# Patient Record
Sex: MALE | Race: WHITE | Employment: FULL TIME | URBAN - METROPOLITAN AREA
[De-identification: names, ages, dates, MRNs, and addresses within clinical notes are randomized per-mention and may not be internally consistent; named-entity substitution may affect disease eponyms.]

---

## 2017-01-09 ENCOUNTER — APPOINTMENT (OUTPATIENT)
Dept: GENERAL RADIOLOGY | Age: 42
End: 2017-01-09
Attending: EMERGENCY MEDICINE
Payer: MEDICAID

## 2017-01-09 ENCOUNTER — HOSPITAL ENCOUNTER (EMERGENCY)
Age: 42
Discharge: HOME | End: 2017-01-09
Attending: EMERGENCY MEDICINE
Payer: MEDICAID

## 2017-01-09 VITALS
TEMPERATURE: 98.2 F | WEIGHT: 270 LBS | BODY MASS INDEX: 32.88 KG/M2 | HEART RATE: 105 BPM | OXYGEN SATURATION: 95 % | RESPIRATION RATE: 19 BRPM | SYSTOLIC BLOOD PRESSURE: 126 MMHG | HEIGHT: 76 IN | DIASTOLIC BLOOD PRESSURE: 76 MMHG

## 2017-01-09 DIAGNOSIS — R07.9 CHEST PAIN, UNSPECIFIED TYPE: Primary | ICD-10-CM

## 2017-01-09 LAB
ALBUMIN SERPL BCP-MCNC: 3.5 G/DL (ref 3.5–5)
ALBUMIN/GLOB SERPL: 0.8 {RATIO} (ref 1.1–2.2)
ALP SERPL-CCNC: 87 U/L (ref 45–117)
ALT SERPL-CCNC: 45 U/L (ref 12–78)
ANION GAP BLD CALC-SCNC: 10 MMOL/L (ref 5–15)
AST SERPL W P-5'-P-CCNC: 19 U/L (ref 15–37)
ATRIAL RATE: 102 BPM
BASOPHILS # BLD AUTO: 0 K/UL (ref 0–0.1)
BASOPHILS # BLD: 0 % (ref 0–1)
BILIRUB SERPL-MCNC: 0.3 MG/DL (ref 0.2–1)
BUN SERPL-MCNC: 12 MG/DL (ref 6–20)
BUN/CREAT SERPL: 12 (ref 12–20)
CALCIUM SERPL-MCNC: 8.8 MG/DL (ref 8.5–10.1)
CALCULATED P AXIS, ECG09: 37 DEGREES
CALCULATED R AXIS, ECG10: 24 DEGREES
CALCULATED T AXIS, ECG11: 24 DEGREES
CHLORIDE SERPL-SCNC: 106 MMOL/L (ref 97–108)
CK SERPL-CCNC: 189 U/L (ref 39–308)
CO2 SERPL-SCNC: 25 MMOL/L (ref 21–32)
CREAT SERPL-MCNC: 1.04 MG/DL (ref 0.7–1.3)
DIAGNOSIS, 93000: NORMAL
EOSINOPHIL # BLD: 0.1 K/UL (ref 0–0.4)
EOSINOPHIL NFR BLD: 1 % (ref 0–7)
ERYTHROCYTE [DISTWIDTH] IN BLOOD BY AUTOMATED COUNT: 12.6 % (ref 11.5–14.5)
GLOBULIN SER CALC-MCNC: 4.4 G/DL (ref 2–4)
GLUCOSE SERPL-MCNC: 107 MG/DL (ref 65–100)
HCT VFR BLD AUTO: 39.4 % (ref 36.6–50.3)
HGB BLD-MCNC: 13.2 G/DL (ref 12.1–17)
LYMPHOCYTES # BLD AUTO: 24 % (ref 12–49)
LYMPHOCYTES # BLD: 1.8 K/UL (ref 0.8–3.5)
MCH RBC QN AUTO: 30.5 PG (ref 26–34)
MCHC RBC AUTO-ENTMCNC: 33.5 G/DL (ref 30–36.5)
MCV RBC AUTO: 91 FL (ref 80–99)
MONOCYTES # BLD: 0.4 K/UL (ref 0–1)
MONOCYTES NFR BLD AUTO: 5 % (ref 5–13)
NEUTS SEG # BLD: 5.1 K/UL (ref 1.8–8)
NEUTS SEG NFR BLD AUTO: 70 % (ref 32–75)
P-R INTERVAL, ECG05: 168 MS
PLATELET # BLD AUTO: 262 K/UL (ref 150–400)
POTASSIUM SERPL-SCNC: 3.6 MMOL/L (ref 3.5–5.1)
PROT SERPL-MCNC: 7.9 G/DL (ref 6.4–8.2)
Q-T INTERVAL, ECG07: 368 MS
QRS DURATION, ECG06: 96 MS
QTC CALCULATION (BEZET), ECG08: 479 MS
RBC # BLD AUTO: 4.33 M/UL (ref 4.1–5.7)
SODIUM SERPL-SCNC: 141 MMOL/L (ref 136–145)
TROPONIN I SERPL-MCNC: <0.04 NG/ML
TROPONIN I SERPL-MCNC: <0.04 NG/ML
VENTRICULAR RATE, ECG03: 102 BPM
WBC # BLD AUTO: 7.3 K/UL (ref 4.1–11.1)

## 2017-01-09 PROCEDURE — 85025 COMPLETE CBC W/AUTO DIFF WBC: CPT | Performed by: STUDENT IN AN ORGANIZED HEALTH CARE EDUCATION/TRAINING PROGRAM

## 2017-01-09 PROCEDURE — 36415 COLL VENOUS BLD VENIPUNCTURE: CPT | Performed by: EMERGENCY MEDICINE

## 2017-01-09 PROCEDURE — 74011250637 HC RX REV CODE- 250/637: Performed by: EMERGENCY MEDICINE

## 2017-01-09 PROCEDURE — 71020 XR CHEST PA LAT: CPT

## 2017-01-09 PROCEDURE — 82550 ASSAY OF CK (CPK): CPT | Performed by: STUDENT IN AN ORGANIZED HEALTH CARE EDUCATION/TRAINING PROGRAM

## 2017-01-09 PROCEDURE — 84484 ASSAY OF TROPONIN QUANT: CPT | Performed by: STUDENT IN AN ORGANIZED HEALTH CARE EDUCATION/TRAINING PROGRAM

## 2017-01-09 PROCEDURE — 80053 COMPREHEN METABOLIC PANEL: CPT | Performed by: STUDENT IN AN ORGANIZED HEALTH CARE EDUCATION/TRAINING PROGRAM

## 2017-01-09 PROCEDURE — 93005 ELECTROCARDIOGRAM TRACING: CPT

## 2017-01-09 PROCEDURE — 99285 EMERGENCY DEPT VISIT HI MDM: CPT

## 2017-01-09 RX ADMIN — NITROGLYCERIN 1 INCH: 20 OINTMENT TOPICAL at 15:29

## 2017-01-09 NOTE — ED PROVIDER NOTES
HPI Comments: 42 y.o. male with past medical history significant for acute MI, depression, and HTN who presents from work via EMS with chief complaint of chest pain. Pt reports onset of 10/10 left sided CP 2 hours ago when he was on his way back to work after eating lunch. The pain started as a discomfort that he initially thought was secondary to what he ate for lunch but it did not resolve. The pain radiates to his \"whole left side\" including left arm. No radiation into  He also reports associated tingling and numbness in left arm, diaphoresis, SOB, \"shaking real bad\", and being told he looked \"flush\". He denies having any sx prior to onset of CP except for a cough. Pt states that he was given 325 mg ASA by EMS which reduced his CP, currently rating 5/10. Pt reports that he has a history of a \"very minor\" cardiac event not requiring catheterization, and that he has been compliant with instructions to take 325 mg ASA daily - including early this morning. He takes medication for high cholesterol, HTN, and depression at night. Pt was last seen by his cardiologist 6 months ago. No Nausea or vomiting. There are no other acute medical concerns at this time. Social hx:  Former smoker. Social alcohol use. Note written by Fredi Delacruz, as dictated by Torri Landry MD 3:12 PM          The history is provided by the patient. Past Medical History:   Diagnosis Date    Acute MI ([facility] [address]     Depression     Hypertension        History reviewed. No pertinent past surgical history. History reviewed. No pertinent family history. Social History     Social History    Marital status: N/A     Spouse name: N/A    Number of children: N/A    Years of education: N/A     Occupational History    Not on file.      Social History Main Topics    Smoking status: Former Smoker    Smokeless tobacco: Not on file    Alcohol use Yes      Comment: socially     Drug use: No    Sexual activity: Not on file     Other Topics Concern    Not on file     Social History Narrative    No narrative on file         ALLERGIES: Review of patient's allergies indicates no known allergies. Review of Systems   Constitutional: Positive for diaphoresis. Negative for fever. HENT: Negative for facial swelling. Eyes: Negative for visual disturbance. Respiratory: Positive for cough and shortness of breath. Cardiovascular: Positive for chest pain. Gastrointestinal: Negative for abdominal pain, nausea and vomiting. Genitourinary: Negative for dysuria. Musculoskeletal: Negative for joint swelling. Skin: Negative for rash. Neurological: Positive for tremors and numbness. Negative for headaches. Hematological: Negative for adenopathy. Psychiatric/Behavioral: Negative for suicidal ideas. All other systems reviewed and are negative. Vitals:    01/09/17 1427   BP: 144/90   Pulse: (!) 111   Resp: 14   Temp: 98.4 °F (36.9 °C)   SpO2: 96%   Weight: 122.5 kg (270 lb)   Height: 6' 4\" (1.93 m)            Physical Exam   Constitutional: He is oriented to person, place, and time. He appears well-developed and well-nourished. No distress. HENT:   Head: Normocephalic and atraumatic. Mouth/Throat: Oropharynx is clear and moist.   Eyes: Pupils are equal, round, and reactive to light. Neck: Normal range of motion. Neck supple. Cardiovascular: Normal rate, regular rhythm, normal heart sounds and intact distal pulses. Pulmonary/Chest: Effort normal and breath sounds normal. No respiratory distress. Abdominal: Soft. Bowel sounds are normal. He exhibits no distension. There is no tenderness. Musculoskeletal: Normal range of motion. He exhibits no edema. Neurological: He is alert and oriented to person, place, and time. Skin: Skin is warm and dry. Nursing note and vitals reviewed.      Note written by Fredi Escobar, as dictated by Sophy Funk MD 3:29 PM      MDM  Number of Diagnoses or Management Options  Diagnosis management comments: A:  34yo M who p/w chest pain starting after lunch today. Pain now improved after asa given by EMS. Pt reports having minor MI about one year ago for which he was started on asa 325mg daily. DDx:  ACS, msk pain, GERD/esophagitis, pericarditis, effusion, myocarditis    P:  ecg  cxr  Labs  Already took asa. Will try nitro paste    ED Course       Procedures    ED EKG interpretation:  Rhythm: normal sinus rhythm. Rate (approx.): 102. Axis: normal.  ST segment:  No concerning ST elevations or depressions. This EKG was interpreted by Kristyn Medina MD,ED Provider. Labs and CXR unremarkable. Pt with no pain after applying nitro paste. PROGRESS NOTE:  4:46 PM  Pt is pain free at this time. Declines admission. Will check repeat troponin. Pt states he can f/u with PCP tomorrow. He can also contact his cardiologist from [address] [place] [address] if needed. 5:00 PM  Repeat ECG  ED EKG interpretation:  Rhythm: normal sinus rhythm. Rate (approx.): 97. Axis: normal.  ST segment:  No concerning ST elevations or depressions. This EKG was interpreted by Kristyn Medina MD,ED Provider. 5:52 PM  Repeat trop neg. Pt remains pain free. Danelle Mcclain to get home before dark. Will call PCP in AM.    5:52 PM  Pt has been reevaluated. There are no new complaints, changes, or physical findings at this time. Medications have been reviewed w/ pt and/or family. Pt and/or family's questions have been answered. Pt and/or family expressed good understanding of the dx/tx/rx and is in agreement with plan of care. Pt instructed and agreed to f/u w/ PCP and/or cardiologist and to return to ED upon further deterioration. Pt is ready for discharge.     LABORATORY TESTS:  Recent Results (from the past 12 hour(s))   EKG, 12 LEAD, INITIAL    Collection Time: 01/09/17  2:32 PM   Result Value Ref Range    Ventricular Rate 102 BPM    Atrial Rate 102 BPM    P-R Interval 168 ms    QRS Duration 96 ms    Q-T Interval 368 ms    QTC Calculation (Bezet) 479 ms    Calculated P Axis 37 degrees    Calculated R Axis 24 degrees    Calculated T Axis 24 degrees    Diagnosis       Sinus tachycardia  No previous ECGs available  Confirmed by Renan Tucker MD ([id number]) on 1/9/2017 4:40:07 PM     CBC WITH AUTOMATED DIFF    Collection Time: 01/09/17  2:40 PM   Result Value Ref Range    WBC 7.3 4.1 - 11.1 K/uL    RBC 4.33 4.10 - 5.70 M/uL    HGB 13.2 12.1 - 17.0 g/dL    HCT 39.4 36.6 - 50.3 %    MCV 91.0 80.0 - 99.0 FL    MCH 30.5 26.0 - 34.0 PG    MCHC 33.5 30.0 - 36.5 g/dL    RDW 12.6 11.5 - 14.5 %    PLATELET 391 610 - 857 K/uL    NEUTROPHILS 70 32 - 75 %    LYMPHOCYTES 24 12 - 49 %    MONOCYTES 5 5 - 13 %    EOSINOPHILS 1 0 - 7 %    BASOPHILS 0 0 - 1 %    ABS. NEUTROPHILS 5.1 1.8 - 8.0 K/UL    ABS. LYMPHOCYTES 1.8 0.8 - 3.5 K/UL    ABS. MONOCYTES 0.4 0.0 - 1.0 K/UL    ABS. EOSINOPHILS 0.1 0.0 - 0.4 K/UL    ABS. BASOPHILS 0.0 0.0 - 0.1 K/UL   METABOLIC PANEL, COMPREHENSIVE    Collection Time: 01/09/17  2:40 PM   Result Value Ref Range    Sodium 141 136 - 145 mmol/L    Potassium 3.6 3.5 - 5.1 mmol/L    Chloride 106 97 - 108 mmol/L    CO2 25 21 - 32 mmol/L    Anion gap 10 5 - 15 mmol/L    Glucose 107 (H) 65 - 100 mg/dL    BUN 12 6 - 20 MG/DL    Creatinine 1.04 0.70 - 1.30 MG/DL    BUN/Creatinine ratio 12 12 - 20      GFR est AA >60 >60 ml/min/1.73m2    GFR est non-AA >60 >60 ml/min/1.73m2    Calcium 8.8 8.5 - 10.1 MG/DL    Bilirubin, total 0.3 0.2 - 1.0 MG/DL    ALT 45 12 - 78 U/L    AST 19 15 - 37 U/L    Alk.  phosphatase 87 45 - 117 U/L    Protein, total 7.9 6.4 - 8.2 g/dL    Albumin 3.5 3.5 - 5.0 g/dL    Globulin 4.4 (H) 2.0 - 4.0 g/dL    A-G Ratio 0.8 (L) 1.1 - 2.2     CK W/ REFLX CKMB    Collection Time: 01/09/17  2:40 PM   Result Value Ref Range     39 - 308 U/L   TROPONIN I    Collection Time: 01/09/17  2:40 PM   Result Value Ref Range    Troponin-I, Qt. <0.04 <0.05 ng/mL   EKG, 12 LEAD, SUBSEQUENT    Collection Time: 01/09/17  4:55 PM   Result Value Ref Range    Ventricular Rate 97 BPM    Atrial Rate 97 BPM    P-R Interval 170 ms    QRS Duration 92 ms    Q-T Interval 410 ms    QTC Calculation (Bezet) 520 ms    Calculated P Axis 101 degrees    Calculated R Axis 3 degrees    Calculated T Axis 9 degrees    Diagnosis       Normal sinus rhythm  Inferior infarct , age undetermined  Prolonged QT  When compared with ECG of 09-JAN-2017 14:32,  Inferior infarct is now present     TROPONIN I    Collection Time: 01/09/17  4:57 PM   Result Value Ref Range    Troponin-I, Qt. <0.04 <0.05 ng/mL       IMAGING RESULTS:  XR CHEST PA LAT   Final Result          MEDICATIONS GIVEN:  Medications   nitroglycerin (NITROBID) 2 % ointment 1 Inch (1 Inch Topical Given 1/9/17 8519)       IMPRESSION:  1. Chest pain, unspecified type        PLAN:  1. Current Discharge Medication List      CONTINUE these medications which have NOT CHANGED    Details   LISINOPRIL PO Take  by mouth daily. METOPROLOL SUCCINATE PO Take  by mouth daily. CITALOPRAM HYDROBROMIDE (CITALOPRAM PO) Take  by mouth daily.            2.   Follow-up Information     Follow up With Details Comments Contact Info    Your primary care doctor Call in 1 day      [facility] [place] [facility]  If symptoms worsen [address] [facility]  [phone]          Return to ED if worse

## 2017-01-09 NOTE — DISCHARGE INSTRUCTIONS
Chest Pain: Care Instructions  Your Care Instructions  There are many things that can cause chest pain. Some are not serious and will get better on their own in a few days. But some kinds of chest pain need more testing and treatment. Your doctor may have recommended a follow-up visit in the next 8 to 12 hours. If you are not getting better, you may need more tests or treatment. Even though your doctor has released you, you still need to watch for any problems. The doctor carefully checked you, but sometimes problems can develop later. If you have new symptoms or if your symptoms do not get better, get medical care right away. If you have worse or different chest pain or pressure that lasts more than 5 minutes or you passed out (lost consciousness), call 911 or seek other emergency help right away. A medical visit is only one step in your treatment. Even if you feel better, you still need to do what your doctor recommends, such as going to all suggested follow-up appointments and taking medicines exactly as directed. This will help you recover and help prevent future problems. How can you care for yourself at home? · Rest until you feel better. · Take your medicine exactly as prescribed. Call your doctor if you think you are having a problem with your medicine. · Do not drive after taking a prescription pain medicine. When should you call for help? Call 911 if:  · You passed out (lost consciousness). · You have severe difficulty breathing. · You have symptoms of a heart attack. These may include:  ¨ Chest pain or pressure, or a strange feeling in your chest.  ¨ Sweating. ¨ Shortness of breath. ¨ Nausea or vomiting. ¨ Pain, pressure, or a strange feeling in your back, neck, jaw, or upper belly or in one or both shoulders or arms. ¨ Lightheadedness or sudden weakness. ¨ A fast or irregular heartbeat.   After you call 911, the  may tell you to chew 1 adult-strength or 2 to 4 low-dose aspirin. Wait for an ambulance. Do not try to drive yourself. Call your doctor today if:  · You have any trouble breathing. · Your chest pain gets worse. · You are dizzy or lightheaded, or you feel like you may faint. · You are not getting better as expected. · You are having new or different chest pain. Where can you learn more? Go to http://collin-pasha.info/. Enter A120 in the search box to learn more about \"Chest Pain: Care Instructions. \"  Current as of: May 27, 2016  Content Version: 11.1  © 0732-5893 [employer]. Care instructions adapted under license by [employer] (which disclaims liability or warranty for this information). If you have questions about a medical condition or this instruction, always ask your healthcare professional. [address] any warranty or liability for your use of this information. We hope that we have addressed all of your medical concerns. The examination and treatment you received in the Emergency Department were for an emergent problem and were not intended as complete care. It is important that you follow up with your healthcare provider(s) for ongoing care. If your symptoms worsen or do not improve as expected, and you are unable to reach your usual health care provider(s), you should return to the Emergency Department. Today's healthcare is undergoing tremendous change, and patient satisfaction surveys are one of the many tools to assess the quality of medical care. You may receive a survey from the [employer] organization regarding your experience in the Emergency Department. I hope that your experience has been completely positive, particularly the medical care that I provided. As such, please participate in the survey; anything less than excellent does not meet my expectations or intentions.         [facility] and [facility] participate in nationally recognized quality of care measures. If your blood pressure is greater than 120/80, as reported below, we urge that you seek medical care to address the potential of high blood pressure, commonly known as hypertension. Hypertension can be hereditary or can be caused by certain medical conditions, pain, stress, or \"white coat syndrome. \"       Please make an appointment with your health care provider(s) for follow up of your Emergency Department visit. VITALS:   Patient Vitals for the past 8 hrs:   Temp Pulse Resp BP SpO2   01/09/17 1730 98.2 °F (36.8 °C) (!) 105 19 126/76 95 %   01/09/17 1715 - (!) 104 18 127/78 97 %   01/09/17 1700 - 96 20 124/75 94 %   01/09/17 1645 - (!) 105 21 148/71 98 %   01/09/17 1630 - - - 128/89 -   01/09/17 1615 - (!) 103 18 130/80 97 %   01/09/17 1600 - 100 17 125/87 95 %   01/09/17 1545 - 97 22 (!) 126/91 97 %   01/09/17 1529 - 97 - 126/78 -   01/09/17 1427 98.4 °F (36.9 °C) (!) 111 14 144/90 96 %          Thank you for allowing us to provide you with medical care today. We realize that you have many choices for your emergency care needs. Please choose us in the future for any continued health care needs.       Regards,           Curry White MD    [facility], [facility].   Office: [phone]            Recent Results (from the past 24 hour(s))   EKG, 12 LEAD, INITIAL    Collection Time: 01/09/17  2:32 PM   Result Value Ref Range    Ventricular Rate 102 BPM    Atrial Rate 102 BPM    P-R Interval 168 ms    QRS Duration 96 ms    Q-T Interval 368 ms    QTC Calculation (Bezet) 479 ms    Calculated P Axis 37 degrees    Calculated R Axis 24 degrees    Calculated T Axis 24 degrees    Diagnosis       Sinus tachycardia  No previous ECGs available  Confirmed by Jake Rasheed MD ([id number]) on 1/9/2017 4:40:07 PM     CBC WITH AUTOMATED DIFF    Collection Time: 01/09/17  2:40 PM   Result Value Ref Range    WBC 7.3 4.1 - 11.1 K/uL    RBC 4.33 4.10 - 5.70 M/uL HGB 13.2 12.1 - 17.0 g/dL    HCT 39.4 36.6 - 50.3 %    MCV 91.0 80.0 - 99.0 FL    MCH 30.5 26.0 - 34.0 PG    MCHC 33.5 30.0 - 36.5 g/dL    RDW 12.6 11.5 - 14.5 %    PLATELET 769 410 - 451 K/uL    NEUTROPHILS 70 32 - 75 %    LYMPHOCYTES 24 12 - 49 %    MONOCYTES 5 5 - 13 %    EOSINOPHILS 1 0 - 7 %    BASOPHILS 0 0 - 1 %    ABS. NEUTROPHILS 5.1 1.8 - 8.0 K/UL    ABS. LYMPHOCYTES 1.8 0.8 - 3.5 K/UL    ABS. MONOCYTES 0.4 0.0 - 1.0 K/UL    ABS. EOSINOPHILS 0.1 0.0 - 0.4 K/UL    ABS. BASOPHILS 0.0 0.0 - 0.1 K/UL   METABOLIC PANEL, COMPREHENSIVE    Collection Time: 01/09/17  2:40 PM   Result Value Ref Range    Sodium 141 136 - 145 mmol/L    Potassium 3.6 3.5 - 5.1 mmol/L    Chloride 106 97 - 108 mmol/L    CO2 25 21 - 32 mmol/L    Anion gap 10 5 - 15 mmol/L    Glucose 107 (H) 65 - 100 mg/dL    BUN 12 6 - 20 MG/DL    Creatinine 1.04 0.70 - 1.30 MG/DL    BUN/Creatinine ratio 12 12 - 20      GFR est AA >60 >60 ml/min/1.73m2    GFR est non-AA >60 >60 ml/min/1.73m2    Calcium 8.8 8.5 - 10.1 MG/DL    Bilirubin, total 0.3 0.2 - 1.0 MG/DL    ALT 45 12 - 78 U/L    AST 19 15 - 37 U/L    Alk.  phosphatase 87 45 - 117 U/L    Protein, total 7.9 6.4 - 8.2 g/dL    Albumin 3.5 3.5 - 5.0 g/dL    Globulin 4.4 (H) 2.0 - 4.0 g/dL    A-G Ratio 0.8 (L) 1.1 - 2.2     CK W/ REFLX CKMB    Collection Time: 01/09/17  2:40 PM   Result Value Ref Range     39 - 308 U/L   TROPONIN I    Collection Time: 01/09/17  2:40 PM   Result Value Ref Range    Troponin-I, Qt. <0.04 <0.05 ng/mL   EKG, 12 LEAD, SUBSEQUENT    Collection Time: 01/09/17  4:55 PM   Result Value Ref Range    Ventricular Rate 97 BPM    Atrial Rate 97 BPM    P-R Interval 170 ms    QRS Duration 92 ms    Q-T Interval 410 ms    QTC Calculation (Bezet) 520 ms    Calculated P Axis 101 degrees    Calculated R Axis 3 degrees    Calculated T Axis 9 degrees    Diagnosis       Normal sinus rhythm  Inferior infarct , age undetermined  Prolonged QT  When compared with ECG of 09-JAN-2017 14:32,  Inferior infarct is now present     TROPONIN I    Collection Time: 01/09/17  4:57 PM   Result Value Ref Range    Troponin-I, Qt. <0.04 <0.05 ng/mL       Xr Chest Pa Lat    Result Date: 1/9/2017  Exam:  2 view chest Indication: Chest pain, 10 out of 10 left-sided chest pain 2 hours ago after eating lunch. COMPARISON: None PA and lateral views demonstrate normal heart size. The patient is on a cardiac monitor. The lungs are clear. No adenopathy or pleural effusions. There is mild/moderate dextroconvex curvature of the thoracic spine. IMPRESSION: 1.  No acute process

## 2017-01-09 NOTE — ED TRIAGE NOTES
Triage Note:  Pt arrived complaining of CP that began while at work today. Pt was given 325 mg ASA with EMS. Pt states pain is 8/10. Pt states he had an MI one year ago.

## 2017-01-09 NOTE — ED NOTES
Pt. Given discharge instructions bedside. Verbalized understanding.  Ambulated out of ED with steady gait

## 2017-01-09 NOTE — LETTER
[address] 
[address] [facility] [address] 
[phone] Work/School Note Date: 1/9/2017 To Whom It May concern: 
 
Placido Castillo was seen and treated today in the emergency room by the following provider(s): 
Attending Provider: Jose M Quiñones MD. Placido Castillo may return to work on 1/11/2017.  
 
Sincerely, 
 
 
 
 
Jose M Quiñones MD

## 2017-01-10 LAB
ATRIAL RATE: 97 BPM
CALCULATED P AXIS, ECG09: 101 DEGREES
CALCULATED R AXIS, ECG10: 3 DEGREES
CALCULATED T AXIS, ECG11: 9 DEGREES
DIAGNOSIS, 93000: NORMAL
P-R INTERVAL, ECG05: 170 MS
Q-T INTERVAL, ECG07: 410 MS
QRS DURATION, ECG06: 92 MS
QTC CALCULATION (BEZET), ECG08: 520 MS
VENTRICULAR RATE, ECG03: 97 BPM

## 2017-03-27 ENCOUNTER — ED HISTORICAL/CONVERTED ENCOUNTER (OUTPATIENT)
Dept: OTHER | Age: 42
End: 2017-03-27

## 2017-04-27 ENCOUNTER — ED HISTORICAL/CONVERTED ENCOUNTER (OUTPATIENT)
Dept: OTHER | Age: 42
End: 2017-04-27

## 2017-06-15 ENCOUNTER — ED HISTORICAL/CONVERTED ENCOUNTER (OUTPATIENT)
Dept: OTHER | Age: 42
End: 2017-06-15

## 2017-08-18 ENCOUNTER — OP HISTORICAL/CONVERTED ENCOUNTER (OUTPATIENT)
Dept: OTHER | Age: 42
End: 2017-08-18

## 2017-09-20 ENCOUNTER — OP HISTORICAL/CONVERTED ENCOUNTER (OUTPATIENT)
Dept: OTHER | Age: 42
End: 2017-09-20

## 2017-09-26 ENCOUNTER — ED HISTORICAL/CONVERTED ENCOUNTER (OUTPATIENT)
Dept: OTHER | Age: 42
End: 2017-09-26

## 2017-10-20 ENCOUNTER — IP HISTORICAL/CONVERTED ENCOUNTER (OUTPATIENT)
Dept: OTHER | Age: 42
End: 2017-10-20

## 2017-11-07 ENCOUNTER — ED HISTORICAL/CONVERTED ENCOUNTER (OUTPATIENT)
Dept: OTHER | Age: 42
End: 2017-11-07

## 2017-11-19 ENCOUNTER — ED HISTORICAL/CONVERTED ENCOUNTER (OUTPATIENT)
Dept: OTHER | Age: 42
End: 2017-11-19

## 2018-01-12 ENCOUNTER — IP HISTORICAL/CONVERTED ENCOUNTER (OUTPATIENT)
Dept: OTHER | Age: 43
End: 2018-01-12

## 2018-04-16 ENCOUNTER — ED HISTORICAL/CONVERTED ENCOUNTER (OUTPATIENT)
Dept: OTHER | Age: 43
End: 2018-04-16

## 2018-08-23 ENCOUNTER — IP HISTORICAL/CONVERTED ENCOUNTER (OUTPATIENT)
Dept: OTHER | Age: 43
End: 2018-08-23

## 2018-09-15 ENCOUNTER — ED HISTORICAL/CONVERTED ENCOUNTER (OUTPATIENT)
Dept: OTHER | Age: 43
End: 2018-09-15

## 2018-10-09 ENCOUNTER — ED HISTORICAL/CONVERTED ENCOUNTER (OUTPATIENT)
Dept: OTHER | Age: 43
End: 2018-10-09

## 2018-10-23 ENCOUNTER — ED HISTORICAL/CONVERTED ENCOUNTER (OUTPATIENT)
Dept: OTHER | Age: 43
End: 2018-10-23

## 2018-10-31 ENCOUNTER — OP HISTORICAL/CONVERTED ENCOUNTER (OUTPATIENT)
Dept: OTHER | Age: 43
End: 2018-10-31

## 2018-11-05 ENCOUNTER — ED HISTORICAL/CONVERTED ENCOUNTER (OUTPATIENT)
Dept: OTHER | Age: 43
End: 2018-11-05

## 2018-11-08 ENCOUNTER — ED HISTORICAL/CONVERTED ENCOUNTER (OUTPATIENT)
Dept: OTHER | Age: 43
End: 2018-11-08

## 2018-11-25 ENCOUNTER — ED HISTORICAL/CONVERTED ENCOUNTER (OUTPATIENT)
Dept: OTHER | Age: 43
End: 2018-11-25

## 2019-01-09 ENCOUNTER — ED HISTORICAL/CONVERTED ENCOUNTER (OUTPATIENT)
Dept: OTHER | Age: 44
End: 2019-01-09

## 2019-03-14 ENCOUNTER — ED HISTORICAL/CONVERTED ENCOUNTER (OUTPATIENT)
Dept: OTHER | Age: 44
End: 2019-03-14

## 2019-03-30 ENCOUNTER — ED HISTORICAL/CONVERTED ENCOUNTER (OUTPATIENT)
Dept: OTHER | Age: 44
End: 2019-03-30

## 2019-04-08 ENCOUNTER — ED HISTORICAL/CONVERTED ENCOUNTER (OUTPATIENT)
Dept: OTHER | Age: 44
End: 2019-04-08

## 2019-04-26 ENCOUNTER — IP HISTORICAL/CONVERTED ENCOUNTER (OUTPATIENT)
Dept: OTHER | Age: 44
End: 2019-04-26

## 2019-05-12 ENCOUNTER — IP HISTORICAL/CONVERTED ENCOUNTER (OUTPATIENT)
Dept: OTHER | Age: 44
End: 2019-05-12

## 2019-06-08 ENCOUNTER — ED HISTORICAL/CONVERTED ENCOUNTER (OUTPATIENT)
Dept: OTHER | Age: 44
End: 2019-06-08

## 2019-06-14 ENCOUNTER — OP HISTORICAL/CONVERTED ENCOUNTER (OUTPATIENT)
Dept: OTHER | Age: 44
End: 2019-06-14

## 2019-06-17 ENCOUNTER — ED HISTORICAL/CONVERTED ENCOUNTER (OUTPATIENT)
Dept: OTHER | Age: 44
End: 2019-06-17

## 2019-07-09 ENCOUNTER — ED HISTORICAL/CONVERTED ENCOUNTER (OUTPATIENT)
Dept: OTHER | Age: 44
End: 2019-07-09

## 2019-09-29 ENCOUNTER — OP HISTORICAL/CONVERTED ENCOUNTER (OUTPATIENT)
Dept: OTHER | Age: 44
End: 2019-09-29

## 2019-10-25 ENCOUNTER — ED HISTORICAL/CONVERTED ENCOUNTER (OUTPATIENT)
Dept: OTHER | Age: 44
End: 2019-10-25

## 2020-07-29 ENCOUNTER — HOSPITAL ENCOUNTER (OUTPATIENT)
Dept: CT IMAGING | Age: 45
Discharge: HOME | End: 2020-07-29
Payer: MEDICAID

## 2020-07-29 DIAGNOSIS — R07.9 CHEST PAIN: ICD-10-CM

## 2020-07-29 DIAGNOSIS — I10 ESSENTIAL (PRIMARY) HYPERTENSION: ICD-10-CM

## 2020-07-29 PROCEDURE — 75574 CT ANGIO HRT W/3D IMAGE: CPT

## 2020-07-29 PROCEDURE — 75571 CT HRT W/O DYE W/CA TEST: CPT

## 2020-07-29 RX ORDER — IODIXANOL 320 MG/ML
150 INJECTION, SOLUTION INTRAVASCULAR
Status: DISPENSED | OUTPATIENT
Start: 2020-07-29 | End: 2020-07-29

## 2020-09-03 ENCOUNTER — OFFICE VISIT (OUTPATIENT)
Dept: SURGERY | Age: 45
End: 2020-09-03
Payer: MEDICAID

## 2020-09-03 VITALS
HEART RATE: 105 BPM | WEIGHT: 315 LBS | TEMPERATURE: 98 F | HEIGHT: 76 IN | DIASTOLIC BLOOD PRESSURE: 88 MMHG | OXYGEN SATURATION: 97 % | BODY MASS INDEX: 38.36 KG/M2 | SYSTOLIC BLOOD PRESSURE: 126 MMHG

## 2020-09-03 DIAGNOSIS — E66.01 OBESITY, MORBID (HCC): ICD-10-CM

## 2020-09-03 PROCEDURE — 99201 PR OFFICE OUTPATIENT NEW 10 MINUTES: CPT | Performed by: SURGERY

## 2020-09-03 RX ORDER — METFORMIN HYDROCHLORIDE 500 MG/1
600 TABLET ORAL
Status: ON HOLD | COMMUNITY
Start: 2020-08-13 | End: 2021-08-19

## 2020-09-03 RX ORDER — TRAZODONE HYDROCHLORIDE 100 MG/1
TABLET ORAL
Status: ON HOLD | COMMUNITY
Start: 2020-07-17 | End: 2021-08-19

## 2020-09-03 RX ORDER — FUROSEMIDE 40 MG/1
TABLET ORAL
COMMUNITY
Start: 2020-08-13 | End: 2020-12-17 | Stop reason: ALTCHOICE

## 2020-09-03 RX ORDER — NICOTINE 11MG/24HR
PATCH, TRANSDERMAL 24 HOURS TRANSDERMAL
Status: ON HOLD | COMMUNITY
Start: 2020-08-13 | End: 2021-06-27 | Stop reason: ALTCHOICE

## 2020-09-03 RX ORDER — GABAPENTIN 100 MG/1
CAPSULE ORAL
COMMUNITY
Start: 2020-08-21 | End: 2020-12-17 | Stop reason: ALTCHOICE

## 2020-09-03 RX ORDER — ALBUTEROL SULFATE 90 UG/1
AEROSOL, METERED RESPIRATORY (INHALATION)
COMMUNITY
Start: 2020-08-21 | End: 2021-07-13 | Stop reason: ALTCHOICE

## 2020-09-03 RX ORDER — ATORVASTATIN CALCIUM 20 MG/1
20 TABLET, FILM COATED ORAL DAILY
COMMUNITY
Start: 2020-08-03

## 2020-09-07 PROBLEM — I65.29 CAROTID ARTERY STENOSIS: Status: ACTIVE | Noted: 2020-09-07

## 2020-09-07 PROBLEM — E66.01 OBESITY, MORBID (HCC): Status: ACTIVE | Noted: 2020-09-07

## 2020-09-07 NOTE — PROGRESS NOTES
VASCULAR FOLLOW UP      Subjective:   CHIEF COMPLAINTS:    PRESENTATION OF ILLNESS:  This patient is not examined today. Past Medical History:   Diagnosis Date    Acute MI ([address]     Depression     Hypertension     Ill-defined condition       Past Surgical History:   Procedure Laterality Date    HX HERNIA REPAIR      HX ORTHOPAEDIC      rotator cuff surgery     Family History   Problem Relation Age of Onset    Diabetes Maternal Uncle     Hypertension Maternal Uncle     Hypertension Paternal Uncle     Diabetes Paternal Uncle     Cancer Other       Social History     Tobacco Use    Smoking status: Former Smoker    Smokeless tobacco: Never Used   Substance Use Topics    Alcohol use: Yes     Comment: socially        Prior to Admission medications    Medication Sig Start Date End Date Taking? Authorizing Provider   atorvastatin (LIPITOR) 20 mg tablet TAKE ONE TABLET BY MOUTH AT BEDTIME 8/3/20   Provider, Historical   ProAir HFA 90 mcg/actuation inhaler INHALE 2 PUFFS EVERY 6 HOURS as needed for SHORTNESS OF BREATH 8/21/20   Provider, Historical   Vitamin D3 50 mcg (2,000 unit) cap capsule TAKE ONE CAPSULE BY MOUTH EVERY DAY 8/13/20   Provider, Historical   furosemide (LASIX) 40 mg tablet TAKE ONE TABLET BY MOUTH EVERY DAY as needed for EDEMA 8/13/20   Provider, Historical   gabapentin (NEURONTIN) 100 mg capsule TAKE ONE CAPSULE BY MOUTH TWICE DAILY 8/21/20   Provider, Historical   metFORMIN (GLUCOPHAGE) 500 mg tablet TAKE ONE TABLET BY MOUTH TWICE DAILY WITH MEALS 8/13/20   Provider, Historical   traZODone (DESYREL) 100 mg tablet TAKE ONE TABLET BY MOUTH AT BEDTIME 7/17/20   Provider, Historical   METOPROLOL SUCCINATE PO Take  by mouth daily. Elizabeth De MD   CITALOPRAM HYDROBROMIDE (CITALOPRAM PO) Take  by mouth daily. Elizabeth De MD   aspirin 81 mg chewable tablet Take 81 mg by mouth daily.     Elizabeth De MD     Allergies   Allergen Reactions    Vancomycin Other (comments)     Promise Aguila syndrome        Review of Systems:  I reviewed the rest of organ systems personally and they were negative signed by Dr. Estrella Hoffman    Objective:     Visit Vitals  /88 (BP 1 Location: Right arm, BP Patient Position: Sitting)   Pulse (!) 105   Temp 98 °F (36.7 °C) (Temporal)   Ht 6' 4\" (1.93 m)   Wt 346 lb (156.9 kg)   SpO2 97% Comment: uses oxygen at night-4l>with Cpap   BMI 42.12 kg/m²     VITAL SIGNS REVIEWED. Physical Exam:  Patient is well-nourished pleasant in conversation is appropriate. Head and neck examination atraumatic, normocephalic. Gaze appropriate. Conversation appropriate. Neck examination shows supple. No mass. No obvious carotid bruit. Chest examination shows lungs are clear bilaterally well-expanded, no crackles or wheezes. Cardiovascular system regular rate, no obvious murmur. Skin warm to touch  and moist, no skin lesions. Abdomen is soft ,not tender or distended bowel sounds present. No palpable mass. Neurological examinations, no focal neuro deficits moving all 4 extremities. Cranial nerves intact. Sensation is intact as well. Hematologic: No obvious bruise or swelling or obvious lymphadenopathy. Psychosocial: Appropriate. Has good effect. Musculoskeletal system: No muscle wasting, appropriate movements upper and lower extremity. Vascular examination        Data Review:   No visits with results within 1 Month(s) from this visit.    Latest known visit with results is:   Admission on 01/09/2017, Discharged on 01/09/2017   Component Date Value Ref Range Status    Ventricular Rate 01/09/2017 102  BPM Final    Atrial Rate 01/09/2017 102  BPM Final    P-R Interval 01/09/2017 168  ms Final    QRS Duration 01/09/2017 96  ms Final    Q-T Interval 01/09/2017 368  ms Final    QTC Calculation (Bezet) 01/09/2017 479  ms Final    Calculated P Axis 01/09/2017 37  degrees Final    Calculated R Axis 01/09/2017 24  degrees Final    Calculated T Axis 01/09/2017 24  degrees Final    Diagnosis 01/09/2017    Final                    Value:Sinus tachycardia  No previous ECGs available  Confirmed by Juanita Lerner MD ([id number]) on 1/9/2017 4:40:07 PM      WBC 01/09/2017 7.3  4.1 - 11.1 K/uL Final    RBC 01/09/2017 4.33  4.10 - 5.70 M/uL Final    HGB 01/09/2017 13.2  12.1 - 17.0 g/dL Final    HCT 01/09/2017 39.4  36.6 - 50.3 % Final    MCV 01/09/2017 91.0  80.0 - 99.0 FL Final    MCH 01/09/2017 30.5  26.0 - 34.0 PG Final    MCHC 01/09/2017 33.5  30.0 - 36.5 g/dL Final    RDW 01/09/2017 12.6  11.5 - 14.5 % Final    PLATELET 82/92/1919 671  150 - 400 K/uL Final    NEUTROPHILS 01/09/2017 70  32 - 75 % Final    LYMPHOCYTES 01/09/2017 24  12 - 49 % Final    MONOCYTES 01/09/2017 5  5 - 13 % Final    EOSINOPHILS 01/09/2017 1  0 - 7 % Final    BASOPHILS 01/09/2017 0  0 - 1 % Final    ABS. NEUTROPHILS 01/09/2017 5.1  1.8 - 8.0 K/UL Final    ABS. LYMPHOCYTES 01/09/2017 1.8  0.8 - 3.5 K/UL Final    ABS. MONOCYTES 01/09/2017 0.4  0.0 - 1.0 K/UL Final    ABS. EOSINOPHILS 01/09/2017 0.1  0.0 - 0.4 K/UL Final    ABS. BASOPHILS 01/09/2017 0.0  0.0 - 0.1 K/UL Final    Sodium 01/09/2017 141  136 - 145 mmol/L Final    Potassium 01/09/2017 3.6  3.5 - 5.1 mmol/L Final    Chloride 01/09/2017 106  97 - 108 mmol/L Final    CO2 01/09/2017 25  21 - 32 mmol/L Final    Anion gap 01/09/2017 10  5 - 15 mmol/L Final    Glucose 01/09/2017 107* 65 - 100 mg/dL Final    BUN 01/09/2017 12  6 - 20 MG/DL Final    Creatinine 01/09/2017 1.04  0.70 - 1.30 MG/DL Final    BUN/Creatinine ratio 01/09/2017 12  12 - 20   Final    GFR est AA 01/09/2017 >60  >60 ml/min/1.73m2 Final    GFR est non-AA 01/09/2017 >60  >60 ml/min/1.73m2 Final    Calcium 01/09/2017 8.8  8.5 - 10.1 MG/DL Final    Bilirubin, total 01/09/2017 0.3  0.2 - 1.0 MG/DL Final    ALT (SGPT) 01/09/2017 45  12 - 78 U/L Final    AST (SGOT) 01/09/2017 19  15 - 37 U/L Final    Alk.  phosphatase 01/09/2017 87  45 - 117 U/L Final    Protein, total 01/09/2017 7.9  6.4 - 8.2 g/dL Final    Albumin 01/09/2017 3.5  3.5 - 5.0 g/dL Final    Globulin 01/09/2017 4.4* 2.0 - 4.0 g/dL Final    A-G Ratio 01/09/2017 0.8* 1.1 - 2.2   Final    CK 01/09/2017 189  39 - 308 U/L Final    Troponin-I, Qt. 01/09/2017 <0.04  <0.05 ng/mL Final    Troponin-I, Qt. 01/09/2017 <0.04  <0.05 ng/mL Final    Ventricular Rate 01/09/2017 97  BPM Final    Atrial Rate 01/09/2017 97  BPM Final    P-R Interval 01/09/2017 170  ms Final    QRS Duration 01/09/2017 92  ms Final    Q-T Interval 01/09/2017 410  ms Final    QTC Calculation (Bezet) 01/09/2017 520  ms Final    Calculated P Axis 01/09/2017 101  degrees Final    Calculated R Axis 01/09/2017 3  degrees Final    Calculated T Axis 01/09/2017 9  degrees Final    Diagnosis 01/09/2017    Final                    Value:Normal sinus rhythm    Prolonged QT  When compared with ECG of 09-JAN-2017 14:32,  QT has lengthened    Confirmed by Radha Riggs M.D., Brodie Schilder ([id number]) on 1/10/2017 4:09:49 PM          Assessment:     Problem List Items Addressed This Visit        Other    Obesity, morbid (HCC)    Relevant Medications    furosemide (LASIX) 40 mg tablet    metFORMIN (GLUCOPHAGE) 500 mg tablet              Plan:   Pt will return in 2 weeks with appropriate documentation and imagings.           Arsenio Jeff MD

## 2020-12-17 ENCOUNTER — TELEPHONE (OUTPATIENT)
Dept: ENDOCRINOLOGY | Age: 45
End: 2020-12-17

## 2020-12-17 ENCOUNTER — OFFICE VISIT (OUTPATIENT)
Dept: ENDOCRINOLOGY | Age: 45
End: 2020-12-17
Payer: MEDICAID

## 2020-12-17 VITALS
OXYGEN SATURATION: 95 % | HEART RATE: 101 BPM | WEIGHT: 315 LBS | SYSTOLIC BLOOD PRESSURE: 132 MMHG | HEIGHT: 76 IN | DIASTOLIC BLOOD PRESSURE: 78 MMHG | BODY MASS INDEX: 38.36 KG/M2 | TEMPERATURE: 98.6 F

## 2020-12-17 DIAGNOSIS — E04.2 MULTINODULAR GOITER: Primary | ICD-10-CM

## 2020-12-17 DIAGNOSIS — E04.9 GOITER: ICD-10-CM

## 2020-12-17 PROBLEM — J44.9 COPD (CHRONIC OBSTRUCTIVE PULMONARY DISEASE) (HCC): Status: ACTIVE | Noted: 2020-12-17

## 2020-12-17 PROBLEM — I10 ESSENTIAL HYPERTENSION: Status: ACTIVE | Noted: 2020-12-17

## 2020-12-17 PROBLEM — R91.8 PULMONARY NODULES: Status: ACTIVE | Noted: 2020-12-17

## 2020-12-17 PROBLEM — G47.33 OBSTRUCTIVE SLEEP APNEA: Status: ACTIVE | Noted: 2020-12-17

## 2020-12-17 PROBLEM — I25.111 CORONARY ARTERY DISEASE INVOLVING NATIVE CORONARY ARTERY OF NATIVE HEART WITH ANGINA PECTORIS WITH DOCUMENTED SPASM (HCC): Status: ACTIVE | Noted: 2020-12-17

## 2020-12-17 PROBLEM — E11.65 TYPE 2 DIABETES MELLITUS WITH HYPERGLYCEMIA, WITHOUT LONG-TERM CURRENT USE OF INSULIN (HCC): Status: ACTIVE | Noted: 2020-12-17

## 2020-12-17 PROBLEM — I42.9 CARDIOMYOPATHY (HCC): Status: ACTIVE | Noted: 2020-12-17

## 2020-12-17 PROCEDURE — 99204 OFFICE O/P NEW MOD 45 MIN: CPT | Performed by: INTERNAL MEDICINE

## 2020-12-17 RX ORDER — DIGOXIN 125 MCG
TABLET ORAL
COMMUNITY
Start: 2020-11-04

## 2020-12-17 RX ORDER — SILDENAFIL 100 MG/1
TABLET, FILM COATED ORAL
Status: ON HOLD | COMMUNITY
Start: 2020-11-17 | End: 2021-06-27 | Stop reason: ALTCHOICE

## 2020-12-17 RX ORDER — GABAPENTIN 300 MG/1
CAPSULE ORAL
COMMUNITY
Start: 2020-12-15 | End: 2021-04-05 | Stop reason: ALTCHOICE

## 2020-12-17 RX ORDER — DOXYCYCLINE 100 MG/1
CAPSULE ORAL
COMMUNITY
Start: 2020-12-11 | End: 2020-12-30 | Stop reason: ALTCHOICE

## 2020-12-17 RX ORDER — CITALOPRAM 40 MG/1
TABLET, FILM COATED ORAL
COMMUNITY
Start: 2020-12-08 | End: 2022-07-18

## 2020-12-17 RX ORDER — AMLODIPINE BESYLATE 5 MG/1
TABLET ORAL
Status: ON HOLD | COMMUNITY
Start: 2020-12-15 | End: 2021-06-27 | Stop reason: ALTCHOICE

## 2020-12-17 RX ORDER — METOPROLOL SUCCINATE 50 MG/1
TABLET, EXTENDED RELEASE ORAL
Status: ON HOLD | COMMUNITY
Start: 2020-11-04 | End: 2021-08-19

## 2020-12-17 RX ORDER — BUMETANIDE 2 MG/1
TABLET ORAL
Status: ON HOLD | COMMUNITY
Start: 2020-12-15 | End: 2021-06-27 | Stop reason: ALTCHOICE

## 2020-12-17 RX ORDER — ZOLPIDEM TARTRATE 10 MG/1
TABLET ORAL
COMMUNITY
Start: 2020-12-04 | End: 2021-04-05 | Stop reason: ALTCHOICE

## 2020-12-17 NOTE — LETTER
12/17/2020 Patient: Saul Jorgensen YOB: 1975 Date of Visit: 12/17/2020 Pete Boss MD 
[address] [address] [address] Fax: [phone] Dear Pete Boss MD, Thank you for referring Mr. Saul Jorgensen to [address] [place] [address] for evaluation. My notes for this consultation are attached. If you have questions, please do not hesitate to call me. I look forward to following your patient along with you. Sincerely, Trina Machuca MD

## 2020-12-17 NOTE — PROGRESS NOTES
History and Physical    Patient: Marion Leyden MRN: [id number]  SSN: [id number]    YOB: 1975  Age: 54 y.o. Sex: male      Subjective:      Marion Leyden is a 54 y.o. male with past medical history of hypertension, hyperlipidemia, type 2 diabetes mellitus, obstructive sleep apnea, depression, COPD, pulmonary nodules, coronary artery disease, cardiomyopathy is sent to me by primary care physician Dr. Franchesca Mcgee for multinodular goiter. Patient thinks he was diagnosed with multinodular goiter a few years back. He does not remember what was done about it at that time. He had chest CT scan a few months back because of frequent COPD exacerbation. This showed pulmonary nodules but it also incidentally showed multinodular goiter. Following this he had thyroid ultrasound and he is here for further evaluation and management. Patient thinks that in past month and a half or so his goiter has enlarged and he has tenderness. He also has difficulty in swallowing which has increased in the past few months. He denies any recent upper respiratory infections. Difficulty in swallowing (food/pills getting stuck): yes getting progressively worse  Difficulty in breathing: yes when he looks down  Visible swelling in the neck: yes  Hoarseness of voice: no  Family history of thyroid cancer: no  Personal history of radiation exposure to head/neck region: no  Family/personal history of other cancers: lung cancer, throat cancer  Symptoms of hypo/hyperthyroidism: increased sweating at night, heat intolerance. BM regular.  Palpitations + CHF and cardiomyopathy     Past Medical History:   Diagnosis Date    Acute MI ([address]     Depression     Hypertension     Ill-defined condition      Past Surgical History:   Procedure Laterality Date    HX HERNIA REPAIR      HX ORTHOPAEDIC      rotator cuff surgery      Family History   Problem Relation Age of Onset    Diabetes Maternal Uncle     Hypertension Maternal Uncle     Hypertension Paternal Uncle     Diabetes Paternal Uncle     Cancer Other      Social History     Tobacco Use    Smoking status: Former Smoker    Smokeless tobacco: Never Used   Substance Use Topics    Alcohol use: Yes     Comment: socially       Prior to Admission medications    Medication Sig Start Date End Date Taking?  Authorizing Provider   amLODIPine (NORVASC) 5 mg tablet TAKE ONE TABLET BY MOUTH EVERY DAY 12/15/20  Yes Provider, Historical   bumetanide (BUMEX) 2 mg tablet TAKE ONE TABLET BY MOUTH EVERY 24 HOURS 12/15/20  Yes Provider, Historical   doxycycline (MONODOX) 100 mg capsule TAKE 1 CAPSULE BY MOUTH TWICE DAILY 12/11/20  Yes Provider, Historical   digoxin (LANOXIN) 0.125 mg tablet TAKE ONE TABLET BY MOUTH EVERY DAY 11/4/20  Yes Provider, Historical   sildenafil citrate (VIAGRA) 100 mg tablet TAKE ONE TABLET BY MOUTH EVERY DAY AS NEEDED 11/17/20  Yes Provider, Historical   zolpidem (AMBIEN) 10 mg tablet TAKE 1 TABLET BY MOUTH ONCE DAILY AT BEDTIME AS NEEDED 12/4/20  Yes Provider, Historical   citalopram (CELEXA) 40 mg tablet TAKE ONE TABLET BY MOUTH EVERY DAY 12/8/20  Yes Provider, Historical   gabapentin (NEURONTIN) 300 mg capsule TAKE ONE CAPSULE BY MOUTH THREE TIMES DAILY 12/15/20  Yes Provider, Historical   metoprolol succinate (TOPROL-XL) 50 mg XL tablet TAKE ONE TABLET BY MOUTH DAILY 11/4/20  Yes Provider, Historical   atorvastatin (LIPITOR) 20 mg tablet TAKE ONE TABLET BY MOUTH AT BEDTIME 8/3/20  Yes Provider, Historical   ProAir HFA 90 mcg/actuation inhaler INHALE 2 PUFFS EVERY 6 HOURS as needed for SHORTNESS OF BREATH 8/21/20  Yes Provider, Historical   Vitamin D3 50 mcg (2,000 unit) cap capsule TAKE ONE CAPSULE BY MOUTH EVERY DAY 8/13/20  Yes Provider, Historical   metFORMIN (GLUCOPHAGE) 500 mg tablet TAKE ONE TABLET BY MOUTH TWICE DAILY WITH MEALS 8/13/20  Yes Provider, Historical   traZODone (DESYREL) 100 mg tablet TAKE ONE TABLET BY MOUTH AT BEDTIME 7/17/20  Yes Provider, Historical   aspirin 81 mg chewable tablet Take 81 mg by mouth daily. Yes Other, MD Elizabeth        Allergies   Allergen Reactions    Vancomycin Hives     Lyndsey syndrome       Review of Systems:  ROS    A comprehensive review of systems was preformed and it is negative except mentioned in HPI    Objective:     Vitals:    12/17/20 1435   BP: 132/78   Pulse: (!) 101   Temp: 98.6 °F (37 °C)   TempSrc: Temporal   SpO2: 95%   Weight: 337 lb 14.4 oz (153.3 kg)   Height: 6' 4\" (1.93 m)        Physical Exam:    Physical Exam  Vitals signs and nursing note reviewed. Constitutional:       Appearance: He is obese. HENT:      Head: Normocephalic and atraumatic. Eyes:      Extraocular Movements: Extraocular movements intact. Pupils: Pupils are equal, round, and reactive to light. Neck:      Musculoskeletal: Neck supple. Comments: Thyromegaly palpated, but I could not examine him further because of tenderness in thyroid region  Cardiovascular:      Rate and Rhythm: Normal rate and regular rhythm. Pulmonary:      Effort: Pulmonary effort is normal.      Breath sounds: Normal breath sounds. Abdominal:      General: Bowel sounds are normal.      Palpations: Abdomen is soft. Musculoskeletal: Normal range of motion. General: No swelling. Skin:     General: Skin is warm and dry. Neurological:      General: No focal deficit present. Mental Status: He is alert and oriented to person, place, and time. Psychiatric:         Mood and Affect: Mood normal.         Behavior: Behavior normal.          Labs and Imaging:    Thyroid ultrasound 8-:  Comparison to thyroid ultrasound from 2014  Multiple cystic and solid nodules evident. Largest nodule is 3.6 x 2.3 x 1.6 cm in size. No unusual vascularity. No significant change in size when compared to previous ultrasound.     Last 3 Recorded Weights in this Encounter    12/17/20 1435   Weight: 337 lb 14.4 oz (153.3 kg) No results found for: HBA1C, HGBE8, SBQ9XQYP, IST3VNNA, TDF5VPQV     Assessment:     Patient Active Problem List   Diagnosis Code    Obesity, morbid ([facility] E66.01    Carotid artery stenosis I65.29    Coronary artery disease involving native coronary artery of native heart with angina pectoris with documented spasm ([facility]) I25.111    Cardiomyopathy ([facility] [address] I42.9    COPD (chronic obstructive pulmonary disease) ([facility] [address] J44.9    Pulmonary nodules R91.8    Obstructive sleep apnea G47.33    Type 2 diabetes mellitus with hyperglycemia, without long-term current use of insulin ([facility]) E11.65    Essential hypertension I10           Plan:     Multinodular goiter:  I reviewed labs, notes from the referring provider's office. Thyroid ultrasound from August 2020, the report that I have is not very clear to read but it looks like it says there is multiple nodules, solid and cystic, largest nodule is 3.6 cm in size. However, patient reports an increase in size and tenderness in his goiter in past couple months. Plan:  Check TSH today. Repeat thyroid ultrasound, advised patient to bring images on a CD for my review. I will see him back in 3 weeks. Essential hypertension:  Blood pressure well controlled on current medications. Type 2 diabetes mellitus:  Patient says it is well controlled. He is only taking Metformin.   Orders Placed This Encounter    US THYROID/PARATHYROID/SOFT TISS     [facility]     Standing Status:   Future     Standing Expiration Date:   1/17/2022     Order Specific Question:   Reason for Exam     Answer:   goiter with recent enllargement and tenderness    TSH RFX ON ABNORMAL TO FREE T4    METABOLIC PANEL, BASIC        Signed By: Olga Ortiz MD     December 17, 2020      Return to clinic 3 weeks

## 2020-12-24 LAB
BUN SERPL-MCNC: 12 MG/DL (ref 6–24)
BUN/CREAT SERPL: 12 (ref 9–20)
CALCIUM SERPL-MCNC: 8.9 MG/DL (ref 8.7–10.2)
CHLORIDE SERPL-SCNC: 100 MMOL/L (ref 96–106)
CO2 SERPL-SCNC: 25 MMOL/L (ref 20–29)
CREAT SERPL-MCNC: 1.02 MG/DL (ref 0.76–1.27)
GLUCOSE SERPL-MCNC: 139 MG/DL (ref 65–99)
POTASSIUM SERPL-SCNC: 4.1 MMOL/L (ref 3.5–5.2)
SODIUM SERPL-SCNC: 141 MMOL/L (ref 134–144)
TSH SERPL DL<=0.005 MIU/L-ACNC: 1.72 UIU/ML (ref 0.45–4.5)

## 2020-12-30 ENCOUNTER — OFFICE VISIT (OUTPATIENT)
Dept: ENDOCRINOLOGY | Age: 45
End: 2020-12-30
Payer: MEDICAID

## 2020-12-30 ENCOUNTER — TELEPHONE (OUTPATIENT)
Dept: ENDOCRINOLOGY | Age: 45
End: 2020-12-30

## 2020-12-30 VITALS
DIASTOLIC BLOOD PRESSURE: 82 MMHG | WEIGHT: 315 LBS | BODY MASS INDEX: 38.36 KG/M2 | TEMPERATURE: 97.8 F | SYSTOLIC BLOOD PRESSURE: 132 MMHG | OXYGEN SATURATION: 94 % | HEIGHT: 76 IN | HEART RATE: 97 BPM

## 2020-12-30 DIAGNOSIS — E04.2 MULTINODULAR GOITER: Primary | ICD-10-CM

## 2020-12-30 PROCEDURE — 99214 OFFICE O/P EST MOD 30 MIN: CPT | Performed by: INTERNAL MEDICINE

## 2020-12-30 NOTE — PROGRESS NOTES
History and Physical    Patient: Debbie Salas MRN: [id number]  SSN: [id number]    YOB: 1975  Age: 54 y.o. Sex: male      Subjective:      Debbie Salas is a 54 y.o. male with past medical history of hypertension, hyperlipidemia, type 2 diabetes mellitus, obstructive sleep apnea, depression, COPD, pulmonary nodules, coronary artery disease, cardiomyopathy is sent to me by primary care physician Dr. Amanda Waller for multinodular goiter. After the last visit patient had repeat ultrasound of thyroid as well as TSH checked and he is here to follow-up on the results. He continues to have difficulty in swallowing and he continues to have soreness and tenderness in his thyroid region. Initial history:  Patient thinks he was diagnosed with multinodular goiter a few years back. He does not remember what was done about it at that time. He had chest CT scan a few months back because of frequent COPD exacerbation. This showed pulmonary nodules but it also incidentally showed multinodular goiter. Following this he had thyroid ultrasound and he is here for further evaluation and management. Patient thinks that in past month and a half or so his goiter has enlarged and he has tenderness. He also has difficulty in swallowing which has increased in the past few months. He denies any recent upper respiratory infections. Difficulty in swallowing (food/pills getting stuck): yes getting progressively worse  Difficulty in breathing: yes when he looks down  Visible swelling in the neck: yes  Hoarseness of voice: no  Family history of thyroid cancer: no  Personal history of radiation exposure to head/neck region: no  Family/personal history of other cancers: lung cancer, throat cancer  Symptoms of hypo/hyperthyroidism: increased sweating at night, heat intolerance. BM regular.  Palpitations + CHF and cardiomyopathy     Past Medical History:   Diagnosis Date    Acute MI ([address]     Depression     Hypertension     Ill-defined condition      Past Surgical History:   Procedure Laterality Date    HX HERNIA REPAIR      HX ORTHOPAEDIC      rotator cuff surgery      Family History   Problem Relation Age of Onset    Diabetes Maternal Uncle     Hypertension Maternal Uncle     Hypertension Paternal Uncle     Diabetes Paternal Uncle     Cancer Other    [facility] Mother     No Known Problems Father      Social History     Tobacco Use    Smoking status: Former Smoker    Smokeless tobacco: Never Used   Substance Use Topics    Alcohol use: Yes     Comment: socially       Prior to Admission medications    Medication Sig Start Date End Date Taking?  Authorizing Provider   amLODIPine (NORVASC) 5 mg tablet TAKE ONE TABLET BY MOUTH EVERY DAY 12/15/20  Yes Provider, Historical   bumetanide (BUMEX) 2 mg tablet TAKE ONE TABLET BY MOUTH EVERY 24 HOURS 12/15/20  Yes Provider, Historical   digoxin (LANOXIN) 0.125 mg tablet TAKE ONE TABLET BY MOUTH EVERY DAY 11/4/20  Yes Provider, Historical   sildenafil citrate (VIAGRA) 100 mg tablet TAKE ONE TABLET BY MOUTH EVERY DAY AS NEEDED 11/17/20  Yes Provider, Historical   zolpidem (AMBIEN) 10 mg tablet TAKE 1 TABLET BY MOUTH ONCE DAILY AT BEDTIME AS NEEDED 12/4/20  Yes Provider, Historical   citalopram (CELEXA) 40 mg tablet TAKE ONE TABLET BY MOUTH EVERY DAY 12/8/20  Yes Provider, Historical   gabapentin (NEURONTIN) 300 mg capsule TAKE ONE CAPSULE BY MOUTH THREE TIMES DAILY 12/15/20  Yes Provider, Historical   metoprolol succinate (TOPROL-XL) 50 mg XL tablet TAKE ONE TABLET BY MOUTH DAILY 11/4/20  Yes Provider, Historical   atorvastatin (LIPITOR) 20 mg tablet TAKE ONE TABLET BY MOUTH AT BEDTIME 8/3/20  Yes Provider, Historical   ProAir HFA 90 mcg/actuation inhaler INHALE 2 PUFFS EVERY 6 HOURS as needed for SHORTNESS OF BREATH 8/21/20  Yes Provider, Historical   Vitamin D3 50 mcg (2,000 unit) cap capsule TAKE ONE CAPSULE BY MOUTH EVERY DAY 8/13/20  Yes Provider, Historical   metFORMIN (GLUCOPHAGE) 500 mg tablet TAKE ONE TABLET BY MOUTH TWICE DAILY WITH MEALS 8/13/20  Yes Provider, Historical   traZODone (DESYREL) 100 mg tablet TAKE ONE TABLET BY MOUTH AT BEDTIME 7/17/20  Yes Provider, Historical   aspirin 81 mg chewable tablet Take 81 mg by mouth daily. Yes Other, MD Elizabeth        Allergies   Allergen Reactions    Vancomycin Hives     Lyndsey syndrome       Review of Systems:  ROS    A comprehensive review of systems was preformed and it is negative except mentioned in HPI    Objective:     Vitals:    12/30/20 1512   BP: 132/82   Pulse: 97   Temp: 97.8 °F (36.6 °C)   TempSrc: Temporal   SpO2: 94%   Weight: 341 lb 12.8 oz (155 kg)   Height: 6' 4\" (1.93 m)        Physical Exam:    Physical Exam  Vitals signs and nursing note reviewed. Constitutional:       Appearance: He is obese. HENT:      Head: Normocephalic and atraumatic. Neck:      Musculoskeletal: Neck supple. Comments: Thyromegaly palpated, but I could not examine him further because of tenderness in thyroid region  Cardiovascular:      Rate and Rhythm: Normal rate and regular rhythm. Pulmonary:      Effort: Pulmonary effort is normal.      Breath sounds: Normal breath sounds. Neurological:      Mental Status: He is alert. Labs and Imaging:    Thyroid ultrasound 8-:  Comparison to thyroid ultrasound from 2014  Multiple cystic and solid nodules evident. Largest nodule is 3.6 x 2.3 x 1.6 cm in size. No unusual vascularity. No significant change in size when compared to previous ultrasound. Results for Kim Hart (MRN [id number]) as of 12/30/2020 15:33   Ref.  Range 12/23/2020 11:47   Sodium Latest Ref Range: 134 - 144 mmol/L 141   Potassium Latest Ref Range: 3.5 - 5.2 mmol/L 4.1   Chloride Latest Ref Range: 96 - 106 mmol/L 100   CO2 Latest Ref Range: 20 - 29 mmol/L 25   Glucose Latest Ref Range: 65 - 99 mg/dL 139 (H)   BUN Latest Ref Range: 6 - 24 mg/dL 12   Creatinine Latest Ref Range: 0.76 - 1.27 mg/dL 1.02   BUN/Creatinine ratio Latest Ref Range: 9 - 20  12   Calcium Latest Ref Range: 8.7 - 10.2 mg/dL 8.9   GFR est non-AA Latest Ref Range: >59 mL/min/1.73 88   GFR est AA Latest Ref Range: >59 mL/min/1.73 101   TSH Latest Ref Range: 0.450 - 4.500 uIU/mL 1.720     Last 3 Recorded Weights in this Encounter    12/30/20 1512   Weight: 341 lb 12.8 oz (155 kg)        No results found for: HBA1C, HGBE8, FXJ0AZET, ZRG3YRKH, TFB3FMNH     Assessment:     Patient Active Problem List   Diagnosis Code    Obesity, morbid ([facility]) E66.01    Carotid artery stenosis I65.29    Coronary artery disease involving native coronary artery of native heart with angina pectoris with documented spasm ([facility]) I25.111    Cardiomyopathy ([address] I42.9    COPD (chronic obstructive pulmonary disease) ([facility] [address] J44.9    Pulmonary nodules R91.8    Obstructive sleep apnea G47.33    Type 2 diabetes mellitus with hyperglycemia, without long-term current use of insulin ([facility]) E11.65    Essential hypertension I10    Multinodular goiter E04.2           Plan:     Multinodular goiter:  Thyroid ultrasound from August 2020, the report that I have is not very clear to read but it looks like it says there is multiple nodules, solid and cystic, largest nodule is 3.6 cm in size. However, patient reports an increase in size and tenderness in his goiter in past couple months. 12-:  TSH normal at 1.26    Thyroid ultrasound 12-: I reviewed the images myself  Both lobes of thyroid appear to be replaced by big nodules.   Right lobe has 4.23 x 2.63 cm nodule which is isoechoic with cystic spaces, no microcalcification, margins are smooth    Left lobe has 4.23 x 2.24 x 3.24 cm nodule which is solid, isoechoic with some hypoechoic areas and echogenic foci which appear like colloid  Plan:  Given the size and appearance of these nodules I am going to order a biopsy of left lobe 4.23 cm nodule, patient wants this to be done at [facility]. I will see him back in 3 weeks for further management. Essential hypertension:  Blood pressure well controlled on current medications. Type 2 diabetes mellitus:  Patient says it is well controlled. He is only taking Metformin.   Orders Placed This Encounter   [facility] THYROID CYST     [facility]  Left lobe 4.23 cm nodule     Standing Status:   Future     Standing Expiration Date:   1/30/2022     Order Specific Question:   Reason for Exam     Answer:   multinodulaarr goiter        Signed By: Augie Weller MD     December 30, 2020      Return to clinic 3 weeks

## 2020-12-30 NOTE — LETTER
12/30/2020 Patient: Marion Leyden YOB: 1975 Date of Visit: 12/30/2020 Jairon Doyle MD 
[address] [address] Via Fax: [phone] Dear Jairon Doyle MD, Thank you for referring Mr. Marion Leyden to [address] [place] [address] for evaluation. My notes for this consultation are attached. If you have questions, please do not hesitate to call me. I look forward to following your patient along with you. Sincerely, Cassie Begum MD

## 2021-01-28 ENCOUNTER — TELEPHONE (OUTPATIENT)
Dept: ENDOCRINOLOGY | Age: 46
End: 2021-01-28

## 2021-01-28 DIAGNOSIS — E04.2 MULTINODULAR GOITER: Primary | ICD-10-CM

## 2021-01-28 NOTE — TELEPHONE ENCOUNTER
To interventional radiology  At [facility]. According to this physician, left lobe nodule is not exactly a nodule but just heterogenous texture. However there is a nodule in the right inferior lobe which may warrant biopsy. Agreed with biopsying the right inferior nodule. Caitie: I made a new thyroid biopsy order. Please fax this to [phone].

## 2021-02-04 ENCOUNTER — OFFICE VISIT (OUTPATIENT)
Dept: ENDOCRINOLOGY | Age: 46
End: 2021-02-04
Payer: MEDICAID

## 2021-02-04 VITALS
HEART RATE: 97 BPM | BODY MASS INDEX: 38.36 KG/M2 | SYSTOLIC BLOOD PRESSURE: 116 MMHG | HEIGHT: 76 IN | TEMPERATURE: 98.7 F | DIASTOLIC BLOOD PRESSURE: 75 MMHG | WEIGHT: 315 LBS | OXYGEN SATURATION: 93 %

## 2021-02-04 DIAGNOSIS — E04.2 MULTINODULAR GOITER: Primary | ICD-10-CM

## 2021-02-04 PROCEDURE — 99214 OFFICE O/P EST MOD 30 MIN: CPT | Performed by: INTERNAL MEDICINE

## 2021-02-04 RX ORDER — ONDANSETRON 4 MG/1
TABLET, ORALLY DISINTEGRATING ORAL
Status: ON HOLD | COMMUNITY
Start: 2021-01-11 | End: 2021-06-27 | Stop reason: ALTCHOICE

## 2021-02-04 RX ORDER — FAMOTIDINE 40 MG/1
TABLET, FILM COATED ORAL
Status: ON HOLD | COMMUNITY
Start: 2021-01-13 | End: 2021-06-27 | Stop reason: ALTCHOICE

## 2021-02-04 NOTE — LETTER
2/4/2021 Patient: Des Ryan YOB: 1975 Date of Visit: 2/4/2021 Yfn Moon MD 
[address] [address] Via Fax: [phone] Dear Yfn Moon MD, Thank you for referring Mr. Des Ryan to [address] [place] [address] for evaluation. My notes for this consultation are attached. If you have questions, please do not hesitate to call me. I look forward to following your patient along with you. Sincerely, Skye Eden MD

## 2021-02-04 NOTE — PROGRESS NOTES
History and Physical    Patient: Mere Mccann MRN: [id number]  SSN: [id number]    YOB: 1975  Age: 54 y.o. Sex: male      Subjective:      Mere Mccann is a 54 y.o. male with past medical history of hypertension, hyperlipidemia, type 2 diabetes mellitus, obstructive sleep apnea, depression, COPD, pulmonary nodules, coronary artery disease, cardiomyopathy is sent to me by primary care physician Dr. Sudheer Montenegro for multinodular goiter. After the last visit patient had repeat ultrasound of thyroid as well as TSH checked. TSH was normal and thyroid ultrasound showed heterogenous echotexture with a couple nodules which meet criteria for biopsy. Following this patient had thyroid biopsy at [facility] and he is here to follow-up on the results. He continues to have pressure symptoms from the goiter, difficulty in swallowing, difficulty in breathing, he feels like his airway gets cut off if he flexes his neck. Initial history:  Patient thinks he was diagnosed with multinodular goiter a few years back. He does not remember what was done about it at that time. He had chest CT scan a few months back because of frequent COPD exacerbation. This showed pulmonary nodules but it also incidentally showed multinodular goiter. Following this he had thyroid ultrasound and he is here for further evaluation and management. Patient thinks that in past month and a half or so his goiter has enlarged and he has tenderness. He also has difficulty in swallowing which has increased in the past few months. He denies any recent upper respiratory infections.   Difficulty in swallowing (food/pills getting stuck): yes getting progressively worse  Difficulty in breathing: yes when he looks down  Visible swelling in the neck: yes  Hoarseness of voice: no  Family history of thyroid cancer: no  Personal history of radiation exposure to head/neck region: no  Family/personal history of other cancers: lung cancer, throat cancer  Symptoms of hypo/hyperthyroidism: increased sweating at night, heat intolerance. BM regular. Palpitations + CHF and cardiomyopathy     Past Medical History:   Diagnosis Date    Acute MI ([facility] [address]     Depression     Hypertension     Ill-defined condition      Past Surgical History:   Procedure Laterality Date    HX HERNIA REPAIR      HX ORTHOPAEDIC      rotator cuff surgery    IR FINE NEEDLE ASPIRATION W IMAGE        Family History   Problem Relation Age of Onset    Diabetes Maternal Uncle     Hypertension Maternal Uncle     Hypertension Paternal Uncle     Diabetes Paternal Uncle     Cancer Other    [facility] Mother     No Known Problems Father      Social History     Tobacco Use    Smoking status: Former Smoker    Smokeless tobacco: Never Used   Substance Use Topics    Alcohol use: Yes     Comment: socially       Prior to Admission medications    Medication Sig Start Date End Date Taking?  Authorizing Provider   famotidine (PEPCID) 40 mg tablet TAKE ONE TABLET BY MOUTH DAILY 1/13/21  Yes Provider, Historical   ondansetron (ZOFRAN ODT) 4 mg disintegrating tablet DISSOLVE ONE TABLET ON THE TONGUE EVERY 8 HOURS AS NEEDED FOR NAUSEA 1/11/21  Yes Provider, Historical   amLODIPine (NORVASC) 5 mg tablet TAKE ONE TABLET BY MOUTH EVERY DAY 12/15/20  Yes Provider, Historical   bumetanide (BUMEX) 2 mg tablet TAKE ONE TABLET BY MOUTH EVERY 24 HOURS 12/15/20  Yes Provider, Historical   digoxin (LANOXIN) 0.125 mg tablet TAKE ONE TABLET BY MOUTH EVERY DAY 11/4/20  Yes Provider, Historical   sildenafil citrate (VIAGRA) 100 mg tablet TAKE ONE TABLET BY MOUTH EVERY DAY AS NEEDED 11/17/20  Yes Provider, Historical   zolpidem (AMBIEN) 10 mg tablet TAKE 1 TABLET BY MOUTH ONCE DAILY AT BEDTIME AS NEEDED 12/4/20  Yes Provider, Historical   citalopram (CELEXA) 40 mg tablet TAKE ONE TABLET BY MOUTH EVERY DAY 12/8/20  Yes Provider, Historical   gabapentin (NEURONTIN) 300 mg capsule TAKE ONE CAPSULE BY MOUTH THREE TIMES DAILY 12/15/20  Yes Provider, Historical   metoprolol succinate (TOPROL-XL) 50 mg XL tablet TAKE ONE TABLET BY MOUTH DAILY 11/4/20  Yes Provider, Historical   atorvastatin (LIPITOR) 20 mg tablet TAKE ONE TABLET BY MOUTH AT BEDTIME 8/3/20  Yes Provider, Historical   ProAir HFA 90 mcg/actuation inhaler INHALE 2 PUFFS EVERY 6 HOURS as needed for SHORTNESS OF BREATH 8/21/20  Yes Provider, Historical   Vitamin D3 50 mcg (2,000 unit) cap capsule TAKE ONE CAPSULE BY MOUTH EVERY DAY 8/13/20  Yes Provider, Historical   metFORMIN (GLUCOPHAGE) 500 mg tablet TAKE ONE TABLET BY MOUTH TWICE DAILY WITH MEALS 8/13/20  Yes Provider, Historical   traZODone (DESYREL) 100 mg tablet TAKE ONE TABLET BY MOUTH AT BEDTIME 7/17/20  Yes Provider, Historical   aspirin 81 mg chewable tablet Take 81 mg by mouth daily. Yes Other, MD Elizabeth        Allergies   Allergen Reactions    Vancomycin Hives     Lyndsey syndrome       Review of Systems:  ROS    A comprehensive review of systems was preformed and it is negative except mentioned in HPI    Objective:     Vitals:    02/04/21 1604   BP: 116/75   Pulse: 97   Temp: 98.7 °F (37.1 °C)   TempSrc: Temporal   SpO2: 93%   Weight: 342 lb 11.2 oz (155.4 kg)   Height: 6' 4\" (1.93 m)        Physical Exam:    Physical Exam  Vitals signs and nursing note reviewed. Constitutional:       Appearance: He is obese. HENT:      Head: Normocephalic and atraumatic. Neck:      Musculoskeletal: Neck supple. Comments: [place] sign positive  Cardiovascular:      Rate and Rhythm: Normal rate and regular rhythm. Pulmonary:      Effort: Pulmonary effort is normal.      Breath sounds: Normal breath sounds. Neurological:      Mental Status: He is alert. Labs and Imaging:    Thyroid ultrasound 8-:  Comparison to thyroid ultrasound from 2014  Multiple cystic and solid nodules evident. Largest nodule is 3.6 x 2.3 x 1.6 cm in size. No unusual vascularity.   No significant change in size when compared to previous ultrasound. Results for Hany Montenegro (MRN [id number]) as of 12/30/2020 15:33   Ref. Range 12/23/2020 11:47   Sodium Latest Ref Range: 134 - 144 mmol/L 141   Potassium Latest Ref Range: 3.5 - 5.2 mmol/L 4.1   Chloride Latest Ref Range: 96 - 106 mmol/L 100   CO2 Latest Ref Range: 20 - 29 mmol/L 25   Glucose Latest Ref Range: 65 - 99 mg/dL 139 (H)   BUN Latest Ref Range: 6 - 24 mg/dL 12   Creatinine Latest Ref Range: 0.76 - 1.27 mg/dL 1.02   BUN/Creatinine ratio Latest Ref Range: 9 - 20  12   Calcium Latest Ref Range: 8.7 - 10.2 mg/dL 8.9   GFR est non-AA Latest Ref Range: >59 mL/min/1.73 88   GFR est AA Latest Ref Range: >59 mL/min/1.73 101   TSH Latest Ref Range: 0.450 - 4.500 uIU/mL 1.720     Last 3 Recorded Weights in this Encounter    02/04/21 1604   Weight: 342 lb 11.2 oz (155.4 kg)        No results found for: HBA1C, HGBE8, JBD2XTER, VGR7IVWY, XTD8LQLL     Assessment:     Patient Active Problem List   Diagnosis Code    Obesity, morbid ([facility]) E66.01    Carotid artery stenosis I65.29    Coronary artery disease involving native coronary artery of native heart with angina pectoris with documented spasm ([facility]) I25.111    Cardiomyopathy ([facility]) I42.9    COPD (chronic obstructive pulmonary disease) ([facility] [address] J44.9    Pulmonary nodules R91.8    Obstructive sleep apnea G47.33    Type 2 diabetes mellitus with hyperglycemia, without long-term current use of insulin ([facility]) E11.65    Essential hypertension I10    Multinodular goiter E04.2           Plan:     Multinodular goiter:  Thyroid ultrasound from August 2020, the report that I have is not very clear to read but it looks like it says there is multiple nodules, solid and cystic, largest nodule is 3.6 cm in size. However, patient reports an increase in size and tenderness in his goiter in past couple months. 12-:  TSH normal at 1.26    Thyroid ultrasound 12-:    Both lobes of thyroid appear to be replaced by big nodules. Right lobe has 4.23 x 2.63 cm nodule which is isoechoic with cystic spaces, no microcalcification, margins are smooth. Left lobe has 4.23 x 2.24 x 3.24 cm nodule which is solid, isoechoic with some hypoechoic areas and echogenic foci which appear like colloid    1-:  Biopsy of right thyroid nodule:  Negative for malignant cells. Plan:  As patient is having compressive symptoms, I will refer patient to ENT for thyroidectomy. Patient is aware that he will be dependent on thyroid hormone replacement for the rest of his life. I will see him back a few weeks after surgery. Essential hypertension:  Blood pressure well controlled on current medications. Type 2 diabetes mellitus:  Patient says it is well controlled. He is only taking Metformin.   Orders Placed This Encounter    REFERRAL TO ENT-OTOLARYNGOLOGY     Referral Priority:   Routine     Referral Type:   Consultation     Referral Reason:   Specialty Services Required     Referred to Provider:   Lola Sumner MD     Requested Specialty:   Otolaryngology     Number of Visits Requested:   1        Signed By: Agus Nevarez MD     February 4, 2021      Return to clinic 2 months

## 2021-02-22 ENCOUNTER — OFFICE VISIT (OUTPATIENT)
Dept: ENT CLINIC | Age: 46
End: 2021-02-22
Payer: MEDICAID

## 2021-02-22 VITALS
HEIGHT: 76 IN | RESPIRATION RATE: 18 BRPM | BODY MASS INDEX: 38.36 KG/M2 | DIASTOLIC BLOOD PRESSURE: 80 MMHG | SYSTOLIC BLOOD PRESSURE: 126 MMHG | HEART RATE: 111 BPM | OXYGEN SATURATION: 94 % | TEMPERATURE: 97.4 F | WEIGHT: 315 LBS

## 2021-02-22 DIAGNOSIS — E04.2 MULTINODULAR GOITER: Primary | ICD-10-CM

## 2021-02-22 DIAGNOSIS — I42.9 CARDIOMYOPATHY, UNSPECIFIED TYPE (HCC): ICD-10-CM

## 2021-02-22 DIAGNOSIS — J44.9 CHRONIC OBSTRUCTIVE PULMONARY DISEASE, UNSPECIFIED COPD TYPE (HCC): ICD-10-CM

## 2021-02-22 DIAGNOSIS — R13.14 PHARYNGOESOPHAGEAL DYSPHAGIA: ICD-10-CM

## 2021-02-22 PROCEDURE — 99204 OFFICE O/P NEW MOD 45 MIN: CPT | Performed by: OTOLARYNGOLOGY

## 2021-02-22 NOTE — LETTER
2/22/2021 Patient: Lucinda Narayan YOB: 1975 Date of Visit: 2/22/2021 Raul Alicea MD 
[address] [place] [address] Via Fax: [phone] Obed Chaney MD 
[address] [facility] [phone] [address] In [employer] Dear MD Obed Abraham MD, Thank you for referring Mr. Lucinda Narayan to [facility] for evaluation. My notes for this consultation are attached. If you have questions, please do not hesitate to call me. I look forward to following your patient along with you. Sincerely, Valentino Stone MD 
 
 cough

## 2021-02-22 NOTE — PROGRESS NOTES
Subjective:    Svetlana Torre   54 y.o.   1975     New Patient Visit    Location - thyroid    Quality - goiter    Severity -  Moderate/severe    Duration - years    Timing - chronic    Context - pt with known MNG for years - he has been having more compressive symptoms of late, adolph when bending neck forward and raising arms; c/o dysphagia at times and feeling choked off    Modifying Features - as above    Associated symptoms/signs - hx CHF/CAD       Review of Systems  Review of Systems   Constitutional: Negative for chills and fever. HENT: Negative for ear pain, hearing loss, nosebleeds and tinnitus. Eyes: Negative for blurred vision and double vision. Respiratory: Negative for cough, sputum production and shortness of breath. Cardiovascular: Negative for chest pain and palpitations. Gastrointestinal: Negative for heartburn, nausea and vomiting. Musculoskeletal: Negative for joint pain and neck pain. Skin: Negative. Neurological: Negative for dizziness, speech change, weakness and headaches. Endo/Heme/Allergies: Negative for environmental allergies. Does not bruise/bleed easily. Psychiatric/Behavioral: Negative for memory loss. The patient does not have insomnia.           Past Medical History:   Diagnosis Date    Acute MI ([address]     Depression     Hypertension     Ill-defined condition      Past Surgical History:   Procedure Laterality Date    HX HERNIA REPAIR      HX ORTHOPAEDIC      rotator cuff surgery    IR FINE NEEDLE ASPIRATION W IMAGE        Family History   Problem Relation Age of Onset    Diabetes Maternal Uncle     Hypertension Maternal Uncle     Hypertension Paternal Uncle     Diabetes Paternal Uncle     Cancer Other    [place] [facility] Mother     No Known Problems Father      Social History     Tobacco Use    Smoking status: Former Smoker    Smokeless tobacco: Never Used   Substance Use Topics    Alcohol use: Yes     Comment: socially       Prior to Admission medications    Medication Sig Start Date End Date Taking? Authorizing Provider   famotidine (PEPCID) 40 mg tablet TAKE ONE TABLET BY MOUTH DAILY 1/13/21   Provider, Historical   ondansetron (ZOFRAN ODT) 4 mg disintegrating tablet DISSOLVE ONE TABLET ON THE TONGUE EVERY 8 HOURS AS NEEDED FOR NAUSEA 1/11/21   Provider, Historical   amLODIPine (NORVASC) 5 mg tablet TAKE ONE TABLET BY MOUTH EVERY DAY 12/15/20   Provider, Historical   bumetanide (BUMEX) 2 mg tablet TAKE ONE TABLET BY MOUTH EVERY 24 HOURS 12/15/20   Provider, Historical   digoxin (LANOXIN) 0.125 mg tablet TAKE ONE TABLET BY MOUTH EVERY DAY 11/4/20   Provider, Historical   sildenafil citrate (VIAGRA) 100 mg tablet TAKE ONE TABLET BY MOUTH EVERY DAY AS NEEDED 11/17/20   Provider, Historical   zolpidem (AMBIEN) 10 mg tablet TAKE 1 TABLET BY MOUTH ONCE DAILY AT BEDTIME AS NEEDED 12/4/20   Provider, Historical   citalopram (CELEXA) 40 mg tablet TAKE ONE TABLET BY MOUTH EVERY DAY 12/8/20   Provider, Historical   gabapentin (NEURONTIN) 300 mg capsule TAKE ONE CAPSULE BY MOUTH THREE TIMES DAILY 12/15/20   Provider, Historical   metoprolol succinate (TOPROL-XL) 50 mg XL tablet TAKE ONE TABLET BY MOUTH DAILY 11/4/20   Provider, Historical   atorvastatin (LIPITOR) 20 mg tablet TAKE ONE TABLET BY MOUTH AT BEDTIME 8/3/20   Provider, Historical   ProAir HFA 90 mcg/actuation inhaler INHALE 2 PUFFS EVERY 6 HOURS as needed for SHORTNESS OF BREATH 8/21/20   Provider, Historical   Vitamin D3 50 mcg (2,000 unit) cap capsule TAKE ONE CAPSULE BY MOUTH EVERY DAY 8/13/20   Provider, Historical   metFORMIN (GLUCOPHAGE) 500 mg tablet TAKE ONE TABLET BY MOUTH TWICE DAILY WITH MEALS 8/13/20   Provider, Historical   traZODone (DESYREL) 100 mg tablet TAKE ONE TABLET BY MOUTH AT BEDTIME 7/17/20   Provider, Historical   aspirin 81 mg chewable tablet Take 81 mg by mouth daily.     Other, MD Elizabeth        Allergies   Allergen Reactions    Vancomycin Hives     Lyndsey syndrome Objective:     Visit Vitals  /80 (BP 1 Location: Left lower arm, BP Patient Position: Sitting, BP Cuff Size: Adult)   Pulse (!) 111   Temp 97.4 °F (36.3 °C) (Oral)   Resp 18   Ht 6' 4\" (1.93 m)   Wt 342 lb (155.1 kg)   SpO2 94%   BMI 41.63 kg/m²        Physical Exam  Vitals signs reviewed. Constitutional:       General: He is awake. Appearance: Normal appearance. He is morbidly obese. HENT:      Head: Normocephalic and atraumatic. Jaw: There is normal jaw occlusion. No trismus, tenderness or malocclusion. Salivary Glands: Right salivary gland is not diffusely enlarged or tender. Left salivary gland is not diffusely enlarged or tender. Right Ear: Hearing, tympanic membrane, ear canal and external ear normal.      Left Ear: Hearing, tympanic membrane, ear canal and external ear normal.      Ears:      [place] exam findings: does not lateralize. Right Rinne: AC > BC. Left Rinne: AC > BC. Nose: No septal deviation, mucosal edema or rhinorrhea. Right Turbinates: Not enlarged, swollen or pale. Left Turbinates: Not enlarged, swollen or pale. Right Sinus: No maxillary sinus tenderness or frontal sinus tenderness. Left Sinus: No maxillary sinus tenderness or frontal sinus tenderness. Mouth/Throat:      Lips: Pink. Mouth: Mucous membranes are moist. No oral lesions. Dentition: Normal dentition. No gum lesions. Tongue: No lesions. Palate: No mass and lesions. Pharynx: Oropharynx is clear. Uvula midline. Tonsils: No tonsillar exudate. 2+ on the right. 2+ on the left. Eyes:      General: Vision grossly intact. Extraocular Movements: Extraocular movements intact. Right eye: No nystagmus. Left eye: No nystagmus. Pupils: Pupils are equal, round, and reactive to light. Neck:      Musculoskeletal: Normal range of motion. No edema or erythema.       Thyroid: Thyroid mass, thyromegaly and thyroid tenderness present. Trachea: Trachea and phonation normal. No tracheal tenderness. Cardiovascular:      Rate and Rhythm: Normal rate and regular rhythm. Pulmonary:      Effort: Pulmonary effort is normal.      Breath sounds: Normal breath sounds. No stridor. No wheezing. Musculoskeletal: Normal range of motion. Lymphadenopathy:      Cervical: No cervical adenopathy. Skin:     General: Skin is warm and dry. Neurological:      General: No focal deficit present. Mental Status: He is alert and oriented to person, place, and time. Mental status is at baseline. Cranial Nerves: Cranial nerves are intact. Coordination: Romberg sign negative. Psychiatric:         Mood and Affect: Mood normal.         Behavior: Behavior normal. Behavior is cooperative. Assessment/Plan:     Encounter Diagnoses   Name Primary?  Multinodular goiter Yes    Chronic obstructive pulmonary disease, unspecified COPD type ([address]     Cardiomyopathy, unspecified type ([address]     Pharyngoesophageal dysphagia      Reviewed all pt imaging results and endocrinology notes. He has a multinodular goiter with compression. Largest 3.6 cm and per his report he had a benign FNA at [facility]. Because of his obesity has thyroids hard to palpate I want him to have a CT scan of the neck done prior to surgery. He is a high risk surgical candidate because of his obesity, CAD, CHF, COPD. He will need clearance from his cardiologist and pulmonologist as well. We will tentatively aim to have surgery in early April. Orders Placed This Encounter    CT NECK SOFT TISSUE W CONT    REFERRAL TO SURGERY           Thank you for referring this patient to:    [facility] Ha.  Chance Bullock MD, [address] [facility]  [address] [place] [address] [address]  [facility] 14. [phone]

## 2021-02-23 ENCOUNTER — TELEPHONE (OUTPATIENT)
Dept: ENT CLINIC | Age: 46
End: 2021-02-23

## 2021-02-25 NOTE — TELEPHONE ENCOUNTER
Pt. Scheduled 3/4/21 at [facility] 4:00PM pt to arrive at 3:30PM, no food 4hours prior to CT, make sure well hydrated, bring ins card and picture ID, advised pt of date and time and instructions.

## 2021-03-02 ENCOUNTER — TELEPHONE (OUTPATIENT)
Dept: ENT CLINIC | Age: 46
End: 2021-03-02

## 2021-03-10 DIAGNOSIS — E04.2 MULTINODULAR GOITER: ICD-10-CM

## 2021-03-10 DIAGNOSIS — R13.14 PHARYNGOESOPHAGEAL DYSPHAGIA: ICD-10-CM

## 2021-04-05 ENCOUNTER — OFFICE VISIT (OUTPATIENT)
Dept: ENDOCRINOLOGY | Age: 46
End: 2021-04-05
Payer: MEDICAID

## 2021-04-05 VITALS
TEMPERATURE: 97.9 F | HEIGHT: 76 IN | DIASTOLIC BLOOD PRESSURE: 76 MMHG | HEART RATE: 87 BPM | BODY MASS INDEX: 38.36 KG/M2 | SYSTOLIC BLOOD PRESSURE: 124 MMHG | WEIGHT: 315 LBS | OXYGEN SATURATION: 98 %

## 2021-04-05 DIAGNOSIS — E03.9 ACQUIRED HYPOTHYROIDISM: Primary | ICD-10-CM

## 2021-04-05 PROCEDURE — 99213 OFFICE O/P EST LOW 20 MIN: CPT | Performed by: INTERNAL MEDICINE

## 2021-04-05 RX ORDER — PANTOPRAZOLE SODIUM 40 MG/1
TABLET, DELAYED RELEASE ORAL
COMMUNITY
Start: 2021-03-23

## 2021-04-05 RX ORDER — ZOLPIDEM TARTRATE 5 MG/1
TABLET ORAL
COMMUNITY
End: 2021-05-06 | Stop reason: ALTCHOICE

## 2021-04-05 RX ORDER — GABAPENTIN 600 MG/1
600 TABLET ORAL AS NEEDED
Status: ON HOLD | COMMUNITY
Start: 2021-03-10 | End: 2021-08-19

## 2021-04-05 RX ORDER — FLUTICASONE FUROATE AND VILANTEROL 200; 25 UG/1; UG/1
POWDER RESPIRATORY (INHALATION)
COMMUNITY
End: 2021-09-13 | Stop reason: ALTCHOICE

## 2021-04-05 RX ORDER — LEVOTHYROXINE SODIUM 150 UG/1
150 TABLET ORAL
Qty: 30 TAB | Refills: 3 | Status: SHIPPED | OUTPATIENT
Start: 2021-04-05 | End: 2021-05-05

## 2021-04-05 NOTE — PROGRESS NOTES
History and Physical    Patient: Rosita Rollins MRN: [id number]  SSN: [id number]    YOB: 1975  Age: 54 y.o. Sex: male      Subjective:      Rosita Rollins is a 54 y.o. male with past medical history of hypertension, hyperlipidemia, type 2 diabetes mellitus, obstructive sleep apnea, depression, COPD, pulmonary nodules, coronary artery disease, cardiomyopathy is sent to me by primary care physician Dr. Amna Quiles for multinodular goiter. After the last visit patient had repeat ultrasound of thyroid as well as TSH checked. TSH was normal and thyroid ultrasound showed heterogenous echotexture with a couple nodules which meet criteria for biopsy. Following this patient had thyroid biopsy at [facility] and he is here to follow-up on the results. He continues to have pressure symptoms from the goiter, difficulty in swallowing, difficulty in breathing, he feels like his airway gets cut off if he flexes his neck. Initial history:  Patient thinks he was diagnosed with multinodular goiter a few years back. He does not remember what was done about it at that time. He had chest CT scan a few months back because of frequent COPD exacerbation. This showed pulmonary nodules but it also incidentally showed multinodular goiter. Following this he had thyroid ultrasound and he is here for further evaluation and management. Patient thinks that in past month and a half or so his goiter has enlarged and he has tenderness. He also has difficulty in swallowing which has increased in the past few months. He denies any recent upper respiratory infections. Karmen sign was positive. At the last visit patient was referred to ENT surgery. He has been cleared by cardiology and pulmonology. He is tentatively scheduled for surgery on April 13.   Difficulty in swallowing (food/pills getting stuck): yes getting progressively worse  Difficulty in breathing: yes when he looks down  Visible swelling in the neck: yes  Hoarseness of voice: no  Family history of thyroid cancer: no  Personal history of radiation exposure to head/neck region: no  Family/personal history of other cancers: lung cancer, throat cancer  Symptoms of hypo/hyperthyroidism: increased sweating at night, heat intolerance. BM regular. Palpitations + CHF and cardiomyopathy     Past Medical History:   Diagnosis Date    Acute MI ([address]     Depression     Hypertension     Ill-defined condition      Past Surgical History:   Procedure Laterality Date    HX HERNIA REPAIR      HX ORTHOPAEDIC      rotator cuff surgery    IR FINE NEEDLE ASPIRATION W IMAGE        Family History   Problem Relation Age of Onset    Diabetes Maternal Uncle     Hypertension Maternal Uncle     Hypertension Paternal Uncle     Diabetes Paternal Uncle     Cancer Other    [de-identified] Cancer Mother     No Known Problems Father      Social History     Tobacco Use    Smoking status: Former Smoker    Smokeless tobacco: Never Used   Substance Use Topics    Alcohol use: Yes     Comment: socially       Prior to Admission medications    Medication Sig Start Date End Date Taking? Authorizing Provider   zolpidem (AMBIEN) 5 mg tablet zolpidem 5 mg tablet   Yes Provider, Historical   gabapentin (NEURONTIN) 600 mg tablet TAKE ONE TABLET BY MOUTH THREE TIMES DAILY 3/10/21  Yes Provider, Historical   pantoprazole (PROTONIX) 40 mg tablet TAKE ONE TABLET BY MOUTH TWICE DAILY 3/23/21  Yes Provider, Historical   fluticasone furoate-vilanteroL (BREO ELLIPTA) 200-25 mcg/dose inhaler Breo Ellipta 200 mcg-25 mcg/dose powder for inhalation   inhale ONE PUFF by MOUTH ONCE daily   Yes Provider, Historical   levothyroxine (SYNTHROID) 150 mcg tablet Take 1 Tab by mouth Daily (before breakfast) for 30 days.  4/5/21 5/5/21 Yes Warren Polanco MD   famotidine (PEPCID) 40 mg tablet TAKE ONE TABLET BY MOUTH DAILY 1/13/21  Yes Provider, Historical   ondansetron (ZOFRAN ODT) 4 mg disintegrating tablet DISSOLVE ONE TABLET ON THE TONGUE EVERY 8 HOURS AS NEEDED FOR NAUSEA 1/11/21  Yes Provider, Historical   amLODIPine (NORVASC) 5 mg tablet TAKE ONE TABLET BY MOUTH EVERY DAY 12/15/20  Yes Provider, Historical   bumetanide (BUMEX) 2 mg tablet TAKE ONE TABLET BY MOUTH EVERY 24 HOURS 12/15/20  Yes Provider, Historical   digoxin (LANOXIN) 0.125 mg tablet TAKE ONE TABLET BY MOUTH EVERY DAY 11/4/20  Yes Provider, Historical   sildenafil citrate (VIAGRA) 100 mg tablet TAKE ONE TABLET BY MOUTH EVERY DAY AS NEEDED 11/17/20  Yes Provider, Historical   citalopram (CELEXA) 40 mg tablet TAKE ONE TABLET BY MOUTH EVERY DAY 12/8/20  Yes Provider, Historical   metoprolol succinate (TOPROL-XL) 50 mg XL tablet TAKE ONE TABLET BY MOUTH DAILY 11/4/20  Yes Provider, Historical   atorvastatin (LIPITOR) 20 mg tablet TAKE ONE TABLET BY MOUTH AT BEDTIME 8/3/20  Yes Provider, Historical   ProAir HFA 90 mcg/actuation inhaler INHALE 2 PUFFS EVERY 6 HOURS as needed for SHORTNESS OF BREATH 8/21/20  Yes Provider, Historical   Vitamin D3 50 mcg (2,000 unit) cap capsule TAKE ONE CAPSULE BY MOUTH EVERY DAY 8/13/20  Yes Provider, Historical   metFORMIN (GLUCOPHAGE) 500 mg tablet TAKE ONE TABLET BY MOUTH TWICE DAILY WITH MEALS 8/13/20  Yes Provider, Historical   traZODone (DESYREL) 100 mg tablet TAKE ONE TABLET BY MOUTH AT BEDTIME 7/17/20  Yes Provider, Historical   aspirin 81 mg chewable tablet Take 81 mg by mouth daily. Yes Other, MD Elizabeth        Allergies   Allergen Reactions    Vancomycin Hives     Lyndsey syndrome       Review of Systems:  ROS    A comprehensive review of systems was preformed and it is negative except mentioned in HPI    Objective:     Vitals:    04/05/21 1341   BP: 124/76   Pulse: 87   Temp: 97.9 °F (36.6 °C)   TempSrc: Temporal   SpO2: 98%   Weight: 340 lb 12.8 oz (154.6 kg)   Height: 6' 4\" (1.93 m)        Physical Exam:    Physical Exam  Vitals signs and nursing note reviewed.    Constitutional: Appearance: He is obese. HENT:      Head: Normocephalic and atraumatic. Neck:      Musculoskeletal: Neck supple. Comments: [place] sign positive  Cardiovascular:      Rate and Rhythm: Normal rate and regular rhythm. Pulmonary:      Effort: Pulmonary effort is normal.      Breath sounds: Normal breath sounds. Neurological:      Mental Status: He is alert. Labs and Imaging:    Thyroid ultrasound 8-:  Comparison to thyroid ultrasound from 2014  Multiple cystic and solid nodules evident. Largest nodule is 3.6 x 2.3 x 1.6 cm in size. No unusual vascularity. No significant change in size when compared to previous ultrasound. Results for Suleiman Vargas (MRN [id number]) as of 12/30/2020 15:33   Ref.  Range 12/23/2020 11:47   Sodium Latest Ref Range: 134 - 144 mmol/L 141   Potassium Latest Ref Range: 3.5 - 5.2 mmol/L 4.1   Chloride Latest Ref Range: 96 - 106 mmol/L 100   CO2 Latest Ref Range: 20 - 29 mmol/L 25   Glucose Latest Ref Range: 65 - 99 mg/dL 139 (H)   BUN Latest Ref Range: 6 - 24 mg/dL 12   Creatinine Latest Ref Range: 0.76 - 1.27 mg/dL 1.02   BUN/Creatinine ratio Latest Ref Range: 9 - 20  12   Calcium Latest Ref Range: 8.7 - 10.2 mg/dL 8.9   GFR est non-AA Latest Ref Range: >59 mL/min/1.73 88   GFR est AA Latest Ref Range: >59 mL/min/1.73 101   TSH Latest Ref Range: 0.450 - 4.500 uIU/mL 1.720     Last 3 Recorded Weights in this Encounter    04/05/21 1341   Weight: 340 lb 12.8 oz (154.6 kg)        No results found for: HBA1C, HGBE8, JUO3EDAJ, JVU9UJPP, OGX8HQMT     Assessment:     Patient Active Problem List   Diagnosis Code    Obesity, morbid ([facility]) E66.01    Carotid artery stenosis I65.29    Coronary artery disease involving native coronary artery of native heart with angina pectoris with documented spasm ([facility]) I25.111    Cardiomyopathy ([facility]) I42.9    COPD (chronic obstructive pulmonary disease) ([facility]) J44.9    Pulmonary nodules R91.8    Obstructive sleep apnea G47.33    Type 2 diabetes mellitus with hyperglycemia, without long-term current use of insulin (HCC) E11.65    Essential hypertension I10    Multinodular goiter E04.2    Pharyngoesophageal dysphagia R13.14           Plan:     Multinodular goiter:  Thyroid ultrasound from August 2020, the report that I have is not very clear to read but it looks like it says there is multiple nodules, solid and cystic, largest nodule is 3.6 cm in size. However, patient reports an increase in size and tenderness in his goiter in past couple months. 12-:  TSH normal at 1.26    Thyroid ultrasound 12-: Both lobes of thyroid appear to be replaced by big nodules. Right lobe has 4.23 x 2.63 cm nodule which is isoechoic with cystic spaces, no microcalcification, margins are smooth. Left lobe has 4.23 x 2.24 x 3.24 cm nodule which is solid, isoechoic with some hypoechoic areas and echogenic foci which appear like colloid    1-:  Biopsy of right thyroid nodule:  Negative for malignant cells. Worsening compressive symptoms. Tentatively scheduled for thyroidectomy on 4-. Plan:  I will start him on levothyroxine 150 mcg daily, to be taken after thyroidectomy for postoperative hypothyroidism. Discussed with patient the correct way of taking levothyroxine. I will see him back in the office 4 weeks after surgery with labs prior to the visit. Essential hypertension:  Blood pressure well controlled on current medications. Type 2 diabetes mellitus:  Patient says it is well controlled. He is only taking Metformin. Orders Placed This Encounter    TSH AND FREE T4     Standing Status:   Future     Standing Expiration Date:   90/1/8870    METABOLIC PANEL, COMPREHENSIVE     Standing Status:   Future     Standing Expiration Date:   10/5/2021    levothyroxine (SYNTHROID) 150 mcg tablet     Sig: Take 1 Tab by mouth Daily (before breakfast) for 30 days.      Dispense:  30 Tab     Refill:  3 Signed By: Niharika Jarvis MD     April 5, 2021      Return to clinic one month

## 2021-04-05 NOTE — LETTER
4/5/2021 Patient: Audelia Polk YOB: 1975 Date of Visit: 4/5/2021 Jeni Tran MD 
[address] [place] [id number] Via Fax: [phone] Dear Jeni Tran MD, Thank you for referring Mr. Audelia Polk to [address] [place] [address] for evaluation. My notes for this consultation are attached. If you have questions, please do not hesitate to call me. I look forward to following your patient along with you. Sincerely, Monserrat Gaytan MD

## 2021-04-09 ENCOUNTER — OFFICE VISIT (OUTPATIENT)
Dept: ENT CLINIC | Age: 46
End: 2021-04-09
Payer: MEDICAID

## 2021-04-09 ENCOUNTER — TELEPHONE (OUTPATIENT)
Dept: ENT CLINIC | Age: 46
End: 2021-04-09

## 2021-04-09 VITALS
RESPIRATION RATE: 18 BRPM | DIASTOLIC BLOOD PRESSURE: 80 MMHG | OXYGEN SATURATION: 88 % | WEIGHT: 315 LBS | BODY MASS INDEX: 38.36 KG/M2 | TEMPERATURE: 97.1 F | HEART RATE: 110 BPM | SYSTOLIC BLOOD PRESSURE: 138 MMHG | HEIGHT: 76 IN

## 2021-04-09 DIAGNOSIS — E04.2 MULTINODULAR GOITER: ICD-10-CM

## 2021-04-09 DIAGNOSIS — E11.65 TYPE 2 DIABETES MELLITUS WITH HYPERGLYCEMIA, WITHOUT LONG-TERM CURRENT USE OF INSULIN (HCC): ICD-10-CM

## 2021-04-09 DIAGNOSIS — R13.14 PHARYNGOESOPHAGEAL DYSPHAGIA: Primary | ICD-10-CM

## 2021-04-09 PROCEDURE — 99214 OFFICE O/P EST MOD 30 MIN: CPT | Performed by: OTOLARYNGOLOGY

## 2021-04-09 NOTE — LETTER
NOTIFICATION RETURN TO WORK / SCHOOL 
 
4/9/2021 1:05 PM 
 
Mr. Macy Degroot 
[address] [address] To Whom It May Concern: 
 
Macy Degroot is currently under the care of [facility]. Mr. Macy Degroot will be out of work from April 13, 2021- April 28, 2021 He will return to work on: April 29, 2021 If there are questions or concerns please have the patient contact our office. Sincerely, Lawence Aschoff, MD

## 2021-04-09 NOTE — LETTER
4/9/2021 Patient: Tasha Wolff YOB: 1975 Date of Visit: 4/9/2021 Hudson Mitchell MD 
[address] [place] [id number] Via Fax: [phone] Dear Hudson Mitchell MD, Thank you for referring Mr. Tasha Wolff to [facility], NOSE, THROAT AND ALLERGY CARE for evaluation. My notes for this consultation are attached. If you have questions, please do not hesitate to call me. I look forward to following your patient along with you. Sincerely, Kacey Le MD

## 2021-04-09 NOTE — PROGRESS NOTES
Subjective:    Domenice Levels   54 y.o.   1975     Followup Visit    Location - thyroid    Quality - goiter    Severity -  Moderate/severe    Duration - years    Timing - chronic    Context - pt with known MNG for years - he has been having more compressive symptoms of late, adolph when bending neck forward and raising arms; c/o dysphagia at times and feeling choked off    Modifying Features - as above    Associated symptoms/signs - hx CHF/CAD     4/9/21 - f/u thyroid - pt is preop today for surgery 4/13 @ [facility]; he had cards clearance with Dr Sánchez Jensen, note reviewed, will request the echo results; he states his pulm Dr Merlinda Gutta had reported everything was stable; he did go to ER 2 weeks ago with some chest congestion, had a negative CT chest for PE; was placed on abx he feels better now; has seen Dr Lexis Gregory, was given rx for levothyroxine to start    Review of Systems  Review of Systems   Constitutional: Negative for chills and fever. HENT: Negative for ear pain, hearing loss, nosebleeds and tinnitus. Eyes: Negative for blurred vision and double vision. Respiratory: Negative for cough, sputum production and shortness of breath. Cardiovascular: Negative for chest pain and palpitations. Gastrointestinal: Negative for heartburn, nausea and vomiting. Musculoskeletal: Negative for joint pain and neck pain. Skin: Negative. Neurological: Negative for dizziness, speech change, weakness and headaches. Endo/Heme/Allergies: Negative for environmental allergies. Does not bruise/bleed easily. Psychiatric/Behavioral: Negative for memory loss. The patient does not have insomnia.           Past Medical History:   Diagnosis Date    Acute MI ([facility] [address]     Depression     Hypertension     Ill-defined condition      Past Surgical History:   Procedure Laterality Date    HX HERNIA REPAIR      HX ORTHOPAEDIC      rotator cuff surgery    IR FINE NEEDLE ASPIRATION W IMAGE        Family History   Problem Relation Age of Onset    Diabetes Maternal Uncle     Hypertension Maternal Uncle     Hypertension Paternal Uncle     Diabetes Paternal Uncle     Cancer Other    Samreen Horn Cancer Mother     No Known Problems Father      Social History     Tobacco Use    Smoking status: Former Smoker    Smokeless tobacco: Never Used   Substance Use Topics    Alcohol use: Yes     Comment: socially       Prior to Admission medications    Medication Sig Start Date End Date Taking? Authorizing Provider   zolpidem (AMBIEN) 5 mg tablet zolpidem 5 mg tablet   Yes Provider, Historical   gabapentin (NEURONTIN) 600 mg tablet TAKE ONE TABLET BY MOUTH THREE TIMES DAILY 3/10/21  Yes Provider, Historical   pantoprazole (PROTONIX) 40 mg tablet TAKE ONE TABLET BY MOUTH TWICE DAILY 3/23/21  Yes Provider, Historical   fluticasone furoate-vilanteroL (BREO ELLIPTA) 200-25 mcg/dose inhaler Breo Ellipta 200 mcg-25 mcg/dose powder for inhalation   inhale ONE PUFF by MOUTH ONCE daily   Yes Provider, Historical   levothyroxine (SYNTHROID) 150 mcg tablet Take 1 Tab by mouth Daily (before breakfast) for 30 days.  4/5/21 5/5/21 Yes Marnie Becerra MD   famotidine (PEPCID) 40 mg tablet TAKE ONE TABLET BY MOUTH DAILY 1/13/21  Yes Provider, Historical   ondansetron (ZOFRAN ODT) 4 mg disintegrating tablet DISSOLVE ONE TABLET ON THE TONGUE EVERY 8 HOURS AS NEEDED FOR NAUSEA 1/11/21  Yes Provider, Historical   amLODIPine (NORVASC) 5 mg tablet TAKE ONE TABLET BY MOUTH EVERY DAY 12/15/20  Yes Provider, Historical   bumetanide (BUMEX) 2 mg tablet TAKE ONE TABLET BY MOUTH EVERY 24 HOURS 12/15/20  Yes Provider, Historical   digoxin (LANOXIN) 0.125 mg tablet TAKE ONE TABLET BY MOUTH EVERY DAY 11/4/20  Yes Provider, Historical   sildenafil citrate (VIAGRA) 100 mg tablet TAKE ONE TABLET BY MOUTH EVERY DAY AS NEEDED 11/17/20  Yes Provider, Historical   citalopram (CELEXA) 40 mg tablet TAKE ONE TABLET BY MOUTH EVERY DAY 12/8/20  Yes Provider, Historical   metoprolol succinate (TOPROL-XL) 50 mg XL tablet TAKE ONE TABLET BY MOUTH DAILY 11/4/20  Yes Provider, Historical   atorvastatin (LIPITOR) 20 mg tablet TAKE ONE TABLET BY MOUTH AT BEDTIME 8/3/20  Yes Provider, Historical   ProAir HFA 90 mcg/actuation inhaler INHALE 2 PUFFS EVERY 6 HOURS as needed for SHORTNESS OF BREATH 8/21/20  Yes Provider, Historical   Vitamin D3 50 mcg (2,000 unit) cap capsule TAKE ONE CAPSULE BY MOUTH EVERY DAY 8/13/20  Yes Provider, Historical   metFORMIN (GLUCOPHAGE) 500 mg tablet TAKE ONE TABLET BY MOUTH TWICE DAILY WITH MEALS 8/13/20  Yes Provider, Historical   traZODone (DESYREL) 100 mg tablet TAKE ONE TABLET BY MOUTH AT BEDTIME 7/17/20  Yes Provider, Historical   aspirin 81 mg chewable tablet Take 81 mg by mouth daily. Yes Other, MD Elizabeth        Allergies   Allergen Reactions    Vancomycin Hives     Lyndsey syndrome         Objective:     Visit Vitals  /80 (BP 1 Location: Right upper arm, BP Patient Position: Sitting, BP Cuff Size: Adult)   Pulse (!) 110   Temp 97.1 °F (36.2 °C) (Temporal)   Resp 18   Ht 6' 4\" (1.93 m)   Wt 340 lb (154.2 kg)   SpO2 (!) 88%   BMI 41.39 kg/m²        Physical Exam  Vitals signs reviewed. Constitutional:       General: He is awake. Appearance: Normal appearance. He is morbidly obese. HENT:      Head: Normocephalic and atraumatic. Jaw: There is normal jaw occlusion. No trismus, tenderness or malocclusion. Salivary Glands: Right salivary gland is not diffusely enlarged or tender. Left salivary gland is not diffusely enlarged or tender. Right Ear: Hearing, tympanic membrane, ear canal and external ear normal.      Left Ear: Hearing, tympanic membrane, ear canal and external ear normal.      Ears:      [place] exam findings: does not lateralize. Right Rinne: AC > BC. Left Rinne: AC > BC. Nose: No septal deviation, mucosal edema or rhinorrhea. Right Turbinates: Not enlarged, swollen or pale.       Left Turbinates: Not enlarged, swollen or pale.      Right Sinus: No maxillary sinus tenderness or frontal sinus tenderness. Left Sinus: No maxillary sinus tenderness or frontal sinus tenderness. Mouth/Throat:      Lips: Pink. Mouth: Mucous membranes are moist. No oral lesions. Dentition: Normal dentition. No gum lesions. Tongue: No lesions. Palate: No mass and lesions. Pharynx: Oropharynx is clear. Uvula midline. Tonsils: No tonsillar exudate. 2+ on the right. 2+ on the left. Eyes:      General: Vision grossly intact. Extraocular Movements: Extraocular movements intact. Right eye: No nystagmus. Left eye: No nystagmus. Pupils: Pupils are equal, round, and reactive to light. Neck:      Musculoskeletal: Normal range of motion. No edema or erythema. Thyroid: Thyroid mass, thyromegaly and thyroid tenderness present. Trachea: Trachea and phonation normal. No tracheal tenderness. Cardiovascular:      Rate and Rhythm: Normal rate and regular rhythm. Pulmonary:      Effort: Pulmonary effort is normal.      Breath sounds: Normal breath sounds. No stridor. No wheezing. Musculoskeletal: Normal range of motion. Lymphadenopathy:      Cervical: No cervical adenopathy. Skin:     General: Skin is warm and dry. Neurological:      General: No focal deficit present. Mental Status: He is alert and oriented to person, place, and time. Mental status is at baseline. Cranial Nerves: Cranial nerves are intact. Coordination: Romberg sign negative. Psychiatric:         Mood and Affect: Mood normal.         Behavior: Behavior normal. Behavior is cooperative. Assessment/Plan:     Encounter Diagnoses   Name Primary?  Pharyngoesophageal dysphagia Yes    Type 2 diabetes mellitus with hyperglycemia, without long-term current use of insulin (HCC)     Multinodular goiter      Reviewed all pt imaging results and endocrinology notes.   He has a multinodular goiter with compression. Largest 3.6 cm and per his report he had a benign FNA at [facility]. I reviewed Dr Aquiles Squires recent note, cardiology note. Also his recent imaging including CT chest and his prior CT neck. He has R>L goiter with compression, bilateral nodules and would need total thyroid for complete treatment of his compression. He is a high risk surgical candidate because of his obesity, CAD, CHF, COPD. Will also get recent notes from pulmonary. Instructed to hold ASA. Risks/benefits thyroidectomy discussed with patient including bleeding, infection, recurrence, hypocalcemia, RLN injury. Questions answered. No orders of the defined types were placed in this encounter. Thank you for referring this patient to:    Nguyen Bergeron.  Ilene Kapadia MD, [address] [facility]  [address] [place] [address] [address]  [facility] [phone]

## 2021-04-09 NOTE — PROGRESS NOTES
Visit Vitals  /80 (BP 1 Location: Right upper arm, BP Patient Position: Sitting, BP Cuff Size: Adult)   Pulse (!) 110   Temp 97.1 °F (36.2 °C) (Temporal)   Resp 18   Ht 6' 4\" (1.93 m)   Wt 340 lb (154.2 kg)   SpO2 (!) 88%   BMI 41.39 kg/m²     Chief Complaint   Patient presents with    Pre-op Exam

## 2021-04-14 DIAGNOSIS — E04.2 MULTINODULAR GOITER: Primary | ICD-10-CM

## 2021-04-14 RX ORDER — OXYCODONE AND ACETAMINOPHEN 5; 325 MG/1; MG/1
1 TABLET ORAL
Qty: 20 TAB | Refills: 0 | Status: SHIPPED | OUTPATIENT
Start: 2021-04-14 | End: 2021-04-17

## 2021-04-14 RX ORDER — CAL/D3/MAG11/ZINC/COP/MANG/BOR 600 MG-800
1 TABLET ORAL 4 TIMES DAILY
Qty: 100 TAB | Refills: 1 | Status: ON HOLD | OUTPATIENT
Start: 2021-04-14 | End: 2021-06-27 | Stop reason: ALTCHOICE

## 2021-04-21 ENCOUNTER — OFFICE VISIT (OUTPATIENT)
Dept: ENT CLINIC | Age: 46
End: 2021-04-21
Payer: MEDICAID

## 2021-04-21 VITALS
TEMPERATURE: 97.1 F | BODY MASS INDEX: 38.36 KG/M2 | OXYGEN SATURATION: 98 % | RESPIRATION RATE: 18 BRPM | HEIGHT: 76 IN | HEART RATE: 90 BPM | SYSTOLIC BLOOD PRESSURE: 136 MMHG | DIASTOLIC BLOOD PRESSURE: 84 MMHG | WEIGHT: 315 LBS

## 2021-04-21 DIAGNOSIS — R04.2 HEMOPTYSIS: ICD-10-CM

## 2021-04-21 DIAGNOSIS — E04.2 MULTINODULAR GOITER: Primary | ICD-10-CM

## 2021-04-21 PROCEDURE — 99024 POSTOP FOLLOW-UP VISIT: CPT | Performed by: OTOLARYNGOLOGY

## 2021-04-21 PROCEDURE — 31575 DIAGNOSTIC LARYNGOSCOPY: CPT | Performed by: OTOLARYNGOLOGY

## 2021-04-21 RX ORDER — OXYCODONE AND ACETAMINOPHEN 5; 325 MG/1; MG/1
1 TABLET ORAL
Qty: 20 TAB | Refills: 0 | Status: SHIPPED | OUTPATIENT
Start: 2021-04-21 | End: 2021-04-24

## 2021-04-21 NOTE — LETTER
4/21/2021 Patient: Destini Cote YOB: 1975 Date of Visit: 4/21/2021 Abdelrahman Durand MD 
[address] [place] [address] [address] Fax: [phone] Dear Abdelrahman Durand MD, Thank you for referring Mr. Destini Cote to [facility] EAR, NOSE, THROAT AND ALLERGY CARE for evaluation. My notes for this consultation are attached. If you have questions, please do not hesitate to call me. I look forward to following your patient along with you. Sincerely, Orlando Fishman MD

## 2021-04-21 NOTE — PROGRESS NOTES
Subjective:    Shirlene Arevalo   54 y.o.   1975     Followup Visit    Location - thyroid    Quality - goiter    Severity -  Moderate/severe    Duration - years    Timing - chronic    Context - pt with known MNG for years - he has been having more compressive symptoms of late, adolph when bending neck forward and raising arms; c/o dysphagia at times and feeling choked off    Modifying Features - as above    Associated symptoms/signs - hx CHF/CAD     4/9/21 - f/u thyroid - pt is preop today for surgery 4/13 @ [facility]; he had cards clearance with Dr Fany Severino, note reviewed, will request the echo results; he states his pulm Dr Artis Roles had reported everything was stable; he did go to ER 2 weeks ago with some chest congestion, had a negative CT chest for PE; was placed on abx he feels better now; has seen Dr Karolina Segal, was given rx for levothyroxine to start    4/21/21 - 8 days postop total thyroid - pt states having more neck/throat pain since yesterday; coughed up some blood; doing ok with breathing, some pain with swallowing; he is out of pain meds    Review of Systems  Not done      Past Medical History:   Diagnosis Date    Acute MI ([address]     Depression     Hypertension     Ill-defined condition      Past Surgical History:   Procedure Laterality Date    HX HERNIA REPAIR      HX ORTHOPAEDIC      rotator cuff surgery    IR FINE NEEDLE ASPIRATION W IMAGE        Family History   Problem Relation Age of Onset    Diabetes Maternal Uncle     Hypertension Maternal Uncle     Hypertension Paternal Uncle     Diabetes Paternal Uncle     Cancer Other    Bee Montesinos Cancer Mother     No Known Problems Father      Social History     Tobacco Use    Smoking status: Former Smoker    Smokeless tobacco: Never Used   Substance Use Topics    Alcohol use: Yes     Comment: socially       Prior to Admission medications    Medication Sig Start Date End Date Taking?  Authorizing Provider   oxyCODONE-acetaminophen (PERCOCET) 5-325 mg per tablet Take 1 Tab by mouth every four (4) hours as needed for Pain for up to 3 days. Max Daily Amount: 6 Tabs. 4/21/21 4/24/21 Yes Carlos Julian MD   [id number]-zinc--jose-bor (Caltrate 600-D Plus Minerals) 600 mg calcium- 800 unit-50 mg tab Take 1 Tab by mouth four (4) times daily. 4/14/21  Yes Alannah Hughes MD   zolpidem (AMBIEN) 5 mg tablet zolpidem 5 mg tablet   Yes Provider, Historical   gabapentin (NEURONTIN) 600 mg tablet TAKE ONE TABLET BY MOUTH THREE TIMES DAILY 3/10/21  Yes Provider, Historical   pantoprazole (PROTONIX) 40 mg tablet TAKE ONE TABLET BY MOUTH TWICE DAILY 3/23/21  Yes Provider, Historical   fluticasone furoate-vilanteroL (BREO ELLIPTA) 200-25 mcg/dose inhaler Breo Ellipta 200 mcg-25 mcg/dose powder for inhalation   inhale ONE PUFF by MOUTH ONCE daily   Yes Provider, Historical   levothyroxine (SYNTHROID) 150 mcg tablet Take 1 Tab by mouth Daily (before breakfast) for 30 days.  4/5/21 5/5/21 Yes Mariama Juares MD   famotidine (PEPCID) 40 mg tablet TAKE ONE TABLET BY MOUTH DAILY 1/13/21  Yes Provider, Historical   ondansetron (ZOFRAN ODT) 4 mg disintegrating tablet DISSOLVE ONE TABLET ON THE TONGUE EVERY 8 HOURS AS NEEDED FOR NAUSEA 1/11/21  Yes Provider, Historical   amLODIPine (NORVASC) 5 mg tablet TAKE ONE TABLET BY MOUTH EVERY DAY 12/15/20  Yes Provider, Historical   bumetanide (BUMEX) 2 mg tablet TAKE ONE TABLET BY MOUTH EVERY 24 HOURS 12/15/20  Yes Provider, Historical   digoxin (LANOXIN) 0.125 mg tablet TAKE ONE TABLET BY MOUTH EVERY DAY 11/4/20  Yes Provider, Historical   sildenafil citrate (VIAGRA) 100 mg tablet TAKE ONE TABLET BY MOUTH EVERY DAY AS NEEDED 11/17/20  Yes Provider, Historical   citalopram (CELEXA) 40 mg tablet TAKE ONE TABLET BY MOUTH EVERY DAY 12/8/20  Yes Provider, Historical   metoprolol succinate (TOPROL-XL) 50 mg XL tablet TAKE ONE TABLET BY MOUTH DAILY 11/4/20  Yes Provider, Historical   atorvastatin (LIPITOR) 20 mg tablet TAKE ONE TABLET BY MOUTH AT BEDTIME 8/3/20  Yes Provider, Historical   ProAir HFA 90 mcg/actuation inhaler INHALE 2 PUFFS EVERY 6 HOURS as needed for SHORTNESS OF BREATH 8/21/20  Yes Provider, Historical   Vitamin D3 50 mcg (2,000 unit) cap capsule TAKE ONE CAPSULE BY MOUTH EVERY DAY 8/13/20  Yes Provider, Historical   metFORMIN (GLUCOPHAGE) 500 mg tablet TAKE ONE TABLET BY MOUTH TWICE DAILY WITH MEALS 8/13/20  Yes Provider, Historical   traZODone (DESYREL) 100 mg tablet TAKE ONE TABLET BY MOUTH AT BEDTIME 7/17/20  Yes Provider, Historical   aspirin 81 mg chewable tablet Take 81 mg by mouth daily. Yes Other, MD Elizabeth        Allergies   Allergen Reactions    Vancomycin Hives     Lyndsey syndrome         Objective:     Visit Vitals  /84 (BP 1 Location: Left upper arm, BP Patient Position: Sitting, BP Cuff Size: Adult)   Pulse 90   Temp 97.1 °F (36.2 °C) (Temporal)   Resp 18   Ht 6' 4\" (1.93 m)   Wt 340 lb (154.2 kg)   SpO2 98%   BMI 41.39 kg/m²      No acute distress  Voice still hoarse  No stridor  Incision intact, Dermabond is peeling off, mild incisional tenderness there is no erythema no ecchymosis; midline incisional edema  Palpation of his neck he is just guarding and tender  Oropharynx is clear no thrush no erythema    Procedure Note - Fiberoptic Laryngoscopy    The nostril is packed with lidocaine/oxymetazoline cottonball for several minutes for topical anesthesia. After several minutes the packing is removed and fiberoptic scope is advanced. Findings are as summarized. Nose - normal turbinates, septum  Nasopharynx - normal eustachian tubes, no mucosal lesions  Oropharynx - normal tonsils, tongue base and posterior wall  Hypopharynx - normal pyriform sinus and post-cricoid region  Larynx - normal arytenoids, false cords, and true cords  Subglottis - visualized upper trachea is normal        Assessment/Plan:     Encounter Diagnoses   Name Primary?  Multinodular goiter Yes    Hemoptysis      He has neck pain today.   Do not see anything out of the ordinary as far as his healing. He does not have any evidence of hematoma. Scope exam is overall clear there is no bruising or ecchymosis internally there is no evidence of bleeding site. We discussed wound care, he can use heat for his neck pain, continue tapering down the calcium tablet he has no obvious symptoms or signs of hypocalcemia at this point    His pathology report is reviewed he has bilateral benign goiter no malignancy. I refill his pain medications. ,  Follow-up 2 weeks      Orders Placed This Encounter    LARYNGOSCOPY,FLEX FIBER,DIAGNOSTIC    oxyCODONE-acetaminophen (PERCOCET) 5-325 mg per tablet     Follow-up and Dispositions    · Return in about 2 weeks (around 5/5/2021). Jameel Murphy MD, [address] [facility]  [address] [place] [address] [address]  [facility] [phone]. [phone]

## 2021-04-22 ENCOUNTER — TELEPHONE (OUTPATIENT)
Dept: ENT CLINIC | Age: 46
End: 2021-04-22

## 2021-05-05 DIAGNOSIS — E03.9 ACQUIRED HYPOTHYROIDISM: ICD-10-CM

## 2021-05-06 ENCOUNTER — OFFICE VISIT (OUTPATIENT)
Dept: ENDOCRINOLOGY | Age: 46
End: 2021-05-06
Payer: MEDICAID

## 2021-05-06 VITALS
BODY MASS INDEX: 38.36 KG/M2 | HEIGHT: 76 IN | HEART RATE: 94 BPM | TEMPERATURE: 97.3 F | DIASTOLIC BLOOD PRESSURE: 81 MMHG | SYSTOLIC BLOOD PRESSURE: 116 MMHG | WEIGHT: 315 LBS | OXYGEN SATURATION: 98 %

## 2021-05-06 DIAGNOSIS — E04.2 MULTINODULAR GOITER: ICD-10-CM

## 2021-05-06 DIAGNOSIS — E89.0 POSTOPERATIVE HYPOTHYROIDISM: Primary | ICD-10-CM

## 2021-05-06 PROBLEM — I63.9 CVA (CEREBRAL VASCULAR ACCIDENT) (HCC): Status: ACTIVE | Noted: 2021-05-06

## 2021-05-06 PROCEDURE — 99214 OFFICE O/P EST MOD 30 MIN: CPT | Performed by: INTERNAL MEDICINE

## 2021-05-06 RX ORDER — ZOLPIDEM TARTRATE 10 MG/1
TABLET ORAL
COMMUNITY
Start: 2021-04-07 | End: 2021-08-03 | Stop reason: ALTCHOICE

## 2021-05-06 RX ORDER — MULTIVITAMIN
TABLET ORAL 3 TIMES DAILY
COMMUNITY
Start: 2021-04-14 | End: 2021-08-26 | Stop reason: ALTCHOICE

## 2021-05-06 NOTE — PROGRESS NOTES
History and Physical    Patient: Suzan Gentile MRN: [id number]  SSN: [id number]    YOB: 1975  Age: 54 y.o. Sex: male      Subjective:      Suzan Gentile is a 54 y.o. male with past medical history of hypertension, hyperlipidemia, type 2 diabetes mellitus, obstructive sleep apnea, depression, COPD, pulmonary nodules, coronary artery disease, cardiomyopathy is sent to me by primary care physician Dr. Lebron Rivero for multinodular goiter. After the last visit patient had repeat ultrasound of thyroid as well as TSH checked. TSH was normal and thyroid ultrasound showed heterogenous echotexture with a couple nodules which meet criteria for biopsy. Following this patient had thyroid biopsy at [facility] and pathology was benign. He continues to have pressure symptoms from the goiter, difficulty in swallowing, difficulty in breathing, he feels like his airway gets cut off if he flexes his neck. At the last visit patient was referred to ENT surgery. He had total thyroidectomy on 4-. Pathology showed benign goiter. After the surgery patient was started on levothyroxine 150 mcg daily. He is taking it regularly and appropriately. He is on calcium tablets which are not being tapered, per ENT directions. He takes calcium along with all other medications and levothyroxine in the morning. He denies any tingling in fingertips, perioral paresthesias, muscle spasms. Surgical incision is healing well. Last week patient had a stroke, he went to [facility] where he was given TPA and transferred to [facility]. He was found to have what sounds like atrial septal defect and he has appointment today with cardiologist to discuss about fixing it.   Since the surgery he feels better in terms of obstructive symptoms, he is able to swallow much better, he still has difficulty in breathing at night but he wears CPAP and overall he feels better in terms of breathing. Initial history:  Patient thinks he was diagnosed with multinodular goiter a few years back. He does not remember what was done about it at that time. He had chest CT scan a few months back because of frequent COPD exacerbation. This showed pulmonary nodules but it also incidentally showed multinodular goiter. Following this he had thyroid ultrasound and he is here for further evaluation and management. Patient thinks that in past month and a half or so his goiter has enlarged and he has tenderness. He also has difficulty in swallowing which has increased in the past few months. He denies any recent upper respiratory infections. [place] sign was positive. At the last visit patient was referred to ENT surgery. He has been cleared by cardiology and pulmonology. He is tentatively scheduled for surgery on April 13. Difficulty in swallowing (food/pills getting stuck): yes getting progressively worse  Difficulty in breathing: yes when he looks down  Visible swelling in the neck: yes  Hoarseness of voice: no  Family history of thyroid cancer: no  Personal history of radiation exposure to head/neck region: no  Family/personal history of other cancers: lung cancer, throat cancer  Symptoms of hypo/hyperthyroidism: increased sweating at night, heat intolerance. BM regular.  Palpitations + CHF and cardiomyopathy     Past Medical History:   Diagnosis Date    [facility]     Depression     Hypertension     Ill-defined condition     Stroke [facility])      Past Surgical History:   Procedure Laterality Date    HX HERNIA REPAIR      HX ORTHOPAEDIC      rotator cuff surgery    HX THYROIDECTOMY      IR FINE NEEDLE ASPIRATION W IMAGE        Family History   Problem Relation Age of Onset    Diabetes Maternal Uncle     Hypertension Maternal Uncle     Hypertension Paternal Uncle     Diabetes Paternal Uncle     Cancer Other    Nik Sit Cancer Mother     No Known Problems Father      Social History     Tobacco Use    Smoking status: Former Smoker    Smokeless tobacco: Never Used   Substance Use Topics    Alcohol use: Yes     Comment: socially       Prior to Admission medications    Medication Sig Start Date End Date Taking? Authorizing Provider   calcium-cholecalciferol, D3, (CALTRATE 600+D) tablet TAKE 1 TABLET BY MOUTH 4 TIMES DAILY 4/14/21  Yes Provider, Historical   zolpidem (AMBIEN) 10 mg tablet TAKE 1 TABLET BY MOUTH ONCE DAILY AT BEDTIME AS NEEDED 4/7/21  Yes Provider, Historical   [id number]-[id number]-[id number]-zinc--jose-bor (Caltrate 600-D Plus Minerals) 600 mg calcium- 800 unit-50 mg tab Take 1 Tab by mouth four (4) times daily.  4/14/21  Yes Varinder Hughes MD   gabapentin (NEURONTIN) 600 mg tablet TAKE ONE TABLET BY MOUTH THREE TIMES DAILY 3/10/21  Yes Provider, Historical   pantoprazole (PROTONIX) 40 mg tablet TAKE ONE TABLET BY MOUTH TWICE DAILY 3/23/21  Yes Provider, Historical   fluticasone furoate-vilanteroL (BREO ELLIPTA) 200-25 mcg/dose inhaler Breo Ellipta 200 mcg-25 mcg/dose powder for inhalation   inhale ONE PUFF by MOUTH ONCE daily   Yes Provider, Historical   famotidine (PEPCID) 40 mg tablet TAKE ONE TABLET BY MOUTH DAILY 1/13/21  Yes Provider, Historical   ondansetron (ZOFRAN ODT) 4 mg disintegrating tablet DISSOLVE ONE TABLET ON THE TONGUE EVERY 8 HOURS AS NEEDED FOR NAUSEA 1/11/21  Yes Provider, Historical   amLODIPine (NORVASC) 5 mg tablet TAKE ONE TABLET BY MOUTH EVERY DAY 12/15/20  Yes Provider, Historical   bumetanide (BUMEX) 2 mg tablet TAKE ONE TABLET BY MOUTH EVERY 24 HOURS 12/15/20  Yes Provider, Historical   digoxin (LANOXIN) 0.125 mg tablet TAKE ONE TABLET BY MOUTH EVERY DAY 11/4/20  Yes Provider, Historical   sildenafil citrate (VIAGRA) 100 mg tablet TAKE ONE TABLET BY MOUTH EVERY DAY AS NEEDED 11/17/20  Yes Provider, Historical   citalopram (CELEXA) 40 mg tablet TAKE ONE TABLET BY MOUTH EVERY DAY 12/8/20  Yes Provider, Historical   metoprolol succinate (TOPROL-XL) 50 mg XL tablet TAKE ONE TABLET BY MOUTH DAILY 11/4/20  Yes Provider, Historical   atorvastatin (LIPITOR) 20 mg tablet TAKE ONE TABLET BY MOUTH AT BEDTIME 8/3/20  Yes Provider, Historical   ProAir HFA 90 mcg/actuation inhaler INHALE 2 PUFFS EVERY 6 HOURS as needed for SHORTNESS OF BREATH 8/21/20  Yes Provider, Historical   Vitamin D3 50 mcg (2,000 unit) cap capsule TAKE ONE CAPSULE BY MOUTH EVERY DAY 8/13/20  Yes Provider, Historical   metFORMIN (GLUCOPHAGE) 500 mg tablet TAKE ONE TABLET BY MOUTH TWICE DAILY WITH MEALS 8/13/20  Yes Provider, Historical   traZODone (DESYREL) 100 mg tablet TAKE ONE TABLET BY MOUTH AT BEDTIME 7/17/20  Yes Provider, Historical   aspirin 81 mg chewable tablet Take 81 mg by mouth daily. Yes Other, MD Elizabeth   levothyroxine (SYNTHROID) 150 mcg tablet Take 1 Tab by mouth Daily (before breakfast) for 30 days. 4/5/21 5/5/21  Oliver Beltran MD        Allergies   Allergen Reactions    Vancomycin Hives     Lyndsey syndrome       Review of Systems:  ROS    A comprehensive review of systems was preformed and it is negative except mentioned in HPI    Objective:     Vitals:    05/06/21 0852   BP: 116/81   Pulse: 94   Temp: 97.3 °F (36.3 °C)   TempSrc: Temporal   SpO2: 98%   Weight: (!) 353 lb (160.1 kg)   Height: 6' 4\" (1.93 m)        Physical Exam:    Physical Exam  Vitals signs and nursing note reviewed. Constitutional:       Appearance: He is obese. HENT:      Head: Normocephalic and atraumatic. Neck:      Musculoskeletal: Neck supple. Cardiovascular:      Rate and Rhythm: Normal rate and regular rhythm. Pulmonary:      Effort: Pulmonary effort is normal.      Breath sounds: Normal breath sounds. Neurological:      Mental Status: He is alert. Labs and Imaging:    Thyroid ultrasound 8-:  Comparison to thyroid ultrasound from 2014  Multiple cystic and solid nodules evident. Largest nodule is 3.6 x 2.3 x 1.6 cm in size. No unusual vascularity.   No significant change in size when compared to previous ultrasound. Results for Estephania Valentine (MRN [id number]) as of 12/30/2020 15:33   Ref. Range 12/23/2020 11:47   Sodium Latest Ref Range: 134 - 144 mmol/L 141   Potassium Latest Ref Range: 3.5 - 5.2 mmol/L 4.1   Chloride Latest Ref Range: 96 - 106 mmol/L 100   CO2 Latest Ref Range: 20 - 29 mmol/L 25   Glucose Latest Ref Range: 65 - 99 mg/dL 139 (H)   BUN Latest Ref Range: 6 - 24 mg/dL 12   Creatinine Latest Ref Range: 0.76 - 1.27 mg/dL 1.02   BUN/Creatinine ratio Latest Ref Range: 9 - 20  12   Calcium Latest Ref Range: 8.7 - 10.2 mg/dL 8.9   GFR est non-AA Latest Ref Range: >59 mL/min/1.73 88   GFR est AA Latest Ref Range: >59 mL/min/1.73 101   TSH Latest Ref Range: 0.450 - 4.500 uIU/mL 1.720     Last 3 Recorded Weights in this Encounter    05/06/21 0852   Weight: (!) 353 lb (160.1 kg)        No results found for: HBA1C, HGBE8, NEB4PIJP, YKB5HHSU, FMF4LRQT     Assessment:     Patient Active Problem List   Diagnosis Code    Obesity, morbid ([facility]) E66.01    Carotid artery stenosis I65.29    Coronary artery disease involving native coronary artery of native heart with angina pectoris with documented spasm ([facility]) I25.111    Cardiomyopathy ([facility]) I42.9    COPD (chronic obstructive pulmonary disease) ([facility]) J44.9    Pulmonary nodules R91.8    Obstructive sleep apnea G47.33    Type 2 diabetes mellitus with hyperglycemia, without long-term current use of insulin ([facility]) E11.65    Essential hypertension I10    Multinodular goiter E04.2    Pharyngoesophageal dysphagia R13.14    CVA (cerebral vascular accident) ([facility] I63.9    Postoperative hypothyroidism E89.0           Plan:     Postoperative hypothyroidism:  S/p total thyroidectomy on 4-. On levothyroxine 150 mcg daily.   Plan:  Discussed with patient the correct way of taking levothyroxine, take it by itself first thing in the morning, wait at least 30 to 45 minutes before taking other medications, take calcium with dinner, not with breakfast to prevent binding with levothyroxine. Check TSH, free T4, CMP and PTH today. I will see him back in 2 months with labs prior to the visit. Multinodular goiter:  Thyroid ultrasound from August 2020, the report that I have is not very clear to read but it looks like it says there is multiple nodules, solid and cystic, largest nodule is 3.6 cm in size. However, patient reports an increase in size and tenderness in his goiter in past couple months. 12-:  TSH normal at 1.26    Thyroid ultrasound 12-: Both lobes of thyroid appear to be replaced by big nodules. Right lobe has 4.23 x 2.63 cm nodule which is isoechoic with cystic spaces, no microcalcification, margins are smooth. Left lobe has 4.23 x 2.24 x 3.24 cm nodule which is solid, isoechoic with some hypoechoic areas and echogenic foci which appear like colloid    1-:  Biopsy of right thyroid nodule:  Negative for malignant cells. Worsening compressive symptoms. Total thyroidectomy on 4-. Pathology benign    Essential hypertension:  Blood pressure well controlled on current medications. Type 2 diabetes mellitus:  Patient says it is well controlled. He is only taking Metformin. Obstructive sleep apnea:  Compliant with CPAP. Follows with pulmonology. CVA:  Last week of April 2021. S/p TPA. Found to have atrial septal defect. Has appointment with cardiology.     Orders Placed This Encounter    TSH AND FREE T4    METABOLIC PANEL, COMPREHENSIVE    PTH INTACT    TSH AND FREE T4     Standing Status:   Future     Standing Expiration Date:   11/6/2021        Signed By: Peter Rutherford MD     May 6, 2021      Return to clinic 2 months

## 2021-05-06 NOTE — LETTER
5/6/2021 Patient: Edmundo Mckay YOB: 1975 Date of Visit: 5/6/2021 Terra Navarrete MD 
[address] [address] [address] Fax: [phone] Dear Terra Navarrete MD, Thank you for referring Mr. Edmundo Mckay to [address] [place] [address] for evaluation. My notes for this consultation are attached. If you have questions, please do not hesitate to call me. I look forward to following your patient along with you. Sincerely, Gilma Baeza MD

## 2021-05-12 ENCOUNTER — OFFICE VISIT (OUTPATIENT)
Dept: ENT CLINIC | Age: 46
End: 2021-05-12
Payer: MEDICAID

## 2021-05-12 VITALS
WEIGHT: 315 LBS | BODY MASS INDEX: 38.36 KG/M2 | HEIGHT: 76 IN | RESPIRATION RATE: 18 BRPM | SYSTOLIC BLOOD PRESSURE: 140 MMHG | OXYGEN SATURATION: 98 % | TEMPERATURE: 98.2 F | DIASTOLIC BLOOD PRESSURE: 80 MMHG | HEART RATE: 79 BPM

## 2021-05-12 DIAGNOSIS — E04.2 MULTINODULAR GOITER: Primary | ICD-10-CM

## 2021-05-12 PROCEDURE — 99024 POSTOP FOLLOW-UP VISIT: CPT | Performed by: OTOLARYNGOLOGY

## 2021-05-12 NOTE — LETTER
5/12/2021 Patient: Bryanna Hughes YOB: 1975 Date of Visit: 5/12/2021 Kayla Tamez MD 
[address] [place] [address] [address] Fax: [phone] Chloé Abrams MD 
[id number] [facility] [phone] [address] In [employer] Dear MD Chloé Finch MD, Thank you for referring Mr. Bryanna Hughes to [facility] [address], [facility] AND [facility]ARE for evaluation. My notes for this consultation are attached. If you have questions, please do not hesitate to call me. I look forward to following your patient along with you. Sincerely, J Carlos Landry MD

## 2021-05-12 NOTE — PROGRESS NOTES
Subjective:    Michael Pulse   54 y.o.   1975     Followup Visit    Original HPI  Location - thyroid    Quality - goiter    Severity -  Moderate/severe    Duration - years    Timing - chronic    Context - pt with known MNG for years - he has been having more compressive symptoms of late, adolph when bending neck forward and raising arms; c/o dysphagia at times and feeling choked off    Modifying Features - as above    Associated symptoms/signs - hx CHF/CAD     Followup HPI  4/9/21 - f/u thyroid - pt is preop today for surgery 4/13 @ [facility]; he had cards clearance with Dr Cornell Figueroa, note reviewed, will request the echo results; he states his pulm Dr Wilmar Gordillo had reported everything was stable; he did go to ER 2 weeks ago with some chest congestion, had a negative CT chest for PE; was placed on abx he feels better now; has seen Dr Quintero , was given rx for levothyroxine to start    4/21/21 - 8 days postop total thyroid - pt states having more neck/throat pain since yesterday; coughed up some blood; doing ok with breathing, some pain with swallowing; he is out of pain meds    5/12/21 -3-week follow-up. Patient has had eventful past few weeks, he presented to [facility] a few days after his last visit with me with acute stroke symptoms and was given TPA. He was transferred to [facility] and overall did well. However was found to have cardiac source of potential embolism and is having percutaneous repair next week. He reports no further pain in his neck, swallowing fine as long as he takes small bites. No limitation in range of motion.   No hypocalcemic signs    Review of Systems  Not done      Past Medical History:   Diagnosis Date    Acute [facility] [address]     Depression     Hypertension     Ill-defined condition     Stroke [facility])      Past Surgical History:   Procedure Laterality Date    HX HERNIA REPAIR      HX ORTHOPAEDIC      rotator cuff surgery    HX THYROIDECTOMY      IR FINE NEEDLE ASPIRATION W IMAGE        Family History   Problem Relation Age of Onset    Diabetes Maternal Uncle     Hypertension Maternal Uncle     Hypertension Paternal Uncle     Diabetes Paternal Uncle     Cancer Other    Jesse Quintero Cancer Mother     No Known Problems Father      Social History     Tobacco Use    Smoking status: Former Smoker    Smokeless tobacco: Never Used   Substance Use Topics    Alcohol use: Yes     Comment: socially       Prior to Admission medications    Medication Sig Start Date End Date Taking? Authorizing Provider   calcium-cholecalciferol, D3, (CALTRATE 600+D) tablet TAKE 1 TABLET BY MOUTH 4 TIMES DAILY 4/14/21   Provider, Historical   zolpidem (AMBIEN) 10 mg tablet TAKE 1 TABLET BY MOUTH ONCE DAILY AT BEDTIME AS NEEDED 4/7/21   Provider, Historical   esx-[id number]-[id number]-zinc--jose-bor (Caltrate 600-D Plus Minerals) 600 mg calcium- 800 unit-50 mg tab Take 1 Tab by mouth four (4) times daily.  4/14/21   Gonzalo Le MD   gabapentin (NEURONTIN) 600 mg tablet TAKE ONE TABLET BY MOUTH THREE TIMES DAILY 3/10/21   Provider, Historical   pantoprazole (PROTONIX) 40 mg tablet TAKE ONE TABLET BY MOUTH TWICE DAILY 3/23/21   Provider, Historical   fluticasone furoate-vilanteroL (BREO ELLIPTA) 200-25 mcg/dose inhaler Breo Ellipta 200 mcg-25 mcg/dose powder for inhalation   inhale ONE PUFF by MOUTH ONCE daily    Provider, Historical   famotidine (PEPCID) 40 mg tablet TAKE ONE TABLET BY MOUTH DAILY 1/13/21   Provider, Historical   ondansetron (ZOFRAN ODT) 4 mg disintegrating tablet DISSOLVE ONE TABLET ON THE TONGUE EVERY 8 HOURS AS NEEDED FOR NAUSEA 1/11/21   Provider, Historical   amLODIPine (NORVASC) 5 mg tablet TAKE ONE TABLET BY MOUTH EVERY DAY 12/15/20   Provider, Historical   bumetanide (BUMEX) 2 mg tablet TAKE ONE TABLET BY MOUTH EVERY 24 HOURS 12/15/20   Provider, Historical   digoxin (LANOXIN) 0.125 mg tablet TAKE ONE TABLET BY MOUTH EVERY DAY 11/4/20   Provider, Historical   sildenafil citrate (VIAGRA) 100 mg tablet TAKE ONE TABLET BY MOUTH EVERY DAY AS NEEDED 11/17/20   Provider, Historical   citalopram (CELEXA) 40 mg tablet TAKE ONE TABLET BY MOUTH EVERY DAY 12/8/20   Provider, Historical   metoprolol succinate (TOPROL-XL) 50 mg XL tablet TAKE ONE TABLET BY MOUTH DAILY 11/4/20   Provider, Historical   atorvastatin (LIPITOR) 20 mg tablet TAKE ONE TABLET BY MOUTH AT BEDTIME 8/3/20   Provider, Historical   ProAir HFA 90 mcg/actuation inhaler INHALE 2 PUFFS EVERY 6 HOURS as needed for SHORTNESS OF BREATH 8/21/20   Provider, Historical   Vitamin D3 50 mcg (2,000 unit) cap capsule TAKE ONE CAPSULE BY MOUTH EVERY DAY 8/13/20   Provider, Historical   metFORMIN (GLUCOPHAGE) 500 mg tablet TAKE ONE TABLET BY MOUTH TWICE DAILY WITH MEALS 8/13/20   Provider, Historical   traZODone (DESYREL) 100 mg tablet TAKE ONE TABLET BY MOUTH AT BEDTIME 7/17/20   Provider, Historical   aspirin 81 mg chewable tablet Take 81 mg by mouth daily. Other, MD Elizabeth        Allergies   Allergen Reactions    Vancomycin Hives     Lyndsey syndrome         Objective:     Visit Vitals  BP (!) 140/80 (BP 1 Location: Left upper arm, BP Patient Position: Sitting, BP Cuff Size: Adult)   Pulse 79   Temp 98.2 °F (36.8 °C) (Temporal)   Resp 18   Ht 6' 4\" (1.93 m)   Wt (!) 353 lb (160.1 kg)   SpO2 98%   BMI 42.97 kg/m²      No acute distress  Voice ear  No stridor  Incision intact, mild incisional hypertrophy no erythema        Assessment/Plan:     Encounter Diagnoses   Name Primary?  Multinodular goiter Yes     1 month postop total thyroidectomy for compressive goiter. He is now doing well postop. Further wound care is discussed. He will continue to follow-up with endocrinology for thyroid medication adjustments. I did review some of his labs from his hospitalization and his calcium appeared to be in good range. Continue with 1 tab daily for now. His pathology report is reviewed he has bilateral benign goiter no malignancy.     Can follow-up as needed      No orders of the defined types were placed in this encounter. Follow-up and Dispositions    · Return if symptoms worsen or fail to improve. Jameel Stinson MD, [address]  [address] [place] [address] [address]  [facility] 14. [phone]

## 2021-05-13 DIAGNOSIS — E89.0 POSTOPERATIVE HYPOTHYROIDISM: Primary | ICD-10-CM

## 2021-05-13 LAB
ALBUMIN SERPL-MCNC: 4.6 G/DL (ref 4–5)
ALBUMIN/GLOB SERPL: 1.2 {RATIO} (ref 1.2–2.2)
ALP SERPL-CCNC: 98 IU/L (ref 39–117)
ALT SERPL-CCNC: 68 IU/L (ref 0–44)
AST SERPL-CCNC: 45 IU/L (ref 0–40)
BILIRUB SERPL-MCNC: 0.2 MG/DL (ref 0–1.2)
BUN SERPL-MCNC: 16 MG/DL (ref 6–24)
BUN/CREAT SERPL: 15 (ref 9–20)
CALCIUM SERPL-MCNC: 9.4 MG/DL (ref 8.7–10.2)
CHLORIDE SERPL-SCNC: 96 MMOL/L (ref 96–106)
CO2 SERPL-SCNC: 23 MMOL/L (ref 20–29)
CREAT SERPL-MCNC: 1.05 MG/DL (ref 0.76–1.27)
GLOBULIN SER CALC-MCNC: 3.8 G/DL (ref 1.5–4.5)
GLUCOSE SERPL-MCNC: 140 MG/DL (ref 65–99)
POTASSIUM SERPL-SCNC: 3.7 MMOL/L (ref 3.5–5.2)
PROT SERPL-MCNC: 8.4 G/DL (ref 6–8.5)
PTH-INTACT SERPL-MCNC: 29 PG/ML (ref 15–65)
SODIUM SERPL-SCNC: 139 MMOL/L (ref 134–144)
T4 FREE SERPL-MCNC: 1.05 NG/DL (ref 0.82–1.77)
TSH SERPL DL<=0.005 MIU/L-ACNC: 29.2 UIU/ML (ref 0.45–4.5)

## 2021-05-13 RX ORDER — LEVOTHYROXINE SODIUM 175 UG/1
175 TABLET ORAL
Qty: 30 TAB | Refills: 3 | Status: SHIPPED | OUTPATIENT
Start: 2021-05-13 | End: 2021-06-12

## 2021-05-13 NOTE — PROGRESS NOTES
Please inform patient about the following:  Calcium level is normal.  Continue calcium tablets according to ENT directions. Thyroid labs are indicating that we need to increase levothyroxine dose. I am going to increase from 150 mcg to 175 mcg daily. Sending prescription to pharmacy.

## 2021-05-14 ENCOUNTER — TELEPHONE (OUTPATIENT)
Dept: ENDOCRINOLOGY | Age: 46
End: 2021-05-14

## 2021-05-14 NOTE — TELEPHONE ENCOUNTER
Patient notified    ----- Message from Javier Johnson MD sent at 5/13/2021  9:20 AM EDT -----  Please inform patient about the following:  Calcium level is normal.  Continue calcium tablets according to ENT directions. Thyroid labs are indicating that we need to increase levothyroxine dose. I am going to increase from 150 mcg to 175 mcg daily. Sending prescription to pharmacy.

## 2021-06-26 ENCOUNTER — HOSPITAL ENCOUNTER (OUTPATIENT)
Age: 46
Setting detail: OBSERVATION
Discharge: HOME | End: 2021-06-28
Attending: STUDENT IN AN ORGANIZED HEALTH CARE EDUCATION/TRAINING PROGRAM | Admitting: INTERNAL MEDICINE
Payer: MEDICAID

## 2021-06-26 ENCOUNTER — APPOINTMENT (OUTPATIENT)
Dept: CT IMAGING | Age: 46
End: 2021-06-26
Attending: STUDENT IN AN ORGANIZED HEALTH CARE EDUCATION/TRAINING PROGRAM
Payer: MEDICAID

## 2021-06-26 ENCOUNTER — APPOINTMENT (OUTPATIENT)
Dept: GENERAL RADIOLOGY | Age: 46
End: 2021-06-26
Attending: STUDENT IN AN ORGANIZED HEALTH CARE EDUCATION/TRAINING PROGRAM
Payer: MEDICAID

## 2021-06-26 DIAGNOSIS — R07.9 CHEST PAIN, UNSPECIFIED TYPE: Primary | ICD-10-CM

## 2021-06-26 LAB
ALBUMIN SERPL-MCNC: 3.2 G/DL (ref 3.5–5)
ALBUMIN/GLOB SERPL: 0.7 {RATIO} (ref 1.1–2.2)
ALP SERPL-CCNC: 79 U/L (ref 45–117)
ALT SERPL-CCNC: 90 U/L (ref 12–78)
ANION GAP SERPL CALC-SCNC: 6 MMOL/L (ref 5–15)
AST SERPL W P-5'-P-CCNC: 60 U/L (ref 15–37)
BASOPHILS # BLD: 0 K/UL (ref 0–0.1)
BASOPHILS NFR BLD: 0 % (ref 0–1)
BILIRUB SERPL-MCNC: 0.2 MG/DL (ref 0.2–1)
BNP SERPL-MCNC: 50 PG/ML
BUN SERPL-MCNC: 15 MG/DL (ref 6–20)
BUN/CREAT SERPL: 13 (ref 12–20)
CA-I BLD-MCNC: 8.9 MG/DL (ref 8.5–10.1)
CHLORIDE SERPL-SCNC: 106 MMOL/L (ref 97–108)
CK SERPL-CCNC: 188.8 NG/ML (ref 39–308)
CO2 SERPL-SCNC: 28 MMOL/L (ref 21–32)
CREAT SERPL-MCNC: 1.18 MG/DL (ref 0.7–1.3)
DIFFERENTIAL METHOD BLD: ABNORMAL
EOSINOPHIL # BLD: 0.1 K/UL (ref 0–0.4)
EOSINOPHIL NFR BLD: 2 % (ref 0–7)
ERYTHROCYTE [DISTWIDTH] IN BLOOD BY AUTOMATED COUNT: 13.9 % (ref 11.5–14.5)
GLOBULIN SER CALC-MCNC: 4.6 G/DL (ref 2–4)
GLUCOSE SERPL-MCNC: 139 MG/DL (ref 65–100)
HCT VFR BLD AUTO: 37.4 % (ref 36.6–50.3)
HGB BLD-MCNC: 12.2 G/DL (ref 12.1–17)
IMM GRANULOCYTES # BLD AUTO: 0 K/UL (ref 0–0.04)
IMM GRANULOCYTES NFR BLD AUTO: 0 % (ref 0–0.5)
LYMPHOCYTES # BLD: 2 K/UL (ref 0.8–3.5)
LYMPHOCYTES NFR BLD: 30 % (ref 12–49)
MCH RBC QN AUTO: 31.9 PG (ref 26–34)
MCHC RBC AUTO-ENTMCNC: 32.6 G/DL (ref 30–36.5)
MCV RBC AUTO: 97.7 FL (ref 80–99)
MONOCYTES # BLD: 0.6 K/UL (ref 0–1)
MONOCYTES NFR BLD: 8 % (ref 5–13)
NEUTS SEG # BLD: 4 K/UL (ref 1.8–8)
NEUTS SEG NFR BLD: 60 % (ref 32–75)
NRBC # BLD: 0 K/UL (ref 0–0.01)
NRBC BLD-RTO: 0 PER 100 WBC
PLATELET # BLD AUTO: 260 K/UL (ref 150–400)
PMV BLD AUTO: 10.2 FL (ref 8.9–12.9)
POTASSIUM SERPL-SCNC: 4 MMOL/L (ref 3.5–5.1)
PROT SERPL-MCNC: 7.8 G/DL (ref 6.4–8.2)
RBC # BLD AUTO: 3.83 M/UL (ref 4.1–5.7)
SODIUM SERPL-SCNC: 140 MMOL/L (ref 136–145)
TROPONIN I SERPL-MCNC: <0.05 NG/ML
WBC # BLD AUTO: 6.8 K/UL (ref 4.1–11.1)

## 2021-06-26 PROCEDURE — 96374 THER/PROPH/DIAG INJ IV PUSH: CPT

## 2021-06-26 PROCEDURE — 93005 ELECTROCARDIOGRAM TRACING: CPT

## 2021-06-26 PROCEDURE — 84484 ASSAY OF TROPONIN QUANT: CPT

## 2021-06-26 PROCEDURE — 99284 EMERGENCY DEPT VISIT MOD MDM: CPT

## 2021-06-26 PROCEDURE — 36415 COLL VENOUS BLD VENIPUNCTURE: CPT

## 2021-06-26 PROCEDURE — 83880 ASSAY OF NATRIURETIC PEPTIDE: CPT

## 2021-06-26 PROCEDURE — 85025 COMPLETE CBC W/AUTO DIFF WBC: CPT

## 2021-06-26 PROCEDURE — 96376 TX/PRO/DX INJ SAME DRUG ADON: CPT

## 2021-06-26 PROCEDURE — 82550 ASSAY OF CK (CPK): CPT

## 2021-06-26 PROCEDURE — 80053 COMPREHEN METABOLIC PANEL: CPT

## 2021-06-26 PROCEDURE — 65270000029 HC RM PRIVATE

## 2021-06-26 PROCEDURE — 71275 CT ANGIOGRAPHY CHEST: CPT

## 2021-06-26 PROCEDURE — 71045 X-RAY EXAM CHEST 1 VIEW: CPT

## 2021-06-26 PROCEDURE — 99218 HC RM OBSERVATION: CPT

## 2021-06-26 PROCEDURE — 74011250636 HC RX REV CODE- 250/636: Performed by: STUDENT IN AN ORGANIZED HEALTH CARE EDUCATION/TRAINING PROGRAM

## 2021-06-26 RX ORDER — MORPHINE SULFATE 4 MG/ML
4 INJECTION INTRAVENOUS
Status: COMPLETED | OUTPATIENT
Start: 2021-06-26 | End: 2021-06-27

## 2021-06-26 RX ORDER — MORPHINE SULFATE 2 MG/ML
2 INJECTION, SOLUTION INTRAMUSCULAR; INTRAVENOUS
Status: COMPLETED | OUTPATIENT
Start: 2021-06-26 | End: 2021-06-26

## 2021-06-26 RX ADMIN — MORPHINE SULFATE 2 MG: 2 INJECTION, SOLUTION INTRAMUSCULAR; INTRAVENOUS at 22:16

## 2021-06-26 RX ADMIN — MORPHINE SULFATE 2 MG: 2 INJECTION, SOLUTION INTRAMUSCULAR; INTRAVENOUS at 23:57

## 2021-06-27 LAB
ATRIAL RATE: 106 BPM
ATRIAL RATE: 83 BPM
CALCULATED P AXIS, ECG09: 14 DEGREES
CALCULATED P AXIS, ECG09: 29 DEGREES
CALCULATED R AXIS, ECG10: 6 DEGREES
CALCULATED R AXIS, ECG10: 8 DEGREES
CALCULATED T AXIS, ECG11: 18 DEGREES
CALCULATED T AXIS, ECG11: 18 DEGREES
DIAGNOSIS, 93000: NORMAL
DIAGNOSIS, 93000: NORMAL
P-R INTERVAL, ECG05: 182 MS
P-R INTERVAL, ECG05: 194 MS
Q-T INTERVAL, ECG07: 362 MS
Q-T INTERVAL, ECG07: 418 MS
QRS DURATION, ECG06: 106 MS
QRS DURATION, ECG06: 96 MS
QTC CALCULATION (BEZET), ECG08: 480 MS
QTC CALCULATION (BEZET), ECG08: 491 MS
TROPONIN I SERPL-MCNC: <0.05 NG/ML
VENTRICULAR RATE, ECG03: 106 BPM
VENTRICULAR RATE, ECG03: 83 BPM

## 2021-06-27 PROCEDURE — 74011250636 HC RX REV CODE- 250/636: Performed by: STUDENT IN AN ORGANIZED HEALTH CARE EDUCATION/TRAINING PROGRAM

## 2021-06-27 PROCEDURE — 74011250636 HC RX REV CODE- 250/636: Performed by: INTERNAL MEDICINE

## 2021-06-27 PROCEDURE — 96372 THER/PROPH/DIAG INJ SC/IM: CPT

## 2021-06-27 PROCEDURE — 36415 COLL VENOUS BLD VENIPUNCTURE: CPT

## 2021-06-27 PROCEDURE — 96376 TX/PRO/DX INJ SAME DRUG ADON: CPT

## 2021-06-27 PROCEDURE — 65270000029 HC RM PRIVATE

## 2021-06-27 PROCEDURE — 99218 HC RM OBSERVATION: CPT

## 2021-06-27 PROCEDURE — 84484 ASSAY OF TROPONIN QUANT: CPT

## 2021-06-27 PROCEDURE — 74011000636 HC RX REV CODE- 636: Performed by: INTERNAL MEDICINE

## 2021-06-27 PROCEDURE — 65270000032 HC RM SEMIPRIVATE

## 2021-06-27 PROCEDURE — 74011250637 HC RX REV CODE- 250/637: Performed by: INTERNAL MEDICINE

## 2021-06-27 PROCEDURE — 93005 ELECTROCARDIOGRAM TRACING: CPT

## 2021-06-27 RX ORDER — LEVOTHYROXINE SODIUM 150 UG/1
150 TABLET ORAL
COMMUNITY
End: 2021-08-26 | Stop reason: ALTCHOICE

## 2021-06-27 RX ORDER — ATORVASTATIN CALCIUM 20 MG/1
20 TABLET, FILM COATED ORAL DAILY
Status: DISCONTINUED | OUTPATIENT
Start: 2021-06-27 | End: 2021-06-28 | Stop reason: HOSPADM

## 2021-06-27 RX ORDER — BUDESONIDE AND FORMOTEROL FUMARATE DIHYDRATE 160; 4.5 UG/1; UG/1
2 AEROSOL RESPIRATORY (INHALATION)
Status: DISCONTINUED | OUTPATIENT
Start: 2021-06-28 | End: 2021-06-28 | Stop reason: HOSPADM

## 2021-06-27 RX ORDER — PANTOPRAZOLE SODIUM 40 MG/1
40 TABLET, DELAYED RELEASE ORAL
Status: DISCONTINUED | OUTPATIENT
Start: 2021-06-27 | End: 2021-06-28 | Stop reason: HOSPADM

## 2021-06-27 RX ORDER — METFORMIN HYDROCHLORIDE 500 MG/1
500 TABLET ORAL
Status: DISCONTINUED | OUTPATIENT
Start: 2021-06-27 | End: 2021-06-28 | Stop reason: HOSPADM

## 2021-06-27 RX ORDER — SODIUM CHLORIDE 0.9 % (FLUSH) 0.9 %
5-40 SYRINGE (ML) INJECTION AS NEEDED
Status: DISCONTINUED | OUTPATIENT
Start: 2021-06-27 | End: 2021-06-28 | Stop reason: HOSPADM

## 2021-06-27 RX ORDER — CITALOPRAM 20 MG/1
40 TABLET, FILM COATED ORAL DAILY
Status: DISCONTINUED | OUTPATIENT
Start: 2021-06-27 | End: 2021-06-28 | Stop reason: HOSPADM

## 2021-06-27 RX ORDER — SODIUM CHLORIDE 0.9 % (FLUSH) 0.9 %
5-40 SYRINGE (ML) INJECTION EVERY 8 HOURS
Status: DISCONTINUED | OUTPATIENT
Start: 2021-06-27 | End: 2021-06-28 | Stop reason: HOSPADM

## 2021-06-27 RX ORDER — OXYCODONE AND ACETAMINOPHEN 5; 325 MG/1; MG/1
1 TABLET ORAL
Status: DISCONTINUED | OUTPATIENT
Start: 2021-06-27 | End: 2021-06-28 | Stop reason: HOSPADM

## 2021-06-27 RX ORDER — CALCIUM CARBONATE/VITAMIN D3 600MG-5MCG
1 TABLET ORAL 3 TIMES DAILY
Status: DISCONTINUED | OUTPATIENT
Start: 2021-06-27 | End: 2021-06-28 | Stop reason: HOSPADM

## 2021-06-27 RX ORDER — GUAIFENESIN 100 MG/5ML
81 LIQUID (ML) ORAL DAILY
Status: DISCONTINUED | OUTPATIENT
Start: 2021-06-27 | End: 2021-06-28 | Stop reason: HOSPADM

## 2021-06-27 RX ORDER — ENOXAPARIN SODIUM 100 MG/ML
40 INJECTION SUBCUTANEOUS EVERY 24 HOURS
Status: DISCONTINUED | OUTPATIENT
Start: 2021-06-27 | End: 2021-06-28 | Stop reason: HOSPADM

## 2021-06-27 RX ORDER — LEVOTHYROXINE SODIUM 100 UG/1
100 TABLET ORAL
Status: DISCONTINUED | OUTPATIENT
Start: 2021-06-27 | End: 2021-06-28 | Stop reason: HOSPADM

## 2021-06-27 RX ORDER — ZOLPIDEM TARTRATE 5 MG/1
5 TABLET ORAL
Status: DISCONTINUED | OUTPATIENT
Start: 2021-06-27 | End: 2021-06-28 | Stop reason: HOSPADM

## 2021-06-27 RX ORDER — ALBUTEROL SULFATE 90 UG/1
2 AEROSOL, METERED RESPIRATORY (INHALATION)
Status: DISCONTINUED | OUTPATIENT
Start: 2021-06-27 | End: 2021-06-28 | Stop reason: HOSPADM

## 2021-06-27 RX ORDER — DIGOXIN 125 MCG
0.12 TABLET ORAL DAILY
Status: DISCONTINUED | OUTPATIENT
Start: 2021-06-27 | End: 2021-06-28 | Stop reason: HOSPADM

## 2021-06-27 RX ORDER — METOPROLOL SUCCINATE 25 MG/1
50 TABLET, EXTENDED RELEASE ORAL DAILY
Status: DISCONTINUED | OUTPATIENT
Start: 2021-06-27 | End: 2021-06-28 | Stop reason: HOSPADM

## 2021-06-27 RX ORDER — TRAZODONE HYDROCHLORIDE 50 MG/1
100 TABLET ORAL
Status: DISCONTINUED | OUTPATIENT
Start: 2021-06-27 | End: 2021-06-28 | Stop reason: HOSPADM

## 2021-06-27 RX ORDER — GABAPENTIN 300 MG/1
600 CAPSULE ORAL 3 TIMES DAILY
Status: DISCONTINUED | OUTPATIENT
Start: 2021-06-28 | End: 2021-06-28 | Stop reason: HOSPADM

## 2021-06-27 RX ADMIN — CITALOPRAM HYDROBROMIDE 40 MG: 20 TABLET ORAL at 10:42

## 2021-06-27 RX ADMIN — METOPROLOL SUCCINATE 50 MG: 25 TABLET, EXTENDED RELEASE ORAL at 10:41

## 2021-06-27 RX ADMIN — DIGOXIN 0.12 MG: 125 TABLET ORAL at 10:42

## 2021-06-27 RX ADMIN — ASPIRIN 81 MG: 81 TABLET, CHEWABLE ORAL at 10:42

## 2021-06-27 RX ADMIN — MORPHINE SULFATE 4 MG: 4 INJECTION, SOLUTION INTRAMUSCULAR; INTRAVENOUS at 02:20

## 2021-06-27 RX ADMIN — OXYCODONE AND ACETAMINOPHEN 1 TABLET: 5; 325 TABLET ORAL at 17:52

## 2021-06-27 RX ADMIN — ENOXAPARIN SODIUM 40 MG: 40 INJECTION SUBCUTANEOUS at 10:42

## 2021-06-27 RX ADMIN — OXYCODONE AND ACETAMINOPHEN 1 TABLET: 5; 325 TABLET ORAL at 14:03

## 2021-06-27 RX ADMIN — ZOLPIDEM TARTRATE 5 MG: 5 TABLET ORAL at 21:50

## 2021-06-27 RX ADMIN — PANTOPRAZOLE SODIUM 40 MG: 40 TABLET, DELAYED RELEASE ORAL at 10:47

## 2021-06-27 RX ADMIN — Medication 10 ML: at 14:06

## 2021-06-27 RX ADMIN — LEVOTHYROXINE SODIUM 100 MCG: 0.1 TABLET ORAL at 10:41

## 2021-06-27 RX ADMIN — ATORVASTATIN CALCIUM 20 MG: 20 TABLET, FILM COATED ORAL at 10:41

## 2021-06-27 RX ADMIN — Medication 1 TABLET: at 17:10

## 2021-06-27 RX ADMIN — Medication 1 TABLET: at 21:50

## 2021-06-27 RX ADMIN — OXYCODONE AND ACETAMINOPHEN 1 TABLET: 5; 325 TABLET ORAL at 21:50

## 2021-06-27 RX ADMIN — IOPAMIDOL 100 ML: 755 INJECTION, SOLUTION INTRAVENOUS at 00:27

## 2021-06-27 RX ADMIN — TRAZODONE HYDROCHLORIDE 100 MG: 50 TABLET ORAL at 21:50

## 2021-06-27 RX ADMIN — Medication 10 ML: at 10:47

## 2021-06-27 RX ADMIN — MORPHINE SULFATE 4 MG: 4 INJECTION, SOLUTION INTRAMUSCULAR; INTRAVENOUS at 06:25

## 2021-06-27 NOTE — CONSULTS
Consult    NAME: Leno Gupta   :  1975   MRN:  [id number]     Date/Time:  2021 11:53 AM    Patient PCP: Makenzie Trivedi MD  ________________________________________________________________________     Assessment:     1. Recurrent chest pain  2. Status post PFO closure 2 weeks ago  3. Recent CVA  4. History of normal coronary arteriogram  5. Morbid obesity with sleep apnea  6. Essential hypertension        Plan:     1. Patient stated that she had a cardiac catheterization after PFO and her coronary arteries were within normal limits. Areas to be substantiated because patient stated that he had 2 heart attacks many years ago and did not have any stents. So far cardiac enzymes are negative. Will repeat echocardiogram to assess the PFO device. CT of the chest did not reveal any aortic dissection or pulmonary embolism. Study was unremarkable. Will empirically treat like a GERD. We will try to get a copy of the records from [facility]. 2. Once MI is ruled out and echocardiogram is reevaluated he may go back to his cardiologist for further evaluation. Empirically place him on proton pump inhibitor. []        High complexity decision making was performed        Subjective:   CHIEF COMPLAINT:     HISTORY OF PRESENT ILLNESS:     Aretha Lagos is a white 54 y.o.  male who is known to have hypertension, obesity, sleep apnea, CVA with the left hemiparesis a month ago and PFO closure 2 weeks ago came to the emergency room because of recurrent chest pain. Which he described as pricking or pulling-like chest pain over the precordium, mainly at rest no relation with exercise, no precipitating or relieving factor, lasts only few seconds, no radiation, has mild associated dyspnea. Patient stated that he had a cardiac catheterization recently and did not have any significant coronary artery disease. He had very good result with PFO closure.   Patient denies any nausea vomiting or abdominal pain. There is no history of gallbladder disorder. There is no relation of the chest pain with deep inspiration. Denies any fever chills or cough or sputum production. We were asked to consult for work up and evaluation of the above problems. Past Medical History:   Diagnosis Date    Acute MI ([facility] [address]     Depression     Hypertension     Ill-defined condition     [facility] ([facility]       Past Surgical History:   Procedure Laterality Date    HX HERNIA REPAIR      HX ORTHOPAEDIC      rotator cuff surgery    HX OTHER SURGICAL      Patent Foramen Ovale Repair    HX THYROIDECTOMY      IR FINE NEEDLE ASPIRATION W IMAGE       Allergies   Allergen Reactions    Vancomycin Hives     Lyndsey syndrome      Meds:  See below  Social History     Tobacco Use    Smoking status: Former Smoker    Smokeless tobacco: Never Used   Substance Use Topics    Alcohol use: Yes     Comment: socially       Family History   Problem Relation Age of Onset    Diabetes Maternal Uncle     Hypertension Maternal Uncle     Hypertension Paternal Uncle     Diabetes Paternal Uncle     Cancer Other    [facility] Cancer Mother     No Known Problems Father        REVIEW OF SYSTEMS:     []         Unable to obtain  ROS due to ---   [x]         Total of 12 systems reviewed as follows:    Constitutional: negative fever, negative chills, negative weight loss  Eyes:   negative visual changes  ENT:   negative sore throat, tongue or lip swelling  Respiratory:  negative cough, negative dyspnea  Cards:  negative for , palpitations, lower extremity edema.   Has chest pain  GI:   negative for nausea, vomiting, diarrhea, and abdominal pain  Genitourinary: negative for frequency, dysuria  Integument:  negative for rash   Hematologic:  negative for easy bruising and gum/nose bleeding  Musculoskel: negative for myalgias,  back pain  Neurological:  negative for headaches, dizziness, vertigo, weakness  Behavl/Psych: negative for feelings of anxiety, depression     Pertinent Positives include :    Objective:      Physical Exam:    Last 24hrs VS reviewed since prior progress note. Most recent are:    Visit Vitals  /72 (BP Patient Position: At rest;Semi fowlers)   Pulse 80   Temp 97.4 °F (36.3 °C)   Resp 20   Ht 6' 4\" (1.93 m)   Wt 157.4 kg (347 lb)   SpO2 95%   BMI 42.24 kg/m²     No intake or output data in the 24 hours ending 06/27/21 1153     General Appearance: Well developed, well nourished, alert & oriented x 3,    no acute distress. Ears/Nose/Mouth/Throat: Pupils equal and round, Hearing grossly normal.  Neck: Supple. JVP within normal limits. Carotids good upstrokes, with no bruit. Chest: Lungs clear to auscultation bilaterally. Cardiovascular: Regular rate and rhythm, S1S2 normal, no murmur, rubs, gallops. Abdomen: Soft, non-tender, bowel sounds are active. No organomegaly. Extremities: No edema bilaterally. Femoral pulses +2, Distal Pulses +1. Skin: Warm and dry. Neuro: CN II-XII grossly intact, Strength and sensation grossly intact. []         Post-cath site without hematoma, bruit, tenderness, or thrill. Distal pulses intact.     Data:      I have reviewed vital signs,labs and ekg independently    Telemetry:    EKG:     EKG RESULTS     Procedure Component Value Units Date/Time    EKG, 12 LEAD, INITIAL [[id number]]     Order Status: Canceled     EKG, 12 LEAD, INITIAL [[id number]]     Order Status: Sent     EKG, 12 LEAD, INITIAL [[id number]] Collected: 06/26/21 2139    Order Status: Completed Updated: 06/27/21 1029     Ventricular Rate 106 BPM      Atrial Rate 106 BPM      P-R Interval 182 ms      QRS Duration 96 ms      Q-T Interval 362 ms      QTC Calculation (Bezet) 480 ms      Calculated P Axis 29 degrees      Calculated R Axis 8 degrees      Calculated T Axis 18 degrees      Diagnosis --     Sinus tachycardia  Otherwise normal ECG  When compared with ECG of 25-OCT-2019 20:33,  No significant change was found  Confirmed by TAYA MYERS, [place] Maurice[place]ley ([id number]) on 6/27/2021 10:29:31 AM             CTA CHEST W OR W WO CONT   Final Result   No acute abnormality identified. XR CHEST PORT   Final Result   1. Unchanged cardiomegaly and/or pericardial effusion. No evidence of cardiac   decompensation. 2. Left lower lobe infiltrate. Echo Results  (Last 48 hours)    None        Cardiolite (Tc-99m Sestamibi) stress test    XR Results (most recent):     Results from Hospital Encounter encounter on 06/26/21    XR CHEST PORT    Narrative  Chest pain. Comparison chest x-ray 10/25/2019. FINDINGS: Single frontal view of the chest. Enlarged cardiac silhouette. Electronic device overlies the left thorax. No vascular congestion or pulmonary  edema. Similar deviation of the trachea, likely from enlarged thyroid. The lungs  are similarly inflated. Left retrocardiac airspace disease. No pleural effusion  or pneumothorax. Surgical staples overlie the superior mediastinum. No free air  under the diaphragm. Bony structures grossly intact. Impression  1. Unchanged cardiomegaly and/or pericardial effusion. No evidence of cardiac  decompensation. 2. Left lower lobe infiltrate. Prior to Admission medications    Medication Sig Start Date End Date Taking? Authorizing Provider   levothyroxine (SYNTHROID) 150 mcg tablet Take 100 mcg by mouth Daily (before breakfast). Yes Provider, Historical   calcium-cholecalciferol, D3, (CALTRATE 600+D) tablet three (3) times daily. 4/14/21  Yes Provider, Historical   zolpidem (AMBIEN) 10 mg tablet TAKE 1 TABLET BY MOUTH ONCE DAILY AT BEDTIME AS NEEDED 4/7/21  Yes Provider, Historical   citalopram (CELEXA) 40 mg tablet TAKE ONE TABLET BY MOUTH EVERY DAY 12/8/20  Yes Provider, Historical   metoprolol succinate (TOPROL-XL) 50 mg XL tablet TAKE ONE TABLET BY MOUTH DAILY 11/4/20  Yes Provider, Historical   atorvastatin (LIPITOR) 20 mg tablet 20 mg daily.  8/3/20  Yes Provider, Historical   metFORMIN (GLUCOPHAGE) 500 mg tablet TAKE ONE TABLET BY MOUTH TWICE DAILY WITH MEALS 8/13/20  Yes Provider, Historical   traZODone (DESYREL) 100 mg tablet TAKE ONE TABLET BY MOUTH AT BEDTIME 7/17/20  Yes Provider, Historical   gabapentin (NEURONTIN) 600 mg tablet as needed. 3/10/21   Provider, Historical   pantoprazole (PROTONIX) 40 mg tablet TAKE ONE TABLET BY MOUTH TWICE DAILY 3/23/21   Provider, Historical   fluticasone furoate-vilanteroL (BREO ELLIPTA) 200-25 mcg/dose inhaler Breo Ellipta 200 mcg-25 mcg/dose powder for inhalation   inhale ONE PUFF by MOUTH ONCE daily    Provider, Historical   digoxin (LANOXIN) 0.125 mg tablet TAKE ONE TABLET BY MOUTH EVERY DAY 11/4/20   Provider, Historical   ProAir HFA 90 mcg/actuation inhaler INHALE 2 PUFFS EVERY 6 HOURS as needed for SHORTNESS OF BREATH 8/21/20   Provider, Historical   aspirin 81 mg chewable tablet Take 81 mg by mouth daily.     Other, MD Elizabeth       Recent Results (from the past 24 hour(s))   EKG, 12 LEAD, INITIAL    Collection Time: 06/26/21  9:39 PM   Result Value Ref Range    Ventricular Rate 106 BPM    Atrial Rate 106 BPM    P-R Interval 182 ms    QRS Duration 96 ms    Q-T Interval 362 ms    QTC Calculation (Bezet) 480 ms    Calculated P Axis 29 degrees    Calculated R Axis 8 degrees    Calculated T Axis 18 degrees    Diagnosis       Sinus tachycardia  Otherwise normal ECG  When compared with ECG of 25-OCT-2019 20:33,  No significant change was found  Confirmed by CHANTELLE BAIN MD ([id number]) on 6/27/2021 10:29:31 AM     CBC WITH AUTOMATED DIFF    Collection Time: 06/26/21  9:51 PM   Result Value Ref Range    WBC 6.8 4.1 - 11.1 K/uL    RBC 3.83 (L) 4.10 - 5.70 M/uL    HGB 12.2 12.1 - 17.0 g/dL    HCT 37.4 36.6 - 50.3 %    MCV 97.7 80.0 - 99.0 FL    MCH 31.9 26.0 - 34.0 PG    MCHC 32.6 30.0 - 36.5 g/dL    RDW 13.9 11.5 - 14.5 %    PLATELET 601 837 - 338 K/uL    MPV 10.2 8.9 - 12.9 FL    NRBC 0.0 0.0  WBC    ABSOLUTE NRBC 0.00 0.00 - 0.01 K/uL    NEUTROPHILS 60 32 - 75 %    LYMPHOCYTES 30 12 - 49 %    MONOCYTES 8 5 - 13 %    EOSINOPHILS 2 0 - 7 %    BASOPHILS 0 0 - 1 %    IMMATURE GRANULOCYTES 0 0 - 0.5 %    ABS. NEUTROPHILS 4.0 1.8 - 8.0 K/UL    ABS. LYMPHOCYTES 2.0 0.8 - 3.5 K/UL    ABS. MONOCYTES 0.6 0.0 - 1.0 K/UL    ABS. EOSINOPHILS 0.1 0.0 - 0.4 K/UL    ABS. BASOPHILS 0.0 0.0 - 0.1 K/UL    ABS. IMM. GRANS. 0.0 0.00 - 0.04 K/UL    DF AUTOMATED     METABOLIC PANEL, COMPREHENSIVE    Collection Time: 06/26/21  9:51 PM   Result Value Ref Range    Sodium 140 136 - 145 mmol/L    Potassium 4.0 3.5 - 5.1 mmol/L    Chloride 106 97 - 108 mmol/L    CO2 28 21 - 32 mmol/L    Anion gap 6 5 - 15 mmol/L    Glucose 139 (H) 65 - 100 mg/dL    BUN 15 6 - 20 mg/dL    Creatinine 1.18 0.70 - 1.30 mg/dL    BUN/Creatinine ratio 13 12 - 20      GFR est AA >60 >60 ml/min/1.73m2    GFR est non-AA >60 >60 ml/min/1.73m2    Calcium 8.9 8.5 - 10.1 mg/dL    Bilirubin, total 0.2 0.2 - 1.0 mg/dL    AST (SGOT) 60 (H) 15 - 37 U/L    ALT (SGPT) 90 (H) 12 - 78 U/L    Alk. phosphatase 79 45 - 117 U/L    Protein, total 7.8 6.4 - 8.2 g/dL    Albumin 3.2 (L) 3.5 - 5.0 g/dL    Globulin 4.6 (H) 2.0 - 4.0 g/dL    A-G Ratio 0.7 (L) 1.1 - 2.2     CK W/ REFLX CKMB    Collection Time: 06/26/21  9:51 PM   Result Value Ref Range    .8 39 - 308 ng/mL   TROPONIN I    Collection Time: 06/26/21  9:51 PM   Result Value Ref Range    Troponin-I, Qt. <0.05 <0.05 ng/mL   BNP    Collection Time: 06/26/21  9:51 PM   Result Value Ref Range    NT pro-BNP 50 <125 pg/mL   TROPONIN I    Collection Time: 06/27/21 12:01 AM   Result Value Ref Range    Troponin-I, Qt. <0.05 <0.05 ng/mL   TROPONIN I    Collection Time: 06/27/21  9:01 AM   Result Value Ref Range    Troponin-I, Qt. <0.05 <0.05 ng/mL   Discussed patient status with patient and nursing staff.     Indiana Holland MD    Patient PCP: MD Kristy Reyes MD

## 2021-06-27 NOTE — ED NOTES
Pt c/o 8/10 chest pain. States \"it feels like everything in my chest is being squeezed. \" Pt is sitting on stretcher with call bell within reach. Denies shortness of breath at this time. Informed physician of pt pain. Will continue to monitor via cardiac monitor, pulse ox, and BP cuff.

## 2021-06-27 NOTE — ROUTINE PROCESS
Primary Nurse Gunjan Escalera RN and Fatuma Johnston RN performed a dual skin assessment on this patient. Patient has bruise to right side of abdomen/ribcage which patient states is from Levoquin shot. Patient also has redness underneath abdominal fold and multiple skin tags present in bilateral underarms. Patient skin is otherwise intact. Patient has heart monitor present to left side of chest, which was placed outside of this hospital. Jaxon score is 20.

## 2021-06-27 NOTE — ED PROVIDER NOTES
EMERGENCY DEPARTMENT HISTORY AND PHYSICAL EXAM      Date: 6/26/2021  Patient Name: Manan Guerrero    History of Presenting Illness     Chief Complaint   Patient presents with    Chest Pain       History Provided By: Patient    HPI: Manan Guerrero, 54 y.o. male with a past medical history significant  hypertension, myocardial infarction, stroke and Recent PFO repair presents to the ED with cc of chest pain, weakness, near syncope. Patient states he awoke this morning, felt generalized weakness, started experiencing chest pain in the morning, describes as sharp, midsternal, rating to left arm and left shoulder, lasting several seconds at a time, denies any shortness of breath, denies any nausea vomiting. Patient states he felt very weak, at one point family member noted that he may have lost consciousness. Patient states he took 4 nitroglycerin with the course of the day, with minimal relief. Patient was recently hospitalized for PFO repair at [facility].   Is followed by [place] [facility], Dr. Candice Watson. There are no other complaints, changes, or physical findings at this time. PCP: Argelia Allen MD    Current Facility-Administered Medications   Medication Dose Route Frequency Provider Last Rate Last Admin    morphine injection 4 mg  4 mg IntraVENous Q4H PRN Katlyn Saenz MD         Current Outpatient Medications   Medication Sig Dispense Refill    calcium-cholecalciferol, D3, (CALTRATE 600+D) tablet TAKE 1 TABLET BY MOUTH 4 TIMES DAILY      zolpidem (AMBIEN) 10 mg tablet TAKE 1 TABLET BY MOUTH ONCE DAILY AT BEDTIME AS NEEDED      jtr-[id number]-zinc--jose-bor (Caltrate 600-D Plus Minerals) 600 mg calcium- 800 unit-50 mg tab Take 1 Tab by mouth four (4) times daily.  100 Tab 1    gabapentin (NEURONTIN) 600 mg tablet TAKE ONE TABLET BY MOUTH THREE TIMES DAILY      pantoprazole (PROTONIX) 40 mg tablet TAKE ONE TABLET BY MOUTH TWICE DAILY      fluticasone furoate-vilanteroL (BREO ELLIPTA) 200-25 mcg/dose inhaler Breo Ellipta 200 mcg-25 mcg/dose powder for inhalation   inhale ONE PUFF by MOUTH ONCE daily      famotidine (PEPCID) 40 mg tablet TAKE ONE TABLET BY MOUTH DAILY      ondansetron (ZOFRAN ODT) 4 mg disintegrating tablet DISSOLVE ONE TABLET ON THE TONGUE EVERY 8 HOURS AS NEEDED FOR NAUSEA      amLODIPine (NORVASC) 5 mg tablet TAKE ONE TABLET BY MOUTH EVERY DAY      bumetanide (BUMEX) 2 mg tablet TAKE ONE TABLET BY MOUTH EVERY 24 HOURS      digoxin (LANOXIN) 0.125 mg tablet TAKE ONE TABLET BY MOUTH EVERY DAY      sildenafil citrate (VIAGRA) 100 mg tablet TAKE ONE TABLET BY MOUTH EVERY DAY AS NEEDED      citalopram (CELEXA) 40 mg tablet TAKE ONE TABLET BY MOUTH EVERY DAY      metoprolol succinate (TOPROL-XL) 50 mg XL tablet TAKE ONE TABLET BY MOUTH DAILY      atorvastatin (LIPITOR) 20 mg tablet TAKE ONE TABLET BY MOUTH AT BEDTIME      ProAir HFA 90 mcg/actuation inhaler INHALE 2 PUFFS EVERY 6 HOURS as needed for SHORTNESS OF BREATH      Vitamin D3 50 mcg (2,000 unit) cap capsule TAKE ONE CAPSULE BY MOUTH EVERY DAY      metFORMIN (GLUCOPHAGE) 500 mg tablet TAKE ONE TABLET BY MOUTH TWICE DAILY WITH MEALS      traZODone (DESYREL) 100 mg tablet TAKE ONE TABLET BY MOUTH AT BEDTIME      aspirin 81 mg chewable tablet Take 81 mg by mouth daily.          Past History     Past Medical History:  Past Medical History:   Diagnosis Date    Acute MI ([address]     Depression     Hypertension     Ill-defined condition     Stroke [facility])        Past Surgical History:  Past Surgical History:   Procedure Laterality Date    HX HERNIA REPAIR      HX ORTHOPAEDIC      rotator cuff surgery    HX OTHER SURGICAL      Patent Foramen Ovale Repair    HX THYROIDECTOMY      IR FINE NEEDLE ASPIRATION W IMAGE         Family History:  Family History   Problem Relation Age of Onset    Diabetes Maternal Uncle     Hypertension Maternal Uncle     Hypertension Paternal Marvelyn Laurentt Diabetes Paternal Uncle     Cancer Other     Cancer Mother     No Known Problems Father        Social History:  Social History     Tobacco Use    Smoking status: Former Smoker    Smokeless tobacco: Never Used   Vaping Use    Vaping Use: Never used   Substance Use Topics    Alcohol use: Yes     Comment: socially     Drug use: No       Allergies: Allergies   Allergen Reactions    Vancomycin Hives     Lyndsey syndrome         Review of Systems     Review of Systems   Constitutional: Negative for activity change, chills and fever. HENT: Negative for congestion and sore throat. Eyes: Negative for photophobia and visual disturbance. Respiratory: Positive for chest tightness. Negative for apnea and shortness of breath. Cardiovascular: Positive for chest pain and leg swelling. Negative for palpitations. Gastrointestinal: Negative for abdominal pain, blood in stool, nausea and vomiting. Genitourinary: Negative for dysuria. Musculoskeletal: Negative for arthralgias and back pain. Skin: Negative for color change. Neurological: Positive for light-headedness. Negative for dizziness, syncope, weakness, numbness and headaches. Physical Exam     Physical Exam  Vitals and nursing note reviewed. Constitutional:       Appearance: Normal appearance. He is normal weight. HENT:      Head: Normocephalic and atraumatic. Nose: Nose normal.      Mouth/Throat:      Mouth: Mucous membranes are moist.   Eyes:      Extraocular Movements: Extraocular movements intact. Pupils: Pupils are equal, round, and reactive to light. Cardiovascular:      Rate and Rhythm: Normal rate and regular rhythm. Pulses: Normal pulses. Radial pulses are 2+ on the right side and 2+ on the left side. Heart sounds: Normal heart sounds. Pulmonary:      Effort: Pulmonary effort is normal.      Breath sounds: Normal breath sounds. Chest:      Chest wall: Tenderness present.    Abdominal:      General: Abdomen is flat. Bowel sounds are normal.      Palpations: Abdomen is soft. Musculoskeletal:         General: No swelling or tenderness. Normal range of motion. Cervical back: Normal range of motion and neck supple. Skin:     General: Skin is warm and dry. Capillary Refill: Capillary refill takes less than 2 seconds. Neurological:      General: No focal deficit present. Mental Status: He is alert and oriented to person, place, and time. Cranial Nerves: No cranial nerve deficit. Sensory: No sensory deficit. Motor: No weakness. Psychiatric:         Mood and Affect: Mood normal.         Behavior: Behavior normal.         Diagnostic Study Results     Labs -     Recent Results (from the past 12 hour(s))   CBC WITH AUTOMATED DIFF    Collection Time: 06/26/21  9:51 PM   Result Value Ref Range    WBC 6.8 4.1 - 11.1 K/uL    RBC 3.83 (L) 4.10 - 5.70 M/uL    HGB 12.2 12.1 - 17.0 g/dL    HCT 37.4 36.6 - 50.3 %    MCV 97.7 80.0 - 99.0 FL    MCH 31.9 26.0 - 34.0 PG    MCHC 32.6 30.0 - 36.5 g/dL    RDW 13.9 11.5 - 14.5 %    PLATELET 171 024 - 867 K/uL    MPV 10.2 8.9 - 12.9 FL    NRBC 0.0 0.0  WBC    ABSOLUTE NRBC 0.00 0.00 - 0.01 K/uL    NEUTROPHILS 60 32 - 75 %    LYMPHOCYTES 30 12 - 49 %    MONOCYTES 8 5 - 13 %    EOSINOPHILS 2 0 - 7 %    BASOPHILS 0 0 - 1 %    IMMATURE GRANULOCYTES 0 0 - 0.5 %    ABS. NEUTROPHILS 4.0 1.8 - 8.0 K/UL    ABS. LYMPHOCYTES 2.0 0.8 - 3.5 K/UL    ABS. MONOCYTES 0.6 0.0 - 1.0 K/UL    ABS. EOSINOPHILS 0.1 0.0 - 0.4 K/UL    ABS. BASOPHILS 0.0 0.0 - 0.1 K/UL    ABS. IMM.  GRANS. 0.0 0.00 - 0.04 K/UL    DF AUTOMATED     METABOLIC PANEL, COMPREHENSIVE    Collection Time: 06/26/21  9:51 PM   Result Value Ref Range    Sodium 140 136 - 145 mmol/L    Potassium 4.0 3.5 - 5.1 mmol/L    Chloride 106 97 - 108 mmol/L    CO2 28 21 - 32 mmol/L    Anion gap 6 5 - 15 mmol/L    Glucose 139 (H) 65 - 100 mg/dL    BUN 15 6 - 20 mg/dL    Creatinine 1.18 0.70 - 1.30 mg/dL BUN/Creatinine ratio 13 12 - 20      GFR est AA >60 >60 ml/min/1.73m2    GFR est non-AA >60 >60 ml/min/1.73m2    Calcium 8.9 8.5 - 10.1 mg/dL    Bilirubin, total 0.2 0.2 - 1.0 mg/dL    AST (SGOT) 60 (H) 15 - 37 U/L    ALT (SGPT) 90 (H) 12 - 78 U/L    Alk. phosphatase 79 45 - 117 U/L    Protein, total 7.8 6.4 - 8.2 g/dL    Albumin 3.2 (L) 3.5 - 5.0 g/dL    Globulin 4.6 (H) 2.0 - 4.0 g/dL    A-G Ratio 0.7 (L) 1.1 - 2.2     CK W/ REFLX CKMB    Collection Time: 06/26/21  9:51 PM   Result Value Ref Range    .8 39 - 308 ng/mL   TROPONIN I    Collection Time: 06/26/21  9:51 PM   Result Value Ref Range    Troponin-I, Qt. <0.05 <0.05 ng/mL   BNP    Collection Time: 06/26/21  9:51 PM   Result Value Ref Range    NT pro-BNP 50 <125 pg/mL   TROPONIN I    Collection Time: 06/27/21 12:01 AM   Result Value Ref Range    Troponin-I, Qt. <0.05 <0.05 ng/mL       Radiologic Studies -   [unfilled]  CT Results  (Last 48 hours)    None        CXR Results  (Last 48 hours)               06/26/21 2208  XR CHEST PORT Final result    Impression:  1. Unchanged cardiomegaly and/or pericardial effusion. No evidence of cardiac   decompensation. 2. Left lower lobe infiltrate. Narrative:  Chest pain. Comparison chest x-ray 10/25/2019. FINDINGS: Single frontal view of the chest. Enlarged cardiac silhouette. Electronic device overlies the left thorax. No vascular congestion or pulmonary   edema. Similar deviation of the trachea, likely from enlarged thyroid. The lungs   are similarly inflated. Left retrocardiac airspace disease. No pleural effusion   or pneumothorax. Surgical staples overlie the superior mediastinum. No free air   under the diaphragm. Bony structures grossly intact. Medical Decision Making and ED Course   I am the first provider for this patient. I reviewed the vital signs, available nursing notes, past medical history, past surgical history, family history and social history.     Vital Signs-Reviewed the patient's vital signs. Patient Vitals for the past 12 hrs:   Temp Pulse Resp BP SpO2   06/26/21 2344 -- 99 20 125/84 92 %   06/26/21 2211 -- (!) 108 22 121/68 92 %   06/26/21 2138 98.5 °F (36.9 °C) (!) 107 18 110/67 95 %       EKG interpretation: (PreliminaryNormal sinus  Sinus tach 106, no ST elevations, Q waves in lead III. Records Reviewed: Nursing Notes and Old Medical Records    The patient presents with chest pain with a differential diagnosis of  ACS, arrhythmia, acute MI, pulmonary edema/CHF and angina      Provider Notes (Medical Decision Making):     MDM   58-year-old male, history of hypertension, ACS, PFO repair, CVA, presents to emergency department for chest pain weakness dizziness since this morning. Physical exam shows well-appearing male, no distress, mild tachycardia, no acute ischemic changes on EKG, normal cardiovascular exam otherwise. We will draw basic lab works including cardiac enzymes, BNP, patient took aspirin at home, nitroglycerin, will administer morphine 2 mg IV. ED Course:   Initial assessment performed. The patients presenting problems have been discussed, and they are in agreement with the care plan formulated and outlined with them. I have encouraged them to ask questions as they arise throughout their visit. ED Course as of Jun 27 0121   Sat Jun 26, 2021   2252 Pts lab work resulting, troponin negative x1, metabolic panel grossly normal, CBC shows stable H&H, patient's chest pain mildly improved after morphine, still patient's heart score is 5, will admit for chest pain. [PZ]   5143 Spoke with Dr. Tanya Enamorado, covering Dr. Paul Wilson, will accept admission. [PZ]      ED Course User Index  [PZ] Colette Mullen MD         Procedures       Carla Villavicencio MD  Procedures               Disposition       Admitted      Diagnosis     Clinical Impression:   1.  Chest pain, unspecified type        Attestations:    Carla Villavicencio MD    Please note that this dictation was completed with [employer], the computer voice recognition software. Quite often unanticipated grammatical, syntax, homophones, and other interpretive errors are inadvertently transcribed by the computer software. Please disregard these errors. Please excuse any errors that have escaped final proofreading. Thank you.

## 2021-06-27 NOTE — H&P
History and Physical    Patient: Edmundo Mckay MRN: [id number]  SSN: [id number]    YOB: 1975  Age: 54 y.o. Sex: male      Subjective:      Edmundo Mckay is a 54 y.o. male who has a history of obstructive sleep apnea, CAD, MI, depression, hypertension, CVA, history of recent PFO closure presented with a complaint of chest pain radiating to the left arm. Patient also has some shortness of breath denies any fever chills denies any cough. Past Medical History:   Diagnosis Date    Acute MI ([address]     Depression     Hypertension     Ill-defined condition     Stroke [facility])      Past Surgical History:   Procedure Laterality Date    HX HERNIA REPAIR      HX ORTHOPAEDIC      rotator cuff surgery    HX OTHER SURGICAL      Patent Foramen Ovale Repair    HX THYROIDECTOMY      IR FINE NEEDLE ASPIRATION W IMAGE        Family History   Problem Relation Age of Onset    Diabetes Maternal Uncle     Hypertension Maternal Uncle     Hypertension Paternal Uncle     Diabetes Paternal Uncle     Cancer Other    [facility] Cancer Mother     No Known Problems Father      Social History     Tobacco Use    Smoking status: Former Smoker    Smokeless tobacco: Never Used   Substance Use Topics    Alcohol use: Yes     Comment: socially       Prior to Admission medications    Medication Sig Start Date End Date Taking? Authorizing Provider   levothyroxine (SYNTHROID) 150 mcg tablet Take 100 mcg by mouth Daily (before breakfast). Yes Provider, Historical   calcium-cholecalciferol, D3, (CALTRATE 600+D) tablet three (3) times daily.  4/14/21  Yes Provider, Historical   zolpidem (AMBIEN) 10 mg tablet TAKE 1 TABLET BY MOUTH ONCE DAILY AT BEDTIME AS NEEDED 4/7/21  Yes Provider, Historical   citalopram (CELEXA) 40 mg tablet TAKE ONE TABLET BY MOUTH EVERY DAY 12/8/20  Yes Provider, Historical   metoprolol succinate (TOPROL-XL) 50 mg XL tablet TAKE ONE TABLET BY MOUTH DAILY 11/4/20  Yes Provider, Historical   atorvastatin (LIPITOR) 20 mg tablet 20 mg daily. 8/3/20  Yes Provider, Historical   metFORMIN (GLUCOPHAGE) 500 mg tablet TAKE ONE TABLET BY MOUTH TWICE DAILY WITH MEALS 8/13/20  Yes Provider, Historical   traZODone (DESYREL) 100 mg tablet TAKE ONE TABLET BY MOUTH AT BEDTIME 7/17/20  Yes Provider, Historical   gabapentin (NEURONTIN) 600 mg tablet as needed. 3/10/21   Provider, Historical   pantoprazole (PROTONIX) 40 mg tablet TAKE ONE TABLET BY MOUTH TWICE DAILY 3/23/21   Provider, Historical   fluticasone furoate-vilanteroL (BREO ELLIPTA) 200-25 mcg/dose inhaler Breo Ellipta 200 mcg-25 mcg/dose powder for inhalation   inhale ONE PUFF by MOUTH ONCE daily    Provider, Historical   digoxin (LANOXIN) 0.125 mg tablet TAKE ONE TABLET BY MOUTH EVERY DAY 11/4/20   Provider, Historical   ProAir HFA 90 mcg/actuation inhaler INHALE 2 PUFFS EVERY 6 HOURS as needed for SHORTNESS OF BREATH 8/21/20   Provider, Historical   aspirin 81 mg chewable tablet Take 81 mg by mouth daily. Other, MD Elizabeth        Allergies   Allergen Reactions    Vancomycin Hives     Lyndsey syndrome       Review of Systems:  A comprehensive review of systems was negative except for that written in the History of Present Illness. Objective:     Vitals:    06/27/21 0136 06/27/21 0356 06/27/21 0735 06/27/21 0800   BP: 116/84  126/72    Pulse: 95 96 80 80   Resp: 19  20    Temp: 97.7 °F (36.5 °C)  97.4 °F (36.3 °C)    SpO2: 93%  95%    Weight:       Height:            Physical Exam:  General:  Alert, cooperative, no distress, appears stated age. Eyes:  Conjunctivae/corneas clear. PERRL, EOMs intact. Fundi benign   Ears:  Normal TMs and external ear canals both ears. Nose: Nares normal. Septum midline. Mucosa normal. No drainage or sinus tenderness.    Mouth/Throat: Lips, mucosa, and tongue normal. Teeth and gums normal.   Neck: Supple, symmetrical, trachea midline, no adenopathy, thyroid: no enlargment/tenderness/nodules, no carotid bruit and no JVD. Back:   Symmetric, no curvature. ROM normal. No CVA tenderness. Lungs:   Clear to auscultation bilaterally. Heart:  Regular rate and rhythm, S1, S2 normal, no murmur, click, rub or gallop. Abdomen:   Soft, non-tender. Bowel sounds normal. No masses,  No organomegaly. Extremities: Extremities normal, atraumatic, no cyanosis or edema. Pulses: 2+ and symmetric all extremities. Skin: Skin color, texture, turgor normal. No rashes or lesions   Lymph nodes: Cervical, supraclavicular, and axillary nodes normal.   Neurologic: CNII-XII intact. Normal strength, sensation and reflexes throughout. Recent Results (from the past 24 hour(s))   EKG, 12 LEAD, INITIAL    Collection Time: 06/26/21  9:39 PM   Result Value Ref Range    Ventricular Rate 106 BPM    Atrial Rate 106 BPM    P-R Interval 182 ms    QRS Duration 96 ms    Q-T Interval 362 ms    QTC Calculation (Bezet) 480 ms    Calculated P Axis 29 degrees    Calculated R Axis 8 degrees    Calculated T Axis 18 degrees    Diagnosis       Sinus tachycardia  Otherwise normal ECG  When compared with ECG of 25-OCT-2019 20:33,  No significant change was found  Confirmed by CHANTELLE BAIN MD ([id number]) on 6/27/2021 10:29:31 AM     CBC WITH AUTOMATED DIFF    Collection Time: 06/26/21  9:51 PM   Result Value Ref Range    WBC 6.8 4.1 - 11.1 K/uL    RBC 3.83 (L) 4.10 - 5.70 M/uL    HGB 12.2 12.1 - 17.0 g/dL    HCT 37.4 36.6 - 50.3 %    MCV 97.7 80.0 - 99.0 FL    MCH 31.9 26.0 - 34.0 PG    MCHC 32.6 30.0 - 36.5 g/dL    RDW 13.9 11.5 - 14.5 %    PLATELET 656 383 - 925 K/uL    MPV 10.2 8.9 - 12.9 FL    NRBC 0.0 0.0  WBC    ABSOLUTE NRBC 0.00 0.00 - 0.01 K/uL    NEUTROPHILS 60 32 - 75 %    LYMPHOCYTES 30 12 - 49 %    MONOCYTES 8 5 - 13 %    EOSINOPHILS 2 0 - 7 %    BASOPHILS 0 0 - 1 %    IMMATURE GRANULOCYTES 0 0 - 0.5 %    ABS. NEUTROPHILS 4.0 1.8 - 8.0 K/UL    ABS. LYMPHOCYTES 2.0 0.8 - 3.5 K/UL    ABS. MONOCYTES 0.6 0.0 - 1.0 K/UL    ABS.  EOSINOPHILS 0.1 0.0 - 0.4 K/UL    ABS. BASOPHILS 0.0 0.0 - 0.1 K/UL    ABS. IMM. GRANS. 0.0 0.00 - 0.04 K/UL    DF AUTOMATED     METABOLIC PANEL, COMPREHENSIVE    Collection Time: 06/26/21  9:51 PM   Result Value Ref Range    Sodium 140 136 - 145 mmol/L    Potassium 4.0 3.5 - 5.1 mmol/L    Chloride 106 97 - 108 mmol/L    CO2 28 21 - 32 mmol/L    Anion gap 6 5 - 15 mmol/L    Glucose 139 (H) 65 - 100 mg/dL    BUN 15 6 - 20 mg/dL    Creatinine 1.18 0.70 - 1.30 mg/dL    BUN/Creatinine ratio 13 12 - 20      GFR est AA >60 >60 ml/min/1.73m2    GFR est non-AA >60 >60 ml/min/1.73m2    Calcium 8.9 8.5 - 10.1 mg/dL    Bilirubin, total 0.2 0.2 - 1.0 mg/dL    AST (SGOT) 60 (H) 15 - 37 U/L    ALT (SGPT) 90 (H) 12 - 78 U/L    Alk.  phosphatase 79 45 - 117 U/L    Protein, total 7.8 6.4 - 8.2 g/dL    Albumin 3.2 (L) 3.5 - 5.0 g/dL    Globulin 4.6 (H) 2.0 - 4.0 g/dL    A-G Ratio 0.7 (L) 1.1 - 2.2     CK W/ REFLX CKMB    Collection Time: 06/26/21  9:51 PM   Result Value Ref Range    .8 39 - 308 ng/mL   TROPONIN I    Collection Time: 06/26/21  9:51 PM   Result Value Ref Range    Troponin-I, Qt. <0.05 <0.05 ng/mL   BNP    Collection Time: 06/26/21  9:51 PM   Result Value Ref Range    NT pro-BNP 50 <125 pg/mL   TROPONIN I    Collection Time: 06/27/21 12:01 AM   Result Value Ref Range    Troponin-I, Qt. <0.05 <0.05 ng/mL       Assessment:     Hospital Problems  Date Reviewed: 5/12/2021        Codes Class Noted POA    Chest pain ICD-10-CM: R07.9  ICD-9-CM: 786.50  6/26/2021 Unknown          History of CAD,  History of MI,  Obstructive sleep apnea,  Morbid obesity  Chronic hypoxic respiratory failure on home oxygen  History of recent PFO closure  Anxiety neurosis  History of CHF  Patient appears  Volume depleted we will hold Bumex  Plan:      cardiology consult  Resume home CPAP machine    Signed By: Ann Marie Garcia MD     June 27, 2021

## 2021-06-27 NOTE — ED NOTES
TRANSFER - OUT REPORT:    Verbal report given to Zayra(name) on Robert Enamorado  being transferred to [facility], Room 478(unit) for routine progression of care       Report consisted of patients Situation, Background, Assessment and   Recommendations(SBAR). Information from the following report(s) SBAR, ED Summary and MAR was reviewed with the receiving nurse. Lines:   Peripheral IV 06/26/21 Left Hand (Active)   Site Assessment Clean, dry, & intact 06/26/21 2222   Phlebitis Assessment 0 06/26/21 2222   Infiltration Assessment 0 06/26/21 2222   Dressing Status Clean, dry, & intact 06/26/21 2222   Dressing Type Tape;Transparent;Elastic bandage 06/26/21 2222   Hub Color/Line Status Pink;Flushed;Patent 06/26/21 2222   Action Taken Blood drawn 06/26/21 2154        Opportunity for questions and clarification was provided.       Patient transported with:   Monitor  Tech

## 2021-06-27 NOTE — ED TRIAGE NOTES
Patient states that he began having chest pain and SOB then he got dizzy.  Three weeks ago he had a PFO closure at [facility] and prior to that he had a stroke

## 2021-06-27 NOTE — PROGRESS NOTES
Patient arrived to unit. Patient a&o x4 and able to verbalize needs. Patient ambulatory. Patient has one bookbag of personal belongings. Vital signs charted.

## 2021-06-28 ENCOUNTER — APPOINTMENT (OUTPATIENT)
Dept: NON INVASIVE DIAGNOSTICS | Age: 46
End: 2021-06-28
Attending: INTERNAL MEDICINE
Payer: MEDICAID

## 2021-06-28 VITALS
HEIGHT: 76 IN | SYSTOLIC BLOOD PRESSURE: 117 MMHG | DIASTOLIC BLOOD PRESSURE: 66 MMHG | RESPIRATION RATE: 20 BRPM | HEART RATE: 80 BPM | WEIGHT: 315 LBS | BODY MASS INDEX: 38.36 KG/M2 | TEMPERATURE: 97.6 F | OXYGEN SATURATION: 98 %

## 2021-06-28 LAB
ANION GAP SERPL CALC-SCNC: 7 MMOL/L (ref 5–15)
BUN SERPL-MCNC: 12 MG/DL (ref 6–20)
BUN/CREAT SERPL: 13 (ref 12–20)
CA-I BLD-MCNC: 8.7 MG/DL (ref 8.5–10.1)
CHLORIDE SERPL-SCNC: 104 MMOL/L (ref 97–108)
CO2 SERPL-SCNC: 29 MMOL/L (ref 21–32)
CREAT SERPL-MCNC: 0.91 MG/DL (ref 0.7–1.3)
ECHO AO ASC DIAM: 3.8 CM
ECHO AO ROOT DIAM: 3.9 CM
ECHO AV AREA PEAK VELOCITY: 4.35 CM2
ECHO AV AREA VTI: 4.34 CM2
ECHO AV AREA/BSA PEAK VELOCITY: 1.6 CM2/M2
ECHO AV AREA/BSA VTI: 1.6 CM2/M2
ECHO AV MEAN GRADIENT: 2 MMHG
ECHO AV MEAN VELOCITY: 72.9 CM/S
ECHO AV PEAK GRADIENT: 5 MMHG
ECHO AV PEAK VELOCITY: 108 CM/S
ECHO AV VTI: 19.1 CM
ECHO LA MAJOR AXIS: 4.7 CM
ECHO LA MAJOR AXIS: 4.7 CM
ECHO LV E' SEPTAL VELOCITY: 7.9 CM/S
ECHO LV EDV A2C: 69.4 CM3
ECHO LV ESV A2C: 15.8 CM3
ECHO LV INTERNAL DIMENSION DIASTOLIC: 4.11 CM (ref 4.2–5.9)
ECHO LV INTERNAL DIMENSION SYSTOLIC: 2.51 CM
ECHO LV IVSD: 1.14 CM (ref 0.6–1)
ECHO LV MASS 2D: 160.9 G (ref 88–224)
ECHO LV MASS INDEX 2D: 57.5 G/M2 (ref 49–115)
ECHO LV POSTERIOR WALL DIASTOLIC: 1.15 CM (ref 0.6–1)
ECHO LVOT DIAM: 2.6 CM
ECHO LVOT PEAK GRADIENT: 3 MMHG
ECHO LVOT PEAK VELOCITY: 88.4 CM/S
ECHO LVOT SV: 83 CM3
ECHO LVOT VTI: 15.6 CM
ECHO LVOT VTI: 15.6 CM
ECHO MV A VELOCITY: 55 CM/S
ECHO MV AREA PHT: 6.67 CM2
ECHO MV E VELOCITY: 79.7 CM/S
ECHO MV E/A RATIO: 1.45
ECHO MV E/E' SEPTAL: 10.09
ECHO MV MAX VELOCITY: 85.8 CM/S
ECHO MV MEAN GRADIENT: 2 MMHG
ECHO MV PEAK GRADIENT: 3 MMHG
ECHO MV PRESSURE HALF TIME (PHT): 33 MS
ECHO MV VTI: 22.7 CM
ECHO PV MAX VELOCITY: 112 CM/S
ECHO PV MEAN GRADIENT: 3 MMHG
ECHO PV PEAK INSTANTANEOUS GRADIENT SYSTOLIC: 5 MMHG
ECHO PV VTI: 21.4 CM
ECHO TV MEAN GRADIENT: 3 MMHG
ECHO TV REGURGITANT MAX VELOCITY: 84.6 CM/S
GLUCOSE BLD STRIP.AUTO-MCNC: 169 MG/DL (ref 65–117)
GLUCOSE BLD STRIP.AUTO-MCNC: 174 MG/DL (ref 65–117)
GLUCOSE BLD STRIP.AUTO-MCNC: 221 MG/DL (ref 65–117)
GLUCOSE SERPL-MCNC: 98 MG/DL (ref 65–100)
LVOT MG: 2 MMHG
MV DEC SLOPE: 7020 MM/S2
MV DEC SLOPE: 7020 MM/S2
PERFORMED BY, TECHID: ABNORMAL
POTASSIUM SERPL-SCNC: 4 MMOL/L (ref 3.5–5.1)
SODIUM SERPL-SCNC: 140 MMOL/L (ref 136–145)

## 2021-06-28 PROCEDURE — 74011250636 HC RX REV CODE- 250/636: Performed by: INTERNAL MEDICINE

## 2021-06-28 PROCEDURE — 82962 GLUCOSE BLOOD TEST: CPT

## 2021-06-28 PROCEDURE — 77010033678 HC OXYGEN DAILY

## 2021-06-28 PROCEDURE — 74011250637 HC RX REV CODE- 250/637: Performed by: INTERNAL MEDICINE

## 2021-06-28 PROCEDURE — 99218 HC RM OBSERVATION: CPT

## 2021-06-28 PROCEDURE — 94760 N-INVAS EAR/PLS OXIMETRY 1: CPT

## 2021-06-28 PROCEDURE — 96372 THER/PROPH/DIAG INJ SC/IM: CPT

## 2021-06-28 PROCEDURE — 93306 TTE W/DOPPLER COMPLETE: CPT

## 2021-06-28 PROCEDURE — 94640 AIRWAY INHALATION TREATMENT: CPT

## 2021-06-28 PROCEDURE — 36415 COLL VENOUS BLD VENIPUNCTURE: CPT

## 2021-06-28 PROCEDURE — 80048 BASIC METABOLIC PNL TOTAL CA: CPT

## 2021-06-28 RX ORDER — BUMETANIDE 1 MG/1
1 TABLET ORAL DAILY
Qty: 30 TABLET | Refills: 0 | Status: ON HOLD | OUTPATIENT
Start: 2021-06-28 | End: 2021-08-19 | Stop reason: ALTCHOICE

## 2021-06-28 RX ORDER — PANTOPRAZOLE SODIUM 40 MG/1
40 TABLET, DELAYED RELEASE ORAL DAILY
Qty: 30 TABLET | Refills: 0 | Status: SHIPPED | OUTPATIENT
Start: 2021-06-28 | End: 2021-08-03

## 2021-06-28 RX ADMIN — PANTOPRAZOLE SODIUM 40 MG: 40 TABLET, DELAYED RELEASE ORAL at 06:37

## 2021-06-28 RX ADMIN — ENOXAPARIN SODIUM 40 MG: 40 INJECTION SUBCUTANEOUS at 09:58

## 2021-06-28 RX ADMIN — OXYCODONE AND ACETAMINOPHEN 1 TABLET: 5; 325 TABLET ORAL at 03:42

## 2021-06-28 RX ADMIN — Medication 1 TABLET: at 09:56

## 2021-06-28 RX ADMIN — Medication 10 ML: at 00:24

## 2021-06-28 RX ADMIN — METOPROLOL SUCCINATE 50 MG: 25 TABLET, EXTENDED RELEASE ORAL at 09:56

## 2021-06-28 RX ADMIN — GABAPENTIN 600 MG: 300 CAPSULE ORAL at 09:55

## 2021-06-28 RX ADMIN — Medication 1 TABLET: at 16:22

## 2021-06-28 RX ADMIN — Medication 10 ML: at 06:41

## 2021-06-28 RX ADMIN — DIGOXIN 0.12 MG: 125 TABLET ORAL at 09:56

## 2021-06-28 RX ADMIN — GABAPENTIN 600 MG: 300 CAPSULE ORAL at 16:22

## 2021-06-28 RX ADMIN — BUDESONIDE AND FORMOTEROL FUMARATE DIHYDRATE 2 PUFF: 160; 4.5 AEROSOL RESPIRATORY (INHALATION) at 09:07

## 2021-06-28 RX ADMIN — ASPIRIN 81 MG: 81 TABLET, CHEWABLE ORAL at 09:55

## 2021-06-28 RX ADMIN — ATORVASTATIN CALCIUM 20 MG: 20 TABLET, FILM COATED ORAL at 09:55

## 2021-06-28 RX ADMIN — CITALOPRAM HYDROBROMIDE 40 MG: 20 TABLET ORAL at 09:56

## 2021-06-28 RX ADMIN — LEVOTHYROXINE SODIUM 100 MCG: 0.1 TABLET ORAL at 06:37

## 2021-06-28 RX ADMIN — Medication 10 ML: at 13:45

## 2021-06-28 NOTE — PROGRESS NOTES
Dr. Jacobo Rodriguez said for pt to take Protonix 40 mg once daily, will ju through duplicate protonix. Telemetry box removed and returned to the telemetry room. Discharge information discussed including discharge teaching, new medications, continued medications, follow-up appointments, when to call the provider, and when to call 911. Pt verbalized understanding. Pt safe for discharge at this time per Dr. Derrick Almodovar.

## 2021-06-28 NOTE — PROGRESS NOTES
Progress Note    Patient: Suzan Gentile MRN: [id number]  SSN: [id number]    YOB: 1975  Age: 54 y.o. Sex: male      Admit Date: 6/26/2021    LOS: 2 days     Subjective:     54years old morbidly obese with obstructive sleep apnea history of carotid artery stenosis possible CAD COPD chronic hypoxic respiratory failure on home oxygen essential hypertension postoperative hypothyroidism feels better today admitted for chest pain. Patient has been seen by cardiologist echocardiogram was recommended and pending. diagnosis possible reflux esophagitis    Objective:     Vitals:    06/27/21 1711 06/27/21 2104 06/28/21 0730 06/28/21 0800   BP: 112/69 119/72 108/71    Pulse: 81 68 89 88   Resp: 22 20 20    Temp: 97.5 °F (36.4 °C) 97.6 °F (36.4 °C) 98.5 °F (36.9 °C)    SpO2: 94% 95% 92%    Weight:       Height:            Intake and Output:  Current Shift: No intake/output data recorded. Last three shifts: 06/26 1901 - 06/28 0700  In: 1000 [P.O.:1000]  Out: 1200 [Urine:1200]    Physical Exam:   General:  Alert, cooperative, no distress, appears stated age. Eyes:  Conjunctivae/corneas clear. PERRL, EOMs intact. Fundi benign   Ears:  Normal TMs and external ear canals both ears. Nose: Nares normal. Septum midline. Mucosa normal. No drainage or sinus tenderness. Mouth/Throat: Lips, mucosa, and tongue normal. Teeth and gums normal.   Neck: Supple, symmetrical, trachea midline, no adenopathy, thyroid: no enlargment/tenderness/nodules, no carotid bruit and no JVD. Back:   Symmetric, no curvature. ROM normal. No CVA tenderness. Lungs:   Clear to auscultation bilaterally. Heart:  Regular rate and rhythm, S1, S2 normal, no murmur, click, rub or gallop. Abdomen:   Soft, non-tender. Bowel sounds normal. No masses,  No organomegaly. Extremities: Extremities normal, atraumatic, no cyanosis or edema. Pulses: 2+ and symmetric all extremities.    Skin: Skin color, texture, turgor normal. No rashes or lesions   Lymph nodes: Cervical, supraclavicular, and axillary nodes normal.   Neurologic: CNII-XII intact. Normal strength, sensation and reflexes throughout. Lab/Data Review: All lab results for the last 24 hours reviewed.      Recent Results (from the past 24 hour(s))   EKG, 12 LEAD, INITIAL    Collection Time: 06/27/21 10:55 AM   Result Value Ref Range    Ventricular Rate 83 BPM    Atrial Rate 83 BPM    P-R Interval 194 ms    QRS Duration 106 ms    Q-T Interval 418 ms    QTC Calculation (Bezet) 491 ms    Calculated P Axis 14 degrees    Calculated R Axis 6 degrees    Calculated T Axis 18 degrees    Diagnosis       Normal sinus rhythm  Inferior infarct , age undetermined  Abnormal ECG  When compared with ECG of 26-JUN-2021 21:39,  No significant change was found  Confirmed by Adriel Rothman ([id number]) on 6/27/2021 1:28:23 PM     TROPONIN I    Collection Time: 06/27/21  3:30 PM   Result Value Ref Range    Troponin-I, Qt. <0.05 <0.05 ng/mL   TROPONIN I    Collection Time: 06/27/21  9:05 PM   Result Value Ref Range    Troponin-I, Qt. <0.05 <4.56 ng/mL   METABOLIC PANEL, BASIC    Collection Time: 06/28/21  3:10 AM   Result Value Ref Range    Sodium 140 136 - 145 mmol/L    Potassium 4.0 3.5 - 5.1 mmol/L    Chloride 104 97 - 108 mmol/L    CO2 29 21 - 32 mmol/L    Anion gap 7 5 - 15 mmol/L    Glucose 98 65 - 100 mg/dL    BUN 12 6 - 20 mg/dL    Creatinine 0.91 0.70 - 1.30 mg/dL    BUN/Creatinine ratio 13 12 - 20      GFR est AA >60 >60 ml/min/1.73m2    GFR est non-AA >60 >60 ml/min/1.73m2    Calcium 8.7 8.5 - 10.1 mg/dL   GLUCOSE, POC    Collection Time: 06/28/21  7:27 AM   Result Value Ref Range    Glucose (POC) 174 (H) 65 - 117 mg/dL    Performed by Jennifer Lisset          Assessment:     Active Problems:    Chest pain (6/26/2021)    GERD  Obstructive sleep apnea  Chronic hypoxic respiratory failure  Morbid obesity    Plan:   Echocardiogram pending  Place on patient on proton inhibitor      Signed By: Laurel Farley Elmo Gale MD     June 28, 2021

## 2021-06-28 NOTE — PROGRESS NOTES
Progress Note      6/28/2021 4:44 PM  NAME: Tasha Wolff   MRN:  [id number]   Admit Diagnosis: Chest pain [R07.9]      Problem List:     1. Atypical chest pain. 2. Status post PFO closure. 3. Status post a CVA. 4. Essential hypertension. Assessment/Plan:     1. Patient chest pain has resolved now. There is no evidence of myocardial infarction. Patient stated that he had a normal coronary arteriogram in the past.  Echocardiogram shows prosthetic device in the PFO position is functioning well with a negative bubble study. No significant abnormal motion of the PFO closure device is noted. However due to marked obesity study is limited. CT of the chest did not reveal any pulmonary embolism or aortic dissection. No further cardiac work-up is planned. Advised contact his primary cardiologist in [facility] for further care. []       High complexity decision making was performed in this patient at high risk for decompensation with multiple organ involvement. Subjective:     Tasha Wolff denies chest pain, dyspnea. Discussed with RN events overnight. Review of Systems:    Symptom Y/N Comments  Symptom Y/N Comments   Fever/Chills N   Chest Pain N    Poor Appetite N   Edema N    Cough N   Abdominal Pain N    Sputum N   Joint Pain N    SOB/WILKS N   Pruritis/Rash N    Nausea/vomit N   Tolerating PT/OT Y    Diarrhea N   Tolerating Diet Y    Constipation N   Other       Could NOT obtain due to:      Objective:      Physical Exam:    Last 24hrs VS reviewed since prior progress note.  Most recent are:    Visit Vitals  /74   Pulse 86   Temp 98.2 °F (36.8 °C)   Resp 20   Ht 6' 4\" (1.93 m)   Wt 157.4 kg (347 lb)   SpO2 94%   BMI 42.24 kg/m²       Intake/Output Summary (Last 24 hours) at 6/28/2021 1644  Last data filed at 6/27/2021 1754  Gross per 24 hour   Intake 1000 ml   Output 1200 ml   Net -200 ml        General Appearance: Well developed, well nourished, alert & oriented x 3,    no acute distress. Ears/Nose/Mouth/Throat: Hearing grossly normal.  Neck: Supple. Chest: Lungs clear to auscultation bilaterally. Cardiovascular: Regular rate and rhythm, S1S2 normal, no murmur. Abdomen: Soft, non-tender, bowel sounds are active. Extremities: No edema bilaterally. Skin: Warm and dry. []            PMH/ reviewed - no change compared to H&P    Data Review    Telemetry: normal sinus rhythm     EKG:   []  No new EKG for review    Lab Data Personally Reviewed:    Recent Labs     06/26/21  2151   WBC 6.8   HGB 12.2   HCT 37.4        No results for input(s): INR, PTP, APTT, INREXT in the last 72 hours. Recent Labs     06/28/21  0310 06/26/21  2151    140   K 4.0 4.0    106   CO2 29 28   BUN 12 15   CREA 0.91 1.18   GLU 98 139*   CA 8.7 8.9     Recent Labs     06/27/21  2105 06/27/21  1530 06/27/21  0901   TROIQ <0.05 <0.05 <0.05     No results found for: CHOL, CHOLX, CHLST, CHOLV, HDL, HDLP, LDL, LDLC, DLDLP, TGLX, TRIGL, TRIGP, CHHD, CHHDX    Recent Labs     06/26/21  2151   AP 79   TP 7.8   ALB 3.2*   GLOB 4.6*     No results for input(s): PH, PCO2, PO2 in the last 72 hours.     Medications Personally Reviewed:    Current Facility-Administered Medications   Medication Dose Route Frequency    perflutren lipid microspheres (DEFINITY) 1.1 mg/mL contrast injection        aspirin chewable tablet 81 mg  81 mg Oral DAILY    atorvastatin (LIPITOR) tablet 20 mg  20 mg Oral DAILY    calcium-vitamin D 600 mg(1,500mg) -200 unit per tablet 1 Tablet  1 Tablet Oral TID    citalopram (CELEXA) tablet 40 mg  40 mg Oral DAILY    digoxin (LANOXIN) tablet 0.125 mg  0.125 mg Oral DAILY    budesonide-formoteroL (SYMBICORT) 160-4.5 mcg/actuation HFA inhaler 2 Puff  2 Puff Inhalation BID RT    gabapentin (NEURONTIN) capsule 600 mg  600 mg Oral TID    levothyroxine (SYNTHROID) tablet 100 mcg  100 mcg Oral ACB    metFORMIN (GLUCOPHAGE) tablet 500 mg  500 mg Oral DAILY WITH BREAKFAST  metoprolol succinate (TOPROL-XL) XL tablet 50 mg  50 mg Oral DAILY    albuterol (PROVENTIL HFA, VENTOLIN HFA, PROAIR HFA) inhaler 2 Puff  2 Puff Inhalation Q6H PRN    traZODone (DESYREL) tablet 100 mg  100 mg Oral QHS    pantoprazole (PROTONIX) tablet 40 mg  40 mg Oral ACB    zolpidem (AMBIEN) tablet 5 mg  5 mg Oral QHS    sodium chloride (NS) flush 5-40 mL  5-40 mL IntraVENous Q8H    sodium chloride (NS) flush 5-40 mL  5-40 mL IntraVENous PRN    enoxaparin (LOVENOX) injection 40 mg  40 mg SubCUTAneous Q24H    oxyCODONE-acetaminophen (PERCOCET) 5-325 mg per tablet 1 Tablet  1 Tablet Oral Q4H PRN         Maritza Dean MD

## 2021-06-28 NOTE — DISCHARGE SUMMARY
Discharge Summary     Patient: Jose Bower MRN: [id number]  SSN: [id number]    YOB: 1975  Age: 54 y.o.   Sex: male       Admit Date: 6/26/2021    Discharge Date: 6/28/2021      Admission Diagnoses: Chest pain [R07.9]    Discharge Diagnoses:   Problem List as of 6/28/2021 Date Reviewed: 5/12/2021        Codes Class Noted - Resolved    Chest pain ICD-10-CM: R07.9  ICD-9-CM: 786.50  6/26/2021 - Present        CVA (cerebral vascular accident) [facility]) ICD-10-CM: I63.9  ICD-9-CM: 434.91  5/6/2021 - Present        Postoperative hypothyroidism ICD-10-CM: E89.0  ICD-9-CM: 244.0  5/6/2021 - Present        Pharyngoesophageal dysphagia ICD-10-CM: R13.14  ICD-9-CM: 787.24  2/22/2021 - Present        Multinodular goiter ICD-10-CM: E04.2  ICD-9-CM: 241.1  12/30/2020 - Present        Coronary artery disease involving native coronary artery of native heart with angina pectoris with documented spasm ([facility] ICD-10-CM: I25.111  ICD-9-CM: 414.01, 413.9  12/17/2020 - Present        Cardiomyopathy ([facility] ICD-10-CM: I42.9  ICD-9-CM: 425.4  12/17/2020 - Present        COPD (chronic obstructive pulmonary disease) ([facility] ICD-10-CM: J44.9  ICD-9-CM: 378  12/17/2020 - Present        Pulmonary nodules ICD-10-CM: R91.8  ICD-9-CM: 793.19  12/17/2020 - Present        Obstructive sleep apnea ICD-10-CM: G47.33  ICD-9-CM: 327.23  12/17/2020 - Present        Type 2 diabetes mellitus with hyperglycemia, without long-term current use of insulin (HCC) ICD-10-CM: E11.65  ICD-9-CM: 250.00, 790.29  12/17/2020 - Present        Essential hypertension ICD-10-CM: I10  ICD-9-CM: 401.9  12/17/2020 - Present        Obesity, morbid ([address] ICD-10-CM: E66.01  ICD-9-CM: 278.01  9/7/2020 - Present        Carotid artery stenosis ICD-10-CM: I65.29  ICD-9-CM: 433.10  9/7/2020 - Present               Discharge Condition: Good    Hospital Course: Patient was 9years old came in with a complaint of chest pain has a history of PFO closure recently status post severe essential hypertension with obstructive sleep apnea morbid obesity patient was seen by cardiologist serial cardiac enzymes were negative and had an echocardiogram which showed a negative bubble study. CT of the chest did not reveal any embolism or aortic dissection. He is to follow-up with his cardiologist.  Possible reflux esophagitis and patient was started on Protonix    Consults: Cardiology    Significant Diagnostic Studies: labs:     CTA CHEST W OR W WO CONT   Final Result   No acute abnormality identified. XR CHEST PORT   Final Result   1. Unchanged cardiomegaly and/or pericardial effusion. No evidence of cardiac   decompensation. 2. Left lower lobe infiltrate. Disposition:     Discharge Medications:   Current Discharge Medication List      START taking these medications    Details   ! ! pantoprazole (Protonix) 40 mg tablet Take 1 Tablet by mouth daily. Qty: 30 Tablet, Refills: 0       !! - Potential duplicate medications found. Please discuss with provider. CONTINUE these medications which have CHANGED    Details   bumetanide (BUMEX) 1 mg tablet Take 1 Tablet by mouth daily. Qty: 30 Tablet, Refills: 0         CONTINUE these medications which have NOT CHANGED    Details   levothyroxine (SYNTHROID) 150 mcg tablet Take 100 mcg by mouth Daily (before breakfast). calcium-cholecalciferol, D3, (CALTRATE 600+D) tablet three (3) times daily. zolpidem (AMBIEN) 10 mg tablet TAKE 1 TABLET BY MOUTH ONCE DAILY AT BEDTIME AS NEEDED      citalopram (CELEXA) 40 mg tablet TAKE ONE TABLET BY MOUTH EVERY DAY      metoprolol succinate (TOPROL-XL) 50 mg XL tablet TAKE ONE TABLET BY MOUTH DAILY      atorvastatin (LIPITOR) 20 mg tablet 20 mg daily. metFORMIN (GLUCOPHAGE) 500 mg tablet TAKE ONE TABLET BY MOUTH TWICE DAILY WITH MEALS      traZODone (DESYREL) 100 mg tablet TAKE ONE TABLET BY MOUTH AT BEDTIME      gabapentin (NEURONTIN) 600 mg tablet as needed.       !! pantoprazole (PROTONIX) 40 mg tablet TAKE ONE TABLET BY MOUTH TWICE DAILY      fluticasone furoate-vilanteroL (BREO ELLIPTA) 200-25 mcg/dose inhaler Breo Ellipta 200 mcg-25 mcg/dose powder for inhalation   inhale ONE PUFF by MOUTH ONCE daily      digoxin (LANOXIN) 0.125 mg tablet TAKE ONE TABLET BY MOUTH EVERY DAY      ProAir HFA 90 mcg/actuation inhaler INHALE 2 PUFFS EVERY 6 HOURS as needed for SHORTNESS OF BREATH      aspirin 81 mg chewable tablet Take 81 mg by mouth daily. !! - Potential duplicate medications found. Please discuss with provider.       STOP taking these medications       ame-N5-mkm54-zinc--jose-bor (Caltrate 600-D Plus Minerals) 600 mg calcium- 800 unit-50 mg tab Comments:   Reason for Stopping:         famotidine (PEPCID) 40 mg tablet Comments:   Reason for Stopping:         ondansetron (ZOFRAN ODT) 4 mg disintegrating tablet Comments:   Reason for Stopping:         amLODIPine (NORVASC) 5 mg tablet Comments:   Reason for Stopping:         sildenafil citrate (VIAGRA) 100 mg tablet Comments:   Reason for Stopping:         Vitamin D3 50 mcg (2,000 unit) cap capsule Comments:   Reason for Stopping:               Activity: Activity as tolerated  Diet: Cardiac Diet  Wound Care: None needed    Follow-up Appointments   Procedures    FOLLOW UP VISIT Appointment in: 3 - 5 Days     Standing Status:   Standing     Number of Occurrences:   1     Order Specific Question:   Appointment in     Answer:   3 - 5 Days     45 minutes discharge time  Signed By: Lazaro Garcia MD     June 28, 2021

## 2021-06-28 NOTE — PROGRESS NOTES
Reason for Admission:  Chest pain                     RUR Score:      11%               Plan for utilizing home health:   None       PCP: First and Last name:  Priscila Watlers MD     Name of Practice:    Are you a current patient: Yes/No: Yes   Approximate date of last visit: Last Week   Can you participate in a virtual visit with your PCP: Yes                    Current Advanced Directive/Advance Care Plan: Full Code      Healthcare Decision Maker:   Click here to complete [address] including selection of the [address] Relationship (ie \"Primary\")    Patient requested Bulmaro Flaherty as primary Decision Maker [phone]           Patient advised he does have an advance directive on file at [address] [place] [address]. Recommended bring copy for record. Transition of Care Plan:         * Disposition- Home, Independent  * PCP, specialist F/U    Ilene Velazquez presented to hospital with complaint of chest pain. PMH significant for  Sleep apnea, CAD, MI, depression, HTN and  CVA. Demographics, NOK and PCP verified. Patient reports full independence and voiced no concerns regarding discharge. Patient is on oxygen through [facility] and has a CPAP. Care Management Interventions  PCP Verified by CM:  Yes  Discharge Durable Medical Equipment: No  Physical Therapy Consult: No  Occupational Therapy Consult: No  Speech Therapy Consult: No  Current Support Network: Own Home  The Plan for Transition of Care is Related to the Following Treatment Goals : Home  The Patient and/or Patient Representative was Provided with a Choice of Provider and Agrees with the Discharge Plan?: Yes  Name of the Patient Representative Who was Provided with a Choice of Provider and Agrees with the Discharge Plan: Patient  Discharge Location  Discharge Placement: Home     Yefri Ling ACM-SW  Complex Care Coordinator/[place]  C: [phone]

## 2021-06-28 NOTE — PROGRESS NOTES
Discharge order received. IV removed, catheter tip intact. Waiting on page from [address] to clarify medication. Night shift nurse, Francisco Hernandez given report. Discharge plan of care/case management plan validated with provider discharge order.

## 2021-06-29 NOTE — PROGRESS NOTES
Message send to Dr. Nolberto Villalobos to clarify Med discharge order of protonix via perfect . Awaiting call back.

## 2021-07-06 DIAGNOSIS — E89.0 POSTOPERATIVE HYPOTHYROIDISM: ICD-10-CM

## 2021-07-13 ENCOUNTER — HOSPITAL ENCOUNTER (OUTPATIENT)
Age: 46
Setting detail: OBSERVATION
Discharge: HOME HEALTH | End: 2021-07-16
Attending: EMERGENCY MEDICINE | Admitting: INTERNAL MEDICINE
Payer: MEDICAID

## 2021-07-13 ENCOUNTER — APPOINTMENT (OUTPATIENT)
Dept: GENERAL RADIOLOGY | Age: 46
End: 2021-07-13
Attending: EMERGENCY MEDICINE
Payer: MEDICAID

## 2021-07-13 DIAGNOSIS — I24.9 ACS (ACUTE CORONARY SYNDROME) (HCC): Primary | ICD-10-CM

## 2021-07-13 PROBLEM — R04.2 HEMOPTYSIS: Status: ACTIVE | Noted: 2021-07-13

## 2021-07-13 LAB
ALBUMIN SERPL-MCNC: 3.7 G/DL (ref 3.5–5)
ALBUMIN/GLOB SERPL: 0.8 {RATIO} (ref 1.1–2.2)
ALP SERPL-CCNC: 85 U/L (ref 45–117)
ALT SERPL-CCNC: 99 U/L (ref 12–78)
ANION GAP SERPL CALC-SCNC: 7 MMOL/L (ref 5–15)
AST SERPL W P-5'-P-CCNC: 56 U/L (ref 15–37)
BASOPHILS # BLD: 0 K/UL (ref 0–0.1)
BASOPHILS NFR BLD: 0 % (ref 0–1)
BILIRUB SERPL-MCNC: 0.2 MG/DL (ref 0.2–1)
BNP SERPL-MCNC: 29 PG/ML
BUN SERPL-MCNC: 18 MG/DL (ref 6–20)
BUN/CREAT SERPL: 16 (ref 12–20)
CA-I BLD-MCNC: 8.9 MG/DL (ref 8.5–10.1)
CHLORIDE SERPL-SCNC: 103 MMOL/L (ref 97–108)
CO2 SERPL-SCNC: 28 MMOL/L (ref 21–32)
CREAT SERPL-MCNC: 1.14 MG/DL (ref 0.7–1.3)
DIFFERENTIAL METHOD BLD: ABNORMAL
EOSINOPHIL # BLD: 0.2 K/UL (ref 0–0.4)
EOSINOPHIL NFR BLD: 2 % (ref 0–7)
ERYTHROCYTE [DISTWIDTH] IN BLOOD BY AUTOMATED COUNT: 14.7 % (ref 11.5–14.5)
GLOBULIN SER CALC-MCNC: 4.5 G/DL (ref 2–4)
GLUCOSE SERPL-MCNC: 116 MG/DL (ref 65–100)
HCT VFR BLD AUTO: 39.1 % (ref 36.6–50.3)
HGB BLD-MCNC: 12.7 G/DL (ref 12.1–17)
IMM GRANULOCYTES # BLD AUTO: 0 K/UL (ref 0–0.04)
IMM GRANULOCYTES NFR BLD AUTO: 0 % (ref 0–0.5)
INR PPP: 0.8 (ref 0.9–1.1)
LYMPHOCYTES # BLD: 1.4 K/UL (ref 0.8–3.5)
LYMPHOCYTES NFR BLD: 21 % (ref 12–49)
MAGNESIUM SERPL-MCNC: 1.8 MG/DL (ref 1.6–2.4)
MCH RBC QN AUTO: 32.2 PG (ref 26–34)
MCHC RBC AUTO-ENTMCNC: 32.5 G/DL (ref 30–36.5)
MCV RBC AUTO: 99 FL (ref 80–99)
MONOCYTES # BLD: 0.5 K/UL (ref 0–1)
MONOCYTES NFR BLD: 7 % (ref 5–13)
NEUTS SEG # BLD: 4.8 K/UL (ref 1.8–8)
NEUTS SEG NFR BLD: 70 % (ref 32–75)
NRBC # BLD: 0 K/UL (ref 0–0.01)
NRBC BLD-RTO: 0 PER 100 WBC
PLATELET # BLD AUTO: 225 K/UL (ref 150–400)
PMV BLD AUTO: 9.8 FL (ref 8.9–12.9)
POTASSIUM SERPL-SCNC: 4.5 MMOL/L (ref 3.5–5.1)
PROT SERPL-MCNC: 8.2 G/DL (ref 6.4–8.2)
PROTHROMBIN TIME: 11.2 SEC (ref 11.9–14.7)
RBC # BLD AUTO: 3.95 M/UL (ref 4.1–5.7)
SODIUM SERPL-SCNC: 138 MMOL/L (ref 136–145)
TROPONIN I SERPL-MCNC: <0.05 NG/ML
WBC # BLD AUTO: 6.8 K/UL (ref 4.1–11.1)

## 2021-07-13 PROCEDURE — 74011636637 HC RX REV CODE- 636/637: Performed by: INTERNAL MEDICINE

## 2021-07-13 PROCEDURE — 74011250637 HC RX REV CODE- 250/637: Performed by: EMERGENCY MEDICINE

## 2021-07-13 PROCEDURE — 74011250637 HC RX REV CODE- 250/637: Performed by: INTERNAL MEDICINE

## 2021-07-13 PROCEDURE — 71045 X-RAY EXAM CHEST 1 VIEW: CPT

## 2021-07-13 PROCEDURE — 36415 COLL VENOUS BLD VENIPUNCTURE: CPT

## 2021-07-13 PROCEDURE — 84484 ASSAY OF TROPONIN QUANT: CPT

## 2021-07-13 PROCEDURE — 99285 EMERGENCY DEPT VISIT HI MDM: CPT

## 2021-07-13 PROCEDURE — 96374 THER/PROPH/DIAG INJ IV PUSH: CPT

## 2021-07-13 PROCEDURE — 96376 TX/PRO/DX INJ SAME DRUG ADON: CPT

## 2021-07-13 PROCEDURE — 65270000029 HC RM PRIVATE

## 2021-07-13 PROCEDURE — 83036 HEMOGLOBIN GLYCOSYLATED A1C: CPT

## 2021-07-13 PROCEDURE — 94640 AIRWAY INHALATION TREATMENT: CPT

## 2021-07-13 PROCEDURE — G0378 HOSPITAL OBSERVATION PER HR: HCPCS

## 2021-07-13 PROCEDURE — 74011000250 HC RX REV CODE- 250: Performed by: INTERNAL MEDICINE

## 2021-07-13 PROCEDURE — 85025 COMPLETE CBC W/AUTO DIFF WBC: CPT

## 2021-07-13 PROCEDURE — 83880 ASSAY OF NATRIURETIC PEPTIDE: CPT

## 2021-07-13 PROCEDURE — 83735 ASSAY OF MAGNESIUM: CPT

## 2021-07-13 PROCEDURE — 80053 COMPREHEN METABOLIC PANEL: CPT

## 2021-07-13 PROCEDURE — 85610 PROTHROMBIN TIME: CPT

## 2021-07-13 PROCEDURE — 93005 ELECTROCARDIOGRAM TRACING: CPT

## 2021-07-13 PROCEDURE — 74011250636 HC RX REV CODE- 250/636: Performed by: EMERGENCY MEDICINE

## 2021-07-13 RX ORDER — CALCIUM CARBONATE/VITAMIN D3 600MG-5MCG
1 TABLET ORAL 3 TIMES DAILY
Status: DISCONTINUED | OUTPATIENT
Start: 2021-07-13 | End: 2021-07-16 | Stop reason: HOSPADM

## 2021-07-13 RX ORDER — ACETAMINOPHEN 650 MG/1
650 SUPPOSITORY RECTAL
Status: DISCONTINUED | OUTPATIENT
Start: 2021-07-13 | End: 2021-07-16 | Stop reason: HOSPADM

## 2021-07-13 RX ORDER — ONDANSETRON 2 MG/ML
4 INJECTION INTRAMUSCULAR; INTRAVENOUS
Status: DISCONTINUED | OUTPATIENT
Start: 2021-07-13 | End: 2021-07-16 | Stop reason: HOSPADM

## 2021-07-13 RX ORDER — HYDROCODONE BITARTRATE AND ACETAMINOPHEN 5; 325 MG/1; MG/1
1 TABLET ORAL
Status: DISCONTINUED | OUTPATIENT
Start: 2021-07-13 | End: 2021-07-16 | Stop reason: HOSPADM

## 2021-07-13 RX ORDER — ONDANSETRON 4 MG/1
4 TABLET, ORALLY DISINTEGRATING ORAL
Status: DISCONTINUED | OUTPATIENT
Start: 2021-07-13 | End: 2021-07-16 | Stop reason: HOSPADM

## 2021-07-13 RX ORDER — IPRATROPIUM BROMIDE AND ALBUTEROL SULFATE 2.5; .5 MG/3ML; MG/3ML
3 SOLUTION RESPIRATORY (INHALATION)
Status: DISCONTINUED | OUTPATIENT
Start: 2021-07-13 | End: 2021-07-16 | Stop reason: HOSPADM

## 2021-07-13 RX ORDER — GABAPENTIN 300 MG/1
600 CAPSULE ORAL 4 TIMES DAILY
Status: DISCONTINUED | OUTPATIENT
Start: 2021-07-13 | End: 2021-07-16 | Stop reason: HOSPADM

## 2021-07-13 RX ORDER — PANTOPRAZOLE SODIUM 40 MG/1
40 TABLET, DELAYED RELEASE ORAL ONCE
Status: COMPLETED | OUTPATIENT
Start: 2021-07-13 | End: 2021-07-13

## 2021-07-13 RX ORDER — PANTOPRAZOLE SODIUM 40 MG/1
40 TABLET, DELAYED RELEASE ORAL
Status: DISCONTINUED | OUTPATIENT
Start: 2021-07-14 | End: 2021-07-16 | Stop reason: HOSPADM

## 2021-07-13 RX ORDER — TRAZODONE HYDROCHLORIDE 50 MG/1
50 TABLET ORAL
Status: DISCONTINUED | OUTPATIENT
Start: 2021-07-13 | End: 2021-07-16 | Stop reason: HOSPADM

## 2021-07-13 RX ORDER — ZOLPIDEM TARTRATE 5 MG/1
5 TABLET ORAL
Status: DISCONTINUED | OUTPATIENT
Start: 2021-07-13 | End: 2021-07-16 | Stop reason: HOSPADM

## 2021-07-13 RX ORDER — POLYETHYLENE GLYCOL 3350 17 G/17G
17 POWDER, FOR SOLUTION ORAL DAILY PRN
Status: DISCONTINUED | OUTPATIENT
Start: 2021-07-13 | End: 2021-07-16 | Stop reason: HOSPADM

## 2021-07-13 RX ORDER — SODIUM CHLORIDE 0.9 % (FLUSH) 0.9 %
5-40 SYRINGE (ML) INJECTION EVERY 8 HOURS
Status: DISCONTINUED | OUTPATIENT
Start: 2021-07-13 | End: 2021-07-16 | Stop reason: HOSPADM

## 2021-07-13 RX ORDER — MORPHINE SULFATE 2 MG/ML
2 INJECTION, SOLUTION INTRAMUSCULAR; INTRAVENOUS ONCE
Status: COMPLETED | OUTPATIENT
Start: 2021-07-13 | End: 2021-07-13

## 2021-07-13 RX ORDER — MORPHINE SULFATE 2 MG/ML
2 INJECTION, SOLUTION INTRAMUSCULAR; INTRAVENOUS
Status: COMPLETED | OUTPATIENT
Start: 2021-07-13 | End: 2021-07-13

## 2021-07-13 RX ORDER — DOXYCYCLINE 100 MG/1
100 CAPSULE ORAL EVERY 12 HOURS
Status: DISCONTINUED | OUTPATIENT
Start: 2021-07-13 | End: 2021-07-16 | Stop reason: HOSPADM

## 2021-07-13 RX ORDER — GUAIFENESIN 100 MG/5ML
81 LIQUID (ML) ORAL DAILY
Status: DISCONTINUED | OUTPATIENT
Start: 2021-07-14 | End: 2021-07-16 | Stop reason: HOSPADM

## 2021-07-13 RX ORDER — CITALOPRAM 20 MG/1
40 TABLET, FILM COATED ORAL DAILY
Status: DISCONTINUED | OUTPATIENT
Start: 2021-07-14 | End: 2021-07-16 | Stop reason: HOSPADM

## 2021-07-13 RX ORDER — ATORVASTATIN CALCIUM 20 MG/1
20 TABLET, FILM COATED ORAL DAILY
Status: DISCONTINUED | OUTPATIENT
Start: 2021-07-14 | End: 2021-07-16 | Stop reason: HOSPADM

## 2021-07-13 RX ORDER — BUMETANIDE 1 MG/1
1 TABLET ORAL DAILY
Status: DISCONTINUED | OUTPATIENT
Start: 2021-07-14 | End: 2021-07-16 | Stop reason: HOSPADM

## 2021-07-13 RX ORDER — SODIUM CHLORIDE 0.9 % (FLUSH) 0.9 %
5-40 SYRINGE (ML) INJECTION AS NEEDED
Status: DISCONTINUED | OUTPATIENT
Start: 2021-07-13 | End: 2021-07-15

## 2021-07-13 RX ORDER — ACETAMINOPHEN 325 MG/1
650 TABLET ORAL
Status: DISCONTINUED | OUTPATIENT
Start: 2021-07-13 | End: 2021-07-16 | Stop reason: HOSPADM

## 2021-07-13 RX ORDER — LEVOTHYROXINE SODIUM 100 UG/1
100 TABLET ORAL
Status: DISCONTINUED | OUTPATIENT
Start: 2021-07-14 | End: 2021-07-16 | Stop reason: HOSPADM

## 2021-07-13 RX ORDER — DEXTROSE 50 % IN WATER (D50W) INTRAVENOUS SYRINGE
25-50 AS NEEDED
Status: DISCONTINUED | OUTPATIENT
Start: 2021-07-13 | End: 2021-07-16 | Stop reason: HOSPADM

## 2021-07-13 RX ORDER — MAGNESIUM SULFATE 100 %
4 CRYSTALS MISCELLANEOUS AS NEEDED
Status: DISCONTINUED | OUTPATIENT
Start: 2021-07-13 | End: 2021-07-16 | Stop reason: HOSPADM

## 2021-07-13 RX ORDER — METFORMIN HYDROCHLORIDE 500 MG/1
500 TABLET ORAL
Status: DISCONTINUED | OUTPATIENT
Start: 2021-07-14 | End: 2021-07-16 | Stop reason: HOSPADM

## 2021-07-13 RX ORDER — BUDESONIDE AND FORMOTEROL FUMARATE DIHYDRATE 160; 4.5 UG/1; UG/1
1 AEROSOL RESPIRATORY (INHALATION)
Status: DISCONTINUED | OUTPATIENT
Start: 2021-07-13 | End: 2021-07-16 | Stop reason: HOSPADM

## 2021-07-13 RX ORDER — IPRATROPIUM BROMIDE AND ALBUTEROL SULFATE 2.5; .5 MG/3ML; MG/3ML
3 SOLUTION RESPIRATORY (INHALATION)
Status: DISCONTINUED | OUTPATIENT
Start: 2021-07-13 | End: 2021-07-13 | Stop reason: SDUPTHER

## 2021-07-13 RX ORDER — DIGOXIN 125 MCG
0.12 TABLET ORAL DAILY
Status: DISCONTINUED | OUTPATIENT
Start: 2021-07-14 | End: 2021-07-16 | Stop reason: HOSPADM

## 2021-07-13 RX ORDER — METOPROLOL SUCCINATE 50 MG/1
50 TABLET, EXTENDED RELEASE ORAL DAILY
Status: DISCONTINUED | OUTPATIENT
Start: 2021-07-14 | End: 2021-07-16 | Stop reason: HOSPADM

## 2021-07-13 RX ORDER — PANTOPRAZOLE SODIUM 40 MG/1
40 TABLET, DELAYED RELEASE ORAL DAILY
Status: DISCONTINUED | OUTPATIENT
Start: 2021-07-14 | End: 2021-07-13 | Stop reason: SDUPTHER

## 2021-07-13 RX ADMIN — ZOLPIDEM TARTRATE 5 MG: 5 TABLET ORAL at 21:08

## 2021-07-13 RX ADMIN — Medication 1 TABLET: at 21:08

## 2021-07-13 RX ADMIN — GABAPENTIN 600 MG: 300 CAPSULE ORAL at 21:08

## 2021-07-13 RX ADMIN — MORPHINE SULFATE 2 MG: 2 INJECTION, SOLUTION INTRAMUSCULAR; INTRAVENOUS at 08:31

## 2021-07-13 RX ADMIN — TRAZODONE HYDROCHLORIDE 50 MG: 50 TABLET ORAL at 21:08

## 2021-07-13 RX ADMIN — BUDESONIDE AND FORMOTEROL FUMARATE DIHYDRATE 1 PUFF: 160; 4.5 AEROSOL RESPIRATORY (INHALATION) at 20:56

## 2021-07-13 RX ADMIN — PREDNISONE 30 MG: 20 TABLET ORAL at 15:22

## 2021-07-13 RX ADMIN — PANTOPRAZOLE SODIUM 40 MG: 40 TABLET, DELAYED RELEASE ORAL at 12:47

## 2021-07-13 RX ADMIN — IPRATROPIUM BROMIDE AND ALBUTEROL SULFATE 3 ML: .5; 3 SOLUTION RESPIRATORY (INHALATION) at 15:24

## 2021-07-13 RX ADMIN — DOXYCYCLINE HYCLATE 100 MG: 100 CAPSULE ORAL at 21:08

## 2021-07-13 RX ADMIN — ACETAMINOPHEN 650 MG: 325 TABLET ORAL at 19:17

## 2021-07-13 RX ADMIN — Medication 10 ML: at 21:10

## 2021-07-13 RX ADMIN — HYDROCODONE BITARTRATE AND ACETAMINOPHEN 1 TABLET: 5; 325 TABLET ORAL at 23:53

## 2021-07-13 RX ADMIN — MORPHINE SULFATE 2 MG: 2 INJECTION, SOLUTION INTRAMUSCULAR; INTRAVENOUS at 13:44

## 2021-07-13 RX ADMIN — IPRATROPIUM BROMIDE AND ALBUTEROL SULFATE 3 ML: .5; 2.5 SOLUTION RESPIRATORY (INHALATION) at 20:56

## 2021-07-13 NOTE — CONSULTS
[address] [facility]    Name:  Girish Gordillo  MR#:  [id number]  :  1975  ACCOUNT #:  [de-identified]  DATE OF SERVICE:  2021    ATTENDING PHYSICIAN:  Jennifer Richmond MD    REQUESTING PHYSICIAN:  Angelic Kennedy. Gabriel Lewis MD    PRIMARY CARE PHYSICIAN:  Steff Garvin MD    REASON FOR CONSULTATION:  Hemoptysis. HISTORY OF PRESENTING ILLNESS:  The patient is a 50-year-old obese white male. He is well known to myself. He has a history of severe obstructive sleep apnea. He is supposed to be on BiPAP 19/15 with nasal pillow and a chinstrap. He claims to be compliant. The patient also has multiple other medical problems including underlying asthma, hypertension, dyslipidemia, depression. He has a history of TIAs. The patient stated that he had another TIA, and more recently about a month ago, he underwent a PFO closure by Dr. Nathaniel Damon [facility].  He has been on Plavix and aspirin. A few days ago, he started getting some shortness of breath and chest pains. Yesterday, he coughed up some bright red blood. Today, he coughed up blood again. Paramedics were called and he was brought over to the hospital.  Further investigations ensued. Because of chest pains, Cardiology was also consulted. They have actually okayed him for discharge. It is felt that he has atypical chest pains. Apparently normal coronary arteries were documented about a month ago prior to his PFO closure. Currently, he is on room air. He feels comfortable. He has not coughed up any more blood. No further information can be obtained. PAST MEDICAL HISTORY:  Significant for severe obstructive sleep apnea and he claims to be compliant with his BiPAP machine. Moderate persistent asthma, and he is supposed to be on Breo 200/25 and Spiriva Respimat 2.5 mcg, also bronchodilator treatments and albuterol inhaler.   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 PAST SURGICAL HISTORY:  Significant for rotator cuff surgery and hernia repair. MEDICATIONS:  Currently, he is not on any medications. Plavix was recommended to be discontinued by the cardiologist.  At home, he had been on Breo 200/25, Spiriva Respimat 2.5 mcg, albuterol inhaler p.r.n., nebulized albuterol p.r.n., trazodone 50 mg daily, Lasix, and citalopram.    ALLERGIES:  VANCOMYCIN. SOCIAL HISTORY:  He is . He lives with his daughter. History of tobacco abuse and he quit in 2003. He started smoking at the age of 5 years and he most smoked was one pack a day. Occasional alcohol consumption. OCCUPATIONAL HISTORY:  He used to work in construction and as an . He is now unemployed. FAMILY HISTORY:  Mother passed away from lung cancer in the early 46s. Father unknown. REVIEW OF SYSTEMS:  Complete review of systems was undertaken. Pertinent findings are discussed above. All other systems were reviewed and are negative. PHYSICAL EXAMINATION:  GENERAL:  The patient is a middle aged appearing morbidly obese white male who does not appear to be in any distress. VITAL SIGNS:  Temperature is 99, pulse is 89 per minute, respiratory rate is 17 per minute, and blood pressure is 137/85. Saturation on room air is 95%. HEENT:  Shows pupils are equal and reactive to light. No pallor or icterus. Nasal passages are patent. Oropharynx is very crowded in general.  No thrush or exudation. NECK:  Neck is quite thick. Trachea is central.  No JVD or lymphadenopathy. CHEST:  He is not using any accessory muscles of respiration. Chest is symmetrical with equal and diminished-to-fair air entry bilaterally. He has prolonged expiratory phase with an occasional expiratory wheeze. No rhonchi or crackles. No chest wall tenderness. HEART:  Rhythm is regular without any loud murmur or gallop. ABDOMEN:  Obese and benign. Bowel sounds are audible. No masses or organomegaly. EXTREMITIES:  Do not show any cyanosis or clubbing. No pitting edema. Pulses are palpable. NEUROLOGICAL:  Examination is grossly intact. SKIN:  Warm and dry. DIAGNOSTIC AND LABORATORY DATA:  Portable chest x-ray shows no acute process, but limited by large body habitus. Echocardiogram done on 06/28 showed normal left ventricle with normal ejection fraction. Wall motion was normal.  No atrial septal defect. PFO occluded device noted. Normal right atrium and right ventricle. Electrolytes are mostly within normal limits. Potassium is 4.5.  BUN and creatinine are normal.  Troponin was negative. BNP is 29. D-dimer 0.49. INR 0.8. CBC is normal.    ASSESSMENT AND PLAN:  1. The patient is presenting with chest pains and hemoptysis. Hemoptysis is most likely due to acute bronchitis. I will start him on azithromycin and nebulized bronchodilator treatments. He does have underlying moderate persistent asthma. I will also give him p.o. prednisone. Chest x-ray shows no focal airspace disease. Hemoptysis is due to acute bronchitis in the setting of the patient using Plavix and aspirin. Apparently, Plavix has been recommended to be stopped by the cardiologist.  2.  Moderate persistent asthma. At home, he has been on Breo 200/25 and Spiriva Respimat along with bronchodilator treatments. Treatment as above. 3.  Severe obstructive sleep apnea. He claims to be compliant with his BiPAP 19/15 with nasal pillow and a chinstrap. However, his compliance has been problematic in the past.  4.  History of transient ischemic attacks. He had a recent patent foramen ovale closure done about a month ago by Dr. Nash Biggs at [facility].  Recent echocardiogram was essentially normal as discussed above.   5.  For deep vein thrombosis prophylaxis, I will start the patient on sequential compression devices to the lower extremities. Thank you for allowing me to participate in the care of this patient. From Pulmonary standpoint, may plan for discharge in the morning. I doubt that the patient has venous thromboembolism. I will follow the patient closely with you.       Patricia Shukla MD      [id number]  D:  07/13/2021 14:51  T:  07/13/2021 19:02  JOB #:  [id number]  CC:  Alissa Montgomery MD

## 2021-07-13 NOTE — CONSULTS
Full consultation dictated. [id number]. Hemoptysis due to acute bronchitis in the setting of Plavix and aspirin. Discussed with cardiology. They recommended discontinuation of Plavix. We will start the patient on oral prednisone and azithromycin. He has moderate persistent asthma at baseline. Also history of severe obstructive sleep apnea claims to be compliant with his BiPAP machine 19/15 with nasal pillows and chinstrap. Thank you.

## 2021-07-13 NOTE — CONSULTS
Cardiology Consult    NAME: Kar Kothari   :  1975   MRN:  [id number]     Date/Time:  2021 12:42 PM    Patient PCP: Chadwick De Guzman MD      Subjective:   CHIEF COMPLAINT: Chest pain    Primary cardiologist: Dr. Jackie Downingws: Chest pain and hemoptysis      HISTORY OF PRESENT ILLNESS:     Kar Kothari is a 54 y.o. WHITE/NON- male who   has hypertension, obesity, COPD, ANIBAL, CVA  months ago s/p PFO closure 4 weeks ago at [facility] by Dr. Nathaniel Damon. Cardiac catheterization showed normal coronary arteries and follow-up echocardiogram showed good seating of the PFO device without compromise. He came to the emergency room because of chest pain, shortness of breath, and hemoptysis. Chest pain is described as tightness that comes and goes at rest.  He has associated wheezing. He reports intermittent hemoptysis, says he is coughed up bright red blood size of a dime. He denies any sputum production, fever, or chills. Says the chest pain is not related to breathing. No cough.         Past Medical History:   Diagnosis Date    Acute MI ([facility] [address]     Depression     Hypertension     Ill-defined condition     Stroke [facility])       Past Surgical History:   Procedure Laterality Date    HX HERNIA REPAIR      HX ORTHOPAEDIC      rotator cuff surgery    HX OTHER SURGICAL      Patent Foramen Ovale Repair    HX THYROIDECTOMY      IR FINE NEEDLE ASPIRATION W IMAGE       Allergies   Allergen Reactions    Vancomycin Hives     Lyndsey syndrome      Meds:  See below  Social History     Tobacco Use    Smoking status: Former Smoker    Smokeless tobacco: Never Used   Substance Use Topics    Alcohol use: Yes     Comment: socially       Family History   Problem Relation Age of Onset    Diabetes Maternal Uncle     Hypertension Maternal Uncle     Hypertension Paternal Uncle     Diabetes Paternal Uncle     Cancer Other    Floydene Ada Cancer Mother     No Known Problems Father REVIEW OF SYSTEMS:     []         Unable to obtain  ROS due to ---   [x]         Total of 12 systems reviewed as follows:    Constitutional: negative fever, negative chills,  Eyes:   negative visual changes  ENT:   negative sore throat, tongue or lip swelling  Respiratory:  +  shortness of breath and hemoptysis  Cards:  Positive for chest pain  GI:   negative for nausea, vomiting  Genitourinary: negative for frequency, dysuria  Integument:  negative for rash   Hematologic:  negative for easy bruising and gum/nose bleeding  Musculoskel: negative for myalgias, back pain  Neurological:  negative for headaches, dizziness, weakness      Objective:      Physical Exam:    Last 24hrs VS reviewed since prior progress note. Most recent are:    Visit Vitals  BP (!) 140/91 (BP 1 Location: Left arm)   Pulse 97   Temp 99 °F (37.2 °C)   Resp 18   Ht 6' 4\" (1.93 m)   Wt 158.8 kg (350 lb)   SpO2 91%   BMI 42.60 kg/m²     No intake or output data in the 24 hours ending 07/13/21 1242     General Appearance: Well developed, alert, no acute distress. Ears/Nose/Mouth/Throat: Pupils equal and round, Hearing grossly normal.  Neck: Supple. No JVP. Good carotids upstrokes, no bruit. Chest: Bilateral wheezes  Cardiovascular: Regular rate and rhythm, S1S2 normal, soft systolic murmur  Abdomen: Soft, non-tender, bowel sounds are active. Extremities: No edema bilaterally. Femoral pulses +2, Distal Pulses +1. Skin: Warm and dry. Neuro: Alert and oriented x 3, normal speech; follows simple commands  Psychiatric: Cooperative, normal affect and judgment      Data:      Telemetry: Sinus rhythm with heart rate in the 80s    EKG: Sinus rhythm with LVH    []  No new EKG for review. Prior to Admission medications    Medication Sig Start Date End Date Taking? Authorizing Provider   pantoprazole (Protonix) 40 mg tablet Take 1 Tablet by mouth daily.  6/28/21   Manuela Grimes MD   bumetanide (BUMEX) 1 mg tablet Take 1 Tablet by mouth daily. 6/28/21   Darrell Ngo MD   levothyroxine (SYNTHROID) 150 mcg tablet Take 100 mcg by mouth Daily (before breakfast). Provider, Historical   calcium-cholecalciferol, D3, (CALTRATE 600+D) tablet three (3) times daily. 4/14/21   Provider, Historical   zolpidem (AMBIEN) 10 mg tablet TAKE 1 TABLET BY MOUTH ONCE DAILY AT BEDTIME AS NEEDED 4/7/21   Provider, Historical   gabapentin (NEURONTIN) 600 mg tablet as needed. 3/10/21   Provider, Historical   pantoprazole (PROTONIX) 40 mg tablet TAKE ONE TABLET BY MOUTH TWICE DAILY 3/23/21   Provider, Historical   fluticasone furoate-vilanteroL (BREO ELLIPTA) 200-25 mcg/dose inhaler Breo Ellipta 200 mcg-25 mcg/dose powder for inhalation   inhale ONE PUFF by MOUTH ONCE daily    Provider, Historical   digoxin (LANOXIN) 0.125 mg tablet TAKE ONE TABLET BY MOUTH EVERY DAY 11/4/20   Provider, Historical   citalopram (CELEXA) 40 mg tablet TAKE ONE TABLET BY MOUTH EVERY DAY 12/8/20   Provider, Historical   metoprolol succinate (TOPROL-XL) 50 mg XL tablet TAKE ONE TABLET BY MOUTH DAILY 11/4/20   Provider, Historical   atorvastatin (LIPITOR) 20 mg tablet 20 mg daily. 8/3/20   Provider, Historical   ProAir HFA 90 mcg/actuation inhaler INHALE 2 PUFFS EVERY 6 HOURS as needed for SHORTNESS OF BREATH 8/21/20   Provider, Historical   metFORMIN (GLUCOPHAGE) 500 mg tablet TAKE ONE TABLET BY MOUTH TWICE DAILY WITH MEALS 8/13/20   Provider, Historical   traZODone (DESYREL) 100 mg tablet TAKE ONE TABLET BY MOUTH AT BEDTIME 7/17/20   Provider, Historical   aspirin 81 mg chewable tablet Take 81 mg by mouth daily.     Other, MD Elizabeth       Recent Results (from the past 24 hour(s))   CBC WITH AUTOMATED DIFF    Collection Time: 07/13/21  8:25 AM   Result Value Ref Range    WBC 6.8 4.1 - 11.1 K/uL    RBC 3.95 (L) 4.10 - 5.70 M/uL    HGB 12.7 12.1 - 17.0 g/dL    HCT 39.1 36.6 - 50.3 %    MCV 99.0 80.0 - 99.0 FL    MCH 32.2 26.0 - 34.0 PG    MCHC 32.5 30.0 - 36.5 g/dL    RDW 14.7 (H) 11.5 - 14.5 %    PLATELET 137 772 - 359 K/uL    MPV 9.8 8.9 - 12.9 FL    NRBC 0.0 0.0  WBC    ABSOLUTE NRBC 0.00 0.00 - 0.01 K/uL    NEUTROPHILS 70 32 - 75 %    LYMPHOCYTES 21 12 - 49 %    MONOCYTES 7 5 - 13 %    EOSINOPHILS 2 0 - 7 %    BASOPHILS 0 0 - 1 %    IMMATURE GRANULOCYTES 0 0 - 0.5 %    ABS. NEUTROPHILS 4.8 1.8 - 8.0 K/UL    ABS. LYMPHOCYTES 1.4 0.8 - 3.5 K/UL    ABS. MONOCYTES 0.5 0.0 - 1.0 K/UL    ABS. EOSINOPHILS 0.2 0.0 - 0.4 K/UL    ABS. BASOPHILS 0.0 0.0 - 0.1 K/UL    ABS. IMM. GRANS. 0.0 0.00 - 0.04 K/UL    DF AUTOMATED     METABOLIC PANEL, COMPREHENSIVE    Collection Time: 07/13/21  8:25 AM   Result Value Ref Range    Sodium 138 136 - 145 mmol/L    Potassium 4.5 3.5 - 5.1 mmol/L    Chloride 103 97 - 108 mmol/L    CO2 28 21 - 32 mmol/L    Anion gap 7 5 - 15 mmol/L    Glucose 116 (H) 65 - 100 mg/dL    BUN 18 6 - 20 mg/dL    Creatinine 1.14 0.70 - 1.30 mg/dL    BUN/Creatinine ratio 16 12 - 20      GFR est AA >60 >60 ml/min/1.73m2    GFR est non-AA >60 >60 ml/min/1.73m2    Calcium 8.9 8.5 - 10.1 mg/dL    Bilirubin, total 0.2 0.2 - 1.0 mg/dL    AST (SGOT) 56 (H) 15 - 37 U/L    ALT (SGPT) 99 (H) 12 - 78 U/L    Alk.  phosphatase 85 45 - 117 U/L    Protein, total 8.2 6.4 - 8.2 g/dL    Albumin 3.7 3.5 - 5.0 g/dL    Globulin 4.5 (H) 2.0 - 4.0 g/dL    A-G Ratio 0.8 (L) 1.1 - 2.2     MAGNESIUM    Collection Time: 07/13/21  8:25 AM   Result Value Ref Range    Magnesium 1.8 1.6 - 2.4 mg/dL   PROTHROMBIN TIME + INR    Collection Time: 07/13/21  8:25 AM   Result Value Ref Range    Prothrombin time 11.2 (L) 11.9 - 14.7 sec    INR 0.8 (L) 0.9 - 1.1     TROPONIN I    Collection Time: 07/13/21  8:25 AM   Result Value Ref Range    Troponin-I, Qt. <0.05 <0.05 ng/mL   TROPONIN I    Collection Time: 07/13/21 10:49 AM   Result Value Ref Range    Troponin-I, Qt. <0.05 <0.05 ng/mL        _______________________________________________________________________     Assessment:   Atypical chest pain with normal coronary arteries documented approximately 1 month ago  Recent PFO closure with follow-up echocardiogram x2 showing good seating of the device  Hemoptysis  Chronic COPD  Sleep apnea  Morbid obesity      Plan:   I spoke with Dr. Calin Browning by phone and reviewed his medical history, it turns out that the patient has normal ejection fraction and widely patent coronary arteries did appear essentially normal.  Therefore, there is no evidence of previous MI    The patient had PFO closure without complications and follow-up echocardiogram at [facility] and from here 2 weeks ago both indicate that this device is well-seated without evidence of compromise    Because the patient is having hemoptysis, I would suggest that we stop the Plavix and just go with aspirin alone; consult pulmonary for further evaluation of his wheezing    From a cardiac standpoint, I would not do additional studies at this time if his enzymes are negative, check BNP        []        High complexity decision making was performed    Shon Cody MD

## 2021-07-13 NOTE — ED PROVIDER NOTES
EMERGENCY DEPARTMENT HISTORY AND PHYSICAL EXAM      Date: 7/13/2021  Patient Name: Yamilka Ortiz      History of Presenting Illness     Chief Complaint   Patient presents with    Chest Pain       History Provided By: Patient    HPI: Yamilka Ortiz, 54 y.o. male with a past medical history significant Coronary artery disease presents to the ED with cc of left-sided chest pain that began this morning acutely. Patient states there were no exacerbating or relieving factors but it felt like his last heart attack. He says it is constant does not radiate and is mild to moderate in severity. Patient specifically denies fever, chills, nausea, vomiting, shortness of breath, rash, diarrhea, headache, night sweats. Patient arrives via EMS and did receive aspirin and nitroglycerin in route. Patient still has pain at this point time and rates it as a 5 out of 10. There are no other complaints, changes, or physical findings at this time.     PCP: Jada Ashley MD    Current Facility-Administered Medications   Medication Dose Route Frequency Provider Last Rate Last Admin    predniSONE (DELTASONE) tablet 30 mg  30 mg Oral DAILY WITH Sedonia Hamman, Zahid, MD   30 mg at 07/14/21 0739    doxycycline (VIBRAMYCIN) capsule 100 mg  100 mg Oral Q12H Cristy Lawson MD   100 mg at 07/14/21 0908    aspirin chewable tablet 81 mg  81 mg Oral DAILY Anil BEGUM MD   81 mg at 07/14/21 0908    atorvastatin (LIPITOR) tablet 20 mg  20 mg Oral DAILY Anil BEGUM MD   20 mg at 07/14/21 0908    bumetanide (BUMEX) tablet 1 mg  1 mg Oral DAILY Anil BEGUM MD   1 mg at 07/14/21 0909    calcium-vitamin D 600 mg(1,500mg) -200 unit per tablet 1 Tablet  1 Tablet Oral TID Jose Araiza MD   1 Tablet at 07/13/21 2108    citalopram (CELEXA) tablet 40 mg  40 mg Oral DAILY Anil BEGUM MD   40 mg at 07/14/21 0909    budesonide-formoteroL (SYMBICORT) 160-4.5 mcg/actuation HFA inhaler 1 Puff  1 Puff Inhalation BID RT Patric Mares MD   1 Puff at 07/14/21 0751    gabapentin (NEURONTIN) capsule 600 mg  600 mg Oral QID Karlos BEGUM MD   600 mg at 07/14/21 4626    levothyroxine (SYNTHROID) tablet 100 mcg  100 mcg Oral ACB Karlos BEGUM MD   100 mcg at 07/14/21 0739    metFORMIN (GLUCOPHAGE) tablet 500 mg  500 mg Oral DAILY WITH Magui BEGUM MD   500 mg at 07/14/21 0739    metoprolol succinate (TOPROL-XL) XL tablet 50 mg  50 mg Oral DAILY Karlos BEGUM MD   50 mg at 07/14/21 0908    pantoprazole (PROTONIX) tablet 40 mg  40 mg Oral ACB Patric Mares MD   40 mg at 07/14/21 0739    digoxin (LANOXIN) tablet 0.125 mg  0.125 mg Oral DAILY Patric Mares MD        traZODone (DESYREL) tablet 50 mg  50 mg Oral QHS Karlos BEGUM MD   50 mg at 07/13/21 2108    zolpidem (AMBIEN) tablet 5 mg  5 mg Oral QHS Patric Mares MD   5 mg at 07/13/21 2108    sodium chloride (NS) flush 5-40 mL  5-40 mL IntraVENous Q8H Patric Mares MD   10 mL at 07/14/21 0541    sodium chloride (NS) flush 5-40 mL  5-40 mL IntraVENous PRN Karlos BEGUM MD        acetaminophen (TYLENOL) tablet 650 mg  650 mg Oral Q6H PRN Karlos BEGUM MD   650 mg at 07/13/21 1917    Or    acetaminophen (TYLENOL) suppository 650 mg  650 mg Rectal Q6H PRN Patric Mares MD        polyethylene glycol (MIRALAX) packet 17 g  17 g Oral DAILY PRN Karlos BEGUM MD        ondansetron (ZOFRAN ODT) tablet 4 mg  4 mg Oral Q8H PRN Karlos BEGUM MD        Or    ondansetron (ZOFRAN) injection 4 mg  4 mg IntraVENous Q6H PRN Karlos BEGUM MD        albuterol-ipratropium (DUO-NEB) 2.5 MG-0.5 MG/3 ML  3 mL Nebulization Q6H RT Patric Mares MD   3 mL at 07/14/21 0751    glucose chewable tablet 16 g  4 Tablet Oral PRN Karlos BEGUM MD        dextrose (D50W) injection syrg 12.5-25 g  25-50 mL IntraVENous PRN Karlos BEGUM MD        glucagon (GLUCAGEN) injection 1 mg  1 mg IntraMUSCular PRN Mary Rollins MD        HYDROcodone-acetaminophen [facility]) 5-325 mg per tablet 1 Tablet  1 Tablet Oral Q6H PRN Mary Rollins MD   1 Tablet at 07/14/21 0739       Past History     Past Medical History:  Past Medical History:   Diagnosis Date    Acute MI ([address]     Depression     Hypertension     Ill-defined condition     Stroke [facility])        Past Surgical History:  Past Surgical History:   Procedure Laterality Date    HX HERNIA REPAIR      HX ORTHOPAEDIC      rotator cuff surgery    HX OTHER SURGICAL      Patent Foramen Ovale Repair    HX THYROIDECTOMY      IR FINE NEEDLE ASPIRATION W IMAGE         Family History:  Family History   Problem Relation Age of Onset    Diabetes Maternal Uncle     Hypertension Maternal Uncle     Hypertension Paternal Uncle     Diabetes Paternal Uncle     Cancer Other    [place] Ping Cancer Mother     No Known Problems Father        Social History:  Social History     Tobacco Use    Smoking status: Former Smoker    Smokeless tobacco: Never Used   Vaping Use    Vaping Use: Never used   Substance Use Topics    Alcohol use: Yes     Comment: socially     Drug use: No       Allergies: Allergies   Allergen Reactions    Vancomycin Hives     Lyndsey syndrome         Review of Systems     Review of Systems   Constitutional: Negative. Negative for appetite change, chills, fatigue and fever. HENT: Negative. Negative for congestion and sinus pain. Eyes: Negative. Negative for pain and visual disturbance. Respiratory: Negative. Negative for chest tightness and shortness of breath. Cardiovascular: Positive for chest pain. Gastrointestinal: Negative. Negative for abdominal pain, diarrhea, nausea and vomiting. Genitourinary: Negative. Negative for difficulty urinating. No discharge   Musculoskeletal: Negative. Negative for arthralgias. Skin: Negative. Negative for rash. Neurological: Negative. Negative for weakness and headaches. Hematological: Negative. Psychiatric/Behavioral: Negative. Negative for agitation. The patient is not nervous/anxious. All other systems reviewed and are negative. Physical Exam     Physical Exam  Vitals and nursing note reviewed. Constitutional:       General: He is not in acute distress. Appearance: He is well-developed. HENT:      Head: Normocephalic and atraumatic. Nose: Nose normal.      Mouth/Throat:      Mouth: Mucous membranes are moist.      Pharynx: Oropharynx is clear. No oropharyngeal exudate. Eyes:      General:         Right eye: No discharge. Left eye: No discharge. Conjunctiva/sclera: Conjunctivae normal.      Pupils: Pupils are equal, round, and reactive to light. Cardiovascular:      Rate and Rhythm: Normal rate and regular rhythm. Chest Wall: PMI is not displaced. No thrill. Heart sounds: Normal heart sounds. No murmur heard. No friction rub. No gallop. Pulmonary:      Effort: Pulmonary effort is normal. No respiratory distress. Breath sounds: Normal breath sounds. No wheezing or rales. Chest:      Chest wall: No tenderness. Abdominal:      General: Bowel sounds are normal. There is no distension. Palpations: Abdomen is soft. There is no mass. Tenderness: There is no abdominal tenderness. There is no guarding or rebound. Musculoskeletal:         General: Normal range of motion. Cervical back: Normal range of motion and neck supple. Lymphadenopathy:      Cervical: No cervical adenopathy. Skin:     General: Skin is warm and dry. Capillary Refill: Capillary refill takes less than 2 seconds. Findings: No erythema or rash. Neurological:      Mental Status: He is alert and oriented to person, place, and time. Cranial Nerves: No cranial nerve deficit.       Coordination: Coordination normal.   Psychiatric:         Mood and Affect: Mood normal.         Behavior: Behavior normal.         Lab and Diagnostic Study Results     Labs -     Recent Results (from the past 12 hour(s))   GLUCOSE, POC    Collection Time: 07/14/21  8:54 AM   Result Value Ref Range    Glucose (POC) 282 (H) 65 - 117 mg/dL    Performed by Damari Knight        Radiologic Studies -   [unfilled]  CT Results  (Last 48 hours)    None        CXR Results  (Last 48 hours)               07/13/21 0821  XR CHEST PORT Final result    Impression:  Possible hydrostatic edema. Narrative:  Chest, frontal view, 7/13/2021       History: Chest pain. Comparison: Including chest 6/26/2021. Findings: The cardiac silhouette is enlarged. The lungs are underexpanded. There is prominence of the pulmonary vasculature and mild hydrostatic edema is   possible. No focal consolidation, pleural effusions or pneumothorax is   identified. Dextroconvex scoliosis of the thoracic spine is noted. Medical Decision Making and ED Course   - I am the first and primary provider for this patient AND AM THE PRIMARY PROVIDER OF RECORD. - I reviewed the vital signs, available nursing notes, past medical history, past surgical history, family history and social history. - Initial assessment performed. The patients presenting problems have been discussed, and the staff are in agreement with the care plan formulated and outlined with them. I have encouraged them to ask questions as they arise throughout their visit. Vital Signs-Reviewed the patient's vital signs. Patient Vitals for the past 12 hrs:   Temp Pulse Resp BP SpO2   07/14/21 0849 97.9 °F (36.6 °C) 99 20 133/68 96 %   07/14/21 0800 -- 98 -- -- --   07/14/21 0400 -- 87 -- -- --   07/13/21 2358 -- 89 -- -- --   07/13/21 2211 97.7 °F (36.5 °C) 94 18 136/78 94 %       EKG interpretation: (Preliminary): Performed at 7:48 AM, and read at 7:50 AM  Ventricular rate 98 bpm,  ms, QRS duration 100 ms,  ms. Interpretation: Normal sinus rhythm with prolonged QT. Abnormal EKG.       Records Reviewed: Nursing Notes      ED Course:   HEART SCORE:   History: Highly Suspicious  ECG: Nonspecific repolarization disturbance  Age: Greater than 45 years - Less than 65 years  Risk Factors: Greater than or equal to 3 risk factors, or history of atherosclerotic disease  Troponin: Less than or equal to normal limit   HEART Score Total : 6     Management   Scores 0-3: 0.9-1.7% risk of adverse cardiac event. In the HEART Score study, these patients were discharged (0.99% in the retrospective study, 1.7% in the prospective study)   Scores 4-6: 12-16.6% risk of adverse cardiac event. In the HEART Score study, these patients were admitted to the hospital. (11.6% retrospective, 16.6% prospective)   Scores ? 7: 50-65% risk of adverse cardiac event. In the HEART Score study, these patients were candidates for early invasive measures. (65.2% retrospective, 50.1% prospective)   A MACE (Major Adverse Cardiac Event) was defined as all-cause mortality, myocardial infarction, or coronary revascularization. Original/Primary Reference  · Olvin Coelho, Huber MALDONADO. Chest pain in the emergency room: value of the HEART score. Neth Heart J. 2008;16(6):191-6. Validation  · Manuel Telles, Huber MALDONADO, et al. A prospective validation of the HEART score for chest pain patients at the emergency department. Int J Cardiol. 2013;168(3):2153-8. · Manuel Telles, Zelda Webster et al. Chest pain in the emergency room: a multicenter validation of the HEART Score. Crit Pathw Cardiol. 2010;9(3):164-9. · Alexis BRITTANY, Demario RF, Jaskaran JENSEN, et al. The HEART Pathway Randomized Controlled Trial One Year Outcomes. Acad Emerg Med. 2018;       ED Course as of Jul 14 0932   Tue Jul 13, 2021   1211 Discussed the case with Dr. Zabrina Guadalupe who is recommending serial enzymes in the department. She conveys if those are negative the patient can be discharged.     [CS]   1838 Dr. Zabrina Guadalupe is evaluated the patient conveys that he does have some mild wheezing at this point time. Patient also conveys to Dr. WELLSTAR [facility] that he is had hemoptysis. She again recommends patient be admitted to the hospital for further work-up. Pulmonary consult with patient's pulmonologist is pending.    [CS]      ED Course User Index  [CS] Blair Seymour MD       Provider Notes (Medical Decision Making): 9year-old male with known history of coronary disease presents with left-sided chest pain that he says mimics his heart attack in the past.  Troponin is negative here no appreciable acute changes on EKG. Patient's heart score is 6 will admit for further provocative testing and risk factor evaluation and management. MDM           Consultations:       Consultations: -  Hospitalist Consultant: Dr. Jia Wood: We have asked for emergent assistance with regard to this patient. We have discussed the patients HPI, ROS, PE and results this far. They will come and evaluate the patient for admission. Procedures and Critical Care       Performed by: Moiz Gonzalez MD  PROCEDURES:  Procedures       Disposition     Disposition: Admitted to Floor Medical Floor the case was discussed with the admitting physician Dr. Jia Wood    Admitted    Diagnosis     Clinical Impression:   1. ACS (acute coronary syndrome) ([facility] [address]        Attestations:    Moiz Gonzalez MD    Please note that this dictation was completed with [employer]bon, the computer voice recognition software. Quite often unanticipated grammatical, syntax, homophones, and other interpretive errors are inadvertently transcribed by the computer software. Please disregard these errors. Please excuse any errors that have escaped final proofreading. Thank you.

## 2021-07-13 NOTE — Clinical Note
Status[de-identified] INPATIENT [101]   Type of Bed: Medical [8]   Inpatient Hospitalization Certified Necessary for the Following Reasons: 1.  Patient Failed outpatient treatment (further clarification in H&P documentation)   Admitting Diagnosis: ACS (acute coronary syndrome) [facility]) [[id number]]   Admitting Physician: Melissa Lorenzo   Attending Physician: Melissa Lorenzo   Estimated Length of Stay: 2 Midnights   Discharge Plan[de-identified] Home with Office Follow-up

## 2021-07-13 NOTE — H&P
History and Physical    Patient: Daphney Toledo MRN: [id number]  SSN: [id number]    YOB: 1975  Age: 54 y.o. Sex: male      Subjective:      Daphney Toledo is a 54 y.o. male wh 54years old with history of PFO placement recently evaluated in the hospital and also at [facility] where an echocardiogram showed intact patch. He presented with a complaint of hemoptysis with the cough and chest pain. Patient is noncompliant with medication has a history of depression CVA. Past Medical History:   Diagnosis Date    Acute MI ([facility] [address]     Depression     Hypertension     Ill-defined condition     Stroke [facility])      Past Surgical History:   Procedure Laterality Date    HX HERNIA REPAIR      HX ORTHOPAEDIC      rotator cuff surgery    HX OTHER SURGICAL      Patent Foramen Ovale Repair    HX THYROIDECTOMY      IR FINE NEEDLE ASPIRATION W IMAGE        Family History   Problem Relation Age of Onset    Diabetes Maternal Uncle     Hypertension Maternal Uncle     Hypertension Paternal Uncle     Diabetes Paternal Uncle     Cancer Other    [facility] Cancer Mother     No Known Problems Father      Social History     Tobacco Use    Smoking status: Former Smoker    Smokeless tobacco: Never Used   Substance Use Topics    Alcohol use: Yes     Comment: socially       Prior to Admission medications    Medication Sig Start Date End Date Taking? Authorizing Provider   pantoprazole (Protonix) 40 mg tablet Take 1 Tablet by mouth daily. 6/28/21   Jena Paulson MD   bumetanide (BUMEX) 1 mg tablet Take 1 Tablet by mouth daily. 6/28/21   Jena Paulson MD   levothyroxine (SYNTHROID) 150 mcg tablet Take 100 mcg by mouth Daily (before breakfast). Provider, Historical   calcium-cholecalciferol, D3, (CALTRATE 600+D) tablet three (3) times daily.  4/14/21   Provider, Historical   zolpidem (AMBIEN) 10 mg tablet TAKE 1 TABLET BY MOUTH ONCE DAILY AT BEDTIME AS NEEDED 4/7/21   Provider, Historical   gabapentin (NEURONTIN) 600 mg tablet as needed. 3/10/21   Provider, Historical   pantoprazole (PROTONIX) 40 mg tablet TAKE ONE TABLET BY MOUTH TWICE DAILY 3/23/21   Provider, Historical   fluticasone furoate-vilanteroL (BREO ELLIPTA) 200-25 mcg/dose inhaler Breo Ellipta 200 mcg-25 mcg/dose powder for inhalation   inhale ONE PUFF by MOUTH ONCE daily    Provider, Historical   digoxin (LANOXIN) 0.125 mg tablet TAKE ONE TABLET BY MOUTH EVERY DAY 11/4/20   Provider, Historical   citalopram (CELEXA) 40 mg tablet TAKE ONE TABLET BY MOUTH EVERY DAY 12/8/20   Provider, Historical   metoprolol succinate (TOPROL-XL) 50 mg XL tablet TAKE ONE TABLET BY MOUTH DAILY 11/4/20   Provider, Historical   atorvastatin (LIPITOR) 20 mg tablet 20 mg daily. 8/3/20   Provider, Historical   metFORMIN (GLUCOPHAGE) 500 mg tablet TAKE ONE TABLET BY MOUTH TWICE DAILY WITH MEALS 8/13/20   Provider, Historical   traZODone (DESYREL) 100 mg tablet TAKE ONE TABLET BY MOUTH AT BEDTIME 7/17/20   Provider, Historical   aspirin 81 mg chewable tablet Take 81 mg by mouth daily. Other, MD Elizabeth        Allergies   Allergen Reactions    Vancomycin Hives     Lyndsey syndrome       Review of Systems:  A comprehensive review of systems was negative except for that written in the History of Present Illness. Objective:     Vitals:    07/13/21 1343 07/13/21 1521 07/13/21 1525 07/13/21 1640   BP: 137/85 (!) 155/80  129/77   Pulse: 89 86  80   Resp: 17 21  14   Temp:       SpO2: 95% 99% 94% 92%   Weight:       Height:            Physical Exam:  General:  Alert, cooperative, no distress, appears stated age. Eyes:  Conjunctivae/corneas clear. PERRL, EOMs intact. Fundi benign   Ears:  Normal TMs and external ear canals both ears. Nose: Nares normal. Septum midline. Mucosa normal. No drainage or sinus tenderness.    Mouth/Throat: Lips, mucosa, and tongue normal. Teeth and gums normal.   Neck: Supple, symmetrical, trachea midline, no adenopathy, thyroid: no enlargment/tenderness/nodules, no carotid bruit and no JVD. Back:   Symmetric, no curvature. ROM normal. No CVA tenderness. Lungs:    Rhonchi to auscultation bilaterally. Heart:  Regular rate and rhythm, S1, S2 normal, no murmur, click, rub or gallop. Abdomen:   Soft, non-tender. Bowel sounds normal. No masses,  No organomegaly. Extremities: Extremities normal, atraumatic, no cyanosis or edema. Pulses: 2+ and symmetric all extremities. Skin: Skin color, texture, turgor normal. No rashes or lesions   Lymph nodes: Cervical, supraclavicular, and axillary nodes normal.   Neurologic: CNII-XII intact. Normal strength, sensation and reflexes throughout.        Assessment:     Hospital Problems  Date Reviewed: 5/12/2021        Codes Class Noted POA    ACS (acute coronary syndrome) [facility]) ICD-10-CM: I24.9  ICD-9-CM: 411.1  7/13/2021 Unknown          Acute bronchitis  Obstructive sleep apnea  Morbid obesity  Hemoptysis  Atypical chest pain  Noncompliance with diet  Plan:     Patient has been seen by cardiology and pulmonology placed on steroids and antibiotics resume home medications    Signed By: João Lamas MD     July 13, 2021

## 2021-07-14 LAB
AMPHET UR QL SCN: NEGATIVE
BARBITURATES UR QL SCN: NEGATIVE
BENZODIAZ UR QL: NEGATIVE
CANNABINOIDS UR QL SCN: NEGATIVE
COCAINE UR QL SCN: NEGATIVE
DRUG SCRN COMMENT,DRGCM: ABNORMAL
EST. AVERAGE GLUCOSE BLD GHB EST-MCNC: 146 MG/DL
GLUCOSE BLD STRIP.AUTO-MCNC: 140 MG/DL (ref 65–117)
GLUCOSE BLD STRIP.AUTO-MCNC: 147 MG/DL (ref 65–117)
GLUCOSE BLD STRIP.AUTO-MCNC: 249 MG/DL (ref 65–117)
GLUCOSE BLD STRIP.AUTO-MCNC: 282 MG/DL (ref 65–117)
HBA1C MFR BLD: 6.7 % (ref 4–5.6)
METHADONE UR QL: NEGATIVE
OPIATES UR QL: POSITIVE
PCP UR QL: NEGATIVE
PERFORMED BY, TECHID: ABNORMAL

## 2021-07-14 PROCEDURE — 74011250637 HC RX REV CODE- 250/637: Performed by: INTERNAL MEDICINE

## 2021-07-14 PROCEDURE — G0378 HOSPITAL OBSERVATION PER HR: HCPCS

## 2021-07-14 PROCEDURE — 82962 GLUCOSE BLOOD TEST: CPT

## 2021-07-14 PROCEDURE — 94640 AIRWAY INHALATION TREATMENT: CPT

## 2021-07-14 PROCEDURE — 74011000250 HC RX REV CODE- 250: Performed by: INTERNAL MEDICINE

## 2021-07-14 PROCEDURE — 80307 DRUG TEST PRSMV CHEM ANLYZR: CPT

## 2021-07-14 PROCEDURE — 74011636637 HC RX REV CODE- 636/637: Performed by: INTERNAL MEDICINE

## 2021-07-14 PROCEDURE — 65270000029 HC RM PRIVATE

## 2021-07-14 RX ORDER — DEXTROSE 50 % IN WATER (D50W) INTRAVENOUS SYRINGE
25-50 AS NEEDED
Status: DISCONTINUED | OUTPATIENT
Start: 2021-07-14 | End: 2021-07-16 | Stop reason: HOSPADM

## 2021-07-14 RX ORDER — INSULIN GLARGINE 100 [IU]/ML
15 INJECTION, SOLUTION SUBCUTANEOUS DAILY
Status: DISCONTINUED | OUTPATIENT
Start: 2021-07-15 | End: 2021-07-16 | Stop reason: HOSPADM

## 2021-07-14 RX ADMIN — PANTOPRAZOLE SODIUM 40 MG: 40 TABLET, DELAYED RELEASE ORAL at 07:39

## 2021-07-14 RX ADMIN — GABAPENTIN 600 MG: 300 CAPSULE ORAL at 14:33

## 2021-07-14 RX ADMIN — Medication 10 ML: at 05:41

## 2021-07-14 RX ADMIN — GABAPENTIN 600 MG: 300 CAPSULE ORAL at 09:09

## 2021-07-14 RX ADMIN — GABAPENTIN 600 MG: 300 CAPSULE ORAL at 21:38

## 2021-07-14 RX ADMIN — PREDNISONE 30 MG: 20 TABLET ORAL at 07:39

## 2021-07-14 RX ADMIN — LEVOTHYROXINE SODIUM 100 MCG: 0.1 TABLET ORAL at 07:39

## 2021-07-14 RX ADMIN — METOPROLOL SUCCINATE 50 MG: 50 TABLET, EXTENDED RELEASE ORAL at 09:08

## 2021-07-14 RX ADMIN — HYDROCODONE BITARTRATE AND ACETAMINOPHEN 1 TABLET: 5; 325 TABLET ORAL at 07:39

## 2021-07-14 RX ADMIN — IPRATROPIUM BROMIDE AND ALBUTEROL SULFATE 3 ML: .5; 2.5 SOLUTION RESPIRATORY (INHALATION) at 13:49

## 2021-07-14 RX ADMIN — CITALOPRAM HYDROBROMIDE 40 MG: 20 TABLET ORAL at 09:09

## 2021-07-14 RX ADMIN — IPRATROPIUM BROMIDE AND ALBUTEROL SULFATE 3 ML: .5; 2.5 SOLUTION RESPIRATORY (INHALATION) at 07:51

## 2021-07-14 RX ADMIN — HYDROCODONE BITARTRATE AND ACETAMINOPHEN 1 TABLET: 5; 325 TABLET ORAL at 14:33

## 2021-07-14 RX ADMIN — ATORVASTATIN CALCIUM 20 MG: 20 TABLET, FILM COATED ORAL at 09:08

## 2021-07-14 RX ADMIN — ASPIRIN 81 MG: 81 TABLET, CHEWABLE ORAL at 09:08

## 2021-07-14 RX ADMIN — HYDROCODONE BITARTRATE AND ACETAMINOPHEN 1 TABLET: 5; 325 TABLET ORAL at 18:11

## 2021-07-14 RX ADMIN — Medication 1 TABLET: at 21:38

## 2021-07-14 RX ADMIN — GABAPENTIN 600 MG: 300 CAPSULE ORAL at 18:11

## 2021-07-14 RX ADMIN — ZOLPIDEM TARTRATE 5 MG: 5 TABLET ORAL at 21:38

## 2021-07-14 RX ADMIN — BUDESONIDE AND FORMOTEROL FUMARATE DIHYDRATE 1 PUFF: 160; 4.5 AEROSOL RESPIRATORY (INHALATION) at 07:51

## 2021-07-14 RX ADMIN — BUMETANIDE 1 MG: 1 TABLET ORAL at 09:09

## 2021-07-14 RX ADMIN — DOXYCYCLINE HYCLATE 100 MG: 100 CAPSULE ORAL at 21:38

## 2021-07-14 RX ADMIN — METFORMIN HYDROCHLORIDE 500 MG: 500 TABLET ORAL at 07:39

## 2021-07-14 RX ADMIN — TRAZODONE HYDROCHLORIDE 50 MG: 50 TABLET ORAL at 21:38

## 2021-07-14 RX ADMIN — DOXYCYCLINE HYCLATE 100 MG: 100 CAPSULE ORAL at 09:08

## 2021-07-14 RX ADMIN — Medication 1 TABLET: at 16:20

## 2021-07-14 RX ADMIN — DIPHENHYDRAMINE HYDROCHLORIDE, ZINC ACETATE: 2; .1 CREAM TOPICAL at 18:12

## 2021-07-14 NOTE — ROUTINE PROCESS
TRANSFER - OUT REPORT:    Verbal report given to jonny(name) on Ronni Ramos  being transferred to (unit) for routine progression of care       Report consisted of patients Situation, Background, Assessment and   Recommendations(SBAR). Information from the following report(s) SBAR was reviewed with the receiving nurse. Lines:   Peripheral IV 07/13/21 Anterior;Right Forearm (Active)        Opportunity for questions and clarification was provided.       Patient transported with:   [employer]

## 2021-07-14 NOTE — PROGRESS NOTES
Reason for Admission:  Kelsey Anton is a 54 y.o. male wh 54years old with history of PFO placement recently evaluated in the hospital and also at [facility] where an echocardiogram showed intact patch. He presented with a complaint of hemoptysis with the cough and chest pain. Patient is noncompliant with medication has a history of depression CVA. RUR Score:  13%                   Plan for utilizing home health:   no       PCP: First and Last name:  Mariya Warner MD     Name of Practice:    Are you a current patient: Yes/No: yes   Approximate date of last visit: couple of weeks ago   Can you participate in a virtual visit with your PCP:                     Current Advanced Directive/Advance Care Plan: Full Code Patient understands advance directive discussion and wants CPR and ventilation if needed. He states his girlfriend, Nadya Bentley ([phone]) as his emergency contact. His next of kin is his cousin, Danya Ramsey ([phone]). Healthcare Decision Maker:   Click here to complete [employer] [employer] including selection of the Healthcare Decision Maker Relationship (ie \"Primary\")                           Transition of Care Plan:     CM assessed patient at bedside. Verified demographics. [facility]. They have given him that hotel room for a week, but pt states he will have to leave that soon because he has no money to continue to pay for it. He does not have a current address. The best number to reach him at is [phone]. He currently has no income. He is waiting on a response from his disability. Patient has [facility] Medicaid and is INPATIENT status, no notifications at this time. Patients pcp is Luc Almendarez who he saw a couple weeks ago. He uses the [facility] for his pharmacy needs.  He sees a cardiologist, pulmonologist, and an endocrinologist. He does take medications at home. Upon discharge he does have a ride. Patient mentioned that [address] [place] [address] is assisting him with getting into an [facility]. They mentioned 2 different facilities. He mentioned he went to see the first facility and he could not stay there because he has difficulty going up and down stairs due to severe neuropathy in his legs. He states he still has to go to [facility] and check out the place. He states he is independent with his ADLS but he cannot walk due to neuropathy. He states he has fallen previously and also forgets his medications. He also mentioned he has a medicaid worker. Her name is Debra Fernandez, phone number [phone], [phone]. He states she is assisting him along with the  in getting into the L[facility]. CM: assist with pcp follow up and other services as recommended. Care Management Interventions  PCP Verified by CM:  Yes  Mode of Transport at Discharge: Self  Transition of Care Consult (CM Consult): Discharge Planning  Health Maintenance Reviewed: Yes  Current Support Network: Assisted Living  Confirm Follow Up Transport: Self  The Plan for Transition of Care is Related to the Following Treatment Goals : pcp followup  The Patient and/or Patient Representative was Provided with a Choice of Provider and Agrees with the Discharge Plan?: Yes  Freedom of Choice List was Provided with Basic Dialogue that Supports the Patient's Individualized Plan of Care/Goals, Treatment Preferences and Shares the Quality Data Associated with the Providers?: Yes  Discharge Location  Discharge Placement: Assisted Living    AVERY Velasco, [address] [place] [address]

## 2021-07-14 NOTE — PROGRESS NOTES
Progress Note      7/14/2021 12:11 PM  NAME: Corinna Rothman   MRN:  [id number]   Admit Diagnosis: ACS (acute coronary syndrome) ([facility] [address] [I24.9]  Hemoptysis [R04.2]      Problem List:   Atypical chest pain with normal coronary arteries documented approximately 1 month ago  Recent PFO closure with follow-up echocardiogram x2 showing good seating of the device  Hemoptysis  Chronic COPD  Sleep apnea  Morbid obesity     Assessment/Plan:   No evidence of a cardiac etiology for his complaints  Cardiac catheterization 1 month ago showed normal coronary arteries, no evidence of previous MI or stents  No further cardiac testing is planned at this time, patient is cleared for discharge from a cardiac standpoint         []       High complexity decision making was performed in this patient at high risk for decompensation with multiple organ involvement. Subjective:     Corinna Rothman denies chest pain, dyspnea. Discussed with RN events overnight. Review of Systems:   Negative except for as noted above. Objective:      Physical Exam:    Last 24hrs VS reviewed since prior progress note. Most recent are:    Visit Vitals  /68 (BP 1 Location: Right upper arm, BP Patient Position: Sitting)   Pulse 99   Temp 97.9 °F (36.6 °C)   Resp 20   Ht 6' 4\" (1.93 m)   Wt 158.8 kg (350 lb)   SpO2 96%   BMI 42.60 kg/m²     No intake or output data in the 24 hours ending 07/14/21 1211     General Appearance: Obese; no acute distress. Ears/Nose/Mouth/Throat:  moist mucous membranes  Neck: Supple. Chest: Lungs with bilateral scattered wheezes. Cardiovascular: Regular rate and rhythm, S1S2 normal, no murmur. Abdomen: Soft, non-tender, bowel sounds are active. Extremities: No edema bilaterally. Skin: Warm and dry. []         Post-cath site without hematoma, bruit, tenderness, or thrill. Distal pulses intact.     PMH/SH reviewed - no change compared to H&P        Telemetry: Sinus rhythm with heart rate in the 80s and 90s    EKG:     []  No new EKG for review    Lab Data Personally Reviewed:    Recent Labs     07/13/21  0825   WBC 6.8   HGB 12.7   HCT 39.1        Recent Labs     07/13/21  0825   INR 0.8*   PTP 11.2*      Recent Labs     07/13/21  0825      K 4.5      CO2 28   BUN 18   CREA 1.14   *   CA 8.9   MG 1.8     Recent Labs     07/13/21  1255 07/13/21  1049 07/13/21  0825   TROIQ <0.05 <0.05 <0.05     No results found for: CHOL, CHOLX, CHLST, CHOLV, HDL, HDLP, LDL, LDLC, DLDLP, TGLX, TRIGL, TRIGP, CHHD, CHHDX    Recent Labs     07/13/21  0825   AP 85   TP 8.2   ALB 3.7   GLOB 4.5*     No results for input(s): PH, PCO2, PO2 in the last 72 hours.     Medications Personally Reviewed:    Current Facility-Administered Medications   Medication Dose Route Frequency    predniSONE (DELTASONE) tablet 30 mg  30 mg Oral DAILY WITH BREAKFAST    doxycycline (VIBRAMYCIN) capsule 100 mg  100 mg Oral Q12H    aspirin chewable tablet 81 mg  81 mg Oral DAILY    atorvastatin (LIPITOR) tablet 20 mg  20 mg Oral DAILY    bumetanide (BUMEX) tablet 1 mg  1 mg Oral DAILY    calcium-vitamin D 600 mg(1,500mg) -200 unit per tablet 1 Tablet  1 Tablet Oral TID    citalopram (CELEXA) tablet 40 mg  40 mg Oral DAILY    budesonide-formoteroL (SYMBICORT) 160-4.5 mcg/actuation HFA inhaler 1 Puff  1 Puff Inhalation BID RT    gabapentin (NEURONTIN) capsule 600 mg  600 mg Oral QID    levothyroxine (SYNTHROID) tablet 100 mcg  100 mcg Oral ACB    metFORMIN (GLUCOPHAGE) tablet 500 mg  500 mg Oral DAILY WITH BREAKFAST    metoprolol succinate (TOPROL-XL) XL tablet 50 mg  50 mg Oral DAILY    pantoprazole (PROTONIX) tablet 40 mg  40 mg Oral ACB    digoxin (LANOXIN) tablet 0.125 mg  0.125 mg Oral DAILY    traZODone (DESYREL) tablet 50 mg  50 mg Oral QHS    zolpidem (AMBIEN) tablet 5 mg  5 mg Oral QHS    sodium chloride (NS) flush 5-40 mL  5-40 mL IntraVENous Q8H    sodium chloride (NS) flush 5-40 mL  5-40 mL IntraVENous PRN    acetaminophen (TYLENOL) tablet 650 mg  650 mg Oral Q6H PRN    Or    acetaminophen (TYLENOL) suppository 650 mg  650 mg Rectal Q6H PRN    polyethylene glycol (MIRALAX) packet 17 g  17 g Oral DAILY PRN    ondansetron (ZOFRAN ODT) tablet 4 mg  4 mg Oral Q8H PRN    Or    ondansetron (ZOFRAN) injection 4 mg  4 mg IntraVENous Q6H PRN    albuterol-ipratropium (DUO-NEB) 2.5 MG-0.5 MG/3 ML  3 mL Nebulization Q6H RT    glucose chewable tablet 16 g  4 Tablet Oral PRN    dextrose (D50W) injection syrg 12.5-25 g  25-50 mL IntraVENous PRN    glucagon (GLUCAGEN) injection 1 mg  1 mg IntraMUSCular PRN    HYDROcodone-acetaminophen (NORCO) 5-325 mg per tablet 1 Tablet  1 Tablet Oral Q6H PRN         Ian Caba MD

## 2021-07-14 NOTE — PROGRESS NOTES
Bedside shift change report given to Ria Gaviria (oncoming nurse) by Ewa Zarate (offgoing nurse). Report included the following information SBAR, Kardex, Procedure Summary, Intake/Output, MAR, Recent Results and Cardiac Rhythm NSR.

## 2021-07-14 NOTE — PROGRESS NOTES
Pulmonary Progress Note      NAME: Bin Rashid   :  1975  MRM:  [id number]    Date/Time: 2021  2:36 PM         Subjective:     Patient seen and examined. He is sitting comfortably in bed. He feels some chest tightness. He coughed up small amount of blood early this morning. He has been on room air. Slept fairly well but without CPAP/BiPAP. Stated that at home he has been on Dennis. Apparently Breo was switched to Dennis. Claims to be compliant with his BiPAP machine at home. Past Medical History reviewed and unchanged from Admission History and Physical       Objective:     Physical Exam     Vitals:      Last 24hrs VS reviewed since prior progress note. Most recent are:    Visit Vitals  /68 (BP 1 Location: Right upper arm, BP Patient Position: Sitting)   Pulse 89   Temp 97.9 °F (36.6 °C)   Resp 20   Ht 6' 4\" (1.93 m)   Wt 158.8 kg (350 lb)   SpO2 96%   BMI 42.60 kg/m²     SpO2 Readings from Last 6 Encounters:   21 96%   21 98%   21 98%   21 98%   21 98%   21 (!) 88%        No intake or output data in the 24 hours ending 21 1436     GENERAL:  The patient is a middle aged appearing morbidly obese white male who does not appear to be in any distress. Saturation on room air is 95%. HEENT:  Shows pupils are equal and reactive to light. No pallor or icterus. Nasal passages are patent. Oropharynx is very crowded in general.  No thrush or exudation. NECK:  Neck is quite thick. Trachea is central.  No JVD or lymphadenopathy. CHEST:  He is not using any accessory muscles of respiration. Chest is symmetrical with equal and diminished-to-fair air entry bilaterally. He has prolonged expiratory phase with an occasional expiratory wheeze. No rhonchi or crackles. No chest wall tenderness. HEART:  Rhythm is regular without any loud murmur or gallop. ABDOMEN:  Obese and benign. Bowel sounds are audible.   No masses or organomegaly. EXTREMITIES:  Do not show any cyanosis or clubbing. No pitting edema. Pulses are palpable. NEUROLOGICAL:  Examination is grossly intact. SKIN:  Warm and dry. XR CHEST PORT   Final Result   Possible hydrostatic edema.              Lab Data Reviewed: (see below)      Medications:  Current Facility-Administered Medications   Medication Dose Route Frequency    predniSONE (DELTASONE) tablet 30 mg  30 mg Oral DAILY WITH BREAKFAST    doxycycline (VIBRAMYCIN) capsule 100 mg  100 mg Oral Q12H    aspirin chewable tablet 81 mg  81 mg Oral DAILY    atorvastatin (LIPITOR) tablet 20 mg  20 mg Oral DAILY    bumetanide (BUMEX) tablet 1 mg  1 mg Oral DAILY    calcium-vitamin D 600 mg(1,500mg) -200 unit per tablet 1 Tablet  1 Tablet Oral TID    citalopram (CELEXA) tablet 40 mg  40 mg Oral DAILY    budesonide-formoteroL (SYMBICORT) 160-4.5 mcg/actuation HFA inhaler 1 Puff  1 Puff Inhalation BID RT    gabapentin (NEURONTIN) capsule 600 mg  600 mg Oral QID    levothyroxine (SYNTHROID) tablet 100 mcg  100 mcg Oral ACB    metFORMIN (GLUCOPHAGE) tablet 500 mg  500 mg Oral DAILY WITH BREAKFAST    metoprolol succinate (TOPROL-XL) XL tablet 50 mg  50 mg Oral DAILY    pantoprazole (PROTONIX) tablet 40 mg  40 mg Oral ACB    digoxin (LANOXIN) tablet 0.125 mg  0.125 mg Oral DAILY    traZODone (DESYREL) tablet 50 mg  50 mg Oral QHS    zolpidem (AMBIEN) tablet 5 mg  5 mg Oral QHS    sodium chloride (NS) flush 5-40 mL  5-40 mL IntraVENous Q8H    sodium chloride (NS) flush 5-40 mL  5-40 mL IntraVENous PRN    acetaminophen (TYLENOL) tablet 650 mg  650 mg Oral Q6H PRN    Or    acetaminophen (TYLENOL) suppository 650 mg  650 mg Rectal Q6H PRN    polyethylene glycol (MIRALAX) packet 17 g  17 g Oral DAILY PRN    ondansetron (ZOFRAN ODT) tablet 4 mg  4 mg Oral Q8H PRN    Or    ondansetron (ZOFRAN) injection 4 mg  4 mg IntraVENous Q6H PRN    albuterol-ipratropium (DUO-NEB) 2.5 MG-0.5 MG/3 ML  3 mL Nebulization Q6H RT    glucose chewable tablet 16 g  4 Tablet Oral PRN    dextrose (D50W) injection syrg 12.5-25 g  25-50 mL IntraVENous PRN    glucagon (GLUCAGEN) injection 1 mg  1 mg IntraMUSCular PRN    HYDROcodone-acetaminophen (NORCO) 5-325 mg per tablet 1 Tablet  1 Tablet Oral Q6H PRN       ______________________________________________________________________      Lab Review:     Recent Labs     07/13/21  0825   WBC 6.8   HGB 12.7   HCT 39.1        Recent Labs     07/13/21  0825      K 4.5      CO2 28   *   BUN 18   CREA 1.14   CA 8.9   MG 1.8   ALB 3.7   ALT 99*   INR 0.8*     No components found for: GLPOC  No results for input(s): PH, PCO2, PO2, HCO3, FIO2 in the last 72 hours. Recent Labs     07/13/21  0825   INR 0.8*       Other pertinent lab:   Portable chest x-ray shows no acute process, but limited by large body habitus. Echocardiogram done on 06/28 showed normal left ventricle with normal ejection fraction. Wall motion was normal.  No atrial septal defect. PFO occluded device noted. Normal right atrium and right ventricle. Troponin was negative. BNP is 29. D-dimer 0.49. INR 0.8. CTA of the chest was done on 6/27/2021. Personally reviewed. No pulmonary embolism. Mostly normal lung parenchyma. A pericardial cyst as described. Stable mediastinal adenopathy is also described. Assessment & Plan:      1. The patient is presenting with chest pains and hemoptysis. Hemoptysis is most likely due to acute bronchitis in the setting of a Plavix and aspirin. Overall hemoptysis has improved. He had one small episode early this morning. He was started on doxycycline and nebulized bronchodilator treatments. He does have underlying moderate persistent asthma. Also started on p.o. prednisone. Chest x-ray shows no focal airspace disease. Apparently, Plavix has been discontinued by the cardiologist.  Patient had a recent CTA of the chest done.   I do not think that he has pulmonary embolism. No need for another CT scan of the chest.  He is diabetic also. 2.  Moderate persistent asthma. At home, he has been on Breo 200/25 and Spiriva Respimat along with bronchodilator treatments. Treatment as above. I will start him on Symbicort in the hospital.    3.  Severe obstructive sleep apnea. He claims to be compliant with his BiPAP 19/15 with nasal pillow and a chinstrap. However, his compliance has been problematic in the past.  The patient stated that his family will bring his BiPAP machine with nasal pillows from home today. 4.  History of transient ischemic attacks. He had a recent patent foramen ovale closure done about a month ago by Dr. Delgado Garcia at [facility].  Recent echocardiogram was essentially normal as discussed above. 5.  For deep vein thrombosis prophylaxis, on sequential compression devices to the lower extremities. 6.  Status post thyroidectomy. He is on Synthroid.     Thank you for allowing me to participate in the care of this patient. From Pulmonary standpoint, may plan for discharge in the morning. I doubt that the patient has venous thromboembolism. I will follow the patient closely with you.   More than 30 minutes spent with patient care.  Eze Santillan MD

## 2021-07-14 NOTE — ACP (ADVANCE CARE PLANNING)
Full Code Patient understands advance directive discussion and wants CPR and ventilation if needed. He states his girlfriend, Merlyn Kemp ([phone]) as his emergency contact. His next of kin is his cousin, Reena Saba ([phone]).      Marylene Spice, ASIMN, [place], [place]  [phone]

## 2021-07-14 NOTE — PROGRESS NOTES
Admission skin assessment performed by Janusz Glover and Queenie Cordova RN. Skin is dry and intact.

## 2021-07-14 NOTE — PROGRESS NOTES
Progress Note    Patient: Arnulfo Dean MRN: [id number]  SSN: [id number]    YOB: 1975  Age: 54 y.o. Sex: male      Admit Date: 7/13/2021    LOS: 1 day     Subjective:     Patient complains of chest tightness and cough. No hemoptysis    Objective:     Vitals:    07/14/21 0800 07/14/21 0849 07/14/21 1200 07/14/21 1457   BP:  133/68  115/79   Pulse: 98 99 89 98   Resp:  20  20   Temp:  97.9 °F (36.6 °C)  98.1 °F (36.7 °C)   SpO2:  96%  93%   Weight:       Height:            Intake and Output:  Current Shift: No intake/output data recorded. Last three shifts: No intake/output data recorded. Physical Exam:   General:  Alert, cooperative, no distress, appears stated age. Eyes:  Conjunctivae/corneas clear. PERRL, EOMs intact. Fundi benign   Ears:  Normal TMs and external ear canals both ears. Nose: Nares normal. Septum midline. Mucosa normal. No drainage or sinus tenderness. Mouth/Throat: Lips, mucosa, and tongue normal. Teeth and gums normal.   Neck: Supple, symmetrical, trachea midline, no adenopathy, thyroid: no enlargment/tenderness/nodules, no carotid bruit and no JVD. Back:   Symmetric, no curvature. ROM normal. No CVA tenderness. Lungs:   Diminished air entry with rhonchi to auscultation bilaterally. Heart:  Regular rate and rhythm, S1, S2 normal, no murmur, click, rub or gallop. Abdomen:   Soft, non-tender. Bowel sounds normal. No masses,  No organomegaly. Extremities: Extremities normal, atraumatic, no cyanosis or edema. Pulses: 2+ and symmetric all extremities. Skin: Skin color, texture, turgor normal. No rashes or lesions   Lymph nodes: Cervical, supraclavicular, and axillary nodes normal.   Neurologic: CNII-XII intact. Normal strength, sensation and reflexes throughout. Lab/Data Review: All lab results for the last 24 hours reviewed.      Recent Results (from the past 24 hour(s))   HEMOGLOBIN A1C WITH EAG    Collection Time: 07/13/21  7:00 PM Result Value Ref Range    Hemoglobin A1c 6.7 (H) 4.0 - 5.6 %    Est. average glucose 146 mg/dL   GLUCOSE, POC    Collection Time: 07/14/21  8:54 AM   Result Value Ref Range    Glucose (POC) 282 (H) 65 - 117 mg/dL    Performed by Kristy Harp    GLUCOSE, POC    Collection Time: 07/14/21 11:54 AM   Result Value Ref Range    Glucose (POC) 140 (H) 65 - 117 mg/dL    Performed by Stevo Ball, URINE    Collection Time: 07/14/21 12:30 PM   Result Value Ref Range    AMPHETAMINES Negative Negative      BARBITURATES Negative Negative      BENZODIAZEPINES Negative Negative      COCAINE Negative Negative      METHADONE Negative Negative      OPIATES Positive (A) Negative      PCP(PHENCYCLIDINE) Negative Negative      THC (TH-CANNABINOL) Negative Negative      Drug screen comment        This test is a screen for drugs of abuse in a medical setting only (i.e., they are unconfirmed results and as such must not be used for non-medical purposes, e.g.,employment testing, legal testing). Due to its inherent nature, false positive (FP) and false negative (FN) results may be obtained. Therefore, if necessary for medical care, recommend confirmation of positive findings by GC/MS.    GLUCOSE, POC    Collection Time: 07/14/21  4:07 PM   Result Value Ref Range    Glucose (POC) 249 (H) 65 - 117 mg/dL    Performed by Kristy Harp          Assessment:     Active Problems:    ACS (acute coronary syndrome) ([facility] [address] (7/13/2021)      Hemoptysis (7/13/2021)    Severe COPD extubation  Obstructive sleep apnea  Possible reflux esophagitis  Very morbid obesity  Atypical chest pain  Dermatitis of the lower extremities  Plan:     Give a dose of steroids continue with duo nebs patient is on antibiotics  Placed on antiproton inhibitor      Signed By: Huber Louis MD     July 14, 2021

## 2021-07-15 ENCOUNTER — APPOINTMENT (OUTPATIENT)
Dept: CT IMAGING | Age: 46
End: 2021-07-15
Attending: INTERNAL MEDICINE
Payer: MEDICAID

## 2021-07-15 LAB
ATRIAL RATE: 98 BPM
CALCULATED P AXIS, ECG09: 17 DEGREES
CALCULATED R AXIS, ECG10: 7 DEGREES
CALCULATED T AXIS, ECG11: 27 DEGREES
D DIMER PPP FEU-MCNC: 0.44 UG/ML(FEU)
DIAGNOSIS, 93000: NORMAL
GLUCOSE BLD STRIP.AUTO-MCNC: 126 MG/DL (ref 65–117)
GLUCOSE BLD STRIP.AUTO-MCNC: 186 MG/DL (ref 65–117)
GLUCOSE BLD STRIP.AUTO-MCNC: 298 MG/DL (ref 65–117)
P-R INTERVAL, ECG05: 184 MS
PERFORMED BY, TECHID: ABNORMAL
Q-T INTERVAL, ECG07: 390 MS
QRS DURATION, ECG06: 100 MS
QTC CALCULATION (BEZET), ECG08: 497 MS
VENTRICULAR RATE, ECG03: 98 BPM

## 2021-07-15 PROCEDURE — 82962 GLUCOSE BLOOD TEST: CPT

## 2021-07-15 PROCEDURE — 74011250637 HC RX REV CODE- 250/637: Performed by: INTERNAL MEDICINE

## 2021-07-15 PROCEDURE — 36415 COLL VENOUS BLD VENIPUNCTURE: CPT

## 2021-07-15 PROCEDURE — 74011636637 HC RX REV CODE- 636/637: Performed by: INTERNAL MEDICINE

## 2021-07-15 PROCEDURE — 71250 CT THORAX DX C-: CPT

## 2021-07-15 PROCEDURE — G0378 HOSPITAL OBSERVATION PER HR: HCPCS

## 2021-07-15 PROCEDURE — 74011000250 HC RX REV CODE- 250: Performed by: INTERNAL MEDICINE

## 2021-07-15 PROCEDURE — 85379 FIBRIN DEGRADATION QUANT: CPT

## 2021-07-15 PROCEDURE — 70450 CT HEAD/BRAIN W/O DYE: CPT

## 2021-07-15 PROCEDURE — 94640 AIRWAY INHALATION TREATMENT: CPT

## 2021-07-15 PROCEDURE — 65270000029 HC RM PRIVATE

## 2021-07-15 RX ORDER — PREDNISONE 20 MG/1
20 TABLET ORAL
Status: DISCONTINUED | OUTPATIENT
Start: 2021-07-16 | End: 2021-07-16 | Stop reason: HOSPADM

## 2021-07-15 RX ADMIN — HYDROCODONE BITARTRATE AND ACETAMINOPHEN 1 TABLET: 5; 325 TABLET ORAL at 17:59

## 2021-07-15 RX ADMIN — Medication 1 TABLET: at 21:07

## 2021-07-15 RX ADMIN — METOPROLOL SUCCINATE 50 MG: 50 TABLET, EXTENDED RELEASE ORAL at 09:42

## 2021-07-15 RX ADMIN — Medication 10 ML: at 21:35

## 2021-07-15 RX ADMIN — INSULIN GLARGINE 15 UNITS: 100 INJECTION, SOLUTION SUBCUTANEOUS at 09:39

## 2021-07-15 RX ADMIN — ATORVASTATIN CALCIUM 20 MG: 20 TABLET, FILM COATED ORAL at 09:41

## 2021-07-15 RX ADMIN — PANTOPRAZOLE SODIUM 40 MG: 40 TABLET, DELAYED RELEASE ORAL at 09:41

## 2021-07-15 RX ADMIN — Medication 1 TABLET: at 09:39

## 2021-07-15 RX ADMIN — DOXYCYCLINE HYCLATE 100 MG: 100 CAPSULE ORAL at 09:41

## 2021-07-15 RX ADMIN — IPRATROPIUM BROMIDE AND ALBUTEROL SULFATE 3 ML: .5; 2.5 SOLUTION RESPIRATORY (INHALATION) at 02:28

## 2021-07-15 RX ADMIN — TRAZODONE HYDROCHLORIDE 50 MG: 50 TABLET ORAL at 21:08

## 2021-07-15 RX ADMIN — PREDNISONE 30 MG: 20 TABLET ORAL at 09:39

## 2021-07-15 RX ADMIN — DIGOXIN 0.12 MG: 125 TABLET ORAL at 09:41

## 2021-07-15 RX ADMIN — GABAPENTIN 600 MG: 300 CAPSULE ORAL at 09:41

## 2021-07-15 RX ADMIN — IPRATROPIUM BROMIDE AND ALBUTEROL SULFATE 3 ML: .5; 2.5 SOLUTION RESPIRATORY (INHALATION) at 08:39

## 2021-07-15 RX ADMIN — GABAPENTIN 600 MG: 300 CAPSULE ORAL at 21:07

## 2021-07-15 RX ADMIN — HYDROCODONE BITARTRATE AND ACETAMINOPHEN 1 TABLET: 5; 325 TABLET ORAL at 01:35

## 2021-07-15 RX ADMIN — DOXYCYCLINE HYCLATE 100 MG: 100 CAPSULE ORAL at 21:07

## 2021-07-15 RX ADMIN — ASPIRIN 81 MG: 81 TABLET, CHEWABLE ORAL at 09:39

## 2021-07-15 RX ADMIN — GABAPENTIN 600 MG: 300 CAPSULE ORAL at 13:29

## 2021-07-15 RX ADMIN — BUMETANIDE 1 MG: 1 TABLET ORAL at 09:40

## 2021-07-15 RX ADMIN — GABAPENTIN 600 MG: 300 CAPSULE ORAL at 17:59

## 2021-07-15 RX ADMIN — BUDESONIDE AND FORMOTEROL FUMARATE DIHYDRATE 1 PUFF: 160; 4.5 AEROSOL RESPIRATORY (INHALATION) at 20:12

## 2021-07-15 RX ADMIN — LEVOTHYROXINE SODIUM 100 MCG: 0.1 TABLET ORAL at 09:41

## 2021-07-15 RX ADMIN — IPRATROPIUM BROMIDE AND ALBUTEROL SULFATE 3 ML: .5; 2.5 SOLUTION RESPIRATORY (INHALATION) at 15:22

## 2021-07-15 RX ADMIN — ZOLPIDEM TARTRATE 5 MG: 5 TABLET ORAL at 21:08

## 2021-07-15 RX ADMIN — CITALOPRAM HYDROBROMIDE 40 MG: 20 TABLET ORAL at 09:41

## 2021-07-15 RX ADMIN — BUDESONIDE AND FORMOTEROL FUMARATE DIHYDRATE 1 PUFF: 160; 4.5 AEROSOL RESPIRATORY (INHALATION) at 08:39

## 2021-07-15 RX ADMIN — METFORMIN HYDROCHLORIDE 500 MG: 500 TABLET ORAL at 09:40

## 2021-07-15 RX ADMIN — IPRATROPIUM BROMIDE AND ALBUTEROL SULFATE 3 ML: .5; 2.5 SOLUTION RESPIRATORY (INHALATION) at 20:12

## 2021-07-15 RX ADMIN — HYDROCODONE BITARTRATE AND ACETAMINOPHEN 1 TABLET: 5; 325 TABLET ORAL at 09:40

## 2021-07-15 RX ADMIN — Medication 1 TABLET: at 17:59

## 2021-07-15 NOTE — PROGRESS NOTES
CM was notified patient was requesting to speak with CM at the bedside. CM met with the patient and he inquired about the status of his UAI. Chart reviewed. CM unaware of UAI needed or being completed. Patient then reported he is essentially homeless. He has been staying at the [facility] for the past week where [facility] has funded a week stay. He reported he wears a CPAP at night and oxygen PRN and has to have a place to stay. He reported he needs a place to stay and has no income and was informed a UAI could potentially assist him with getting into an care home with an auxiliary stacey. CM will start working on a UAI for care home but informed the patient we will likely not be able to secure care home prior to discharge and he will likely have to return to the hotel on discharge. Soon after speaking with the patient CM received a phone call [phone]. She reported that this patient has an open APS case but could not provide details. She stated they have been working to get him into an care home but have been unsuccessful due to no openings at this time. She stated the patient is at high risk and essentially homeless and needs to be placed in an LISA. CM explained we would likely not be able to secure that during his hospitalization as he would likely be discharged in 24 hours. Ms. Irvin Swenson reiterated patient's risk of homelessness and that they have exhausted all options. CM explained we do not have any additional options or resources and this process would need to continue from the community. Ms. Irvin Swenson stated she would continue looking and call CM back.      She did state if we were able to complete a UAI and find LISA, they would pay for a month at the LISA until his auxiliary stacey kicked in. CM inquired why they would not be able to assist with a hotel for one more week while they continue this process and she stated they are only allowed to pay one week for a hotel but can pay one month for LISA. CM working on UAI and will send to [facility] once processed. CM will also reach out to some LISA's, however, it appears they have already reached out to all in the community.

## 2021-07-15 NOTE — PROGRESS NOTES
Pulmonary Progress Note      NAME: Cee Sparks   :  1975  MRM:  [id number]    Date/Time: 7/15/2021  2:36 PM         Subjective:     Patient seen and examined. He is sitting comfortably in bed. Apparently last evening he was sitting on the edge of the bed. When he reached over to get a drink he momentarily passed out. His head went back on the bed hitting the bed rail. He woke up right away. His vision was a little blurry. He apparently had a CT of the chest done without IV contrast.  Cardiology assessed him and no arrhythmia was found. It was not thought to be cardiogenic. Breathing wise is doing fine. His significant other did not bring his BiPAP machine from home. He has had no further hemoptysis. Slept fairly well but without CPAP/BiPAP. Stated that at home he has been on [place]. Apparently Breo was switched to [place]. Claims to be compliant with his BiPAP machine at home. Past Medical History reviewed and unchanged from Admission History and Physical       Objective:     Physical Exam     Vitals:      Last 24hrs VS reviewed since prior progress note. Most recent are:    Visit Vitals  /82 (BP 1 Location: Right upper arm, BP Patient Position: At rest;Supine)   Pulse 96   Temp 98.3 °F (36.8 °C)   Resp 20   Ht 6' 4\" (1.93 m)   Wt 158.8 kg (350 lb)   SpO2 92%   BMI 42.60 kg/m²     SpO2 Readings from Last 6 Encounters:   07/15/21 92%   21 98%   21 98%   21 98%   21 98%   21 (!) 88%            Intake/Output Summary (Last 24 hours) at 7/15/2021 1504  Last data filed at 7/15/2021 0201  Gross per 24 hour   Intake 3000 ml   Output --   Net 3000 ml        GENERAL:  The patient is a middle aged appearing morbidly obese white male who does not appear to be in any distress. Saturation on room air is 95%. HEENT:  Shows pupils are equal and reactive to light. No pallor or icterus. Nasal passages are patent.   Oropharynx is very crowded in general.  No thrush or exudation. NECK:  Neck is quite thick. Trachea is central.  No JVD or lymphadenopathy. CHEST:  He is not using any accessory muscles of respiration. Chest is symmetrical with equal and diminished-to-fair air entry bilaterally. He has prolonged expiratory phase with an occasional expiratory wheeze. No rhonchi or crackles. No chest wall tenderness. HEART:  Rhythm is regular without any loud murmur or gallop. ABDOMEN:  Obese and benign. Bowel sounds are audible. No masses or organomegaly. EXTREMITIES:  Do not show any cyanosis or clubbing. No pitting edema. Pulses are palpable. NEUROLOGICAL:  Examination is grossly intact. SKIN:  Warm and dry. CT CHEST WO CONT   Final Result   No acute cardiopulmonary finding. Chronic findings as above. XR CHEST PORT   Final Result   Possible hydrostatic edema.              Lab Data Reviewed: (see below)      Medications:  Current Facility-Administered Medications   Medication Dose Route Frequency    dextrose (D50W) injection syrg 12.5-25 g  25-50 mL IntraVENous PRN    insulin glargine (LANTUS) injection 15 Units  15 Units SubCUTAneous DAILY    diphenhydrAMINE-zinc acetate 2%-0.1% (BENADRYL) cream   Topical TID PRN    predniSONE (DELTASONE) tablet 30 mg  30 mg Oral DAILY WITH BREAKFAST    doxycycline (VIBRAMYCIN) capsule 100 mg  100 mg Oral Q12H    aspirin chewable tablet 81 mg  81 mg Oral DAILY    atorvastatin (LIPITOR) tablet 20 mg  20 mg Oral DAILY    bumetanide (BUMEX) tablet 1 mg  1 mg Oral DAILY    calcium-vitamin D 600 mg(1,500mg) -200 unit per tablet 1 Tablet  1 Tablet Oral TID    citalopram (CELEXA) tablet 40 mg  40 mg Oral DAILY    budesonide-formoteroL (SYMBICORT) 160-4.5 mcg/actuation HFA inhaler 1 Puff  1 Puff Inhalation BID RT    gabapentin (NEURONTIN) capsule 600 mg  600 mg Oral QID    levothyroxine (SYNTHROID) tablet 100 mcg  100 mcg Oral ACB    metFORMIN (GLUCOPHAGE) tablet 500 mg  500 mg Oral DAILY WITH BREAKFAST    metoprolol succinate (TOPROL-XL) XL tablet 50 mg  50 mg Oral DAILY    pantoprazole (PROTONIX) tablet 40 mg  40 mg Oral ACB    digoxin (LANOXIN) tablet 0.125 mg  0.125 mg Oral DAILY    traZODone (DESYREL) tablet 50 mg  50 mg Oral QHS    zolpidem (AMBIEN) tablet 5 mg  5 mg Oral QHS    sodium chloride (NS) flush 5-40 mL  5-40 mL IntraVENous Q8H    acetaminophen (TYLENOL) tablet 650 mg  650 mg Oral Q6H PRN    Or    acetaminophen (TYLENOL) suppository 650 mg  650 mg Rectal Q6H PRN    polyethylene glycol (MIRALAX) packet 17 g  17 g Oral DAILY PRN    ondansetron (ZOFRAN ODT) tablet 4 mg  4 mg Oral Q8H PRN    Or    ondansetron (ZOFRAN) injection 4 mg  4 mg IntraVENous Q6H PRN    albuterol-ipratropium (DUO-NEB) 2.5 MG-0.5 MG/3 ML  3 mL Nebulization Q6H RT    glucose chewable tablet 16 g  4 Tablet Oral PRN    dextrose (D50W) injection syrg 12.5-25 g  25-50 mL IntraVENous PRN    glucagon (GLUCAGEN) injection 1 mg  1 mg IntraMUSCular PRN    HYDROcodone-acetaminophen (NORCO) 5-325 mg per tablet 1 Tablet  1 Tablet Oral Q6H PRN       ______________________________________________________________________      Lab Review:     Recent Labs     07/13/21  0825   WBC 6.8   HGB 12.7   HCT 39.1        Recent Labs     07/13/21  0825      K 4.5      CO2 28   *   BUN 18   CREA 1.14   CA 8.9   MG 1.8   ALB 3.7   ALT 99*   INR 0.8*     No components found for: GLPOC  No results for input(s): PH, PCO2, PO2, HCO3, FIO2 in the last 72 hours. Recent Labs     07/13/21  0825   INR 0.8*       Other pertinent lab:   Portable chest x-ray shows no acute process, but limited by large body habitus. Echocardiogram done on 06/28 showed normal left ventricle with normal ejection fraction. Wall motion was normal.  No atrial septal defect. PFO occluded device noted. Normal right atrium and right ventricle. Troponin was negative. BNP is 29. D-dimer 0.49. INR 0.8.    CTA of the chest was done on 6/27/2021. Personally reviewed. No pulmonary embolism. Mostly normal lung parenchyma. A pericardial cyst as described. Stable mediastinal adenopathy is also described. Assessment & Plan:      1. The patient is presenting with chest pains and hemoptysis. Hemoptysis is most likely due to acute bronchitis in the setting of a Plavix and aspirin. Overall hemoptysis has improved. He had one small episode early 7/14 morning. He was started on doxycycline and nebulized bronchodilator treatments. He does have underlying moderate persistent asthma. Also started on p.o. prednisone. Chest x-ray shows no focal airspace disease. Apparently, Plavix has been discontinued by the cardiologist.  Patient had a recent CTA of the chest done which was negative for PE. I do not think that he has pulmonary embolism. Last evening after the brief syncopal episode he had a CT of the chest done without IV contrast.  Reviewed. No acute process. Discussed with primary attending. Will obtain a CT of the head without contrast.    As discussed above cardiology has seen the patient and they do not think it was cardiogenic. 2.  Moderate persistent asthma. At home, he has been on Breo 200/25 and Spiriva Respimat along with bronchodilator treatments. Treatment as above. On Symbicort in the hospital.    3.  Severe obstructive sleep apnea. He claims to be compliant with his BiPAP 19/15 with nasal pillow and a chinstrap. However, his compliance has been problematic in the past.  The patient stated that his family will bring his BiPAP machine with nasal pillows from home today. 4.  History of transient ischemic attacks. He had a recent patent foramen ovale closure done about a month ago by Dr. Ifeanyi Eldridge at [facility].  Recent echocardiogram was essentially normal as discussed above. 5.  For deep vein thrombosis prophylaxis, on sequential compression devices to the lower extremities.     6.  Status post thyroidectomy. He is on Synthroid.     Thank you for allowing me to participate in the care of this patient. From Pulmonary standpoint, may plan for discharge in the morning. I doubt that the patient has venous thromboembolism. I will follow the patient closely with you.   More than 30 minutes spent with patient care.  Mike Guerrero MD

## 2021-07-15 NOTE — DISCHARGE SUMMARY
Discharge Summary     Patient: Cee Sparks MRN: [id number]  SSN: [id number]    YOB: 1975  Age: 54 y.o.   Sex: male       Admit Date: 7/13/2021    Discharge Date: 7/15/2021      Admission Diagnoses: ACS (acute coronary syndrome) ([facility] [I24.9]  Hemoptysis [R04.2]    Discharge Diagnoses:   Problem List as of 7/15/2021 Date Reviewed: 5/12/2021        Codes Class Noted - Resolved    ACS (acute coronary syndrome) ([facility] ICD-10-CM: I24.9  ICD-9-CM: 411.1  7/13/2021 - Present        Hemoptysis ICD-10-CM: R04.2  ICD-9-CM: 786.30  7/13/2021 - Present        Chest pain ICD-10-CM: R07.9  ICD-9-CM: 786.50  6/26/2021 - Present        CVA (cerebral vascular accident) [facility]) ICD-10-CM: I63.9  ICD-9-CM: 434.91  5/6/2021 - Present        Postoperative hypothyroidism ICD-10-CM: E89.0  ICD-9-CM: 244.0  5/6/2021 - Present        Pharyngoesophageal dysphagia ICD-10-CM: R13.14  ICD-9-CM: 787.24  2/22/2021 - Present        Multinodular goiter ICD-10-CM: E04.2  ICD-9-CM: 241.1  12/30/2020 - Present        Coronary artery disease involving native coronary artery of native heart with angina pectoris with documented spasm ([facility] ICD-10-CM: I25.111  ICD-9-CM: 414.01, 413.9  12/17/2020 - Present        Cardiomyopathy ([facility] ICD-10-CM: I42.9  ICD-9-CM: 425.4  12/17/2020 - Present        COPD (chronic obstructive pulmonary disease) ([facility] ICD-10-CM: J44.9  ICD-9-CM: 460  12/17/2020 - Present        Pulmonary nodules ICD-10-CM: R91.8  ICD-9-CM: 793.19  12/17/2020 - Present        Obstructive sleep apnea ICD-10-CM: G47.33  ICD-9-CM: 327.23  12/17/2020 - Present        Type 2 diabetes mellitus with hyperglycemia, without long-term current use of insulin (HCC) ICD-10-CM: E11.65  ICD-9-CM: 250.00, 790.29  12/17/2020 - Present        Essential hypertension ICD-10-CM: I10  ICD-9-CM: 401.9  12/17/2020 - Present        Obesity, morbid ([address] ICD-10-CM: E66.01  ICD-9-CM: 278.01  9/7/2020 - Present        Carotid artery stenosis ICD-10-CM: I65.29  ICD-9-CM: 433.10  9/7/2020 - Present               Discharge Condition: Good    Hospital Course: Patient came with a complaint of cough and hemoptysis there with diagnosis of acute bronchitis morbid obesity patient with sleep apnea patient is noncompliant with diet and CPAP machine use.  notes evaluate patient social condition and attempt to get patient to residential    Consults: Cardiology and Pulmonary/Critical Care    Significant Diagnostic Studies: labs:   Recent Results (from the past 24 hour(s))   GLUCOSE, POC    Collection Time: 07/14/21  8:20 PM   Result Value Ref Range    Glucose (POC) 147 (H) 65 - 117 mg/dL    Performed by Jorge AVILA DIMER    Collection Time: 07/15/21  3:26 AM   Result Value Ref Range    D DIMER 0.44 <0.50 ug/ml(FEU)   GLUCOSE, POC    Collection Time: 07/15/21  9:07 AM   Result Value Ref Range    Glucose (POC) 126 (H) 65 - 117 mg/dL    Performed by Kailyn Rees    GLUCOSE, POC    Collection Time: 07/15/21 11:42 AM   Result Value Ref Range    Glucose (POC) 186 (H) 65 - 117 mg/dL    Performed by JUNITO MAGANA          CT CHEST WO CONT   Final Result   No acute cardiopulmonary finding. Chronic findings as above. XR CHEST PORT   Final Result   Possible hydrostatic edema. CT HEAD WO CONT    (Results Pending)       Disposition: home    Discharge Medications:   Current Discharge Medication List      CONTINUE these medications which have NOT CHANGED    Details   bumetanide (BUMEX) 1 mg tablet Take 1 Tablet by mouth daily. Qty: 30 Tablet, Refills: 0      levothyroxine (SYNTHROID) 150 mcg tablet Take 150 mcg by mouth Daily (before breakfast). calcium-cholecalciferol, D3, (CALTRATE 600+D) tablet three (3) times daily. zolpidem (AMBIEN) 10 mg tablet TAKE 1 TABLET BY MOUTH ONCE DAILY AT BEDTIME AS NEEDED      gabapentin (NEURONTIN) 600 mg tablet as needed.       citalopram (CELEXA) 40 mg tablet TAKE ONE TABLET BY MOUTH EVERY DAY      metoprolol succinate (TOPROL-XL) 50 mg XL tablet TAKE ONE TABLET BY MOUTH DAILY      atorvastatin (LIPITOR) 20 mg tablet 20 mg daily. metFORMIN (GLUCOPHAGE) 500 mg tablet TAKE ONE TABLET BY MOUTH TWICE DAILY WITH MEALS      traZODone (DESYREL) 100 mg tablet TAKE ONE TABLET BY MOUTH AT BEDTIME      aspirin 81 mg chewable tablet Take 81 mg by mouth daily. !! pantoprazole (Protonix) 40 mg tablet Take 1 Tablet by mouth daily. Qty: 30 Tablet, Refills: 0      !! pantoprazole (PROTONIX) 40 mg tablet TAKE ONE TABLET BY MOUTH TWICE DAILY      fluticasone furoate-vilanteroL (BREO ELLIPTA) 200-25 mcg/dose inhaler Breo Ellipta 200 mcg-25 mcg/dose powder for inhalation   inhale ONE PUFF by MOUTH ONCE daily      digoxin (LANOXIN) 0.125 mg tablet TAKE ONE TABLET BY MOUTH EVERY DAY       ! ! - Potential duplicate medications found. Please discuss with provider. STOP taking these medications       ProAir HFA 90 mcg/actuation inhaler Comments:   Reason for Stopping:               Activity: Activity as tolerated  Diet: Diabetic Diet  Wound Care: As directed    No orders of the defined types were placed in this encounter.   50 minutes discharge time    Signed By: Monique Sahni MD     July 15, 2021

## 2021-07-15 NOTE — PROGRESS NOTES
Pt reports \"blacking out\" overnight and tipping to the side of the bed bumping the bed rail with his head. Pt reports no pain to the head and feels he only blacked out for \" a few seconds\". Dr Arlen Hart notified and ordered a D dimer and chest CT. Pt orthostatic VS completed. Pt asking for a \"lunch box meal\" immidately after CT and orthostatic VS. Pt placed on bed alarm.

## 2021-07-15 NOTE — PROGRESS NOTES
Progress Note      7/15/2021 12:11 PM  NAME: Robert Coe   MRN:  [id number]   Admit Diagnosis: ACS (acute coronary syndrome) ([address] [I24.9]  Hemoptysis [R04.2]      Problem List:   Atypical chest pain with normal coronary arteries  Unwitnessed syncopal episodes early this AM   Recent PFO closure with follow-up echocardiogram x2 showing good seating of the device  Hemoptysis  Chronic COPD  Sleep apnea  Morbid obesity     Assessment/Plan:   I have personally reviewed his rhythm strips over the past since admission, there is no evidence of cardiac arrhythmia. Most specifically, during the time period where the patient says he passed out, there was no evidence of any arrhythmias     CT of the chest from last night also reviewed and shows no significant pathology, the PFO closure device is well-seated and there is no evidence of pulmonary embolism         []       High complexity decision making was performed in this patient at high risk for decompensation with multiple organ involvement. Subjective:     Robert Coe denies chest pain, dyspnea. Discussed with RN events overnight. Review of Systems:   Negative except for as noted above. Objective:      Physical Exam:    Last 24hrs VS reviewed since prior progress note. Most recent are:    Visit Vitals  /82 (BP 1 Location: Right upper arm, BP Patient Position: At rest;Supine)   Pulse 96   Temp 98.3 °F (36.8 °C)   Resp 20   Ht 6' 4\" (1.93 m)   Wt 158.8 kg (350 lb)   SpO2 92%   BMI 42.60 kg/m²       Intake/Output Summary (Last 24 hours) at 7/15/2021 1145  Last data filed at 7/15/2021 0201  Gross per 24 hour   Intake 3000 ml   Output --   Net 3000 ml        General Appearance: Obese; no acute distress. Ears/Nose/Mouth/Throat:  moist mucous membranes  Neck: Supple. Chest: Lungs with bilateral scattered wheezes. Cardiovascular: Regular rate and rhythm, S1S2 normal, no murmur.   Abdomen: Soft, non-tender, bowel sounds are active. Extremities: No edema bilaterally. Skin: Warm and dry. []         Post-cath site without hematoma, bruit, tenderness, or thrill. Distal pulses intact. PMH/SH reviewed - no change compared to H&P        Telemetry: Sinus rhythm with heart rate in the 80s and 90s    EKG:     []  No new EKG for review    Lab Data Personally Reviewed:    Recent Labs     07/13/21  0825   WBC 6.8   HGB 12.7   HCT 39.1        Recent Labs     07/13/21  0825   INR 0.8*   PTP 11.2*      Recent Labs     07/13/21  0825      K 4.5      CO2 28   BUN 18   CREA 1.14   *   CA 8.9   MG 1.8     Recent Labs     07/13/21  1255 07/13/21  1049 07/13/21  0825   TROIQ <0.05 <0.05 <0.05     No results found for: CHOL, CHOLX, CHLST, CHOLV, HDL, HDLP, LDL, LDLC, DLDLP, TGLX, TRIGL, TRIGP, CHHD, CHHDX    Recent Labs     07/13/21  0825   AP 85   TP 8.2   ALB 3.7   GLOB 4.5*     No results for input(s): PH, PCO2, PO2 in the last 72 hours.     Medications Personally Reviewed:    Current Facility-Administered Medications   Medication Dose Route Frequency    dextrose (D50W) injection syrg 12.5-25 g  25-50 mL IntraVENous PRN    insulin glargine (LANTUS) injection 15 Units  15 Units SubCUTAneous DAILY    diphenhydrAMINE-zinc acetate 2%-0.1% (BENADRYL) cream   Topical TID PRN    predniSONE (DELTASONE) tablet 30 mg  30 mg Oral DAILY WITH BREAKFAST    doxycycline (VIBRAMYCIN) capsule 100 mg  100 mg Oral Q12H    aspirin chewable tablet 81 mg  81 mg Oral DAILY    atorvastatin (LIPITOR) tablet 20 mg  20 mg Oral DAILY    bumetanide (BUMEX) tablet 1 mg  1 mg Oral DAILY    calcium-vitamin D 600 mg(1,500mg) -200 unit per tablet 1 Tablet  1 Tablet Oral TID    citalopram (CELEXA) tablet 40 mg  40 mg Oral DAILY    budesonide-formoteroL (SYMBICORT) 160-4.5 mcg/actuation HFA inhaler 1 Puff  1 Puff Inhalation BID RT    gabapentin (NEURONTIN) capsule 600 mg  600 mg Oral QID    levothyroxine (SYNTHROID) tablet 100 mcg  100 mcg Oral ACB    metFORMIN (GLUCOPHAGE) tablet 500 mg  500 mg Oral DAILY WITH BREAKFAST    metoprolol succinate (TOPROL-XL) XL tablet 50 mg  50 mg Oral DAILY    pantoprazole (PROTONIX) tablet 40 mg  40 mg Oral ACB    digoxin (LANOXIN) tablet 0.125 mg  0.125 mg Oral DAILY    traZODone (DESYREL) tablet 50 mg  50 mg Oral QHS    zolpidem (AMBIEN) tablet 5 mg  5 mg Oral QHS    sodium chloride (NS) flush 5-40 mL  5-40 mL IntraVENous Q8H    sodium chloride (NS) flush 5-40 mL  5-40 mL IntraVENous PRN    acetaminophen (TYLENOL) tablet 650 mg  650 mg Oral Q6H PRN    Or    acetaminophen (TYLENOL) suppository 650 mg  650 mg Rectal Q6H PRN    polyethylene glycol (MIRALAX) packet 17 g  17 g Oral DAILY PRN    ondansetron (ZOFRAN ODT) tablet 4 mg  4 mg Oral Q8H PRN    Or    ondansetron (ZOFRAN) injection 4 mg  4 mg IntraVENous Q6H PRN    albuterol-ipratropium (DUO-NEB) 2.5 MG-0.5 MG/3 ML  3 mL Nebulization Q6H RT    glucose chewable tablet 16 g  4 Tablet Oral PRN    dextrose (D50W) injection syrg 12.5-25 g  25-50 mL IntraVENous PRN    glucagon (GLUCAGEN) injection 1 mg  1 mg IntraMUSCular PRN    HYDROcodone-acetaminophen (NORCO) 5-325 mg per tablet 1 Tablet  1 Tablet Oral Q6H PRN         Lucia Jorge MD

## 2021-07-16 VITALS
TEMPERATURE: 98.2 F | DIASTOLIC BLOOD PRESSURE: 85 MMHG | WEIGHT: 315 LBS | HEIGHT: 76 IN | OXYGEN SATURATION: 93 % | HEART RATE: 92 BPM | RESPIRATION RATE: 20 BRPM | SYSTOLIC BLOOD PRESSURE: 151 MMHG | BODY MASS INDEX: 38.36 KG/M2

## 2021-07-16 LAB
GLUCOSE BLD STRIP.AUTO-MCNC: 110 MG/DL (ref 65–117)
PERFORMED BY, TECHID: NORMAL

## 2021-07-16 PROCEDURE — 82962 GLUCOSE BLOOD TEST: CPT

## 2021-07-16 PROCEDURE — 74011250637 HC RX REV CODE- 250/637: Performed by: INTERNAL MEDICINE

## 2021-07-16 PROCEDURE — 74011000250 HC RX REV CODE- 250: Performed by: INTERNAL MEDICINE

## 2021-07-16 PROCEDURE — 74011636637 HC RX REV CODE- 636/637: Performed by: INTERNAL MEDICINE

## 2021-07-16 PROCEDURE — 94640 AIRWAY INHALATION TREATMENT: CPT

## 2021-07-16 PROCEDURE — G0378 HOSPITAL OBSERVATION PER HR: HCPCS

## 2021-07-16 PROCEDURE — 94760 N-INVAS EAR/PLS OXIMETRY 1: CPT

## 2021-07-16 RX ORDER — GUAIFENESIN 600 MG/1
600 TABLET, EXTENDED RELEASE ORAL EVERY 12 HOURS
Status: DISCONTINUED | OUTPATIENT
Start: 2021-07-16 | End: 2021-07-16 | Stop reason: HOSPADM

## 2021-07-16 RX ADMIN — METFORMIN HYDROCHLORIDE 500 MG: 500 TABLET ORAL at 09:25

## 2021-07-16 RX ADMIN — IPRATROPIUM BROMIDE AND ALBUTEROL SULFATE 3 ML: .5; 2.5 SOLUTION RESPIRATORY (INHALATION) at 08:56

## 2021-07-16 RX ADMIN — GUAIFENESIN 600 MG: 600 TABLET ORAL at 11:46

## 2021-07-16 RX ADMIN — LEVOTHYROXINE SODIUM 100 MCG: 0.1 TABLET ORAL at 05:22

## 2021-07-16 RX ADMIN — Medication 10 ML: at 05:10

## 2021-07-16 RX ADMIN — CITALOPRAM HYDROBROMIDE 40 MG: 20 TABLET ORAL at 09:25

## 2021-07-16 RX ADMIN — ASPIRIN 81 MG: 81 TABLET, CHEWABLE ORAL at 09:24

## 2021-07-16 RX ADMIN — BUDESONIDE AND FORMOTEROL FUMARATE DIHYDRATE 1 PUFF: 160; 4.5 AEROSOL RESPIRATORY (INHALATION) at 08:56

## 2021-07-16 RX ADMIN — PREDNISONE 20 MG: 20 TABLET ORAL at 09:25

## 2021-07-16 RX ADMIN — INSULIN GLARGINE 15 UNITS: 100 INJECTION, SOLUTION SUBCUTANEOUS at 10:09

## 2021-07-16 RX ADMIN — BUMETANIDE 1 MG: 1 TABLET ORAL at 09:25

## 2021-07-16 RX ADMIN — DOXYCYCLINE HYCLATE 100 MG: 100 CAPSULE ORAL at 09:24

## 2021-07-16 RX ADMIN — METOPROLOL SUCCINATE 50 MG: 50 TABLET, EXTENDED RELEASE ORAL at 09:26

## 2021-07-16 RX ADMIN — ATORVASTATIN CALCIUM 20 MG: 20 TABLET, FILM COATED ORAL at 09:25

## 2021-07-16 RX ADMIN — Medication 1 TABLET: at 09:24

## 2021-07-16 RX ADMIN — PANTOPRAZOLE SODIUM 40 MG: 40 TABLET, DELAYED RELEASE ORAL at 05:22

## 2021-07-16 RX ADMIN — GABAPENTIN 600 MG: 300 CAPSULE ORAL at 09:25

## 2021-07-16 NOTE — PROGRESS NOTES
Pulmonary Progress Note      NAME: Lanna Dancer   :  1975  MRM:  [id number]    Date/Time: 2021  2:36 PM         Subjective:     Patient seen and examined. He is sitting comfortably in bed. Apparently early morning of 7/15 he was sitting on the edge of the bed. When he reached over to get a drink he momentarily passed out. His head went back on the bed hitting the bed rail. He woke up right away. His vision was a little blurry. He apparently had a CT of the chest done without IV contrast.  Cardiology assessed him and no arrhythmia was found. It was not thought to be cardiogenic. Breathing wise is doing fine. His significant other did not bring his BiPAP machine again from home. He has had no further hemoptysis. Slept fairly well but without CPAP/BiPAP. Stated that at home he has been on [place]. Apparently Breo was switched to [place]. Claims to be compliant with his BiPAP machine at home. Past Medical History reviewed and unchanged from Admission History and Physical       Objective:     Physical Exam     Vitals:      Last 24hrs VS reviewed since prior progress note. Most recent are:    Visit Vitals  BP (!) 151/85   Pulse 92   Temp 98.2 °F (36.8 °C)   Resp 20   Ht 6' 4\" (1.93 m)   Wt 158.8 kg (350 lb)   SpO2 93%   BMI 42.60 kg/m²     SpO2 Readings from Last 6 Encounters:   21 93%   21 98%   21 98%   21 98%   21 98%   21 (!) 88%          No intake or output data in the 24 hours ending 21 1023     GENERAL:  The patient is a middle aged appearing morbidly obese white male who does not appear to be in any distress. Saturation on room air is 95%. He is sitting in the chair this morning   HEENT:  Shows pupils are equal and reactive to light. No pallor or icterus. Nasal passages are patent. Oropharynx is very crowded in general.  No thrush or exudation. NECK:  Neck is quite thick.   Trachea is central.  No JVD or lymphadenopathy. CHEST:  He is not using any accessory muscles of respiration. Chest is symmetrical with equal and diminished-to-fair air entry bilaterally. He has prolonged expiratory phase with an occasional expiratory wheeze. No rhonchi or crackles. No chest wall tenderness. HEART:  Rhythm is regular without any loud murmur or gallop. ABDOMEN:  Obese and benign. Bowel sounds are audible. No masses or organomegaly. EXTREMITIES:  Do not show any cyanosis or clubbing. No significant pitting edema. Pulses are palpable. NEUROLOGICAL:  Examination is grossly intact. SKIN:  Warm and dry. CT HEAD WO CONT   Final Result   1. No acute intracranial abnormality. 2.  Small region of left occipitotemporal encephalomalacia. CT CHEST WO CONT   Final Result   No acute cardiopulmonary finding. Chronic findings as above. XR CHEST PORT   Final Result   Possible hydrostatic edema.              Lab Data Reviewed: (see below)      Medications:  Current Facility-Administered Medications   Medication Dose Route Frequency    predniSONE (DELTASONE) tablet 20 mg  20 mg Oral DAILY WITH BREAKFAST    dextrose (D50W) injection syrg 12.5-25 g  25-50 mL IntraVENous PRN    insulin glargine (LANTUS) injection 15 Units  15 Units SubCUTAneous DAILY    diphenhydrAMINE-zinc acetate 2%-0.1% (BENADRYL) cream   Topical TID PRN    doxycycline (VIBRAMYCIN) capsule 100 mg  100 mg Oral Q12H    aspirin chewable tablet 81 mg  81 mg Oral DAILY    atorvastatin (LIPITOR) tablet 20 mg  20 mg Oral DAILY    bumetanide (BUMEX) tablet 1 mg  1 mg Oral DAILY    calcium-vitamin D 600 mg(1,500mg) -200 unit per tablet 1 Tablet  1 Tablet Oral TID    citalopram (CELEXA) tablet 40 mg  40 mg Oral DAILY    budesonide-formoteroL (SYMBICORT) 160-4.5 mcg/actuation HFA inhaler 1 Puff  1 Puff Inhalation BID RT    gabapentin (NEURONTIN) capsule 600 mg  600 mg Oral QID    levothyroxine (SYNTHROID) tablet 100 mcg  100 mcg Oral ACB    metFORMIN (GLUCOPHAGE) tablet 500 mg  500 mg Oral DAILY WITH BREAKFAST    metoprolol succinate (TOPROL-XL) XL tablet 50 mg  50 mg Oral DAILY    pantoprazole (PROTONIX) tablet 40 mg  40 mg Oral ACB    digoxin (LANOXIN) tablet 0.125 mg  0.125 mg Oral DAILY    traZODone (DESYREL) tablet 50 mg  50 mg Oral QHS    zolpidem (AMBIEN) tablet 5 mg  5 mg Oral QHS    sodium chloride (NS) flush 5-40 mL  5-40 mL IntraVENous Q8H    acetaminophen (TYLENOL) tablet 650 mg  650 mg Oral Q6H PRN    Or    acetaminophen (TYLENOL) suppository 650 mg  650 mg Rectal Q6H PRN    polyethylene glycol (MIRALAX) packet 17 g  17 g Oral DAILY PRN    ondansetron (ZOFRAN ODT) tablet 4 mg  4 mg Oral Q8H PRN    Or    ondansetron (ZOFRAN) injection 4 mg  4 mg IntraVENous Q6H PRN    albuterol-ipratropium (DUO-NEB) 2.5 MG-0.5 MG/3 ML  3 mL Nebulization Q6H RT    glucose chewable tablet 16 g  4 Tablet Oral PRN    dextrose (D50W) injection syrg 12.5-25 g  25-50 mL IntraVENous PRN    glucagon (GLUCAGEN) injection 1 mg  1 mg IntraMUSCular PRN    HYDROcodone-acetaminophen (NORCO) 5-325 mg per tablet 1 Tablet  1 Tablet Oral Q6H PRN       ______________________________________________________________________      Lab Review:     No results for input(s): WBC, HGB, HGBEXT, HCT, HCTEXT, PLT, PLTEXT, HGBEXT, HCTEXT, PLTEXT, HGBEXT, HCTEXT, PLTEXT in the last 72 hours. No results for input(s): NA, K, CL, CO2, GLU, BUN, CREA, CA, MG, PHOS, ALB, TBIL, ALT, INR, INREXT, INREXT, INREXT in the last 72 hours. No lab exists for component: SGOT  No components found for: GLPOC  No results for input(s): PH, PCO2, PO2, HCO3, FIO2 in the last 72 hours. No results for input(s): INR, INREXT, INREXT, INREXT in the last 72 hours. Other pertinent lab:   Portable chest x-ray shows no acute process, but limited by large body habitus. Echocardiogram done on 06/28 showed normal left ventricle with normal ejection fraction. Wall motion was normal.  No atrial septal defect. PFO occluded device noted. Normal right atrium and right ventricle. Troponin was negative. BNP is 29. D-dimer 0.49. INR 0.8. CTA of the chest was done on 6/27/2021. Personally reviewed. No pulmonary embolism. Mostly normal lung parenchyma. A pericardial cyst as described. Stable mediastinal adenopathy is also described. Assessment & Plan:      1. The patient is presenting with chest pains and hemoptysis. Hemoptysis is most likely due to acute bronchitis in the setting of a Plavix and aspirin. Overall hemoptysis has improved. He had one small episode early 7/14 morning. He was started on doxycycline and nebulized bronchodilator treatments. He does have underlying moderate persistent asthma. Also started on p.o. prednisone. Chest x-ray shows no focal airspace disease. Apparently, Plavix has been discontinued by the cardiologist.  Patient had a recent CTA of the chest done which was negative for PE. I do not think that he has pulmonary embolism. On 7/15 after the brief syncopal episode he had a CT of the chest done without IV contrast.  Reviewed. No acute process. Discussed with primary attending. CT of the head without contrast was obtained on 7/15. No acute process noted. As above. As discussed above cardiology has seen the patient and they do not think it was cardiogenic. 2.  Moderate persistent asthma. At home, he has been on Breo 200/25 and Spiriva Respimat along with bronchodilator treatments. Treatment as above. On Symbicort in the hospital.    3.  Severe obstructive sleep apnea. He claims to be compliant with his BiPAP 19/15 with nasal pillow and a chinstrap. However, his compliance has been problematic in the past.  The patient stated that his family will bring his BiPAP machine with nasal pillows from home today. 4.  History of transient ischemic attacks.   He had a recent patent foramen ovale closure done about a month ago by Dr. Reed Gage at [facility] echocardiogram was essentially normal as discussed above. Patient further stated that he had a cardiac monitor for 1 month after the PFO closure which he turned it in. He does not know the results. 5.  For deep vein thrombosis prophylaxis, on sequential compression devices to the lower extremities. 6.  Status post thyroidectomy. He is on Synthroid.     Thank you for allowing me to participate in the care of this patient. From Pulmonary standpoint, he can be discharged. However case management working for placement. He is essentially homeless. He knows to follow-up with me in a few weeks time. Discussed with the patient in detail. He has multiple comorbid conditions and he was urged to be compliant with his BiPAP and medical management and to follow-up with all his physicians. I also checked with the monitoring technicians. No significant arrhythmias have been noted since admission.   More than 30 minutes spent with patient care.  Rica Robbins MD

## 2021-07-16 NOTE — PROGRESS NOTES
CM followed up with [facility] worker, Ms. Josue Matamoros at [phone] regarding patient's discharge plan. She stated he can return to the [facility] until Tuesday and she will follow up with the patient. Patient agreeable to have a few [facility] visits. Accepted with [facility] with a [facility] 7/17/21. Patient notified of the above information and will follow up with APS at discharge. Discharge plan of care/case management plan validated with provider discharge order.

## 2021-07-16 NOTE — DISCHARGE INSTRUCTIONS
Patient Education        Coughing Up Blood: Care Instructions  Your Care Instructions     Coughing up blood can be frightening. The blood may come from the lungs, stomach, or throat. You may cough up a few thin streaks of bright red blood. This probably is not a cause for concern. Coughing up large amounts of bright red blood or rust-colored mucus from the lungs can be a symptom of a more serious condition. Several conditions can make you cough up blood from the lungs. These include bronchitis and pneumonia, or more serious problems such as cancer or a blood clot in the lung (pulmonary embolus). Depending on what is causing your cough, it may go away after the illness is treated. Your doctor may tell you not to suppress the cough with cough medicine if it is better for you to cough up the blood and spit it out. Follow-up care is a key part of your treatment and safety. Be sure to make and go to all appointments, and call your doctor if you are having problems. It's also a good idea to know your test results and keep a list of the medicines you take. How can you care for yourself at home? · Make a note of when and for how long you cough up blood. Also note if you are coughing up spit with a small amount of blood, or mostly blood. Take this information to your next appointment with your doctor. · Drink plenty of water. This helps keep the mucus thin and helps you cough it up. If you have kidney, heart, or liver disease and have to limit fluids, talk with your doctor before you increase your fluid intake. · If your doctor prescribed antibiotics, take them as directed. Do not stop taking them just because you feel better. You need to take the full course of antibiotics. · Do not take cough medicine without your doctor's guidance. They can cause problems if you have other health problems. They can also interact with other medicine. · Do not smoke or use other forms of tobacco, especially while you have a cough. Smoking can make coughing worse. If you need help quitting, talk to your doctor about stop-smoking programs and medicines. These can increase your chances of quitting for good. · Avoid exposure to smoke, dust, or other pollutants. When should you call for help? Call 911 anytime you think you may need emergency care. For example, call if:    · You have sudden chest pain and shortness of breath.     · You have severe trouble breathing. Call your doctor now or seek immediate medical care if:    · You have wheezing and difficulty breathing.     · You are dizzy or lightheaded, or you feel like you may faint.     · You cough up clots of blood. Watch closely for changes in your health, and be sure to contact your doctor if:    · You do not get better as expected.     · You have any new symptoms, such as chest pain with difficulty breathing or a fever. Where can you learn more? Go to http://www.gray.com/  Enter M550 in the search box to learn more about \"Coughing Up Blood: Care Instructions. \"  Current as of: February 26, 2020               Content Version: 12.8  © 2006-2021 [employer]. Care instructions adapted under license by [employer] (which disclaims liability or warranty for this information). If you have questions about a medical condition or this instruction, always ask your healthcare professional. [place] [address] any warranty or liability for your use of this information.

## 2021-07-16 NOTE — PROGRESS NOTES
Problem: Pressure Injury - Risk of  Goal: *Prevention of pressure injury  Description: Document Jaxon Scale and appropriate interventions in the flowsheet. Outcome: Progressing Towards Goal  Note: Pressure Injury Interventions: Activity Interventions: Increase time out of bed    Mobility Interventions: PT/OT evaluation    Nutrition Interventions: Offer support with meals,snacks and hydration                     Problem: Falls - Risk of  Goal: *Absence of Falls  Description: Document Danney Mess Fall Risk and appropriate interventions in the flowsheet.   7/15/2021 2204 by Dorene Stallings RN  Outcome: Progressing Towards Goal  Note: Fall Risk Interventions:            Medication Interventions: Teach patient to arise slowly                7/15/2021 2203 by Dorene Stallings RN  Outcome: Progressing Towards Goal  Note: Fall Risk Interventions:            Medication Interventions: Teach patient to arise slowly

## 2021-07-16 NOTE — DISCHARGE SUMMARY
Discharge Summary     Patient: Jeremías Ward MRN: [id number]  SSN: [id number]    YOB: 1975  Age: 54 y.o.   Sex: male       Admit Date: 7/13/2021    Discharge Date: 7/16/2021      Admission Diagnoses: ACS (acute coronary syndrome) ([facility] [I24.9]  Hemoptysis [R04.2]    Discharge Diagnoses:   Problem List as of 7/16/2021 Date Reviewed: 5/12/2021        Codes Class Noted - Resolved    ACS (acute coronary syndrome) ([facility] ICD-10-CM: I24.9  ICD-9-CM: 411.1  7/13/2021 - Present        Hemoptysis ICD-10-CM: R04.2  ICD-9-CM: 786.30  7/13/2021 - Present        Chest pain ICD-10-CM: R07.9  ICD-9-CM: 786.50  6/26/2021 - Present        CVA (cerebral vascular accident) [facility]) ICD-10-CM: I63.9  ICD-9-CM: 434.91  5/6/2021 - Present        Postoperative hypothyroidism ICD-10-CM: E89.0  ICD-9-CM: 244.0  5/6/2021 - Present        Pharyngoesophageal dysphagia ICD-10-CM: R13.14  ICD-9-CM: 787.24  2/22/2021 - Present        Multinodular goiter ICD-10-CM: E04.2  ICD-9-CM: 241.1  12/30/2020 - Present        Coronary artery disease involving native coronary artery of native heart with angina pectoris with documented spasm ([facility] ICD-10-CM: I25.111  ICD-9-CM: 414.01, 413.9  12/17/2020 - Present        Cardiomyopathy ([facility] ICD-10-CM: I42.9  ICD-9-CM: 425.4  12/17/2020 - Present        COPD (chronic obstructive pulmonary disease) ([facility] ICD-10-CM: J44.9  ICD-9-CM: 716  12/17/2020 - Present        Pulmonary nodules ICD-10-CM: R91.8  ICD-9-CM: 793.19  12/17/2020 - Present        Obstructive sleep apnea ICD-10-CM: G47.33  ICD-9-CM: 327.23  12/17/2020 - Present        Type 2 diabetes mellitus with hyperglycemia, without long-term current use of insulin (HCC) ICD-10-CM: E11.65  ICD-9-CM: 250.00, 790.29  12/17/2020 - Present        Essential hypertension ICD-10-CM: I10  ICD-9-CM: 401.9  12/17/2020 - Present        Obesity, morbid ([address] ICD-10-CM: E66.01  ICD-9-CM: 278.01  9/7/2020 - Present        Carotid artery stenosis ICD-10-CM: I65.29  ICD-9-CM: 433.10  9/7/2020 - Present               Discharge Condition: Good    Hospital Course: Patient came with a complaint of cough and hemoptysis there with diagnosis of acute bronchitis morbid obesity patient with sleep apnea patient is noncompliant with diet and CPAP machine use.  notes evaluate patient social condition and attempt to get patient to shelter    Consults: Cardiology and Pulmonary/Critical Care    Significant Diagnostic Studies: labs:   Recent Results (from the past 24 hour(s))   GLUCOSE, POC    Collection Time: 07/15/21  4:56 PM   Result Value Ref Range    Glucose (POC) 298 (H) 65 - 117 mg/dL    Performed by Linda Branham    GLUCOSE, POC    Collection Time: 07/16/21  8:41 AM   Result Value Ref Range    Glucose (POC) 110 65 - 117 mg/dL    Performed by CAROLINE MONTANA          CT HEAD WO CONT   Final Result   1. No acute intracranial abnormality. 2.  Small region of left occipitotemporal encephalomalacia. CT CHEST WO CONT   Final Result   No acute cardiopulmonary finding. Chronic findings as above. XR CHEST PORT   Final Result   Possible hydrostatic edema. Disposition: home    Discharge Medications:   Current Discharge Medication List      CONTINUE these medications which have NOT CHANGED    Details   bumetanide (BUMEX) 1 mg tablet Take 1 Tablet by mouth daily. Qty: 30 Tablet, Refills: 0      levothyroxine (SYNTHROID) 150 mcg tablet Take 150 mcg by mouth Daily (before breakfast). calcium-cholecalciferol, D3, (CALTRATE 600+D) tablet three (3) times daily. zolpidem (AMBIEN) 10 mg tablet TAKE 1 TABLET BY MOUTH ONCE DAILY AT BEDTIME AS NEEDED      gabapentin (NEURONTIN) 600 mg tablet as needed. citalopram (CELEXA) 40 mg tablet TAKE ONE TABLET BY MOUTH EVERY DAY      metoprolol succinate (TOPROL-XL) 50 mg XL tablet TAKE ONE TABLET BY MOUTH DAILY      atorvastatin (LIPITOR) 20 mg tablet 20 mg daily.       metFORMIN (GLUCOPHAGE) 500 mg tablet TAKE ONE TABLET BY MOUTH TWICE DAILY WITH MEALS      traZODone (DESYREL) 100 mg tablet TAKE ONE TABLET BY MOUTH AT BEDTIME      aspirin 81 mg chewable tablet Take 81 mg by mouth daily. !! pantoprazole (Protonix) 40 mg tablet Take 1 Tablet by mouth daily. Qty: 30 Tablet, Refills: 0      !! pantoprazole (PROTONIX) 40 mg tablet TAKE ONE TABLET BY MOUTH TWICE DAILY      fluticasone furoate-vilanteroL (BREO ELLIPTA) 200-25 mcg/dose inhaler Breo Ellipta 200 mcg-25 mcg/dose powder for inhalation   inhale ONE PUFF by MOUTH ONCE daily      digoxin (LANOXIN) 0.125 mg tablet TAKE ONE TABLET BY MOUTH EVERY DAY       ! ! - Potential duplicate medications found. Please discuss with provider. STOP taking these medications       ProAir HFA 90 mcg/actuation inhaler Comments:   Reason for Stopping:               Activity: Activity as tolerated  Diet: Diabetic Diet  Wound Care: As directed    No orders of the defined types were placed in this encounter.   50 minutes discharge time    Signed By: Shira Roman MD     July 16, 2021

## 2021-08-02 ENCOUNTER — APPOINTMENT (OUTPATIENT)
Dept: GENERAL RADIOLOGY | Age: 46
End: 2021-08-02
Attending: EMERGENCY MEDICINE
Payer: MEDICAID

## 2021-08-02 ENCOUNTER — HOSPITAL ENCOUNTER (OUTPATIENT)
Age: 46
Setting detail: OBSERVATION
Discharge: HOME | End: 2021-08-03
Attending: STUDENT IN AN ORGANIZED HEALTH CARE EDUCATION/TRAINING PROGRAM | Admitting: INTERNAL MEDICINE
Payer: MEDICAID

## 2021-08-02 DIAGNOSIS — R07.9 CHEST PAIN, UNSPECIFIED TYPE: Primary | ICD-10-CM

## 2021-08-02 LAB
ALBUMIN SERPL-MCNC: 3.3 G/DL (ref 3.5–5)
ALBUMIN/GLOB SERPL: 0.7 {RATIO} (ref 1.1–2.2)
ALP SERPL-CCNC: 72 U/L (ref 45–117)
ALT SERPL-CCNC: 74 U/L (ref 12–78)
ANION GAP SERPL CALC-SCNC: 8 MMOL/L (ref 5–15)
AST SERPL W P-5'-P-CCNC: 58 U/L (ref 15–37)
BASOPHILS # BLD: 0 K/UL (ref 0–0.1)
BASOPHILS NFR BLD: 0 % (ref 0–1)
BILIRUB SERPL-MCNC: 0.4 MG/DL (ref 0.2–1)
BNP SERPL-MCNC: 43 PG/ML
BUN SERPL-MCNC: 14 MG/DL (ref 6–20)
BUN/CREAT SERPL: 13 (ref 12–20)
CA-I BLD-MCNC: 8.3 MG/DL (ref 8.5–10.1)
CHLORIDE SERPL-SCNC: 102 MMOL/L (ref 97–108)
CO2 SERPL-SCNC: 26 MMOL/L (ref 21–32)
CREAT SERPL-MCNC: 1.1 MG/DL (ref 0.7–1.3)
DIFFERENTIAL METHOD BLD: ABNORMAL
EOSINOPHIL # BLD: 0.1 K/UL (ref 0–0.4)
EOSINOPHIL NFR BLD: 1 % (ref 0–7)
ERYTHROCYTE [DISTWIDTH] IN BLOOD BY AUTOMATED COUNT: 14.6 % (ref 11.5–14.5)
GLOBULIN SER CALC-MCNC: 4.5 G/DL (ref 2–4)
GLUCOSE SERPL-MCNC: 93 MG/DL (ref 65–100)
HCT VFR BLD AUTO: 37.4 % (ref 36.6–50.3)
HGB BLD-MCNC: 12.3 G/DL (ref 12.1–17)
IMM GRANULOCYTES # BLD AUTO: 0 K/UL (ref 0–0.04)
IMM GRANULOCYTES NFR BLD AUTO: 0 % (ref 0–0.5)
LYMPHOCYTES # BLD: 1.6 K/UL (ref 0.8–3.5)
LYMPHOCYTES NFR BLD: 23 % (ref 12–49)
MCH RBC QN AUTO: 32.8 PG (ref 26–34)
MCHC RBC AUTO-ENTMCNC: 32.9 G/DL (ref 30–36.5)
MCV RBC AUTO: 99.7 FL (ref 80–99)
MONOCYTES # BLD: 0.5 K/UL (ref 0–1)
MONOCYTES NFR BLD: 7 % (ref 5–13)
NEUTS SEG # BLD: 4.7 K/UL (ref 1.8–8)
NEUTS SEG NFR BLD: 69 % (ref 32–75)
NRBC # BLD: 0 K/UL (ref 0–0.01)
NRBC BLD-RTO: 0 PER 100 WBC
PLATELET # BLD AUTO: 232 K/UL (ref 150–400)
PMV BLD AUTO: 10.5 FL (ref 8.9–12.9)
POTASSIUM SERPL-SCNC: 3.7 MMOL/L (ref 3.5–5.1)
PROT SERPL-MCNC: 7.8 G/DL (ref 6.4–8.2)
RBC # BLD AUTO: 3.75 M/UL (ref 4.1–5.7)
SODIUM SERPL-SCNC: 136 MMOL/L (ref 136–145)
TROPONIN I SERPL-MCNC: <0.05 NG/ML
TROPONIN I SERPL-MCNC: <0.05 NG/ML
WBC # BLD AUTO: 6.9 K/UL (ref 4.1–11.1)

## 2021-08-02 PROCEDURE — 96374 THER/PROPH/DIAG INJ IV PUSH: CPT

## 2021-08-02 PROCEDURE — 84484 ASSAY OF TROPONIN QUANT: CPT

## 2021-08-02 PROCEDURE — 99285 EMERGENCY DEPT VISIT HI MDM: CPT

## 2021-08-02 PROCEDURE — 74011250636 HC RX REV CODE- 250/636: Performed by: STUDENT IN AN ORGANIZED HEALTH CARE EDUCATION/TRAINING PROGRAM

## 2021-08-02 PROCEDURE — 74011250637 HC RX REV CODE- 250/637: Performed by: STUDENT IN AN ORGANIZED HEALTH CARE EDUCATION/TRAINING PROGRAM

## 2021-08-02 PROCEDURE — 36415 COLL VENOUS BLD VENIPUNCTURE: CPT

## 2021-08-02 PROCEDURE — 93005 ELECTROCARDIOGRAM TRACING: CPT

## 2021-08-02 PROCEDURE — 85025 COMPLETE CBC W/AUTO DIFF WBC: CPT

## 2021-08-02 PROCEDURE — 71045 X-RAY EXAM CHEST 1 VIEW: CPT

## 2021-08-02 PROCEDURE — 96376 TX/PRO/DX INJ SAME DRUG ADON: CPT

## 2021-08-02 PROCEDURE — 83880 ASSAY OF NATRIURETIC PEPTIDE: CPT

## 2021-08-02 PROCEDURE — 80053 COMPREHEN METABOLIC PANEL: CPT

## 2021-08-02 RX ORDER — MORPHINE SULFATE 4 MG/ML
4 INJECTION INTRAVENOUS ONCE
Status: COMPLETED | OUTPATIENT
Start: 2021-08-02 | End: 2021-08-02

## 2021-08-02 RX ORDER — METHOCARBAMOL 500 MG/1
1000 TABLET, FILM COATED ORAL ONCE
Status: COMPLETED | OUTPATIENT
Start: 2021-08-02 | End: 2021-08-02

## 2021-08-02 RX ORDER — METHOCARBAMOL 500 MG/1
1000 TABLET, FILM COATED ORAL 4 TIMES DAILY
Status: DISCONTINUED | OUTPATIENT
Start: 2021-08-02 | End: 2021-08-02

## 2021-08-02 RX ADMIN — MORPHINE SULFATE 4 MG: 4 INJECTION, SOLUTION INTRAMUSCULAR; INTRAVENOUS at 21:04

## 2021-08-02 RX ADMIN — METHOCARBAMOL TABLETS 1000 MG: 500 TABLET, COATED ORAL at 21:04

## 2021-08-02 RX ADMIN — MORPHINE SULFATE 4 MG: 4 INJECTION, SOLUTION INTRAMUSCULAR; INTRAVENOUS at 18:43

## 2021-08-02 NOTE — LETTER
[facility] EMERGENCY DEPT  [address] [place] [id number]  [phone]    Work/School Note    Date: 8/2/2021    To Whom It May concern:    Margot Lucas was seen and treated today in the emergency room by the following provider(s):  Attending Provider: Adam Church MD.      Margot Lucas may return to work on 8/4/2021.     Sincerely,          Hannah Nair RN

## 2021-08-02 NOTE — Clinical Note
Status[de-identified] INPATIENT [101]   Type of Bed: Medical [8]   Inpatient Hospitalization Certified Necessary for the Following Reasons: 3.  Patient receiving treatment that can only be provided in an inpatient setting (further clarification in H&P documentation)   Admitting Diagnosis: Chest pain [066342]   Admitting Physician: Dimple Linton   Attending Physician: Dimple Linton   Estimated Length of Stay: 2 Midnights   Discharge Plan[de-identified] Home with Office Follow-up

## 2021-08-02 NOTE — ED PROVIDER NOTES
EMERGENCY DEPARTMENT HISTORY AND PHYSICAL EXAM      Date: 8/2/2021  Patient Name: Keyla Bowens    History of Presenting Illness     Chief Complaint   Patient presents with    Chest Pain       History Provided By: Patient    HPI: Keyla Bowens, 54 y.o. male with a past medical history of MI, hypertension, stroke, CHF, and COPD on Bumex, aspirin, and digoxin presents to the ED for chest pain prior to presentation. Patient reports atypical stabbing and continuous chest pain that was radiating down the left arm that he describes as similar to his previous heart attack. He describes shortness of breath with that chest pain. He denies any abdominal pain, lightheadedness, dizziness, or syncope. He does describe orthopnea and exertional dyspnea that is intermittent and currently not present. He also reports bilateral lower extremity swelling that has gotten worse. To note, the patient was admitted recently and evaluated by cardiology but he had no recent stress tests. He has no other complaints. Denies any fevers, chills, or cough. There are no other complaints, changes, or physical findings at this time. PCP: Adrian Grande MD    Current Facility-Administered Medications   Medication Dose Route Frequency Provider Last Rate Last Admin    morphine injection 4 mg  4 mg IntraVENous Q4H PRN Travis Gregory MD   4 mg at 08/03/21 0047    ondansetron (ZOFRAN) injection 4 mg  4 mg IntraVENous Q4H PRN Sierra BEGUM MD   4 mg at 08/03/21 0047     Current Outpatient Medications   Medication Sig Dispense Refill    pantoprazole (Protonix) 40 mg tablet Take 1 Tablet by mouth daily. (Patient not taking: Reported on 7/13/2021) 30 Tablet 0    bumetanide (BUMEX) 1 mg tablet Take 1 Tablet by mouth daily. 30 Tablet 0    levothyroxine (SYNTHROID) 150 mcg tablet Take 150 mcg by mouth Daily (before breakfast).  calcium-cholecalciferol, D3, (CALTRATE 600+D) tablet three (3) times daily.       zolpidem (AMBIEN) 10 mg tablet TAKE 1 TABLET BY MOUTH ONCE DAILY AT BEDTIME AS NEEDED      gabapentin (NEURONTIN) 600 mg tablet as needed.  pantoprazole (PROTONIX) 40 mg tablet TAKE ONE TABLET BY MOUTH TWICE DAILY (Patient not taking: Reported on 7/13/2021)      fluticasone furoate-vilanteroL (BREO ELLIPTA) 200-25 mcg/dose inhaler Breo Ellipta 200 mcg-25 mcg/dose powder for inhalation   inhale ONE PUFF by MOUTH ONCE daily (Patient not taking: Reported on 7/13/2021)      digoxin (LANOXIN) 0.125 mg tablet TAKE ONE TABLET BY MOUTH EVERY DAY (Patient not taking: Reported on 7/13/2021)      citalopram (CELEXA) 40 mg tablet TAKE ONE TABLET BY MOUTH EVERY DAY      metoprolol succinate (TOPROL-XL) 50 mg XL tablet TAKE ONE TABLET BY MOUTH DAILY      atorvastatin (LIPITOR) 20 mg tablet 20 mg daily.  metFORMIN (GLUCOPHAGE) 500 mg tablet TAKE ONE TABLET BY MOUTH TWICE DAILY WITH MEALS      traZODone (DESYREL) 100 mg tablet TAKE ONE TABLET BY MOUTH AT BEDTIME      aspirin 81 mg chewable tablet Take 81 mg by mouth daily.          Past History   I reviewed the past medical hx, surgical hx, family hx, social hx, and allergy information and are listed here:    Past Medical History:  Past Medical History:   Diagnosis Date    Acute MI ([address]     Depression     Hypertension     Ill-defined condition     Stroke ([facility]        Past Surgical History:  Past Surgical History:   Procedure Laterality Date    HX HERNIA REPAIR      HX ORTHOPAEDIC      rotator cuff surgery    HX OTHER SURGICAL      Patent Foramen Ovale Repair    HX THYROIDECTOMY      IR FINE NEEDLE ASPIRATION W IMAGE         Family History:  Family History   Problem Relation Age of Onset    Diabetes Maternal Uncle     Hypertension Maternal Uncle     Hypertension Paternal Uncle     Diabetes Paternal Uncle     Cancer Other    [facility] Cancer Mother     No Known Problems Father        Social History:  Social History     Tobacco Use    Smoking status: Former Smoker  Smokeless tobacco: Never Used   Vaping Use    Vaping Use: Never used   Substance Use Topics    Alcohol use: Yes     Comment: socially     Drug use: No       Allergies: Allergies   Allergen Reactions    Vancomycin Hives     Lyndsey syndrome       Review of Systems     Review of Systems   Constitutional: Negative for chills and fever. HENT: Negative for congestion, rhinorrhea and sore throat. Eyes: Negative. Respiratory: Positive for shortness of breath. Negative for cough and wheezing. Cardiovascular: Positive for chest pain and leg swelling. Gastrointestinal: Negative for abdominal pain, nausea and vomiting. Endocrine: Negative. Genitourinary: Negative for dysuria, flank pain and hematuria. Musculoskeletal: Negative for arthralgias and back pain. Skin: Negative for rash and wound. Allergic/Immunologic: Negative. Neurological: Negative for dizziness, weakness, light-headedness and headaches. Hematological: Negative. Psychiatric/Behavioral: Negative for agitation and confusion. Physical Exam and Vital Signs   Vital Signs - Reviewed the patient's vital signs.     Patient Vitals for the past 12 hrs:   Temp Pulse Resp BP SpO2   08/02/21 2231 -- 94 18 (!) 156/106 94 %   08/02/21 1843 -- 89 15 138/81 94 %   08/02/21 1552 97.9 °F (36.6 °C) 94 22 (!) 162/82 93 %       Physical Exam:    GENERAL: awake, alert, cooperative, not in distress  HEENT:  * Pupils equal, EOMI  * Head atraumatic  CV:  * regular rhythm  * +2 pulses in extremities bilaterally  PULMONARY: Good air movement, no wheezes or crackles  ABDOMEN: soft, not distended, not tender, no guarding  : No suprapubic tenderness  EXTREMITIES/BACK: warm and perfused, no tenderness, bilateral lower extremity pitting edema edema  SKIN: no rashes or signs of trauma  NEURO:  * Speech clear  * Moves U&LE to command      Medical Decision Making and ED Course   - I am the first and primary provider for this patient and am the primary provider of record. - I reviewed the vital signs, available nursing notes, past medical history, past surgical history, family history and social history. - Initial assessment performed. The patients presenting problems have been discussed, and the staff are in agreement with the care plan formulated and outlined with them. I have encouraged them to ask questions as they arise throughout their visit. - Records Reviewed: Nursing Notes, Old Medical Records and Previous electrocardiograms      MDM:   Patient is a 54 y.o. male presenting for chest pain. Vitals reveal no abnormalities and physical exam reveals lateral lower extremity pitting edema. EKG showed no acute abnormalities except for prolonged QT. Based on the history, physical exam, risk factors, and vitals signs, I favor the following differential diagnoses: ACS, CHF, pulmonary edema, pleural effusion, pneumothorax. The more comprehensive list of differential diagnoses, including the less probable ones, include but is not limited to ACS, CHF, arrhythmia, valvulopathy, pericarditis, pulmonary edema, pleural effusion, PTX, PNA, COVID-19, GERD, esophageal spasm, musculoskeletal pain, PE, dissection, AAA, and pericardial effusion. Results     Labs:     Recent Results (from the past 12 hour(s))   CBC WITH AUTOMATED DIFF    Collection Time: 08/02/21  4:15 PM   Result Value Ref Range    WBC 6.9 4.1 - 11.1 K/uL    RBC 3.75 (L) 4.10 - 5.70 M/uL    HGB 12.3 12.1 - 17.0 g/dL    HCT 37.4 36.6 - 50.3 %    MCV 99.7 (H) 80.0 - 99.0 FL    MCH 32.8 26.0 - 34.0 PG    MCHC 32.9 30.0 - 36.5 g/dL    RDW 14.6 (H) 11.5 - 14.5 %    PLATELET 616 482 - 773 K/uL    MPV 10.5 8.9 - 12.9 FL    NRBC 0.0 0.0  WBC    ABSOLUTE NRBC 0.00 0.00 - 0.01 K/uL    NEUTROPHILS 69 32 - 75 %    LYMPHOCYTES 23 12 - 49 %    MONOCYTES 7 5 - 13 %    EOSINOPHILS 1 0 - 7 %    BASOPHILS 0 0 - 1 %    IMMATURE GRANULOCYTES 0 0 - 0.5 %    ABS. NEUTROPHILS 4.7 1.8 - 8.0 K/UL    ABS.  LYMPHOCYTES 1.6 0.8 - 3.5 K/UL    ABS. MONOCYTES 0.5 0.0 - 1.0 K/UL    ABS. EOSINOPHILS 0.1 0.0 - 0.4 K/UL    ABS. BASOPHILS 0.0 0.0 - 0.1 K/UL    ABS. IMM. GRANS. 0.0 0.00 - 0.04 K/UL    DF AUTOMATED     METABOLIC PANEL, COMPREHENSIVE    Collection Time: 08/02/21  4:15 PM   Result Value Ref Range    Sodium 136 136 - 145 mmol/L    Potassium 3.7 3.5 - 5.1 mmol/L    Chloride 102 97 - 108 mmol/L    CO2 26 21 - 32 mmol/L    Anion gap 8 5 - 15 mmol/L    Glucose 93 65 - 100 mg/dL    BUN 14 6 - 20 mg/dL    Creatinine 1.10 0.70 - 1.30 mg/dL    BUN/Creatinine ratio 13 12 - 20      GFR est AA >60 >60 ml/min/1.73m2    GFR est non-AA >60 >60 ml/min/1.73m2    Calcium 8.3 (L) 8.5 - 10.1 mg/dL    Bilirubin, total 0.4 0.2 - 1.0 mg/dL    AST (SGOT) 58 (H) 15 - 37 U/L    ALT (SGPT) 74 12 - 78 U/L    Alk. phosphatase 72 45 - 117 U/L    Protein, total 7.8 6.4 - 8.2 g/dL    Albumin 3.3 (L) 3.5 - 5.0 g/dL    Globulin 4.5 (H) 2.0 - 4.0 g/dL    A-G Ratio 0.7 (L) 1.1 - 2.2     TROPONIN I    Collection Time: 08/02/21  4:15 PM   Result Value Ref Range    Troponin-I, Qt. <0.05 <0.05 ng/mL   BNP    Collection Time: 08/02/21  4:15 PM   Result Value Ref Range    NT pro-BNP 43 <125 pg/mL   TROPONIN I    Collection Time: 08/02/21  6:50 PM   Result Value Ref Range    Troponin-I, Qt. <0.05 <0.05 ng/mL       Radiologic Studies:  CT Results  (Last 48 hours)    None        CXR Results  (Last 48 hours)               08/02/21 1646  XR CHEST PORT Final result    Impression:  Findings/impression:       No consolidative airspace disease, pleural effusion or pneumothorax. Cardiac silhouette is enlarged. No pulmonary edema. No acute osseous abnormality identified. Narrative:  Study: XR CHEST PORT       Clinical indication: chest pain       Comparison: Chest CT 7/15/2021.                  Medications ordered:  Medications   morphine injection 4 mg (4 mg IntraVENous Given 8/3/21 0047)   ondansetron (ZOFRAN) injection 4 mg (4 mg IntraVENous Given 8/3/21 0047)   morphine injection 4 mg (4 mg IntraVENous Given 8/2/21 1843)   morphine injection 4 mg (4 mg IntraVENous Given 8/2/21 2104)   methocarbamoL (ROBAXIN) tablet 1,000 mg (1,000 mg Oral Given 8/2/21 2104)        ED Course     ED Course:          Reassessment / Disposition Discussion:    The patient presented with chest pain of uncertain etiology. Based on the patient's historical features and/or lab analysis and/or EKG, in addition to the patient's reassuring physical exam, I do not see compelling evidence at this time for a malignant etiology for the patient's chest pain. Cardiac enzymatic assay was obtained and was reassuring with values of <0.05. The patient does not have evidence for pulmonary embolus, malignant cardiac, pulmonary or aortic pathology or the other more malignant etiologies for chest pain listed above, based on the workup. Based on the nature and duration of the patient's pain and negative workup, the abovementioned pathologies are unlikely. The patient has not had recent stress echo was however. HEART SCORE:  HEART SCORE:   History: Moderately Suspicious  ECG: Normal  Age: Greater than 45 years - Less than 65 years  Risk Factors: Greater than or equal to 3 risk factors, or history of atherosclerotic disease  Troponin: Less than or equal to normal limit   HEART Score Total : 4     Management   Scores 0-3: 0.9-1.7% risk of adverse cardiac event. In the HEART Score study, these patients were discharged (0.99% in the retrospective study, 1.7% in the prospective study)   Scores 4-6: 12-16.6% risk of adverse cardiac event. In the HEART Score study, these patients were admitted to the hospital. (11.6% retrospective, 16.6% prospective)   Scores ? 7: 50-65% risk of adverse cardiac event.  In the HEART Score study, these patients were candidates for early invasive measures. (65.2% retrospective, 50.1% prospective)   A MACE (Major Adverse Cardiac Event) was defined as all-cause mortality, myocardial infarction, or coronary revascularization. Original/Primary Reference  · Manuel CELAYA, Olvin GEORGE, Huber MALDONADO. Chest pain in the emergency room: value of the HEART score. Neth Heart J. 2008;16(6):191-6. Validation  · Manuel Tellse, Huber MALDONADO, et al. A prospective validation of the HEART score for chest pain patients at the emergency department. Int J Cardiol. 2013;168(3):2153-8. · Manuel Telles, Roya Pulliam et al. Chest pain in the emergency room: a multicenter validation of the HEART Score. Crit Pathw Cardiol. 2010;9(3):164-9. · Alexis BRITTANY, Demario RF, Jaskaran BC, et al. The HEART Pathway Randomized Controlled Trial One Year Outcomes. [facility] 2018; After completion of workup and discussion of results and diagnoses with the patient, all the patient's questions were answered. Heart score 4, patient will need to be admitted for a stratification. Disposition     Disposition: Condition ongoing  Admitted to Floor Medical Floor the case was discussed with the admitting physician Dr. De La Cruz Poster    Admitted    ADMISSION:  After completion of ED workup and discussion of results and diagnoses with the patient, patient was admitted to the hospital. All the patient's questions were answered. Case was discussed with the receiving team.      Consultations and Procedures:    Performed by: Gogo Finn MD  Procedures     EKG interpretation (Preliminary):  Performed at 16.08, and read at 16.17. Rhythm: normal sinus rhythm; and regular . Rate (approx.): 93. Axis: normal;  MN interval: normal;  QRS interval: normal ;  ST/T wave: normal;  Other findings: QTc prolongation. EKG interpretation (Preliminary):  Performed at 18.54, and read at  18.58  Rhythm: normal sinus rhythm; and regular . Rate (approx.): 90. Axis: normal;  MN interval: normal;  QRS interval: normal ;  ST/T wave: normal;  Other findings: QT prolongation. Diagnosis     Clinical Impression:   1.  Chest pain, unspecified type Attestations:    Michele Vazquez MD    Please note that this dictation was completed with [employer], the computer voice recognition software. Quite often unanticipated grammatical, syntax, homophones, and other interpretive errors are inadvertently transcribed by the computer software. Please disregard these errors. Please excuse any errors that have escaped final proofreading. Thank you.

## 2021-08-03 VITALS
DIASTOLIC BLOOD PRESSURE: 89 MMHG | HEART RATE: 87 BPM | SYSTOLIC BLOOD PRESSURE: 136 MMHG | RESPIRATION RATE: 16 BRPM | BODY MASS INDEX: 38.36 KG/M2 | OXYGEN SATURATION: 96 % | HEIGHT: 76 IN | WEIGHT: 315 LBS | TEMPERATURE: 98 F

## 2021-08-03 PROBLEM — I69.398 SEIZURE AS LATE EFFECT OF CEREBROVASCULAR ACCIDENT (CVA) (HCC): Status: ACTIVE | Noted: 2021-08-03

## 2021-08-03 PROBLEM — R55 SYNCOPE: Status: ACTIVE | Noted: 2021-08-03

## 2021-08-03 PROBLEM — R56.9 SEIZURE AS LATE EFFECT OF CEREBROVASCULAR ACCIDENT (CVA) (HCC): Status: ACTIVE | Noted: 2021-08-03

## 2021-08-03 LAB
AMPHET UR QL SCN: NEGATIVE
ATRIAL RATE: 90 BPM
ATRIAL RATE: 93 BPM
BARBITURATES UR QL SCN: NEGATIVE
BENZODIAZ UR QL: NEGATIVE
CALCULATED P AXIS, ECG09: 25 DEGREES
CALCULATED P AXIS, ECG09: 54 DEGREES
CALCULATED R AXIS, ECG10: 14 DEGREES
CALCULATED R AXIS, ECG10: 5 DEGREES
CALCULATED T AXIS, ECG11: 22 DEGREES
CALCULATED T AXIS, ECG11: 24 DEGREES
CANNABINOIDS UR QL SCN: NEGATIVE
COCAINE UR QL SCN: NEGATIVE
DIAGNOSIS, 93000: NORMAL
DIAGNOSIS, 93000: NORMAL
DRUG SCRN COMMENT,DRGCM: ABNORMAL
GLUCOSE BLD STRIP.AUTO-MCNC: 104 MG/DL (ref 65–117)
GLUCOSE BLD STRIP.AUTO-MCNC: 133 MG/DL (ref 65–117)
METHADONE UR QL: NEGATIVE
OPIATES UR QL: POSITIVE
P-R INTERVAL, ECG05: 188 MS
P-R INTERVAL, ECG05: 190 MS
PCP UR QL: NEGATIVE
PERFORMED BY, TECHID: ABNORMAL
PERFORMED BY, TECHID: NORMAL
Q-T INTERVAL, ECG07: 402 MS
Q-T INTERVAL, ECG07: 404 MS
QRS DURATION, ECG06: 102 MS
QRS DURATION, ECG06: 98 MS
QTC CALCULATION (BEZET), ECG08: 491 MS
QTC CALCULATION (BEZET), ECG08: 502 MS
TROPONIN I SERPL-MCNC: <0.05 NG/ML
VENTRICULAR RATE, ECG03: 90 BPM
VENTRICULAR RATE, ECG03: 93 BPM

## 2021-08-03 PROCEDURE — 96375 TX/PRO/DX INJ NEW DRUG ADDON: CPT

## 2021-08-03 PROCEDURE — 94640 AIRWAY INHALATION TREATMENT: CPT

## 2021-08-03 PROCEDURE — 74011250636 HC RX REV CODE- 250/636: Performed by: STUDENT IN AN ORGANIZED HEALTH CARE EDUCATION/TRAINING PROGRAM

## 2021-08-03 PROCEDURE — 84484 ASSAY OF TROPONIN QUANT: CPT

## 2021-08-03 PROCEDURE — 65270000029 HC RM PRIVATE

## 2021-08-03 PROCEDURE — 74011250637 HC RX REV CODE- 250/637: Performed by: INTERNAL MEDICINE

## 2021-08-03 PROCEDURE — 80307 DRUG TEST PRSMV CHEM ANLYZR: CPT

## 2021-08-03 PROCEDURE — 74011250636 HC RX REV CODE- 250/636: Performed by: INTERNAL MEDICINE

## 2021-08-03 PROCEDURE — 99218 HC RM OBSERVATION: CPT

## 2021-08-03 PROCEDURE — 82962 GLUCOSE BLOOD TEST: CPT

## 2021-08-03 PROCEDURE — 96376 TX/PRO/DX INJ SAME DRUG ADON: CPT

## 2021-08-03 RX ORDER — PANTOPRAZOLE SODIUM 40 MG/1
40 TABLET, DELAYED RELEASE ORAL
Status: DISCONTINUED | OUTPATIENT
Start: 2021-08-03 | End: 2021-08-03 | Stop reason: HOSPADM

## 2021-08-03 RX ORDER — ONDANSETRON 2 MG/ML
4 INJECTION INTRAMUSCULAR; INTRAVENOUS
Status: DISCONTINUED | OUTPATIENT
Start: 2021-08-03 | End: 2021-08-03 | Stop reason: HOSPADM

## 2021-08-03 RX ORDER — CITALOPRAM 20 MG/1
40 TABLET, FILM COATED ORAL DAILY
Status: DISCONTINUED | OUTPATIENT
Start: 2021-08-03 | End: 2021-08-03 | Stop reason: HOSPADM

## 2021-08-03 RX ORDER — LEVOTHYROXINE SODIUM 75 UG/1
150 TABLET ORAL
Status: DISCONTINUED | OUTPATIENT
Start: 2021-08-03 | End: 2021-08-03 | Stop reason: HOSPADM

## 2021-08-03 RX ORDER — GUAIFENESIN 100 MG/5ML
81 LIQUID (ML) ORAL DAILY
Status: DISCONTINUED | OUTPATIENT
Start: 2021-08-03 | End: 2021-08-03 | Stop reason: HOSPADM

## 2021-08-03 RX ORDER — TOPIRAMATE 25 MG/1
50 TABLET ORAL 2 TIMES DAILY WITH MEALS
Status: DISCONTINUED | OUTPATIENT
Start: 2021-08-03 | End: 2021-08-03 | Stop reason: HOSPADM

## 2021-08-03 RX ORDER — DIGOXIN 125 MCG
0.12 TABLET ORAL DAILY
Status: DISCONTINUED | OUTPATIENT
Start: 2021-08-03 | End: 2021-08-03 | Stop reason: HOSPADM

## 2021-08-03 RX ORDER — MORPHINE SULFATE 4 MG/ML
4 INJECTION INTRAVENOUS
Status: DISCONTINUED | OUTPATIENT
Start: 2021-08-03 | End: 2021-08-03 | Stop reason: HOSPADM

## 2021-08-03 RX ORDER — DOCUSATE SODIUM 100 MG/1
100 CAPSULE, LIQUID FILLED ORAL 2 TIMES DAILY
Status: DISCONTINUED | OUTPATIENT
Start: 2021-08-03 | End: 2021-08-03 | Stop reason: HOSPADM

## 2021-08-03 RX ORDER — ENOXAPARIN SODIUM 100 MG/ML
40 INJECTION SUBCUTANEOUS EVERY 24 HOURS
Status: DISCONTINUED | OUTPATIENT
Start: 2021-08-03 | End: 2021-08-03 | Stop reason: HOSPADM

## 2021-08-03 RX ORDER — BUDESONIDE AND FORMOTEROL FUMARATE DIHYDRATE 160; 4.5 UG/1; UG/1
2 AEROSOL RESPIRATORY (INHALATION)
Status: DISCONTINUED | OUTPATIENT
Start: 2021-08-03 | End: 2021-08-03 | Stop reason: HOSPADM

## 2021-08-03 RX ORDER — ATORVASTATIN CALCIUM 20 MG/1
20 TABLET, FILM COATED ORAL DAILY
Status: DISCONTINUED | OUTPATIENT
Start: 2021-08-03 | End: 2021-08-03 | Stop reason: HOSPADM

## 2021-08-03 RX ORDER — TRAZODONE HYDROCHLORIDE 50 MG/1
100 TABLET ORAL
Status: DISCONTINUED | OUTPATIENT
Start: 2021-08-03 | End: 2021-08-03 | Stop reason: HOSPADM

## 2021-08-03 RX ORDER — TOPIRAMATE 50 MG/1
50 TABLET, FILM COATED ORAL 2 TIMES DAILY WITH MEALS
Qty: 60 TABLET | Refills: 0 | Status: SHIPPED | OUTPATIENT
Start: 2021-08-03

## 2021-08-03 RX ORDER — SODIUM CHLORIDE 0.9 % (FLUSH) 0.9 %
5-40 SYRINGE (ML) INJECTION AS NEEDED
Status: DISCONTINUED | OUTPATIENT
Start: 2021-08-03 | End: 2021-08-03 | Stop reason: HOSPADM

## 2021-08-03 RX ORDER — SODIUM CHLORIDE 0.9 % (FLUSH) 0.9 %
5-40 SYRINGE (ML) INJECTION EVERY 8 HOURS
Status: DISCONTINUED | OUTPATIENT
Start: 2021-08-03 | End: 2021-08-03 | Stop reason: HOSPADM

## 2021-08-03 RX ORDER — CALCIUM CARBONATE/VITAMIN D3 600MG-5MCG
1 TABLET ORAL 3 TIMES DAILY
Status: DISCONTINUED | OUTPATIENT
Start: 2021-08-03 | End: 2021-08-03 | Stop reason: HOSPADM

## 2021-08-03 RX ORDER — METOPROLOL SUCCINATE 50 MG/1
50 TABLET, EXTENDED RELEASE ORAL DAILY
Status: DISCONTINUED | OUTPATIENT
Start: 2021-08-03 | End: 2021-08-03 | Stop reason: HOSPADM

## 2021-08-03 RX ORDER — METFORMIN HYDROCHLORIDE 500 MG/1
500 TABLET ORAL 2 TIMES DAILY WITH MEALS
Status: DISCONTINUED | OUTPATIENT
Start: 2021-08-03 | End: 2021-08-03 | Stop reason: HOSPADM

## 2021-08-03 RX ORDER — BUMETANIDE 1 MG/1
1 TABLET ORAL DAILY
Status: DISCONTINUED | OUTPATIENT
Start: 2021-08-03 | End: 2021-08-03 | Stop reason: HOSPADM

## 2021-08-03 RX ORDER — DOCUSATE SODIUM 100 MG/1
100 CAPSULE, LIQUID FILLED ORAL 2 TIMES DAILY
Qty: 60 CAPSULE | Refills: 2 | Status: SHIPPED | OUTPATIENT
Start: 2021-08-03 | End: 2021-11-01

## 2021-08-03 RX ADMIN — TRAZODONE HYDROCHLORIDE 100 MG: 50 TABLET ORAL at 03:25

## 2021-08-03 RX ADMIN — Medication 1 TABLET: at 09:47

## 2021-08-03 RX ADMIN — MORPHINE SULFATE 4 MG: 4 INJECTION, SOLUTION INTRAMUSCULAR; INTRAVENOUS at 00:47

## 2021-08-03 RX ADMIN — METOPROLOL SUCCINATE 50 MG: 50 TABLET, EXTENDED RELEASE ORAL at 08:33

## 2021-08-03 RX ADMIN — METFORMIN HYDROCHLORIDE 500 MG: 500 TABLET ORAL at 08:34

## 2021-08-03 RX ADMIN — LEVOTHYROXINE SODIUM 150 MCG: 0.07 TABLET ORAL at 08:32

## 2021-08-03 RX ADMIN — TOPIRAMATE 50 MG: 25 TABLET, FILM COATED ORAL at 09:47

## 2021-08-03 RX ADMIN — ASPIRIN 81 MG: 81 TABLET, CHEWABLE ORAL at 08:32

## 2021-08-03 RX ADMIN — BUDESONIDE AND FORMOTEROL FUMARATE DIHYDRATE 2 PUFF: 160; 4.5 AEROSOL RESPIRATORY (INHALATION) at 08:13

## 2021-08-03 RX ADMIN — DOCUSATE SODIUM 100 MG: 100 CAPSULE ORAL at 08:33

## 2021-08-03 RX ADMIN — BUMETANIDE 1 MG: 1 TABLET ORAL at 09:46

## 2021-08-03 RX ADMIN — PANTOPRAZOLE SODIUM 40 MG: 40 TABLET, DELAYED RELEASE ORAL at 07:30

## 2021-08-03 RX ADMIN — ATORVASTATIN CALCIUM 20 MG: 20 TABLET, FILM COATED ORAL at 08:32

## 2021-08-03 RX ADMIN — DIGOXIN 0.12 MG: 125 TABLET ORAL at 09:47

## 2021-08-03 RX ADMIN — ONDANSETRON 4 MG: 2 INJECTION INTRAMUSCULAR; INTRAVENOUS at 00:47

## 2021-08-03 RX ADMIN — CITALOPRAM HYDROBROMIDE 40 MG: 20 TABLET ORAL at 09:47

## 2021-08-03 RX ADMIN — Medication 10 ML: at 08:34

## 2021-08-03 NOTE — DISCHARGE SUMMARY
Discharge Summary     Patient: Nuzhat Mejia MRN: [id number]  SSN: [id number]    YOB: 1975  Age: 54 y.o.   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 (CVA) ([facility] [U65.370, R56.9]    Discharge Diagnoses:   Problem List as of 8/3/2021 Date Reviewed: 5/12/2021        Codes Class Noted - Resolved    Syncope ICD-10-CM: R55  ICD-9-CM: 780.2  8/3/2021 - Present        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 (CVA) ([facility] ICD-10-CM: Y00.365, R56.9  ICD-9-CM: 438.89, 780.39  8/3/2021 - Present        ACS (acute coronary syndrome) ([facility] ICD-10-CM: I24.9  ICD-9-CM: 411.1  7/13/2021 - Present        Hemoptysis ICD-10-CM: R04.2  ICD-9-CM: 786.30  7/13/2021 - Present        Chest pain ICD-10-CM: R07.9  ICD-9-CM: 786.50  6/26/2021 - Present        CVA (cerebral vascular accident) [facility]) ICD-10-CM: I63.9  ICD-9-CM: 434.91  5/6/2021 - Present        Postoperative hypothyroidism ICD-10-CM: E89.0  ICD-9-CM: 244.0  5/6/2021 - Present        Pharyngoesophageal dysphagia ICD-10-CM: R13.14  ICD-9-CM: 787.24  2/22/2021 - Present        Multinodular goiter ICD-10-CM: E04.2  ICD-9-CM: 241.1  12/30/2020 - Present        Coronary artery disease involving native coronary artery of native heart with angina pectoris with documented spasm ([facility] ICD-10-CM: I25.111  ICD-9-CM: 414.01, 413.9  12/17/2020 - Present        Cardiomyopathy ([facility] ICD-10-CM: I42.9  ICD-9-CM: 425.4  12/17/2020 - Present        COPD (chronic obstructive pulmonary disease) ([facility] ICD-10-CM: J44.9  ICD-9-CM: 496  12/17/2020 - Present        Pulmonary nodules ICD-10-CM: R91.8  ICD-9-CM: 793.19  12/17/2020 - Present        Obstructive sleep apnea ICD-10-CM: G47.33  ICD-9-CM: 327.23  12/17/2020 - Present        Type 2 diabetes mellitus with hyperglycemia, without long-term current use of insulin ([facility] ICD-10-CM: E11.65  ICD-9-CM: 250.00, 790.29  12/17/2020 - Present        Essential hypertension ICD-10-CM: I10  ICD-9-CM: 401.9  12/17/2020 - Present        Obesity, morbid ([facility] ICD-10-CM: E66.01  ICD-9-CM: 278.01  9/7/2020 - Present        Carotid artery stenosis ICD-10-CM: I65.29  ICD-9-CM: 433.10  9/7/2020 - Present               Discharge Condition: Good    Hospital Course: Patient came to the hospital for chest pain near syncopal event. Will place on telemetry and seen by cardiologist.  My assessment of atypical chest pain, chronic COPD, obstructive sleep apnea, morbid obesity,    Consults: Cardiology    Significant Diagnostic Studies: labs:     XR CHEST PORT   Final Result   Findings/impression:      No consolidative airspace disease, pleural effusion or pneumothorax. Cardiac silhouette is enlarged. No pulmonary edema. No acute osseous abnormality identified. Disposition: home    Discharge Medications:   Current Discharge Medication List      START taking these medications    Details   docusate sodium (COLACE) 100 mg capsule Take 1 Capsule by mouth two (2) times a day for 90 days. Qty: 60 Capsule, Refills: 2      topiramate (TOPAMAX) 50 mg tablet Take 1 Tablet by mouth two (2) times daily (with meals). Qty: 60 Tablet, Refills: 0         CONTINUE these medications which have NOT CHANGED    Details   bumetanide (BUMEX) 1 mg tablet Take 1 Tablet by mouth daily. Qty: 30 Tablet, Refills: 0      levothyroxine (SYNTHROID) 150 mcg tablet Take 150 mcg by mouth Daily (before breakfast). calcium-cholecalciferol, D3, (CALTRATE 600+D) tablet three (3) times daily. gabapentin (NEURONTIN) 600 mg tablet as needed.       pantoprazole (PROTONIX) 40 mg tablet TAKE ONE TABLET BY MOUTH TWICE DAILY      fluticasone furoate-vilanteroL (BREO ELLIPTA) 200-25 mcg/dose inhaler Breo Ellipta 200 mcg-25 mcg/dose powder for inhalation   inhale ONE PUFF by MOUTH ONCE daily      digoxin (LANOXIN) 0.125 mg tablet TAKE ONE TABLET BY MOUTH EVERY DAY      citalopram (CELEXA) 40 mg tablet TAKE ONE TABLET BY MOUTH EVERY DAY      metoprolol succinate (TOPROL-XL) 50 mg XL tablet TAKE ONE TABLET BY MOUTH DAILY      atorvastatin (LIPITOR) 20 mg tablet 20 mg daily. metFORMIN (GLUCOPHAGE) 500 mg tablet TAKE ONE TABLET BY MOUTH TWICE DAILY WITH MEALS      traZODone (DESYREL) 100 mg tablet TAKE ONE TABLET BY MOUTH AT BEDTIME      aspirin 81 mg chewable tablet Take 81 mg by mouth daily.          STOP taking these medications       zolpidem (AMBIEN) 10 mg tablet Comments:   Reason for Stopping:               Activity: Activity as tolerated  Diet: Cardiac Diet  Wound Care: As directed    Follow-up Appointments   Procedures    FOLLOW UP VISIT Appointment in: 3 - 5 Days     Standing Status:   Standing     Number of Occurrences:   1     Order Specific Question:   Appointment in     Answer:   3 - 5 Days     50 minutes discharge time  Signed By: Emiliano Alejandro MD     August 3, 2021

## 2021-08-03 NOTE — CONSULTS
Neurology Consult Note    HPI  Mr. Margot Lucas is a 54 y.o.  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 Per chart review, patient had sudden onset stabbing chest pain radiating down the left arm. He had associated shortness of breath with this. He had difficulty with his breathing with exertion and difficulty laying down and breathing. He also feels that his lower extremity swelling has recently gotten worse. Patient's initial laboratory work-up including troponin were negative. Patient reports that he had a TPA aborted stroke about 3 months ago at [facility].  He ambulates without assistive device for the most part at home but sometimes uses a cane. He has no history of seizures or neoplastic disorders that he is aware of. Corroborates the story above. Home Stroke Prophylaxis: ASA 81, Atorvastatin 20      IMPRESSION  Mr. Margot Lucas is a 54 y.o.  male with the above history presented with chest pain/tightness, shortness of breath which occurred suddenly with pain radiating down the left upper extremity. Patient's initial laboratory work-up including troponins were negative. We will defer further management of cardiac etiology for patient's chest pain to primary team and possibly cardiology team.  Patient without any history of seizures per his report and no mention of seizure activity on admitting ED documentation. Patient did have a reported TPA aborted stroke in the past.  We will continue patient on antiplatelet and statin therapy for neurovascular prophylaxis.       RECOMMENDATIONS      Reported History of tPA Aborted Stroke  Associated: Chest Pain/Tightness  -Stroke Prophylaxis: ASA 81, Atorvastatin 20   -SBP Goal < 160  -Glucose Goal < 180  -Head of Bed at 30 degrees  -VTE Prophylaxis: Enoxaparin 40 daily  -PT/OT/ST as needed  -Management of metabolic/infectious derangements to referring teams  -D/C EEG    Please contact neurology with any further questions/concerns    Review of Systems  A 14 point review was done and pertinent positives & negative findings are listed as part of the history of present illness, all other systems were reviewed and are negative. Physical/Neurological Exam  Cardiovascular: tachycardic at times  Pulmonary: no increased work of breathing  Gastrointestinal/Abdomen: soft w/hypoactive bowel sounds   Skin: warm and dry      Patient awake, alert; following central and peripheral commands   No expressive or receptive aphasia;  No dysarthria   Pupils react to light bilaterally; EOM Intact   No visual field deficits on gross exam   Intact to light touch on face bilaterally   No facial droop   No gross hearing loss   Tongue is midline   Motor: Antigravity throughout  No abnormal movements   Normal tone throughout   Sensation to light touch intact grossly throughout       Past Medical History:   Diagnosis Date    Acute MI ([facility] [address]     Depression     Hypertension     Ill-defined condition     Stroke ([facility] [address]       Past Surgical History:   Procedure Laterality Date    HX HERNIA REPAIR      HX ORTHOPAEDIC      rotator cuff surgery    HX OTHER SURGICAL      Patent Foramen Ovale Repair    HX THYROIDECTOMY      IR FINE NEEDLE ASPIRATION W IMAGE       Allergies   Allergen Reactions    Vancomycin Hives     Lyndsey syndrome     Family History   Problem Relation Age of Onset    Diabetes Maternal Uncle     Hypertension Maternal Uncle     Hypertension Paternal Uncle     Diabetes Paternal Uncle     Cancer Other     Cancer Mother     No Known Problems Father         Social Connections:     Frequency of Communication with Friends and Family:     Frequency of Social Gatherings with Friends and Family:     Attends [employer] Services:     Active Member of Clubs or Organizations:     Attends Club or Organization Meetings:     Marital Status: Medications  Current Outpatient Medications   Medication Instructions    aspirin 81 mg, Oral, DAILY    atorvastatin (LIPITOR) 20 mg, DAILY    bumetanide (BUMEX) 1 mg, Oral, DAILY    calcium-cholecalciferol, D3, (CALTRATE 600+D) tablet 3 TIMES DAILY    citalopram (CELEXA) 40 mg tablet TAKE ONE TABLET BY MOUTH EVERY DAY    digoxin (LANOXIN) 0.125 mg tablet TAKE ONE TABLET BY MOUTH EVERY DAY    fluticasone furoate-vilanteroL (BREO ELLIPTA) 200-25 mcg/dose inhaler Breo Ellipta 200 mcg-25 mcg/dose powder for inhalation   inhale ONE PUFF by MOUTH ONCE daily    gabapentin (NEURONTIN) 600 mg tablet AS NEEDED    levothyroxine (SYNTHROID) 150 mcg, Oral, DAILY BEFORE BREAKFAST    metFORMIN (GLUCOPHAGE) 500 mg tablet TAKE ONE TABLET BY MOUTH TWICE DAILY WITH MEALS    metoprolol succinate (TOPROL-XL) 50 mg XL tablet TAKE ONE TABLET BY MOUTH DAILY    pantoprazole (PROTONIX) 40 mg tablet TAKE ONE TABLET BY MOUTH TWICE DAILY    traZODone (DESYREL) 100 mg tablet TAKE ONE TABLET BY MOUTH AT BEDTIME       Current Facility-Administered Medications   Medication Dose Route Frequency    morphine injection 4 mg  4 mg IntraVENous Q4H PRN    ondansetron (ZOFRAN) injection 4 mg  4 mg IntraVENous Q4H PRN    aspirin chewable tablet 81 mg  81 mg Oral DAILY    atorvastatin (LIPITOR) tablet 20 mg  20 mg Oral DAILY    bumetanide (BUMEX) tablet 1 mg  1 mg Oral DAILY    calcium-vitamin D 600 mg(1,500mg) -200 unit per tablet 1 Tablet  1 Tablet Oral TID    citalopram (CELEXA) tablet 40 mg  40 mg Oral DAILY    digoxin (LANOXIN) tablet 0.125 mg  0.125 mg Oral DAILY    budesonide-formoteroL (SYMBICORT) 160-4.5 mcg/actuation HFA inhaler 2 Puff  2 Puff Inhalation BID RT    . PHARMACY TO SUBSTITUTE PER PROTOCOL (Reordered from: gabapentin (NEURONTIN) 600 mg tablet)    Per Protocol    levothyroxine (SYNTHROID) tablet 150 mcg  150 mcg Oral ACB    metFORMIN (GLUCOPHAGE) tablet 500 mg  500 mg Oral BID WITH MEALS    metoprolol succinate (TOPROL-XL) XL tablet 50 mg  50 mg Oral DAILY    pantoprazole (PROTONIX) tablet 40 mg  40 mg Oral ACB    traZODone (DESYREL) tablet 100 mg  100 mg Oral QHS    topiramate (TOPAMAX) tablet 50 mg  50 mg Oral BID WITH MEALS    sodium chloride (NS) flush 5-40 mL  5-40 mL IntraVENous Q8H    sodium chloride (NS) flush 5-40 mL  5-40 mL IntraVENous PRN    docusate sodium (COLACE) capsule 100 mg  100 mg Oral BID    enoxaparin (LOVENOX) injection 40 mg  40 mg SubCUTAneous Q24H     Current Outpatient Medications   Medication Sig    bumetanide (BUMEX) 1 mg tablet Take 1 Tablet by mouth daily.  levothyroxine (SYNTHROID) 150 mcg tablet Take 150 mcg by mouth Daily (before breakfast).  calcium-cholecalciferol, D3, (CALTRATE 600+D) tablet three (3) times daily.  gabapentin (NEURONTIN) 600 mg tablet as needed.  pantoprazole (PROTONIX) 40 mg tablet TAKE ONE TABLET BY MOUTH TWICE DAILY (Patient not taking: Reported on 7/13/2021)    fluticasone furoate-vilanteroL (BREO ELLIPTA) 200-25 mcg/dose inhaler Breo Ellipta 200 mcg-25 mcg/dose powder for inhalation   inhale ONE PUFF by MOUTH ONCE daily (Patient not taking: Reported on 7/13/2021)    digoxin (LANOXIN) 0.125 mg tablet TAKE ONE TABLET BY MOUTH EVERY DAY (Patient not taking: Reported on 7/13/2021)    citalopram (CELEXA) 40 mg tablet TAKE ONE TABLET BY MOUTH EVERY DAY    metoprolol succinate (TOPROL-XL) 50 mg XL tablet TAKE ONE TABLET BY MOUTH DAILY    atorvastatin (LIPITOR) 20 mg tablet 20 mg daily.  metFORMIN (GLUCOPHAGE) 500 mg tablet TAKE ONE TABLET BY MOUTH TWICE DAILY WITH MEALS    traZODone (DESYREL) 100 mg tablet TAKE ONE TABLET BY MOUTH AT BEDTIME    aspirin 81 mg chewable tablet Take 81 mg by mouth daily.         Objective  Temp:  [97.9 °F (36.6 °C)]   Pulse (Heart Rate):  [89-94]   BP: (138-162)/()   Resp Rate:  [15-22]   O2 Sat (%):  [92 %-94 %]   Weight:  [158.8 kg (350 lb)]   No intake or output data in the 24 hours ending 08/03/21 0635  Wt Readings from Last 3 Encounters:   08/02/21 158.8 kg (350 lb)   07/13/21 158.8 kg (350 lb)   06/26/21 157.4 kg (347 lb)        Labs  Recent Results (from the past 24 hour(s))   CBC WITH AUTOMATED DIFF    Collection Time: 08/02/21  4:15 PM   Result Value Ref Range    WBC 6.9 4.1 - 11.1 K/uL    RBC 3.75 (L) 4.10 - 5.70 M/uL    HGB 12.3 12.1 - 17.0 g/dL    HCT 37.4 36.6 - 50.3 %    MCV 99.7 (H) 80.0 - 99.0 FL    MCH 32.8 26.0 - 34.0 PG    MCHC 32.9 30.0 - 36.5 g/dL    RDW 14.6 (H) 11.5 - 14.5 %    PLATELET 025 894 - 907 K/uL    MPV 10.5 8.9 - 12.9 FL    NRBC 0.0 0.0  WBC    ABSOLUTE NRBC 0.00 0.00 - 0.01 K/uL    NEUTROPHILS 69 32 - 75 %    LYMPHOCYTES 23 12 - 49 %    MONOCYTES 7 5 - 13 %    EOSINOPHILS 1 0 - 7 %    BASOPHILS 0 0 - 1 %    IMMATURE GRANULOCYTES 0 0 - 0.5 %    ABS. NEUTROPHILS 4.7 1.8 - 8.0 K/UL    ABS. LYMPHOCYTES 1.6 0.8 - 3.5 K/UL    ABS. MONOCYTES 0.5 0.0 - 1.0 K/UL    ABS. EOSINOPHILS 0.1 0.0 - 0.4 K/UL    ABS. BASOPHILS 0.0 0.0 - 0.1 K/UL    ABS. IMM. GRANS. 0.0 0.00 - 0.04 K/UL    DF AUTOMATED     METABOLIC PANEL, COMPREHENSIVE    Collection Time: 08/02/21  4:15 PM   Result Value Ref Range    Sodium 136 136 - 145 mmol/L    Potassium 3.7 3.5 - 5.1 mmol/L    Chloride 102 97 - 108 mmol/L    CO2 26 21 - 32 mmol/L    Anion gap 8 5 - 15 mmol/L    Glucose 93 65 - 100 mg/dL    BUN 14 6 - 20 mg/dL    Creatinine 1.10 0.70 - 1.30 mg/dL    BUN/Creatinine ratio 13 12 - 20      GFR est AA >60 >60 ml/min/1.73m2    GFR est non-AA >60 >60 ml/min/1.73m2    Calcium 8.3 (L) 8.5 - 10.1 mg/dL    Bilirubin, total 0.4 0.2 - 1.0 mg/dL    AST (SGOT) 58 (H) 15 - 37 U/L    ALT (SGPT) 74 12 - 78 U/L    Alk.  phosphatase 72 45 - 117 U/L    Protein, total 7.8 6.4 - 8.2 g/dL    Albumin 3.3 (L) 3.5 - 5.0 g/dL    Globulin 4.5 (H) 2.0 - 4.0 g/dL    A-G Ratio 0.7 (L) 1.1 - 2.2     TROPONIN I    Collection Time: 08/02/21  4:15 PM   Result Value Ref Range    Troponin-I, Qt. <0.05 <0.05 ng/mL   BNP    Collection Time: 08/02/21  4:15 PM   Result Value Ref Range    NT pro-BNP 43 <125 pg/mL   TROPONIN I    Collection Time: 08/02/21  6:50 PM   Result Value Ref Range    Troponin-I, Qt. <0.05 <0.05 ng/mL          Significant Diagnostic Studies  All images independently visualized    XR CHEST PORT   Final Result   Findings/impression:      No consolidative airspace disease, pleural effusion or pneumothorax. Cardiac silhouette is enlarged. No pulmonary edema. No acute osseous abnormality identified. This document has been prepared by the Dragon voice recognition system, typographical errors may have occurred.  Attempts have been made to correct errors, however inadvertent errors may persist.

## 2021-08-03 NOTE — ED NOTES
Patient cleared by Cardiology and Neurology. Dr. De La Cruz Poster notified. Patient requesting to be discharged. Pending discharge order by provider.

## 2021-08-03 NOTE — ED NOTES
01;00 Pt wheeled to the bathroom by one of the nurses and brought back. Left resting in bed, no concerns verbalized  03:00 Pt sleeping at this time.

## 2021-08-03 NOTE — ED NOTES
Hospital bed taken to pt's room and when writer came back, pt stated to writer \" this bed is broke, I tried using the remote to move head of bed but it hit my head\". Writer used the bed remote to move head of bed up and it was working. Writer called one of the ED techs to check the bed again and it was working well. Pt stated that probably it's right side of the bed remote that was not working. No concerns verbalized at this time. Pt left comfortable on hospital bed.

## 2021-08-03 NOTE — H&P
History and Physical    Patient: Tj Patrick MRN: [id number]  SSN: [id number]    YOB: 1975  Age: 54 y.o. Sex: male      Subjective:      Tj Patrick is a 54 y.o. male who has a history of hypertension, depression, obstructive sleep apnea, morbid obesity, recent stroke of 3 months ago presented with a complaint of chest tightness and diaphoresis. Patient was extensively investigated in the past by the cardiologist for chest pain which was thought to be noncardiac. Patient has a history of PFO closure    Past Medical History:   Diagnosis Date    Acute MI [facility] [address]     Depression     Hypertension     Ill-defined condition     Stroke ([facility] [address]      Past Surgical History:   Procedure Laterality Date    HX HERNIA REPAIR      HX ORTHOPAEDIC      rotator cuff surgery    HX OTHER SURGICAL      Patent Foramen Ovale Repair    HX THYROIDECTOMY      IR FINE NEEDLE ASPIRATION W IMAGE        Family History   Problem Relation Age of Onset    Diabetes Maternal Uncle     Hypertension Maternal Uncle     Hypertension Paternal Uncle     Diabetes Paternal Uncle     Cancer Other    [facility] Cancer Mother     No Known Problems Father      Social History     Tobacco Use    Smoking status: Former Smoker    Smokeless tobacco: Never Used   Substance Use Topics    Alcohol use: Yes     Comment: socially       Prior to Admission medications    Medication Sig Start Date End Date Taking? Authorizing Provider   bumetanide (BUMEX) 1 mg tablet Take 1 Tablet by mouth daily. 6/28/21   Nathaniel Meyers MD   levothyroxine (SYNTHROID) 150 mcg tablet Take 150 mcg by mouth Daily (before breakfast). Provider, Historical   calcium-cholecalciferol, D3, (CALTRATE 600+D) tablet three (3) times daily. 4/14/21   Provider, Historical   gabapentin (NEURONTIN) 600 mg tablet as needed.  3/10/21   Provider, Historical   pantoprazole (PROTONIX) 40 mg tablet TAKE ONE TABLET BY MOUTH TWICE DAILY  Patient not taking: Reported on 7/13/2021 3/23/21   Provider, Historical   fluticasone furoate-vilanteroL (BREO ELLIPTA) 200-25 mcg/dose inhaler Breo Ellipta 200 mcg-25 mcg/dose powder for inhalation   inhale ONE PUFF by MOUTH ONCE daily  Patient not taking: Reported on 7/13/2021    Provider, Historical   digoxin (LANOXIN) 0.125 mg tablet TAKE ONE TABLET BY MOUTH EVERY DAY  Patient not taking: Reported on 7/13/2021 11/4/20   Provider, Historical   citalopram (CELEXA) 40 mg tablet TAKE ONE TABLET BY MOUTH EVERY DAY 12/8/20   Provider, Historical   metoprolol succinate (TOPROL-XL) 50 mg XL tablet TAKE ONE TABLET BY MOUTH DAILY 11/4/20   Provider, Historical   atorvastatin (LIPITOR) 20 mg tablet 20 mg daily. 8/3/20   Provider, Historical   metFORMIN (GLUCOPHAGE) 500 mg tablet TAKE ONE TABLET BY MOUTH TWICE DAILY WITH MEALS 8/13/20   Provider, Historical   traZODone (DESYREL) 100 mg tablet TAKE ONE TABLET BY MOUTH AT BEDTIME 7/17/20   Provider, Historical   aspirin 81 mg chewable tablet Take 81 mg by mouth daily. Other, MD Elizabeth        Allergies   Allergen Reactions    Vancomycin Hives     Lyndsey syndrome       Review of Systems:  A comprehensive review of systems was negative except for that written in the History of Present Illness. Objective:     Vitals:    08/03/21 0753 08/03/21 0758 08/03/21 0815 08/03/21 0833   BP: (!) 129/94 136/88  (!) 129/107   Pulse: 85 90  95   Resp:  16     Temp:       SpO2:  98% 96%    Weight:       Height:            Physical Exam:  General:  Alert, cooperative, no distress, appears stated age. Eyes:  Conjunctivae/corneas clear. PERRL, EOMs intact. Fundi benign   Ears:  Normal TMs and external ear canals both ears. Nose: Nares normal. Septum midline. Mucosa normal. No drainage or sinus tenderness. Mouth/Throat: Lips, mucosa, and tongue normal. Teeth and gums normal.   Neck: Supple, symmetrical, trachea midline, no adenopathy, thyroid: no enlargment/tenderness/nodules, no carotid bruit and no JVD. Back:   Symmetric, no curvature. ROM normal. No CVA tenderness. Lungs:   Clear to auscultation bilaterally. Heart:  Regular rate and rhythm, S1, S2 normal, no murmur, click, rub or gallop. Abdomen:   Soft, non-tender. Bowel sounds normal. No masses,  No organomegaly. Extremities: Extremities normal, atraumatic, no cyanosis or edema. Pulses: 2+ and symmetric all extremities. Skin: Skin color, texture, turgor normal. No rashes or lesions   Lymph nodes: Cervical, supraclavicular, and axillary nodes normal.   Neurologic: CNII-XII intact. Normal strength, sensation and reflexes throughout. Recent Results (from the past 24 hour(s))   CBC WITH AUTOMATED DIFF    Collection Time: 08/02/21  4:15 PM   Result Value Ref Range    WBC 6.9 4.1 - 11.1 K/uL    RBC 3.75 (L) 4.10 - 5.70 M/uL    HGB 12.3 12.1 - 17.0 g/dL    HCT 37.4 36.6 - 50.3 %    MCV 99.7 (H) 80.0 - 99.0 FL    MCH 32.8 26.0 - 34.0 PG    MCHC 32.9 30.0 - 36.5 g/dL    RDW 14.6 (H) 11.5 - 14.5 %    PLATELET 790 951 - 794 K/uL    MPV 10.5 8.9 - 12.9 FL    NRBC 0.0 0.0  WBC    ABSOLUTE NRBC 0.00 0.00 - 0.01 K/uL    NEUTROPHILS 69 32 - 75 %    LYMPHOCYTES 23 12 - 49 %    MONOCYTES 7 5 - 13 %    EOSINOPHILS 1 0 - 7 %    BASOPHILS 0 0 - 1 %    IMMATURE GRANULOCYTES 0 0 - 0.5 %    ABS. NEUTROPHILS 4.7 1.8 - 8.0 K/UL    ABS. LYMPHOCYTES 1.6 0.8 - 3.5 K/UL    ABS. MONOCYTES 0.5 0.0 - 1.0 K/UL    ABS. EOSINOPHILS 0.1 0.0 - 0.4 K/UL    ABS. BASOPHILS 0.0 0.0 - 0.1 K/UL    ABS. IMM.  GRANS. 0.0 0.00 - 0.04 K/UL    DF AUTOMATED     METABOLIC PANEL, COMPREHENSIVE    Collection Time: 08/02/21  4:15 PM   Result Value Ref Range    Sodium 136 136 - 145 mmol/L    Potassium 3.7 3.5 - 5.1 mmol/L    Chloride 102 97 - 108 mmol/L    CO2 26 21 - 32 mmol/L    Anion gap 8 5 - 15 mmol/L    Glucose 93 65 - 100 mg/dL    BUN 14 6 - 20 mg/dL    Creatinine 1.10 0.70 - 1.30 mg/dL    BUN/Creatinine ratio 13 12 - 20      GFR est AA >60 >60 ml/min/1.73m2    GFR est non-AA >60 >60 ml/min/1.73m2    Calcium 8.3 (L) 8.5 - 10.1 mg/dL    Bilirubin, total 0.4 0.2 - 1.0 mg/dL    AST (SGOT) 58 (H) 15 - 37 U/L    ALT (SGPT) 74 12 - 78 U/L    Alk. phosphatase 72 45 - 117 U/L    Protein, total 7.8 6.4 - 8.2 g/dL    Albumin 3.3 (L) 3.5 - 5.0 g/dL    Globulin 4.5 (H) 2.0 - 4.0 g/dL    A-G Ratio 0.7 (L) 1.1 - 2.2     TROPONIN I    Collection Time: 08/02/21  4:15 PM   Result Value Ref Range    Troponin-I, Qt. <0.05 <0.05 ng/mL   BNP    Collection Time: 08/02/21  4:15 PM   Result Value Ref Range    NT pro-BNP 43 <125 pg/mL   TROPONIN I    Collection Time: 08/02/21  6:50 PM   Result Value Ref Range    Troponin-I, Qt. <0.05 <0.05 ng/mL   GLUCOSE, POC    Collection Time: 08/03/21  8:13 AM   Result Value Ref Range    Glucose (POC) 133 (H) 65 - 117 mg/dL    Performed by Dottie Cullen:     Hospital Problems  Date Reviewed: 5/12/2021        Codes Class Noted POA    Syncope ICD-10-CM: R55  ICD-9-CM: 780.2  8/3/2021 Unknown        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 (CVA) ([facility] [address] ICD-10-CM: C96.863, R56.9  ICD-9-CM: 438.89, 780.39  8/3/2021 Unknown        Chest pain ICD-10-CM: R07.9  ICD-9-CM: 786.50  6/26/2021 Unknown            Atypical chest pain consult cardiology for evaluation  Obstructive sleep apnea  Morbid obesity  Diabetes mellitus type 2  History of PFO closure  Plan:     Placed on telemetry. Heart enzymes cardiology consult.   Patient was seen by neurology and essentially cleared for any neurological event    Signed By: Rosa Maria Aquino MD     August 3, 2021

## 2021-08-03 NOTE — CONSULTS
Cardiology Consult    NAME: Roya Kirkpatrick   :  1975   MRN:  [id number]     Date/Time:  8/3/2021 12:42 PM    Patient PCP: Vannesa Brownlee MD      Subjective:   CHIEF COMPLAINT: Chest pain      REASON FOR CONSULT: Chest pain      HISTORY OF PRESENT ILLNESS:     Roya Kirkpatrick is a 54 y.o. WHITE/NON- male who has obesity, COPD, ANIBAL, CVA  months ago s/p PFO closure 2021 at [facility] by Dr. Jones Nazario. Cardiac catheterization showed normal coronary arteries and follow-up echocardiogram showed good seating of the PFO device without compromise. He came to the emergency room because of chest pain, shortness of breath, and feeling weak at his doctor's office.      He had an echo done 2 weeks ago here that showed good seating of the PFO closure device and normal EF he came to the ER because he went to his doctor's office and just did not feel well. Says he had shortness of breath and mild chest stabbing pains.     Denies any drugs or alcohol, now back to baseline, wants to go home. No chest pain or dyspnea since coming to ER.           Past Medical History:   Diagnosis Date    Acute MI ([facility] [address]     Depression     Hypertension     Ill-defined condition     Stroke [facility])       Past Surgical History:   Procedure Laterality Date    HX HERNIA REPAIR      HX ORTHOPAEDIC      rotator cuff surgery    HX OTHER SURGICAL      Patent Foramen Ovale Repair    HX THYROIDECTOMY      IR FINE NEEDLE ASPIRATION W IMAGE       Allergies   Allergen Reactions    Vancomycin Hives     Lyndsey syndrome      Meds:  See below  Social History     Tobacco Use    Smoking status: Former Smoker    Smokeless tobacco: Never Used   Substance Use Topics    Alcohol use: Yes     Comment: socially       Family History   Problem Relation Age of Onset    Diabetes Maternal Uncle     Hypertension Maternal Uncle     Hypertension Paternal Uncle     Diabetes Paternal Uncle     Cancer Other     Cancer Mother     No Known Problems Father        REVIEW OF SYSTEMS:     []         Unable to obtain  ROS due to ---   [x]         Total of 12 systems reviewed as follows:    Constitutional: negative fever, negative chills,  Eyes:   negative visual changes  ENT:   negative sore throat, tongue or lip swelling  Respiratory:  negative cough, negative dyspnea  Cards:  + chest pain, palpitations  GI:   negative for nausea, vomiting  Genitourinary: negative for frequency, dysuria  Integument:  negative for rash   Hematologic:  negative for easy bruising and gum/nose bleeding  Musculoskel: negative for myalgias, back pain  Neurological:  negative for headaches, dizziness, weakness      Objective:      Physical Exam:    Last 24hrs VS reviewed since prior progress note. Most recent are:    Visit Vitals  BP (!) 149/83 (BP 1 Location: Left upper arm, BP Patient Position: At rest)   Pulse 90   Temp 98 °F (36.7 °C)   Resp 16   Ht 6' 4\" (1.93 m)   Wt 158.8 kg (350 lb)   SpO2 96%   BMI 42.60 kg/m²       Intake/Output Summary (Last 24 hours) at 8/3/2021 1242  Last data filed at 8/3/2021 0759  Gross per 24 hour   Intake --   Output 400 ml   Net -400 ml        General Appearance: Well developed, alert, no acute distress. Ears/Nose/Mouth/Throat: Pupils equal and round, Hearing grossly normal.  Neck: Supple. No JVP. Good carotids upstrokes, no bruit. Chest: Lungs clear to auscultation bilaterally. Cardiovascular: Regular rate and rhythm, S1S2 normal, soft systolic murmur  Abdomen: Soft, non-tender, bowel sounds are active. Extremities: No edema bilaterally. Femoral pulses +2, Distal Pulses +1. Skin: Warm and dry. Neuro: Alert and oriented x 3, normal speech; follows simple commands  Psychiatric: Cooperative, normal affect and judgment      Data:      Telemetry:    EKG:    []  No new EKG for review. Prior to Admission medications    Medication Sig Start Date End Date Taking?  Authorizing Provider   bumetanide (BUMEX) 1 mg tablet Take 1 Tablet by mouth daily. 6/28/21   Maurice Campos MD   levothyroxine (SYNTHROID) 150 mcg tablet Take 150 mcg by mouth Daily (before breakfast). Provider, Historical   calcium-cholecalciferol, D3, (CALTRATE 600+D) tablet three (3) times daily. 4/14/21   Provider, Historical   gabapentin (NEURONTIN) 600 mg tablet as needed. 3/10/21   Provider, Historical   pantoprazole (PROTONIX) 40 mg tablet TAKE ONE TABLET BY MOUTH TWICE DAILY  Patient not taking: Reported on 7/13/2021 3/23/21   Provider, Historical   fluticasone furoate-vilanteroL (BREO ELLIPTA) 200-25 mcg/dose inhaler Breo Ellipta 200 mcg-25 mcg/dose powder for inhalation   inhale ONE PUFF by MOUTH ONCE daily  Patient not taking: Reported on 7/13/2021    Provider, Historical   digoxin (LANOXIN) 0.125 mg tablet TAKE ONE TABLET BY MOUTH EVERY DAY  Patient not taking: Reported on 7/13/2021 11/4/20   Provider, Historical   citalopram (CELEXA) 40 mg tablet TAKE ONE TABLET BY MOUTH EVERY DAY 12/8/20   Provider, Historical   metoprolol succinate (TOPROL-XL) 50 mg XL tablet TAKE ONE TABLET BY MOUTH DAILY 11/4/20   Provider, Historical   atorvastatin (LIPITOR) 20 mg tablet 20 mg daily. 8/3/20   Provider, Historical   metFORMIN (GLUCOPHAGE) 500 mg tablet TAKE ONE TABLET BY MOUTH TWICE DAILY WITH MEALS 8/13/20   Provider, Historical   traZODone (DESYREL) 100 mg tablet TAKE ONE TABLET BY MOUTH AT BEDTIME 7/17/20   Provider, Historical   aspirin 81 mg chewable tablet Take 81 mg by mouth daily.     Kenisha, MD Elizabeth       Recent Results (from the past 24 hour(s))   CBC WITH AUTOMATED DIFF    Collection Time: 08/02/21  4:15 PM   Result Value Ref Range    WBC 6.9 4.1 - 11.1 K/uL    RBC 3.75 (L) 4.10 - 5.70 M/uL    HGB 12.3 12.1 - 17.0 g/dL    HCT 37.4 36.6 - 50.3 %    MCV 99.7 (H) 80.0 - 99.0 FL    MCH 32.8 26.0 - 34.0 PG    MCHC 32.9 30.0 - 36.5 g/dL    RDW 14.6 (H) 11.5 - 14.5 %    PLATELET 964 633 - 552 K/uL    MPV 10.5 8.9 - 12.9 FL    NRBC 0.0 0.0  WBC    ABSOLUTE NRBC 0.00 0.00 - 0.01 K/uL    NEUTROPHILS 69 32 - 75 %    LYMPHOCYTES 23 12 - 49 %    MONOCYTES 7 5 - 13 %    EOSINOPHILS 1 0 - 7 %    BASOPHILS 0 0 - 1 %    IMMATURE GRANULOCYTES 0 0 - 0.5 %    ABS. NEUTROPHILS 4.7 1.8 - 8.0 K/UL    ABS. LYMPHOCYTES 1.6 0.8 - 3.5 K/UL    ABS. MONOCYTES 0.5 0.0 - 1.0 K/UL    ABS. EOSINOPHILS 0.1 0.0 - 0.4 K/UL    ABS. BASOPHILS 0.0 0.0 - 0.1 K/UL    ABS. IMM. GRANS. 0.0 0.00 - 0.04 K/UL    DF AUTOMATED     METABOLIC PANEL, COMPREHENSIVE    Collection Time: 08/02/21  4:15 PM   Result Value Ref Range    Sodium 136 136 - 145 mmol/L    Potassium 3.7 3.5 - 5.1 mmol/L    Chloride 102 97 - 108 mmol/L    CO2 26 21 - 32 mmol/L    Anion gap 8 5 - 15 mmol/L    Glucose 93 65 - 100 mg/dL    BUN 14 6 - 20 mg/dL    Creatinine 1.10 0.70 - 1.30 mg/dL    BUN/Creatinine ratio 13 12 - 20      GFR est AA >60 >60 ml/min/1.73m2    GFR est non-AA >60 >60 ml/min/1.73m2    Calcium 8.3 (L) 8.5 - 10.1 mg/dL    Bilirubin, total 0.4 0.2 - 1.0 mg/dL    AST (SGOT) 58 (H) 15 - 37 U/L    ALT (SGPT) 74 12 - 78 U/L    Alk.  phosphatase 72 45 - 117 U/L    Protein, total 7.8 6.4 - 8.2 g/dL    Albumin 3.3 (L) 3.5 - 5.0 g/dL    Globulin 4.5 (H) 2.0 - 4.0 g/dL    A-G Ratio 0.7 (L) 1.1 - 2.2     TROPONIN I    Collection Time: 08/02/21  4:15 PM   Result Value Ref Range    Troponin-I, Qt. <0.05 <0.05 ng/mL   BNP    Collection Time: 08/02/21  4:15 PM   Result Value Ref Range    NT pro-BNP 43 <125 pg/mL   TROPONIN I    Collection Time: 08/02/21  6:50 PM   Result Value Ref Range    Troponin-I, Qt. <0.05 <0.05 ng/mL   DRUG SCREEN, URINE    Collection Time: 08/03/21  7:55 AM   Result Value Ref Range    AMPHETAMINES Negative Negative      BARBITURATES Negative Negative      BENZODIAZEPINES Negative Negative      COCAINE Negative Negative      METHADONE Negative Negative      OPIATES Positive (A) Negative      PCP(PHENCYCLIDINE) Negative Negative      THC (TH-CANNABINOL) Negative Negative      Drug screen comment        This test is a screen for drugs of abuse in a medical setting only (i.e., they are unconfirmed results and as such must not be used for non-medical purposes, e.g.,employment testing, legal testing). Due to its inherent nature, false positive (FP) and false negative (FN) results may be obtained. Therefore, if necessary for medical care, recommend confirmation of positive findings by GC/MS.    GLUCOSE, POC    Collection Time: 08/03/21  8:13 AM   Result Value Ref Range    Glucose (POC) 133 (H) 65 - 117 mg/dL    Performed by María Love    TROPONIN I    Collection Time: 08/03/21  8:30 AM   Result Value Ref Range    Troponin-I, Qt. <0.05 <0.05 ng/mL        _______________________________________________________________________     Assessment:   Atypical chest pain with normal coronary arteries   S/p PFO closure with follow-up echocardiogram x2 showing good seating of the device  Chronic COPD  Sleep apnea  Morbid obesity      Plan:   No further cardiac testing at this time  I spoke to his cardiologist, Dr. Zoya Dalton by phone on his last hospitalization and indeed confirmed that he has normal coronary arteries  Outpatient follow-up with cardiology, Dr. Zoya Dalton        []        High complexity decision making was performed    Sharlon Gaucher, MD

## 2021-08-03 NOTE — ED NOTES
Rounded on pt at this time, pt alert and oriented x4, awaiting admission, hospital bed requested to be brought by EVS. No concerns verbalized at this time.

## 2021-08-03 NOTE — ED NOTES
Bedside and Verbal shift change report by Kenya Garcia RN (offgoing nurse). Report included the following information SBAR, Kardex, ED Summary, MAR, Recent Results and Cardiac Rhythm NSR. Dr. Cirilo Reinoso at bedside for neurology consult. Patient in hospital bed with call light within reach. Patient requesting breakfast tray. Meal ordered. Pending receipt from dietary. Alert. Oriented x 4. Respirations not labored. 65  Dr. Muniz Spotted at bedside for Cardiology consult.

## 2021-08-18 ENCOUNTER — APPOINTMENT (OUTPATIENT)
Dept: GENERAL RADIOLOGY | Age: 46
End: 2021-08-18
Attending: EMERGENCY MEDICINE
Payer: MEDICAID

## 2021-08-18 ENCOUNTER — HOSPITAL ENCOUNTER (OUTPATIENT)
Age: 46
Setting detail: OBSERVATION
Discharge: HOME | End: 2021-08-20
Attending: EMERGENCY MEDICINE | Admitting: INTERNAL MEDICINE
Payer: MEDICAID

## 2021-08-18 DIAGNOSIS — J44.1 COPD EXACERBATION (HCC): Primary | ICD-10-CM

## 2021-08-18 DIAGNOSIS — R09.02 HYPOXEMIA: ICD-10-CM

## 2021-08-18 DIAGNOSIS — G62.9 PERIPHERAL POLYNEUROPATHY: ICD-10-CM

## 2021-08-18 DIAGNOSIS — Z86.79 HISTORY OF CONGESTIVE HEART FAILURE: ICD-10-CM

## 2021-08-18 DIAGNOSIS — M79.89 LEG SWELLING: ICD-10-CM

## 2021-08-18 LAB
ALBUMIN SERPL-MCNC: 3.4 G/DL (ref 3.5–5)
ALBUMIN/GLOB SERPL: 0.8 {RATIO} (ref 1.1–2.2)
ALP SERPL-CCNC: 79 U/L (ref 45–117)
ALT SERPL-CCNC: 72 U/L (ref 12–78)
ANION GAP SERPL CALC-SCNC: 6 MMOL/L (ref 5–15)
AST SERPL W P-5'-P-CCNC: 28 U/L (ref 15–37)
BASOPHILS # BLD: 0 K/UL (ref 0–0.1)
BASOPHILS NFR BLD: 1 % (ref 0–1)
BILIRUB SERPL-MCNC: 0.2 MG/DL (ref 0.2–1)
BNP SERPL-MCNC: 104 PG/ML
BUN SERPL-MCNC: 11 MG/DL (ref 6–20)
BUN/CREAT SERPL: 10 (ref 12–20)
CA-I BLD-MCNC: 9.1 MG/DL (ref 8.5–10.1)
CHLORIDE SERPL-SCNC: 102 MMOL/L (ref 97–108)
CK SERPL-CCNC: 124 NG/ML (ref 39–308)
CO2 SERPL-SCNC: 33 MMOL/L (ref 21–32)
CREAT SERPL-MCNC: 1.13 MG/DL (ref 0.7–1.3)
DIFFERENTIAL METHOD BLD: ABNORMAL
EOSINOPHIL # BLD: 0.1 K/UL (ref 0–0.4)
EOSINOPHIL NFR BLD: 2 % (ref 0–7)
ERYTHROCYTE [DISTWIDTH] IN BLOOD BY AUTOMATED COUNT: 14.8 % (ref 11.5–14.5)
GLOBULIN SER CALC-MCNC: 4.5 G/DL (ref 2–4)
GLUCOSE BLD STRIP.AUTO-MCNC: 285 MG/DL (ref 65–117)
GLUCOSE SERPL-MCNC: 86 MG/DL (ref 65–100)
HCT VFR BLD AUTO: 38 % (ref 36.6–50.3)
HGB BLD-MCNC: 12.2 G/DL (ref 12.1–17)
IMM GRANULOCYTES # BLD AUTO: 0 K/UL (ref 0–0.04)
IMM GRANULOCYTES NFR BLD AUTO: 0 % (ref 0–0.5)
LYMPHOCYTES # BLD: 1.5 K/UL (ref 0.8–3.5)
LYMPHOCYTES NFR BLD: 19 % (ref 12–49)
MCH RBC QN AUTO: 33 PG (ref 26–34)
MCHC RBC AUTO-ENTMCNC: 32.1 G/DL (ref 30–36.5)
MCV RBC AUTO: 102.7 FL (ref 80–99)
MONOCYTES # BLD: 0.5 K/UL (ref 0–1)
MONOCYTES NFR BLD: 7 % (ref 5–13)
NEUTS SEG # BLD: 5.7 K/UL (ref 1.8–8)
NEUTS SEG NFR BLD: 71 % (ref 32–75)
NRBC # BLD: 0 K/UL (ref 0–0.01)
NRBC BLD-RTO: 0 PER 100 WBC
PERFORMED BY, TECHID: ABNORMAL
PLATELET # BLD AUTO: 223 K/UL (ref 150–400)
PMV BLD AUTO: 9.7 FL (ref 8.9–12.9)
POTASSIUM SERPL-SCNC: 3.6 MMOL/L (ref 3.5–5.1)
PROT SERPL-MCNC: 7.9 G/DL (ref 6.4–8.2)
RBC # BLD AUTO: 3.7 M/UL (ref 4.1–5.7)
SODIUM SERPL-SCNC: 141 MMOL/L (ref 136–145)
TROPONIN I SERPL-MCNC: <0.05 NG/ML
WBC # BLD AUTO: 8 K/UL (ref 4.1–11.1)

## 2021-08-18 PROCEDURE — 71045 X-RAY EXAM CHEST 1 VIEW: CPT

## 2021-08-18 PROCEDURE — 99218 HC RM OBSERVATION: CPT

## 2021-08-18 PROCEDURE — 80053 COMPREHEN METABOLIC PANEL: CPT

## 2021-08-18 PROCEDURE — 36415 COLL VENOUS BLD VENIPUNCTURE: CPT

## 2021-08-18 PROCEDURE — 93005 ELECTROCARDIOGRAM TRACING: CPT

## 2021-08-18 PROCEDURE — 82550 ASSAY OF CK (CPK): CPT

## 2021-08-18 PROCEDURE — 74011250636 HC RX REV CODE- 250/636: Performed by: EMERGENCY MEDICINE

## 2021-08-18 PROCEDURE — 85025 COMPLETE CBC W/AUTO DIFF WBC: CPT

## 2021-08-18 PROCEDURE — 96375 TX/PRO/DX INJ NEW DRUG ADDON: CPT

## 2021-08-18 PROCEDURE — 83880 ASSAY OF NATRIURETIC PEPTIDE: CPT

## 2021-08-18 PROCEDURE — 82962 GLUCOSE BLOOD TEST: CPT

## 2021-08-18 PROCEDURE — 96374 THER/PROPH/DIAG INJ IV PUSH: CPT

## 2021-08-18 PROCEDURE — 65270000029 HC RM PRIVATE

## 2021-08-18 PROCEDURE — 94640 AIRWAY INHALATION TREATMENT: CPT

## 2021-08-18 PROCEDURE — 99282 EMERGENCY DEPT VISIT SF MDM: CPT

## 2021-08-18 PROCEDURE — 84484 ASSAY OF TROPONIN QUANT: CPT

## 2021-08-18 PROCEDURE — 74011000250 HC RX REV CODE- 250: Performed by: EMERGENCY MEDICINE

## 2021-08-18 RX ORDER — IPRATROPIUM BROMIDE AND ALBUTEROL SULFATE 2.5; .5 MG/3ML; MG/3ML
3 SOLUTION RESPIRATORY (INHALATION)
Status: COMPLETED | OUTPATIENT
Start: 2021-08-18 | End: 2021-08-18

## 2021-08-18 RX ORDER — TRAMADOL HYDROCHLORIDE 50 MG/1
50 TABLET ORAL
Status: DISCONTINUED | OUTPATIENT
Start: 2021-08-18 | End: 2021-08-20 | Stop reason: HOSPADM

## 2021-08-18 RX ORDER — FUROSEMIDE 10 MG/ML
40 INJECTION INTRAMUSCULAR; INTRAVENOUS ONCE
Status: COMPLETED | OUTPATIENT
Start: 2021-08-18 | End: 2021-08-18

## 2021-08-18 RX ORDER — TRAZODONE HYDROCHLORIDE 50 MG/1
100 TABLET ORAL
Status: DISCONTINUED | OUTPATIENT
Start: 2021-08-19 | End: 2021-08-20 | Stop reason: HOSPADM

## 2021-08-18 RX ORDER — ARIPIPRAZOLE 10 MG/1
10 TABLET ORAL DAILY
COMMUNITY
End: 2021-08-26 | Stop reason: ALTCHOICE

## 2021-08-18 RX ORDER — METFORMIN HYDROCHLORIDE 500 MG/1
500 TABLET ORAL 2 TIMES DAILY WITH MEALS
Status: DISCONTINUED | OUTPATIENT
Start: 2021-08-19 | End: 2021-08-19

## 2021-08-18 RX ORDER — CHOLECALCIFEROL (VITAMIN D3) 125 MCG
2000 CAPSULE ORAL DAILY
Status: ON HOLD | COMMUNITY
End: 2021-08-19

## 2021-08-18 RX ORDER — ZOLPIDEM TARTRATE 5 MG/1
5 TABLET ORAL
Status: DISCONTINUED | OUTPATIENT
Start: 2021-08-18 | End: 2021-08-20 | Stop reason: HOSPADM

## 2021-08-18 RX ORDER — ACETAMINOPHEN 325 MG/1
650 TABLET ORAL
Status: DISCONTINUED | OUTPATIENT
Start: 2021-08-18 | End: 2021-08-20 | Stop reason: HOSPADM

## 2021-08-18 RX ORDER — ZOLPIDEM TARTRATE 10 MG/1
10 TABLET ORAL
Status: ON HOLD | COMMUNITY
End: 2021-08-19

## 2021-08-18 RX ADMIN — METHYLPREDNISOLONE SODIUM SUCCINATE 125 MG: 125 INJECTION, POWDER, FOR SOLUTION INTRAMUSCULAR; INTRAVENOUS at 19:34

## 2021-08-18 RX ADMIN — IPRATROPIUM BROMIDE AND ALBUTEROL SULFATE 3 ML: .5; 2.5 SOLUTION RESPIRATORY (INHALATION) at 19:40

## 2021-08-18 RX ADMIN — FUROSEMIDE 40 MG: 10 INJECTION, SOLUTION INTRAMUSCULAR; INTRAVENOUS at 19:36

## 2021-08-18 NOTE — ED PROVIDER NOTES
EMERGENCY DEPARTMENT HISTORY AND PHYSICAL EXAM      Date: 8/18/2021  Patient Name: Sonja Rene    History of Presenting Illness     Chief Complaint   Patient presents with    Shortness of Breath    Peripheral Edema     . History Provided By: Patient    HPI: Sonja Rene, 54 y.o. male presents to the ED with cc of   Chief Complaint   Patient presents with    Shortness of Breath    Peripheral Edema     GCS 15 pt stated that he has been having increased BLE edema, greater left than right; pt also stated that he has been experiencing SOB with ambulation   With history of congestive heart failure and COPD denies any fever or any exposure to COVID-19 patient is vaccinated x2   Moderate severity, no known exacerbating or relieving factors, no other associated signs and symptoms. There are no other complaints, changes, or physical findings at this time. PCP: Carmen Busby MD    No current facility-administered medications on file prior to encounter. Current Outpatient Medications on File Prior to Encounter   Medication Sig Dispense Refill    docusate sodium (COLACE) 100 mg capsule Take 1 Capsule by mouth two (2) times a day for 90 days. 60 Capsule 2    topiramate (TOPAMAX) 50 mg tablet Take 1 Tablet by mouth two (2) times daily (with meals). 60 Tablet 0    bumetanide (BUMEX) 1 mg tablet Take 1 Tablet by mouth daily. 30 Tablet 0    levothyroxine (SYNTHROID) 150 mcg tablet Take 150 mcg by mouth Daily (before breakfast).  calcium-cholecalciferol, D3, (CALTRATE 600+D) tablet three (3) times daily.  gabapentin (NEURONTIN) 600 mg tablet as needed.       pantoprazole (PROTONIX) 40 mg tablet TAKE ONE TABLET BY MOUTH TWICE DAILY (Patient not taking: Reported on 7/13/2021)      fluticasone furoate-vilanteroL (BREO ELLIPTA) 200-25 mcg/dose inhaler Breo Ellipta 200 mcg-25 mcg/dose powder for inhalation   inhale ONE PUFF by MOUTH ONCE daily (Patient not taking: Reported on 7/13/2021)  digoxin (LANOXIN) 0.125 mg tablet TAKE ONE TABLET BY MOUTH EVERY DAY (Patient not taking: Reported on 7/13/2021)      citalopram (CELEXA) 40 mg tablet TAKE ONE TABLET BY MOUTH EVERY DAY      metoprolol succinate (TOPROL-XL) 50 mg XL tablet TAKE ONE TABLET BY MOUTH DAILY      atorvastatin (LIPITOR) 20 mg tablet 20 mg daily.  metFORMIN (GLUCOPHAGE) 500 mg tablet TAKE ONE TABLET BY MOUTH TWICE DAILY WITH MEALS      traZODone (DESYREL) 100 mg tablet TAKE ONE TABLET BY MOUTH AT BEDTIME      aspirin 81 mg chewable tablet Take 81 mg by mouth daily. Past History     Past Medical History:  Past Medical History:   Diagnosis Date    Acute [facility] ([facility] [address]     Depression     Hypertension     Ill-defined condition     Stroke ([facility] [address]        Past Surgical History:  Past Surgical History:   Procedure Laterality Date    HX HERNIA REPAIR      HX ORTHOPAEDIC      rotator cuff surgery    HX OTHER SURGICAL      Patent Foramen Ovale Repair    HX THYROIDECTOMY      IR FINE NEEDLE ASPIRATION W IMAGE         Family History:  Family History   Problem Relation Age of Onset    Diabetes Maternal Uncle     Hypertension Maternal Uncle     Hypertension Paternal Uncle     Diabetes Paternal Uncle     Cancer Other    [facility] Cancer Mother     No Known Problems Father        Social History:  Social History     Tobacco Use    Smoking status: Former Smoker    Smokeless tobacco: Never Used   Vaping Use    Vaping Use: Never used   Substance Use Topics    Alcohol use: Yes     Comment: socially     Drug use: Not Currently       Allergies: Allergies   Allergen Reactions    Vancomycin Hives     Lyndsey syndrome         Review of Systems   Review of Systems   Constitutional: Negative. HENT: Negative for congestion, nosebleeds, sinus pressure and sinus pain. Eyes: Negative. Negative for photophobia. Respiratory: Positive for chest tightness, shortness of breath and wheezing. Negative for apnea, cough, choking and stridor. Cardiovascular: Positive for palpitations and leg swelling. Gastrointestinal: Negative. Genitourinary: Negative. Musculoskeletal: Positive for arthralgias and myalgias. Negative for back pain and gait problem. Skin: Negative. Allergic/Immunologic: Negative. Neurological: Negative. Negative for weakness, light-headedness and numbness. Hematological: Negative. Psychiatric/Behavioral: Negative. Physical Exam   Physical Exam  Vitals and nursing note reviewed. Constitutional:       General: He is in acute distress. Appearance: Normal appearance. He is obese. He is ill-appearing and diaphoretic. HENT:      Head: Normocephalic and atraumatic. Mouth/Throat:      Pharynx: Oropharynx is clear. Eyes:      Extraocular Movements: Extraocular movements intact. Pupils: Pupils are equal, round, and reactive to light. Cardiovascular:      Rate and Rhythm: Normal rate and regular rhythm. Pulses: Normal pulses. Heart sounds: Normal heart sounds. Pulmonary:      Effort: Respiratory distress present. Breath sounds: Wheezing and rales present. Abdominal:      General: Abdomen is flat. Bowel sounds are normal.      Palpations: Abdomen is soft. Musculoskeletal:         General: Normal range of motion. Cervical back: Normal range of motion and neck supple. Right lower leg: Edema present. Left lower leg: Edema present. Skin:     General: Skin is warm. Neurological:      General: No focal deficit present. Mental Status: He is alert and oriented to person, place, and time. Psychiatric:         Mood and Affect: Mood is anxious.          Behavior: Behavior normal.         Diagnostic Study Results     Labs -     Recent Results (from the past 12 hour(s))   CBC WITH AUTOMATED DIFF    Collection Time: 08/18/21  4:30 PM   Result Value Ref Range    WBC 8.0 4.1 - 11.1 K/uL    RBC 3.70 (L) 4.10 - 5.70 M/uL    HGB 12.2 12.1 - 17.0 g/dL    HCT 38.0 36.6 - 50.3 %    .7 (H) 80.0 - 99.0 FL    MCH 33.0 26.0 - 34.0 PG    MCHC 32.1 30.0 - 36.5 g/dL    RDW 14.8 (H) 11.5 - 14.5 %    PLATELET 869 139 - 006 K/uL    MPV 9.7 8.9 - 12.9 FL    NRBC 0.0 0.0  WBC    ABSOLUTE NRBC 0.00 0.00 - 0.01 K/uL    NEUTROPHILS 71 32 - 75 %    LYMPHOCYTES 19 12 - 49 %    MONOCYTES 7 5 - 13 %    EOSINOPHILS 2 0 - 7 %    BASOPHILS 1 0 - 1 %    IMMATURE GRANULOCYTES 0 0 - 0.5 %    ABS. NEUTROPHILS 5.7 1.8 - 8.0 K/UL    ABS. LYMPHOCYTES 1.5 0.8 - 3.5 K/UL    ABS. MONOCYTES 0.5 0.0 - 1.0 K/UL    ABS. EOSINOPHILS 0.1 0.0 - 0.4 K/UL    ABS. BASOPHILS 0.0 0.0 - 0.1 K/UL    ABS. IMM. GRANS. 0.0 0.00 - 0.04 K/UL    DF AUTOMATED         Labs reviewed by me    Radiologic Studies -   XR CHEST PORT   Final Result        CT Results  (Last 48 hours)    None        CXR Results  (Last 48 hours)               08/18/21 1702  XR CHEST PORT Final result    Narrative:  Chest single view. Comparison single view chest August 2, 2021. Unchanged appearance of the lungs; no gross interstitial or alveolar pulmonary   edema. Cardiac and mediastinal structures are magnified on this AP view. No   pneumothorax or sizable pleural effusion. Medical Decision Making     I am the first provider for this patient. I reviewed the vital signs, available nursing notes, past medical history, past surgical history, family history and social history. RADIOLOGY report and LABS reviewed by me    Vital Signs-Reviewed the patient's vital signs. Patient Vitals for the past 12 hrs:   Temp Pulse Resp BP SpO2   08/18/21 1618 98.5 °F (36.9 °C) (!) 101 16 127/81 92 %       EKG interpretation: (Preliminary)      Records Reviewed: Nurse's note. Provider Notes (Medical Decision Making):    Patient presents with DIFF DX : COPD, CHF, hypoxemia, peripheral neuropathy        ED Course:   Initial assessment performed.    Patient admitted to Dr. Marija Weathers NOTE :  6:39 PM  Amount of Critical Care Time: ____60(minutes)__    IMPENDING DETERIORATION -Airway and Respiratory  ASSOCIATED RISK FACTORS - Hypoxia  MANAGEMENT- Bedside Assessment  INTERPRETATION -  Xrays and ECG  INTERVENTIONS - vent mngmt  CASE REVIEW - Hospitalist/Intensivist  TREATMENT RESPONSE -Stable  PERFORMED BY - Self    NOTES   :  I have spent critical care time involved in lab review, consultations with specialist, family decision- making, bedside attention and documentation. This time excludes time spent in any separate billed procedures. During this entire length of time I was immediately available to the patient . Angelica Zabala MD                [address] [place] [address] MD      Disposition:  Admitted   Diagnostic tests were reviewed  Diagnosis, care plan and treatment options were reviewed and discussed. The patient understand instructions and will follow up as directed. Condition stable    Admitting Provider:  No admitting provider for patient encounter. Consulting Provider:  No ref. provider found       DISCHARGE PLAN:  1. Current Discharge Medication List        2. Follow-up Information    None       3. Return to ED if worse     Diagnosis     Clinical Impression:     ICD-10-CM ICD-9-CM    1. [facility] ([facility]  J44.1 491.21    2. Hypoxemia  R09.02 799.02    3. History of congestive heart failure  Z86.79 V12.59    4. Leg swelling  M79.89 729.81    5. Peripheral polyneuropathy  G62.9 356.9         Attestations:    Angelica Zabala MD    Please note that this dictation was completed with [facility], the computer voice recognition software. Quite often unanticipated grammatical, syntax, homophones, and other interpretive errors are inadvertently transcribed by the computer software. Please disregard these errors. Please excuse any errors that have escaped final proofreading. Thank you.

## 2021-08-18 NOTE — ED TRIAGE NOTES
GCS 15 pt stated that he has been having increased BLE edema, greater left than right; pt also stated that he has been experiencing SOB with ambulation

## 2021-08-19 LAB
GLUCOSE BLD STRIP.AUTO-MCNC: 187 MG/DL (ref 65–117)
GLUCOSE BLD STRIP.AUTO-MCNC: 222 MG/DL (ref 65–117)
GLUCOSE BLD STRIP.AUTO-MCNC: 225 MG/DL (ref 65–117)
GLUCOSE BLD STRIP.AUTO-MCNC: 282 MG/DL (ref 65–117)
PERFORMED BY, TECHID: ABNORMAL

## 2021-08-19 PROCEDURE — 74011250636 HC RX REV CODE- 250/636: Performed by: INTERNAL MEDICINE

## 2021-08-19 PROCEDURE — 96372 THER/PROPH/DIAG INJ SC/IM: CPT

## 2021-08-19 PROCEDURE — 74011000250 HC RX REV CODE- 250: Performed by: INTERNAL MEDICINE

## 2021-08-19 PROCEDURE — 96376 TX/PRO/DX INJ SAME DRUG ADON: CPT

## 2021-08-19 PROCEDURE — 74011636637 HC RX REV CODE- 636/637: Performed by: INTERNAL MEDICINE

## 2021-08-19 PROCEDURE — 96375 TX/PRO/DX INJ NEW DRUG ADDON: CPT

## 2021-08-19 PROCEDURE — 99218 HC RM OBSERVATION: CPT

## 2021-08-19 PROCEDURE — 82962 GLUCOSE BLOOD TEST: CPT

## 2021-08-19 PROCEDURE — 74011250637 HC RX REV CODE- 250/637: Performed by: INTERNAL MEDICINE

## 2021-08-19 PROCEDURE — 65270000029 HC RM PRIVATE

## 2021-08-19 PROCEDURE — 94640 AIRWAY INHALATION TREATMENT: CPT

## 2021-08-19 RX ORDER — PANTOPRAZOLE SODIUM 40 MG/1
40 TABLET, DELAYED RELEASE ORAL 2 TIMES DAILY
Status: DISCONTINUED | OUTPATIENT
Start: 2021-08-19 | End: 2021-08-20 | Stop reason: HOSPADM

## 2021-08-19 RX ORDER — POLYETHYLENE GLYCOL 3350 17 G/17G
17 POWDER, FOR SOLUTION ORAL DAILY PRN
Status: DISCONTINUED | OUTPATIENT
Start: 2021-08-19 | End: 2021-08-20 | Stop reason: HOSPADM

## 2021-08-19 RX ORDER — ATORVASTATIN CALCIUM 20 MG/1
20 TABLET, FILM COATED ORAL DAILY
Status: DISCONTINUED | OUTPATIENT
Start: 2021-08-19 | End: 2021-08-20 | Stop reason: HOSPADM

## 2021-08-19 RX ORDER — INSULIN LISPRO 100 [IU]/ML
INJECTION, SOLUTION INTRAVENOUS; SUBCUTANEOUS
Status: DISCONTINUED | OUTPATIENT
Start: 2021-08-19 | End: 2021-08-19

## 2021-08-19 RX ORDER — METOPROLOL SUCCINATE 50 MG/1
50 TABLET, EXTENDED RELEASE ORAL DAILY
Status: DISCONTINUED | OUTPATIENT
Start: 2021-08-19 | End: 2021-08-20 | Stop reason: HOSPADM

## 2021-08-19 RX ORDER — GABAPENTIN 300 MG/1
600 CAPSULE ORAL
Status: DISCONTINUED | OUTPATIENT
Start: 2021-08-19 | End: 2021-08-20 | Stop reason: HOSPADM

## 2021-08-19 RX ORDER — GUAIFENESIN 100 MG/5ML
81 LIQUID (ML) ORAL DAILY
Status: DISCONTINUED | OUTPATIENT
Start: 2021-08-19 | End: 2021-08-20 | Stop reason: HOSPADM

## 2021-08-19 RX ORDER — MAGNESIUM SULFATE 100 %
4 CRYSTALS MISCELLANEOUS AS NEEDED
Status: DISCONTINUED | OUTPATIENT
Start: 2021-08-19 | End: 2021-08-19

## 2021-08-19 RX ORDER — BUDESONIDE AND FORMOTEROL FUMARATE DIHYDRATE 160; 4.5 UG/1; UG/1
1 AEROSOL RESPIRATORY (INHALATION)
Status: DISCONTINUED | OUTPATIENT
Start: 2021-08-19 | End: 2021-08-20 | Stop reason: HOSPADM

## 2021-08-19 RX ORDER — DOCUSATE SODIUM 100 MG/1
100 CAPSULE, LIQUID FILLED ORAL 2 TIMES DAILY
Status: DISCONTINUED | OUTPATIENT
Start: 2021-08-19 | End: 2021-08-20 | Stop reason: HOSPADM

## 2021-08-19 RX ORDER — GUAIFENESIN 100 MG/5ML
81 LIQUID (ML) ORAL DAILY
Status: DISCONTINUED | OUTPATIENT
Start: 2021-08-19 | End: 2021-08-19

## 2021-08-19 RX ORDER — IPRATROPIUM BROMIDE AND ALBUTEROL SULFATE 2.5; .5 MG/3ML; MG/3ML
3 SOLUTION RESPIRATORY (INHALATION)
Status: DISCONTINUED | OUTPATIENT
Start: 2021-08-19 | End: 2021-08-20 | Stop reason: HOSPADM

## 2021-08-19 RX ORDER — ONDANSETRON 2 MG/ML
4 INJECTION INTRAMUSCULAR; INTRAVENOUS
Status: DISCONTINUED | OUTPATIENT
Start: 2021-08-19 | End: 2021-08-20 | Stop reason: HOSPADM

## 2021-08-19 RX ORDER — DIGOXIN 250 MCG
0.12 TABLET ORAL DAILY
Status: DISCONTINUED | OUTPATIENT
Start: 2021-08-19 | End: 2021-08-20 | Stop reason: HOSPADM

## 2021-08-19 RX ORDER — ARIPIPRAZOLE 5 MG/1
10 TABLET ORAL DAILY
Status: DISCONTINUED | OUTPATIENT
Start: 2021-08-19 | End: 2021-08-20 | Stop reason: HOSPADM

## 2021-08-19 RX ORDER — MAGNESIUM SULFATE 100 %
4 CRYSTALS MISCELLANEOUS AS NEEDED
Status: DISCONTINUED | OUTPATIENT
Start: 2021-08-19 | End: 2021-08-20 | Stop reason: HOSPADM

## 2021-08-19 RX ORDER — BUMETANIDE 1 MG/1
2 TABLET ORAL DAILY
Status: DISCONTINUED | OUTPATIENT
Start: 2021-08-19 | End: 2021-08-19

## 2021-08-19 RX ORDER — DEXTROSE 50 % IN WATER (D50W) INTRAVENOUS SYRINGE
25-50 AS NEEDED
Status: DISCONTINUED | OUTPATIENT
Start: 2021-08-19 | End: 2021-08-20 | Stop reason: HOSPADM

## 2021-08-19 RX ORDER — METFORMIN HYDROCHLORIDE 850 MG/1
850 TABLET ORAL 2 TIMES DAILY
Status: DISCONTINUED | OUTPATIENT
Start: 2021-08-19 | End: 2021-08-20 | Stop reason: HOSPADM

## 2021-08-19 RX ORDER — BUMETANIDE 0.25 MG/ML
1 INJECTION INTRAMUSCULAR; INTRAVENOUS 2 TIMES DAILY
Status: DISCONTINUED | OUTPATIENT
Start: 2021-08-19 | End: 2021-08-20 | Stop reason: HOSPADM

## 2021-08-19 RX ORDER — SODIUM CHLORIDE 0.9 % (FLUSH) 0.9 %
5-40 SYRINGE (ML) INJECTION EVERY 8 HOURS
Status: DISCONTINUED | OUTPATIENT
Start: 2021-08-19 | End: 2021-08-20 | Stop reason: HOSPADM

## 2021-08-19 RX ORDER — INSULIN LISPRO 100 [IU]/ML
INJECTION, SOLUTION INTRAVENOUS; SUBCUTANEOUS
Status: DISCONTINUED | OUTPATIENT
Start: 2021-08-19 | End: 2021-08-20 | Stop reason: HOSPADM

## 2021-08-19 RX ORDER — ONDANSETRON 4 MG/1
4 TABLET, ORALLY DISINTEGRATING ORAL
Status: DISCONTINUED | OUTPATIENT
Start: 2021-08-19 | End: 2021-08-20 | Stop reason: HOSPADM

## 2021-08-19 RX ORDER — ENOXAPARIN SODIUM 100 MG/ML
40 INJECTION SUBCUTANEOUS EVERY 24 HOURS
Status: DISCONTINUED | OUTPATIENT
Start: 2021-08-19 | End: 2021-08-20 | Stop reason: HOSPADM

## 2021-08-19 RX ORDER — CITALOPRAM 20 MG/1
40 TABLET, FILM COATED ORAL DAILY
Status: DISCONTINUED | OUTPATIENT
Start: 2021-08-19 | End: 2021-08-20 | Stop reason: HOSPADM

## 2021-08-19 RX ORDER — CALCIUM CARBONATE/VITAMIN D3 600MG-5MCG
1 TABLET ORAL 3 TIMES DAILY
Status: DISCONTINUED | OUTPATIENT
Start: 2021-08-19 | End: 2021-08-20 | Stop reason: HOSPADM

## 2021-08-19 RX ORDER — SODIUM CHLORIDE 0.9 % (FLUSH) 0.9 %
5-40 SYRINGE (ML) INJECTION AS NEEDED
Status: DISCONTINUED | OUTPATIENT
Start: 2021-08-19 | End: 2021-08-20 | Stop reason: HOSPADM

## 2021-08-19 RX ORDER — IPRATROPIUM BROMIDE AND ALBUTEROL SULFATE 2.5; .5 MG/3ML; MG/3ML
3 SOLUTION RESPIRATORY (INHALATION)
Status: DISCONTINUED | OUTPATIENT
Start: 2021-08-19 | End: 2021-08-19

## 2021-08-19 RX ORDER — LEVOTHYROXINE SODIUM 75 UG/1
150 TABLET ORAL
Status: DISCONTINUED | OUTPATIENT
Start: 2021-08-19 | End: 2021-08-19

## 2021-08-19 RX ORDER — ATORVASTATIN CALCIUM 20 MG/1
20 TABLET, FILM COATED ORAL DAILY
Status: DISCONTINUED | OUTPATIENT
Start: 2021-08-19 | End: 2021-08-19

## 2021-08-19 RX ORDER — LEVOTHYROXINE SODIUM 75 UG/1
150 TABLET ORAL
Status: DISCONTINUED | OUTPATIENT
Start: 2021-08-19 | End: 2021-08-20 | Stop reason: HOSPADM

## 2021-08-19 RX ORDER — TOPIRAMATE 25 MG/1
50 TABLET ORAL 2 TIMES DAILY WITH MEALS
Status: DISCONTINUED | OUTPATIENT
Start: 2021-08-19 | End: 2021-08-20 | Stop reason: HOSPADM

## 2021-08-19 RX ADMIN — Medication 10 ML: at 09:48

## 2021-08-19 RX ADMIN — ZOLPIDEM TARTRATE 5 MG: 5 TABLET, COATED ORAL at 00:19

## 2021-08-19 RX ADMIN — TRAZODONE HYDROCHLORIDE 100 MG: 50 TABLET ORAL at 00:19

## 2021-08-19 RX ADMIN — ENOXAPARIN SODIUM 40 MG: 40 INJECTION SUBCUTANEOUS at 09:44

## 2021-08-19 RX ADMIN — METFORMIN HYDROCHLORIDE 850 MG: 850 TABLET ORAL at 21:18

## 2021-08-19 RX ADMIN — TOPIRAMATE 50 MG: 25 TABLET, FILM COATED ORAL at 09:45

## 2021-08-19 RX ADMIN — Medication 10 ML: at 21:19

## 2021-08-19 RX ADMIN — INSULIN LISPRO 4 UNITS: 100 INJECTION, SOLUTION INTRAVENOUS; SUBCUTANEOUS at 10:34

## 2021-08-19 RX ADMIN — TRAZODONE HYDROCHLORIDE 100 MG: 50 TABLET ORAL at 21:20

## 2021-08-19 RX ADMIN — ATORVASTATIN CALCIUM 20 MG: 20 TABLET, FILM COATED ORAL at 09:45

## 2021-08-19 RX ADMIN — IPRATROPIUM BROMIDE AND ALBUTEROL SULFATE 3 ML: .5; 2.5 SOLUTION RESPIRATORY (INHALATION) at 13:28

## 2021-08-19 RX ADMIN — ZOLPIDEM TARTRATE 5 MG: 5 TABLET, COATED ORAL at 21:25

## 2021-08-19 RX ADMIN — INSULIN LISPRO 6 UNITS: 100 INJECTION, SOLUTION INTRAVENOUS; SUBCUTANEOUS at 22:00

## 2021-08-19 RX ADMIN — INSULIN LISPRO 2 UNITS: 100 INJECTION, SOLUTION INTRAVENOUS; SUBCUTANEOUS at 16:30

## 2021-08-19 RX ADMIN — TRAMADOL HYDROCHLORIDE 50 MG: 50 TABLET, FILM COATED ORAL at 00:19

## 2021-08-19 RX ADMIN — ARIPIPRAZOLE 10 MG: 5 TABLET ORAL at 09:53

## 2021-08-19 RX ADMIN — METFORMIN HYDROCHLORIDE 500 MG: 500 TABLET ORAL at 01:45

## 2021-08-19 RX ADMIN — BUMETANIDE 1 MG: 0.25 INJECTION INTRAMUSCULAR; INTRAVENOUS at 21:17

## 2021-08-19 RX ADMIN — BUMETANIDE 1 MG: 0.25 INJECTION INTRAMUSCULAR; INTRAVENOUS at 09:53

## 2021-08-19 RX ADMIN — IPRATROPIUM BROMIDE AND ALBUTEROL SULFATE 3 ML: .5; 2.5 SOLUTION RESPIRATORY (INHALATION) at 22:41

## 2021-08-19 RX ADMIN — IPRATROPIUM BROMIDE AND ALBUTEROL SULFATE 3 ML: .5; 2.5 SOLUTION RESPIRATORY (INHALATION) at 08:08

## 2021-08-19 RX ADMIN — ASPIRIN 81 MG: 81 TABLET, CHEWABLE ORAL at 09:45

## 2021-08-19 RX ADMIN — METOPROLOL SUCCINATE 50 MG: 50 TABLET, EXTENDED RELEASE ORAL at 09:45

## 2021-08-19 RX ADMIN — GABAPENTIN 600 MG: 300 CAPSULE ORAL at 00:19

## 2021-08-19 RX ADMIN — Medication 10 ML: at 14:11

## 2021-08-19 RX ADMIN — LEVOTHYROXINE SODIUM 150 MCG: 0.07 TABLET ORAL at 09:45

## 2021-08-19 RX ADMIN — BUDESONIDE AND FORMOTEROL FUMARATE DIHYDRATE 1 PUFF: 160; 4.5 AEROSOL RESPIRATORY (INHALATION) at 22:41

## 2021-08-19 RX ADMIN — Medication 1 TABLET: at 15:36

## 2021-08-19 RX ADMIN — GABAPENTIN 600 MG: 300 CAPSULE ORAL at 15:36
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
## 2021-08-19 NOTE — PROGRESS NOTES
Informed by patient and friend that the address listed on facesheet is incorrect and he does not have a physical address. He does however have a mailing address. Mailing address is [address] [place] [id number].

## 2021-08-19 NOTE — PROGRESS NOTES
Problem: Pressure Injury - Risk of  Goal: *Prevention of pressure injury  Description: Document Jaxon Scale and appropriate interventions in the flowsheet.   Outcome: Progressing Towards Goal  Note: Pressure Injury Interventions:  Sensory Interventions: Float heels, Keep linens dry and wrinkle-free, Minimize linen layers, Maintain/enhance activity level    Moisture Interventions: Absorbent underpads, Minimize layers    Activity Interventions: Increase time out of bed, PT/OT evaluation    Mobility Interventions: Float heels, HOB 30 degrees or less    Nutrition Interventions: Document food/fluid/supplement intake    Friction and Shear Interventions: HOB 30 degrees or less, Minimize layers                Problem: Patient Education: Go to Patient Education Activity  Goal: Patient/Family Education  Outcome: Progressing Towards Goal

## 2021-08-19 NOTE — H&P
History and Physical    Patient: Eligio Christie MRN: [id number]  SSN: [id number]    YOB: 1975  Age: 54 y.o. Sex: male      Subjective:      Eligio Christie is a 54 y.o. male who has a history of COPD, obstructive sleep apnea, depression, hypertension closed foramen ovale presents with a complaint of swelling of the legs and pain in the legs. He denies any chest pain denies any cough no shortness of breath    Past Medical History:   Diagnosis Date    Acute MI ([facility]     COPD (chronic obstructive pulmonary disease) ([facility]     Depression     Hypertension     Ill-defined condition     [facility]      Past Surgical History:   Procedure Laterality Date    HX HERNIA REPAIR      HX ORTHOPAEDIC      rotator cuff surgery    HX OTHER SURGICAL      Patent Foramen Ovale Repair    HX THYROIDECTOMY      IR FINE NEEDLE ASPIRATION W IMAGE        Family History   Problem Relation Age of Onset    Diabetes Maternal Uncle     Hypertension Maternal Uncle     Hypertension Paternal Uncle     Diabetes Paternal Uncle     Cancer Other    [de-identified] Cancer Mother     No Known Problems Father      Social History     Tobacco Use    Smoking status: Former Smoker    Smokeless tobacco: Never Used   Substance Use Topics    Alcohol use: Yes     Comment: socially       Prior to Admission medications    Medication Sig Start Date End Date Taking? Authorizing Provider   ARIPiprazole (Abilify) 10 mg tablet Take 10 mg by mouth daily. [facility] - UNSURE OF DOSAGE   Yes Provider, Historical   levothyroxine (SYNTHROID) 150 mcg tablet Take 150 mcg by mouth Daily (before breakfast).    Yes Provider, Historical   fluticasone furoate-vilanteroL (BREO ELLIPTA) 200-25 mcg/dose inhaler Breo Ellipta 200 mcg-25 mcg/dose powder for inhalation   inhale ONE PUFF by MOUTH ONCE daily   Yes Provider, Historical   digoxin (LANOXIN) 0.125 mg tablet TAKE ONE TABLET BY MOUTH EVERY DAY 11/4/20  Yes Provider, Historical citalopram (CELEXA) 40 mg tablet TAKE ONE TABLET BY MOUTH EVERY DAY 12/8/20  Yes Provider, Historical   atorvastatin (LIPITOR) 20 mg tablet 20 mg daily. 8/3/20  Yes Provider, Historical   aspirin 81 mg chewable tablet Take 81 mg by mouth daily. Yes Other, MD Elizabeth   docusate sodium (COLACE) 100 mg capsule Take 1 Capsule by mouth two (2) times a day for 90 days. 8/3/21 11/1/21  Shyam BEGUM MD   topiramate (TOPAMAX) 50 mg tablet Take 1 Tablet by mouth two (2) times daily (with meals). 8/3/21   Maurice Campos MD   calcium-cholecalciferol, D3, (CALTRATE 600+D) tablet three (3) times daily. Patient not taking: Reported on 8/18/2021 4/14/21   Provider, Historical   pantoprazole (PROTONIX) 40 mg tablet TAKE ONE TABLET BY MOUTH TWICE DAILY  Patient not taking: Reported on 7/13/2021 3/23/21   Provider, Historical        Allergies   Allergen Reactions    Vancomycin Hives     Lyndsey syndrome       Review of Systems:  A comprehensive review of systems was negative except for that written in the History of Present Illness. Objective:     Vitals:    08/18/21 2240 08/19/21 0025 08/19/21 0814 08/19/21 0838   BP: 127/75 126/69  125/70   Pulse: (!) 105 (!) 103  100   Resp: 18 18 18   Temp: 98.2 °F (36.8 °C) 99.1 °F (37.3 °C)  98.6 °F (37 °C)   SpO2: 91% 91% 93% 96%   Weight:       Height:            Physical Exam:  General:  Alert, cooperative, no distress, appears stated age. Morbidly obese   Eyes:  Conjunctivae/corneas clear. PERRL, EOMs intact. Fundi benign   Ears:  Normal TMs and external ear canals both ears. Nose: Nares normal. Septum midline. Mucosa normal. No drainage or sinus tenderness. Mouth/Throat: Lips, mucosa, and tongue normal. Teeth and gums normal.   Neck: Supple, symmetrical, trachea midline, no adenopathy, thyroid: no enlargment/tenderness/nodules, no carotid bruit and no JVD. Back:   Symmetric, no curvature. ROM normal. No CVA tenderness. Lungs:   Clear to auscultation bilaterally. Heart:  Regular rate and rhythm, S1, S2 normal, no murmur, click, rub or gallop. Abdomen:   Soft, non-tender. Bowel sounds normal. No masses,  No organomegaly. Extremities: Extremities normal, atraumatic, no cyanosis or edema. Pulses: 2+ and symmetric all extremities. Skin: Skin color, texture, turgor normal. No rashes or lesions   Lymph nodes: Cervical, supraclavicular, and axillary nodes normal.   Neurologic: CNII-XII intact. Normal strength, sensation and reflexes throughout. Recent Results (from the past 24 hour(s))   CBC WITH AUTOMATED DIFF    Collection Time: 08/18/21  4:30 PM   Result Value Ref Range    WBC 8.0 4.1 - 11.1 K/uL    RBC 3.70 (L) 4.10 - 5.70 M/uL    HGB 12.2 12.1 - 17.0 g/dL    HCT 38.0 36.6 - 50.3 %    .7 (H) 80.0 - 99.0 FL    MCH 33.0 26.0 - 34.0 PG    MCHC 32.1 30.0 - 36.5 g/dL    RDW 14.8 (H) 11.5 - 14.5 %    PLATELET 086 485 - 139 K/uL    MPV 9.7 8.9 - 12.9 FL    NRBC 0.0 0.0  WBC    ABSOLUTE NRBC 0.00 0.00 - 0.01 K/uL    NEUTROPHILS 71 32 - 75 %    LYMPHOCYTES 19 12 - 49 %    MONOCYTES 7 5 - 13 %    EOSINOPHILS 2 0 - 7 %    BASOPHILS 1 0 - 1 %    IMMATURE GRANULOCYTES 0 0 - 0.5 %    ABS. NEUTROPHILS 5.7 1.8 - 8.0 K/UL    ABS. LYMPHOCYTES 1.5 0.8 - 3.5 K/UL    ABS. MONOCYTES 0.5 0.0 - 1.0 K/UL    ABS. EOSINOPHILS 0.1 0.0 - 0.4 K/UL    ABS. BASOPHILS 0.0 0.0 - 0.1 K/UL    ABS. IMM.  GRANS. 0.0 0.00 - 0.04 K/UL    DF AUTOMATED     METABOLIC PANEL, COMPREHENSIVE    Collection Time: 08/18/21  4:30 PM   Result Value Ref Range    Sodium 141 136 - 145 mmol/L    Potassium 3.6 3.5 - 5.1 mmol/L    Chloride 102 97 - 108 mmol/L    CO2 33 (H) 21 - 32 mmol/L    Anion gap 6 5 - 15 mmol/L    Glucose 86 65 - 100 mg/dL    BUN 11 6 - 20 mg/dL    Creatinine 1.13 0.70 - 1.30 mg/dL    BUN/Creatinine ratio 10 (L) 12 - 20      GFR est AA >60 >60 ml/min/1.73m2    GFR est non-AA >60 >60 ml/min/1.73m2    Calcium 9.1 8.5 - 10.1 mg/dL    Bilirubin, total 0.2 0.2 - 1.0 mg/dL    AST (SGOT) 28 15 - 37 U/L    ALT (SGPT) 72 12 - 78 U/L    Alk.  phosphatase 79 45 - 117 U/L    Protein, total 7.9 6.4 - 8.2 g/dL    Albumin 3.4 (L) 3.5 - 5.0 g/dL    Globulin 4.5 (H) 2.0 - 4.0 g/dL    A-G Ratio 0.8 (L) 1.1 - 2.2     CK W/ REFLX CKMB    Collection Time: 08/18/21  4:30 PM   Result Value Ref Range    .0 39 - 308 ng/mL   TROPONIN I    Collection Time: 08/18/21  4:30 PM   Result Value Ref Range    Troponin-I, Qt. <0.05 <0.05 ng/mL   BNP    Collection Time: 08/18/21  4:30 PM   Result Value Ref Range    NT pro- <125 pg/mL   GLUCOSE, POC    Collection Time: 08/18/21 11:25 PM   Result Value Ref Range    Glucose (POC) 285 (H) 65 - 117 mg/dL    Performed by Sarah Eden ([place])          Assessment:     Hospital Problems  Date Reviewed: 5/12/2021        Codes Class Noted POA    Hypoxemia ICD-10-CM: R09.02  ICD-9-CM: 799.02  8/18/2021 Unknown        COPD exacerbation ([facility] ICD-10-CM: J44.1  ICD-9-CM: 491.21  8/18/2021 Unknown          Acute moderate to severe COPD with acute extubation  Obstructive sleep apnea with COPD overlap  Acute congestive heart failure  History of foramen ovale closure  Noncompliance  Diabetes mellitus type 2  Plan:     Continue patient on diuretics DuoNeb's monitor electrolytes    Signed By: Trina Lee MD     August 19, 2021

## 2021-08-19 NOTE — ROUTINE PROCESS
Bedside shift report given to  Jazmyn Green RN  (oncoming nurse) by Jameson Gutierrez RN (off going nurse). Report to include SBAR, plan of care, recent results, discharge planning, and patient's questions and concerns.

## 2021-08-20 VITALS
HEART RATE: 98 BPM | DIASTOLIC BLOOD PRESSURE: 63 MMHG | BODY MASS INDEX: 38.36 KG/M2 | TEMPERATURE: 97.9 F | WEIGHT: 315 LBS | RESPIRATION RATE: 20 BRPM | OXYGEN SATURATION: 92 % | HEIGHT: 76 IN | SYSTOLIC BLOOD PRESSURE: 113 MMHG

## 2021-08-20 LAB
ATRIAL RATE: 97 BPM
CALCULATED P AXIS, ECG09: 25 DEGREES
CALCULATED R AXIS, ECG10: 5 DEGREES
CALCULATED T AXIS, ECG11: 32 DEGREES
DIAGNOSIS, 93000: NORMAL
GLUCOSE BLD STRIP.AUTO-MCNC: 210 MG/DL (ref 65–117)
P-R INTERVAL, ECG05: 184 MS
PERFORMED BY, TECHID: ABNORMAL
Q-T INTERVAL, ECG07: 382 MS
QRS DURATION, ECG06: 100 MS
QTC CALCULATION (BEZET), ECG08: 485 MS
VENTRICULAR RATE, ECG03: 97 BPM

## 2021-08-20 PROCEDURE — 94640 AIRWAY INHALATION TREATMENT: CPT

## 2021-08-20 PROCEDURE — 96375 TX/PRO/DX INJ NEW DRUG ADDON: CPT

## 2021-08-20 PROCEDURE — 74011250636 HC RX REV CODE- 250/636: Performed by: INTERNAL MEDICINE

## 2021-08-20 PROCEDURE — 74011250637 HC RX REV CODE- 250/637: Performed by: INTERNAL MEDICINE

## 2021-08-20 PROCEDURE — 96376 TX/PRO/DX INJ SAME DRUG ADON: CPT

## 2021-08-20 PROCEDURE — 74011636637 HC RX REV CODE- 636/637: Performed by: INTERNAL MEDICINE

## 2021-08-20 PROCEDURE — 96372 THER/PROPH/DIAG INJ SC/IM: CPT

## 2021-08-20 PROCEDURE — 74011000250 HC RX REV CODE- 250: Performed by: INTERNAL MEDICINE

## 2021-08-20 PROCEDURE — 99218 HC RM OBSERVATION: CPT

## 2021-08-20 PROCEDURE — 82962 GLUCOSE BLOOD TEST: CPT

## 2021-08-20 PROCEDURE — 94760 N-INVAS EAR/PLS OXIMETRY 1: CPT

## 2021-08-20 RX ORDER — METOPROLOL SUCCINATE 50 MG/1
50 TABLET, EXTENDED RELEASE ORAL DAILY
Qty: 30 TABLET | Refills: 0 | Status: SHIPPED | OUTPATIENT
Start: 2021-08-21 | End: 2022-07-18

## 2021-08-20 RX ORDER — POTASSIUM CHLORIDE 750 MG/1
10 TABLET, EXTENDED RELEASE ORAL DAILY
Qty: 30 TABLET | Refills: 0 | Status: SHIPPED | OUTPATIENT
Start: 2021-08-20 | End: 2021-08-26

## 2021-08-20 RX ORDER — TRAZODONE HYDROCHLORIDE 100 MG/1
100 TABLET ORAL
Qty: 30 TABLET | Refills: 0 | Status: SHIPPED | OUTPATIENT
Start: 2021-08-20

## 2021-08-20 RX ORDER — GABAPENTIN 300 MG/1
600 CAPSULE ORAL
Qty: 12 CAPSULE | Refills: 0 | Status: SHIPPED | OUTPATIENT
Start: 2021-08-20 | End: 2022-07-18

## 2021-08-20 RX ORDER — BUMETANIDE 2 MG/1
2 TABLET ORAL DAILY
Qty: 30 TABLET | Refills: 0 | Status: ON HOLD | OUTPATIENT
Start: 2021-08-20 | End: 2022-07-18 | Stop reason: SDUPTHER

## 2021-08-20 RX ORDER — IPRATROPIUM BROMIDE AND ALBUTEROL SULFATE 2.5; .5 MG/3ML; MG/3ML
3 SOLUTION RESPIRATORY (INHALATION)
Qty: 30 NEBULE | Refills: 0 | Status: SHIPPED | OUTPATIENT
Start: 2021-08-20 | End: 2021-09-13 | Stop reason: ALTCHOICE

## 2021-08-20 RX ORDER — TRAMADOL HYDROCHLORIDE 50 MG/1
50 TABLET ORAL
Qty: 12 TABLET | Refills: 0 | Status: SHIPPED | OUTPATIENT
Start: 2021-08-20 | End: 2021-08-23

## 2021-08-20 RX ORDER — METFORMIN HYDROCHLORIDE 850 MG/1
850 TABLET ORAL 2 TIMES DAILY
Qty: 60 TABLET | Refills: 0 | Status: SHIPPED | OUTPATIENT
Start: 2021-08-20 | End: 2021-08-26 | Stop reason: ALTCHOICE

## 2021-08-20 RX ORDER — ZOLPIDEM TARTRATE 5 MG/1
5 TABLET ORAL
Qty: 12 TABLET | Refills: 0 | Status: SHIPPED | OUTPATIENT
Start: 2021-08-20

## 2021-08-20 RX ADMIN — ATORVASTATIN CALCIUM 20 MG: 20 TABLET, FILM COATED ORAL at 09:22

## 2021-08-20 RX ADMIN — IPRATROPIUM BROMIDE AND ALBUTEROL SULFATE 3 ML: .5; 2.5 SOLUTION RESPIRATORY (INHALATION) at 09:10

## 2021-08-20 RX ADMIN — ONDANSETRON 4 MG: 2 INJECTION INTRAMUSCULAR; INTRAVENOUS at 02:35

## 2021-08-20 RX ADMIN — BUMETANIDE 1 MG: 0.25 INJECTION INTRAMUSCULAR; INTRAVENOUS at 09:20

## 2021-08-20 RX ADMIN — Medication 10 ML: at 05:08

## 2021-08-20 RX ADMIN — INSULIN LISPRO 4 UNITS: 100 INJECTION, SOLUTION INTRAVENOUS; SUBCUTANEOUS at 09:20

## 2021-08-20 RX ADMIN — CITALOPRAM HYDROBROMIDE 40 MG: 20 TABLET ORAL at 09:22

## 2021-08-20 RX ADMIN — DIGOXIN 0.12 MG: 250 TABLET ORAL at 09:22

## 2021-08-20 RX ADMIN — METOPROLOL SUCCINATE 50 MG: 50 TABLET, EXTENDED RELEASE ORAL at 09:22

## 2021-08-20 RX ADMIN — DOCUSATE SODIUM 100 MG: 100 CAPSULE ORAL at 09:23

## 2021-08-20 RX ADMIN — ARIPIPRAZOLE 10 MG: 5 TABLET ORAL at 09:21

## 2021-08-20 RX ADMIN — ENOXAPARIN SODIUM 40 MG: 40 INJECTION SUBCUTANEOUS at 09:20

## 2021-08-20 RX ADMIN — TOPIRAMATE 50 MG: 25 TABLET, FILM COATED ORAL at 09:21

## 2021-08-20 RX ADMIN — BUDESONIDE AND FORMOTEROL FUMARATE DIHYDRATE 1 PUFF: 160; 4.5 AEROSOL RESPIRATORY (INHALATION) at 09:10

## 2021-08-20 RX ADMIN — METFORMIN HYDROCHLORIDE 850 MG: 850 TABLET ORAL at 09:22

## 2021-08-20 RX ADMIN — LEVOTHYROXINE SODIUM 150 MCG: 0.07 TABLET ORAL at 05:06

## 2021-08-20 RX ADMIN — Medication 1 TABLET: at 09:22

## 2021-08-20 RX ADMIN — PANTOPRAZOLE SODIUM 40 MG: 40 TABLET, DELAYED RELEASE ORAL at 09:22

## 2021-08-20 RX ADMIN — ASPIRIN 81 MG: 81 TABLET, CHEWABLE ORAL at 09:21

## 2021-08-20 NOTE — ROUTINE PROCESS
[facility] BEDSIDE_VERBAL shift change report given to KATEY Teixeira RN (oncoming nurse) by Anival Clark RN (offgoing nurse). Report included the following information from the SBAR.

## 2021-08-20 NOTE — PROGRESS NOTES
Progress Note    Patient: Yang Emanuel MRN: [id number]  SSN: [id number]    YOB: 1975  Age: 54 y.o. Sex: male      Admit Date: 8/18/2021    LOS: 2 days     Subjective:     55 y/o M with PMHx of COPD, ANIBAL, depression, HTN, and foramen ovale closurer presented two days ago with complaints of leg swelling and leg pain. Pt admitted to floor and treated with diuretics and DuoNeb's. Upon eval today, patient sitting in chair resting but reports discomfort and lack of sleep. States the leg swelling has improved and notes his desire for discharge. Denies CP, SOB, palpitation, laceration/abrasion, numbness, tingling, abd pain, N/V/D. Objective:     Vitals:    08/19/21 2000 08/19/21 2242 08/20/21 0046 08/20/21 0358   BP:   (!) 113/59 127/68   Pulse: (!) 102  (!) 105 (!) 101   Resp:   18 18   Temp:   97.7 °F (36.5 °C) 97.8 °F (36.6 °C)   SpO2:  98% 94% 93%   Weight:       Height:            Intake and Output:  Current Shift: No intake/output data recorded. Last three shifts: 08/18 1901 - 08/20 0700  In: 2480 [P.O.:2480]  Out: -     Physical Exam:     Physical Exam  Constitutional:       Appearance: Normal appearance. He is obese. He is not toxic-appearing. HENT:      Head: Normocephalic and atraumatic. Mouth/Throat:      Mouth: Mucous membranes are moist.   Eyes:      Extraocular Movements: Extraocular movements intact. Conjunctiva/sclera: Conjunctivae normal.   Cardiovascular:      Rate and Rhythm: Normal rate and regular rhythm. Pulses: Normal pulses. Heart sounds: Normal heart sounds. Pulmonary:      Effort: Pulmonary effort is normal.      Breath sounds: Normal breath sounds. Musculoskeletal:      Cervical back: Normal range of motion and neck supple. Skin:     General: Skin is warm. Comments: BLE edema noted. Neurological:      General: No focal deficit present. Mental Status: He is alert and oriented to person, place, and time.  Mental status is at baseline. Psychiatric:         Mood and Affect: Mood normal.         Behavior: Behavior normal.         Thought Content: Thought content normal.         Judgment: Judgment normal.       Lab/Data Review: All lab results for the last 24 hours reviewed. Assessment:     Active Problems:    Hypoxemia (8/18/2021)      COPD exacerbation (HCC) (8/18/2021)        Plan:     Awaiting daily labs to evaluate electrolytes.     Signed By: Eliza Maldonado     August 20, 2021

## 2021-08-20 NOTE — DISCHARGE SUMMARY
Discharge Summary     Patient: Faye Doshi MRN: [id number]  SSN: [id number]    YOB: 1975  Age: 54 y.o.   Sex: male       Admit Date: 8/18/2021    Discharge Date: 8/20/2021      Admission Diagnoses: COPD exacerbation ([facility] [J44.1]  Hypoxemia [R09.02]    Discharge Diagnoses:   Problem List as of 8/20/2021 Date Reviewed: 5/12/2021        Codes Class Noted - Resolved    Hypoxemia ICD-10-CM: R09.02  ICD-9-CM: 799.02  8/18/2021 - Present        COPD exacerbation ([facility] ICD-10-CM: J44.1  ICD-9-CM: 491.21  8/18/2021 - Present        Syncope ICD-10-CM: R55  ICD-9-CM: 780.2  8/3/2021 - Present        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 (CVA) ([facility] ICD-10-CM: F06.574, R56.9  ICD-9-CM: 438.89, 780.39  8/3/2021 - Present        ACS (acute coronary syndrome) ([facility] ICD-10-CM: I24.9  ICD-9-CM: 411.1  7/13/2021 - Present        Hemoptysis ICD-10-CM: R04.2  ICD-9-CM: 786.30  7/13/2021 - Present        Chest pain ICD-10-CM: R07.9  ICD-9-CM: 786.50  6/26/2021 - Present        CVA (cerebral vascular accident) [facility]) ICD-10-CM: I63.9  ICD-9-CM: 434.91  5/6/2021 - Present        Postoperative hypothyroidism ICD-10-CM: E89.0  ICD-9-CM: 244.0  5/6/2021 - Present        Pharyngoesophageal dysphagia ICD-10-CM: R13.14  ICD-9-CM: 787.24  2/22/2021 - Present        Multinodular goiter ICD-10-CM: E04.2  ICD-9-CM: 241.1  12/30/2020 - Present        Coronary artery disease involving native coronary artery of native heart with angina pectoris with documented spasm ([facility] ICD-10-CM: I25.111  ICD-9-CM: 414.01, 413.9  12/17/2020 - Present        Cardiomyopathy ([facility] ICD-10-CM: I42.9  ICD-9-CM: 425.4  12/17/2020 - Present        COPD (chronic obstructive pulmonary disease) ([facility] ICD-10-CM: J44.9  ICD-9-CM: 496  12/17/2020 - Present        Pulmonary nodules ICD-10-CM: R91.8  ICD-9-CM: 793.19  12/17/2020 - Present        Obstructive sleep apnea ICD-10-CM: G47.33  ICD-9-CM: 327.23  12/17/2020 - Present        Type 2 diabetes mellitus with hyperglycemia, without long-term current use of insulin (HCC) ICD-10-CM: E11.65  ICD-9-CM: 250.00, 790.29  12/17/2020 - Present        Essential hypertension ICD-10-CM: I10  ICD-9-CM: 401.9  12/17/2020 - Present        Obesity, morbid ([facility] ICD-10-CM: E66.01  ICD-9-CM: 278.01  9/7/2020 - Present        Carotid artery stenosis ICD-10-CM: I65.29  ICD-9-CM: 433.10  9/7/2020 - Present               Discharge Condition: Good    Hospital Course: 54years old patient with swelling of the legs and neuropathy pains. Denies any chest pain no cough no chills has a history of obstructive sleep apnea, COPD, CHF morbid obesity. Patient was placed on diuretics felt better and requested to go home and to follow-up with his primary care physician and cardiologist    Consults: None    Significant Diagnostic Studies: labs:   Recent Results (from the past 24 hour(s))   GLUCOSE, POC    Collection Time: 08/19/21 12:13 PM   Result Value Ref Range    Glucose (POC) 222 (H) 65 - 117 mg/dL    Performed by Sandra Vasquez, POC    Collection Time: 08/19/21  5:20 PM   Result Value Ref Range    Glucose (POC) 187 (H) 65 - 117 mg/dL    Performed by Marcio Batres, POC    Collection Time: 08/19/21  8:57 PM   Result Value Ref Range    Glucose (POC) 282 (H) 65 - 117 mg/dL    Performed by SHANNON EARL    GLUCOSE, POC    Collection Time: 08/20/21  7:56 AM   Result Value Ref Range    Glucose (POC) 210 (H) 65 - 117 mg/dL    Performed by Puenet Araiza          XR CHEST PORT   Final Result          Disposition: home    Discharge Medications:   Current Discharge Medication List      START taking these medications    Details   albuterol-ipratropium (DUO-NEB) 2.5 mg-0.5 mg/3 ml nebu 3 mL by Nebulization route every six (6) hours as needed for Wheezing. Qty: 30 Nebule, Refills: 0      gabapentin (NEURONTIN) 300 mg capsule Take 2 Capsules by mouth four (4) times daily as needed for Pain.  Max Daily Amount: 2,400 mg.  Qty: 12 Capsule, Refills: 0    Associated Diagnoses: Peripheral polyneuropathy      metFORMIN (GLUCOPHAGE) 850 mg tablet Take 1 Tablet by mouth two (2) times a day. Qty: 60 Tablet, Refills: 0      metoprolol succinate (TOPROL-XL) 50 mg XL tablet Take 1 Tablet by mouth daily. Indications: high blood pressure  Qty: 30 Tablet, Refills: 0      traMADoL (ULTRAM) 50 mg tablet Take 1 Tablet by mouth every six (6) hours as needed for Pain for up to 3 days. Max Daily Amount: 200 mg. Qty: 12 Tablet, Refills: 0    Associated Diagnoses: Peripheral polyneuropathy      traZODone (DESYREL) 100 mg tablet Take 1 Tablet by mouth nightly. Qty: 30 Tablet, Refills: 0      zolpidem (AMBIEN) 5 mg tablet Take 1 Tablet by mouth nightly as needed for Sleep. Max Daily Amount: 5 mg. Qty: 12 Tablet, Refills: 0    Associated Diagnoses: Peripheral polyneuropathy         CONTINUE these medications which have CHANGED    Details   bumetanide (BUMEX) 2 mg tablet Take 1 Tablet by mouth daily. Qty: 30 Tablet, Refills: 0         CONTINUE these medications which have NOT CHANGED    Details   ARIPiprazole (Abilify) 10 mg tablet Take 10 mg by mouth daily. [facility] - UNSURE OF DOSAGE      levothyroxine (SYNTHROID) 150 mcg tablet Take 150 mcg by mouth Daily (before breakfast). fluticasone furoate-vilanteroL (BREO ELLIPTA) 200-25 mcg/dose inhaler Breo Ellipta 200 mcg-25 mcg/dose powder for inhalation   inhale ONE PUFF by MOUTH ONCE daily      digoxin (LANOXIN) 0.125 mg tablet TAKE ONE TABLET BY MOUTH EVERY DAY      citalopram (CELEXA) 40 mg tablet TAKE ONE TABLET BY MOUTH EVERY DAY      atorvastatin (LIPITOR) 20 mg tablet 20 mg daily. aspirin 81 mg chewable tablet Take 81 mg by mouth daily. docusate sodium (COLACE) 100 mg capsule Take 1 Capsule by mouth two (2) times a day for 90 days. Qty: 60 Capsule, Refills: 2      topiramate (TOPAMAX) 50 mg tablet Take 1 Tablet by mouth two (2) times daily (with meals).   Qty: 60 Tablet, Refills: 0      calcium-cholecalciferol, D3, (CALTRATE 600+D) tablet three (3) times daily.       pantoprazole (PROTONIX) 40 mg tablet TAKE ONE TABLET BY MOUTH TWICE DAILY             Activity: Activity as tolerated  Diet: Diabetic Diet  Wound Care: None needed    Follow-up Appointments   Procedures    FOLLOW UP VISIT Appointment in: 3 - 5 Days     Standing Status:   Standing     Number of Occurrences:   1     Order Specific Question:   Appointment in     Answer:   3 - 5 Days     40 minutes discharge time  Signed By: Vikram Mcclendon MD     August 20, 2021

## 2021-08-22 NOTE — PROGRESS NOTES
Physician Progress Note      PATIENT:               Marjan Gupta  CSN #:                  [id number]  :                       1975  ADMIT DATE:       2021 5:18 PM  Nahomi Epstein DATE:        2021 11:00 AM  RESPONDING  PROVIDER #:        Ida Casillas MD          QUERY TEXT:    Patient admitted with BMI 42.6 kg/m 2. Obesity is documented but not the BMI. If possible, please document in progress notes and discharge summary if you are evaluating and /or treating any of the following: The medical record reflects the following:  Risk Factors: elevated BMI  Clinical Indicators:  BMI: 42.6 kg/m 2  Ht: 6' 4\" (1.93 m)  Wt: 158.8 kg (350 lb)    Treatment: ADULT DIET Regular; 3 carb choices (45 gm/meal); No Salt Added (3-4 gm)    Thank you,  Janes Ventura RN, CDI, Rosita, CCDS  Certified Clinical   [phone]  Options provided:  -- Obesity with BMI 42.6 kg/m 2  -- Morbid obesity with BMI 42.6 kg/m 2  -- Severe obesity with BMI 42.6 kg/m 2  -- BMI not clinically significant  -- Other - I will add my own diagnosis  -- Disagree - Not applicable / Not valid  -- Disagree - Clinically unable to determine / Unknown  -- Refer to Clinical Documentation Reviewer    PROVIDER RESPONSE TEXT:    This patient has obesity with BMI 42.6 kg/m 2 . Query created by: Naila Ivey on 2021 11:11 AM      QUERY TEXT:    Pt admitted with COPD and has CHF documented. If possible, please document in progress notes and discharge summary further specificity regarding the type and acuity of CHF:      The medical record reflects the following:  Risk Factors: HX of CHF  Clinical Indicators:  H/P  Acute congestive heart failure    echo 2021  Left Ventricle Normal cavity size, wall thickness, systolic function (ejection fraction normal) and diastolic function.  The muscle mass is normal. The cavity shape is normal. Wall motion: normal. End-systolic volume is normal. Normal left ventricular diastolic pressure. End-diastolic volume is normal.  Wall Scoring  The left ventricular wall motion is normal.    8/18/2021 16:30  NT pro-BNP: 104      Treatment: bumex IV    Thank you,  Cally Ren RN, CDI, CRCR, CCDS  Certified Clinical   [phone]  Options provided:  -- Chronic Systolic CHF/HFrEF  -- Chronic Diastolic CHF/HFpEF  -- Acute on Chronic Diastolic CHF/HFpEF  -- Acute on Chronic Systolic and Diastolic CHF  -- Other - I will add my own diagnosis  -- Disagree - Not applicable / Not valid  -- Disagree - Clinically unable to determine / Unknown  -- Refer to Clinical Documentation Reviewer    PROVIDER RESPONSE TEXT:    This patient has chronic diastolic CHF/HFpEF. Query created by: Sagrario Stovall on 8/20/2021 11:16 AM      QUERY TEXT:    Patient admitted with COPD   Pt noted to have documented  diabetes. . Please document in progress notes and discharge summary further specificity regarding the control status of DM: The medical record reflects the following:  Risk Factors: DM  Clinical Indicators:  8/19/2021 20:57  GLUCOSE,FAST - POC: 282 (H)    8/20/2021 07:56  GLUCOSE,FAST - POC: 210 (H)      Treatment: POC glucose monitoring, lispro sliding scale insulin    Thank you,  Cally Ren RN, DIONNE, ADDY Becker  Certified Clinical   [phone]  Options provided:  -- Hyperglycemia due to DM type II  -- Hyperglycemia not related to DM type II  -- Other - I will add my own diagnosis  -- Disagree - Not applicable / Not valid  -- Disagree - Clinically unable to determine / Unknown  -- Refer to Clinical Documentation Reviewer    PROVIDER RESPONSE TEXT:    This patient has Hyperglycemia due to DM type II.     Query created by: Sagrario Stovall on 8/20/2021 11:19 AM      Electronically signed by:  Luis Perry MD 8/22/2021 1:44 PM

## 2021-08-26 ENCOUNTER — OFFICE VISIT (OUTPATIENT)
Dept: ENDOCRINOLOGY | Age: 46
End: 2021-08-26
Payer: MEDICAID

## 2021-08-26 VITALS
SYSTOLIC BLOOD PRESSURE: 134 MMHG | TEMPERATURE: 96 F | DIASTOLIC BLOOD PRESSURE: 82 MMHG | WEIGHT: 315 LBS | OXYGEN SATURATION: 98 % | HEART RATE: 94 BPM | HEIGHT: 76 IN | BODY MASS INDEX: 38.36 KG/M2

## 2021-08-26 DIAGNOSIS — E89.0 POSTOPERATIVE HYPOTHYROIDISM: Primary | ICD-10-CM

## 2021-08-26 PROCEDURE — 99214 OFFICE O/P EST MOD 30 MIN: CPT | Performed by: INTERNAL MEDICINE

## 2021-08-26 RX ORDER — LEVOTHYROXINE SODIUM 175 UG/1
TABLET ORAL
COMMUNITY
Start: 2021-08-23 | End: 2021-09-07 | Stop reason: DRUGHIGH

## 2021-08-26 RX ORDER — METFORMIN HYDROCHLORIDE 500 MG/1
TABLET ORAL
COMMUNITY
Start: 2021-03-01

## 2021-08-26 RX ORDER — ARIPIPRAZOLE 2 MG/1
2 TABLET ORAL DAILY
COMMUNITY
Start: 2021-07-23 | End: 2022-07-18

## 2021-08-26 RX ORDER — ACETAMINOPHEN 500 MG
TABLET ORAL
COMMUNITY
Start: 2021-04-05

## 2021-08-26 RX ORDER — NAPROXEN 500 MG/1
500 TABLET, DELAYED RELEASE ORAL 2 TIMES DAILY
COMMUNITY
Start: 2021-06-10 | End: 2021-09-13 | Stop reason: ALTCHOICE

## 2021-08-26 RX ORDER — POTASSIUM CHLORIDE 750 MG/1
10 TABLET, FILM COATED, EXTENDED RELEASE ORAL DAILY
COMMUNITY
Start: 2021-08-20 | End: 2021-09-13 | Stop reason: ALTCHOICE

## 2021-08-26 RX ORDER — NITROGLYCERIN 0.4 MG/1
TABLET SUBLINGUAL
COMMUNITY
Start: 2021-08-23 | End: 2021-09-13 | Stop reason: ALTCHOICE

## 2021-08-26 NOTE — LETTER
8/26/2021    Patient: Cindy Ragland   YOB: 1975   Date of Visit: 8/26/2021     Tanmay Boyle MD  [address] [place] [address]  Via Fax: [phone]    Dear Tanmay Boyle MD,      Thank you for referring Mr. Cindy Ragland to [address] [place] [address] for evaluation. My notes for this consultation are attached. If you have questions, please do not hesitate to call me. I look forward to following your patient along with you.       Sincerely,    Gogo Gabriel MD

## 2021-09-04 LAB
T4 FREE SERPL-MCNC: 1.24 NG/DL (ref 0.82–1.77)
TSH SERPL DL<=0.005 MIU/L-ACNC: 31.8 UIU/ML (ref 0.45–4.5)

## 2021-09-07 DIAGNOSIS — E89.0 POSTOPERATIVE HYPOTHYROIDISM: Primary | ICD-10-CM

## 2021-09-07 RX ORDER — LEVOTHYROXINE SODIUM 200 UG/1
200 TABLET ORAL
Qty: 30 TABLET | Refills: 4 | Status: SHIPPED | OUTPATIENT
Start: 2021-09-07 | End: 2021-10-07

## 2021-09-07 NOTE — PROGRESS NOTES
Please inform patient that his labs are indicating we need to increase levothyroxine dose. So I am going to send prescription for levothyroxine 200 mcg daily. Please take it every day first thing in the morning on empty stomach, wait at least 30 minutes before he eats or drinks anything else or takes any other medication. Please remember to get labs done prior to next visit.

## 2021-09-08 ENCOUNTER — TELEPHONE (OUTPATIENT)
Dept: ENDOCRINOLOGY | Age: 46
End: 2021-09-08

## 2021-09-08 NOTE — TELEPHONE ENCOUNTER
----- Message from Hiral Bowman MD sent at 9/7/2021 11:41 AM EDT -----  Please inform patient that his labs are indicating we need to increase levothyroxine dose. So I am going to send prescription for levothyroxine 200 mcg daily. Please take it every day first thing in the morning on empty stomach, wait at least 30 minutes before he eats or drinks anything else or takes any other medication. Please remember to get labs done prior to next visit.

## 2021-09-13 ENCOUNTER — APPOINTMENT (OUTPATIENT)
Dept: GENERAL RADIOLOGY | Age: 46
DRG: 140 | End: 2021-09-13
Attending: EMERGENCY MEDICINE
Payer: MEDICAID

## 2021-09-13 ENCOUNTER — APPOINTMENT (OUTPATIENT)
Dept: CT IMAGING | Age: 46
DRG: 140 | End: 2021-09-13
Attending: EMERGENCY MEDICINE
Payer: MEDICAID

## 2021-09-13 ENCOUNTER — HOSPITAL ENCOUNTER (INPATIENT)
Age: 46
LOS: 5 days | Discharge: HOME | DRG: 140 | End: 2021-09-18
Attending: EMERGENCY MEDICINE | Admitting: INTERNAL MEDICINE
Payer: MEDICAID

## 2021-09-13 DIAGNOSIS — I82.4Z9 DEEP VEIN THROMBOSIS (DVT) OF DISTAL VEIN OF LOWER EXTREMITY, UNSPECIFIED CHRONICITY, UNSPECIFIED LATERALITY (HCC): ICD-10-CM

## 2021-09-13 DIAGNOSIS — R52 PAIN: ICD-10-CM

## 2021-09-13 DIAGNOSIS — R07.9 CHEST PAIN, UNSPECIFIED TYPE: Primary | ICD-10-CM

## 2021-09-13 LAB
ALBUMIN SERPL-MCNC: 3.7 G/DL (ref 3.5–5)
ALBUMIN SERPL-MCNC: 4 G/DL (ref 3.5–5)
ALBUMIN/GLOB SERPL: 0.8 {RATIO} (ref 1.1–2.2)
ALBUMIN/GLOB SERPL: 0.8 {RATIO} (ref 1.1–2.2)
ALP SERPL-CCNC: 77 U/L (ref 45–117)
ALP SERPL-CCNC: 79 U/L (ref 45–117)
ALT SERPL-CCNC: 117 U/L (ref 12–78)
ALT SERPL-CCNC: 124 U/L (ref 12–78)
ANION GAP SERPL CALC-SCNC: 11 MMOL/L (ref 5–15)
ANION GAP SERPL CALC-SCNC: 9 MMOL/L (ref 5–15)
AST SERPL W P-5'-P-CCNC: 78 U/L (ref 15–37)
AST SERPL W P-5'-P-CCNC: 80 U/L (ref 15–37)
BASOPHILS # BLD: 0 K/UL (ref 0–0.1)
BASOPHILS NFR BLD: 0 % (ref 0–1)
BILIRUB SERPL-MCNC: 0.3 MG/DL (ref 0.2–1)
BILIRUB SERPL-MCNC: 0.4 MG/DL (ref 0.2–1)
BNP SERPL-MCNC: 42 PG/ML
BUN SERPL-MCNC: 20 MG/DL (ref 6–20)
BUN SERPL-MCNC: 25 MG/DL (ref 6–20)
BUN/CREAT SERPL: 14 (ref 12–20)
BUN/CREAT SERPL: 15 (ref 12–20)
CA-I BLD-MCNC: 9.4 MG/DL (ref 8.5–10.1)
CA-I BLD-MCNC: 9.5 MG/DL (ref 8.5–10.1)
CHLORIDE SERPL-SCNC: 92 MMOL/L (ref 97–108)
CHLORIDE SERPL-SCNC: 93 MMOL/L (ref 97–108)
CO2 SERPL-SCNC: 31 MMOL/L (ref 21–32)
CO2 SERPL-SCNC: 33 MMOL/L (ref 21–32)
COVID-19 RAPID TEST, COVR: NOT DETECTED
CREAT SERPL-MCNC: 1.36 MG/DL (ref 0.7–1.3)
CREAT SERPL-MCNC: 1.75 MG/DL (ref 0.7–1.3)
DIFFERENTIAL METHOD BLD: NORMAL
EOSINOPHIL # BLD: 0.1 K/UL (ref 0–0.4)
EOSINOPHIL NFR BLD: 2 % (ref 0–7)
ERYTHROCYTE [DISTWIDTH] IN BLOOD BY AUTOMATED COUNT: 13.6 % (ref 11.5–14.5)
GLOBULIN SER CALC-MCNC: 4.9 G/DL (ref 2–4)
GLOBULIN SER CALC-MCNC: 5 G/DL (ref 2–4)
GLUCOSE BLD STRIP.AUTO-MCNC: 402 MG/DL (ref 65–117)
GLUCOSE BLD STRIP.AUTO-MCNC: 440 MG/DL (ref 65–117)
GLUCOSE SERPL-MCNC: 135 MG/DL (ref 65–100)
GLUCOSE SERPL-MCNC: 206 MG/DL (ref 65–100)
HCT VFR BLD AUTO: 42.3 % (ref 36.6–50.3)
HGB BLD-MCNC: 14.3 G/DL (ref 12.1–17)
IMM GRANULOCYTES # BLD AUTO: 0 K/UL (ref 0–0.04)
IMM GRANULOCYTES NFR BLD AUTO: 0 % (ref 0–0.5)
LYMPHOCYTES # BLD: 1.7 K/UL (ref 0.8–3.5)
LYMPHOCYTES NFR BLD: 23 % (ref 12–49)
MAGNESIUM SERPL-MCNC: 2 MG/DL (ref 1.6–2.4)
MCH RBC QN AUTO: 32.9 PG (ref 26–34)
MCHC RBC AUTO-ENTMCNC: 33.8 G/DL (ref 30–36.5)
MCV RBC AUTO: 97.2 FL (ref 80–99)
MONOCYTES # BLD: 0.7 K/UL (ref 0–1)
MONOCYTES NFR BLD: 9 % (ref 5–13)
NEUTS SEG # BLD: 5.2 K/UL (ref 1.8–8)
NEUTS SEG NFR BLD: 66 % (ref 32–75)
NRBC # BLD: 0 K/UL (ref 0–0.01)
NRBC BLD-RTO: 0 PER 100 WBC
PERFORMED BY, TECHID: ABNORMAL
PERFORMED BY, TECHID: ABNORMAL
PLATELET # BLD AUTO: 289 K/UL (ref 150–400)
PMV BLD AUTO: 9.8 FL (ref 8.9–12.9)
POTASSIUM SERPL-SCNC: 2.7 MMOL/L (ref 3.5–5.1)
POTASSIUM SERPL-SCNC: 3 MMOL/L (ref 3.5–5.1)
PROCALCITONIN SERPL-MCNC: 0.39 NG/ML
PROT SERPL-MCNC: 8.6 G/DL (ref 6.4–8.2)
PROT SERPL-MCNC: 9 G/DL (ref 6.4–8.2)
RBC # BLD AUTO: 4.35 M/UL (ref 4.1–5.7)
SARS-COV-2, COV2: NORMAL
SODIUM SERPL-SCNC: 134 MMOL/L (ref 136–145)
SODIUM SERPL-SCNC: 135 MMOL/L (ref 136–145)
SPECIMEN SOURCE: NORMAL
TROPONIN I SERPL-MCNC: <0.05 NG/ML
TROPONIN I SERPL-MCNC: <0.05 NG/ML
WBC # BLD AUTO: 7.7 K/UL (ref 4.1–11.1)

## 2021-09-13 PROCEDURE — 71045 X-RAY EXAM CHEST 1 VIEW: CPT

## 2021-09-13 PROCEDURE — 94640 AIRWAY INHALATION TREATMENT: CPT

## 2021-09-13 PROCEDURE — 99284 EMERGENCY DEPT VISIT MOD MDM: CPT

## 2021-09-13 PROCEDURE — 87635 SARS-COV-2 COVID-19 AMP PRB: CPT

## 2021-09-13 PROCEDURE — 74011250637 HC RX REV CODE- 250/637: Performed by: EMERGENCY MEDICINE

## 2021-09-13 PROCEDURE — 96365 THER/PROPH/DIAG IV INF INIT: CPT

## 2021-09-13 PROCEDURE — 36415 COLL VENOUS BLD VENIPUNCTURE: CPT

## 2021-09-13 PROCEDURE — 93005 ELECTROCARDIOGRAM TRACING: CPT

## 2021-09-13 PROCEDURE — 74011250636 HC RX REV CODE- 250/636: Performed by: INTERNAL MEDICINE

## 2021-09-13 PROCEDURE — 96366 THER/PROPH/DIAG IV INF ADDON: CPT

## 2021-09-13 PROCEDURE — 65270000029 HC RM PRIVATE

## 2021-09-13 PROCEDURE — 94760 N-INVAS EAR/PLS OXIMETRY 1: CPT

## 2021-09-13 PROCEDURE — 74011636637 HC RX REV CODE- 636/637: Performed by: INTERNAL MEDICINE

## 2021-09-13 PROCEDURE — 82962 GLUCOSE BLOOD TEST: CPT

## 2021-09-13 PROCEDURE — 74011250636 HC RX REV CODE- 250/636: Performed by: EMERGENCY MEDICINE

## 2021-09-13 PROCEDURE — 96375 TX/PRO/DX INJ NEW DRUG ADDON: CPT

## 2021-09-13 PROCEDURE — 85025 COMPLETE CBC W/AUTO DIFF WBC: CPT

## 2021-09-13 PROCEDURE — 71275 CT ANGIOGRAPHY CHEST: CPT

## 2021-09-13 PROCEDURE — 74011250637 HC RX REV CODE- 250/637: Performed by: INTERNAL MEDICINE

## 2021-09-13 PROCEDURE — 96374 THER/PROPH/DIAG INJ IV PUSH: CPT

## 2021-09-13 PROCEDURE — 84484 ASSAY OF TROPONIN QUANT: CPT

## 2021-09-13 PROCEDURE — 80053 COMPREHEN METABOLIC PANEL: CPT

## 2021-09-13 PROCEDURE — 99218 HC RM OBSERVATION: CPT

## 2021-09-13 PROCEDURE — 83735 ASSAY OF MAGNESIUM: CPT

## 2021-09-13 PROCEDURE — 83880 ASSAY OF NATRIURETIC PEPTIDE: CPT

## 2021-09-13 PROCEDURE — 84145 PROCALCITONIN (PCT): CPT

## 2021-09-13 PROCEDURE — 74011000636 HC RX REV CODE- 636: Performed by: EMERGENCY MEDICINE

## 2021-09-13 RX ORDER — POTASSIUM CHLORIDE 750 MG/1
40 TABLET, FILM COATED, EXTENDED RELEASE ORAL EVERY 4 HOURS
Status: COMPLETED | OUTPATIENT
Start: 2021-09-13 | End: 2021-09-13

## 2021-09-13 RX ORDER — ENOXAPARIN SODIUM 100 MG/ML
40 INJECTION SUBCUTANEOUS EVERY 24 HOURS
Status: DISCONTINUED | OUTPATIENT
Start: 2021-09-13 | End: 2021-09-18 | Stop reason: HOSPADM

## 2021-09-13 RX ORDER — SODIUM CHLORIDE 0.9 % (FLUSH) 0.9 %
5-40 SYRINGE (ML) INJECTION AS NEEDED
Status: DISCONTINUED | OUTPATIENT
Start: 2021-09-13 | End: 2021-09-18 | Stop reason: HOSPADM

## 2021-09-13 RX ORDER — PANTOPRAZOLE SODIUM 40 MG/1
40 TABLET, DELAYED RELEASE ORAL 2 TIMES DAILY
Status: DISCONTINUED | OUTPATIENT
Start: 2021-09-13 | End: 2021-09-18 | Stop reason: HOSPADM

## 2021-09-13 RX ORDER — IPRATROPIUM BROMIDE 0.5 MG/2.5ML
0.5 SOLUTION RESPIRATORY (INHALATION)
Status: DISCONTINUED | OUTPATIENT
Start: 2021-09-13 | End: 2021-09-13

## 2021-09-13 RX ORDER — ARIPIPRAZOLE 2 MG/1
2 TABLET ORAL DAILY
Status: DISCONTINUED | OUTPATIENT
Start: 2021-09-14 | End: 2021-09-18 | Stop reason: HOSPADM

## 2021-09-13 RX ORDER — ALBUTEROL SULFATE 0.83 MG/ML
2.5 SOLUTION RESPIRATORY (INHALATION)
Status: DISCONTINUED | OUTPATIENT
Start: 2021-09-13 | End: 2021-09-13

## 2021-09-13 RX ORDER — TRAZODONE HYDROCHLORIDE 50 MG/1
100 TABLET ORAL
Status: DISCONTINUED | OUTPATIENT
Start: 2021-09-13 | End: 2021-09-18 | Stop reason: HOSPADM

## 2021-09-13 RX ORDER — INSULIN LISPRO 100 [IU]/ML
INJECTION, SOLUTION INTRAVENOUS; SUBCUTANEOUS
Status: DISCONTINUED | OUTPATIENT
Start: 2021-09-13 | End: 2021-09-13

## 2021-09-13 RX ORDER — MORPHINE SULFATE 4 MG/ML
4 INJECTION INTRAVENOUS ONCE
Status: COMPLETED | OUTPATIENT
Start: 2021-09-13 | End: 2021-09-13

## 2021-09-13 RX ORDER — MAGNESIUM SULFATE 100 %
4 CRYSTALS MISCELLANEOUS AS NEEDED
Status: DISCONTINUED | OUTPATIENT
Start: 2021-09-13 | End: 2021-09-18 | Stop reason: HOSPADM

## 2021-09-13 RX ORDER — IPRATROPIUM BROMIDE AND ALBUTEROL SULFATE 2.5; .5 MG/3ML; MG/3ML
3 SOLUTION RESPIRATORY (INHALATION)
Status: DISCONTINUED | OUTPATIENT
Start: 2021-09-13 | End: 2021-09-13 | Stop reason: ALTCHOICE

## 2021-09-13 RX ORDER — DEXTROSE 50 % IN WATER (D50W) INTRAVENOUS SYRINGE
25-50 AS NEEDED
Status: DISCONTINUED | OUTPATIENT
Start: 2021-09-13 | End: 2021-09-18 | Stop reason: HOSPADM

## 2021-09-13 RX ORDER — ALBUTEROL SULFATE 90 UG/1
4 AEROSOL, METERED RESPIRATORY (INHALATION)
Status: COMPLETED | OUTPATIENT
Start: 2021-09-13 | End: 2021-09-13

## 2021-09-13 RX ORDER — METFORMIN HYDROCHLORIDE 500 MG/1
500 TABLET ORAL
Status: DISCONTINUED | OUTPATIENT
Start: 2021-09-14 | End: 2021-09-18 | Stop reason: HOSPADM

## 2021-09-13 RX ORDER — ATORVASTATIN CALCIUM 20 MG/1
20 TABLET, FILM COATED ORAL DAILY
Status: DISCONTINUED | OUTPATIENT
Start: 2021-09-14 | End: 2021-09-18 | Stop reason: HOSPADM

## 2021-09-13 RX ORDER — DOCUSATE SODIUM 100 MG/1
100 CAPSULE, LIQUID FILLED ORAL 2 TIMES DAILY
Status: DISCONTINUED | OUTPATIENT
Start: 2021-09-13 | End: 2021-09-13 | Stop reason: SDUPTHER

## 2021-09-13 RX ORDER — CITALOPRAM 20 MG/1
40 TABLET, FILM COATED ORAL DAILY
Status: DISCONTINUED | OUTPATIENT
Start: 2021-09-14 | End: 2021-09-18 | Stop reason: HOSPADM

## 2021-09-13 RX ORDER — POTASSIUM CHLORIDE 7.45 MG/ML
10 INJECTION INTRAVENOUS ONCE
Status: COMPLETED | OUTPATIENT
Start: 2021-09-13 | End: 2021-09-13

## 2021-09-13 RX ORDER — ASPIRIN 325 MG
325 TABLET ORAL
Status: COMPLETED | OUTPATIENT
Start: 2021-09-13 | End: 2021-09-13

## 2021-09-13 RX ORDER — POTASSIUM CHLORIDE 750 MG/1
40 TABLET, FILM COATED, EXTENDED RELEASE ORAL
Status: COMPLETED | OUTPATIENT
Start: 2021-09-13 | End: 2021-09-13

## 2021-09-13 RX ORDER — METOPROLOL SUCCINATE 50 MG/1
50 TABLET, EXTENDED RELEASE ORAL DAILY
Status: DISCONTINUED | OUTPATIENT
Start: 2021-09-14 | End: 2021-09-18 | Stop reason: HOSPADM

## 2021-09-13 RX ORDER — GUAIFENESIN 100 MG/5ML
81 LIQUID (ML) ORAL DAILY
Status: DISCONTINUED | OUTPATIENT
Start: 2021-09-14 | End: 2021-09-18 | Stop reason: HOSPADM

## 2021-09-13 RX ORDER — INSULIN LISPRO 100 [IU]/ML
INJECTION, SOLUTION INTRAVENOUS; SUBCUTANEOUS
Status: DISCONTINUED | OUTPATIENT
Start: 2021-09-14 | End: 2021-09-18 | Stop reason: HOSPADM

## 2021-09-13 RX ORDER — INSULIN GLARGINE 100 [IU]/ML
20 INJECTION, SOLUTION SUBCUTANEOUS
Status: DISCONTINUED | OUTPATIENT
Start: 2021-09-14 | End: 2021-09-18 | Stop reason: HOSPADM

## 2021-09-13 RX ORDER — GABAPENTIN 300 MG/1
600 CAPSULE ORAL
Status: DISCONTINUED | OUTPATIENT
Start: 2021-09-13 | End: 2021-09-18 | Stop reason: HOSPADM

## 2021-09-13 RX ORDER — BUDESONIDE AND FORMOTEROL FUMARATE DIHYDRATE 80; 4.5 UG/1; UG/1
1 AEROSOL RESPIRATORY (INHALATION)
Status: DISCONTINUED | OUTPATIENT
Start: 2021-09-13 | End: 2021-09-14

## 2021-09-13 RX ORDER — TOPIRAMATE 25 MG/1
50 TABLET ORAL 2 TIMES DAILY WITH MEALS
Status: DISCONTINUED | OUTPATIENT
Start: 2021-09-13 | End: 2021-09-18 | Stop reason: HOSPADM

## 2021-09-13 RX ORDER — SODIUM CHLORIDE 0.9 % (FLUSH) 0.9 %
5-40 SYRINGE (ML) INJECTION EVERY 8 HOURS
Status: DISCONTINUED | OUTPATIENT
Start: 2021-09-13 | End: 2021-09-18 | Stop reason: HOSPADM

## 2021-09-13 RX ORDER — DIGOXIN 125 MCG
0.12 TABLET ORAL DAILY
Status: DISCONTINUED | OUTPATIENT
Start: 2021-09-14 | End: 2021-09-18 | Stop reason: HOSPADM

## 2021-09-13 RX ORDER — POTASSIUM CHLORIDE 20 MEQ/1
40 TABLET, EXTENDED RELEASE ORAL DAILY
Status: DISCONTINUED | OUTPATIENT
Start: 2021-09-14 | End: 2021-09-18 | Stop reason: HOSPADM

## 2021-09-13 RX ORDER — ALBUTEROL SULFATE 90 UG/1
2 AEROSOL, METERED RESPIRATORY (INHALATION)
Status: DISCONTINUED | OUTPATIENT
Start: 2021-09-13 | End: 2021-09-14

## 2021-09-13 RX ORDER — BUMETANIDE 1 MG/1
2 TABLET ORAL DAILY
Status: DISCONTINUED | OUTPATIENT
Start: 2021-09-14 | End: 2021-09-18 | Stop reason: HOSPADM

## 2021-09-13 RX ORDER — DOCUSATE SODIUM 100 MG/1
100 CAPSULE, LIQUID FILLED ORAL 2 TIMES DAILY
Status: DISCONTINUED | OUTPATIENT
Start: 2021-09-13 | End: 2021-09-18 | Stop reason: HOSPADM

## 2021-09-13 RX ORDER — ONDANSETRON 2 MG/ML
4 INJECTION INTRAMUSCULAR; INTRAVENOUS
Status: DISCONTINUED | OUTPATIENT
Start: 2021-09-13 | End: 2021-09-18 | Stop reason: HOSPADM

## 2021-09-13 RX ORDER — LEVOTHYROXINE SODIUM 100 UG/1
200 TABLET ORAL
Status: DISCONTINUED | OUTPATIENT
Start: 2021-09-14 | End: 2021-09-18 | Stop reason: HOSPADM

## 2021-09-13 RX ORDER — MELATONIN
2000 DAILY
Status: DISCONTINUED | OUTPATIENT
Start: 2021-09-14 | End: 2021-09-18 | Stop reason: HOSPADM

## 2021-09-13 RX ORDER — ZOLPIDEM TARTRATE 5 MG/1
5 TABLET ORAL
Status: DISCONTINUED | OUTPATIENT
Start: 2021-09-13 | End: 2021-09-18 | Stop reason: HOSPADM

## 2021-09-13 RX ORDER — MAGNESIUM SULFATE HEPTAHYDRATE 40 MG/ML
2 INJECTION, SOLUTION INTRAVENOUS
Status: COMPLETED | OUTPATIENT
Start: 2021-09-13 | End: 2021-09-13

## 2021-09-13 RX ADMIN — AZITHROMYCIN 500 MG: 500 INJECTION, POWDER, LYOPHILIZED, FOR SOLUTION INTRAVENOUS at 18:50

## 2021-09-13 RX ADMIN — ZOLPIDEM TARTRATE 5 MG: 5 TABLET ORAL at 22:01

## 2021-09-13 RX ADMIN — POTASSIUM CHLORIDE 10 MEQ: 7.46 INJECTION, SOLUTION INTRAVENOUS at 18:49

## 2021-09-13 RX ADMIN — GABAPENTIN 600 MG: 300 CAPSULE ORAL at 22:01

## 2021-09-13 RX ADMIN — METHYLPREDNISOLONE SODIUM SUCCINATE 60 MG: 125 INJECTION, POWDER, FOR SOLUTION INTRAMUSCULAR; INTRAVENOUS at 18:49

## 2021-09-13 RX ADMIN — METHYLPREDNISOLONE SODIUM SUCCINATE 125 MG: 125 INJECTION, POWDER, FOR SOLUTION INTRAMUSCULAR; INTRAVENOUS at 13:22

## 2021-09-13 RX ADMIN — POTASSIUM CHLORIDE 40 MEQ: 750 TABLET, FILM COATED, EXTENDED RELEASE ORAL at 13:19

## 2021-09-13 RX ADMIN — INSULIN LISPRO 15 UNITS: 100 INJECTION, SOLUTION INTRAVENOUS; SUBCUTANEOUS at 21:53

## 2021-09-13 RX ADMIN — POTASSIUM CHLORIDE 40 MEQ: 750 TABLET, FILM COATED, EXTENDED RELEASE ORAL at 18:49

## 2021-09-13 RX ADMIN — PANTOPRAZOLE SODIUM 40 MG: 40 TABLET, DELAYED RELEASE ORAL at 21:58

## 2021-09-13 RX ADMIN — ALBUTEROL SULFATE 2 PUFF: 108 AEROSOL, METERED RESPIRATORY (INHALATION) at 20:17

## 2021-09-13 RX ADMIN — IOPAMIDOL 100 ML: 755 INJECTION, SOLUTION INTRAVENOUS at 13:06

## 2021-09-13 RX ADMIN — Medication 10 ML: at 22:07

## 2021-09-13 RX ADMIN — POTASSIUM CHLORIDE 40 MEQ: 750 TABLET, FILM COATED, EXTENDED RELEASE ORAL at 21:57

## 2021-09-13 RX ADMIN — MAGNESIUM SULFATE HEPTAHYDRATE 2 G: 40 INJECTION, SOLUTION INTRAVENOUS at 13:22

## 2021-09-13 RX ADMIN — ALBUTEROL SULFATE 4 PUFF: 108 AEROSOL, METERED RESPIRATORY (INHALATION) at 13:05

## 2021-09-13 RX ADMIN — ENOXAPARIN SODIUM 40 MG: 40 INJECTION SUBCUTANEOUS at 21:54

## 2021-09-13 RX ADMIN — TOPIRAMATE 50 MG: 25 TABLET, FILM COATED ORAL at 21:54

## 2021-09-13 RX ADMIN — ONDANSETRON 4 MG: 2 INJECTION INTRAMUSCULAR; INTRAVENOUS at 19:28

## 2021-09-13 RX ADMIN — ASPIRIN 325 MG ORAL TABLET 325 MG: 325 PILL ORAL at 13:19

## 2021-09-13 RX ADMIN — MORPHINE SULFATE 4 MG: 4 INJECTION INTRAVENOUS at 19:28

## 2021-09-13 RX ADMIN — Medication 10 ML: at 22:05

## 2021-09-13 NOTE — ED TRIAGE NOTES
GCS 15 pt stated that since yesterday he has been having increasing SOB and CP; pt stated that this am it got even worse and came in for further evaluation

## 2021-09-13 NOTE — ED PROVIDER NOTES
EMERGENCY DEPARTMENT HISTORY AND PHYSICAL EXAM        Date: 9/13/2021  Patient Name: Sebas Hurley    History of Presenting Illness     Chief Complaint   Patient presents with    Shortness of Breath    Chest Pain       History Provided By: Patient    HPI: Sebas Hurley, 54 y.o. male with history of CAD, COPD, hypertension, stroke, and depression who presents with chest pain and shortness of breath. States his symptoms started yesterday afternoon. Pain is in the center of her chest, sharp, radiates to her left shoulder, constant, and 9/10. He has had generalized weakness. He is also has shortness of breath. PCP: Gentry Easton MD    Current Facility-Administered Medications   Medication Dose Route Frequency Provider Last Rate Last Admin    morphine injection 4 mg  4 mg IntraVENous ONCE Kali Brunner, DO        potassium chloride 10 mEq in 100 ml IVPB  10 mEq IntraVENous ONCE Ifeanyi Lawrence, DO         Current Outpatient Medications   Medication Sig Dispense Refill    levothyroxine (SYNTHROID) 200 mcg tablet Take 1 Tablet by mouth Daily (before breakfast) for 30 days. 30 Tablet 4    potassium chloride SR (KLOR-CON 10) 10 mEq tablet Take 10 mEq by mouth daily.  nitroglycerin (NITROSTAT) 0.4 mg SL tablet dissolve 1 tab under the tongue at 1st sign of attack; may repeat every 5 minutes up to 3 tabs; if no relief seek medical help      metFORMIN (GLUCOPHAGE) 500 mg tablet       ARIPiprazole (ABILIFY) 2 mg tablet Take 2 mg by mouth daily.  EC-Naproxen 500 mg EC tablet Take 500 mg by mouth two (2) times a day.  cholecalciferol (VITAMIN D3) (2,000 UNITS /50 MCG) cap capsule       albuterol-ipratropium (DUO-NEB) 2.5 mg-0.5 mg/3 ml nebu 3 mL by Nebulization route every six (6) hours as needed for Wheezing. 30 Nebule 0    bumetanide (BUMEX) 2 mg tablet Take 1 Tablet by mouth daily.  30 Tablet 0    gabapentin (NEURONTIN) 300 mg capsule Take 2 Capsules by mouth four (4) times daily as needed for Pain. Max Daily Amount: 2,400 mg. 12 Capsule 0    metoprolol succinate (TOPROL-XL) 50 mg XL tablet Take 1 Tablet by mouth daily. Indications: high blood pressure 30 Tablet 0    traZODone (DESYREL) 100 mg tablet Take 1 Tablet by mouth nightly. 30 Tablet 0    zolpidem (AMBIEN) 5 mg tablet Take 1 Tablet by mouth nightly as needed for Sleep. Max Daily Amount: 5 mg. 12 Tablet 0    docusate sodium (COLACE) 100 mg capsule Take 1 Capsule by mouth two (2) times a day for 90 days. 60 Capsule 2    topiramate (TOPAMAX) 50 mg tablet Take 1 Tablet by mouth two (2) times daily (with meals). 60 Tablet 0    pantoprazole (PROTONIX) 40 mg tablet TAKE ONE TABLET BY MOUTH TWICE DAILY      fluticasone furoate-vilanteroL (BREO ELLIPTA) 200-25 mcg/dose inhaler Breo Ellipta 200 mcg-25 mcg/dose powder for inhalation   inhale ONE PUFF by MOUTH ONCE daily      digoxin (LANOXIN) 0.125 mg tablet TAKE ONE TABLET BY MOUTH EVERY DAY      citalopram (CELEXA) 40 mg tablet TAKE ONE TABLET BY MOUTH EVERY DAY      atorvastatin (LIPITOR) 20 mg tablet 20 mg daily.  aspirin 81 mg chewable tablet Take 81 mg by mouth daily.          Past History     Past Medical History:  Past Medical History:   Diagnosis Date    [facility] [address]     COPD [facility]chronic obstructive [facility] disease) [facility] [address]     Depression     Hypertension     Ill-defined condition     Stroke ([facility] [address]        Past Surgical History:  Past Surgical History:   Procedure Laterality Date    HX HERNIA REPAIR      HX ORTHOPAEDIC      rotator cuff surgery    HX OTHER SURGICAL      Patent Foramen Ovale Repair    HX THYROIDECTOMY      IR FINE NEEDLE ASPIRATION W IMAGE         Family History:  Family History   Problem Relation Age of Onset    Diabetes Maternal Uncle     Hypertension Maternal Uncle     Hypertension Paternal Uncle     Diabetes Paternal Uncle     Cancer Other    Wilhelminia Blazing Cancer Mother     No Known Problems Father        Social History:  Social History     Tobacco Use    Smoking status: Former Smoker    Smokeless tobacco: Never Used   Vaping Use    Vaping Use: Never used   Substance Use Topics    Alcohol use: Yes     Comment: socially     Drug use: Not Currently       Allergies: Allergies   Allergen Reactions    Vancomycin Hives     Lyndsey syndrome           Review of Systems   Review of Systems   Constitutional: Negative for fever. HENT: Negative for congestion. Eyes: Negative for visual disturbance. Respiratory: Positive for cough and shortness of breath. Cardiovascular: Positive for chest pain. Gastrointestinal: Negative for abdominal pain. Genitourinary: Negative for dysuria. Musculoskeletal: Negative for arthralgias. Skin: Negative for rash. Neurological: Negative for headaches. Physical Exam   Constitutional: Patient appears uncomfortable and in mild distress. Well-nourished. Skin: No rash. ENT: No rhinorrhea. No cough. Head is normocephalic and atraumatic. Eye: No proptosis or conjunctival injections. Respiratory: No obvious distress. Lung sounds are wheezy bilaterally. Gastrointestinal: Nondistended. Cardiovascular: Tachycardic with regular rhythm. No murmurs. 2+ radial pulses bilaterally. Musculoskeletal: No obvious bony deformities. Psychiatric: Cooperative. Appropriate mood and affect.     Diagnostic Study Results     Labs -     Recent Results (from the past 24 hour(s))   CBC WITH AUTOMATED DIFF    Collection Time: 09/13/21 11:00 AM   Result Value Ref Range    WBC 7.7 4.1 - 11.1 K/uL    RBC 4.35 4.10 - 5.70 M/uL    HGB 14.3 12.1 - 17.0 g/dL    HCT 42.3 36.6 - 50.3 %    MCV 97.2 80.0 - 99.0 FL    MCH 32.9 26.0 - 34.0 PG    MCHC 33.8 30.0 - 36.5 g/dL    RDW 13.6 11.5 - 14.5 %    PLATELET 567 944 - 509 K/uL    MPV 9.8 8.9 - 12.9 FL    NRBC 0.0 0.0  WBC    ABSOLUTE NRBC 0.00 0.00 - 0.01 K/uL    NEUTROPHILS 66 32 - 75 %    LYMPHOCYTES 23 12 - 49 %    MONOCYTES 9 5 - 13 %    EOSINOPHILS 2 0 - 7 %    BASOPHILS 0 0 - 1 % IMMATURE GRANULOCYTES 0 0 - 0.5 %    ABS. NEUTROPHILS 5.2 1.8 - 8.0 K/UL    ABS. LYMPHOCYTES 1.7 0.8 - 3.5 K/UL    ABS. MONOCYTES 0.7 0.0 - 1.0 K/UL    ABS. EOSINOPHILS 0.1 0.0 - 0.4 K/UL    ABS. BASOPHILS 0.0 0.0 - 0.1 K/UL    ABS. IMM. GRANS. 0.0 0.00 - 0.04 K/UL    DF AUTOMATED     METABOLIC PANEL, COMPREHENSIVE    Collection Time: 09/13/21 11:00 AM   Result Value Ref Range    Sodium 134 (L) 136 - 145 mmol/L    Potassium 2.7 (LL) 3.5 - 5.1 mmol/L    Chloride 92 (L) 97 - 108 mmol/L    CO2 33 (H) 21 - 32 mmol/L    Anion gap 9 5 - 15 mmol/L    Glucose 135 (H) 65 - 100 mg/dL    BUN 20 6 - 20 mg/dL    Creatinine 1.36 (H) 0.70 - 1.30 mg/dL    BUN/Creatinine ratio 15 12 - 20      GFR est AA >60 >60 ml/min/1.73m2    GFR est non-AA 56 (L) >60 ml/min/1.73m2    Calcium 9.4 8.5 - 10.1 mg/dL    Bilirubin, total 0.4 0.2 - 1.0 mg/dL    AST (SGOT) 80 (H) 15 - 37 U/L    ALT (SGPT) 124 (H) 12 - 78 U/L    Alk. phosphatase 79 45 - 117 U/L    Protein, total 9.0 (H) 6.4 - 8.2 g/dL    Albumin 4.0 3.5 - 5.0 g/dL    Globulin 5.0 (H) 2.0 - 4.0 g/dL    A-G Ratio 0.8 (L) 1.1 - 2.2     TROPONIN I    Collection Time: 09/13/21 11:00 AM   Result Value Ref Range    Troponin-I, Qt. <0.05 <0.05 ng/mL   BNP    Collection Time: 09/13/21 11:00 AM   Result Value Ref Range    NT pro-BNP 42 <125 pg/mL   PROCALCITONIN    Collection Time: 09/13/21 11:00 AM   Result Value Ref Range    Procalcitonin 0.39 (H) 0 ng/mL   MAGNESIUM    Collection Time: 09/13/21 11:00 AM   Result Value Ref Range    Magnesium 2.0 1.6 - 2.4 mg/dL       Radiologic Studies -   CTA CHEST W OR W WO CONT   Final Result   No pulmonary embolism definitively identified. Other findings as   above. XR CHEST PORT   Final Result   Hydrostatic edema. CT Results  (Last 48 hours)               09/13/21 1305  CTA CHEST W OR W WO CONT Final result    Impression:  No pulmonary embolism definitively identified. Other findings as   above.                Narrative:  CT angiography of the chest, 9/13/2021       History: Chest pain. Technique: Multiple contiguous axial images were acquired from the thoracic   inlet to diaphragm following intravenous administration of 100 mL Isovue-370   contrast material utilizing a CT pulmonary angiography technique. Coronal,   sagittal and MIP reconstruction of images was made. All CT scans at this facility are performed using dose reduction optimization   techniques as appropriate to perform the exam including the following: Automated   exposure control, adjustments of the mA and/or kV according to patient size, or   use iterative reconstruction technique. Comparison: Including CT chest 7/15/2021. Findings: Image quality is degraded by artifact. CT pulmonary angiography: The main pulmonary artery measures 2.7 cm which is   within normal limits. No pulmonary embolism is definitively identified. CT chest: Surgical clips are seen in the included thyroid fossa. Gynecomastia is present. No axillary, mediastinal or hilar lymphadenopathy is   noted. The heart size is borderline enlarged. Interatrial septal closure device is in   place. Lobular fluid attenuation hypodensity in the right heart border   measuring 1.6 cm in thickness is suboptimally visualized due to artifact and   consistent with pericardial cyst.  No significant pericardial effusion is   evident. The thoracic aorta is normal in caliber. The lungs are markedly underexpanded with atelectatic changes. There is mosaic   attenuation suggesting sequela of small to medium airway disease. Scattered   cysts measure up to 2.8 cm in the right middle lobe. Right paramediastinal bleb   measures 2.9 cm. No focal consolidation, pleural effusion or pneumothorax identified. There is   no specific CT evidence of pneumonia.        A paramediastinal nodule in the left upper lobe measuring 6 mm is stable on   prior studies including CT angiography chest 20/6/2017. A juxtapleural nodule   in the left lower lobe, posterior aspect measures 4 mm, stable as compared to CT   chest 9/29/2019. These are benign-appearing. No routine imaging follow-up is   indicated. Degenerative changes are present in the thoracic spine. The included liver has diffusely low attenuation consistent with hepatic   steatosis. The included adrenal glands, pancreas, spleen show no focal   abnormality. CXR Results  (Last 48 hours)               09/13/21 1111  XR CHEST PORT Final result    Impression:  Hydrostatic edema. Narrative:  Chest, frontal view, 9/13/2021       History: Shortness of breath. Comparison: Including chest 8/18/2021. Findings: The cardiac silhouette is continued globular enlargement which could   indicate cardiomyopathy or pericardial effusion. The lungs are adequately   expanded. There is pulmonary vascular congestion and mild hydrostatic edema. No focal consolidation, pleural effusions or pneumothorax is identified. Dextroconvex curvature of the thoracic spine is noted. Medical Decision Making and ED Course     I reviewed the available vital signs, nursing notes, past medical history, past surgical history, family history, and social history. Vital Signs - Reviewed the patient's vital signs. Patient Vitals for the past 12 hrs:   Temp Pulse Resp BP SpO2   09/13/21 1306 -- -- -- -- 92 %   09/13/21 1219 -- (!) 113 24 118/79 94 %   09/13/21 1050 98 °F (36.7 °C) (!) 114 24 127/73 94 %       EKG interpretation: Obtained on 9/13/2021 at 1055. Read at 1108. Sinus tachycardia rate of 112 bpm.  Normal AK interval, QRS duration, QTc interval.  No ST segment abnormalities. Normal axis. Medical Decision Making:   Presented with chest pain. The differential diagnosis is ACS, atypical chest pain, pleurisy, pulmonary embolism. Work-up was negative.   Patient was mildly tachycardic CTA was completed to rule out pulmonary embolism. This was negative as well. Patient pain is concerning for possible ACS. Did give him pain medicine and aspirin. Will admit for further care and observation. Disposition     Admitted     Diagnosis     Clinical impression:   1. Chest pain, unspecified type           Attestation:  Please note that this dictation was completed with [employer], the computer voice recognition software. Quite often unanticipated grammatical, syntax, homophones, and other interpretive errors are inadvertently transcribed by the computer software. Please disregard these errors. Please excuse any errors that have escaped final proofreading. Thank you.   Kassi Gregorio, DO

## 2021-09-13 NOTE — H&P
History and Physical    Patient: Breanne Solano MRN: [id number]  SSN: [id number]    YOB: 1975  Age: 54 y.o. Sex: male      Subjective:      Breanne Solano is a 54 y.o. male who has a history of COPD, hypertension, obstructive sleep apnea, history of CVA, history of foramen ovale  with PFO. Presents with shortness of breath at rest and with exertion. Patient also complains of chest pain patient was extensively evaluated for chest pain in the past.  He has not been compliant with his CPAP machine    Past Medical History:   Diagnosis Date    Acute MI ([address]     COPD [facility]chronic obstructive [facility] disease) [facility] [address]     Depression     Hypertension     Ill-defined condition     Stroke ([facility] [address]      Past Surgical History:   Procedure Laterality Date    HX HERNIA REPAIR      HX ORTHOPAEDIC      rotator cuff surgery    HX OTHER SURGICAL      Patent Foramen Ovale Repair    HX THYROIDECTOMY      IR FINE NEEDLE ASPIRATION W IMAGE        Family History   Problem Relation Age of Onset    Diabetes Maternal Uncle     Hypertension Maternal Uncle     Hypertension Paternal Uncle     Diabetes Paternal Uncle     Cancer Other    Leonrenee Donis Cancer Mother     No Known Problems Father      Social History     Tobacco Use    Smoking status: Former Smoker    Smokeless tobacco: Never Used   Substance Use Topics    Alcohol use: Yes     Comment: socially       Prior to Admission medications    Medication Sig Start Date End Date Taking? Authorizing Provider   levothyroxine (SYNTHROID) 200 mcg tablet Take 1 Tablet by mouth Daily (before breakfast) for 30 days. 9/7/21 10/7/21  Kailyn Gant MD   metFORMIN (GLUCOPHAGE) 500 mg tablet  3/1/21   Provider, Historical   ARIPiprazole (ABILIFY) 2 mg tablet Take 2 mg by mouth daily.  7/23/21   Provider, Historical   cholecalciferol (VITAMIN D3) (2,000 UNITS /50 MCG) cap capsule  4/5/21   Provider, Historical   bumetanide (BUMEX) 2 mg tablet Take 1 Tablet by mouth daily. 8/20/21   Adam Church MD   gabapentin (NEURONTIN) 300 mg capsule Take 2 Capsules by mouth four (4) times daily as needed for Pain. Max Daily Amount: 2,400 mg. 8/20/21   Ben BEGUM MD   metoprolol succinate (TOPROL-XL) 50 mg XL tablet Take 1 Tablet by mouth daily. Indications: high blood pressure 8/21/21   Ben BEGUM MD   traZODone (DESYREL) 100 mg tablet Take 1 Tablet by mouth nightly. 8/20/21   Adam Church MD   zolpidem (AMBIEN) 5 mg tablet Take 1 Tablet by mouth nightly as needed for Sleep. Max Daily Amount: 5 mg. 8/20/21   Ben BEGUM MD   docusate sodium (COLACE) 100 mg capsule Take 1 Capsule by mouth two (2) times a day for 90 days. 8/3/21 11/1/21  Ben BEGUM MD   topiramate (TOPAMAX) 50 mg tablet Take 1 Tablet by mouth two (2) times daily (with meals). 8/3/21   Ben BEGUM MD   pantoprazole (PROTONIX) 40 mg tablet TAKE ONE TABLET BY MOUTH TWICE DAILY 3/23/21   Provider, Historical   digoxin (LANOXIN) 0.125 mg tablet TAKE ONE TABLET BY MOUTH EVERY DAY 11/4/20   Provider, Historical   citalopram (CELEXA) 40 mg tablet TAKE ONE TABLET BY MOUTH EVERY DAY 12/8/20   Provider, Historical   atorvastatin (LIPITOR) 20 mg tablet 20 mg daily. 8/3/20   Provider, Historical   aspirin 81 mg chewable tablet Take 81 mg by mouth daily. Other, MD Elizabeth        Allergies   Allergen Reactions    Vancomycin Hives     Lyndsey syndrome       Review of Systems:  A comprehensive review of systems was negative except for that written in the History of Present Illness. Objective:     Vitals:    09/13/21 1050 09/13/21 1219 09/13/21 1306   BP: 127/73 118/79    Pulse: (!) 114 (!) 113    Resp: 24 24    Temp: 98 °F (36.7 °C)     SpO2: 94% 94% 92%   Weight: (!) 164.2 kg (362 lb)     Height: 6' 4\" (1.93 m)          Physical Exam:  General:  Alert, cooperative, no distress, appears stated age. Eyes:  Conjunctivae/corneas clear. PERRL, EOMs intact.  Fundi benign   Ears:  Normal TMs and external ear canals both ears. Nose: Nares normal. Septum midline. Mucosa normal. No drainage or sinus tenderness. Mouth/Throat: Lips, mucosa, and tongue normal. Teeth and gums normal.   Neck: Supple, symmetrical, trachea midline, no adenopathy, thyroid: no enlargment/tenderness/nodules, no carotid bruit and no JVD. Back:   Symmetric, no curvature. ROM normal. No CVA tenderness. Lungs:    Bilateral rhonchi to auscultation bilaterally. Heart:  Regular rate and rhythm, S1, S2 normal, no murmur, click, rub or gallop. Abdomen:   Soft, non-tender. Bowel sounds normal. No masses,  No organomegaly. Extremities: Extremities normal, atraumatic, no cyanosis or edema. Pulses: 2+ and symmetric all extremities. Skin: Skin color, texture, turgor normal. No rashes or lesions   Lymph nodes: Cervical, supraclavicular, and axillary nodes normal.   Neurologic: CNII-XII intact. Normal strength, sensation and reflexes throughout. Recent Results (from the past 24 hour(s))   CBC WITH AUTOMATED DIFF    Collection Time: 09/13/21 11:00 AM   Result Value Ref Range    WBC 7.7 4.1 - 11.1 K/uL    RBC 4.35 4.10 - 5.70 M/uL    HGB 14.3 12.1 - 17.0 g/dL    HCT 42.3 36.6 - 50.3 %    MCV 97.2 80.0 - 99.0 FL    MCH 32.9 26.0 - 34.0 PG    MCHC 33.8 30.0 - 36.5 g/dL    RDW 13.6 11.5 - 14.5 %    PLATELET 740 430 - 329 K/uL    MPV 9.8 8.9 - 12.9 FL    NRBC 0.0 0.0  WBC    ABSOLUTE NRBC 0.00 0.00 - 0.01 K/uL    NEUTROPHILS 66 32 - 75 %    LYMPHOCYTES 23 12 - 49 %    MONOCYTES 9 5 - 13 %    EOSINOPHILS 2 0 - 7 %    BASOPHILS 0 0 - 1 %    IMMATURE GRANULOCYTES 0 0 - 0.5 %    ABS. NEUTROPHILS 5.2 1.8 - 8.0 K/UL    ABS. LYMPHOCYTES 1.7 0.8 - 3.5 K/UL    ABS. MONOCYTES 0.7 0.0 - 1.0 K/UL    ABS. EOSINOPHILS 0.1 0.0 - 0.4 K/UL    ABS. BASOPHILS 0.0 0.0 - 0.1 K/UL    ABS. IMM.  GRANS. 0.0 0.00 - 0.04 K/UL    DF AUTOMATED     METABOLIC PANEL, COMPREHENSIVE    Collection Time: 09/13/21 11:00 AM   Result Value Ref Range    Sodium 134 (L) 136 - 145 mmol/L    Potassium 2.7 (LL) 3.5 - 5.1 mmol/L    Chloride 92 (L) 97 - 108 mmol/L    CO2 33 (H) 21 - 32 mmol/L    Anion gap 9 5 - 15 mmol/L    Glucose 135 (H) 65 - 100 mg/dL    BUN 20 6 - 20 mg/dL    Creatinine 1.36 (H) 0.70 - 1.30 mg/dL    BUN/Creatinine ratio 15 12 - 20      GFR est AA >60 >60 ml/min/1.73m2    GFR est non-AA 56 (L) >60 ml/min/1.73m2    Calcium 9.4 8.5 - 10.1 mg/dL    Bilirubin, total 0.4 0.2 - 1.0 mg/dL    AST (SGOT) 80 (H) 15 - 37 U/L    ALT (SGPT) 124 (H) 12 - 78 U/L    Alk.  phosphatase 79 45 - 117 U/L    Protein, total 9.0 (H) 6.4 - 8.2 g/dL    Albumin 4.0 3.5 - 5.0 g/dL    Globulin 5.0 (H) 2.0 - 4.0 g/dL    A-G Ratio 0.8 (L) 1.1 - 2.2     TROPONIN I    Collection Time: 09/13/21 11:00 AM   Result Value Ref Range    Troponin-I, Qt. <0.05 <0.05 ng/mL   BNP    Collection Time: 09/13/21 11:00 AM   Result Value Ref Range    NT pro-BNP 42 <125 pg/mL   PROCALCITONIN    Collection Time: 09/13/21 11:00 AM   Result Value Ref Range    Procalcitonin 0.39 (H) 0 ng/mL   MAGNESIUM    Collection Time: 09/13/21 11:00 AM   Result Value Ref Range    Magnesium 2.0 1.6 - 2.4 mg/dL         Assessment:     Hospital Problems  Date Reviewed: 8/26/2021        Codes Class Noted POA    Chest pain ICD-10-CM: R07.9  ICD-9-CM: 786.50  6/26/2021 Unknown        COPD (chronic obstructive pulmonary disease) ([facility] ICD-10-CM: J44.9  ICD-9-CM: 100  12/17/2020 Unknown          Exacerbation of COPD plus patient on duo nebs and steroids  Possible infective bronchitis placed on antibiotics  Atypical chest pain  Morbid obesity  Obstructive sleep apnea  Noncompliance    Plan:   Consult pulmonary and cardiology    Signed By: Idalmis Macdonald MD     September 13, 2021

## 2021-09-13 NOTE — Clinical Note
Patient Class[de-identified] OBSERVATION [104]   Type of Bed: Telemetry [19]   Cardiac Monitoring Required?: Yes   Reason for Observation: chest pain   Admitting Diagnosis: Chest pain [[id number]]   Admitting Physician: Dimple Linton   Attending Physician: Dimple Linton

## 2021-09-14 LAB
ANION GAP SERPL CALC-SCNC: 10 MMOL/L (ref 5–15)
ATRIAL RATE: 112 BPM
BASOPHILS # BLD: 0 K/UL (ref 0–0.1)
BASOPHILS NFR BLD: 0 % (ref 0–1)
BUN SERPL-MCNC: 25 MG/DL (ref 6–20)
BUN/CREAT SERPL: 15 (ref 12–20)
CA-I BLD-MCNC: 9.3 MG/DL (ref 8.5–10.1)
CALCULATED P AXIS, ECG09: 29 DEGREES
CALCULATED R AXIS, ECG10: 5 DEGREES
CALCULATED T AXIS, ECG11: 30 DEGREES
CHLORIDE SERPL-SCNC: 93 MMOL/L (ref 97–108)
CO2 SERPL-SCNC: 28 MMOL/L (ref 21–32)
CREAT SERPL-MCNC: 1.62 MG/DL (ref 0.7–1.3)
DIAGNOSIS, 93000: NORMAL
DIFFERENTIAL METHOD BLD: ABNORMAL
EOSINOPHIL # BLD: 0 K/UL (ref 0–0.4)
EOSINOPHIL NFR BLD: 0 % (ref 0–7)
ERYTHROCYTE [DISTWIDTH] IN BLOOD BY AUTOMATED COUNT: 13.8 % (ref 11.5–14.5)
FLUAV AG NPH QL IA: NEGATIVE
FLUBV AG NOSE QL IA: NEGATIVE
GLUCOSE BLD STRIP.AUTO-MCNC: 243 MG/DL (ref 65–117)
GLUCOSE BLD STRIP.AUTO-MCNC: 274 MG/DL (ref 65–117)
GLUCOSE BLD STRIP.AUTO-MCNC: 305 MG/DL (ref 65–117)
GLUCOSE BLD STRIP.AUTO-MCNC: 321 MG/DL (ref 65–117)
GLUCOSE SERPL-MCNC: 201 MG/DL (ref 65–100)
HCT VFR BLD AUTO: 40.1 % (ref 36.6–50.3)
HGB BLD-MCNC: 13.6 G/DL (ref 12.1–17)
IMM GRANULOCYTES # BLD AUTO: 0.1 K/UL (ref 0–0.04)
IMM GRANULOCYTES NFR BLD AUTO: 0 % (ref 0–0.5)
LYMPHOCYTES # BLD: 1 K/UL (ref 0.8–3.5)
LYMPHOCYTES NFR BLD: 8 % (ref 12–49)
MCH RBC QN AUTO: 33.3 PG (ref 26–34)
MCHC RBC AUTO-ENTMCNC: 33.9 G/DL (ref 30–36.5)
MCV RBC AUTO: 98.3 FL (ref 80–99)
MONOCYTES # BLD: 0.3 K/UL (ref 0–1)
MONOCYTES NFR BLD: 2 % (ref 5–13)
NEUTS SEG # BLD: 11.4 K/UL (ref 1.8–8)
NEUTS SEG NFR BLD: 90 % (ref 32–75)
NRBC # BLD: 0 K/UL (ref 0–0.01)
NRBC BLD-RTO: 0 PER 100 WBC
P-R INTERVAL, ECG05: 174 MS
PERFORMED BY, TECHID: ABNORMAL
PLATELET # BLD AUTO: 329 K/UL (ref 150–400)
PMV BLD AUTO: 10.8 FL (ref 8.9–12.9)
POTASSIUM SERPL-SCNC: 3.8 MMOL/L (ref 3.5–5.1)
Q-T INTERVAL, ECG07: 358 MS
QRS DURATION, ECG06: 106 MS
QTC CALCULATION (BEZET), ECG08: 488 MS
RBC # BLD AUTO: 4.08 M/UL (ref 4.1–5.7)
SODIUM SERPL-SCNC: 131 MMOL/L (ref 136–145)
TROPONIN I SERPL-MCNC: <0.05 NG/ML
TROPONIN I SERPL-MCNC: <0.05 NG/ML
VENTRICULAR RATE, ECG03: 112 BPM
WBC # BLD AUTO: 12.8 K/UL (ref 4.1–11.1)

## 2021-09-14 PROCEDURE — 74011250636 HC RX REV CODE- 250/636: Performed by: INTERNAL MEDICINE

## 2021-09-14 PROCEDURE — 85025 COMPLETE CBC W/AUTO DIFF WBC: CPT

## 2021-09-14 PROCEDURE — 74011250637 HC RX REV CODE- 250/637: Performed by: INTERNAL MEDICINE

## 2021-09-14 PROCEDURE — 82962 GLUCOSE BLOOD TEST: CPT

## 2021-09-14 PROCEDURE — 94640 AIRWAY INHALATION TREATMENT: CPT

## 2021-09-14 PROCEDURE — 65270000029 HC RM PRIVATE

## 2021-09-14 PROCEDURE — 74011636637 HC RX REV CODE- 636/637: Performed by: INTERNAL MEDICINE

## 2021-09-14 PROCEDURE — 84484 ASSAY OF TROPONIN QUANT: CPT

## 2021-09-14 PROCEDURE — 80048 BASIC METABOLIC PNL TOTAL CA: CPT

## 2021-09-14 PROCEDURE — 74011000250 HC RX REV CODE- 250: Performed by: INTERNAL MEDICINE

## 2021-09-14 PROCEDURE — 87804 INFLUENZA ASSAY W/OPTIC: CPT

## 2021-09-14 RX ORDER — BUDESONIDE 0.5 MG/2ML
500 INHALANT ORAL
Status: DISCONTINUED | OUTPATIENT
Start: 2021-09-14 | End: 2021-09-18 | Stop reason: HOSPADM

## 2021-09-14 RX ORDER — TRAMADOL HYDROCHLORIDE 50 MG/1
50 TABLET ORAL
Status: DISCONTINUED | OUTPATIENT
Start: 2021-09-14 | End: 2021-09-18 | Stop reason: HOSPADM

## 2021-09-14 RX ORDER — GUAIFENESIN 600 MG/1
1200 TABLET, EXTENDED RELEASE ORAL EVERY 12 HOURS
Status: DISCONTINUED | OUTPATIENT
Start: 2021-09-14 | End: 2021-09-16

## 2021-09-14 RX ORDER — IPRATROPIUM BROMIDE AND ALBUTEROL SULFATE 2.5; .5 MG/3ML; MG/3ML
3 SOLUTION RESPIRATORY (INHALATION)
Status: DISCONTINUED | OUTPATIENT
Start: 2021-09-14 | End: 2021-09-15

## 2021-09-14 RX ORDER — BENZONATATE 100 MG/1
200 CAPSULE ORAL
Status: DISCONTINUED | OUTPATIENT
Start: 2021-09-14 | End: 2021-09-16

## 2021-09-14 RX ADMIN — INSULIN LISPRO 12 UNITS: 100 INJECTION, SOLUTION INTRAVENOUS; SUBCUTANEOUS at 16:30

## 2021-09-14 RX ADMIN — TRAMADOL HYDROCHLORIDE 50 MG: 50 TABLET, FILM COATED ORAL at 18:47

## 2021-09-14 RX ADMIN — Medication 10 ML: at 14:43

## 2021-09-14 RX ADMIN — ALBUTEROL SULFATE 2 PUFF: 108 AEROSOL, METERED RESPIRATORY (INHALATION) at 02:42

## 2021-09-14 RX ADMIN — AZITHROMYCIN 500 MG: 500 INJECTION, POWDER, LYOPHILIZED, FOR SOLUTION INTRAVENOUS at 18:47

## 2021-09-14 RX ADMIN — DIGOXIN 0.12 MG: 125 TABLET ORAL at 09:05

## 2021-09-14 RX ADMIN — ARIPIPRAZOLE 2 MG: 2 TABLET ORAL at 09:05

## 2021-09-14 RX ADMIN — METHYLPREDNISOLONE SODIUM SUCCINATE 60 MG: 125 INJECTION, POWDER, FOR SOLUTION INTRAMUSCULAR; INTRAVENOUS at 05:27

## 2021-09-14 RX ADMIN — PANTOPRAZOLE SODIUM 40 MG: 40 TABLET, DELAYED RELEASE ORAL at 21:40

## 2021-09-14 RX ADMIN — GUAIFENESIN 1200 MG: 600 TABLET, EXTENDED RELEASE ORAL at 18:47

## 2021-09-14 RX ADMIN — BUMETANIDE 2 MG: 1 TABLET ORAL at 09:05

## 2021-09-14 RX ADMIN — ALBUTEROL SULFATE 2 PUFF: 108 AEROSOL, METERED RESPIRATORY (INHALATION) at 13:13

## 2021-09-14 RX ADMIN — Medication 10 ML: at 21:41

## 2021-09-14 RX ADMIN — Medication 10 ML: at 05:27

## 2021-09-14 RX ADMIN — LEVOTHYROXINE SODIUM 200 MCG: 0.1 TABLET ORAL at 09:13

## 2021-09-14 RX ADMIN — BUDESONIDE 500 MCG: 0.5 SUSPENSION RESPIRATORY (INHALATION) at 19:53

## 2021-09-14 RX ADMIN — INSULIN LISPRO 9 UNITS: 100 INJECTION, SOLUTION INTRAVENOUS; SUBCUTANEOUS at 09:06

## 2021-09-14 RX ADMIN — PANTOPRAZOLE SODIUM 40 MG: 40 TABLET, DELAYED RELEASE ORAL at 09:05

## 2021-09-14 RX ADMIN — INSULIN GLARGINE 20 UNITS: 100 INJECTION, SOLUTION SUBCUTANEOUS at 01:12

## 2021-09-14 RX ADMIN — INSULIN LISPRO 12 UNITS: 100 INJECTION, SOLUTION INTRAVENOUS; SUBCUTANEOUS at 21:43

## 2021-09-14 RX ADMIN — CITALOPRAM HYDROBROMIDE 40 MG: 20 TABLET ORAL at 09:06

## 2021-09-14 RX ADMIN — ALBUTEROL SULFATE 2 PUFF: 108 AEROSOL, METERED RESPIRATORY (INHALATION) at 07:21

## 2021-09-14 RX ADMIN — BUDESONIDE AND FORMOTEROL FUMARATE DIHYDRATE 1 PUFF: 80; 4.5 AEROSOL RESPIRATORY (INHALATION) at 07:21

## 2021-09-14 RX ADMIN — METOPROLOL SUCCINATE 50 MG: 50 TABLET, EXTENDED RELEASE ORAL at 09:04

## 2021-09-14 RX ADMIN — TOPIRAMATE 50 MG: 25 TABLET, FILM COATED ORAL at 18:47

## 2021-09-14 RX ADMIN — METHYLPREDNISOLONE SODIUM SUCCINATE 60 MG: 125 INJECTION, POWDER, FOR SOLUTION INTRAMUSCULAR; INTRAVENOUS at 01:11

## 2021-09-14 RX ADMIN — INSULIN GLARGINE 20 UNITS: 100 INJECTION, SOLUTION SUBCUTANEOUS at 21:43

## 2021-09-14 RX ADMIN — METHYLPREDNISOLONE SODIUM SUCCINATE 60 MG: 125 INJECTION, POWDER, FOR SOLUTION INTRAMUSCULAR; INTRAVENOUS at 18:48

## 2021-09-14 RX ADMIN — ENOXAPARIN SODIUM 40 MG: 40 INJECTION SUBCUTANEOUS at 18:48

## 2021-09-14 RX ADMIN — POTASSIUM CHLORIDE 40 MEQ: 1500 TABLET, EXTENDED RELEASE ORAL at 09:04

## 2021-09-14 RX ADMIN — TRAZODONE HYDROCHLORIDE 100 MG: 50 TABLET ORAL at 21:41

## 2021-09-14 RX ADMIN — ASPIRIN 81 MG CHEWABLE TABLET 81 MG: 81 TABLET CHEWABLE at 09:03

## 2021-09-14 RX ADMIN — METHYLPREDNISOLONE SODIUM SUCCINATE 60 MG: 125 INJECTION, POWDER, FOR SOLUTION INTRAMUSCULAR; INTRAVENOUS at 14:42

## 2021-09-14 RX ADMIN — ATORVASTATIN CALCIUM 20 MG: 20 TABLET, FILM COATED ORAL at 09:04

## 2021-09-14 RX ADMIN — Medication 2000 UNITS: at 09:13

## 2021-09-14 RX ADMIN — IPRATROPIUM BROMIDE AND ALBUTEROL SULFATE 3 ML: .5; 2.5 SOLUTION RESPIRATORY (INHALATION) at 19:53

## 2021-09-14 RX ADMIN — INSULIN LISPRO 6 UNITS: 100 INJECTION, SOLUTION INTRAVENOUS; SUBCUTANEOUS at 14:42

## 2021-09-14 NOTE — CONSULTS
Pulmonology and Critical Care Consult    Subjective:   Consult Note: 9/14/2021 2:48 PM    Chief Complaint:   Chief Complaint   Patient presents with    Shortness of Breath    Chest Pain        This patient has been seen and evaluated at the request of Dr. Debo Odom. Patient is a 13-year-old morbidly obese  male with a history of asthma, hypertension, ANIBAL on BiPAP, and a PFO who presented to the hospital with increasing shortness of breath and cough over the past 2 weeks and is currently being treated for an acute exacerbation of asthma/bronchitis. Patient states that over the past 2 weeks he is steadily noticed increasing shortness of breath and a nonproductive cough. He states that he can feel something rattling in his chest but he cannot cough it up. He has been using his Spiriva inhaler every day and his rescue albuterol inhaler multiple times per day without much improvement in his breathing. A CTA of the chest on admission showed mosaic attenuation and small to medium airway disease but no evidence of pulmonary emboli. He follows in our clinic with Dr. Keisha Jolly and his next appointment is on September 28 at 4 PM at our [facility]. His most recent FEV1 was 61% predicted. He came into the hospital yesterday with complaints of chest discomfort and shortness of breath, I feel that the chest pain is related to the coughing and is musculoskeletal in nature. His troponins negative x4, proBNP level 42, white blood cell count 7.7 on arrival and is increased to 12.8 today which is likely a steroid response, and creatinine was 1.75 on admission and is now down to 1.62. His COVID-19 rapid test was negative. He is currently being treated with IV azithromycin, Bumex 2 mg p.o. daily, and Solu-Medrol 60 mg IV every 6 hours. I will stop his scheduled albuterol inhaler and Symbicort inhaler and start him on duo nebs every 6 hours. I will also order Pulmicort nebs twice daily.   I will order him guaifenesin 1200 mg ER p.o. twice daily as well as some as needed Tessalon Perles. I will also order as needed tramadol for his chest discomfort. He is on BiPAP 19/15 at baseline and states that about a week ago he had to give it back to his [employer]. Hopefully he can get a repeat sleep study as an outpatient and get set back up with a BiPAP. He states that he can only wear the nasal pillows mask, which we do not have here at the hospital.  I did order him BiPAP to wear at night here, but he would likely refuse to use this BiPAP if we have only the full facemask.       Past Medical History:   Diagnosis Date    Acute [facility] [address]     COPD (chronic obstructive pulmonary disease) ([facility] [address] [facility]     Depression     Hypertension     Ill-defined condition     Stroke ([facility] [address]      Past Surgical History:   Procedure Laterality Date    HX HERNIA REPAIR      HX ORTHOPAEDIC      rotator cuff surgery    HX OTHER SURGICAL      Patent Foramen Ovale Repair    HX THYROIDECTOMY      IR FINE NEEDLE ASPIRATION W IMAGE        Family History   Problem Relation Age of Onset    Diabetes Maternal Uncle     Hypertension Maternal Uncle     Hypertension Paternal Uncle     Diabetes Paternal Uncle     Cancer Other     Cancer Mother     No Known Problems Father      Social History     Tobacco Use    Smoking status: Former Smoker    Smokeless tobacco: Never Used   Substance Use Topics    Alcohol use: Yes     Comment: socially       Current Facility-Administered Medications   Medication Dose Route Frequency Provider Last Rate Last Admin    albuterol-ipratropium (DUO-NEB) 2.5 MG-0.5 MG/3 ML  3 mL Nebulization Q6H RT Kali Del Cid, DO        guaiFENesin ER (MUCINEX) tablet 1,200 mg  1,200 mg Oral Q12H Kali Del Cid DO        traMADoL (ULTRAM) tablet 50 mg  50 mg Oral Q6H PRN Kali Del Cid DO        benzonatate (TESSALON) capsule 200 mg  200 mg Oral TID PRN Kali Del Cid DO        sodium chloride (NS) flush 5-40 mL  5-40 mL IntraVENous Q8H Racheal BEGUM MD   10 mL at 09/14/21 1443    sodium chloride (NS) flush 5-40 mL  5-40 mL IntraVENous PRN Racheal BEGUM MD        methylPREDNISolone (PF) (Solu-MEDROL) injection 60 mg  60 mg IntraVENous Q6H Racheal BEGUM MD   60 mg at 09/14/21 1442    ondansetron (ZOFRAN) injection 4 mg  4 mg IntraVENous Q4H PRN Racheal BEGUM MD   4 mg at 09/13/21 1928    docusate sodium (COLACE) capsule 100 mg  100 mg Oral BID Racheal BEGUM MD        azithromycin (ZITHROMAX) 500 mg in 0.9% sodium chloride 250 mL (VIAL-MATE)  500 mg IntraVENous Q24H Racheal BEGUM  mL/hr at 09/13/21 1850 500 mg at 09/13/21 1850    ARIPiprazole (ABILIFY) tablet 2 mg  2 mg Oral DAILY Pee Yates MD   2 mg at 09/14/21 4866    aspirin chewable tablet 81 mg  81 mg Oral DAILY Racheal BEGUM MD   81 mg at 09/14/21 9050    atorvastatin (LIPITOR) tablet 20 mg  20 mg Oral DAILY Pee Yates MD   20 mg at 09/14/21 0904    bumetanide (BUMEX) tablet 2 mg  2 mg Oral DAILY Pee Yates MD   2 mg at 09/14/21 2662    cholecalciferol (VITAMIN D3) (1000 Units /25 mcg) tablet 2,000 Units  2,000 Units Oral DAILY Pee Yates MD   2,000 Units at 09/14/21 0913    citalopram (CELEXA) tablet 40 mg  40 mg Oral DAILY Pee Yates MD   40 mg at 09/14/21 0906    digoxin (LANOXIN) tablet 0.125 mg  0.125 mg Oral DAILY Racheal BEGUM MD   0.125 mg at 09/14/21 3403    gabapentin (NEURONTIN) capsule 600 mg  600 mg Oral QID PRN Racheal BEGUM MD   600 mg at 09/13/21 2201    levothyroxine (SYNTHROID) tablet 200 mcg  200 mcg Oral ACB Pee Yates MD   200 mcg at 09/14/21 0913    metFORMIN (GLUCOPHAGE) tablet 500 mg  500 mg Oral DAILY WITH BREAKFAST Racheal BEGUM MD        metoprolol succinate (TOPROL-XL) XL tablet 50 mg  50 mg Oral DAILY Racheal BEGUM MD   50 mg at 09/14/21 0904    pantoprazole (PROTONIX) tablet 40 mg  40 mg Oral BID Pee Yates MD   40 mg at 09/14/21 0905    topiramate (TOPAMAX) tablet 50 mg  50 mg Oral BID WITH MEALS Moises BEGUM MD   50 mg at 09/13/21 2154    traZODone (DESYREL) tablet 100 mg  100 mg Oral QHS Moises BEGUM MD        zolpidem (AMBIEN) tablet 5 mg  5 mg Oral QHS PRN Moises BEGUM MD   5 mg at 09/13/21 2201    potassium chloride (K-DUR, KLOR-CON) SR tablet 40 mEq  40 mEq Oral DAILY Moises BEGUM MD   40 mEq at 09/14/21 0904    enoxaparin (LOVENOX) injection 40 mg  40 mg SubCUTAneous Q24H Moises BEGUM MD   40 mg at 09/13/21 2154    glucose chewable tablet 16 g  4 Tablet Oral PRN Bo Ahumada MD        glucagon (GLUCAGEN) injection 1 mg  1 mg IntraMUSCular PRN Moises BEGUM MD        dextrose (D50W) injection syrg 12.5-25 g  25-50 mL IntraVENous PRN Bo Ahumada MD        insulin glargine (LANTUS) injection 20 Units  20 Units SubCUTAneous QHS Moises BEGUM MD   20 Units at 09/14/21 0112    glucagon (GLUCAGEN) injection 1 mg  1 mg IntraMUSCular PRN Moises BEGUM MD        dextrose (D50W) injection syrg 12.5-25 g  25-50 mL IntraVENous PRN Moises BEGUM MD        glucose chewable tablet 16 g  4 Tablet Oral PRN Bo Ahumada MD        insulin lispro (HUMALOG) injection   SubCUTAneous AC&HS Bo Ahumada MD   6 Units at 09/14/21 1442        Home Meds:  No current facility-administered medications on file prior to encounter. Current Outpatient Medications on File Prior to Encounter   Medication Sig Dispense Refill    levothyroxine (SYNTHROID) 200 mcg tablet Take 1 Tablet by mouth Daily (before breakfast) for 30 days. 30 Tablet 4    metFORMIN (GLUCOPHAGE) 500 mg tablet       ARIPiprazole (ABILIFY) 2 mg tablet Take 2 mg by mouth daily.  cholecalciferol (VITAMIN D3) (2,000 UNITS /50 MCG) cap capsule       bumetanide (BUMEX) 2 mg tablet Take 1 Tablet by mouth daily. 30 Tablet 0    gabapentin (NEURONTIN) 300 mg capsule Take 2 Capsules by mouth four (4) times daily as needed for Pain. Max Daily Amount: 2,400 mg. 12 Capsule 0    metoprolol succinate (TOPROL-XL) 50 mg XL tablet Take 1 Tablet by mouth daily. Indications: high blood pressure 30 Tablet 0    traZODone (DESYREL) 100 mg tablet Take 1 Tablet by mouth nightly. 30 Tablet 0    zolpidem (AMBIEN) 5 mg tablet Take 1 Tablet by mouth nightly as needed for Sleep. Max Daily Amount: 5 mg. 12 Tablet 0    docusate sodium (COLACE) 100 mg capsule Take 1 Capsule by mouth two (2) times a day for 90 days. 60 Capsule 2    topiramate (TOPAMAX) 50 mg tablet Take 1 Tablet by mouth two (2) times daily (with meals). 60 Tablet 0    pantoprazole (PROTONIX) 40 mg tablet TAKE ONE TABLET BY MOUTH TWICE DAILY      digoxin (LANOXIN) 0.125 mg tablet TAKE ONE TABLET BY MOUTH EVERY DAY      citalopram (CELEXA) 40 mg tablet TAKE ONE TABLET BY MOUTH EVERY DAY      atorvastatin (LIPITOR) 20 mg tablet 20 mg daily.  aspirin 81 mg chewable tablet Take 81 mg by mouth daily. Allergies   Allergen Reactions    Vancomycin Hives     Lyndsey syndrome       Review of Systems:  A comprehensive review of systems was negative except for that written in the HPI. Objective:     Blood pressure (!) 158/93, pulse (!) 109, temperature 98 °F (36.7 °C), resp. rate 16, height 6' 4\" (1.93 m), weight (!) 164.2 kg (362 lb), SpO2 91 %. No data recorded.       Intake and Output:  Current Shift: 09/14 0701 - 09/14 1900  In: -   Out: 700 [Urine:700]  Last 3 Shifts: 09/12 1901 - 09/14 0700  In: 150 [I.V.:150]  Out: -     Physical Exam:   General appearance: alert, cooperative, no distress, appears stated age, morbidly obese  Head: Normocephalic, without obvious abnormality, atraumatic  Eyes: negative  Neck: supple, symmetrical, trachea midline and no adenopathy  Lungs: Coarse breath sounds bilaterally with expiratory wheezing, prolonged exhalation phase, no accessory muscle use or supplementary oxygen use at this time  Heart: regular rate and rhythm, S1, S2 normal, no murmur, click, rub or gallop  Abdomen: soft, non-tender. Bowel sounds normal. No masses,  no organomegaly  Extremities: extremities normal, atraumatic, no cyanosis or edema  Pulses: 2+ and symmetric  Skin: Skin color, texture, turgor normal. No rashes or lesions  Lymph nodes: Cervical, supraclavicular, and axillary nodes normal.  Neurologic: Grossly normal, alert and oriented x3    Additional comments:None    Lab/Data Review: All lab results for the last 24 hours reviewed. Recent Results (from the past 24 hour(s))   METABOLIC PANEL, COMPREHENSIVE    Collection Time: 09/13/21  5:45 PM   Result Value Ref Range    Sodium 135 (L) 136 - 145 mmol/L    Potassium 3.0 (L) 3.5 - 5.1 mmol/L    Chloride 93 (L) 97 - 108 mmol/L    CO2 31 21 - 32 mmol/L    Anion gap 11 5 - 15 mmol/L    Glucose 206 (H) 65 - 100 mg/dL    BUN 25 (H) 6 - 20 mg/dL    Creatinine 1.75 (H) 0.70 - 1.30 mg/dL    BUN/Creatinine ratio 14 12 - 20      GFR est AA 51 (L) >60 ml/min/1.73m2    GFR est non-AA 42 (L) >60 ml/min/1.73m2    Calcium 9.5 8.5 - 10.1 mg/dL    Bilirubin, total 0.3 0.2 - 1.0 mg/dL    AST (SGOT) 78 (H) 15 - 37 U/L    ALT (SGPT) 117 (H) 12 - 78 U/L    Alk.  phosphatase 77 45 - 117 U/L    Protein, total 8.6 (H) 6.4 - 8.2 g/dL    Albumin 3.7 3.5 - 5.0 g/dL    Globulin 4.9 (H) 2.0 - 4.0 g/dL    A-G Ratio 0.8 (L) 1.1 - 2.2     TROPONIN I    Collection Time: 09/13/21  5:45 PM   Result Value Ref Range    Troponin-I, Qt. <0.05 <0.05 ng/mL   SARS-COV-2    Collection Time: 09/13/21  6:57 PM   Result Value Ref Range    SARS-CoV-2 Please find results under separate order     COVID-19 RAPID TEST    Collection Time: 09/13/21  6:57 PM   Result Value Ref Range    Specimen source Nasopharyngeal      COVID-19 rapid test Not Detected Not Detected     GLUCOSE, POC    Collection Time: 09/13/21  9:11 PM   Result Value Ref Range    Glucose (POC) 402 (H) 65 - 117 mg/dL    Performed by Trish, POC    Collection Time: 09/13/21 11:39 PM   Result Value Ref Range    Glucose (POC) 440 (H) 65 - 117 mg/dL    Performed by Liset Mcfadden    TROPONIN I    Collection Time: 09/14/21  1:17 AM   Result Value Ref Range    Troponin-I, Qt. <0.05 <0.05 ng/mL   CBC WITH AUTOMATED DIFF    Collection Time: 09/14/21  3:36 AM   Result Value Ref Range    WBC 12.8 (H) 4.1 - 11.1 K/uL    RBC 4.08 (L) 4.10 - 5.70 M/uL    HGB 13.6 12.1 - 17.0 g/dL    HCT 40.1 36.6 - 50.3 %    MCV 98.3 80.0 - 99.0 FL    MCH 33.3 26.0 - 34.0 PG    MCHC 33.9 30.0 - 36.5 g/dL    RDW 13.8 11.5 - 14.5 %    PLATELET 950 515 - 418 K/uL    MPV 10.8 8.9 - 12.9 FL    NRBC 0.0 0.0  WBC    ABSOLUTE NRBC 0.00 0.00 - 0.01 K/uL    NEUTROPHILS 90 (H) 32 - 75 %    LYMPHOCYTES 8 (L) 12 - 49 %    MONOCYTES 2 (L) 5 - 13 %    EOSINOPHILS 0 0 - 7 %    BASOPHILS 0 0 - 1 %    IMMATURE GRANULOCYTES 0 0 - 0.5 %    ABS. NEUTROPHILS 11.4 (H) 1.8 - 8.0 K/UL    ABS. LYMPHOCYTES 1.0 0.8 - 3.5 K/UL    ABS. MONOCYTES 0.3 0.0 - 1.0 K/UL    ABS. EOSINOPHILS 0.0 0.0 - 0.4 K/UL    ABS. BASOPHILS 0.0 0.0 - 0.1 K/UL    ABS. IMM.  GRANS. 0.1 (H) 0.00 - 0.04 K/UL    DF AUTOMATED     METABOLIC PANEL, BASIC    Collection Time: 09/14/21  3:36 AM   Result Value Ref Range    Sodium 131 (L) 136 - 145 mmol/L    Potassium 3.8 3.5 - 5.1 mmol/L    Chloride 93 (L) 97 - 108 mmol/L    CO2 28 21 - 32 mmol/L    Anion gap 10 5 - 15 mmol/L    Glucose 201 (H) 65 - 100 mg/dL    BUN 25 (H) 6 - 20 mg/dL    Creatinine 1.62 (H) 0.70 - 1.30 mg/dL    BUN/Creatinine ratio 15 12 - 20      GFR est AA 56 (L) >60 ml/min/1.73m2    GFR est non-AA 46 (L) >60 ml/min/1.73m2    Calcium 9.3 8.5 - 10.1 mg/dL   TROPONIN I    Collection Time: 09/14/21  3:36 AM   Result Value Ref Range    Troponin-I, Qt. <0.05 <0.05 ng/mL   GLUCOSE, POC    Collection Time: 09/14/21  8:05 AM   Result Value Ref Range    Glucose (POC) 274 (H) 65 - 117 mg/dL    Performed by Geovanni Rodriguez    GLUCOSE, POC    Collection Time: 09/14/21 12:39 PM   Result Value Ref Range    Glucose (POC) 243 (H) 65 - 117 mg/dL Performed by Yonathan Beavers          Chest X-Ray:   CTA CHEST W OR W WO CONT   Final Result   No pulmonary embolism definitively identified. Other findings as   above. XR CHEST PORT   Final Result   Hydrostatic edema. CT imaging:  CT Results  (Last 48 hours)               09/13/21 1305  CTA CHEST W OR W WO CONT Final result    Impression:  No pulmonary embolism definitively identified. Other findings as   above. Narrative:  CT angiography of the chest, 9/13/2021       History: Chest pain. Technique: Multiple contiguous axial images were acquired from the thoracic   inlet to diaphragm following intravenous administration of 100 mL Isovue-370   contrast material utilizing a CT pulmonary angiography technique. Coronal,   sagittal and MIP reconstruction of images was made. All CT scans at this facility are performed using dose reduction optimization   techniques as appropriate to perform the exam including the following: Automated   exposure control, adjustments of the mA and/or kV according to patient size, or   use iterative reconstruction technique. Comparison: Including CT chest 7/15/2021. Findings: Image quality is degraded by artifact. CT pulmonary angiography: The main pulmonary artery measures 2.7 cm which is   within normal limits. No pulmonary embolism is definitively identified. CT chest: Surgical clips are seen in the included thyroid fossa. Gynecomastia is present. No axillary, mediastinal or hilar lymphadenopathy is   noted. The heart size is borderline enlarged. Interatrial septal closure device is in   place. Lobular fluid attenuation hypodensity in the right heart border   measuring 1.6 cm in thickness is suboptimally visualized due to artifact and   consistent with pericardial cyst.  No significant pericardial effusion is   evident. The thoracic aorta is normal in caliber.        The lungs are markedly underexpanded with atelectatic changes. There is mosaic   attenuation suggesting sequela of small to medium airway disease. Scattered   cysts measure up to 2.8 cm in the right middle lobe. Right paramediastinal bleb   measures 2.9 cm. No focal consolidation, pleural effusion or pneumothorax identified. There is   no specific CT evidence of pneumonia. A paramediastinal nodule in the left upper lobe measuring 6 mm is stable on   prior studies including CT angiography chest 20/6/2017. A juxtapleural nodule   in the left lower lobe, posterior aspect measures 4 mm, stable as compared to CT   chest 9/29/2019. These are benign-appearing. No routine imaging follow-up is   indicated. Degenerative changes are present in the thoracic spine. The included liver has diffusely low attenuation consistent with hepatic   steatosis. The included adrenal glands, pancreas, spleen show no focal   abnormality. PFTs:   PFT Results  (Last 3 results in the past 10 years)    None            Assessment:     Patient is a 58-year-old morbidly obese  male with a history of asthma, hypertension, ANIBAL on BiPAP, and a PFO who presented to the hospital with increasing shortness of breath and cough over the past 2 weeks and is currently being treated for an acute exacerbation of asthma/bronchitis. Plan:     1.)  Acute bronchitis/acute exacerbation of asthma  -Per Dr. Clemente Leslie, the patient has moderate persistent asthma at baseline and has been on a Spiriva and a Breo inhaler. He admits to compliance with his inhaler therapy.   -COVID-19 negative on admission, I will go ahead and order a rapid flu test  -Requiring any supplemental oxygen but he does have significant coarse breath sounds bilaterally  -We will start the patient on duo nebs every 6 hours, Pulmicort nebs twice daily, and continue him on Solu-Medrol 60 mg IV every 6 hours  -Hopefully he will be able to go home in the next 24 to 48 hours if he is feeling better.  -Follow-up with Dr. Debbie Wolff on 9/28 at 4PM    2.)  Acute kidney injury  -Baseline creatinine appears to be about 1.1-1.2 and it was 1.75 on admission  -Likely prerenal and this is improving down to 1.62 this morning  -Repeat BMP in the morning    3.)  Leukocytosis  -White blood cell count is gone up to 12.8 from 7.7 on admission  -Likely steroid response  -Continue azithromycin for now  -Repeat CBC in the morning    4.)  ANIBAL  -Follows in our clinic with Dr. Debbie Wolff, next appointment is on 9/28 at 4 PM.  Patient has been given our office card with the appointment written on the card. -Should be on BiPAP 19/15 at baseline, but states that he had to return his machine about a week ago.   Will need repeat outpatient sleep study.  -I will order BiPAP for him to wear here in the hospital, but he will likely refuse as we only have full facemask's.    5.)  Morbid obesity  -BMI 44.06, due to excess calories  -Weight loss recommended  -ANIBAL management as above      CODE STATUS: Full Code      Disposition and Family: Stable to remain on the floor    Total time spent with patient: 40 minutes      Yane Hassan DO  [facility]  9/14/2021  2:48 PM

## 2021-09-14 NOTE — ACP (ADVANCE CARE PLANNING)
Advance Care Planning   Healthcare Decision Maker:       Primary Decision Maker: Tere [facility] - [phone]    Secondary Decision Maker: Donovan Lantigua - [phone]

## 2021-09-14 NOTE — PROGRESS NOTES
Reason for Admission:   COPD                    RUR Score:    27%              PCP: First and Last name:   Marcela Lewis MD     Name of Practice:    Are you a current patient: Yes/No: Yes   Approximate date of last visit:  1 week ago. Can you participate in a virtual visit if needed:   Yes/Call/Has cell. Do you (patient/family) have any concerns for transition/discharge? Pt states he's homeless  & has no money. Plan for utilizing home health:  None. Signed Choice Letter to continue home O2 /has a portable. Referral sent via [facility].     Current Advanced Directive/Advance Care Plan:  Full Code      Healthcare Decision Maker:               Primary Decision Maker: Ayla Escobedo - [phone]    Secondary Decision Maker: Roxana Chery - [phone]    Transition of Care Plan:    D/C Plan is home/hotel - pt will call friends for assist upon discharge. May need cab/transportation upon discharge.

## 2021-09-14 NOTE — PROGRESS NOTES
Progress Note    Patient: Eligio Christie MRN: [id number]  SSN: [id number]    YOB: 1975  Age: 54 y.o. Sex: male      Admit Date: 9/13/2021    LOS: 1 day     Subjective:     54years old patient with morbid obesity, obstructive sleep apnea, admitted for COPD with acute exacerbation, atypical chest pain and has a history of diabetes. Patient says she is not feeling any better    Objective:     Vitals:    09/14/21 0755 09/14/21 0800 09/14/21 1145 09/14/21 1314   BP: (!) 158/93      Pulse: (!) 109 (!) 109 (!) 109    Resp: 16      Temp:       SpO2: 97%   91%   Weight:       Height:            Intake and Output:  Current Shift: 09/14 0701 - 09/14 1900  In: -   Out: 700 [Urine:700]  Last three shifts: 09/12 1901 - 09/14 0700  In: 150 [I.V.:150]  Out: -     Physical Exam:   General:  Alert, cooperative, no distress, appears stated age. Eyes:  Conjunctivae/corneas clear. PERRL, EOMs intact. Fundi benign   Ears:  Normal TMs and external ear canals both ears. Nose: Nares normal. Septum midline. Mucosa normal. No drainage or sinus tenderness. Mouth/Throat: Lips, mucosa, and tongue normal. Teeth and gums normal.   Neck: Supple, symmetrical, trachea midline, no adenopathy, thyroid: no enlargment/tenderness/nodules, no carotid bruit and no JVD. Back:   Symmetric, no curvature. ROM normal. No CVA tenderness. Lungs:    Rhonchi bilaterally to auscultation bilaterally. Heart:  Regular rate and rhythm, S1, S2 normal, no murmur, click, rub or gallop. Abdomen:   Soft, non-tender. Bowel sounds normal. No masses,  No organomegaly. Extremities: Extremities normal, atraumatic, no cyanosis or edema. Pulses: 2+ and symmetric all extremities. Skin: Skin color, texture, turgor normal. No rashes or lesions   Lymph nodes: Cervical, supraclavicular, and axillary nodes normal.   Neurologic: CNII-XII intact. Normal strength, sensation and reflexes throughout. Lab/Data Review:   All lab results for the last 24 hours reviewed. Recent Results (from the past 24 hour(s))   METABOLIC PANEL, COMPREHENSIVE    Collection Time: 09/13/21  5:45 PM   Result Value Ref Range    Sodium 135 (L) 136 - 145 mmol/L    Potassium 3.0 (L) 3.5 - 5.1 mmol/L    Chloride 93 (L) 97 - 108 mmol/L    CO2 31 21 - 32 mmol/L    Anion gap 11 5 - 15 mmol/L    Glucose 206 (H) 65 - 100 mg/dL    BUN 25 (H) 6 - 20 mg/dL    Creatinine 1.75 (H) 0.70 - 1.30 mg/dL    BUN/Creatinine ratio 14 12 - 20      GFR est AA 51 (L) >60 ml/min/1.73m2    GFR est non-AA 42 (L) >60 ml/min/1.73m2    Calcium 9.5 8.5 - 10.1 mg/dL    Bilirubin, total 0.3 0.2 - 1.0 mg/dL    AST (SGOT) 78 (H) 15 - 37 U/L    ALT (SGPT) 117 (H) 12 - 78 U/L    Alk.  phosphatase 77 45 - 117 U/L    Protein, total 8.6 (H) 6.4 - 8.2 g/dL    Albumin 3.7 3.5 - 5.0 g/dL    Globulin 4.9 (H) 2.0 - 4.0 g/dL    A-G Ratio 0.8 (L) 1.1 - 2.2     TROPONIN I    Collection Time: 09/13/21  5:45 PM   Result Value Ref Range    Troponin-I, Qt. <0.05 <0.05 ng/mL   SARS-COV-2    Collection Time: 09/13/21  6:57 PM   Result Value Ref Range    SARS-CoV-2 Please find results under separate order     COVID-19 RAPID TEST    Collection Time: 09/13/21  6:57 PM   Result Value Ref Range    Specimen source Nasopharyngeal      COVID-19 rapid test Not Detected Not Detected     GLUCOSE, POC    Collection Time: 09/13/21  9:11 PM   Result Value Ref Range    Glucose (POC) 402 (H) 65 - 117 mg/dL    Performed by Trish POC    Collection Time: 09/13/21 11:39 PM   Result Value Ref Range    Glucose (POC) 440 (H) 65 - 117 mg/dL    Performed by Liset Mcfadden    TROPONIN I    Collection Time: 09/14/21  1:17 AM   Result Value Ref Range    Troponin-I, Qt. <0.05 <0.05 ng/mL   CBC WITH AUTOMATED DIFF    Collection Time: 09/14/21  3:36 AM   Result Value Ref Range    WBC 12.8 (H) 4.1 - 11.1 K/uL    RBC 4.08 (L) 4.10 - 5.70 M/uL    HGB 13.6 12.1 - 17.0 g/dL    HCT 40.1 36.6 - 50.3 %    MCV 98.3 80.0 - 99.0 FL    MCH 33.3 26.0 - 34.0 PG    MCHC 33.9 30.0 - 36.5 g/dL    RDW 13.8 11.5 - 14.5 %    PLATELET 633 358 - 020 K/uL    MPV 10.8 8.9 - 12.9 FL    NRBC 0.0 0.0  WBC    ABSOLUTE NRBC 0.00 0.00 - 0.01 K/uL    NEUTROPHILS 90 (H) 32 - 75 %    LYMPHOCYTES 8 (L) 12 - 49 %    MONOCYTES 2 (L) 5 - 13 %    EOSINOPHILS 0 0 - 7 %    BASOPHILS 0 0 - 1 %    IMMATURE GRANULOCYTES 0 0 - 0.5 %    ABS. NEUTROPHILS 11.4 (H) 1.8 - 8.0 K/UL    ABS. LYMPHOCYTES 1.0 0.8 - 3.5 K/UL    ABS. MONOCYTES 0.3 0.0 - 1.0 K/UL    ABS. EOSINOPHILS 0.0 0.0 - 0.4 K/UL    ABS. BASOPHILS 0.0 0.0 - 0.1 K/UL    ABS. IMM. GRANS. 0.1 (H) 0.00 - 0.04 K/UL    DF AUTOMATED     METABOLIC PANEL, BASIC    Collection Time: 09/14/21  3:36 AM   Result Value Ref Range    Sodium 131 (L) 136 - 145 mmol/L    Potassium 3.8 3.5 - 5.1 mmol/L    Chloride 93 (L) 97 - 108 mmol/L    CO2 28 21 - 32 mmol/L    Anion gap 10 5 - 15 mmol/L    Glucose 201 (H) 65 - 100 mg/dL    BUN 25 (H) 6 - 20 mg/dL    Creatinine 1.62 (H) 0.70 - 1.30 mg/dL    BUN/Creatinine ratio 15 12 - 20      GFR est AA 56 (L) >60 ml/min/1.73m2    GFR est non-AA 46 (L) >60 ml/min/1.73m2    Calcium 9.3 8.5 - 10.1 mg/dL   TROPONIN I    Collection Time: 09/14/21  3:36 AM   Result Value Ref Range    Troponin-I, Qt. <0.05 <0.05 ng/mL   GLUCOSE, POC    Collection Time: 09/14/21  8:05 AM   Result Value Ref Range    Glucose (POC) 274 (H) 65 - 117 mg/dL    Performed by Akshat Navarrete, POC    Collection Time: 09/14/21 12:39 PM   Result Value Ref Range    Glucose (POC) 243 (H) 65 - 117 mg/dL    Performed by Kailyn Morales          Assessment:     Active Problems:    COPD (chronic obstructive pulmonary disease) ([facility] [address] (12/17/2020)      Chest pain (6/26/2021)    Obstructive sleep apnea  Morbid obesity  Anxiety neurosis    Plan:     Continue with duo nebs add antibiotics and continue with the steroids.     Signed By: Taco Jensen MD     September 14, 2021

## 2021-09-14 NOTE — CONSULTS
Cardiology Consult    NAME: Eligio Christie   :  1975   MRN:  [id number]     Date/Time:  2021 5:11 PM    Patient PCP: Nitza Lizama MD  ________________________________________________________________________     Assessment/Plan:     Chest pain, sharp, constant, and radiating down the left arm and to the right of the chest, negative cardiac enzymes, will repeat EKG. Normal LV function by recent echo , will try to find the timing of his last catheterization before deciding whether to pursue again. CVA/PFO closure 4 months ago    Bronchitis, exacerbation of asthma, pulmonary following  Obstructive sleep apnea    Obesity    Hypertension 2 years, diabetes 1 year, hypercholesterolemia 2 years, quit tobacco 18 years,               []        High complexity decision making was performed        Subjective:   CHIEF COMPLAINT:   Shortness of breath    REASON FOR CONSULT:    Chest pain  HISTORY OF PRESENT ILLNESS:     Eligio Christie is a 54 y.o. WHITE/NON- male who has a history of obstructive sleep apnea and bronchitis/bronchial asthma who presents with increasing shortness of breath. Patient continues to experience sharp left-sided chest pains. This is constant. Radiating down the left arm as well as into the right side of the chest.  Continues to feel short of breath. No sweating. Cardiac enzymes have been negative so far. EKG with sinus tachycardia otherwise unremarkable. Patient had normal LV function by last echo . Apparently has had cardiac catheterization in the past, exact timeframe not known. History of CVA, status post PFO closure  History of hypertension, hypercholesterolemia, diabetes, remote tobacco use. Obstructive sleep apnea, on treatment.         Past Medical History:   Diagnosis Date    Acute MI ([facility] [address]     COPD (chronic obstructive pulmonary disease) ([facility] [address]     Depression     Hypertension     Ill-defined condition     Stroke ([facility] [address] Past Surgical History:   Procedure Laterality Date    HX HERNIA REPAIR      HX ORTHOPAEDIC      rotator cuff surgery    HX OTHER SURGICAL      Patent Foramen Ovale Repair    HX THYROIDECTOMY      IR FINE NEEDLE ASPIRATION W IMAGE       Allergies   Allergen Reactions    Vancomycin Hives     Lyndsey syndrome      Meds:  See below  Social History     Tobacco Use    Smoking status: Former Smoker    Smokeless tobacco: Never Used   Substance Use Topics    Alcohol use: Yes     Comment: socially       Family History   Problem Relation Age of Onset    Diabetes Maternal Uncle     Hypertension Maternal Uncle     Hypertension Paternal Uncle     Diabetes Paternal Uncle     Cancer Other    Bethena Lobstein Cancer Mother     No Known Problems Father        REVIEW OF SYSTEMS:     []         Unable to obtain  ROS due to ---   [x]         Total of 12 systems reviewed as follows:    Constitutional: negative fever, negative chills, negative weight loss  Eyes:   negative visual changes  ENT:   negative sore throat, tongue or lip swelling  Respiratory:  negative cough, negative dyspnea  Cards:  See HPI  GI:   negative for nausea, vomiting, diarrhea, and abdominal pain  Genitourinary: negative for frequency, dysuria  Integument:  negative for rash   Hematologic:  negative for easy bruising and gum/nose bleeding  Musculoskel: negative for myalgias,  back pain  Neurological:  negative for headaches, dizziness, vertigo, weakness  Behavl/Psych: negative for feelings of anxiety, depression     Pertinent Positives include :    Objective:      Physical Exam:    Last 24hrs VS reviewed since prior progress note.  Most recent are:    Visit Vitals  BP (!) 158/93   Pulse (!) 109   Temp 98 °F (36.7 °C)   Resp 16   Ht 6' 4\" (1.93 m)   Wt (!) 164.2 kg (362 lb)   SpO2 91%   BMI 44.06 kg/m²       Intake/Output Summary (Last 24 hours) at 9/14/2021 1711  Last data filed at 9/14/2021 0802  Gross per 24 hour   Intake 150 ml   Output 700 ml   Net -550 ml General Appearance: Overweight male, no acute distress. Ears/Nose/Mouth/Throat: Pupils equal and round, Hearing grossly normal.  Neck: Supple. JVP within normal limits. Carotids good upstrokes, with no bruit. Chest: Lungs clear to auscultation bilaterally. Cardiovascular: Regular rate and rhythm, S1S2 normal, no murmur, rubs, gallops. Abdomen: Soft, non-tender, bowel sounds are active. No organomegaly. Extremities: No edema bilaterally. Femoral pulses +2, Distal Pulses +1. Skin: Warm and dry. Neuro: Alert , normal speech; follows simple commands  Psychiatric: Cooperative,     []         Post-cath site without hematoma, bruit, tenderness, or thrill. Distal pulses intact. Data:      Telemetry:    EKG:  []  No new EKG for review. 9/13/2021 EKG sinus tachycardia 112/min    Prior to Admission medications    Medication Sig Start Date End Date Taking? Authorizing Provider   levothyroxine (SYNTHROID) 200 mcg tablet Take 1 Tablet by mouth Daily (before breakfast) for 30 days. 9/7/21 10/7/21  Chaitanya Gallegos MD   metFORMIN (GLUCOPHAGE) 500 mg tablet  3/1/21   Provider, Historical   ARIPiprazole (ABILIFY) 2 mg tablet Take 2 mg by mouth daily. 7/23/21   Provider, Historical   cholecalciferol (VITAMIN D3) (2,000 UNITS /50 MCG) cap capsule  4/5/21   Provider, Historical   bumetanide (BUMEX) 2 mg tablet Take 1 Tablet by mouth daily. 8/20/21   Vee Liang MD   gabapentin (NEURONTIN) 300 mg capsule Take 2 Capsules by mouth four (4) times daily as needed for Pain. Max Daily Amount: 2,400 mg. 8/20/21   Karyn BEGUM MD   metoprolol succinate (TOPROL-XL) 50 mg XL tablet Take 1 Tablet by mouth daily. Indications: high blood pressure 8/21/21   Karyn BEGUM MD   traZODone (DESYREL) 100 mg tablet Take 1 Tablet by mouth nightly. 8/20/21   Vee Liang MD   zolpidem (AMBIEN) 5 mg tablet Take 1 Tablet by mouth nightly as needed for Sleep.  Max Daily Amount: 5 mg. 8/20/21   Vee Liang MD   docusate sodium (COLACE) 100 mg capsule Take 1 Capsule by mouth two (2) times a day for 90 days. 8/3/21 11/1/21  Hunter BEGUM MD   topiramate (TOPAMAX) 50 mg tablet Take 1 Tablet by mouth two (2) times daily (with meals). 8/3/21   Hunter BEGUM MD   pantoprazole (PROTONIX) 40 mg tablet TAKE ONE TABLET BY MOUTH TWICE DAILY 3/23/21   Provider, Historical   digoxin (LANOXIN) 0.125 mg tablet TAKE ONE TABLET BY MOUTH EVERY DAY 11/4/20   Provider, Historical   citalopram (CELEXA) 40 mg tablet TAKE ONE TABLET BY MOUTH EVERY DAY 12/8/20   Provider, Historical   atorvastatin (LIPITOR) 20 mg tablet 20 mg daily. 8/3/20   Provider, Historical   aspirin 81 mg chewable tablet Take 81 mg by mouth daily. Other, MD Elizabeth       Recent Results (from the past 24 hour(s))   METABOLIC PANEL, COMPREHENSIVE    Collection Time: 09/13/21  5:45 PM   Result Value Ref Range    Sodium 135 (L) 136 - 145 mmol/L    Potassium 3.0 (L) 3.5 - 5.1 mmol/L    Chloride 93 (L) 97 - 108 mmol/L    CO2 31 21 - 32 mmol/L    Anion gap 11 5 - 15 mmol/L    Glucose 206 (H) 65 - 100 mg/dL    BUN 25 (H) 6 - 20 mg/dL    Creatinine 1.75 (H) 0.70 - 1.30 mg/dL    BUN/Creatinine ratio 14 12 - 20      GFR est AA 51 (L) >60 ml/min/1.73m2    GFR est non-AA 42 (L) >60 ml/min/1.73m2    Calcium 9.5 8.5 - 10.1 mg/dL    Bilirubin, total 0.3 0.2 - 1.0 mg/dL    AST (SGOT) 78 (H) 15 - 37 U/L    ALT (SGPT) 117 (H) 12 - 78 U/L    Alk.  phosphatase 77 45 - 117 U/L    Protein, total 8.6 (H) 6.4 - 8.2 g/dL    Albumin 3.7 3.5 - 5.0 g/dL    Globulin 4.9 (H) 2.0 - 4.0 g/dL    A-G Ratio 0.8 (L) 1.1 - 2.2     TROPONIN I    Collection Time: 09/13/21  5:45 PM   Result Value Ref Range    Troponin-I, Qt. <0.05 <0.05 ng/mL   SARS-COV-2    Collection Time: 09/13/21  6:57 PM   Result Value Ref Range    SARS-CoV-2 Please find results under separate order     COVID-19 RAPID TEST    Collection Time: 09/13/21  6:57 PM   Result Value Ref Range    Specimen source Nasopharyngeal      COVID-19 rapid test Not Detected Not Detected     GLUCOSE, POC    Collection Time: 09/13/21  9:11 PM   Result Value Ref Range    Glucose (POC) 402 (H) 65 - 117 mg/dL    Performed by Christian Ni    GLUCOSE, POC    Collection Time: 09/13/21 11:39 PM   Result Value Ref Range    Glucose (POC) 440 (H) 65 - 117 mg/dL    Performed by Christian Ni    TROPONIN I    Collection Time: 09/14/21  1:17 AM   Result Value Ref Range    Troponin-I, Qt. <0.05 <0.05 ng/mL   CBC WITH AUTOMATED DIFF    Collection Time: 09/14/21  3:36 AM   Result Value Ref Range    WBC 12.8 (H) 4.1 - 11.1 K/uL    RBC 4.08 (L) 4.10 - 5.70 M/uL    HGB 13.6 12.1 - 17.0 g/dL    HCT 40.1 36.6 - 50.3 %    MCV 98.3 80.0 - 99.0 FL    MCH 33.3 26.0 - 34.0 PG    MCHC 33.9 30.0 - 36.5 g/dL    RDW 13.8 11.5 - 14.5 %    PLATELET 543 290 - 599 K/uL    MPV 10.8 8.9 - 12.9 FL    NRBC 0.0 0.0  WBC    ABSOLUTE NRBC 0.00 0.00 - 0.01 K/uL    NEUTROPHILS 90 (H) 32 - 75 %    LYMPHOCYTES 8 (L) 12 - 49 %    MONOCYTES 2 (L) 5 - 13 %    EOSINOPHILS 0 0 - 7 %    BASOPHILS 0 0 - 1 %    IMMATURE GRANULOCYTES 0 0 - 0.5 %    ABS. NEUTROPHILS 11.4 (H) 1.8 - 8.0 K/UL    ABS. LYMPHOCYTES 1.0 0.8 - 3.5 K/UL    ABS. MONOCYTES 0.3 0.0 - 1.0 K/UL    ABS. EOSINOPHILS 0.0 0.0 - 0.4 K/UL    ABS. BASOPHILS 0.0 0.0 - 0.1 K/UL    ABS. IMM.  GRANS. 0.1 (H) 0.00 - 0.04 K/UL    DF AUTOMATED     METABOLIC PANEL, BASIC    Collection Time: 09/14/21  3:36 AM   Result Value Ref Range    Sodium 131 (L) 136 - 145 mmol/L    Potassium 3.8 3.5 - 5.1 mmol/L    Chloride 93 (L) 97 - 108 mmol/L    CO2 28 21 - 32 mmol/L    Anion gap 10 5 - 15 mmol/L    Glucose 201 (H) 65 - 100 mg/dL    BUN 25 (H) 6 - 20 mg/dL    Creatinine 1.62 (H) 0.70 - 1.30 mg/dL    BUN/Creatinine ratio 15 12 - 20      GFR est AA 56 (L) >60 ml/min/1.73m2    GFR est non-AA 46 (L) >60 ml/min/1.73m2    Calcium 9.3 8.5 - 10.1 mg/dL   TROPONIN I    Collection Time: 09/14/21  3:36 AM   Result Value Ref Range    Troponin-I, Qt. <0.05 <0.05 ng/mL   GLUCOSE, POC Collection Time: 09/14/21  8:05 AM   Result Value Ref Range    Glucose (POC) 274 (H) 65 - 117 mg/dL    Performed by Bre Gore    GLUCOSE, POC    Collection Time: 09/14/21 12:39 PM   Result Value Ref Range    Glucose (POC) 243 (H) 65 - 117 mg/dL    Performed by Jose Antonio Black MD

## 2021-09-14 NOTE — ED NOTES
TRANSFER - OUT REPORT:    Verbal report given to Bere(name) on Gudaalupe Mcclendon  being transferred to (unit) for routine progression of care       Report consisted of patients Situation, Background, Assessment and   Recommendations(SBAR). Information from the following report(s) SBAR and ED Summary was reviewed with the receiving nurse. Lines:   Peripheral IV 09/13/21 Left Antecubital (Active)        Opportunity for questions and clarification was provided.       Patient transported with:   Ticket Hoy

## 2021-09-14 NOTE — PROGRESS NOTES
9/14/21. PCP is Dr. Kianna Carver. D/C Plan undecided @ this time. Per pt he is homeless & has no money. Had been staying in a hotel. Stated he cannot live with GF) Janusz Rosales @ [phone]( she has his portable O2 tank). Pt signed Choice Letter to continue O2 service via [facility]. Referral sent via [place]. Pt has cell & will attempt to contact friends for assist upon discharge.

## 2021-09-15 ENCOUNTER — APPOINTMENT (OUTPATIENT)
Dept: NON INVASIVE DIAGNOSTICS | Age: 46
DRG: 140 | End: 2021-09-15
Attending: INTERNAL MEDICINE
Payer: MEDICAID

## 2021-09-15 LAB
ALBUMIN SERPL-MCNC: 3.4 G/DL (ref 3.5–5)
ALBUMIN/GLOB SERPL: 0.9 {RATIO} (ref 1.1–2.2)
ALP SERPL-CCNC: 63 U/L (ref 45–117)
ALT SERPL-CCNC: 95 U/L (ref 12–78)
ANION GAP SERPL CALC-SCNC: 8 MMOL/L (ref 5–15)
AST SERPL W P-5'-P-CCNC: 39 U/L (ref 15–37)
ATRIAL RATE: 94 BPM
BILIRUB SERPL-MCNC: 0.3 MG/DL (ref 0.2–1)
BUN SERPL-MCNC: 27 MG/DL (ref 6–20)
BUN/CREAT SERPL: 21 (ref 12–20)
CA-I BLD-MCNC: 8.9 MG/DL (ref 8.5–10.1)
CALCULATED P AXIS, ECG09: 35 DEGREES
CALCULATED R AXIS, ECG10: 8 DEGREES
CALCULATED T AXIS, ECG11: 13 DEGREES
CHLORIDE SERPL-SCNC: 93 MMOL/L (ref 97–108)
CK SERPL-CCNC: 219 U/L (ref 39–308)
CO2 SERPL-SCNC: 32 MMOL/L (ref 21–32)
CREAT SERPL-MCNC: 1.3 MG/DL (ref 0.7–1.3)
D DIMER PPP FEU-MCNC: 0.84 UG/ML(FEU)
DIAGNOSIS, 93000: NORMAL
ERYTHROCYTE [DISTWIDTH] IN BLOOD BY AUTOMATED COUNT: 13.5 % (ref 11.5–14.5)
GLOBULIN SER CALC-MCNC: 4 G/DL (ref 2–4)
GLUCOSE BLD STRIP.AUTO-MCNC: 187 MG/DL (ref 65–117)
GLUCOSE BLD STRIP.AUTO-MCNC: 199 MG/DL (ref 65–117)
GLUCOSE BLD STRIP.AUTO-MCNC: 215 MG/DL (ref 65–117)
GLUCOSE BLD STRIP.AUTO-MCNC: 323 MG/DL (ref 65–117)
GLUCOSE SERPL-MCNC: 217 MG/DL (ref 65–100)
HCT VFR BLD AUTO: 36.5 % (ref 36.6–50.3)
HGB BLD-MCNC: 12.2 G/DL (ref 12.1–17)
MAGNESIUM SERPL-MCNC: 2.1 MG/DL (ref 1.6–2.4)
MCH RBC QN AUTO: 32.9 PG (ref 26–34)
MCHC RBC AUTO-ENTMCNC: 33.4 G/DL (ref 30–36.5)
MCV RBC AUTO: 98.4 FL (ref 80–99)
NRBC # BLD: 0 K/UL (ref 0–0.01)
NRBC BLD-RTO: 0 PER 100 WBC
P-R INTERVAL, ECG05: 194 MS
PERFORMED BY, TECHID: ABNORMAL
PHOSPHATE SERPL-MCNC: 3.8 MG/DL (ref 2.6–4.7)
PLATELET # BLD AUTO: 259 K/UL (ref 150–400)
PMV BLD AUTO: 10.1 FL (ref 8.9–12.9)
POTASSIUM SERPL-SCNC: 3.2 MMOL/L (ref 3.5–5.1)
PROT SERPL-MCNC: 7.4 G/DL (ref 6.4–8.2)
Q-T INTERVAL, ECG07: 394 MS
QRS DURATION, ECG06: 102 MS
QTC CALCULATION (BEZET), ECG08: 492 MS
RBC # BLD AUTO: 3.71 M/UL (ref 4.1–5.7)
SODIUM SERPL-SCNC: 133 MMOL/L (ref 136–145)
TROPONIN I SERPL-MCNC: <0.05 NG/ML
VENTRICULAR RATE, ECG03: 94 BPM
WBC # BLD AUTO: 14.8 K/UL (ref 4.1–11.1)

## 2021-09-15 PROCEDURE — 83735 ASSAY OF MAGNESIUM: CPT

## 2021-09-15 PROCEDURE — 74011000250 HC RX REV CODE- 250: Performed by: INTERNAL MEDICINE

## 2021-09-15 PROCEDURE — 82550 ASSAY OF CK (CPK): CPT

## 2021-09-15 PROCEDURE — 93005 ELECTROCARDIOGRAM TRACING: CPT

## 2021-09-15 PROCEDURE — 36415 COLL VENOUS BLD VENIPUNCTURE: CPT

## 2021-09-15 PROCEDURE — 80053 COMPREHEN METABOLIC PANEL: CPT

## 2021-09-15 PROCEDURE — 74011250637 HC RX REV CODE- 250/637: Performed by: INTERNAL MEDICINE

## 2021-09-15 PROCEDURE — 65270000029 HC RM PRIVATE

## 2021-09-15 PROCEDURE — 84100 ASSAY OF PHOSPHORUS: CPT

## 2021-09-15 PROCEDURE — 85379 FIBRIN DEGRADATION QUANT: CPT

## 2021-09-15 PROCEDURE — 84484 ASSAY OF TROPONIN QUANT: CPT

## 2021-09-15 PROCEDURE — 93970 EXTREMITY STUDY: CPT

## 2021-09-15 PROCEDURE — 74011636637 HC RX REV CODE- 636/637: Performed by: INTERNAL MEDICINE

## 2021-09-15 PROCEDURE — 74011250636 HC RX REV CODE- 250/636: Performed by: INTERNAL MEDICINE

## 2021-09-15 PROCEDURE — 82962 GLUCOSE BLOOD TEST: CPT

## 2021-09-15 PROCEDURE — 85027 COMPLETE CBC AUTOMATED: CPT

## 2021-09-15 PROCEDURE — 93970 EXTREMITY STUDY: CPT | Performed by: SURGERY

## 2021-09-15 PROCEDURE — 94640 AIRWAY INHALATION TREATMENT: CPT

## 2021-09-15 RX ORDER — PROMETHAZINE HYDROCHLORIDE AND DEXTROMETHORPHAN HYDROBROMIDE 6.25; 15 MG/5ML; MG/5ML
5 SYRUP ORAL
Status: DISCONTINUED | OUTPATIENT
Start: 2021-09-15 | End: 2021-09-18 | Stop reason: HOSPADM

## 2021-09-15 RX ORDER — POTASSIUM CHLORIDE 20 MEQ/1
40 TABLET, EXTENDED RELEASE ORAL
Status: COMPLETED | OUTPATIENT
Start: 2021-09-15 | End: 2021-09-15

## 2021-09-15 RX ORDER — IPRATROPIUM BROMIDE AND ALBUTEROL SULFATE 2.5; .5 MG/3ML; MG/3ML
3 SOLUTION RESPIRATORY (INHALATION)
Status: DISCONTINUED | OUTPATIENT
Start: 2021-09-15 | End: 2021-09-16

## 2021-09-15 RX ADMIN — PANTOPRAZOLE SODIUM 40 MG: 40 TABLET, DELAYED RELEASE ORAL at 08:01

## 2021-09-15 RX ADMIN — TOPIRAMATE 50 MG: 25 TABLET, FILM COATED ORAL at 08:01

## 2021-09-15 RX ADMIN — METHYLPREDNISOLONE SODIUM SUCCINATE 60 MG: 125 INJECTION, POWDER, FOR SOLUTION INTRAMUSCULAR; INTRAVENOUS at 12:37

## 2021-09-15 RX ADMIN — GUAIFENESIN 1200 MG: 600 TABLET, EXTENDED RELEASE ORAL at 22:18

## 2021-09-15 RX ADMIN — BUDESONIDE 500 MCG: 0.5 SUSPENSION RESPIRATORY (INHALATION) at 19:49

## 2021-09-15 RX ADMIN — IPRATROPIUM BROMIDE AND ALBUTEROL SULFATE 3 ML: .5; 2.5 SOLUTION RESPIRATORY (INHALATION) at 16:09

## 2021-09-15 RX ADMIN — TRAMADOL HYDROCHLORIDE 50 MG: 50 TABLET, FILM COATED ORAL at 16:56

## 2021-09-15 RX ADMIN — METFORMIN HYDROCHLORIDE 500 MG: 500 TABLET ORAL at 08:01

## 2021-09-15 RX ADMIN — METHYLPREDNISOLONE SODIUM SUCCINATE 60 MG: 125 INJECTION, POWDER, FOR SOLUTION INTRAMUSCULAR; INTRAVENOUS at 18:14

## 2021-09-15 RX ADMIN — INSULIN LISPRO 3 UNITS: 100 INJECTION, SOLUTION INTRAVENOUS; SUBCUTANEOUS at 12:37

## 2021-09-15 RX ADMIN — METHYLPREDNISOLONE SODIUM SUCCINATE 60 MG: 125 INJECTION, POWDER, FOR SOLUTION INTRAMUSCULAR; INTRAVENOUS at 22:27

## 2021-09-15 RX ADMIN — DIGOXIN 0.12 MG: 125 TABLET ORAL at 08:00

## 2021-09-15 RX ADMIN — POTASSIUM CHLORIDE 40 MEQ: 1500 TABLET, EXTENDED RELEASE ORAL at 08:00

## 2021-09-15 RX ADMIN — TOPIRAMATE 50 MG: 25 TABLET, FILM COATED ORAL at 18:14

## 2021-09-15 RX ADMIN — INSULIN LISPRO 3 UNITS: 100 INJECTION, SOLUTION INTRAVENOUS; SUBCUTANEOUS at 07:59

## 2021-09-15 RX ADMIN — TRAMADOL HYDROCHLORIDE 50 MG: 50 TABLET, FILM COATED ORAL at 23:01

## 2021-09-15 RX ADMIN — INSULIN LISPRO 12 UNITS: 100 INJECTION, SOLUTION INTRAVENOUS; SUBCUTANEOUS at 22:20

## 2021-09-15 RX ADMIN — ATORVASTATIN CALCIUM 20 MG: 20 TABLET, FILM COATED ORAL at 08:01

## 2021-09-15 RX ADMIN — METOPROLOL SUCCINATE 50 MG: 50 TABLET, EXTENDED RELEASE ORAL at 08:00

## 2021-09-15 RX ADMIN — IPRATROPIUM BROMIDE AND ALBUTEROL SULFATE 3 ML: .5; 2.5 SOLUTION RESPIRATORY (INHALATION) at 13:23

## 2021-09-15 RX ADMIN — PROMETHAZINE HYDROCHLORIDE AND DEXTROMETHORPHAN HYDROBROMIDE 5 ML: 15; 6.25 SYRUP ORAL at 22:19

## 2021-09-15 RX ADMIN — INSULIN LISPRO 6 UNITS: 100 INJECTION, SOLUTION INTRAVENOUS; SUBCUTANEOUS at 16:30

## 2021-09-15 RX ADMIN — ENOXAPARIN SODIUM 40 MG: 40 INJECTION SUBCUTANEOUS at 18:14

## 2021-09-15 RX ADMIN — GABAPENTIN 600 MG: 300 CAPSULE ORAL at 22:26

## 2021-09-15 RX ADMIN — INSULIN GLARGINE 20 UNITS: 100 INJECTION, SOLUTION SUBCUTANEOUS at 22:20

## 2021-09-15 RX ADMIN — IPRATROPIUM BROMIDE AND ALBUTEROL SULFATE 3 ML: .5; 2.5 SOLUTION RESPIRATORY (INHALATION) at 00:22

## 2021-09-15 RX ADMIN — GUAIFENESIN 1200 MG: 600 TABLET, EXTENDED RELEASE ORAL at 07:40

## 2021-09-15 RX ADMIN — POTASSIUM CHLORIDE 40 MEQ: 1500 TABLET, EXTENDED RELEASE ORAL at 18:14

## 2021-09-15 RX ADMIN — BUMETANIDE 2 MG: 1 TABLET ORAL at 08:00

## 2021-09-15 RX ADMIN — Medication 10 ML: at 22:26

## 2021-09-15 RX ADMIN — DOCUSATE SODIUM 100 MG: 100 CAPSULE, LIQUID FILLED ORAL at 08:01

## 2021-09-15 RX ADMIN — Medication 10 ML: at 07:41

## 2021-09-15 RX ADMIN — AZITHROMYCIN 500 MG: 500 INJECTION, POWDER, LYOPHILIZED, FOR SOLUTION INTRAVENOUS at 18:15

## 2021-09-15 RX ADMIN — TRAMADOL HYDROCHLORIDE 50 MG: 50 TABLET, FILM COATED ORAL at 07:43

## 2021-09-15 RX ADMIN — IPRATROPIUM BROMIDE AND ALBUTEROL SULFATE 3 ML: .5; 2.5 SOLUTION RESPIRATORY (INHALATION) at 19:49

## 2021-09-15 RX ADMIN — METHYLPREDNISOLONE SODIUM SUCCINATE 60 MG: 125 INJECTION, POWDER, FOR SOLUTION INTRAMUSCULAR; INTRAVENOUS at 07:40

## 2021-09-15 RX ADMIN — TRAZODONE HYDROCHLORIDE 100 MG: 50 TABLET ORAL at 22:19

## 2021-09-15 RX ADMIN — CITALOPRAM HYDROBROMIDE 40 MG: 20 TABLET ORAL at 08:01

## 2021-09-15 RX ADMIN — METHYLPREDNISOLONE SODIUM SUCCINATE 60 MG: 125 INJECTION, POWDER, FOR SOLUTION INTRAMUSCULAR; INTRAVENOUS at 01:14

## 2021-09-15 RX ADMIN — PANTOPRAZOLE SODIUM 40 MG: 40 TABLET, DELAYED RELEASE ORAL at 22:19

## 2021-09-15 RX ADMIN — PROMETHAZINE HYDROCHLORIDE AND DEXTROMETHORPHAN HYDROBROMIDE 5 ML: 15; 6.25 SYRUP ORAL at 12:00

## 2021-09-15 RX ADMIN — ARIPIPRAZOLE 2 MG: 2 TABLET ORAL at 08:00

## 2021-09-15 RX ADMIN — Medication 2000 UNITS: at 08:01

## 2021-09-15 RX ADMIN — LEVOTHYROXINE SODIUM 200 MCG: 0.1 TABLET ORAL at 08:01

## 2021-09-15 RX ADMIN — ASPIRIN 81 MG CHEWABLE TABLET 81 MG: 81 TABLET CHEWABLE at 08:02

## 2021-09-15 NOTE — PROGRESS NOTES
Called Dr. Shelley Lancaster back per his request regarding patient D-Dimer result of 0.84. No orders received.

## 2021-09-15 NOTE — PROGRESS NOTES
Progress Note      9/15/2021 3:27 PM  NAME: Delilah Ramsay   MRN:  [id number]   Admit Diagnosis: Chest pain [R07.9]  [facility] [J44.9]        Assessment/Plan:   Chest pain, atypical, 6/10/2021 normal coronaries by cardiac catheterization at [facility], negative cardiac enzymes. 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 Subjective:     Delilah Ramsay denies chest pain, dyspnea. Discussed with RN events overnight. Review of Systems:    Symptom Y/N Comments  Symptom Y/N Comments   Fever/Chills N   Chest Pain N    Poor Appetite N   Edema N    Cough N   Abdominal Pain N    Sputum N   Joint Pain N    SOB/WILKS N   Pruritis/Rash N    Nausea/vomit N   Tolerating PT/OT Y    Diarrhea N   Tolerating Diet Y    Constipation N   Other       Could NOT obtain due to:      Objective:      Physical Exam:    Last 24hrs VS reviewed since prior progress note. Most recent are:    Visit Vitals  /70 (BP 1 Location: Left upper arm, BP Patient Position: Sitting)   Pulse (!) 102   Temp 97.7 °F (36.5 °C)   Resp 19   Ht 6' 4\" (1.93 m)   Wt (!) 164.2 kg (362 lb)   SpO2 94%   BMI 44.06 kg/m²     No intake or output data in the 24 hours ending 09/15/21 1527     General Appearance: Overweight male alert; no acute distress. Ears/Nose/Mouth/Throat: Hearing grossly normal; moist mucous membranes  Neck: Supple. Chest: Lungs with scattered rhonchi  Cardiovascular: Regular rate and rhythm, S1S2 normal, no murmur. Abdomen: Soft, non-tender, bowel sounds are active. Extremities: No edema bilaterally. Skin: Warm and dry.     []         Post-cath site without hematoma, bruit, tenderness, or thrill. Distal pulses intact. PMH/ reviewed - no change compared to H&P    Data Review    Telemetry:     EKG:   []  No new EKG for review    Lab Data Personally Reviewed:    Recent Labs     09/15/21  0013 09/14/21  0336   WBC 14.8* 12.8*   HGB 12.2 13.6   HCT 36.5* 40.1    329     No results for input(s): INR, PTP, APTT, INREXT in the last 72 hours. Recent Labs     09/15/21  0013 09/14/21  0336 09/13/21  1745 09/13/21  1100 09/13/21  1100   * 131* 135*   < > 134*   K 3.2* 3.8 3.0*   < > 2.7*   CL 93* 93* 93*   < > 92*   CO2 32 28 31   < > 33*   BUN 27* 25* 25*   < > 20   CREA 1.30 1.62* 1.75*   < > 1.36*   * 201* 206*   < > 135*   CA 8.9 9.3 9.5   < > 9.4   MG 2.1  --   --   --  2.0    < > = values in this interval not displayed. Recent Labs     09/15/21  0013 09/14/21  0336 09/14/21  0117     --   --    TROIQ <0.05 <0.05 <0.05     No results found for: CHOL, CHOLX, CHLST, CHOLV, HDL, HDLP, LDL, LDLC, DLDLP, TGLX, TRIGL, TRIGP, CHHD, CHHDX    Recent Labs     09/15/21  0013 09/13/21  1745 09/13/21  1100   AP 63 77 79   TP 7.4 8.6* 9.0*   ALB 3.4* 3.7 4.0   GLOB 4.0 4.9* 5.0*     No results for input(s): PH, PCO2, PO2 in the last 72 hours.     Medications Personally Reviewed:    Current Facility-Administered Medications   Medication Dose Route Frequency    albuterol-ipratropium (DUO-NEB) 2.5 MG-0.5 MG/3 ML  3 mL Nebulization Q4HWA RT    promethazine-dextromethorphan (PROMETHAZINE-DM) 6.25-15 mg/5 mL syrup 5 mL  5 mL Oral Q4HWA RT    potassium chloride (K-DUR, KLOR-CON) SR tablet 40 mEq  40 mEq Oral NOW    guaiFENesin ER (MUCINEX) tablet 1,200 mg  1,200 mg Oral Q12H    traMADoL (ULTRAM) tablet 50 mg  50 mg Oral Q6H PRN    benzonatate (TESSALON) capsule 200 mg  200 mg Oral TID PRN    budesonide (PULMICORT) 500 mcg/2 ml nebulizer suspension  500 mcg Nebulization BID RT    sodium chloride (NS) flush 5-40 mL  5-40 mL IntraVENous Q8H    sodium chloride (NS) flush 5-40 mL  5-40 mL IntraVENous PRN    methylPREDNISolone (PF) (Solu-MEDROL) injection 60 mg  60 mg IntraVENous Q6H    ondansetron (ZOFRAN) injection 4 mg  4 mg IntraVENous Q4H PRN    docusate sodium (COLACE) capsule 100 mg  100 mg Oral BID    azithromycin (ZITHROMAX) 500 mg in 0.9% sodium chloride 250 mL (VIAL-MATE)  500 mg IntraVENous Q24H    ARIPiprazole (ABILIFY) tablet 2 mg  2 mg Oral DAILY    aspirin chewable tablet 81 mg  81 mg Oral DAILY    atorvastatin (LIPITOR) tablet 20 mg  20 mg Oral DAILY    bumetanide (BUMEX) tablet 2 mg  2 mg Oral DAILY    cholecalciferol (VITAMIN D3) (1000 Units /25 mcg) tablet 2,000 Units  2,000 Units Oral DAILY    citalopram (CELEXA) tablet 40 mg  40 mg Oral DAILY    digoxin (LANOXIN) tablet 0.125 mg  0.125 mg Oral DAILY    gabapentin (NEURONTIN) capsule 600 mg  600 mg Oral QID PRN    levothyroxine (SYNTHROID) tablet 200 mcg  200 mcg Oral ACB    metFORMIN (GLUCOPHAGE) tablet 500 mg  500 mg Oral DAILY WITH BREAKFAST    metoprolol succinate (TOPROL-XL) XL tablet 50 mg  50 mg Oral DAILY    pantoprazole (PROTONIX) tablet 40 mg  40 mg Oral BID    topiramate (TOPAMAX) tablet 50 mg  50 mg Oral BID WITH MEALS    traZODone (DESYREL) tablet 100 mg  100 mg Oral QHS    zolpidem (AMBIEN) tablet 5 mg  5 mg Oral QHS PRN    potassium chloride (K-DUR, KLOR-CON) SR tablet 40 mEq  40 mEq Oral DAILY    enoxaparin (LOVENOX) injection 40 mg  40 mg SubCUTAneous Q24H    glucose chewable tablet 16 g  4 Tablet Oral PRN    glucagon (GLUCAGEN) injection 1 mg  1 mg IntraMUSCular PRN    dextrose (D50W) injection syrg 12.5-25 g  25-50 mL IntraVENous PRN    insulin glargine (LANTUS) injection 20 Units  20 Units SubCUTAneous QHS    glucagon (GLUCAGEN) injection 1 mg  1 mg IntraMUSCular PRN    dextrose (D50W) injection syrg 12.5-25 g  25-50 mL IntraVENous PRN    glucose chewable tablet 16 g  4 Tablet Oral PRN    insulin lispro (HUMALOG) injection   SubCUTAneous AC&HS Rocío Abreu MD

## 2021-09-15 NOTE — PROGRESS NOTES
Pulmonology and Critical Care Progress Note    Subjective:     Chief Complaint:   Chief Complaint   Patient presents with    Shortness of Breath    Chest Pain      Patient seen and examined in his room on the floor this morning, no acute events overnight but very early in the morning he developed severe left-sided chest pain that radiated down his arm. Certainly concerning for possible ACS, but his troponin remains negative. Cardiology following closely, may consider left heart cath this admission. He is still saturating well on room air but he also is having some significant continued coarse breath sounds bilaterally and wheezing. I will switch his duo nebs to every 4 hours while awake and continue him on high-dose IV steroids as well as IV azithromycin. Continue on twice daily Pulmicort nebs. His D-dimer is very mildly increased at 0.8, CTA of the chest was negative on admission, I will check bilateral lower extremity venous Dopplers. Potassium level 3.2 today, oral KCl has been ordered by the primary team.  Creatinine improving to 1.3 from 1.62 yesterday. Patient remains afebrile, white blood cell count up slightly to 14.8 which is likely steroid response. Typically an asthma exacerbation does not cause chest discomfort. Continue with as needed tramadol, higher dose pain medications per primary team/cardiology.             Current Facility-Administered Medications   Medication Dose Route Frequency Provider Last Rate Last Admin    albuterol-ipratropium (DUO-NEB) 2.5 MG-0.5 MG/3 ML  3 mL Nebulization Q4HWA RT Kali Del Cid DO        guaiFENesin ER [facility]) tablet 1,200 mg  1,200 mg Oral Q12H Kali Del Cid DO   1,200 mg at 09/15/21 0740    traMADoL (ULTRAM) tablet 50 mg  50 mg Oral Q6H PRN Rashad Krishnan DO   50 mg at 09/15/21 0743    benzonatate (TESSALON) capsule 200 mg  200 mg Oral TID PRN Kali Del Cid DO        budesonide (PULMICORT) 500 mcg/2 ml nebulizer suspension  500 mcg Nebulization BID RT Leif Jeong DO   500 mcg at 09/14/21 1953    sodium chloride (NS) flush 5-40 mL  5-40 mL IntraVENous Q8H Tammy BEGUM MD   10 mL at 09/15/21 0741    sodium chloride (NS) flush 5-40 mL  5-40 mL IntraVENous PRN Tammy BEGUM MD        methylPREDNISolone (PF) (Solu-MEDROL) injection 60 mg  60 mg IntraVENous Q6H Tammy BEGUM MD   60 mg at 09/15/21 0740    ondansetron (ZOFRAN) injection 4 mg  4 mg IntraVENous Q4H PRN Tammy BEGUM MD   4 mg at 09/13/21 1928    docusate sodium (COLACE) capsule 100 mg  100 mg Oral BID Tammy BEGUM MD   100 mg at 09/15/21 0801    azithromycin (ZITHROMAX) 500 mg in 0.9% sodium chloride 250 mL (VIAL-MATE)  500 mg IntraVENous Q24H Tammy BEGUM  mL/hr at 09/14/21 1847 500 mg at 09/14/21 1847    ARIPiprazole (ABILIFY) tablet 2 mg  2 mg Oral DAILY Damari Vidales MD   2 mg at 09/15/21 0800    aspirin chewable tablet 81 mg  81 mg Oral DAILY Tammy BEGUM MD   81 mg at 09/15/21 0802    atorvastatin (LIPITOR) tablet 20 mg  20 mg Oral DAILY Damari Vidales MD   20 mg at 09/15/21 0801    bumetanide (BUMEX) tablet 2 mg  2 mg Oral DAILY Damari Vidales MD   2 mg at 09/15/21 0800    cholecalciferol (VITAMIN D3) (1000 Units /25 mcg) tablet 2,000 Units  2,000 Units Oral DAILY Damari Vidales MD   2,000 Units at 09/15/21 0801    citalopram (CELEXA) tablet 40 mg  40 mg Oral DAILY Damari Vidales MD   40 mg at 09/15/21 0801    digoxin (LANOXIN) tablet 0.125 mg  0.125 mg Oral DAILY Tammy BEGUM MD   0.125 mg at 09/15/21 0800    gabapentin (NEURONTIN) capsule 600 mg  600 mg Oral QID PRN Tammy BEGUM MD   600 mg at 09/13/21 2201    levothyroxine (SYNTHROID) tablet 200 mcg  200 mcg Oral ACB Damari Vidales MD   200 mcg at 09/15/21 0801    metFORMIN (GLUCOPHAGE) tablet 500 mg  500 mg Oral DAILY WITH Ana Paula BEGUM MD   500 mg at 09/15/21 0801    metoprolol succinate (TOPROL-XL) XL tablet 50 mg  50 mg Oral DAILY Gonzalo, Yael Duenas MD   50 mg at 09/15/21 0800    pantoprazole (PROTONIX) tablet 40 mg  40 mg Oral BID Tom BEGUM MD   40 mg at 09/15/21 0801    topiramate (TOPAMAX) tablet 50 mg  50 mg Oral BID WITH MEALS Tom BEGUM MD   50 mg at 09/15/21 0801    traZODone (DESYREL) tablet 100 mg  100 mg Oral QHS Tom BEGUM MD   100 mg at 21 214    zolpidem (AMBIEN) tablet 5 mg  5 mg Oral QHS PRN Tom BEGUM MD   5 mg at 21 2201    potassium chloride (K-DUR, KLOR-CON) SR tablet 40 mEq  40 mEq Oral DAILY Tom BEGUM MD   40 mEq at 09/15/21 0800    enoxaparin (LOVENOX) injection 40 mg  40 mg SubCUTAneous Q24H Tom BEGUM MD   40 mg at 21 1848    glucose chewable tablet 16 g  4 Tablet Oral PRN Tash Foley MD        glucagon (GLUCAGEN) injection 1 mg  1 mg IntraMUSCular PRN Tom BEGUM MD        dextrose (D50W) injection syrg 12.5-25 g  25-50 mL IntraVENous PRN Tash Foley MD        insulin glargine (LANTUS) injection 20 Units  20 Units SubCUTAneous QHS Tom BEGUM MD   20 Units at 21    glucagon (GLUCAGEN) injection 1 mg  1 mg IntraMUSCular PRN Tom BEGUM MD        dextrose (D50W) injection syrg 12.5-25 g  25-50 mL IntraVENous PRN Tom BEGUM MD        glucose chewable tablet 16 g  4 Tablet Oral PRN Tash Foley MD        insulin lispro (HUMALOG) injection   SubCUTAneous AC&HS Tash Foley MD   3 Units at 09/15/21 0759          Allergies   Allergen Reactions    Vancomycin Hives     Lyndsey syndrome       Review of Systems:  A comprehensive review of systems was negative except for that written in the HPI. Objective:     Blood pressure 131/70, pulse (!) 102, temperature 97.7 °F (36.5 °C), resp. rate 19, height 6' 4\" (1.93 m), weight (!) 164.2 kg (362 lb), SpO2 93 %.  Temp (24hrs), Av °F (36.7 °C), Min:97.7 °F (36.5 °C), Max:98.3 °F (36.8 °C)      Intake and Output:  Current Shift: No intake/output data recorded. Last 3 Shifts: 09/13 1901 - 09/15 0700  In: 150 [I.V.:150]  Out: 700 [Urine:700]    Physical Exam:   General appearance: alert, cooperative, no distress, appears stated age, morbidly obese  Head: Normocephalic, without obvious abnormality, atraumatic  Eyes: negative  Neck: supple, symmetrical, trachea midline and no adenopathy  Lungs: Coarse breath sounds bilaterally with expiratory wheezing, prolonged exhalation phase, no accessory muscle use or supplementary oxygen use at this time  Heart: regular rate and rhythm, S1, S2 normal, no murmur, click, rub or gallop  Abdomen: soft, non-tender. Bowel sounds normal. No masses,  no organomegaly  Extremities: extremities normal, atraumatic, no cyanosis or edema  Pulses: 2+ and symmetric  Skin: Skin color, texture, turgor normal. No rashes or lesions  Lymph nodes: Cervical, supraclavicular, and axillary nodes normal.  Neurologic: Grossly normal, alert and oriented x3    Additional comments:None    Lab/Data Review: All lab results for the last 24 hours reviewed.   Recent Results (from the past 24 hour(s))   GLUCOSE, POC    Collection Time: 09/14/21 12:39 PM   Result Value Ref Range    Glucose (POC) 243 (H) 65 - 117 mg/dL    Performed by Douglas Papa    GLUCOSE, POC    Collection Time: 09/14/21  5:52 PM   Result Value Ref Range    Glucose (POC) 321 (H) 65 - 117 mg/dL    Performed by Douglas Papa    INFLUENZA A & B AG (RAPID TEST)    Collection Time: 09/14/21  7:00 PM   Result Value Ref Range    Influenza A Antigen Negative Negative      Influenza B Antigen Negative Negative     GLUCOSE, POC    Collection Time: 09/14/21  9:31 PM   Result Value Ref Range    Glucose (POC) 305 (H) 65 - 117 mg/dL    Performed by [facility]    D DIMER    Collection Time: 09/15/21 12:13 AM   Result Value Ref Range    D DIMER 0.84 (H) <0.50 ug/ml(FEU)   CBC W/O DIFF    Collection Time: 09/15/21 12:13 AM   Result Value Ref Range    WBC 14.8 (H) 4.1 - 11.1 K/uL    RBC 3.71 (L) 4.10 - 5.70 M/uL    HGB 12.2 12.1 - 17.0 g/dL    HCT 36.5 (L) 36.6 - 50.3 %    MCV 98.4 80.0 - 99.0 FL    MCH 32.9 26.0 - 34.0 PG    MCHC 33.4 30.0 - 36.5 g/dL    RDW 13.5 11.5 - 14.5 %    PLATELET 987 088 - 307 K/uL    MPV 10.1 8.9 - 12.9 FL    NRBC 0.0 0.0  WBC    ABSOLUTE NRBC 0.00 0.00 - 6.66 K/uL   METABOLIC PANEL, COMPREHENSIVE    Collection Time: 09/15/21 12:13 AM   Result Value Ref Range    Sodium 133 (L) 136 - 145 mmol/L    Potassium 3.2 (L) 3.5 - 5.1 mmol/L    Chloride 93 (L) 97 - 108 mmol/L    CO2 32 21 - 32 mmol/L    Anion gap 8 5 - 15 mmol/L    Glucose 217 (H) 65 - 100 mg/dL    BUN 27 (H) 6 - 20 mg/dL    Creatinine 1.30 0.70 - 1.30 mg/dL    BUN/Creatinine ratio 21 (H) 12 - 20      GFR est AA >60 >60 ml/min/1.73m2    GFR est non-AA 59 (L) >60 ml/min/1.73m2    Calcium 8.9 8.5 - 10.1 mg/dL    Bilirubin, total 0.3 0.2 - 1.0 mg/dL    AST (SGOT) 39 (H) 15 - 37 U/L    ALT (SGPT) 95 (H) 12 - 78 U/L    Alk. phosphatase 63 45 - 117 U/L    Protein, total 7.4 6.4 - 8.2 g/dL    Albumin 3.4 (L) 3.5 - 5.0 g/dL    Globulin 4.0 2.0 - 4.0 g/dL    A-G Ratio 0.9 (L) 1.1 - 2.2     MAGNESIUM    Collection Time: 09/15/21 12:13 AM   Result Value Ref Range    Magnesium 2.1 1.6 - 2.4 mg/dL   PHOSPHORUS    Collection Time: 09/15/21 12:13 AM   Result Value Ref Range    Phosphorus 3.8 2.6 - 4.7 mg/dL   CK    Collection Time: 09/15/21 12:13 AM   Result Value Ref Range     39 - 308 U/L   TROPONIN I    Collection Time: 09/15/21 12:13 AM   Result Value Ref Range    Troponin-I, Qt. <0.05 <0.05 ng/mL   GLUCOSE, POC    Collection Time: 09/15/21  7:28 AM   Result Value Ref Range    Glucose (POC) 187 (H) 65 - 117 mg/dL    Performed by Breanna Bee          Chest X-Ray:   CTA CHEST W OR W WO CONT   Final Result   No pulmonary embolism definitively identified. Other findings as   above. XR CHEST PORT   Final Result   Hydrostatic edema.          DUPLEX LOWER EXT VENOUS BILAT    (Results Pending)       CT imaging:  CT Results  (Last 48 hours)               09/13/21 1305  CTA CHEST W OR W WO CONT Final result    Impression:  No pulmonary embolism definitively identified. Other findings as   above. Narrative:  CT angiography of the chest, 9/13/2021       History: Chest pain. Technique: Multiple contiguous axial images were acquired from the thoracic   inlet to diaphragm following intravenous administration of 100 mL Isovue-370   contrast material utilizing a CT pulmonary angiography technique. Coronal,   sagittal and MIP reconstruction of images was made. All CT scans at this facility are performed using dose reduction optimization   techniques as appropriate to perform the exam including the following: Automated   exposure control, adjustments of the mA and/or kV according to patient size, or   use iterative reconstruction technique. Comparison: Including CT chest 7/15/2021. Findings: Image quality is degraded by artifact. CT pulmonary angiography: The main pulmonary artery measures 2.7 cm which is   within normal limits. No pulmonary embolism is definitively identified. CT chest: Surgical clips are seen in the included thyroid fossa. Gynecomastia is present. No axillary, mediastinal or hilar lymphadenopathy is   noted. The heart size is borderline enlarged. Interatrial septal closure device is in   place. Lobular fluid attenuation hypodensity in the right heart border   measuring 1.6 cm in thickness is suboptimally visualized due to artifact and   consistent with pericardial cyst.  No significant pericardial effusion is   evident. The thoracic aorta is normal in caliber. The lungs are markedly underexpanded with atelectatic changes. There is mosaic   attenuation suggesting sequela of small to medium airway disease. Scattered   cysts measure up to 2.8 cm in the right middle lobe. Right paramediastinal bleb   measures 2.9 cm.          No focal consolidation, pleural effusion or pneumothorax identified. There is   no specific CT evidence of pneumonia. A paramediastinal nodule in the left upper lobe measuring 6 mm is stable on   prior studies including CT angiography chest 20/6/2017. A juxtapleural nodule   in the left lower lobe, posterior aspect measures 4 mm, stable as compared to CT   chest 9/29/2019. These are benign-appearing. No routine imaging follow-up is   indicated. Degenerative changes are present in the thoracic spine. The included liver has diffusely low attenuation consistent with hepatic   steatosis. The included adrenal glands, pancreas, spleen show no focal   abnormality. PFTs:   PFT Results  (Last 3 results in the past 10 years)    None            Assessment:     Patient is a 53-year-old morbidly obese  male with a history of asthma, hypertension, ANIBAL on BiPAP, and a PFO who presented to the hospital with increasing shortness of breath and cough over the past 2 weeks and is currently being treated for an acute exacerbation of asthma/bronchitis. Plan:     1.)  Acute bronchitis/acute exacerbation of asthma  -Per Dr. Walker Rhodes, who has seen him previously, the patient has moderate persistent asthma at baseline and has been on a Spiriva and a Breo inhaler. He admits to compliance with his inhaler therapy. -COVID-19 negative on admission, rapid flu also negative  -Not requiring any supplemental oxygen but he does have continued significant coarse breath sounds bilaterally  -We will switch the patient to duo nebs every 4 hours while awake, continue him on Pulmicort nebs twice daily, and continue him on Solu-Medrol 60 mg IV every 6 hours for the time being.  -Still not feeling very well at all, having chest pains as well. Cardiology following closely for possible left heart cath.   Troponin checked multiple times hospitalization and all been negative.  -Typically an asthma exacerbation does not cause chest pain. We will start him on Promethazine DM cough syrup every 4 hours while awake. -Follow-up with Dr. Lola Cazares on 9/28 at 4PM    2.)  Acute kidney injury  -Baseline creatinine appears to be about 1.1-1.2 and it was 1.75 on admission  -Likely prerenal and this is improving down to 1.62 this morning  -Repeat BMP in the morning    3.)  Leukocytosis  -White blood cell count is gone up to 14.8 today, still without fevers  -Likely steroid response  -Continue azithromycin for now x5 days total  -Repeat CBC in the morning    4.)  ANIBAL  -Follows in our clinic with Dr. Lola Cazares, next appointment is on 9/28 at 4 PM.  Patient has been given our office card with the appointment written on the card. -Should be on BiPAP 19/15 at baseline, but states that he had to return his machine about a week ago. Will need repeat outpatient sleep study.  -Refused BiPAP here in the hospital because we do not have nasal pillows mask.     5.)  Morbid obesity  -BMI 44.06, due to excess calories  -Weight loss recommended  -ANIBAL management as above      CODE STATUS: Full Code      Disposition and Family: Stable to remain on the floor    Total time spent with patient: 30 minutes      Gina Barrow DO  [facility]  9/15/2021

## 2021-09-15 NOTE — PROGRESS NOTES
Patient c/o sharp chest pain radiating to left arm rated 9/10. Notified Dr. Haily Wilson of this and patient /70, . Also notified that pain was not relieved by 50 mg tramadol received at 6:47 pm. Orders received to notify cardiology, draw cardiac enzymes, collect urine screening, and start aspirin if patient not already taking aspirin. [id number] with cardiologist on call [facility]) and notified him that Haily Wilson ordered cardiac enzymes as well as urine screen for this patient. Also notified him that EKG was done yesterday, and notified him that Dr. Abel Faulkner placed note today at 5:11 pm stating previous cardiac enzymes were negative and EKG will be repeated. No further orders received.

## 2021-09-15 NOTE — PROGRESS NOTES
CM met with patient at bedside to discuss DC planning. Currently he is getting duplex scan and asked CM to come back.

## 2021-09-15 NOTE — PROGRESS NOTES
Progress Note    Patient: Anderson Sever MRN: [id number]  SSN: [id number]    YOB: 1975  Age: 54 y.o. Sex: male      Admit Date: 9/13/2021    LOS: 2 days     Subjective:     54years old patient admitted with COPD, appropriate sleep apnea, morbid obesity, with atypical chest pain history of PFO procedure    Claims  condition is about the same    Objective:     Vitals:    09/15/21 0400 09/15/21 0745 09/15/21 1200 09/15/21 1323   BP:       Pulse: (!) 102 (!) 102 (!) 102    Resp:       Temp:       SpO2:    94%   Weight:       Height:            Intake and Output:  Current Shift: No intake/output data recorded. Last three shifts: 09/13 1901 - 09/15 0700  In: 150 [I.V.:150]  Out: 700 [Urine:700]    Physical Exam:   General:  Alert, cooperative, no distress, appears stated age. Eyes:  Conjunctivae/corneas clear. PERRL, EOMs intact. Fundi benign   Ears:  Normal TMs and external ear canals both ears. Nose: Nares normal. Septum midline. Mucosa normal. No drainage or sinus tenderness. Mouth/Throat: Lips, mucosa, and tongue normal. Teeth and gums normal.   Neck: Supple, symmetrical, trachea midline, no adenopathy, thyroid: no enlargment/tenderness/nodules, no carotid bruit and no JVD. Back:   Symmetric, no curvature. ROM normal. No CVA tenderness. Lungs:    Bilaterally better air entry to auscultation bilaterally. Heart:  Regular rate and rhythm, S1, S2 normal, no murmur, click, rub or gallop. Abdomen:   Soft, non-tender. Bowel sounds normal. No masses,  No organomegaly. Extremities: Extremities normal, atraumatic, no cyanosis or edema. Pulses: 2+ and symmetric all extremities. Skin: Skin color, texture, turgor normal. No rashes or lesions   Lymph nodes: Cervical, supraclavicular, and axillary nodes normal.   Neurologic: CNII-XII intact. Normal strength, sensation and reflexes throughout. Lab/Data Review: All lab results for the last 24 hours reviewed.      Recent Results (from the past 24 hour(s))   GLUCOSE, POC    Collection Time: 09/14/21  5:52 PM   Result Value Ref Range    Glucose (POC) 321 (H) 65 - 117 mg/dL    Performed by Steven Luke    INFLUENZA A & B AG (RAPID TEST)    Collection Time: 09/14/21  7:00 PM   Result Value Ref Range    Influenza A Antigen Negative Negative      Influenza B Antigen Negative Negative     GLUCOSE, POC    Collection Time: 09/14/21  9:31 PM   Result Value Ref Range    Glucose (POC) 305 (H) 65 - 117 mg/dL    Performed by Camacho Hand    D DIMER    Collection Time: 09/15/21 12:13 AM   Result Value Ref Range    D DIMER 0.84 (H) <0.50 ug/ml(FEU)   CBC W/O DIFF    Collection Time: 09/15/21 12:13 AM   Result Value Ref Range    WBC 14.8 (H) 4.1 - 11.1 K/uL    RBC 3.71 (L) 4.10 - 5.70 M/uL    HGB 12.2 12.1 - 17.0 g/dL    HCT 36.5 (L) 36.6 - 50.3 %    MCV 98.4 80.0 - 99.0 FL    MCH 32.9 26.0 - 34.0 PG    MCHC 33.4 30.0 - 36.5 g/dL    RDW 13.5 11.5 - 14.5 %    PLATELET 184 570 - 765 K/uL    MPV 10.1 8.9 - 12.9 FL    NRBC 0.0 0.0  WBC    ABSOLUTE NRBC 0.00 0.00 - 1.81 K/uL   METABOLIC PANEL, COMPREHENSIVE    Collection Time: 09/15/21 12:13 AM   Result Value Ref Range    Sodium 133 (L) 136 - 145 mmol/L    Potassium 3.2 (L) 3.5 - 5.1 mmol/L    Chloride 93 (L) 97 - 108 mmol/L    CO2 32 21 - 32 mmol/L    Anion gap 8 5 - 15 mmol/L    Glucose 217 (H) 65 - 100 mg/dL    BUN 27 (H) 6 - 20 mg/dL    Creatinine 1.30 0.70 - 1.30 mg/dL    BUN/Creatinine ratio 21 (H) 12 - 20      GFR est AA >60 >60 ml/min/1.73m2    GFR est non-AA 59 (L) >60 ml/min/1.73m2    Calcium 8.9 8.5 - 10.1 mg/dL    Bilirubin, total 0.3 0.2 - 1.0 mg/dL    AST (SGOT) 39 (H) 15 - 37 U/L    ALT (SGPT) 95 (H) 12 - 78 U/L    Alk.  phosphatase 63 45 - 117 U/L    Protein, total 7.4 6.4 - 8.2 g/dL    Albumin 3.4 (L) 3.5 - 5.0 g/dL    Globulin 4.0 2.0 - 4.0 g/dL    A-G Ratio 0.9 (L) 1.1 - 2.2     MAGNESIUM    Collection Time: 09/15/21 12:13 AM   Result Value Ref Range    Magnesium 2.1 1.6 - 2.4 mg/dL   PHOSPHORUS    Collection Time: 09/15/21 12:13 AM   Result Value Ref Range    Phosphorus 3.8 2.6 - 4.7 mg/dL   CK    Collection Time: 09/15/21 12:13 AM   Result Value Ref Range     39 - 308 U/L   TROPONIN I    Collection Time: 09/15/21 12:13 AM   Result Value Ref Range    Troponin-I, Qt. <0.05 <0.05 ng/mL   GLUCOSE, POC    Collection Time: 09/15/21  7:28 AM   Result Value Ref Range    Glucose (POC) 187 (H) 65 - 117 mg/dL    Performed by Gudelia Chavira    GLUCOSE, POC    Collection Time: 09/15/21 11:27 AM   Result Value Ref Range    Glucose (POC) 199 (H) 65 - 117 mg/dL    Performed by Gudelia Chavira          Assessment:     Active Problems:    COPD (chronic obstructive pulmonary disease) ([facility] [address] (12/17/2020)      Chest pain (6/26/2021)    Proximal sleep apnea  Morbid obesity  History of PFO  Diabetes mellitus type 2  Acute kidney injury    Plan:     Reviewed pulmonary and cardiology notes.   Continue with management    Signed By: Lindsey Perry MD     September 15, 2021

## 2021-09-16 LAB
ALBUMIN SERPL-MCNC: 3.2 G/DL (ref 3.5–5)
ALBUMIN/GLOB SERPL: 0.7 {RATIO} (ref 1.1–2.2)
ALP SERPL-CCNC: 61 U/L (ref 45–117)
ALT SERPL-CCNC: 77 U/L (ref 12–78)
ANION GAP SERPL CALC-SCNC: 5 MMOL/L (ref 5–15)
AST SERPL W P-5'-P-CCNC: 28 U/L (ref 15–37)
BILIRUB SERPL-MCNC: 0.2 MG/DL (ref 0.2–1)
BNP SERPL-MCNC: 184 PG/ML
BUN SERPL-MCNC: 25 MG/DL (ref 6–20)
BUN/CREAT SERPL: 20 (ref 12–20)
CA-I BLD-MCNC: 8.4 MG/DL (ref 8.5–10.1)
CHLORIDE SERPL-SCNC: 100 MMOL/L (ref 97–108)
CO2 SERPL-SCNC: 33 MMOL/L (ref 21–32)
CREAT SERPL-MCNC: 1.25 MG/DL (ref 0.7–1.3)
ERYTHROCYTE [DISTWIDTH] IN BLOOD BY AUTOMATED COUNT: 13.9 % (ref 11.5–14.5)
GLOBULIN SER CALC-MCNC: 4.5 G/DL (ref 2–4)
GLUCOSE BLD STRIP.AUTO-MCNC: 204 MG/DL (ref 65–117)
GLUCOSE BLD STRIP.AUTO-MCNC: 305 MG/DL (ref 65–117)
GLUCOSE BLD STRIP.AUTO-MCNC: 318 MG/DL (ref 65–117)
GLUCOSE SERPL-MCNC: 235 MG/DL (ref 65–100)
HCT VFR BLD AUTO: 37.4 % (ref 36.6–50.3)
HGB BLD-MCNC: 12.3 G/DL (ref 12.1–17)
MAGNESIUM SERPL-MCNC: 2.5 MG/DL (ref 1.6–2.4)
MCH RBC QN AUTO: 33 PG (ref 26–34)
MCHC RBC AUTO-ENTMCNC: 32.9 G/DL (ref 30–36.5)
MCV RBC AUTO: 100.3 FL (ref 80–99)
NRBC # BLD: 0 K/UL (ref 0–0.01)
NRBC BLD-RTO: 0 PER 100 WBC
PERFORMED BY, TECHID: ABNORMAL
PHOSPHATE SERPL-MCNC: 3.6 MG/DL (ref 2.6–4.7)
PLATELET # BLD AUTO: 272 K/UL (ref 150–400)
PMV BLD AUTO: 10.3 FL (ref 8.9–12.9)
POTASSIUM SERPL-SCNC: 3.7 MMOL/L (ref 3.5–5.1)
PROT SERPL-MCNC: 7.7 G/DL (ref 6.4–8.2)
RBC # BLD AUTO: 3.73 M/UL (ref 4.1–5.7)
SODIUM SERPL-SCNC: 138 MMOL/L (ref 136–145)
WBC # BLD AUTO: 12.6 K/UL (ref 4.1–11.1)

## 2021-09-16 PROCEDURE — 74011636637 HC RX REV CODE- 636/637: Performed by: INTERNAL MEDICINE

## 2021-09-16 PROCEDURE — 82962 GLUCOSE BLOOD TEST: CPT

## 2021-09-16 PROCEDURE — 36415 COLL VENOUS BLD VENIPUNCTURE: CPT

## 2021-09-16 PROCEDURE — 84100 ASSAY OF PHOSPHORUS: CPT

## 2021-09-16 PROCEDURE — 83735 ASSAY OF MAGNESIUM: CPT

## 2021-09-16 PROCEDURE — 83880 ASSAY OF NATRIURETIC PEPTIDE: CPT

## 2021-09-16 PROCEDURE — 74011250637 HC RX REV CODE- 250/637: Performed by: INTERNAL MEDICINE

## 2021-09-16 PROCEDURE — 80053 COMPREHEN METABOLIC PANEL: CPT

## 2021-09-16 PROCEDURE — 74011250636 HC RX REV CODE- 250/636: Performed by: INTERNAL MEDICINE

## 2021-09-16 PROCEDURE — 94640 AIRWAY INHALATION TREATMENT: CPT

## 2021-09-16 PROCEDURE — 94760 N-INVAS EAR/PLS OXIMETRY 1: CPT

## 2021-09-16 PROCEDURE — 65270000029 HC RM PRIVATE

## 2021-09-16 PROCEDURE — 74011000250 HC RX REV CODE- 250: Performed by: INTERNAL MEDICINE

## 2021-09-16 PROCEDURE — 85027 COMPLETE CBC AUTOMATED: CPT

## 2021-09-16 RX ORDER — BENZONATATE 100 MG/1
200 CAPSULE ORAL 3 TIMES DAILY
Status: DISCONTINUED | OUTPATIENT
Start: 2021-09-16 | End: 2021-09-18 | Stop reason: HOSPADM

## 2021-09-16 RX ORDER — GUAIFENESIN 600 MG/1
600 TABLET, EXTENDED RELEASE ORAL EVERY 12 HOURS
Status: DISCONTINUED | OUTPATIENT
Start: 2021-09-16 | End: 2021-09-18 | Stop reason: HOSPADM

## 2021-09-16 RX ORDER — IPRATROPIUM BROMIDE AND ALBUTEROL SULFATE 2.5; .5 MG/3ML; MG/3ML
3 SOLUTION RESPIRATORY (INHALATION)
Status: DISCONTINUED | OUTPATIENT
Start: 2021-09-16 | End: 2021-09-18 | Stop reason: HOSPADM

## 2021-09-16 RX ORDER — CODEINE PHOSPHATE AND GUAIFENESIN 10; 100 MG/5ML; MG/5ML
10 SOLUTION ORAL
Status: DISCONTINUED | OUTPATIENT
Start: 2021-09-16 | End: 2021-09-18 | Stop reason: HOSPADM

## 2021-09-16 RX ADMIN — PROMETHAZINE HYDROCHLORIDE AND DEXTROMETHORPHAN HYDROBROMIDE 5 ML: 15; 6.25 SYRUP ORAL at 14:33

## 2021-09-16 RX ADMIN — DOCUSATE SODIUM 100 MG: 100 CAPSULE, LIQUID FILLED ORAL at 09:07

## 2021-09-16 RX ADMIN — ARIPIPRAZOLE 2 MG: 2 TABLET ORAL at 09:05

## 2021-09-16 RX ADMIN — METHYLPREDNISOLONE SODIUM SUCCINATE 60 MG: 125 INJECTION, POWDER, FOR SOLUTION INTRAMUSCULAR; INTRAVENOUS at 14:33

## 2021-09-16 RX ADMIN — BUDESONIDE 500 MCG: 0.5 SUSPENSION RESPIRATORY (INHALATION) at 07:47

## 2021-09-16 RX ADMIN — INSULIN LISPRO 12 UNITS: 100 INJECTION, SOLUTION INTRAVENOUS; SUBCUTANEOUS at 18:08

## 2021-09-16 RX ADMIN — POTASSIUM CHLORIDE 40 MEQ: 1500 TABLET, EXTENDED RELEASE ORAL at 09:09

## 2021-09-16 RX ADMIN — GUAIFENESIN 1200 MG: 600 TABLET, EXTENDED RELEASE ORAL at 09:09

## 2021-09-16 RX ADMIN — BENZONATATE 200 MG: 100 CAPSULE ORAL at 16:09

## 2021-09-16 RX ADMIN — METOPROLOL SUCCINATE 50 MG: 50 TABLET, EXTENDED RELEASE ORAL at 09:06

## 2021-09-16 RX ADMIN — INSULIN LISPRO 12 UNITS: 100 INJECTION, SOLUTION INTRAVENOUS; SUBCUTANEOUS at 20:53

## 2021-09-16 RX ADMIN — BENZONATATE 200 MG: 100 CAPSULE ORAL at 20:54

## 2021-09-16 RX ADMIN — LEVOTHYROXINE SODIUM 200 MCG: 0.1 TABLET ORAL at 06:30

## 2021-09-16 RX ADMIN — PANTOPRAZOLE SODIUM 40 MG: 40 TABLET, DELAYED RELEASE ORAL at 09:07

## 2021-09-16 RX ADMIN — GUAIFENESIN 600 MG: 600 TABLET, EXTENDED RELEASE ORAL at 20:54

## 2021-09-16 RX ADMIN — INSULIN GLARGINE 20 UNITS: 100 INJECTION, SOLUTION SUBCUTANEOUS at 20:53

## 2021-09-16 RX ADMIN — Medication 2000 UNITS: at 09:08

## 2021-09-16 RX ADMIN — AZITHROMYCIN 500 MG: 500 INJECTION, POWDER, LYOPHILIZED, FOR SOLUTION INTRAVENOUS at 18:09

## 2021-09-16 RX ADMIN — ASPIRIN 81 MG CHEWABLE TABLET 81 MG: 81 TABLET CHEWABLE at 09:10

## 2021-09-16 RX ADMIN — TRAMADOL HYDROCHLORIDE 50 MG: 50 TABLET, FILM COATED ORAL at 20:54

## 2021-09-16 RX ADMIN — PANTOPRAZOLE SODIUM 40 MG: 40 TABLET, DELAYED RELEASE ORAL at 20:54

## 2021-09-16 RX ADMIN — ATORVASTATIN CALCIUM 20 MG: 20 TABLET, FILM COATED ORAL at 09:05

## 2021-09-16 RX ADMIN — PROMETHAZINE HYDROCHLORIDE AND DEXTROMETHORPHAN HYDROBROMIDE 5 ML: 15; 6.25 SYRUP ORAL at 08:38

## 2021-09-16 RX ADMIN — Medication 10 ML: at 16:09

## 2021-09-16 RX ADMIN — TOPIRAMATE 50 MG: 25 TABLET, FILM COATED ORAL at 18:10

## 2021-09-16 RX ADMIN — Medication 10 ML: at 20:55

## 2021-09-16 RX ADMIN — DIGOXIN 0.12 MG: 125 TABLET ORAL at 09:07

## 2021-09-16 RX ADMIN — INSULIN LISPRO 6 UNITS: 100 INJECTION, SOLUTION INTRAVENOUS; SUBCUTANEOUS at 08:36

## 2021-09-16 RX ADMIN — METHYLPREDNISOLONE SODIUM SUCCINATE 60 MG: 125 INJECTION, POWDER, FOR SOLUTION INTRAMUSCULAR; INTRAVENOUS at 06:00

## 2021-09-16 RX ADMIN — IPRATROPIUM BROMIDE AND ALBUTEROL SULFATE 3 ML: .5; 2.5 SOLUTION RESPIRATORY (INHALATION) at 11:39

## 2021-09-16 RX ADMIN — Medication 10 ML: at 06:29

## 2021-09-16 RX ADMIN — CITALOPRAM HYDROBROMIDE 40 MG: 20 TABLET ORAL at 09:04

## 2021-09-16 RX ADMIN — METHYLPREDNISOLONE SODIUM SUCCINATE 60 MG: 125 INJECTION, POWDER, FOR SOLUTION INTRAMUSCULAR; INTRAVENOUS at 20:54

## 2021-09-16 RX ADMIN — IPRATROPIUM BROMIDE AND ALBUTEROL SULFATE 3 ML: .5; 2.5 SOLUTION RESPIRATORY (INHALATION) at 07:47

## 2021-09-16 RX ADMIN — TRAZODONE HYDROCHLORIDE 100 MG: 50 TABLET ORAL at 22:00

## 2021-09-16 RX ADMIN — BUDESONIDE 500 MCG: 0.5 SUSPENSION RESPIRATORY (INHALATION) at 20:44

## 2021-09-16 RX ADMIN — BUMETANIDE 2 MG: 1 TABLET ORAL at 09:00

## 2021-09-16 RX ADMIN — ENOXAPARIN SODIUM 40 MG: 40 INJECTION SUBCUTANEOUS at 18:10

## 2021-09-16 RX ADMIN — METFORMIN HYDROCHLORIDE 500 MG: 500 TABLET ORAL at 08:38

## 2021-09-16 RX ADMIN — IPRATROPIUM BROMIDE AND ALBUTEROL SULFATE 3 ML: .5; 2.5 SOLUTION RESPIRATORY (INHALATION) at 20:44

## 2021-09-16 RX ADMIN — TOPIRAMATE 50 MG: 25 TABLET, FILM COATED ORAL at 08:39

## 2021-09-16 NOTE — PROGRESS NOTES
Progress Note    Patient: Cindy Ragland MRN: [id number]  SSN: [id number]    YOB: 1975  Age: 54 y.o. Sex: male      Admit Date: 9/13/2021    LOS: 3 days     Subjective:     54years old with exacerbation of asthma, sleep apnea morbid obesity seen by pulmonary and cardiology    Objective:     Vitals:    09/15/21 2057 09/16/21 0747 09/16/21 0811 09/16/21 0900   BP: 118/62  127/83 126/75   Pulse: (!) 103  (!) 59 95   Resp: 18  14    Temp: 97.9 °F (36.6 °C)  97.8 °F (36.6 °C)    SpO2: 92% 94% 98%    Weight:       Height:            Intake and Output:  Current Shift: No intake/output data recorded. Last three shifts: 09/14 1901 - 09/16 0700  In: 9550 [P.O.:1520; I.V.:250]  Out: -     Physical Exam:   General:  Alert, cooperative, no distress, appears stated age. Eyes:  Conjunctivae/corneas clear. PERRL, EOMs intact. Fundi benign   Ears:  Normal TMs and external ear canals both ears. Nose: Nares normal. Septum midline. Mucosa normal. No drainage or sinus tenderness. Mouth/Throat: Lips, mucosa, and tongue normal. Teeth and gums normal.   Neck: Supple, symmetrical, trachea midline, no adenopathy, thyroid: no enlargment/tenderness/nodules, no carotid bruit and no JVD. Back:   Symmetric, no curvature. ROM normal. No CVA tenderness. Lungs:   rhonchir to auscultation bilaterally. Heart:  Regular rate and rhythm, S1, S2 normal, no murmur, click, rub or gallop. Abdomen:   Soft, non-tender. Bowel sounds normal. No masses,  No organomegaly. Extremities: Extremities normal, atraumatic, no cyanosis or edema. Pulses: 2+ and symmetric all extremities. Skin: Skin color, texture, turgor normal. No rashes or lesions   Lymph nodes: Cervical, supraclavicular, and axillary nodes normal.   Neurologic: CNII-XII intact. Normal strength, sensation and reflexes throughout. Lab/Data Review: All lab results for the last 24 hours reviewed.      Recent Results (from the past 24 hour(s)) GLUCOSE, POC    Collection Time: 09/15/21 11:27 AM   Result Value Ref Range    Glucose (POC) 199 (H) 65 - 117 mg/dL    Performed by Santana Mallet    EKG, 12 LEAD, SUBSEQUENT    Collection Time: 09/15/21  3:50 PM   Result Value Ref Range    Ventricular Rate 94 BPM    Atrial Rate 94 BPM    P-R Interval 194 ms    QRS Duration 102 ms    Q-T Interval 394 ms    QTC Calculation (Bezet) 492 ms    Calculated P Axis 35 degrees    Calculated R Axis 8 degrees    Calculated T Axis 13 degrees    Diagnosis       Normal sinus rhythm  Minimal voltage criteria for LVH, may be normal variant  Inferior infarct , age undetermined  Abnormal ECG  When compared with ECG of 13-SEP-2021 10:55,  Inferior infarct is now Present  Confirmed by Flaquito Pretty MD, Elizabeth Hudson ([id number]) on 9/15/2021 4:25:19 PM     GLUCOSE, POC    Collection Time: 09/15/21  4:44 PM   Result Value Ref Range    Glucose (POC) 215 (H) 65 - 117 mg/dL    Performed by Santana Mallet    GLUCOSE, POC    Collection Time: 09/15/21  8:54 PM   Result Value Ref Range    Glucose (POC) 323 (H) 65 - 117 mg/dL    Performed by Nydia Brooks    CBC W/O DIFF    Collection Time: 09/16/21  7:25 AM   Result Value Ref Range    WBC 12.6 (H) 4.1 - 11.1 K/uL    RBC 3.73 (L) 4.10 - 5.70 M/uL    HGB 12.3 12.1 - 17.0 g/dL    HCT 37.4 36.6 - 50.3 %    .3 (H) 80.0 - 99.0 FL    MCH 33.0 26.0 - 34.0 PG    MCHC 32.9 30.0 - 36.5 g/dL    RDW 13.9 11.5 - 14.5 %    PLATELET 235 134 - 986 K/uL    MPV 10.3 8.9 - 12.9 FL    NRBC 0.0 0.0  WBC    ABSOLUTE NRBC 0.00 0.00 - 3.56 K/uL   METABOLIC PANEL, COMPREHENSIVE    Collection Time: 09/16/21  7:25 AM   Result Value Ref Range    Sodium 138 136 - 145 mmol/L    Potassium 3.7 3.5 - 5.1 mmol/L    Chloride 100 97 - 108 mmol/L    CO2 33 (H) 21 - 32 mmol/L    Anion gap 5 5 - 15 mmol/L    Glucose 235 (H) 65 - 100 mg/dL    BUN 25 (H) 6 - 20 mg/dL    Creatinine 1.25 0.70 - 1.30 mg/dL    BUN/Creatinine ratio 20 12 - 20      GFR est AA >60 >60 ml/min/1.73m2    GFR est non-AA >60 >60 ml/min/1.73m2    Calcium 8.4 (L) 8.5 - 10.1 mg/dL    Bilirubin, total 0.2 0.2 - 1.0 mg/dL    AST (SGOT) 28 15 - 37 U/L    ALT (SGPT) 77 12 - 78 U/L    Alk.  phosphatase 61 45 - 117 U/L    Protein, total 7.7 6.4 - 8.2 g/dL    Albumin 3.2 (L) 3.5 - 5.0 g/dL    Globulin 4.5 (H) 2.0 - 4.0 g/dL    A-G Ratio 0.7 (L) 1.1 - 2.2     MAGNESIUM    Collection Time: 09/16/21  7:25 AM   Result Value Ref Range    Magnesium 2.5 (H) 1.6 - 2.4 mg/dL   PHOSPHORUS    Collection Time: 09/16/21  7:25 AM   Result Value Ref Range    Phosphorus 3.6 2.6 - 4.7 mg/dL   GLUCOSE, POC    Collection Time: 09/16/21  8:19 AM   Result Value Ref Range    Glucose (POC) 204 (H) 65 - 117 mg/dL    Performed by Chandra Lira          Assessment:     Active Problems:    COPD (chronic obstructive pulmonary disease) ([facility] [address] (12/17/2020)      Chest pain (6/26/2021)    Obesity  Obstructive sleep    Plan:      to assist with social issues    Signed By: Alla Lyn MD     September 16, 2021

## 2021-09-16 NOTE — PROGRESS NOTES
Received patient resting in bed. No c/o pain or discomfort. Bed in low position with wheels locked and call light within reach. Bedside and Verbal shift change report given to Pretty Bautista (oncoming nurse) by KATEY Stoddard (offgoing nurse). Report included the following information SBAR and Kardex.

## 2021-09-16 NOTE — PROGRESS NOTES
CM sent message to [facility] to follow-up with patients oxygen/cpap. Nita with [facility] sent CM message in [place], [place] records are showing he still has active oxygen services with us, he has it continuously, both portables and home unit. As far as PAP goes, he would have to reach out to someone in our PAP department for that info. \"    CM called [facility] and spoke with Junior in the PAP department. Junior said that patient originally had a CPAP, but was noncompliant. They switched it out for a BiPAP and he was still noncompliant and equipment was cancelled by his insurance this July for noncompliance. Chart reviewed, patient does not require oxygen day or night in hospital and refuses to wear BiPAP at night. CM met with patient at bedside to discuss DC planning and all of the above. Patient states that he is homeless. He said he was living in a hotel with his girlfriend but he has ran out of crisis funds. CM asked patient if he wants CM to look for a shelter for him. He stated that he has already checked around and they are all full. CM informed patient that he is expected to DC in the next 24 hours, and asked what is his plan. Patient stated that he will just get into his car that is in the parking lot and leave. DC plan: home, self care.

## 2021-09-16 NOTE — PROGRESS NOTES
Pulmonology and Critical Care Progress Note    Subjective:     Chief Complaint:   Chief Complaint   Patient presents with    Shortness of Breath    Chest Pain      Patient seen and examined in his room on the floor this morning, no acute events overnight. States that he feels essentially no different from admission, which does not really fit with an asthma exacerbation which should have improved within the 72 hours of admission on high-dose steroids and scheduled nebs. I am not sure that any further hospitalization is really going to help him all that much. If he is discharged today, would recommend switching Solu-Medrol over to prednisone 40 mg daily x5 days. He has a close pulmonary follow-up already. Will need to ensure that he has access to his inhaler therapy for his asthma, he can always come to our office for samples. I have readjusted his cough medicine to scheduled Tessalon Perles and I have added as needed codeine cough syrup. I discussed the case with the  today, I do not know if there is much more to offer here in the hospital. Suspect that there are more social issues happening. Lower extremity venous Dopplers are negative for blood clots.        Current Facility-Administered Medications   Medication Dose Route Frequency Provider Last Rate Last Admin    benzonatate (TESSALON) capsule 200 mg  200 mg Oral TID Kali Del Cid,         guaiFENesin-codeine (ROBITUSSIN AC) 100-10 mg/5 mL solution 10 mL  10 mL Oral Q6H PRN Kali Del Cid DO        guaiFENesin ER (MUCINEX) tablet 600 mg  600 mg Oral Q12H Kali Del Cid, DO        albuterol-ipratropium (DUO-NEB) 2.5 MG-0.5 MG/3 ML  3 mL Nebulization Q4HWA RT Kali Del Cid, DO   3 mL at 09/16/21 1139    promethazine-dextromethorphan (PROMETHAZINE-DM) 6.25-15 mg/5 mL syrup 5 mL  5 mL Oral Q4HWA RT Kali Del Cid, DO   5 mL at 09/16/21 0838    traMADoL (ULTRAM) tablet 50 mg  50 mg Oral Q6H PRN Aleena Solis, DO   50 mg at 09/15/21 2300    budesonide (PULMICORT) 500 mcg/2 ml nebulizer suspension  500 mcg Nebulization BID RT Kali Del Cid DO   500 mcg at 09/16/21 0747    sodium chloride (NS) flush 5-40 mL  5-40 mL IntraVENous Q8H Miguel BEGUM MD   10 mL at 09/16/21 0629    sodium chloride (NS) flush 5-40 mL  5-40 mL IntraVENous PRN Miguel BEGUM MD        methylPREDNISolone (PF) (Solu-MEDROL) injection 60 mg  60 mg IntraVENous Q6H Miguel BEGUM MD   60 mg at 09/16/21 0600    ondansetron (ZOFRAN) injection 4 mg  4 mg IntraVENous Q4H PRN Miguel BEGUM MD   4 mg at 09/13/21 1928    docusate sodium (COLACE) capsule 100 mg  100 mg Oral BID Miguel BEGUM MD   100 mg at 09/16/21 0907    azithromycin (ZITHROMAX) 500 mg in 0.9% sodium chloride 250 mL (VIAL-MATE)  500 mg IntraVENous Q24H Susi Arguello MD   Transfusion Completed at 09/15/21 1915    ARIPiprazole (ABILIFY) tablet 2 mg  2 mg Oral DAILY Susi Arguello MD   2 mg at 09/16/21 3548    aspirin chewable tablet 81 mg  81 mg Oral DAILY Miguel BEGUM MD   81 mg at 09/16/21 0910    atorvastatin (LIPITOR) tablet 20 mg  20 mg Oral DAILY Susi Arguello MD   20 mg at 09/16/21 0905    bumetanide (BUMEX) tablet 2 mg  2 mg Oral DAILY Susi Arguello MD   2 mg at 09/16/21 0900    cholecalciferol (VITAMIN D3) (1000 Units /25 mcg) tablet 2,000 Units  2,000 Units Oral DAILY Susi Arguello MD   2,000 Units at 09/16/21 0908    citalopram (CELEXA) tablet 40 mg  40 mg Oral DAILY Susi Arguello MD   40 mg at 09/16/21 2650    digoxin (LANOXIN) tablet 0.125 mg  0.125 mg Oral DAILY Miguel BEGUM MD   0.125 mg at 09/16/21 1059    gabapentin (NEURONTIN) capsule 600 mg  600 mg Oral QID PRN Miguel BEGUM MD   600 mg at 09/15/21 2226    levothyroxine (SYNTHROID) tablet 200 mcg  200 mcg Oral ACB Susi Arguello MD   200 mcg at 09/16/21 0630    metFORMIN (GLUCOPHAGE) tablet 500 mg  500 mg Oral DAILY WITH [place] BEGUM MD   500 mg at 09/16/21 1582    metoprolol succinate (TOPROL-XL) XL tablet 50 mg  50 mg Oral DAILY Osmin BEGUM MD   50 mg at 21 09    pantoprazole (PROTONIX) tablet 40 mg  40 mg Oral BID Osmin BEGUM MD   40 mg at 21 9751    topiramate (TOPAMAX) tablet 50 mg  50 mg Oral BID WITH MEALS Osmin BEGUM MD   50 mg at 21 1717    traZODone (DESYREL) tablet 100 mg  100 mg Oral QHS Osmin BEGUM MD   100 mg at 09/15/21 2219    zolpidem (AMBIEN) tablet 5 mg  5 mg Oral QHS PRN Osmin BEGUM MD   5 mg at 21 220    potassium chloride (K-DUR, KLOR-CON) SR tablet 40 mEq  40 mEq Oral DAILY Osmin BEGUM MD   40 mEq at 21 0909    enoxaparin (LOVENOX) injection 40 mg  40 mg SubCUTAneous Q24H Osmin BEGUM MD   40 mg at 09/15/21 1814    glucose chewable tablet 16 g  4 Tablet Oral PRN Camille Somers MD        glucagon (GLUCAGEN) injection 1 mg  1 mg IntraMUSCular PRN Osmin BEGUM MD        dextrose (D50W) injection syrg 12.5-25 g  25-50 mL IntraVENous PRN Camille Somers MD        insulin glargine (LANTUS) injection 20 Units  20 Units SubCUTAneous QHS Osmin BEGUM MD   20 Units at 09/15/21 2220    glucagon (GLUCAGEN) injection 1 mg  1 mg IntraMUSCular PRN Osmin BEGUM MD        dextrose (D50W) injection syrg 12.5-25 g  25-50 mL IntraVENous PRN Osmin BEGUM MD        glucose chewable tablet 16 g  4 Tablet Oral PRN Camille Somers MD        insulin lispro (HUMALOG) injection   SubCUTAneous AC&HS Camille Somers MD   6 Units at 21 9198          Allergies   Allergen Reactions    Vancomycin Hives     Lyndsey syndrome       Review of Systems:  A comprehensive review of systems was negative except for that written in the HPI. Objective:     Blood pressure 114/68, pulse 61, temperature 97.8 °F (36.6 °C), resp. rate 17, height 6' 4\" (1.93 m), weight (!) 164.2 kg (362 lb), SpO2 95 %.  Temp (24hrs), Av.9 °F (36.6 °C), Min:97.8 °F (36.6 °C), Max:98.2 °F (36.8 °C)      Intake and Output:  Current Shift: No intake/output data recorded. Last 3 Shifts: 09/14 1901 - 09/16 0700  In: 2000 [P.O.:1520; I.V.:250]  Out: -     Physical Exam:   General appearance: alert, cooperative, no distress, appears stated age, morbidly obese  Head: Normocephalic, without obvious abnormality, atraumatic  Eyes: negative  Neck: supple, symmetrical, trachea midline and no adenopathy  Lungs: Mild improvement in coarse breath sounds bilaterally, prolonged exhalation phase, no accessory muscle use or supplementary oxygen use at this time  Heart: regular rate and rhythm, S1, S2 normal, no murmur, click, rub or gallop  Abdomen: soft, non-tender. Bowel sounds normal. No masses,  no organomegaly  Extremities: extremities normal, atraumatic, no cyanosis or edema  Pulses: 2+ and symmetric  Skin: Skin color, texture, turgor normal. No rashes or lesions  Lymph nodes: Cervical, supraclavicular, and axillary nodes normal.  Neurologic: Grossly normal, alert and oriented x3    Additional comments:None    Lab/Data Review: All lab results for the last 24 hours reviewed.   Recent Results (from the past 24 hour(s))   EKG, 12 LEAD, SUBSEQUENT    Collection Time: 09/15/21  3:50 PM   Result Value Ref Range    Ventricular Rate 94 BPM    Atrial Rate 94 BPM    P-R Interval 194 ms    QRS Duration 102 ms    Q-T Interval 394 ms    QTC Calculation (Bezet) 492 ms    Calculated P Axis 35 degrees    Calculated R Axis 8 degrees    Calculated T Axis 13 degrees    Diagnosis       Normal sinus rhythm  Minimal voltage criteria for LVH, may be normal variant  Inferior infarct , age undetermined  Abnormal ECG  When compared with ECG of 13-SEP-2021 10:55,  Inferior infarct is now Present  Confirmed by Weston Alicea MD, Angeles Flores ([id number]) on 9/15/2021 4:25:19 PM     GLUCOSE, POC    Collection Time: 09/15/21  4:44 PM   Result Value Ref Range    Glucose (POC) 215 (H) 65 - 117 mg/dL    Performed by [employer], POC    Collection Time: 09/15/21  8:54 PM   Result Value Ref Range    Glucose (POC) 323 (H) 65 - 117 mg/dL    Performed by Amber Acosta    CBC W/O DIFF    Collection Time: 09/16/21  7:25 AM   Result Value Ref Range    WBC 12.6 (H) 4.1 - 11.1 K/uL    RBC 3.73 (L) 4.10 - 5.70 M/uL    HGB 12.3 12.1 - 17.0 g/dL    HCT 37.4 36.6 - 50.3 %    .3 (H) 80.0 - 99.0 FL    MCH 33.0 26.0 - 34.0 PG    MCHC 32.9 30.0 - 36.5 g/dL    RDW 13.9 11.5 - 14.5 %    PLATELET 010 041 - 892 K/uL    MPV 10.3 8.9 - 12.9 FL    NRBC 0.0 0.0  WBC    ABSOLUTE NRBC 0.00 0.00 - 3.05 K/uL   METABOLIC PANEL, COMPREHENSIVE    Collection Time: 09/16/21  7:25 AM   Result Value Ref Range    Sodium 138 136 - 145 mmol/L    Potassium 3.7 3.5 - 5.1 mmol/L    Chloride 100 97 - 108 mmol/L    CO2 33 (H) 21 - 32 mmol/L    Anion gap 5 5 - 15 mmol/L    Glucose 235 (H) 65 - 100 mg/dL    BUN 25 (H) 6 - 20 mg/dL    Creatinine 1.25 0.70 - 1.30 mg/dL    BUN/Creatinine ratio 20 12 - 20      GFR est AA >60 >60 ml/min/1.73m2    GFR est non-AA >60 >60 ml/min/1.73m2    Calcium 8.4 (L) 8.5 - 10.1 mg/dL    Bilirubin, total 0.2 0.2 - 1.0 mg/dL    AST (SGOT) 28 15 - 37 U/L    ALT (SGPT) 77 12 - 78 U/L    Alk. phosphatase 61 45 - 117 U/L    Protein, total 7.7 6.4 - 8.2 g/dL    Albumin 3.2 (L) 3.5 - 5.0 g/dL    Globulin 4.5 (H) 2.0 - 4.0 g/dL    A-G Ratio 0.7 (L) 1.1 - 2.2     MAGNESIUM    Collection Time: 09/16/21  7:25 AM   Result Value Ref Range    Magnesium 2.5 (H) 1.6 - 2.4 mg/dL   PHOSPHORUS    Collection Time: 09/16/21  7:25 AM   Result Value Ref Range    Phosphorus 3.6 2.6 - 4.7 mg/dL   GLUCOSE, POC    Collection Time: 09/16/21  8:19 AM   Result Value Ref Range    Glucose (POC) 204 (H) 65 - 117 mg/dL    Performed by Kiah Kaye          Chest X-Ray:   DUPLEX LOWER EXT VENOUS BILAT   Final Result      CTA CHEST W OR W WO CONT   Final Result   No pulmonary embolism definitively identified. Other findings as   above. XR CHEST PORT   Final Result   Hydrostatic edema. CT imaging:  CT Results  (Last 48 hours)    None          PFTs:   PFT Results  (Last 3 results in the past 10 years)    None            Assessment:     Patient is a 29-year-old morbidly obese  male with a history of asthma, hypertension, ANIBAL on BiPAP, and a PFO who presented to the hospital with increasing shortness of breath and cough over the past 2 weeks and is currently being treated for an acute exacerbation of asthma/bronchitis. Plan:     1.)  Acute bronchitis/acute exacerbation of asthma  -Per Dr. Inez Hamman, who has seen him previously, the patient has moderate persistent asthma at baseline and has been on a Spiriva and a Breo inhaler. He admits to compliance with his inhaler therapy. -COVID-19 negative on admission, rapid flu also negative  -Not on any supplemental oxygen. Respiratory exam improving today.  -Currently on duo nebs every 4 hours while awake, Pulmicort nebs twice daily, and Solu-Medrol 60 mg IV every 6 hours.  -Not sure that he will improve all that much more while here in the hospital, suspect there are some social/psych issues contributing here as well.  -Cardiac work-up negative, no further plans for left heart cath per cardiology while here in the hospital.  -From a pulmonary standpoint, patient can likely be discharged home. If he goes home today, would recommend transitioning his Solu-Medrol over to prednisone 40 mg daily x5 days.  -Provide patient with refills of his inhalers, he can also come to our office for samples if necessary.  -Follow-up with Dr. Inez Hamman on 9/28 at 4PM    2.)  Acute kidney injury  -Baseline creatinine appears to be about 1.1-1.2 and it was 1.75 on admission  -Likely prerenal and this is improving down to 1.25 this morning  -Repeat BMP in the morning    3.)  Leukocytosis  -White blood cell count went up up to 14.8 yesterday, still without fevers.   White blood cell count improving to 12.6 today.  -Likely steroid response  -Continue azithromycin for now x5 days total, can convert to oral at any time. -Repeat CBC in the morning    4.)  ANIBAL  -Follows in our clinic with Dr. Lele Hernandez, next appointment is on 9/28 at 4 PM.  Patient has been given our office card with the appointment written on the card. -Should be on BiPAP 19/15 at baseline, but states that he had to return his machine about a week ago. Will need repeat outpatient sleep study.  -Refused BiPAP here in the hospital because we do not have nasal pillows mask.     5.)  Morbid obesity  -BMI 44.06, due to excess calories  -Weight loss recommended  -ANIBAL management as above      CODE STATUS: Full Code      Disposition and Family: Stable to remain on the floor    Total time spent with patient: 30 minutes      Glen Stephens DO  [facility]  9/16/2021

## 2021-09-16 NOTE — PROGRESS NOTES
9/16/2021, 10:19 AM        Formerly [facility]  Phone: [phone]      Daily Progress Note:      Patient identification:     Sonja Rene  1 y.o.male  1975      Primary Cardiologist:  Dr. Jez Jon    Chief Complaint:  Chest pain    Assessment:     1. Chest pain, atypical, 6/10/2021 normal coronaries by cardiac catheterization at [facility], negative cardiac enzymes.     2. Bronchitis, exacerbation of asthma     3. Obesity/obstructive sleep apnea, awaiting new CPAP machine     4. Hyperension, well controlled     5. History of diabetes, hypercholesterolemia, remote tobacco use      6.  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 Continue with current medical therapy. 2. Continue as noted modification therapy lifestyle changes. 3. Please call for further questions. 4. Please call for further questions. Interval History:  No new interval cardiac developments. Subjective:  Pt. States no angina. Review of Systems:   No chest pain. No worsening edema. No fever or chills.     Medications:     Current Facility-Administered Medications   Medication Dose Route Frequency    albuterol-ipratropium (DUO-NEB) 2.5 MG-0.5 MG/3 ML  3 mL Nebulization Q4HWA RT    promethazine-dextromethorphan (PROMETHAZINE-DM) 6.25-15 mg/5 mL syrup 5 mL  5 mL Oral Q4HWA RT    guaiFENesin ER (MUCINEX) tablet 1,200 mg  1,200 mg Oral Q12H    traMADoL (ULTRAM) tablet 50 mg  50 mg Oral Q6H PRN    benzonatate (TESSALON) capsule 200 mg  200 mg Oral TID PRN    budesonide (PULMICORT) 500 mcg/2 ml nebulizer suspension  500 mcg Nebulization BID RT    sodium chloride (NS) flush 5-40 mL  5-40 mL IntraVENous Q8H    sodium chloride (NS) flush 5-40 mL  5-40 mL IntraVENous PRN    methylPREDNISolone (PF) (Solu-MEDROL) injection 60 mg  60 mg IntraVENous Q6H    ondansetron (ZOFRAN) injection 4 mg  4 mg IntraVENous Q4H PRN    docusate sodium (COLACE) capsule 100 mg 100 mg Oral BID    azithromycin (ZITHROMAX) 500 mg in 0.9% sodium chloride 250 mL (VIAL-MATE)  500 mg IntraVENous Q24H    ARIPiprazole (ABILIFY) tablet 2 mg  2 mg Oral DAILY    aspirin chewable tablet 81 mg  81 mg Oral DAILY    atorvastatin (LIPITOR) tablet 20 mg  20 mg Oral DAILY    bumetanide (BUMEX) tablet 2 mg  2 mg Oral DAILY    cholecalciferol (VITAMIN D3) (1000 Units /25 mcg) tablet 2,000 Units  2,000 Units Oral DAILY    citalopram (CELEXA) tablet 40 mg  40 mg Oral DAILY    digoxin (LANOXIN) tablet 0.125 mg  0.125 mg Oral DAILY    gabapentin (NEURONTIN) capsule 600 mg  600 mg Oral QID PRN    levothyroxine (SYNTHROID) tablet 200 mcg  200 mcg Oral ACB    metFORMIN (GLUCOPHAGE) tablet 500 mg  500 mg Oral DAILY WITH BREAKFAST    metoprolol succinate (TOPROL-XL) XL tablet 50 mg  50 mg Oral DAILY    pantoprazole (PROTONIX) tablet 40 mg  40 mg Oral BID    topiramate (TOPAMAX) tablet 50 mg  50 mg Oral BID WITH MEALS    traZODone (DESYREL) tablet 100 mg  100 mg Oral QHS    zolpidem (AMBIEN) tablet 5 mg  5 mg Oral QHS PRN    potassium chloride (K-DUR, KLOR-CON) SR tablet 40 mEq  40 mEq Oral DAILY    enoxaparin (LOVENOX) injection 40 mg  40 mg SubCUTAneous Q24H    glucose chewable tablet 16 g  4 Tablet Oral PRN    glucagon (GLUCAGEN) injection 1 mg  1 mg IntraMUSCular PRN    dextrose (D50W) injection syrg 12.5-25 g  25-50 mL IntraVENous PRN    insulin glargine (LANTUS) injection 20 Units  20 Units SubCUTAneous QHS    glucagon (GLUCAGEN) injection 1 mg  1 mg IntraMUSCular PRN    dextrose (D50W) injection syrg 12.5-25 g  25-50 mL IntraVENous PRN    glucose chewable tablet 16 g  4 Tablet Oral PRN    insulin lispro (HUMALOG) injection   SubCUTAneous AC&HS       Allergies:    Allergies   Allergen Reactions    Vancomycin Hives     Lyndsey syndrome       Physical Exam:   Visit Vitals  /75   Pulse 95   Temp 97.8 °F (36.6 °C)   Resp 14   Ht 6' 4\" (1.93 m)   Wt (!) 164.2 kg (362 lb)   SpO2 98%   BMI 44.06 kg/m²       General:  NAD, calm, cooperative, Obese  CVS:  Regular rate and rhythm, normal S1S2, no new murmurs, rubs or gallops  Pulm:  Normal effort, clear to auscultation bilaterally  Abd:  Soft, non-tender, non-distended  Ext:  Warm and well perfused  Neuro:  Alert and oriented, answering questions appropriately    Telemetry reviewed:      Labs:    CBC  Lab Results   Component Value Date/Time    WBC 12.6 (H) 09/16/2021 07:25 AM    RBC 3.73 (L) 09/16/2021 07:25 AM    HCT 37.4 09/16/2021 07:25 AM    .3 (H) 09/16/2021 07:25 AM    MCH 33.0 09/16/2021 07:25 AM    RDW 13.9 09/16/2021 07:25 AM    MONOS 2 (L) 09/14/2021 03:36 AM    EOS 0 09/14/2021 03:36 AM    BASOS 0 09/14/2021 03:36 AM       Basic Metabolic Profile  Lab Results   Component Value Date     09/16/2021    CO2 33 (H) 09/16/2021    BUN 25 (H) 09/16/2021       Diagnostic Studies:    Echo Results  (Last 48 hours)    None        DUPLEX LOWER EXT VENOUS BILAT   Final Result      CTA CHEST W OR W WO CONT   Final Result   No pulmonary embolism definitively identified. Other findings as   above. XR CHEST PORT   Final Result   Hydrostatic edema.              Dr. Nan Queen MD, Katelynn Winkler

## 2021-09-16 NOTE — ROUTINE PROCESS
Bedside shift report given to Susanne Hooper RN  (oncoming nurse) by Michelle Zambrano RN (off going nurse). Report to include SBAR, plan of care, recent results, discharge planning, and patient's questions and concerns.

## 2021-09-17 LAB
ALBUMIN SERPL-MCNC: 3.3 G/DL (ref 3.5–5)
ALBUMIN/GLOB SERPL: 0.8 {RATIO} (ref 1.1–2.2)
ALP SERPL-CCNC: 59 U/L (ref 45–117)
ALT SERPL-CCNC: 74 U/L (ref 12–78)
ANION GAP SERPL CALC-SCNC: 4 MMOL/L (ref 5–15)
AST SERPL W P-5'-P-CCNC: 25 U/L (ref 15–37)
BILIRUB SERPL-MCNC: 0.3 MG/DL (ref 0.2–1)
BUN SERPL-MCNC: 26 MG/DL (ref 6–20)
BUN/CREAT SERPL: 23 (ref 12–20)
CA-I BLD-MCNC: 8.8 MG/DL (ref 8.5–10.1)
CHLORIDE SERPL-SCNC: 103 MMOL/L (ref 97–108)
CO2 SERPL-SCNC: 32 MMOL/L (ref 21–32)
CREAT SERPL-MCNC: 1.13 MG/DL (ref 0.7–1.3)
ERYTHROCYTE [DISTWIDTH] IN BLOOD BY AUTOMATED COUNT: 14 % (ref 11.5–14.5)
GLOBULIN SER CALC-MCNC: 4.3 G/DL (ref 2–4)
GLUCOSE BLD STRIP.AUTO-MCNC: 133 MG/DL (ref 65–117)
GLUCOSE BLD STRIP.AUTO-MCNC: 137 MG/DL (ref 65–117)
GLUCOSE BLD STRIP.AUTO-MCNC: 154 MG/DL (ref 65–117)
GLUCOSE BLD STRIP.AUTO-MCNC: 165 MG/DL (ref 65–117)
GLUCOSE SERPL-MCNC: 133 MG/DL (ref 65–100)
HCT VFR BLD AUTO: 41 % (ref 36.6–50.3)
HGB BLD-MCNC: 13.6 G/DL (ref 12.1–17)
MAGNESIUM SERPL-MCNC: 2.3 MG/DL (ref 1.6–2.4)
MCH RBC QN AUTO: 33.3 PG (ref 26–34)
MCHC RBC AUTO-ENTMCNC: 33.2 G/DL (ref 30–36.5)
MCV RBC AUTO: 100.2 FL (ref 80–99)
NRBC # BLD: 0 K/UL (ref 0–0.01)
NRBC BLD-RTO: 0 PER 100 WBC
PERFORMED BY, TECHID: ABNORMAL
PHOSPHATE SERPL-MCNC: 3.2 MG/DL (ref 2.6–4.7)
PLATELET # BLD AUTO: 252 K/UL (ref 150–400)
PMV BLD AUTO: 10.1 FL (ref 8.9–12.9)
POTASSIUM SERPL-SCNC: 3.6 MMOL/L (ref 3.5–5.1)
PROT SERPL-MCNC: 7.6 G/DL (ref 6.4–8.2)
RBC # BLD AUTO: 4.09 M/UL (ref 4.1–5.7)
SODIUM SERPL-SCNC: 139 MMOL/L (ref 136–145)
WBC # BLD AUTO: 13.8 K/UL (ref 4.1–11.1)

## 2021-09-17 PROCEDURE — 83735 ASSAY OF MAGNESIUM: CPT

## 2021-09-17 PROCEDURE — 94640 AIRWAY INHALATION TREATMENT: CPT

## 2021-09-17 PROCEDURE — 84100 ASSAY OF PHOSPHORUS: CPT

## 2021-09-17 PROCEDURE — 74011636637 HC RX REV CODE- 636/637: Performed by: INTERNAL MEDICINE

## 2021-09-17 PROCEDURE — 80053 COMPREHEN METABOLIC PANEL: CPT

## 2021-09-17 PROCEDURE — 74011250637 HC RX REV CODE- 250/637: Performed by: INTERNAL MEDICINE

## 2021-09-17 PROCEDURE — 74011000250 HC RX REV CODE- 250: Performed by: INTERNAL MEDICINE

## 2021-09-17 PROCEDURE — 65270000029 HC RM PRIVATE

## 2021-09-17 PROCEDURE — 82962 GLUCOSE BLOOD TEST: CPT

## 2021-09-17 PROCEDURE — 94760 N-INVAS EAR/PLS OXIMETRY 1: CPT

## 2021-09-17 PROCEDURE — 85027 COMPLETE CBC AUTOMATED: CPT

## 2021-09-17 PROCEDURE — 36415 COLL VENOUS BLD VENIPUNCTURE: CPT

## 2021-09-17 RX ORDER — PREDNISONE 20 MG/1
40 TABLET ORAL
Status: DISCONTINUED | OUTPATIENT
Start: 2021-09-17 | End: 2021-09-18 | Stop reason: HOSPADM

## 2021-09-17 RX ORDER — BENZONATATE 200 MG/1
200 CAPSULE ORAL 3 TIMES DAILY
Qty: 21 CAPSULE | Refills: 0 | Status: SHIPPED | OUTPATIENT
Start: 2021-09-17 | End: 2021-09-24

## 2021-09-17 RX ORDER — PREDNISONE 20 MG/1
40 TABLET ORAL
Qty: 14 TABLET | Refills: 0 | Status: SHIPPED | OUTPATIENT
Start: 2021-09-18 | End: 2022-01-24

## 2021-09-17 RX ORDER — TRAMADOL HYDROCHLORIDE 50 MG/1
50 TABLET ORAL
Qty: 12 TABLET | Refills: 0 | Status: SHIPPED | OUTPATIENT
Start: 2021-09-17 | End: 2021-09-20

## 2021-09-17 RX ORDER — INSULIN GLARGINE 100 [IU]/ML
20 INJECTION, SOLUTION SUBCUTANEOUS
Qty: 10 ML | Refills: 0 | Status: SHIPPED | OUTPATIENT
Start: 2021-09-17

## 2021-09-17 RX ORDER — IPRATROPIUM BROMIDE AND ALBUTEROL SULFATE 2.5; .5 MG/3ML; MG/3ML
3 SOLUTION RESPIRATORY (INHALATION)
Qty: 30 NEBULE | Refills: 0 | Status: SHIPPED | OUTPATIENT
Start: 2021-09-17

## 2021-09-17 RX ORDER — GUAIFENESIN 600 MG/1
600 TABLET, EXTENDED RELEASE ORAL EVERY 12 HOURS
Qty: 20 TABLET | Refills: 0 | Status: SHIPPED | OUTPATIENT
Start: 2021-09-17

## 2021-09-17 RX ORDER — POTASSIUM CHLORIDE 20 MEQ/1
40 TABLET, EXTENDED RELEASE ORAL DAILY
Qty: 30 TABLET | Refills: 0 | Status: SHIPPED | OUTPATIENT
Start: 2021-09-18

## 2021-09-17 RX ORDER — AZITHROMYCIN 500 MG/1
500 TABLET, FILM COATED ORAL DAILY
Qty: 3 TABLET | Refills: 0 | Status: SHIPPED | OUTPATIENT
Start: 2021-09-17 | End: 2022-01-24

## 2021-09-17 RX ADMIN — IPRATROPIUM BROMIDE AND ALBUTEROL SULFATE 3 ML: .5; 2.5 SOLUTION RESPIRATORY (INHALATION) at 08:40

## 2021-09-17 RX ADMIN — ASPIRIN 81 MG CHEWABLE TABLET 81 MG: 81 TABLET CHEWABLE at 11:21

## 2021-09-17 RX ADMIN — METOPROLOL SUCCINATE 50 MG: 50 TABLET, EXTENDED RELEASE ORAL at 11:21

## 2021-09-17 RX ADMIN — Medication 2000 UNITS: at 11:21

## 2021-09-17 RX ADMIN — BUMETANIDE 2 MG: 1 TABLET ORAL at 11:21

## 2021-09-17 RX ADMIN — DOCUSATE SODIUM 100 MG: 100 CAPSULE, LIQUID FILLED ORAL at 11:22

## 2021-09-17 RX ADMIN — ARIPIPRAZOLE 2 MG: 2 TABLET ORAL at 11:22

## 2021-09-17 RX ADMIN — BUDESONIDE 500 MCG: 0.5 SUSPENSION RESPIRATORY (INHALATION) at 08:40

## 2021-09-17 RX ADMIN — IPRATROPIUM BROMIDE AND ALBUTEROL SULFATE 3 ML: .5; 2.5 SOLUTION RESPIRATORY (INHALATION) at 19:38

## 2021-09-17 RX ADMIN — PANTOPRAZOLE SODIUM 40 MG: 40 TABLET, DELAYED RELEASE ORAL at 11:22

## 2021-09-17 RX ADMIN — GUAIFENESIN 600 MG: 600 TABLET, EXTENDED RELEASE ORAL at 11:22

## 2021-09-17 RX ADMIN — TRAZODONE HYDROCHLORIDE 100 MG: 50 TABLET ORAL at 21:50

## 2021-09-17 RX ADMIN — GUAIFENESIN 600 MG: 600 TABLET, EXTENDED RELEASE ORAL at 21:51

## 2021-09-17 RX ADMIN — DIGOXIN 0.12 MG: 125 TABLET ORAL at 11:22

## 2021-09-17 RX ADMIN — INSULIN GLARGINE 20 UNITS: 100 INJECTION, SOLUTION SUBCUTANEOUS at 22:00

## 2021-09-17 RX ADMIN — PROMETHAZINE HYDROCHLORIDE AND DEXTROMETHORPHAN HYDROBROMIDE 5 ML: 15; 6.25 SYRUP ORAL at 21:56

## 2021-09-17 RX ADMIN — METFORMIN HYDROCHLORIDE 500 MG: 500 TABLET ORAL at 11:22

## 2021-09-17 RX ADMIN — ATORVASTATIN CALCIUM 20 MG: 20 TABLET, FILM COATED ORAL at 11:21

## 2021-09-17 RX ADMIN — BENZONATATE 200 MG: 100 CAPSULE ORAL at 11:21

## 2021-09-17 RX ADMIN — Medication 5 ML: at 08:00

## 2021-09-17 RX ADMIN — TOPIRAMATE 50 MG: 25 TABLET, FILM COATED ORAL at 11:22

## 2021-09-17 RX ADMIN — IPRATROPIUM BROMIDE AND ALBUTEROL SULFATE 3 ML: .5; 2.5 SOLUTION RESPIRATORY (INHALATION) at 13:53

## 2021-09-17 RX ADMIN — BENZONATATE 200 MG: 100 CAPSULE ORAL at 21:51

## 2021-09-17 RX ADMIN — BUDESONIDE 500 MCG: 0.5 SUSPENSION RESPIRATORY (INHALATION) at 19:38

## 2021-09-17 RX ADMIN — LEVOTHYROXINE SODIUM 200 MCG: 0.1 TABLET ORAL at 11:21

## 2021-09-17 RX ADMIN — POTASSIUM CHLORIDE 40 MEQ: 1500 TABLET, EXTENDED RELEASE ORAL at 11:21

## 2021-09-17 RX ADMIN — CITALOPRAM HYDROBROMIDE 40 MG: 20 TABLET ORAL at 11:22

## 2021-09-17 RX ADMIN — PANTOPRAZOLE SODIUM 40 MG: 40 TABLET, DELAYED RELEASE ORAL at 21:51

## 2021-09-17 NOTE — DISCHARGE INSTRUCTIONS
Patient Education        Learning About Managing Acute Pain at Home  What is a pain management plan? A pain management plan spells out ways you can deal with your pain at home. You and your care team will create this plan before you leave the hospital. The plan may include:  · The goals of your treatment. This may include how you can expect your pain and function to improve. · The treatments your doctor suggests for your pain. These may include medicines, physical therapy, or relaxation exercises. · Notes about how you and your care team will work together as you recover. Your team may include your doctor, a physical therapist, and an occupational therapist.  · A review of your treatment goals for pain and function. Your feelings about how you want to manage your pain are important. Be open and honest when you talk with your doctor. This will help ensure that you get a plan that is safe and that works best for you. Why is it important to follow your plan? After you have an injury or surgery, a certain amount of pain is common and normal. But you can manage your pain after you leave the hospital.  The best way to do that is to follow your pain management plan. This will help keep you comfortable and able to do the things you want to do. It can also speed your recovery and help reduce the risk of problems. What are the side effects of pain medicines? All pain medicines--like acetaminophen, nonsteroidal anti-inflammatory drugs, and opioids--have side effects, including allergic reaction, rash, and upset stomach. Common side effects of opioids also include constipation and nausea. More serious effects include needing larger doses over time, getting sick if you stop taking the drug, developing opioid use disorder, and death. How do you manage pain after you leave the hospital?  After you leave the hospital, the best way to benefit from your treatment is to take good care of yourself.  Here are some ways to do that.  · Try nonmedical ways to relieve pain. These ways include breathing exercises, progressive muscle relaxation, yoga, meditation, and massage. · Take your medicines or other treatments exactly as prescribed. Let your doctor know if your pain isn't getting better. · Pace yourself. It might be hard to take it easy when you get home. But even simple activities can increase pain at first. Follow your doctor's instructions about when you can be active again and any activities you should avoid. When you do start getting back to your regular activities, start slowly. · Arrange your home to help you recover. Here are some ideas:  ? Remove throw rugs to prevent falling. ? Sleep close to the bathroom, or have a commode near your bed.  ? Have pillows near you so you can sit or lie in a comfortable position. · Use tools that may help. Some devices may help you do your daily activities and be more mobile. These devices include walking canes, crutches, grab bars, and reachers. · Try heat or cold. Heat can soothe muscle pain and other aches. Cold can help with swelling. · Get support. Friends and relatives often want to help but don't know what to do. Let them know what you need. It will make them happy and will help you. How do you take opioids safely? Opioids can help you manage pain. But their use can lead to problems, like opioid use disorder and even death. Because of this, it is best to get off them as soon as possible. As soon as you don't need them, talk to your doctor about how to safely stop taking them. If you need to take opioids to manage pain, this advice can help you stay safe. · Take opioids exactly as directed. Follow the directions carefully. It's easy to misuse opioids if you take a dose other than what's prescribed by your doctor. Even sharing them with someone they were not meant for is misuse. · Do not drive or operate machinery.    Opioids may affect your judgment and decision making. Talk with your doctor about when it is safe to drive. · Avoid alcohol, sleeping medicines, and muscle relaxers. Opioids can be dangerous if you take them with alcohol or with certain drugs. This includes over-the-counter medicines. Make sure your doctor knows about all the other medicines you take. Don't start any new medicines before you talk to your doctor or pharmacist.  · Ask your doctor about a naloxone rescue kit. It can help you--and even save your life--if you take too much of an opioid. How do you safely store opioid pills and patches? It's important to store opioids safely so that they aren't used by the wrong person. Your pain medicine is only for you to take. If someone else takes your medicine, it can harm that person. You can safely store your medicine. Follow these tips. · Store pills and patches up high and out of sight. ? Keep them away from children and pets. ? Return the container to the same place each time you take your medicine. · Try locking your opioid medicine in a cabinet. · Make sure the bottles are closed tightly. If they have a safety cap, make sure that it's locked. Tighten the cap until you hear a click or can't twist it anymore. · Keep track of how many pills or patches you have left. You may want to keep track in a notebook. · Let the people who live with you know about your medicine. ? Tell them that it is only for you to take. ? If guests have opioid medicine with them, ask them to keep it safe. How do you get rid of opioid pills and patches safely? If you have opioid pills or patches that you aren't going to use, get rid of them right away. It's also important to get rid of used opioid patches. Do not keep your opioid medicine or opioid patches for later use. When you get rid of these medicines safely, you take away any chance that a person or an animal might get sick from one of them. Follow one of these steps.  If you can't do the first step, then take the next step. · Bring them to a Drug Enforcement Administration (RADHA)-authorized medicine take-back program or drop-off box. ? Your local trash and recycle center, pharmacy, or hospital may offer one of these. · Throw the medicines in the trash. Take this step if you can't get to a take-back program or drop-off box and the medicine's instructions do not have specific disposal information. 1. Take the medicine out of its container. 2. Mix it with something that tastes bad, like cat litter or coffee grounds. 3. Place the mixture in a sealed plastic bag and put the bag in your household trash. · Flush them down the sink or toilet. ? You can flush your medicine down the toilet or sink only if you can't go to a RADHA-approved site or if your medicine's instructions specifically say to.  ? If you are throwing away a patch, first fold the sticky sides together. ? You can also go to the FDA website to see a list of medicines that should be flushed. Follow-up care is a key part of your treatment and safety. Be sure to make and go to all appointments, and call your doctor if you are having problems. It's also a good idea to know your test results and keep a list of the medicines you take. Where can you learn more? Go to http://www.gray.com/  Enter P175 in the search box to learn more about \"Learning About Managing Acute Pain at Home. \"  Current as of: April 8, 2021               Content Version: 13.0  © 2006-2021 [employer]. Care instructions adapted under license by [employer] (which disclaims liability or warranty for this information). If you have questions about a medical condition or this instruction, always ask your healthcare professional. [place] [address] any warranty or liability for your use of this information.          Patient Education        Chest Pain: Care Instructions  Your Care Instructions     There are many things that can cause chest pain. Some are not serious and will get better on their own in a few days. But some kinds of chest pain need more testing and treatment. Your doctor may have recommended a follow-up visit in the next 8 to 12 hours. If you are not getting better, you may need more tests or treatment. Even though your doctor has released you, you still need to watch for any problems. The doctor carefully checked you, but sometimes problems can develop later. If you have new symptoms or if your symptoms do not get better, get medical care right away. If you have worse or different chest pain or pressure that lasts more than 5 minutes or you passed out (lost consciousness), call 911 or seek other emergency help right away. A medical visit is only one step in your treatment. Even if you feel better, you still need to do what your doctor recommends, such as going to all suggested follow-up appointments and taking medicines exactly as directed. This will help you recover and help prevent future problems. How can you care for yourself at home? · Rest until you feel better. · Take your medicine exactly as prescribed. Call your doctor if you think you are having a problem with your medicine. · Do not drive after taking a prescription pain medicine. When should you call for help? Call 911 if:     · You passed out (lost consciousness).     · You have severe difficulty breathing.     · You have symptoms of a heart attack. These may include:  ? Chest pain or pressure, or a strange feeling in your chest.  ? Sweating. ? Shortness of breath. ? Nausea or vomiting. ? Pain, pressure, or a strange feeling in your back, neck, jaw, or upper belly or in one or both shoulders or arms. ? Lightheadedness or sudden weakness. ? A fast or irregular heartbeat. After you call 911, the  may tell you to chew 1 adult-strength or 2 to 4 low-dose aspirin. Wait for an ambulance. Do not try to drive yourself.    Call your doctor today if:     · You have any trouble breathing.     · Your chest pain gets worse.     · You are dizzy or lightheaded, or you feel like you may faint.     · You are not getting better as expected.     · You are having new or different chest pain. Where can you learn more? Go to http://www.pritchard.com/  Enter A120 in the search box to learn more about \"Chest Pain: Care Instructions. \"  Current as of: July 1, 2021               Content Version: 13.0  © 2006-2021 [employer]. Care instructions adapted under license by [employer] (which disclaims liability or warranty for this information). If you have questions about a medical condition or this instruction, always ask your healthcare professional. [address] any warranty or liability for your use of this information. Patient Education        Musculoskeletal Chest Pain: Care Instructions  Your Care Instructions     Chest pain is not always a sign that something is wrong with your heart or that you have another serious problem. The doctor thinks your chest pain is caused by strained muscles or ligaments, inflamed chest cartilage, or another problem in your chest, rather than by your heart. You may need more tests to find the cause of your chest pain. Follow-up care is a key part of your treatment and safety. Be sure to make and go to all appointments, and call your doctor if you are having problems. It's also a good idea to know your test results and keep a list of the medicines you take. How can you care for yourself at home? · Take pain medicines exactly as directed. ? If the doctor gave you a prescription medicine for pain, take it as prescribed. ? If you are not taking a prescription pain medicine, ask your doctor if you can take an over-the-counter medicine. · Rest and protect the sore area.   · Stop, change, or take a break from any activity that may be causing your pain or soreness. · Put ice or a cold pack on the sore area for 10 to 20 minutes at a time. Try to do this every 1 to 2 hours for the next 3 days (when you are awake) or until the swelling goes down. Put a thin cloth between the ice and your skin. · After 2 or 3 days, apply a heating pad set on low or a warm cloth to the area that hurts. Some doctors suggest that you go back and forth between hot and cold. · Do not wrap or tape your ribs for support. This may cause you to take smaller breaths, which could increase your risk of lung problems. · Mentholated creams such as Bengay or Icy Hot may soothe sore muscles. Follow the instructions on the package. · Follow your doctor's instructions for exercising. · Gentle stretching and massage may help you get better faster. Stretch slowly to the point just before pain begins, and hold the stretch for at least 15 to 30 seconds. Do this 3 or 4 times a day. Stretch just after you have applied heat. · As your pain gets better, slowly return to your normal activities. Any increased pain may be a sign that you need to rest a while longer. When should you call for help? Call 911 anytime you think you may need emergency care. For example, call if:    · You have chest pain or pressure. This may occur with:  ? Sweating. ? Shortness of breath. ? Nausea or vomiting. ? Pain that spreads from the chest to the neck, jaw, or one or both shoulders or arms. ? Dizziness or lightheadedness. ? A fast or uneven pulse. After calling 911, chew 1 adult-strength aspirin. Wait for an ambulance. Do not try to drive yourself.     · You have sudden chest pain and shortness of breath, or you cough up blood.    Call your doctor now or seek immediate medical care if:    · You have any trouble breathing.     · Your chest pain gets worse.     · Your chest pain occurs consistently with exercise and is relieved by rest.   Watch closely for changes in your health, and be sure to contact your doctor if:    · Your chest pain does not get better after 1 week. Where can you learn more? Go to http://www.[employer].com/  Enter V293 in the search box to learn more about \"Musculoskeletal Chest Pain: Care Instructions. \"  Current as of: July 1, 2021               Content Version: 13.0  © 1158-8739 [employer]. Care instructions adapted under license by [employer] (which disclaims liability or warranty for this information). If you have questions about a medical condition or this instruction, always ask your healthcare professional. [address] any warranty or liability for your use of this information.

## 2021-09-17 NOTE — PROGRESS NOTES
Pulmonology and Critical Care Progress Note    Subjective:     Chief Complaint:   Chief Complaint   Patient presents with    Shortness of Breath    Chest Pain      Patient seen and examined in his room on the floor this afternoon, no acute events overnight. Feels little bit better today from a breathing standpoint, slightly better air movement today on exam.  Continue on every 6 hours duo nebs while admitted, can complete 5 total days of azithromycin (convert to oral at any time), continue twice daily Pulmicort nebs, transition from Solu-Medrol over to prednisone 40 mg daily. Would recommend a taper of 10 mg every 3 days. Okay for discharge from a pulmonary standpoint, patient has a quick follow-up with Dr. Blanca Landeros in our clinic within 2 weeks. Patient was instructed to come to our clinic when it is open to obtain samples of inhalers if he realizes that he is out of inhalers after discharge.        Current Facility-Administered Medications   Medication Dose Route Frequency Provider Last Rate Last Admin    predniSONE (DELTASONE) tablet 40 mg  40 mg Oral DAILY WITH BREAKFAST Kali Hall DO        benzonatate (TESSALON) capsule 200 mg  200 mg Oral TID Kali Del Cid DO   200 mg at 09/17/21 1121    guaiFENesin-codeine (ROBITUSSIN AC) 100-10 mg/5 mL solution 10 mL  10 mL Oral Q6H PRN Kali Del Cid DO        guaiFENesin ER (MUCINEX) tablet 600 mg  600 mg Oral Q12H Kali Del Cid DO   600 mg at 09/17/21 1122    albuterol-ipratropium (DUO-NEB) 2.5 MG-0.5 MG/3 ML  3 mL Nebulization Q6H RT Kali Del Cid DO   3 mL at 09/17/21 1353    promethazine-dextromethorphan (PROMETHAZINE-DM) 6.25-15 mg/5 mL syrup 5 mL  5 mL Oral Q4HWA RT Kali Del Cid DO   5 mL at 09/16/21 1433    traMADoL (ULTRAM) tablet 50 mg  50 mg Oral Q6H PRN Brody Crystal DO   50 mg at 09/16/21 2054    budesonide (PULMICORT) 500 mcg/2 ml nebulizer suspension  500 mcg Nebulization BID RT Kali Del Cid DO   500 mcg at 09/17/21 0840    sodium chloride (NS) flush 5-40 mL  5-40 mL IntraVENous Q8H Adriel Sánchez MD   5 mL at 09/17/21 0800    sodium chloride (NS) flush 5-40 mL  5-40 mL IntraVENous PRN Ben BEGUM MD        ondansetron [facility]) injection 4 mg  4 mg IntraVENous Q4H PRN Ben BEGUM MD   4 mg at 09/13/21 1928    docusate sodium (COLACE) capsule 100 mg  100 mg Oral BID Ben BEGUM MD   100 mg at 09/17/21 1122    azithromycin (ZITHROMAX) 500 mg in 0.9% sodium chloride 250 mL (VIAL-MATE)  500 mg IntraVENous Q24H Ben BEGUM  mL/hr at 09/16/21 1809 500 mg at 09/16/21 1809    ARIPiprazole (ABILIFY) tablet 2 mg  2 mg Oral DAILY Ben BEGUM MD   2 mg at 09/17/21 1122    aspirin chewable tablet 81 mg  81 mg Oral DAILY Ben BEGUM MD   81 mg at 09/17/21 1121    atorvastatin (LIPITOR) tablet 20 mg  20 mg Oral DAILY Adam Church MD   20 mg at 09/17/21 1121    bumetanide (BUMEX) tablet 2 mg  2 mg Oral DAILY Adam Church MD   2 mg at 09/17/21 1121    cholecalciferol (VITAMIN D3) (1000 Units /25 mcg) tablet 2,000 Units  2,000 Units Oral DAILY Adam Church MD   2,000 Units at 09/17/21 1121    citalopram (CELEXA) tablet 40 mg  40 mg Oral DAILY Adam Church MD   40 mg at 09/17/21 1122    digoxin (LANOXIN) tablet 0.125 mg  0.125 mg Oral DAILY Ben BEGUM MD   0.125 mg at 09/17/21 1122    gabapentin (NEURONTIN) capsule 600 mg  600 mg Oral QID PRN Ben BEGUM MD   600 mg at 09/15/21 2226    levothyroxine (SYNTHROID) tablet 200 mcg  200 mcg Oral ACB Adam Church MD   200 mcg at 09/17/21 1121    metFORMIN (GLUCOPHAGE) tablet 500 mg  500 mg Oral DAILY WITH BREAKFAST Adam Church MD   500 mg at 09/17/21 1122    metoprolol succinate (TOPROL-XL) XL tablet 50 mg  50 mg Oral DAILY Ben BEGUM MD   50 mg at 09/17/21 1121    pantoprazole (PROTONIX) tablet 40 mg  40 mg Oral BID Ben BEGUM MD   40 mg at 09/17/21 1122    topiramate (TOPAMAX) tablet 50 mg  50 mg Oral BID WITH MEALS Job Roberts MD   50 mg at 21 112    traZODone (DESYREL) tablet 100 mg  100 mg Oral QHS Queen Gordon BEGUM MD   100 mg at 21    zolpidem (AMBIEN) tablet 5 mg  5 mg Oral QHS PRN Queen Gordon BEGUM MD   5 mg at 21    potassium chloride (K-DUR, KLOR-CON) SR tablet 40 mEq  40 mEq Oral DAILY Queen Gordon BEGUM MD   40 mEq at 21 112    enoxaparin (LOVENOX) injection 40 mg  40 mg SubCUTAneous Q24H Queen Gordon BEGUM MD   40 mg at 210    glucose chewable tablet 16 g  4 Tablet Oral PRN Job Roberts MD        glucagon (GLUCAGEN) injection 1 mg  1 mg IntraMUSCular PRN Queen Gordon BEGUM MD        dextrose (D50W) injection syrg 12.5-25 g  25-50 mL IntraVENous PRN Queen Gordon BEGUM MD        insulin glargine (LANTUS) injection 20 Units  20 Units SubCUTAneous QHS Queen Gordon BEGUM MD   20 Units at 21    glucagon (GLUCAGEN) injection 1 mg  1 mg IntraMUSCular PRN Queen Gordon BEGUM MD        dextrose (D50W) injection syrg 12.5-25 g  25-50 mL IntraVENous PRN Queen Gordon BEGUM MD        glucose chewable tablet 16 g  4 Tablet Oral PRN Job Roberts MD        insulin lispro (HUMALOG) injection   SubCUTAneous AC&HS Job Roberts MD   12 Units at 21          Allergies   Allergen Reactions    Vancomycin Hives     Lyndsey syndrome       Review of Systems:  A comprehensive review of systems was negative except for that written in the HPI. Objective:     Blood pressure 122/70, pulse 99, temperature 97.8 °F (36.6 °C), resp. rate 22, height 6' 4\" (1.93 m), weight (!) 160.6 kg (354 lb 1.6 oz), SpO2 99 %. Temp (24hrs), Av °F (36.7 °C), Min:97.8 °F (36.6 °C), Max:98.2 °F (36.8 °C)      Intake and Output:  Current Shift: No intake/output data recorded.   Last 3 Shifts: 09/15 190 0700  In: 1520 [P.O.:1520]  Out: -     Physical Exam:   General appearance: alert, cooperative, no distress, appears stated age, morbidly obese  Head: Normocephalic, without obvious abnormality, atraumatic  Eyes: negative  Neck: supple, symmetrical, trachea midline and no adenopathy  Lungs: Mild improvement in coarse breath sounds bilaterally, prolonged exhalation phase, no accessory muscle use or supplementary oxygen use at this time  Heart: regular rate and rhythm, S1, S2 normal, no murmur, click, rub or gallop  Abdomen: soft, non-tender. Bowel sounds normal. No masses,  no organomegaly  Extremities: extremities normal, atraumatic, no cyanosis or edema  Pulses: 2+ and symmetric  Skin: Skin color, texture, turgor normal. No rashes or lesions  Lymph nodes: Cervical, supraclavicular, and axillary nodes normal.  Neurologic: Grossly normal, alert and oriented x3    Additional comments:None    Lab/Data Review: All lab results for the last 24 hours reviewed.   Recent Results (from the past 24 hour(s))   GLUCOSE, POC    Collection Time: 09/16/21  3:54 PM   Result Value Ref Range    Glucose (POC) 318 (H) 65 - 117 mg/dL    Performed by Dulce Maria Jaimes    GLUCOSE, POC    Collection Time: 09/16/21  8:21 PM   Result Value Ref Range    Glucose (POC) 305 (H) 65 - 117 mg/dL    Performed by [address] [place] [address] Avenue, POC    Collection Time: 09/17/21  9:28 AM   Result Value Ref Range    Glucose (POC) 133 (H) 65 - 117 mg/dL    Performed by Essie Gutiérrez    MAGNESIUM    Collection Time: 09/17/21 11:10 AM   Result Value Ref Range    Magnesium 2.3 1.6 - 2.4 mg/dL   PHOSPHORUS    Collection Time: 09/17/21 11:10 AM   Result Value Ref Range    Phosphorus 3.2 2.6 - 4.7 mg/dL   CBC W/O DIFF    Collection Time: 09/17/21 11:10 AM   Result Value Ref Range    WBC 13.8 (H) 4.1 - 11.1 K/uL    RBC 4.09 (L) 4.10 - 5.70 M/uL    HGB 13.6 12.1 - 17.0 g/dL    HCT 41.0 36.6 - 50.3 %    .2 (H) 80.0 - 99.0 FL    MCH 33.3 26.0 - 34.0 PG    MCHC 33.2 30.0 - 36.5 g/dL    RDW 14.0 11.5 - 14.5 %    PLATELET 221 185 - 352 K/uL    MPV 10.1 8.9 - 12.9 FL    NRBC 0.0 0.0  WBC    ABSOLUTE NRBC 0. 00 0.00 - 5.94 K/uL   METABOLIC PANEL, COMPREHENSIVE    Collection Time: 09/17/21 11:10 AM   Result Value Ref Range    Sodium 139 136 - 145 mmol/L    Potassium 3.6 3.5 - 5.1 mmol/L    Chloride 103 97 - 108 mmol/L    CO2 32 21 - 32 mmol/L    Anion gap 4 (L) 5 - 15 mmol/L    Glucose 133 (H) 65 - 100 mg/dL    BUN 26 (H) 6 - 20 mg/dL    Creatinine 1.13 0.70 - 1.30 mg/dL    BUN/Creatinine ratio 23 (H) 12 - 20      GFR est AA >60 >60 ml/min/1.73m2    GFR est non-AA >60 >60 ml/min/1.73m2    Calcium 8.8 8.5 - 10.1 mg/dL    Bilirubin, total 0.3 0.2 - 1.0 mg/dL    AST (SGOT) 25 15 - 37 U/L    ALT (SGPT) 74 12 - 78 U/L    Alk. phosphatase 59 45 - 117 U/L    Protein, total 7.6 6.4 - 8.2 g/dL    Albumin 3.3 (L) 3.5 - 5.0 g/dL    Globulin 4.3 (H) 2.0 - 4.0 g/dL    A-G Ratio 0.8 (L) 1.1 - 2.2     GLUCOSE, POC    Collection Time: 09/17/21 12:24 PM   Result Value Ref Range    Glucose (POC) 154 (H) 65 - 117 mg/dL    Performed by Emily Johnson          Chest X-Ray:   DUPLEX LOWER EXT VENOUS BILAT   Final Result      CTA CHEST W OR W WO CONT   Final Result   No pulmonary embolism definitively identified. Other findings as   above. XR CHEST PORT   Final Result   Hydrostatic edema. CT imaging:  CT Results  (Last 48 hours)    None          PFTs:   PFT Results  (Last 3 results in the past 10 years)    None            Assessment:     Patient is a 76-year-old morbidly obese  male with a history of asthma, hypertension, ANIBAL on BiPAP, and a PFO who presented to the hospital with increasing shortness of breath and cough over the past 2 weeks and is currently being treated for an acute exacerbation of asthma/bronchitis. Plan:     1.)  Acute bronchitis/acute exacerbation of asthma  -Per Dr. Inez Hamman, who has seen him previously, the patient has moderate persistent asthma at baseline and has been on a Spiriva and a Breo inhaler. He admits to compliance with his inhaler therapy.     -COVID-19 negative on admission, rapid flu also negative  -Not on any supplemental oxygen. Respiratory exam improving today.  -Currently on duo nebs every 4 hours while awake, Pulmicort nebs twice daily, and prednisone 40 mg daily  -Not sure that he will improve all that much more while here in the hospital, suspect there are some social/psych issues contributing here as well.  -Cardiac work-up negative, no further plans for left heart cath per cardiology while here in the hospital.  -From a pulmonary standpoint, patient can likely be discharged home.    -Transition to Solu-Medrol over to prednisone 40 mg daily, recommend decreasing by 10 mg every 3 days until off.  -Please provide patient with refills of his inhalers, he can also come to our office for samples if necessary.  -Follow-up with Dr. Debra Gomez on 9/28 at [address] [place] [address], patient has been provided with our office card with the date/time of his appointment.  -The pulmonary service will sign off now, please not hesitate contact me with any further questions/concerns prior to discharge. 2.)  Acute kidney injury  -Baseline creatinine appears to be about 1.1-1.2 and it was 1.75 on admission  -Creatinine stable at 1.13 this morning from 1.25 yesterday  -Repeat BMP in the morning    3.)  Leukocytosis  -White blood cell count went up up to 14.8 yesterday, still without fevers. White blood cell count stable at 13.8 today, likely a steroid response.  -Continue azithromycin for now x5 days total, can convert to oral at any time. 4.)  ANIBAL  -Follows in our clinic with Dr. Debra Gomez, next appointment is on 9/28 at 4 PM.  Patient has been given our office card with the appointment written on the card. -Should be on BiPAP 19/15 at baseline, but states that he had to return his machine about a week ago. Will need repeat outpatient sleep study.  -Refused BiPAP here in the hospital because we do not have nasal pillows mask.     5.)  Morbid obesity  -BMI 44.06, due to excess calories  -Weight loss recommended  -ANIBAL management as above      CODE STATUS: Full Code      Disposition and Family: Stable to remain on the floor    Total time spent with patient: 30 minutes      Phuong María,   [facility]  9/17/2021

## 2021-09-17 NOTE — DISCHARGE SUMMARY
Discharge Summary     Patient: Brenda Preciado MRN: [id number]  SSN: [id number]    YOB: 1975  Age: 54 y.o.   Sex: male       Admit Date: 9/13/2021    Discharge Date: 9/17/2021      Admission Diagnoses: Chest pain [R07.9]  COPD (chronic obstructive pulmonary disease) ([facility] [J44.9]    Discharge Diagnoses:   Problem List as of 9/17/2021 Date Reviewed: 8/26/2021        Codes Class Noted - Resolved    Hypoxemia ICD-10-CM: R09.02  ICD-9-CM: 799.02  8/18/2021 - Present        COPD exacerbation ([place] [address].) ICD-10-CM: J44.1  ICD-9-CM: 491.21  8/18/2021 - Present        Syncope ICD-10-CM: R55  ICD-9-CM: 780.2  8/3/2021 - Present        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 (CVA) ([place] [address].) ICD-10-CM: Y24.717, R56.9  ICD-9-CM: 438.89, 780.39  8/3/2021 - Present        ACS (acute coronary syndrome) ([facility] ICD-10-CM: I24.9  ICD-9-CM: 411.1  7/13/2021 - Present        Hemoptysis ICD-10-CM: R04.2  ICD-9-CM: 786.30  7/13/2021 - Present        Chest pain ICD-10-CM: R07.9  ICD-9-CM: 786.50  6/26/2021 - Present        CVA (cerebral vascular accident) ([facility] ICD-10-CM: I63.9  ICD-9-CM: 434.91  5/6/2021 - Present        Postoperative hypothyroidism ICD-10-CM: E89.0  ICD-9-CM: 244.0  5/6/2021 - Present        Pharyngoesophageal dysphagia ICD-10-CM: R13.14  ICD-9-CM: 787.24  2/22/2021 - Present        Multinodular goiter ICD-10-CM: E04.2  ICD-9-CM: 241.1  12/30/2020 - Present        Coronary artery disease involving native coronary artery of native heart with angina pectoris with documented spasm ([facility] ICD-10-CM: I25.111  ICD-9-CM: 414.01, 413.9  12/17/2020 - Present        Cardiomyopathy ([facility] ICD-10-CM: I42.9  ICD-9-CM: 425.4  12/17/2020 - Present        COPD (chronic obstructive pulmonary disease) ([facility] ICD-10-CM: J44.9  ICD-9-CM: 496  12/17/2020 - Present        Pulmonary nodules ICD-10-CM: R91.8  ICD-9-CM: 793.19  12/17/2020 - Present        Obstructive sleep apnea ICD-10-CM: G47.33  ICD-9-CM: 327.23 12/17/2020 - Present        Type 2 diabetes mellitus with hyperglycemia, without long-term current use of insulin (HCC) ICD-10-CM: E11.65  ICD-9-CM: 250.00, 790.29  12/17/2020 - Present        Essential hypertension ICD-10-CM: I10  ICD-9-CM: 401.9  12/17/2020 - Present        Obesity, morbid ([facility] ICD-10-CM: E66.01  ICD-9-CM: 278.01  9/7/2020 - Present        Carotid artery stenosis ICD-10-CM: I65.29  ICD-9-CM: 433.10  9/7/2020 - Present               Discharge Condition: Good    Hospital Course: 54years old patient came in with a complaint of chest pain and shortness of breath patient has a history of obstructive sleep apnea history of morbid obesity, and PFO.   She was seen by pulmonologist and cardiologist.    Consults: Cardiology and Pulmonary/Critical Care    Significant Diagnostic Studies: labs:   Recent Results (from the past 24 hour(s))   GLUCOSE, POC    Collection Time: 09/16/21  8:21 PM   Result Value Ref Range    Glucose (POC) 305 (H) 65 - 117 mg/dL    Performed by Alex Tavarez    GLUCOSE, POC    Collection Time: 09/17/21  9:28 AM   Result Value Ref Range    Glucose (POC) 133 (H) 65 - 117 mg/dL    Performed by Kalani Workman    MAGNESIUM    Collection Time: 09/17/21 11:10 AM   Result Value Ref Range    Magnesium 2.3 1.6 - 2.4 mg/dL   PHOSPHORUS    Collection Time: 09/17/21 11:10 AM   Result Value Ref Range    Phosphorus 3.2 2.6 - 4.7 mg/dL   CBC W/O DIFF    Collection Time: 09/17/21 11:10 AM   Result Value Ref Range    WBC 13.8 (H) 4.1 - 11.1 K/uL    RBC 4.09 (L) 4.10 - 5.70 M/uL    HGB 13.6 12.1 - 17.0 g/dL    HCT 41.0 36.6 - 50.3 %    .2 (H) 80.0 - 99.0 FL    MCH 33.3 26.0 - 34.0 PG    MCHC 33.2 30.0 - 36.5 g/dL    RDW 14.0 11.5 - 14.5 %    PLATELET 180 281 - 389 K/uL    MPV 10.1 8.9 - 12.9 FL    NRBC 0.0 0.0  WBC    ABSOLUTE NRBC 0.00 0.00 - 1.32 K/uL   METABOLIC PANEL, COMPREHENSIVE    Collection Time: 09/17/21 11:10 AM   Result Value Ref Range    Sodium 139 136 - 145 mmol/L Potassium 3.6 3.5 - 5.1 mmol/L    Chloride 103 97 - 108 mmol/L    CO2 32 21 - 32 mmol/L    Anion gap 4 (L) 5 - 15 mmol/L    Glucose 133 (H) 65 - 100 mg/dL    BUN 26 (H) 6 - 20 mg/dL    Creatinine 1.13 0.70 - 1.30 mg/dL    BUN/Creatinine ratio 23 (H) 12 - 20      GFR est AA >60 >60 ml/min/1.73m2    GFR est non-AA >60 >60 ml/min/1.73m2    Calcium 8.8 8.5 - 10.1 mg/dL    Bilirubin, total 0.3 0.2 - 1.0 mg/dL    AST (SGOT) 25 15 - 37 U/L    ALT (SGPT) 74 12 - 78 U/L    Alk. phosphatase 59 45 - 117 U/L    Protein, total 7.6 6.4 - 8.2 g/dL    Albumin 3.3 (L) 3.5 - 5.0 g/dL    Globulin 4.3 (H) 2.0 - 4.0 g/dL    A-G Ratio 0.8 (L) 1.1 - 2.2     GLUCOSE, POC    Collection Time: 09/17/21 12:24 PM   Result Value Ref Range    Glucose (POC) 154 (H) 65 - 117 mg/dL    Performed by Ofelia Landaverde          DUPLEX LOWER EXT VENOUS BILAT   Final Result      CTA CHEST W OR W WO CONT   Final Result   No pulmonary embolism definitively identified. Other findings as   above. XR CHEST PORT   Final Result   Hydrostatic edema. Disposition: home    Discharge Medications:   Current Discharge Medication List      START taking these medications    Details   potassium chloride (K-DUR, KLOR-CON) 20 mEq tablet Take 2 Tablets by mouth daily. Qty: 30 Tablet, Refills: 0      azithromycin (ZITHROMAX) 500 mg tab Take 1 Tablet by mouth daily. Qty: 3 Tablet, Refills: 0      benzonatate (TESSALON) 200 mg capsule Take 1 Capsule by mouth three (3) times daily for 7 days. Qty: 21 Capsule, Refills: 0      guaiFENesin ER (MUCINEX) 600 mg ER tablet Take 1 Tablet by mouth every twelve (12) hours. Qty: 20 Tablet, Refills: 0      insulin glargine (LANTUS) 100 unit/mL injection 20 Units by SubCUTAneous route nightly. Qty: 10 mL, Refills: 0      predniSONE (DELTASONE) 20 mg tablet Take 40 mg by mouth daily (with breakfast).   Qty: 14 Tablet, Refills: 0      traMADoL (ULTRAM) 50 mg tablet Take 1 Tablet by mouth every six (6) hours as needed for Pain for up to 3 days. Max Daily Amount: 200 mg. Qty: 12 Tablet, Refills: 0    Associated Diagnoses: Pain         CONTINUE these medications which have CHANGED    Details   albuterol-ipratropium (DUO-NEB) 2.5 mg-0.5 mg/3 ml nebu 3 mL by Nebulization route every six (6) hours as needed for Wheezing. Qty: 30 Nebule, Refills: 0         CONTINUE these medications which have NOT CHANGED    Details   levothyroxine (SYNTHROID) 200 mcg tablet Take 1 Tablet by mouth Daily (before breakfast) for 30 days. Qty: 30 Tablet, Refills: 4    Comments: DC levothyroxine 175 mcg  Associated Diagnoses: Postoperative hypothyroidism      metFORMIN (GLUCOPHAGE) 500 mg tablet       ARIPiprazole (ABILIFY) 2 mg tablet Take 2 mg by mouth daily. cholecalciferol (VITAMIN D3) (2,000 UNITS /50 MCG) cap capsule       bumetanide (BUMEX) 2 mg tablet Take 1 Tablet by mouth daily. Qty: 30 Tablet, Refills: 0      gabapentin (NEURONTIN) 300 mg capsule Take 2 Capsules by mouth four (4) times daily as needed for Pain. Max Daily Amount: 2,400 mg. Qty: 12 Capsule, Refills: 0    Associated Diagnoses: Peripheral polyneuropathy      metoprolol succinate (TOPROL-XL) 50 mg XL tablet Take 1 Tablet by mouth daily. Indications: high blood pressure  Qty: 30 Tablet, Refills: 0      traZODone (DESYREL) 100 mg tablet Take 1 Tablet by mouth nightly. Qty: 30 Tablet, Refills: 0      zolpidem (AMBIEN) 5 mg tablet Take 1 Tablet by mouth nightly as needed for Sleep. Max Daily Amount: 5 mg. Qty: 12 Tablet, Refills: 0    Associated Diagnoses: Peripheral polyneuropathy      docusate sodium (COLACE) 100 mg capsule Take 1 Capsule by mouth two (2) times a day for 90 days. Qty: 60 Capsule, Refills: 2      topiramate (TOPAMAX) 50 mg tablet Take 1 Tablet by mouth two (2) times daily (with meals).   Qty: 60 Tablet, Refills: 0      pantoprazole (PROTONIX) 40 mg tablet TAKE ONE TABLET BY MOUTH TWICE DAILY      digoxin (LANOXIN) 0.125 mg tablet TAKE ONE TABLET BY MOUTH EVERY DAY      citalopram (CELEXA) 40 mg tablet TAKE ONE TABLET BY MOUTH EVERY DAY      atorvastatin (LIPITOR) 20 mg tablet 20 mg daily. aspirin 81 mg chewable tablet Take 81 mg by mouth daily.          STOP taking these medications       potassium chloride SR (KLOR-CON 10) 10 mEq tablet Comments:   Reason for Stopping:         nitroglycerin (NITROSTAT) 0.4 mg SL tablet Comments:   Reason for Stopping:         EC-Naproxen 500 mg EC tablet Comments:   Reason for Stopping:         fluticasone furoate-vilanteroL (BREO ELLIPTA) 200-25 mcg/dose inhaler Comments:   Reason for Stopping:               Activity: Activity as tolerated  Diet: Diabetic Diet  Wound Care: None needed    Follow-up Appointments   Procedures    FOLLOW UP VISIT Appointment in: 3 - 5 Days     Standing Status:   Standing     Number of Occurrences:   1     Order Specific Question:   Appointment in     Answer:   3 - 5 Days     35 minutes discharge time  Signed By: Adeline Jurado MD     September 17, 2021

## 2021-09-18 VITALS
HEIGHT: 76 IN | SYSTOLIC BLOOD PRESSURE: 136 MMHG | BODY MASS INDEX: 38.36 KG/M2 | WEIGHT: 315 LBS | HEART RATE: 76 BPM | DIASTOLIC BLOOD PRESSURE: 86 MMHG | TEMPERATURE: 98 F | OXYGEN SATURATION: 95 % | RESPIRATION RATE: 16 BRPM

## 2021-09-18 LAB
ALBUMIN SERPL-MCNC: 3 G/DL (ref 3.5–5)
ALBUMIN/GLOB SERPL: 0.8 {RATIO} (ref 1.1–2.2)
ALP SERPL-CCNC: 58 U/L (ref 45–117)
ALT SERPL-CCNC: 111 U/L (ref 12–78)
ANION GAP SERPL CALC-SCNC: 4 MMOL/L (ref 5–15)
AST SERPL W P-5'-P-CCNC: 75 U/L (ref 15–37)
BILIRUB SERPL-MCNC: 0.4 MG/DL (ref 0.2–1)
BUN SERPL-MCNC: 30 MG/DL (ref 6–20)
BUN/CREAT SERPL: 27 (ref 12–20)
CA-I BLD-MCNC: 8.2 MG/DL (ref 8.5–10.1)
CHLORIDE SERPL-SCNC: 105 MMOL/L (ref 97–108)
CO2 SERPL-SCNC: 31 MMOL/L (ref 21–32)
CREAT SERPL-MCNC: 1.13 MG/DL (ref 0.7–1.3)
ERYTHROCYTE [DISTWIDTH] IN BLOOD BY AUTOMATED COUNT: 14 % (ref 11.5–14.5)
GLOBULIN SER CALC-MCNC: 4 G/DL (ref 2–4)
GLUCOSE BLD STRIP.AUTO-MCNC: 222 MG/DL (ref 65–117)
GLUCOSE BLD STRIP.AUTO-MCNC: 245 MG/DL (ref 65–117)
GLUCOSE SERPL-MCNC: 96 MG/DL (ref 65–100)
HCT VFR BLD AUTO: 39.5 % (ref 36.6–50.3)
HGB BLD-MCNC: 13.1 G/DL (ref 12.1–17)
MCH RBC QN AUTO: 33.1 PG (ref 26–34)
MCHC RBC AUTO-ENTMCNC: 33.2 G/DL (ref 30–36.5)
MCV RBC AUTO: 99.7 FL (ref 80–99)
NRBC # BLD: 0 K/UL (ref 0–0.01)
NRBC BLD-RTO: 0 PER 100 WBC
PERFORMED BY, TECHID: ABNORMAL
PERFORMED BY, TECHID: ABNORMAL
PLATELET # BLD AUTO: 231 K/UL (ref 150–400)
PMV BLD AUTO: 9.8 FL (ref 8.9–12.9)
POTASSIUM SERPL-SCNC: 3.9 MMOL/L (ref 3.5–5.1)
PROT SERPL-MCNC: 7 G/DL (ref 6.4–8.2)
RBC # BLD AUTO: 3.96 M/UL (ref 4.1–5.7)
SODIUM SERPL-SCNC: 140 MMOL/L (ref 136–145)
WBC # BLD AUTO: 9.4 K/UL (ref 4.1–11.1)

## 2021-09-18 PROCEDURE — 74011250637 HC RX REV CODE- 250/637: Performed by: INTERNAL MEDICINE

## 2021-09-18 PROCEDURE — 85027 COMPLETE CBC AUTOMATED: CPT

## 2021-09-18 PROCEDURE — 94640 AIRWAY INHALATION TREATMENT: CPT

## 2021-09-18 PROCEDURE — 36415 COLL VENOUS BLD VENIPUNCTURE: CPT

## 2021-09-18 PROCEDURE — 74011636637 HC RX REV CODE- 636/637: Performed by: INTERNAL MEDICINE

## 2021-09-18 PROCEDURE — 82962 GLUCOSE BLOOD TEST: CPT

## 2021-09-18 PROCEDURE — 74011250636 HC RX REV CODE- 250/636: Performed by: INTERNAL MEDICINE

## 2021-09-18 PROCEDURE — 80053 COMPREHEN METABOLIC PANEL: CPT

## 2021-09-18 PROCEDURE — 74011000250 HC RX REV CODE- 250: Performed by: INTERNAL MEDICINE

## 2021-09-18 RX ADMIN — PROMETHAZINE HYDROCHLORIDE AND DEXTROMETHORPHAN HYDROBROMIDE 5 ML: 15; 6.25 SYRUP ORAL at 13:08

## 2021-09-18 RX ADMIN — PREDNISONE 40 MG: 20 TABLET ORAL at 10:26

## 2021-09-18 RX ADMIN — DIGOXIN 0.12 MG: 125 TABLET ORAL at 10:33

## 2021-09-18 RX ADMIN — DOCUSATE SODIUM 100 MG: 100 CAPSULE, LIQUID FILLED ORAL at 10:26

## 2021-09-18 RX ADMIN — Medication 2000 UNITS: at 10:27

## 2021-09-18 RX ADMIN — BUDESONIDE 500 MCG: 0.5 SUSPENSION RESPIRATORY (INHALATION) at 07:52

## 2021-09-18 RX ADMIN — Medication 10 ML: at 14:37

## 2021-09-18 RX ADMIN — IPRATROPIUM BROMIDE AND ALBUTEROL SULFATE 3 ML: .5; 2.5 SOLUTION RESPIRATORY (INHALATION) at 14:55

## 2021-09-18 RX ADMIN — ASPIRIN 81 MG CHEWABLE TABLET 81 MG: 81 TABLET CHEWABLE at 10:26

## 2021-09-18 RX ADMIN — LEVOTHYROXINE SODIUM 200 MCG: 0.1 TABLET ORAL at 10:26

## 2021-09-18 RX ADMIN — ATORVASTATIN CALCIUM 20 MG: 20 TABLET, FILM COATED ORAL at 10:26

## 2021-09-18 RX ADMIN — PROMETHAZINE HYDROCHLORIDE AND DEXTROMETHORPHAN HYDROBROMIDE 5 ML: 15; 6.25 SYRUP ORAL at 10:37

## 2021-09-18 RX ADMIN — METOPROLOL SUCCINATE 50 MG: 50 TABLET, EXTENDED RELEASE ORAL at 10:26

## 2021-09-18 RX ADMIN — POTASSIUM CHLORIDE 40 MEQ: 1500 TABLET, EXTENDED RELEASE ORAL at 10:26

## 2021-09-18 RX ADMIN — INSULIN LISPRO 6 UNITS: 100 INJECTION, SOLUTION INTRAVENOUS; SUBCUTANEOUS at 11:30

## 2021-09-18 RX ADMIN — GUAIFENESIN 600 MG: 600 TABLET, EXTENDED RELEASE ORAL at 10:27

## 2021-09-18 RX ADMIN — INSULIN LISPRO 6 UNITS: 100 INJECTION, SOLUTION INTRAVENOUS; SUBCUTANEOUS at 16:30

## 2021-09-18 RX ADMIN — BUMETANIDE 2 MG: 1 TABLET ORAL at 10:26

## 2021-09-18 RX ADMIN — TOPIRAMATE 50 MG: 25 TABLET, FILM COATED ORAL at 10:37

## 2021-09-18 RX ADMIN — PANTOPRAZOLE SODIUM 40 MG: 40 TABLET, DELAYED RELEASE ORAL at 10:26

## 2021-09-18 RX ADMIN — BENZONATATE 200 MG: 100 CAPSULE ORAL at 10:26

## 2021-09-18 RX ADMIN — ENOXAPARIN SODIUM 40 MG: 40 INJECTION SUBCUTANEOUS at 16:57

## 2021-09-18 RX ADMIN — METFORMIN HYDROCHLORIDE 500 MG: 500 TABLET ORAL at 10:26

## 2021-09-18 RX ADMIN — BENZONATATE 200 MG: 100 CAPSULE ORAL at 16:56

## 2021-09-18 RX ADMIN — CITALOPRAM HYDROBROMIDE 40 MG: 20 TABLET ORAL at 10:26

## 2021-09-18 RX ADMIN — IPRATROPIUM BROMIDE AND ALBUTEROL SULFATE 3 ML: .5; 2.5 SOLUTION RESPIRATORY (INHALATION) at 07:51

## 2021-09-18 RX ADMIN — TOPIRAMATE 50 MG: 25 TABLET, FILM COATED ORAL at 16:56

## 2021-09-18 RX ADMIN — ARIPIPRAZOLE 2 MG: 2 TABLET ORAL at 10:33

## 2021-09-18 NOTE — DISCHARGE SUMMARY
Discharge Summary     Patient: Vineet Walter MRN: [id number]  SSN: [id number]    YOB: 1975  Age: 54 y.o.   Sex: male       Admit Date: 9/13/2021    Discharge Date: 9/18/2021      Admission Diagnoses: Chest pain [R07.9]  COPD (chronic obstructive pulmonary disease) ([facility] [J44.9]    Discharge Diagnoses:   Problem List as of 9/18/2021 Date Reviewed: 8/26/2021        Codes Class Noted - Resolved    Hypoxemia ICD-10-CM: R09.02  ICD-9-CM: 799.02  8/18/2021 - Present        COPD exacerbation ([place] [address] ICD-10-CM: J44.1  ICD-9-CM: 491.21  8/18/2021 - Present        Syncope ICD-10-CM: R55  ICD-9-CM: 780.2  8/3/2021 - Present        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 (CVA) ([place] [address] ICD-10-CM: P06.448, R56.9  ICD-9-CM: 438.89, 780.39  8/3/2021 - Present        ACS (acute coronary syndrome) ([place] [address] ICD-10-CM: I24.9  ICD-9-CM: 411.1  7/13/2021 - Present        Hemoptysis ICD-10-CM: R04.2  ICD-9-CM: 786.30  7/13/2021 - Present        Chest pain ICD-10-CM: R07.9  ICD-9-CM: 786.50  6/26/2021 - Present        CVA (cerebral vascular accident) ([facility] ICD-10-CM: I63.9  ICD-9-CM: 434.91  5/6/2021 - Present        Postoperative hypothyroidism ICD-10-CM: E89.0  ICD-9-CM: 244.0  5/6/2021 - Present        Pharyngoesophageal dysphagia ICD-10-CM: R13.14  ICD-9-CM: 787.24  2/22/2021 - Present        Multinodular goiter ICD-10-CM: E04.2  ICD-9-CM: 241.1  12/30/2020 - Present        Coronary artery disease involving native coronary artery of native heart with angina pectoris with documented spasm ([facility] ICD-10-CM: I25.111  ICD-9-CM: 414.01, 413.9  12/17/2020 - Present        Cardiomyopathy ([facility] ICD-10-CM: I42.9  ICD-9-CM: 425.4  12/17/2020 - Present        COPD (chronic obstructive pulmonary disease) ([facility] ICD-10-CM: J44.9  ICD-9-CM: 496  12/17/2020 - Present        Pulmonary nodules ICD-10-CM: R91.8  ICD-9-CM: 793.19  12/17/2020 - Present        Obstructive sleep apnea ICD-10-CM: G47.33  ICD-9-CM: 327.23 12/17/2020 - Present        Type 2 diabetes mellitus with hyperglycemia, without long-term current use of insulin (HCC) ICD-10-CM: E11.65  ICD-9-CM: 250.00, 790.29  12/17/2020 - Present        Essential hypertension ICD-10-CM: I10  ICD-9-CM: 401.9  12/17/2020 - Present        Obesity, morbid ([address] ICD-10-CM: E66.01  ICD-9-CM: 278.01  9/7/2020 - Present        Carotid artery stenosis ICD-10-CM: I65.29  ICD-9-CM: 433.10  9/7/2020 - Present               Discharge Condition: Good    Hospital Course: 54years old patient came in with a complaint of chest pain and shortness of breath patient has a history of obstructive sleep apnea history of morbid obesity, and PFO.   She was seen by pulmonologist and cardiologist.Patient was unable to leave yesterday     Consults: Cardiology and Pulmonary/Critical Care    Significant Diagnostic Studies: labs:   Recent Results (from the past 24 hour(s))   GLUCOSE, POC    Collection Time: 09/17/21  4:58 PM   Result Value Ref Range    Glucose (POC) 165 (H) 65 - 117 mg/dL    Performed by Harika Mcrae    GLUCOSE, POC    Collection Time: 09/17/21  9:19 PM   Result Value Ref Range    Glucose (POC) 137 (H) 65 - 117 mg/dL    Performed by PERSON RAYTRINIQUE    CBC W/O DIFF    Collection Time: 09/18/21  6:00 AM   Result Value Ref Range    WBC 9.4 4.1 - 11.1 K/uL    RBC 3.96 (L) 4.10 - 5.70 M/uL    HGB 13.1 12.1 - 17.0 g/dL    HCT 39.5 36.6 - 50.3 %    MCV 99.7 (H) 80.0 - 99.0 FL    MCH 33.1 26.0 - 34.0 PG    MCHC 33.2 30.0 - 36.5 g/dL    RDW 14.0 11.5 - 14.5 %    PLATELET 459 597 - 805 K/uL    MPV 9.8 8.9 - 12.9 FL    NRBC 0.0 0.0  WBC    ABSOLUTE NRBC 0.00 0.00 - 3.75 K/uL   METABOLIC PANEL, COMPREHENSIVE    Collection Time: 09/18/21  6:00 AM   Result Value Ref Range    Sodium 140 136 - 145 mmol/L    Potassium 3.9 3.5 - 5.1 mmol/L    Chloride 105 97 - 108 mmol/L    CO2 31 21 - 32 mmol/L    Anion gap 4 (L) 5 - 15 mmol/L    Glucose 96 65 - 100 mg/dL    BUN 30 (H) 6 - 20 mg/dL Creatinine 1.13 0.70 - 1.30 mg/dL    BUN/Creatinine ratio 27 (H) 12 - 20      GFR est AA >60 >60 ml/min/1.73m2    GFR est non-AA >60 >60 ml/min/1.73m2    Calcium 8.2 (L) 8.5 - 10.1 mg/dL    Bilirubin, total 0.4 0.2 - 1.0 mg/dL    AST (SGOT) 75 (H) 15 - 37 U/L    ALT (SGPT) 111 (H) 12 - 78 U/L    Alk. phosphatase 58 45 - 117 U/L    Protein, total 7.0 6.4 - 8.2 g/dL    Albumin 3.0 (L) 3.5 - 5.0 g/dL    Globulin 4.0 2.0 - 4.0 g/dL    A-G Ratio 0.8 (L) 1.1 - 2.2     GLUCOSE, POC    Collection Time: 09/18/21 12:25 PM   Result Value Ref Range    Glucose (POC) 245 (H) 65 - 117 mg/dL    Performed by Bales Justice          DUPLEX LOWER EXT VENOUS BILAT   Final Result      CTA CHEST W OR W WO CONT   Final Result   No pulmonary embolism definitively identified. Other findings as   above. XR CHEST PORT   Final Result   Hydrostatic edema. Disposition: home    Discharge Medications:   Current Discharge Medication List      START taking these medications    Details   potassium chloride (K-DUR, KLOR-CON) 20 mEq tablet Take 2 Tablets by mouth daily. Qty: 30 Tablet, Refills: 0      azithromycin (ZITHROMAX) 500 mg tab Take 1 Tablet by mouth daily. Qty: 3 Tablet, Refills: 0      benzonatate (TESSALON) 200 mg capsule Take 1 Capsule by mouth three (3) times daily for 7 days. Qty: 21 Capsule, Refills: 0      guaiFENesin ER (MUCINEX) 600 mg ER tablet Take 1 Tablet by mouth every twelve (12) hours. Qty: 20 Tablet, Refills: 0      insulin glargine (LANTUS) 100 unit/mL injection 20 Units by SubCUTAneous route nightly. Qty: 10 mL, Refills: 0      predniSONE (DELTASONE) 20 mg tablet Take 40 mg by mouth daily (with breakfast). Qty: 14 Tablet, Refills: 0      traMADoL (ULTRAM) 50 mg tablet Take 1 Tablet by mouth every six (6) hours as needed for Pain for up to 3 days. Max Daily Amount: 200 mg.   Qty: 12 Tablet, Refills: 0    Associated Diagnoses: Pain         CONTINUE these medications which have CHANGED    Details albuterol-ipratropium (DUO-NEB) 2.5 mg-0.5 mg/3 ml nebu 3 mL by Nebulization route every six (6) hours as needed for Wheezing. Qty: 30 Nebule, Refills: 0         CONTINUE these medications which have NOT CHANGED    Details   levothyroxine (SYNTHROID) 200 mcg tablet Take 1 Tablet by mouth Daily (before breakfast) for 30 days. Qty: 30 Tablet, Refills: 4    Comments: DC levothyroxine 175 mcg  Associated Diagnoses: Postoperative hypothyroidism      metFORMIN (GLUCOPHAGE) 500 mg tablet       ARIPiprazole (ABILIFY) 2 mg tablet Take 2 mg by mouth daily. cholecalciferol (VITAMIN D3) (2,000 UNITS /50 MCG) cap capsule       bumetanide (BUMEX) 2 mg tablet Take 1 Tablet by mouth daily. Qty: 30 Tablet, Refills: 0      gabapentin (NEURONTIN) 300 mg capsule Take 2 Capsules by mouth four (4) times daily as needed for Pain. Max Daily Amount: 2,400 mg. Qty: 12 Capsule, Refills: 0    Associated Diagnoses: Peripheral polyneuropathy      metoprolol succinate (TOPROL-XL) 50 mg XL tablet Take 1 Tablet by mouth daily. Indications: high blood pressure  Qty: 30 Tablet, Refills: 0      traZODone (DESYREL) 100 mg tablet Take 1 Tablet by mouth nightly. Qty: 30 Tablet, Refills: 0      zolpidem (AMBIEN) 5 mg tablet Take 1 Tablet by mouth nightly as needed for Sleep. Max Daily Amount: 5 mg. Qty: 12 Tablet, Refills: 0    Associated Diagnoses: Peripheral polyneuropathy      docusate sodium (COLACE) 100 mg capsule Take 1 Capsule by mouth two (2) times a day for 90 days. Qty: 60 Capsule, Refills: 2      topiramate (TOPAMAX) 50 mg tablet Take 1 Tablet by mouth two (2) times daily (with meals). Qty: 60 Tablet, Refills: 0      pantoprazole (PROTONIX) 40 mg tablet TAKE ONE TABLET BY MOUTH TWICE DAILY      digoxin (LANOXIN) 0.125 mg tablet TAKE ONE TABLET BY MOUTH EVERY DAY      citalopram (CELEXA) 40 mg tablet TAKE ONE TABLET BY MOUTH EVERY DAY      atorvastatin (LIPITOR) 20 mg tablet 20 mg daily.       aspirin 81 mg chewable tablet Take 81 mg by mouth daily.          STOP taking these medications       potassium chloride SR (KLOR-CON 10) 10 mEq tablet Comments:   Reason for Stopping:         nitroglycerin (NITROSTAT) 0.4 mg SL tablet Comments:   Reason for Stopping:         EC-Naproxen 500 mg EC tablet Comments:   Reason for Stopping:         fluticasone furoate-vilanteroL (BREO ELLIPTA) 200-25 mcg/dose inhaler Comments:   Reason for Stopping:               Activity: Activity as tolerated  Diet: Diabetic Diet  Wound Care: None needed    Follow-up Appointments   Procedures    FOLLOW UP VISIT Appointment in: 3 - 5 Days     Standing Status:   Standing     Number of Occurrences:   1     Order Specific Question:   Appointment in     Answer:   3 - 5 Days     30 minutes discharge time  Signed By: Taco Jensen MD     September 18, 2021

## 2021-09-18 NOTE — PROGRESS NOTES
Pt stated that he did not feel comfortable discharging tonight because he will not have the proper housing for discharge,

## 2021-10-27 ENCOUNTER — TELEPHONE (OUTPATIENT)
Dept: ENDOCRINOLOGY | Age: 46
End: 2021-10-27

## 2021-10-27 NOTE — TELEPHONE ENCOUNTER
----- Message from Silviano Dunaway MD sent at 9/7/2021 11:41 AM EDT -----  Please inform patient that his labs are indicating we need to increase levothyroxine dose. So I am going to send prescription for levothyroxine 200 mcg daily. Please take it every day first thing in the morning on empty stomach, wait at least 30 minutes before he eats or drinks anything else or takes any other medication. Please remember to get labs done prior to next visit.

## 2021-11-26 DIAGNOSIS — E89.0 POSTOPERATIVE HYPOTHYROIDISM: ICD-10-CM

## 2022-01-22 ENCOUNTER — HOSPITAL ENCOUNTER (INPATIENT)
Age: 47
LOS: 2 days | Discharge: HOME | DRG: 198 | End: 2022-01-24
Attending: STUDENT IN AN ORGANIZED HEALTH CARE EDUCATION/TRAINING PROGRAM | Admitting: FAMILY MEDICINE
Payer: MEDICAID

## 2022-01-22 ENCOUNTER — APPOINTMENT (OUTPATIENT)
Dept: GENERAL RADIOLOGY | Age: 47
DRG: 198 | End: 2022-01-22
Attending: STUDENT IN AN ORGANIZED HEALTH CARE EDUCATION/TRAINING PROGRAM
Payer: MEDICAID

## 2022-01-22 DIAGNOSIS — I10 HYPERTENSION, UNSPECIFIED TYPE: ICD-10-CM

## 2022-01-22 DIAGNOSIS — E87.6 HYPOKALEMIA: ICD-10-CM

## 2022-01-22 DIAGNOSIS — J44.9 CHRONIC OBSTRUCTIVE PULMONARY DISEASE, UNSPECIFIED COPD TYPE (HCC): ICD-10-CM

## 2022-01-22 DIAGNOSIS — R07.9 CHEST PAIN, UNSPECIFIED TYPE: Primary | ICD-10-CM

## 2022-01-22 LAB
ALBUMIN SERPL-MCNC: 3.6 G/DL (ref 3.5–5)
ALBUMIN/GLOB SERPL: 0.7 {RATIO} (ref 1.1–2.2)
ALP SERPL-CCNC: 84 U/L (ref 45–117)
ALT SERPL-CCNC: 53 U/L (ref 12–78)
ANION GAP SERPL CALC-SCNC: 4 MMOL/L (ref 5–15)
APTT PPP: 26 SEC (ref 21.2–34.1)
AST SERPL W P-5'-P-CCNC: 31 U/L (ref 15–37)
ATRIAL RATE: 91 BPM
ATRIAL RATE: 95 BPM
ATRIAL RATE: 96 BPM
BASOPHILS # BLD: 0 K/UL (ref 0–0.1)
BASOPHILS # BLD: 0 K/UL (ref 0–0.1)
BASOPHILS NFR BLD: 0 % (ref 0–1)
BASOPHILS NFR BLD: 0 % (ref 0–1)
BILIRUB SERPL-MCNC: 0.5 MG/DL (ref 0.2–1)
BUN SERPL-MCNC: 19 MG/DL (ref 6–20)
BUN/CREAT SERPL: 17 (ref 12–20)
CA-I BLD-MCNC: 10.1 MG/DL (ref 8.5–10.1)
CALCULATED P AXIS, ECG09: 26 DEGREES
CALCULATED P AXIS, ECG09: 45 DEGREES
CALCULATED P AXIS, ECG09: 65 DEGREES
CALCULATED R AXIS, ECG10: 23 DEGREES
CALCULATED R AXIS, ECG10: 24 DEGREES
CALCULATED R AXIS, ECG10: 32 DEGREES
CALCULATED T AXIS, ECG11: -11 DEGREES
CALCULATED T AXIS, ECG11: -12 DEGREES
CALCULATED T AXIS, ECG11: 41 DEGREES
CHLORIDE SERPL-SCNC: 94 MMOL/L (ref 97–108)
CO2 SERPL-SCNC: 35 MMOL/L (ref 21–32)
COVID-19 RAPID TEST, COVR: NOT DETECTED
CREAT SERPL-MCNC: 1.12 MG/DL (ref 0.7–1.3)
DIAGNOSIS, 93000: NORMAL
DIFFERENTIAL METHOD BLD: ABNORMAL
DIFFERENTIAL METHOD BLD: NORMAL
EOSINOPHIL # BLD: 0.2 K/UL (ref 0–0.4)
EOSINOPHIL # BLD: 0.2 K/UL (ref 0–0.4)
EOSINOPHIL NFR BLD: 2 % (ref 0–7)
EOSINOPHIL NFR BLD: 3 % (ref 0–7)
ERYTHROCYTE [DISTWIDTH] IN BLOOD BY AUTOMATED COUNT: 13.2 % (ref 11.5–14.5)
ERYTHROCYTE [DISTWIDTH] IN BLOOD BY AUTOMATED COUNT: 13.2 % (ref 11.5–14.5)
GLOBULIN SER CALC-MCNC: 5.2 G/DL (ref 2–4)
GLUCOSE BLD STRIP.AUTO-MCNC: 164 MG/DL (ref 65–117)
GLUCOSE SERPL-MCNC: 107 MG/DL (ref 65–100)
HCT VFR BLD AUTO: 38 % (ref 36.6–50.3)
HCT VFR BLD AUTO: 40.1 % (ref 36.6–50.3)
HGB BLD-MCNC: 12.3 G/DL (ref 12.1–17)
HGB BLD-MCNC: 13.6 G/DL (ref 12.1–17)
IMM GRANULOCYTES # BLD AUTO: 0 K/UL (ref 0–0.04)
IMM GRANULOCYTES # BLD AUTO: 0 K/UL (ref 0–0.04)
IMM GRANULOCYTES NFR BLD AUTO: 0 % (ref 0–0.5)
IMM GRANULOCYTES NFR BLD AUTO: 0 % (ref 0–0.5)
LYMPHOCYTES # BLD: 2 K/UL (ref 0.8–3.5)
LYMPHOCYTES # BLD: 2.2 K/UL (ref 0.8–3.5)
LYMPHOCYTES NFR BLD: 26 % (ref 12–49)
LYMPHOCYTES NFR BLD: 26 % (ref 12–49)
MCH RBC QN AUTO: 31 PG (ref 26–34)
MCH RBC QN AUTO: 32.2 PG (ref 26–34)
MCHC RBC AUTO-ENTMCNC: 32.4 G/DL (ref 30–36.5)
MCHC RBC AUTO-ENTMCNC: 33.9 G/DL (ref 30–36.5)
MCV RBC AUTO: 94.8 FL (ref 80–99)
MCV RBC AUTO: 95.7 FL (ref 80–99)
MONOCYTES # BLD: 0.5 K/UL (ref 0–1)
MONOCYTES # BLD: 0.5 K/UL (ref 0–1)
MONOCYTES NFR BLD: 6 % (ref 5–13)
MONOCYTES NFR BLD: 7 % (ref 5–13)
NEUTS SEG # BLD: 4.8 K/UL (ref 1.8–8)
NEUTS SEG # BLD: 5.6 K/UL (ref 1.8–8)
NEUTS SEG NFR BLD: 64 % (ref 32–75)
NEUTS SEG NFR BLD: 66 % (ref 32–75)
NRBC # BLD: 0 K/UL (ref 0–0.01)
NRBC # BLD: 0 K/UL (ref 0–0.01)
NRBC BLD-RTO: 0 PER 100 WBC
NRBC BLD-RTO: 0 PER 100 WBC
P-R INTERVAL, ECG05: 164 MS
P-R INTERVAL, ECG05: 188 MS
P-R INTERVAL, ECG05: 190 MS
PERFORMED BY, TECHID: ABNORMAL
PLATELET # BLD AUTO: 229 K/UL (ref 150–400)
PLATELET # BLD AUTO: 256 K/UL (ref 150–400)
PMV BLD AUTO: 10.3 FL (ref 8.9–12.9)
PMV BLD AUTO: 9.9 FL (ref 8.9–12.9)
POTASSIUM SERPL-SCNC: 2.7 MMOL/L (ref 3.5–5.1)
PROT SERPL-MCNC: 8.8 G/DL (ref 6.4–8.2)
Q-T INTERVAL, ECG07: 414 MS
Q-T INTERVAL, ECG07: 418 MS
Q-T INTERVAL, ECG07: 432 MS
QRS DURATION, ECG06: 100 MS
QRS DURATION, ECG06: 104 MS
QRS DURATION, ECG06: 106 MS
QTC CALCULATION (BEZET), ECG08: 509 MS
QTC CALCULATION (BEZET), ECG08: 528 MS
QTC CALCULATION (BEZET), ECG08: 542 MS
RBC # BLD AUTO: 3.97 M/UL (ref 4.1–5.7)
RBC # BLD AUTO: 4.23 M/UL (ref 4.1–5.7)
SODIUM SERPL-SCNC: 133 MMOL/L (ref 136–145)
SPECIMEN SOURCE: NORMAL
THERAPEUTIC RANGE,PTTT: NORMAL SEC (ref 82–109)
TROPONIN-HIGH SENSITIVITY: 7 NG/L (ref 0–76)
TROPONIN-HIGH SENSITIVITY: 8 NG/L (ref 0–76)
TROPONIN-HIGH SENSITIVITY: 9 NG/L (ref 0–76)
VENTRICULAR RATE, ECG03: 91 BPM
VENTRICULAR RATE, ECG03: 95 BPM
VENTRICULAR RATE, ECG03: 96 BPM
WBC # BLD AUTO: 7.5 K/UL (ref 4.1–11.1)
WBC # BLD AUTO: 8.7 K/UL (ref 4.1–11.1)

## 2022-01-22 PROCEDURE — 96365 THER/PROPH/DIAG IV INF INIT: CPT

## 2022-01-22 PROCEDURE — 85025 COMPLETE CBC W/AUTO DIFF WBC: CPT

## 2022-01-22 PROCEDURE — 96368 THER/DIAG CONCURRENT INF: CPT

## 2022-01-22 PROCEDURE — 74011250636 HC RX REV CODE- 250/636

## 2022-01-22 PROCEDURE — 36415 COLL VENOUS BLD VENIPUNCTURE: CPT

## 2022-01-22 PROCEDURE — 74011000250 HC RX REV CODE- 250: Performed by: STUDENT IN AN ORGANIZED HEALTH CARE EDUCATION/TRAINING PROGRAM

## 2022-01-22 PROCEDURE — 74011250637 HC RX REV CODE- 250/637: Performed by: STUDENT IN AN ORGANIZED HEALTH CARE EDUCATION/TRAINING PROGRAM

## 2022-01-22 PROCEDURE — 74011250637 HC RX REV CODE- 250/637: Performed by: FAMILY MEDICINE

## 2022-01-22 PROCEDURE — 74011250636 HC RX REV CODE- 250/636: Performed by: FAMILY MEDICINE

## 2022-01-22 PROCEDURE — 74011636637 HC RX REV CODE- 636/637: Performed by: FAMILY MEDICINE

## 2022-01-22 PROCEDURE — 71045 X-RAY EXAM CHEST 1 VIEW: CPT

## 2022-01-22 PROCEDURE — 94761 N-INVAS EAR/PLS OXIMETRY MLT: CPT

## 2022-01-22 PROCEDURE — 96366 THER/PROPH/DIAG IV INF ADDON: CPT

## 2022-01-22 PROCEDURE — 84484 ASSAY OF TROPONIN QUANT: CPT

## 2022-01-22 PROCEDURE — 99285 EMERGENCY DEPT VISIT HI MDM: CPT

## 2022-01-22 PROCEDURE — 82962 GLUCOSE BLOOD TEST: CPT

## 2022-01-22 PROCEDURE — 80053 COMPREHEN METABOLIC PANEL: CPT

## 2022-01-22 PROCEDURE — 94640 AIRWAY INHALATION TREATMENT: CPT

## 2022-01-22 PROCEDURE — 96375 TX/PRO/DX INJ NEW DRUG ADDON: CPT

## 2022-01-22 PROCEDURE — 65270000029 HC RM PRIVATE

## 2022-01-22 PROCEDURE — 85730 THROMBOPLASTIN TIME PARTIAL: CPT

## 2022-01-22 PROCEDURE — 83036 HEMOGLOBIN GLYCOSYLATED A1C: CPT

## 2022-01-22 PROCEDURE — 96376 TX/PRO/DX INJ SAME DRUG ADON: CPT

## 2022-01-22 PROCEDURE — 74011250636 HC RX REV CODE- 250/636: Performed by: STUDENT IN AN ORGANIZED HEALTH CARE EDUCATION/TRAINING PROGRAM

## 2022-01-22 PROCEDURE — 93005 ELECTROCARDIOGRAM TRACING: CPT

## 2022-01-22 PROCEDURE — 87635 SARS-COV-2 COVID-19 AMP PRB: CPT

## 2022-01-22 RX ORDER — BUMETANIDE 1 MG/1
2 TABLET ORAL DAILY
Status: DISCONTINUED | OUTPATIENT
Start: 2022-01-23 | End: 2022-01-24 | Stop reason: HOSPADM

## 2022-01-22 RX ORDER — TRAZODONE HYDROCHLORIDE 50 MG/1
100 TABLET ORAL
Status: DISCONTINUED | OUTPATIENT
Start: 2022-01-22 | End: 2022-01-24 | Stop reason: HOSPADM

## 2022-01-22 RX ORDER — POLYETHYLENE GLYCOL 3350 17 G/17G
17 POWDER, FOR SOLUTION ORAL DAILY PRN
Status: DISCONTINUED | OUTPATIENT
Start: 2022-01-22 | End: 2022-01-24 | Stop reason: HOSPADM

## 2022-01-22 RX ORDER — DEXTROSE 50 % IN WATER (D50W) INTRAVENOUS SYRINGE
25-50 AS NEEDED
Status: DISCONTINUED | OUTPATIENT
Start: 2022-01-22 | End: 2022-01-24 | Stop reason: HOSPADM

## 2022-01-22 RX ORDER — ACETAMINOPHEN 325 MG/1
650 TABLET ORAL
Status: DISCONTINUED | OUTPATIENT
Start: 2022-01-22 | End: 2022-01-24 | Stop reason: HOSPADM

## 2022-01-22 RX ORDER — POTASSIUM CHLORIDE 7.45 MG/ML
10 INJECTION INTRAVENOUS ONCE
Status: COMPLETED | OUTPATIENT
Start: 2022-01-22 | End: 2022-01-22

## 2022-01-22 RX ORDER — MORPHINE SULFATE 4 MG/ML
4 INJECTION INTRAVENOUS ONCE
Status: COMPLETED | OUTPATIENT
Start: 2022-01-22 | End: 2022-01-22

## 2022-01-22 RX ORDER — GUAIFENESIN 100 MG/5ML
81 LIQUID (ML) ORAL DAILY
Status: DISCONTINUED | OUTPATIENT
Start: 2022-01-23 | End: 2022-01-24 | Stop reason: HOSPADM

## 2022-01-22 RX ORDER — IPRATROPIUM BROMIDE AND ALBUTEROL SULFATE 2.5; .5 MG/3ML; MG/3ML
3 SOLUTION RESPIRATORY (INHALATION)
Status: COMPLETED | OUTPATIENT
Start: 2022-01-22 | End: 2022-01-22

## 2022-01-22 RX ORDER — HEPARIN SODIUM 1000 [USP'U]/ML
4000 INJECTION, SOLUTION INTRAVENOUS; SUBCUTANEOUS ONCE
Status: COMPLETED | OUTPATIENT
Start: 2022-01-22 | End: 2022-01-22

## 2022-01-22 RX ORDER — DIGOXIN 125 MCG
0.12 TABLET ORAL DAILY
Status: DISCONTINUED | OUTPATIENT
Start: 2022-01-23 | End: 2022-01-24 | Stop reason: HOSPADM

## 2022-01-22 RX ORDER — HEPARIN SODIUM 10000 [USP'U]/100ML
12-25 INJECTION, SOLUTION INTRAVENOUS
Status: DISCONTINUED | OUTPATIENT
Start: 2022-01-22 | End: 2022-01-22

## 2022-01-22 RX ORDER — HEPARIN SODIUM 1000 [USP'U]/ML
4000 INJECTION, SOLUTION INTRAVENOUS; SUBCUTANEOUS AS NEEDED
Status: DISCONTINUED | OUTPATIENT
Start: 2022-01-22 | End: 2022-01-24 | Stop reason: HOSPADM

## 2022-01-22 RX ORDER — TOPIRAMATE 25 MG/1
50 TABLET ORAL 2 TIMES DAILY WITH MEALS
Status: DISCONTINUED | OUTPATIENT
Start: 2022-01-22 | End: 2022-01-24 | Stop reason: HOSPADM

## 2022-01-22 RX ORDER — ARIPIPRAZOLE 2 MG/1
2 TABLET ORAL DAILY
Status: DISCONTINUED | OUTPATIENT
Start: 2022-01-23 | End: 2022-01-24 | Stop reason: HOSPADM

## 2022-01-22 RX ORDER — HEPARIN SODIUM 1000 [USP'U]/ML
2000 INJECTION, SOLUTION INTRAVENOUS; SUBCUTANEOUS AS NEEDED
Status: DISCONTINUED | OUTPATIENT
Start: 2022-01-22 | End: 2022-01-24 | Stop reason: HOSPADM

## 2022-01-22 RX ORDER — IPRATROPIUM BROMIDE AND ALBUTEROL SULFATE 2.5; .5 MG/3ML; MG/3ML
3 SOLUTION RESPIRATORY (INHALATION)
Status: DISCONTINUED | OUTPATIENT
Start: 2022-01-22 | End: 2022-01-24 | Stop reason: HOSPADM

## 2022-01-22 RX ORDER — NITROGLYCERIN 20 MG/100ML
0-20 INJECTION INTRAVENOUS
Status: DISCONTINUED | OUTPATIENT
Start: 2022-01-22 | End: 2022-01-23

## 2022-01-22 RX ORDER — INSULIN GLARGINE 100 [IU]/ML
20 INJECTION, SOLUTION SUBCUTANEOUS
Status: DISCONTINUED | OUTPATIENT
Start: 2022-01-22 | End: 2022-01-24 | Stop reason: HOSPADM

## 2022-01-22 RX ORDER — MAGNESIUM SULFATE 100 %
4 CRYSTALS MISCELLANEOUS AS NEEDED
Status: DISCONTINUED | OUTPATIENT
Start: 2022-01-22 | End: 2022-01-24 | Stop reason: HOSPADM

## 2022-01-22 RX ORDER — ONDANSETRON 2 MG/ML
4 INJECTION INTRAMUSCULAR; INTRAVENOUS
Status: DISCONTINUED | OUTPATIENT
Start: 2022-01-22 | End: 2022-01-24 | Stop reason: HOSPADM

## 2022-01-22 RX ORDER — PANTOPRAZOLE SODIUM 40 MG/1
40 TABLET, DELAYED RELEASE ORAL 2 TIMES DAILY
Status: DISCONTINUED | OUTPATIENT
Start: 2022-01-22 | End: 2022-01-24 | Stop reason: HOSPADM

## 2022-01-22 RX ORDER — POTASSIUM CHLORIDE 20 MEQ/1
40 TABLET, EXTENDED RELEASE ORAL DAILY
Status: DISCONTINUED | OUTPATIENT
Start: 2022-01-23 | End: 2022-01-24 | Stop reason: HOSPADM

## 2022-01-22 RX ORDER — GABAPENTIN 300 MG/1
600 CAPSULE ORAL
Status: DISCONTINUED | OUTPATIENT
Start: 2022-01-22 | End: 2022-01-24 | Stop reason: HOSPADM

## 2022-01-22 RX ORDER — POTASSIUM CHLORIDE 7.45 MG/ML
INJECTION INTRAVENOUS
Status: COMPLETED
Start: 2022-01-22 | End: 2022-01-22

## 2022-01-22 RX ORDER — METFORMIN HYDROCHLORIDE 500 MG/1
500 TABLET ORAL 2 TIMES DAILY WITH MEALS
Status: DISCONTINUED | OUTPATIENT
Start: 2022-01-22 | End: 2022-01-24 | Stop reason: HOSPADM

## 2022-01-22 RX ORDER — POTASSIUM CHLORIDE 750 MG/1
40 TABLET, FILM COATED, EXTENDED RELEASE ORAL
Status: COMPLETED | OUTPATIENT
Start: 2022-01-22 | End: 2022-01-22

## 2022-01-22 RX ORDER — NITROGLYCERIN 0.4 MG/1
0.4 TABLET SUBLINGUAL
Status: COMPLETED | OUTPATIENT
Start: 2022-01-22 | End: 2022-01-22

## 2022-01-22 RX ORDER — ACETAMINOPHEN 650 MG/1
650 SUPPOSITORY RECTAL
Status: DISCONTINUED | OUTPATIENT
Start: 2022-01-22 | End: 2022-01-24 | Stop reason: HOSPADM

## 2022-01-22 RX ORDER — CITALOPRAM 20 MG/1
40 TABLET, FILM COATED ORAL DAILY
Status: DISCONTINUED | OUTPATIENT
Start: 2022-01-23 | End: 2022-01-24 | Stop reason: HOSPADM

## 2022-01-22 RX ORDER — ONDANSETRON 2 MG/ML
4 INJECTION INTRAMUSCULAR; INTRAVENOUS
Status: COMPLETED | OUTPATIENT
Start: 2022-01-22 | End: 2022-01-22

## 2022-01-22 RX ORDER — ATORVASTATIN CALCIUM 20 MG/1
20 TABLET, FILM COATED ORAL DAILY
Status: DISCONTINUED | OUTPATIENT
Start: 2022-01-23 | End: 2022-01-24 | Stop reason: HOSPADM

## 2022-01-22 RX ORDER — METOPROLOL SUCCINATE 25 MG/1
50 TABLET, EXTENDED RELEASE ORAL DAILY
Status: DISCONTINUED | OUTPATIENT
Start: 2022-01-23 | End: 2022-01-24 | Stop reason: HOSPADM

## 2022-01-22 RX ORDER — ONDANSETRON 4 MG/1
4 TABLET, ORALLY DISINTEGRATING ORAL
Status: DISCONTINUED | OUTPATIENT
Start: 2022-01-22 | End: 2022-01-24 | Stop reason: HOSPADM

## 2022-01-22 RX ORDER — INSULIN LISPRO 100 [IU]/ML
INJECTION, SOLUTION INTRAVENOUS; SUBCUTANEOUS
Status: DISCONTINUED | OUTPATIENT
Start: 2022-01-22 | End: 2022-01-24 | Stop reason: HOSPADM

## 2022-01-22 RX ADMIN — PANTOPRAZOLE SODIUM 40 MG: 40 TABLET, DELAYED RELEASE ORAL at 20:00

## 2022-01-22 RX ADMIN — ACETAMINOPHEN 650 MG: 325 TABLET, FILM COATED ORAL at 20:00

## 2022-01-22 RX ADMIN — POTASSIUM CHLORIDE 10 MEQ: 10 INJECTION, SOLUTION INTRAVENOUS at 14:37

## 2022-01-22 RX ADMIN — INSULIN GLARGINE 20 UNITS: 100 INJECTION, SOLUTION SUBCUTANEOUS at 21:09

## 2022-01-22 RX ADMIN — POTASSIUM CHLORIDE 10 MEQ: 7.45 INJECTION INTRAVENOUS at 14:37

## 2022-01-22 RX ADMIN — Medication 6 UNITS/KG/HR: at 18:18

## 2022-01-22 RX ADMIN — HEPARIN SODIUM 4000 UNITS: 1000 INJECTION, SOLUTION INTRAVENOUS; SUBCUTANEOUS at 18:18

## 2022-01-22 RX ADMIN — INSULIN LISPRO 3 UNITS: 100 INJECTION, SOLUTION INTRAVENOUS; SUBCUTANEOUS at 21:09

## 2022-01-22 RX ADMIN — POTASSIUM CHLORIDE 10 MEQ: 7.46 INJECTION, SOLUTION INTRAVENOUS at 14:37

## 2022-01-22 RX ADMIN — NITROGLYCERIN 10 MCG/MIN: 20 INJECTION INTRAVENOUS at 17:31

## 2022-01-22 RX ADMIN — NITROGLYCERIN 0.4 MG: 0.4 TABLET, ORALLY DISINTEGRATING SUBLINGUAL at 14:24

## 2022-01-22 RX ADMIN — NITROGLYCERIN 15 MCG/MIN: 20 INJECTION INTRAVENOUS at 17:46

## 2022-01-22 RX ADMIN — POTASSIUM CHLORIDE 40 MEQ: 750 TABLET, FILM COATED, EXTENDED RELEASE ORAL at 16:08

## 2022-01-22 RX ADMIN — TRAZODONE HYDROCHLORIDE 100 MG: 50 TABLET ORAL at 21:09

## 2022-01-22 RX ADMIN — IPRATROPIUM BROMIDE AND ALBUTEROL SULFATE 3 ML: .5; 2.5 SOLUTION RESPIRATORY (INHALATION) at 13:55

## 2022-01-22 RX ADMIN — METFORMIN HYDROCHLORIDE 500 MG: 500 TABLET ORAL at 21:09

## 2022-01-22 RX ADMIN — TOPIRAMATE 50 MG: 25 TABLET, FILM COATED ORAL at 20:00

## 2022-01-22 RX ADMIN — MORPHINE SULFATE 4 MG: 4 INJECTION INTRAVENOUS at 15:02

## 2022-01-22 RX ADMIN — ONDANSETRON 4 MG: 2 INJECTION INTRAMUSCULAR; INTRAVENOUS at 15:02

## 2022-01-22 RX ADMIN — GABAPENTIN 600 MG: 300 CAPSULE ORAL at 21:09

## 2022-01-22 RX ADMIN — POTASSIUM CHLORIDE 10 MEQ: 7.46 INJECTION, SOLUTION INTRAVENOUS at 19:28

## 2022-01-22 RX ADMIN — MORPHINE SULFATE 4 MG: 4 INJECTION INTRAVENOUS at 16:08

## 2022-01-22 NOTE — ED PROVIDER NOTES
EMERGENCY DEPARTMENT HISTORY AND PHYSICAL EXAM      Date: 1/22/2022  Patient Name: Jennifer Rodriguez      History of Presenting Illness     Chief Complaint   Patient presents with    Chest Pain       History Provided By: Patient    HPI: Jennifer Rodriguez, 51 y.o. male with PMH of HTN, COPD, CVA, and CAD with closure of PFO presents to the ED with acute onset of retrosternal chest pressure radiating to the left arm with no aggravating relieving factors associated with dizziness and shortness of breath. Patient is denying nausea, vomiting or diaphoresis. He reports this is similar to when he had his heart attack. At time, he did have a closure of his PFO. Denied history of stenting or bypass surgery. He is an ex-smoker and does not use any illicit drugs. There are no other complaints, changes, or physical findings at this time. PCP: Esther Sanders MD    Current Facility-Administered Medications   Medication Dose Route Frequency Provider Last Rate Last Admin    potassium chloride 10 mEq in 100 ml IVPB  10 mEq IntraVENous ONCE Sudheer Barrera MD        nitroglycerin (Tridil) 200 mcg/ml infusion  0-20 mcg/min IntraVENous TITRATE Sudheer Barrera MD        heparin (porcine) 1,000 unit/mL injection 4,000 Units  4,000 Units IntraVENous ONCE Sudheer Barrera MD        heparin 25,000 units in D5W 250 ml infusion  12-25 Units/kg/hr IntraVENous TITRATE Sudheer Barrera MD         Current Outpatient Medications   Medication Sig Dispense Refill    albuterol-ipratropium (DUO-NEB) 2.5 mg-0.5 mg/3 ml nebu 3 mL by Nebulization route every six (6) hours as needed for Wheezing. 30 Nebule 0    potassium chloride (K-DUR, KLOR-CON) 20 mEq tablet Take 2 Tablets by mouth daily. 30 Tablet 0    azithromycin (ZITHROMAX) 500 mg tab Take 1 Tablet by mouth daily. 3 Tablet 0    guaiFENesin ER (MUCINEX) 600 mg ER tablet Take 1 Tablet by mouth every twelve (12) hours.  20 Tablet 0    insulin glargine (LANTUS) 100 unit/mL injection 20 Units by SubCUTAneous route nightly. 10 mL 0    predniSONE (DELTASONE) 20 mg tablet Take 40 mg by mouth daily (with breakfast). 14 Tablet 0    metFORMIN (GLUCOPHAGE) 500 mg tablet       ARIPiprazole (ABILIFY) 2 mg tablet Take 2 mg by mouth daily.  cholecalciferol (VITAMIN D3) (2,000 UNITS /50 MCG) cap capsule       bumetanide (BUMEX) 2 mg tablet Take 1 Tablet by mouth daily. 30 Tablet 0    gabapentin (NEURONTIN) 300 mg capsule Take 2 Capsules by mouth four (4) times daily as needed for Pain. Max Daily Amount: 2,400 mg. 12 Capsule 0    metoprolol succinate (TOPROL-XL) 50 mg XL tablet Take 1 Tablet by mouth daily. Indications: high blood pressure 30 Tablet 0    traZODone (DESYREL) 100 mg tablet Take 1 Tablet by mouth nightly. 30 Tablet 0    zolpidem (AMBIEN) 5 mg tablet Take 1 Tablet by mouth nightly as needed for Sleep. Max Daily Amount: 5 mg. 12 Tablet 0    topiramate (TOPAMAX) 50 mg tablet Take 1 Tablet by mouth two (2) times daily (with meals). 60 Tablet 0    pantoprazole (PROTONIX) 40 mg tablet TAKE ONE TABLET BY MOUTH TWICE DAILY      digoxin (LANOXIN) 0.125 mg tablet TAKE ONE TABLET BY MOUTH EVERY DAY      citalopram (CELEXA) 40 mg tablet TAKE ONE TABLET BY MOUTH EVERY DAY      atorvastatin (LIPITOR) 20 mg tablet 20 mg daily.  aspirin 81 mg chewable tablet Take 81 mg by mouth daily.          Past History     Past Medical History:  Past Medical History:   Diagnosis Date    [facility]     COPD (chronic obstructive pulmonary disease) ([facility]     Depression     Hypertension     Ill-defined condition     Stroke ([facility]          Past Surgical History:  Past Surgical History:   Procedure Laterality Date    HX HERNIA REPAIR      HX ORTHOPAEDIC      rotator cuff surgery    HX OTHER SURGICAL      Patent Foramen Ovale Repair    HX THYROIDECTOMY      IR FINE NEEDLE ASPIRATION W IMAGE         Family History:  Family History   Problem Relation Age of Onset    Diabetes Maternal Uncle     Hypertension Maternal Uncle     Hypertension Paternal Uncle     Diabetes Paternal Uncle     Cancer Other    [facility] Cancer Mother     No Known Problems Father        Social History:  Social History     Tobacco Use    Smoking status: Former Smoker    Smokeless tobacco: Never Used   Vaping Use    Vaping Use: Never used   Substance Use Topics    Alcohol use: Yes     Comment: socially     Drug use: Not Currently       Allergies: Allergies   Allergen Reactions    Vancomycin Hives     Lyndsey syndrome         Review of Systems     Review of Systems   Constitutional: Negative for diaphoresis and fatigue. HENT: Negative for congestion and rhinorrhea. Eyes: Negative for discharge and redness. Respiratory: Positive for cough and shortness of breath. Negative for chest tightness. Cardiovascular: Positive for chest pain. Negative for palpitations. Gastrointestinal: Negative for abdominal pain and vomiting. Genitourinary: Negative for flank pain and hematuria. Musculoskeletal: Negative for back pain and gait problem. Skin: Negative for color change and pallor. Neurological: Positive for dizziness. Negative for seizures and headaches. Physical Exam     Physical Exam  Constitutional:       Appearance: Normal appearance. He is not ill-appearing. HENT:      Head: Normocephalic and atraumatic. Eyes:      Extraocular Movements: Extraocular movements intact. Conjunctiva/sclera: Conjunctivae normal.   Cardiovascular:      Rate and Rhythm: Normal rate and regular rhythm. Pulmonary:      Effort: Pulmonary effort is normal.      Breath sounds: Wheezing present. Abdominal:      General: Abdomen is flat. Palpations: Abdomen is soft. Tenderness: There is no abdominal tenderness. Musculoskeletal:         General: No tenderness or deformity. Cervical back: Normal range of motion and neck supple. Neurological:      General: No focal deficit present.       Mental Status: He is alert and oriented to person, place, and time. Lab and Diagnostic Study Results     Labs -     Recent Results (from the past 12 hour(s))   CBC WITH AUTOMATED DIFF    Collection Time: 01/22/22  1:47 PM   Result Value Ref Range    WBC 7.5 4.1 - 11.1 K/uL    RBC 4.23 4. 10 - 5.70 M/uL    HGB 13.6 12.1 - 17.0 g/dL    HCT 40.1 36.6 - 50.3 %    MCV 94.8 80.0 - 99.0 FL    MCH 32.2 26.0 - 34.0 PG    MCHC 33.9 30.0 - 36.5 g/dL    RDW 13.2 11.5 - 14.5 %    PLATELET 959 712 - 009 K/uL    MPV 9.9 8.9 - 12.9 FL    NRBC 0.0 0.0  WBC    ABSOLUTE NRBC 0.00 0.00 - 0.01 K/uL    NEUTROPHILS 64 32 - 75 %    LYMPHOCYTES 26 12 - 49 %    MONOCYTES 7 5 - 13 %    EOSINOPHILS 3 0 - 7 %    BASOPHILS 0 0 - 1 %    IMMATURE GRANULOCYTES 0 0 - 0.5 %    ABS. NEUTROPHILS 4.8 1.8 - 8.0 K/UL    ABS. LYMPHOCYTES 2.0 0.8 - 3.5 K/UL    ABS. MONOCYTES 0.5 0.0 - 1.0 K/UL    ABS. EOSINOPHILS 0.2 0.0 - 0.4 K/UL    ABS. BASOPHILS 0.0 0.0 - 0.1 K/UL    ABS. IMM. GRANS. 0.0 0.00 - 0.04 K/UL    DF AUTOMATED     METABOLIC PANEL, COMPREHENSIVE    Collection Time: 01/22/22  1:47 PM   Result Value Ref Range    Sodium 133 (L) 136 - 145 mmol/L    Potassium 2.7 (LL) 3.5 - 5.1 mmol/L    Chloride 94 (L) 97 - 108 mmol/L    CO2 35 (H) 21 - 32 mmol/L    Anion gap 4 (L) 5 - 15 mmol/L    Glucose 107 (H) 65 - 100 mg/dL    BUN 19 6 - 20 mg/dL    Creatinine 1.12 0.70 - 1.30 mg/dL    BUN/Creatinine ratio 17 12 - 20      GFR est AA >60 >60 ml/min/1.73m2    GFR est non-AA >60 >60 ml/min/1.73m2    Calcium 10.1 8.5 - 10.1 mg/dL    Bilirubin, total 0.5 0.2 - 1.0 mg/dL    AST (SGOT) 31 15 - 37 U/L    ALT (SGPT) 53 12 - 78 U/L    Alk.  phosphatase 84 45 - 117 U/L    Protein, total 8.8 (H) 6.4 - 8.2 g/dL    Albumin 3.6 3.5 - 5.0 g/dL    Globulin 5.2 (H) 2.0 - 4.0 g/dL    A-G Ratio 0.7 (L) 1.1 - 2.2     TROPONIN-HIGH SENSITIVITY    Collection Time: 01/22/22  1:47 PM   Result Value Ref Range    Troponin-High Sensitivity 9 0 - 76 ng/L       Radiologic Studies - [unfilled]  CT Results  (Last 48 hours)    None        CXR Results  (Last 48 hours)               01/22/22 1400  XR CHEST PORT Final result    Impression:  Cardiomegaly. Lungs clear. Narrative:  Exam: AP chest       Comparison: 9/23/2021. Number of views: 1       Findings: Stable cardiomegaly. The exam is negative for evidence of  pleural   disease. The lungs are clear. Medical Decision Making and ED Course   - I am the first and primary provider for this patient AND AM THE PRIMARY PROVIDER OF RECORD. - I reviewed the vital signs, available nursing notes, past medical history, past surgical history, family history and social history. - Initial assessment performed. The patients presenting problems have been discussed, and the staff are in agreement with the care plan formulated and outlined with them. I have encouraged them to ask questions as they arise throughout their visit. Vital Signs-Reviewed the patient's vital signs. Patient Vitals for the past 24 hrs:   Temp Pulse Resp BP SpO2   01/22/22 1639 -- 94 15 (!) 161/109 95 %   01/22/22 1558 -- 100 18 (!) 143/90 95 %   01/22/22 1424 -- 93 -- 128/84 --   01/22/22 1332 98 °F (36.7 °C) 92 18 115/79 97 %       Records Reviewed: Nursing Notes    Provider Notes (Medical Decision Making):     Patient is presenting with 2 issues. #Chest pressure with dizziness: History of coronary artery disease concerning for an ACS equivalent at this point. Patient did receive aspirin 324 PTA. Plan is to obtain EKG, CBC, chemistry, and troponin. We will get a repeat EKG and if there is any evolution, we will start the patient on heparin drip and will contact cardiology. #Wheezing: Patient does have history of COPD. Patient is not disclosing other symptoms that may suggest an infectious etiology. His wheezing is expiratory rather than inspiratory and thus more like to be reactive airway disease.   We will give the patient 1 round of DuoNeb while in the ED and reassess. ED Course:       ED Course as of 01/22/22 1649   Sat Jan 22, 2022   1450 Potassium(!!): 2.7  We will start oral and intravenous replacement [AA]   1641 Patient still has pain despite doses of nitroglycerin and 2 doses of morphine. Discussed with cardiology who recommended starting the patient on nitroglycerin drip and heparin drip given intractable pain. [AA]      ED Course User Index  [AA] Sudheer Barrera MD           Disposition     Disposition: Condition stable  Admitted to Tele the case was discussed with the admitting physician Dr. Amor Layton    Admitted      Diagnosis     Clinical Impression:   1. Chest pain, unspecified type    2. Chronic obstructive pulmonary disease, unspecified COPD type ([address]    3. Hypokalemia    4. Hypertension, unspecified type        Attestations: Duglas Gardner MD    Please note that this dictation was completed with [employer], the computer voice recognition software. Quite often unanticipated grammatical, syntax, homophones, and other interpretive errors are inadvertently transcribed by the computer software. Please disregard these errors. Please excuse any errors that have escaped final proofreading. Thank you.

## 2022-01-22 NOTE — ED TRIAGE NOTES
Pt arrives by EMS from work at [employer] with substernal CP that started about 45min ago, h/o COPD and CHF

## 2022-01-22 NOTE — Clinical Note
Status[de-identified] INPATIENT [101]   Type of Bed: Remote Telemetry [29]   Cardiac Monitoring Required?: No   Inpatient Hospitalization Certified Necessary for the Following Reasons: 3.  Patient receiving treatment that can only be provided in an inpatient setting (further clarification in H&P documentation)   Admitting Diagnosis: Chest pain [588305]   Admitting Physician: Jolene Reid [[id number]]   Attending Physician: Jolene Reid [[id number]]   Estimated Length of Stay: 2 Midnights   Discharge Plan[de-identified] Home with Office Follow-up

## 2022-01-23 LAB
ALBUMIN SERPL-MCNC: 2.9 G/DL (ref 3.5–5)
ALBUMIN/GLOB SERPL: 0.6 {RATIO} (ref 1.1–2.2)
ALP SERPL-CCNC: 70 U/L (ref 45–117)
ALT SERPL-CCNC: 48 U/L (ref 12–78)
ANION GAP SERPL CALC-SCNC: 4 MMOL/L (ref 5–15)
APTT PPP: 24.1 SEC (ref 21.2–34.1)
APTT PPP: 65.9 SEC (ref 21.2–34.1)
APTT PPP: >153 SEC (ref 21.2–34.1)
APTT PPP: >153 SEC (ref 21.2–34.1)
AST SERPL W P-5'-P-CCNC: 32 U/L (ref 15–37)
BASOPHILS # BLD: 0 K/UL (ref 0–0.1)
BASOPHILS NFR BLD: 0 % (ref 0–1)
BILIRUB SERPL-MCNC: 0.2 MG/DL (ref 0.2–1)
BUN SERPL-MCNC: 19 MG/DL (ref 6–20)
BUN/CREAT SERPL: 16 (ref 12–20)
CA-I BLD-MCNC: 9 MG/DL (ref 8.5–10.1)
CHLORIDE SERPL-SCNC: 96 MMOL/L (ref 97–108)
CO2 SERPL-SCNC: 36 MMOL/L (ref 21–32)
CREAT SERPL-MCNC: 1.16 MG/DL (ref 0.7–1.3)
DIFFERENTIAL METHOD BLD: ABNORMAL
EOSINOPHIL # BLD: 0.2 K/UL (ref 0–0.4)
EOSINOPHIL NFR BLD: 4 % (ref 0–7)
ERYTHROCYTE [DISTWIDTH] IN BLOOD BY AUTOMATED COUNT: 13.1 % (ref 11.5–14.5)
EST. AVERAGE GLUCOSE BLD GHB EST-MCNC: 140 MG/DL
GLOBULIN SER CALC-MCNC: 4.6 G/DL (ref 2–4)
GLUCOSE BLD STRIP.AUTO-MCNC: 148 MG/DL (ref 65–117)
GLUCOSE BLD STRIP.AUTO-MCNC: 153 MG/DL (ref 65–117)
GLUCOSE BLD STRIP.AUTO-MCNC: 156 MG/DL (ref 65–117)
GLUCOSE BLD STRIP.AUTO-MCNC: 156 MG/DL (ref 65–117)
GLUCOSE SERPL-MCNC: 125 MG/DL (ref 65–100)
HBA1C MFR BLD: 6.5 % (ref 4–5.6)
HCT VFR BLD AUTO: 37 % (ref 36.6–50.3)
HGB BLD-MCNC: 12.1 G/DL (ref 12.1–17)
IMM GRANULOCYTES # BLD AUTO: 0 K/UL (ref 0–0.04)
IMM GRANULOCYTES NFR BLD AUTO: 0 % (ref 0–0.5)
LYMPHOCYTES # BLD: 1.9 K/UL (ref 0.8–3.5)
LYMPHOCYTES NFR BLD: 32 % (ref 12–49)
MCH RBC QN AUTO: 31.8 PG (ref 26–34)
MCHC RBC AUTO-ENTMCNC: 32.7 G/DL (ref 30–36.5)
MCV RBC AUTO: 97.4 FL (ref 80–99)
MONOCYTES # BLD: 0.4 K/UL (ref 0–1)
MONOCYTES NFR BLD: 7 % (ref 5–13)
NEUTS SEG # BLD: 3.5 K/UL (ref 1.8–8)
NEUTS SEG NFR BLD: 57 % (ref 32–75)
NRBC # BLD: 0 K/UL (ref 0–0.01)
NRBC BLD-RTO: 0 PER 100 WBC
PERFORMED BY, TECHID: ABNORMAL
PLATELET # BLD AUTO: 201 K/UL (ref 150–400)
PMV BLD AUTO: 10 FL (ref 8.9–12.9)
POTASSIUM SERPL-SCNC: 2.8 MMOL/L (ref 3.5–5.1)
PROT SERPL-MCNC: 7.5 G/DL (ref 6.4–8.2)
RBC # BLD AUTO: 3.8 M/UL (ref 4.1–5.7)
SODIUM SERPL-SCNC: 136 MMOL/L (ref 136–145)
THERAPEUTIC RANGE,PTTT: ABNORMAL SEC (ref 82–109)
THERAPEUTIC RANGE,PTTT: NORMAL SEC (ref 82–109)
WBC # BLD AUTO: 6.1 K/UL (ref 4.1–11.1)

## 2022-01-23 PROCEDURE — 65270000029 HC RM PRIVATE

## 2022-01-23 PROCEDURE — 65610000006 HC RM INTENSIVE CARE

## 2022-01-23 PROCEDURE — 85025 COMPLETE CBC W/AUTO DIFF WBC: CPT

## 2022-01-23 PROCEDURE — 74011250636 HC RX REV CODE- 250/636: Performed by: FAMILY MEDICINE

## 2022-01-23 PROCEDURE — 82962 GLUCOSE BLOOD TEST: CPT

## 2022-01-23 PROCEDURE — 85730 THROMBOPLASTIN TIME PARTIAL: CPT

## 2022-01-23 PROCEDURE — 77010033678 HC OXYGEN DAILY

## 2022-01-23 PROCEDURE — 36415 COLL VENOUS BLD VENIPUNCTURE: CPT

## 2022-01-23 PROCEDURE — 74011250637 HC RX REV CODE- 250/637: Performed by: INTERNAL MEDICINE

## 2022-01-23 PROCEDURE — 74011636637 HC RX REV CODE- 636/637: Performed by: FAMILY MEDICINE

## 2022-01-23 PROCEDURE — 74011250637 HC RX REV CODE- 250/637: Performed by: FAMILY MEDICINE

## 2022-01-23 PROCEDURE — 80053 COMPREHEN METABOLIC PANEL: CPT

## 2022-01-23 RX ORDER — POTASSIUM CHLORIDE 750 MG/1
40 TABLET, FILM COATED, EXTENDED RELEASE ORAL
Status: COMPLETED | OUTPATIENT
Start: 2022-01-23 | End: 2022-01-23

## 2022-01-23 RX ORDER — POTASSIUM CHLORIDE 20 MEQ/1
40 TABLET, EXTENDED RELEASE ORAL
Status: COMPLETED | OUTPATIENT
Start: 2022-01-23 | End: 2022-01-23

## 2022-01-23 RX ADMIN — INSULIN LISPRO 3 UNITS: 100 INJECTION, SOLUTION INTRAVENOUS; SUBCUTANEOUS at 11:59

## 2022-01-23 RX ADMIN — ASPIRIN 81 MG CHEWABLE TABLET 81 MG: 81 TABLET CHEWABLE at 08:01

## 2022-01-23 RX ADMIN — PANTOPRAZOLE SODIUM 40 MG: 40 TABLET, DELAYED RELEASE ORAL at 20:31

## 2022-01-23 RX ADMIN — INSULIN LISPRO 3 UNITS: 100 INJECTION, SOLUTION INTRAVENOUS; SUBCUTANEOUS at 07:30

## 2022-01-23 RX ADMIN — INSULIN GLARGINE 20 UNITS: 100 INJECTION, SOLUTION SUBCUTANEOUS at 21:31

## 2022-01-23 RX ADMIN — TRAZODONE HYDROCHLORIDE 100 MG: 50 TABLET ORAL at 21:31

## 2022-01-23 RX ADMIN — METOPROLOL SUCCINATE 50 MG: 25 TABLET, EXTENDED RELEASE ORAL at 08:02

## 2022-01-23 RX ADMIN — BUMETANIDE 2 MG: 1 TABLET ORAL at 08:56

## 2022-01-23 RX ADMIN — Medication 5 UNITS/KG/HR: at 05:20

## 2022-01-23 RX ADMIN — CITALOPRAM HYDROBROMIDE 40 MG: 20 TABLET ORAL at 08:56

## 2022-01-23 RX ADMIN — DIGOXIN 0.12 MG: 125 TABLET ORAL at 13:37

## 2022-01-23 RX ADMIN — POTASSIUM CHLORIDE 40 MEQ: 1500 TABLET, EXTENDED RELEASE ORAL at 08:02

## 2022-01-23 RX ADMIN — ARIPIPRAZOLE 2 MG: 2 TABLET ORAL at 08:56

## 2022-01-23 RX ADMIN — INSULIN LISPRO 3 UNITS: 100 INJECTION, SOLUTION INTRAVENOUS; SUBCUTANEOUS at 16:30

## 2022-01-23 RX ADMIN — TOPIRAMATE 50 MG: 25 TABLET, FILM COATED ORAL at 17:15

## 2022-01-23 RX ADMIN — POTASSIUM CHLORIDE 40 MEQ: 750 TABLET, FILM COATED, EXTENDED RELEASE ORAL at 11:00

## 2022-01-23 RX ADMIN — METFORMIN HYDROCHLORIDE 500 MG: 500 TABLET ORAL at 08:01

## 2022-01-23 RX ADMIN — POTASSIUM CHLORIDE 40 MEQ: 1500 TABLET, EXTENDED RELEASE ORAL at 17:15

## 2022-01-23 RX ADMIN — TOPIRAMATE 50 MG: 25 TABLET, FILM COATED ORAL at 08:02

## 2022-01-23 RX ADMIN — HEPARIN SODIUM 2000 UNITS: 1000 INJECTION, SOLUTION INTRAVENOUS; SUBCUTANEOUS at 05:21

## 2022-01-23 RX ADMIN — ACETAMINOPHEN 650 MG: 325 TABLET, FILM COATED ORAL at 08:56

## 2022-01-23 RX ADMIN — ACETAMINOPHEN 650 MG: 325 TABLET, FILM COATED ORAL at 15:50

## 2022-01-23 RX ADMIN — ATORVASTATIN CALCIUM 20 MG: 20 TABLET, FILM COATED ORAL at 08:02

## 2022-01-23 RX ADMIN — PANTOPRAZOLE SODIUM 40 MG: 40 TABLET, DELAYED RELEASE ORAL at 08:02

## 2022-01-23 RX ADMIN — METFORMIN HYDROCHLORIDE 500 MG: 500 TABLET ORAL at 17:15

## 2022-01-23 NOTE — H&P
History and Physical    NAME: Elton Perez   :  1975   MRN:  [id number]     Date/Time:  2022 10:03 AM    Patient PCP: Gris Evans MD  ______________________________________________________________________             Subjective:     CHIEF COMPLAINT:     Chest pain    HISTORY OF PRESENT ILLNESS:       Patient is a 51y.o. year old male morbidly obese history of diabetes hypertension hyperlipidemia depression history of COPD CVA multinodular goiter cardiomyopathy carotid artery stenosis  Came to emergency room complaining of chest pain chest pain 10 out of 10/retrosternal radiates over the left no nausea no vomiting came to emergency room seen by the ER physician patient had a recent closure of the PFO    Seen by the ER physician patient was started on heparin drip and nitro drip admit to ICU for further work-up    Initial cardiac enzymes are negative    Past Medical History:   Diagnosis Date    Acute MI ([address]     [facility]     Depression     Hypertension     Ill-defined condition     Stroke ([facility]         Past Surgical History:   Procedure Laterality Date    HX HERNIA REPAIR      HX ORTHOPAEDIC      rotator cuff surgery    HX OTHER SURGICAL      Patent Foramen Ovale Repair    HX THYROIDECTOMY      IR FINE NEEDLE ASPIRATION W IMAGE         Social History     Tobacco Use    Smoking status: Former Smoker    Smokeless tobacco: Never Used   Substance Use Topics    Alcohol use: Yes     Comment: socially         Family History   Problem Relation Age of Onset    Diabetes Maternal Uncle     Hypertension Maternal Uncle     Hypertension Paternal Uncle     Diabetes Paternal Uncle     Cancer Other     Cancer Mother     No Known Problems Father        Allergies   Allergen Reactions    Vancomycin Hives     Lyndsey syndrome        Prior to Admission medications    Medication Sig Start Date End Date Taking?  Authorizing Provider albuterol-ipratropium (DUO-NEB) 2.5 mg-0.5 mg/3 ml nebu 3 mL by Nebulization route every six (6) hours as needed for Wheezing. 9/17/21   Afshan BEGUM MD   potassium chloride (K-DUR, KLOR-CON) 20 mEq tablet Take 2 Tablets by mouth daily. 9/18/21   Afshan BEGUM MD   azithromycin (ZITHROMAX) 500 mg tab Take 1 Tablet by mouth daily. 9/17/21   Afshan BEGUM MD   guaiFENesin ER (MUCINEX) 600 mg ER tablet Take 1 Tablet by mouth every twelve (12) hours. 9/17/21   Anthony Ewing MD   insulin glargine (LANTUS) 100 unit/mL injection 20 Units by SubCUTAneous route nightly. 9/17/21   Afshan BEGUM MD   predniSONE (DELTASONE) 20 mg tablet Take 40 mg by mouth daily (with breakfast). 9/18/21   Afshan BEGUM MD   metFORMIN (GLUCOPHAGE) 500 mg tablet  3/1/21   Provider, Historical   ARIPiprazole (ABILIFY) 2 mg tablet Take 2 mg by mouth daily. 7/23/21   Provider, Historical   cholecalciferol (VITAMIN D3) (2,000 UNITS /50 MCG) cap capsule  4/5/21   Provider, Historical   bumetanide (BUMEX) 2 mg tablet Take 1 Tablet by mouth daily. 8/20/21   Anthony Ewing MD   gabapentin (NEURONTIN) 300 mg capsule Take 2 Capsules by mouth four (4) times daily as needed for Pain. Max Daily Amount: 2,400 mg. 8/20/21   Afshan BEGUM MD   metoprolol succinate (TOPROL-XL) 50 mg XL tablet Take 1 Tablet by mouth daily. Indications: high blood pressure 8/21/21   Afshan BEGUM MD   traZODone (DESYREL) 100 mg tablet Take 1 Tablet by mouth nightly. 8/20/21   Anthony Ewing MD   zolpidem (AMBIEN) 5 mg tablet Take 1 Tablet by mouth nightly as needed for Sleep. Max Daily Amount: 5 mg. 8/20/21   Afshan BEGUM MD   topiramate (TOPAMAX) 50 mg tablet Take 1 Tablet by mouth two (2) times daily (with meals).  8/3/21   Anthony Ewing MD   pantoprazole (PROTONIX) 40 mg tablet TAKE ONE TABLET BY MOUTH TWICE DAILY 3/23/21   Provider, Jaiden   digoxin (LANOXIN) 0.125 mg tablet TAKE ONE TABLET BY MOUTH EVERY DAY 11/4/20 Provider, Historical   citalopram (CELEXA) 40 mg tablet TAKE ONE TABLET BY MOUTH EVERY DAY 12/8/20   Provider, Historical   atorvastatin (LIPITOR) 20 mg tablet 20 mg daily. 8/3/20   Provider, Historical   aspirin 81 mg chewable tablet Take 81 mg by mouth daily.     Other, MD Elizabeth         Current Facility-Administered Medications:     potassium chloride SR (KLOR-CON 10) tablet 40 mEq, 40 mEq, Oral, NOW, Aditi Lugo MD    nitroglycerin (Tridil) 200 mcg/ml infusion, 0-20 mcg/min, IntraVENous, TITRATE, Lisa Lugo MD, Last Rate: 4.5 mL/hr at 01/23/22 1000, 15 mcg/min at 01/23/22 1000    heparin 25,000 units in  ml infusion, 6-25 Units/kg/hr, IntraVENous, TITRATE, Lisa Lugo MD, Last Rate: 7.6 mL/hr at 01/23/22 0520, 5 Units/kg/hr at 01/23/22 0520    heparin (porcine) 1,000 unit/mL injection 4,000 Units, 4,000 Units, IntraVENous, PRN **OR** heparin (porcine) 1,000 unit/mL injection 2,000 Units, 2,000 Units, IntraVENous, PRN, Lisa Lugo MD, 2,000 Units at 01/23/22 0521    albuterol-ipratropium (DUO-NEB) 2.5 MG-0.5 MG/3 ML, 3 mL, Nebulization, Q6H PRN, Lisa Lugo MD    bumetanide (BUMEX) tablet 2 mg, 2 mg, Oral, DAILY, Lisa Lugo MD, 2 mg at 01/23/22 0856    gabapentin (NEURONTIN) capsule 600 mg, 600 mg, Oral, QID PRN, Lisa Lugo MD, 600 mg at 01/22/22 2109    insulin glargine (LANTUS) injection 20 Units, 20 Units, SubCUTAneous, QHS, Lisa Lugo MD, 20 Units at 01/22/22 2109    metoprolol succinate (TOPROL-XL) XL tablet 50 mg, 50 mg, Oral, DAILY, Lisa Lugo MD, 50 mg at 01/23/22 0802    potassium chloride (K-DUR, KLOR-CON M20) SR tablet 40 mEq, 40 mEq, Oral, DAILY, Lisa Lugo MD, 40 mEq at 01/23/22 0802    topiramate (TOPAMAX) tablet 50 mg, 50 mg, Oral, BID WITH MEALS, Lisa Lugo MD, 50 mg at 01/23/22 0802    traZODone (DESYREL) tablet 100 mg, 100 mg, Oral, QHS, Lisa Lugo MD, 100 mg at 01/22/22 2109    ARIPiprazole (ABILIFY) tablet 2 mg, 2 mg, Oral, DAILY, Jordon Benjamin MD, 2 mg at 01/23/22 4599    aspirin chewable tablet 81 mg, 81 mg, Oral, DAILY, Lisa Lugo MD, 81 mg at 01/23/22 0801    atorvastatin (LIPITOR) tablet 20 mg, 20 mg, Oral, DAILY, Lisa Lugo MD, 20 mg at 01/23/22 0802    citalopram (CELEXA) tablet 40 mg, 40 mg, Oral, DAILY, Lisa Lugo MD, 40 mg at 01/23/22 0856    digoxin (LANOXIN) tablet 0.125 mg, 0.125 mg, Oral, DAILY, Vee Lugo MD    metFORMIN (GLUCOPHAGE) tablet 500 mg, 500 mg, Oral, BID WITH MEALS, Vee Lugo MD, 500 mg at 01/23/22 0801    pantoprazole (PROTONIX) tablet 40 mg, 40 mg, Oral, BID, Lisa Lugo MD, 40 mg at 01/23/22 0802    insulin lispro (HUMALOG) injection, , SubCUTAneous, AC&HS, Lisa Lugo MD, 3 Units at 01/23/22 0730    glucose chewable tablet 16 g, 4 Tablet, Oral, PRN, Vee Lugo MD    dextrose (D50W) injection syrg 12.5-25 g, 25-50 mL, IntraVENous, PRN, Vee Lugo MD    glucagon (GLUCAGEN) injection 1 mg, 1 mg, IntraMUSCular, PRN, Vee Lugo MD    acetaminophen (TYLENOL) tablet 650 mg, 650 mg, Oral, Q6H PRN, 650 mg at 01/23/22 0856 **OR** acetaminophen (TYLENOL) suppository 650 mg, 650 mg, Rectal, Q6H PRN, Vee Lugo MD    polyethylene glycol (MIRALAX) packet 17 g, 17 g, Oral, DAILY PRN, Vee Lugo MD    ondansetron (ZOFRAN ODT) tablet 4 mg, 4 mg, Oral, Q8H PRN **OR** ondansetron (ZOFRAN) injection 4 mg, 4 mg, IntraVENous, Q6H PRN, Lisa Lguo MD    LAB DATA REVIEWED:    Recent Results (from the past 24 hour(s))   EKG, 12 LEAD, INITIAL    Collection Time: 01/22/22  1:41 PM   Result Value Ref Range    Ventricular Rate 95 BPM    Atrial Rate 95 BPM    P-R Interval 164 ms    QRS Duration 100 ms    Q-T Interval 432 ms    QTC Calculation (Bezet) 542 ms    Calculated P Axis 26 degrees    Calculated R Axis 23 degrees    Calculated T Axis -12 degrees    Diagnosis       Normal sinus rhythm  Voltage criteria for left ventricular hypertrophy  Possible Inferior infarct (cited on or before 15-SEP-2021)  Prolonged QT  Abnormal ECG  When compared with ECG of 15-SEP-2021 15:50,  T wave amplitude has decreased in Anterior leads    Confirmed by Sendy Guzman MD, Vinod Polanco ([id number]) on 1/22/2022 10:23:32 PM     CBC WITH AUTOMATED DIFF    Collection Time: 01/22/22  1:47 PM   Result Value Ref Range    WBC 7.5 4.1 - 11.1 K/uL    RBC 4.23 4. 10 - 5.70 M/uL    HGB 13.6 12.1 - 17.0 g/dL    HCT 40.1 36.6 - 50.3 %    MCV 94.8 80.0 - 99.0 FL    MCH 32.2 26.0 - 34.0 PG    MCHC 33.9 30.0 - 36.5 g/dL    RDW 13.2 11.5 - 14.5 %    PLATELET 084 554 - 854 K/uL    MPV 9.9 8.9 - 12.9 FL    NRBC 0.0 0.0  WBC    ABSOLUTE NRBC 0.00 0.00 - 0.01 K/uL    NEUTROPHILS 64 32 - 75 %    LYMPHOCYTES 26 12 - 49 %    MONOCYTES 7 5 - 13 %    EOSINOPHILS 3 0 - 7 %    BASOPHILS 0 0 - 1 %    IMMATURE GRANULOCYTES 0 0 - 0.5 %    ABS. NEUTROPHILS 4.8 1.8 - 8.0 K/UL    ABS. LYMPHOCYTES 2.0 0.8 - 3.5 K/UL    ABS. MONOCYTES 0.5 0.0 - 1.0 K/UL    ABS. EOSINOPHILS 0.2 0.0 - 0.4 K/UL    ABS. BASOPHILS 0.0 0.0 - 0.1 K/UL    ABS. IMM. GRANS. 0.0 0.00 - 0.04 K/UL    DF AUTOMATED     METABOLIC PANEL, COMPREHENSIVE    Collection Time: 01/22/22  1:47 PM   Result Value Ref Range    Sodium 133 (L) 136 - 145 mmol/L    Potassium 2.7 (LL) 3.5 - 5.1 mmol/L    Chloride 94 (L) 97 - 108 mmol/L    CO2 35 (H) 21 - 32 mmol/L    Anion gap 4 (L) 5 - 15 mmol/L    Glucose 107 (H) 65 - 100 mg/dL    BUN 19 6 - 20 mg/dL    Creatinine 1.12 0.70 - 1.30 mg/dL    BUN/Creatinine ratio 17 12 - 20      GFR est AA >60 >60 ml/min/1.73m2    GFR est non-AA >60 >60 ml/min/1.73m2    Calcium 10.1 8.5 - 10.1 mg/dL    Bilirubin, total 0.5 0.2 - 1.0 mg/dL    AST (SGOT) 31 15 - 37 U/L    ALT (SGPT) 53 12 - 78 U/L    Alk.  phosphatase 84 45 - 117 U/L    Protein, total 8.8 (H) 6.4 - 8.2 g/dL    Albumin 3.6 3.5 - 5.0 g/dL    Globulin 5.2 (H) 2.0 - 4.0 g/dL    A-G Ratio 0.7 (L) 1.1 - 2.2 TROPONIN-HIGH SENSITIVITY    Collection Time: 01/22/22  1:47 PM   Result Value Ref Range    Troponin-High Sensitivity 9 0 - 76 ng/L   EKG, 12 LEAD, INITIAL    Collection Time: 01/22/22  2:09 PM   Result Value Ref Range    Ventricular Rate 91 BPM    Atrial Rate 91 BPM    P-R Interval 188 ms    QRS Duration 106 ms    Q-T Interval 414 ms    QTC Calculation (Bezet) 509 ms    Calculated P Axis 45 degrees    Calculated R Axis 32 degrees    Calculated T Axis 41 degrees    Diagnosis       Normal sinus rhythm  Prolonged QT  Abnormal ECG  When compared with ECG of 22-JAN-2022 13:41, (Unconfirmed)  Borderline criteria for Inferior infarct are no longer Present  Nonspecific T wave abnormality now evident in Lateral leads  Confirmed by Dane Monroe MD ([id number]) on 1/22/2022 10:25:09 PM     EKG, 12 LEAD, INITIAL    Collection Time: 01/22/22  4:14 PM   Result Value Ref Range    Ventricular Rate 96 BPM    Atrial Rate 96 BPM    P-R Interval 190 ms    QRS Duration 104 ms    Q-T Interval 418 ms    QTC Calculation (Bezet) 528 ms    Calculated P Axis 65 degrees    Calculated R Axis 24 degrees    Calculated T Axis -11 degrees    Diagnosis       Normal sinus rhythm  Minimal voltage criteria for LVH, may be normal variant  Cannot rule out Inferior infarct , age undetermined  Cannot rule out Anterior infarct , age undetermined  Prolonged QT  Abnormal ECG  When compared with ECG of 22-JAN-2022 14:09, (Unconfirmed)  T wave inversion now evident in Anterior leads  Nonspecific T wave abnormality no longer evident in Lateral leads  Confirmed by Dane Monroe MD ([id number]) on 1/22/2022 10:24:09 PM     TROPONIN-HIGH SENSITIVITY    Collection Time: 01/22/22  4:25 PM   Result Value Ref Range    Troponin-High Sensitivity 8 0 - 76 ng/L   PTT    Collection Time: 01/22/22  5:25 PM   Result Value Ref Range    aPTT 26.0 21.2 - 34.1 sec    aPTT, therapeutic range   82 - 109 sec   COVID-19 RAPID TEST    Collection Time: 01/22/22  5:44 PM   Result Value Ref Range Specimen source Please find results under separate order      COVID-19 rapid test Not Detected Not Detected     CBC WITH AUTOMATED DIFF    Collection Time: 01/22/22  7:19 PM   Result Value Ref Range    WBC 8.7 4.1 - 11.1 K/uL    RBC 3.97 (L) 4.10 - 5.70 M/uL    HGB 12.3 12.1 - 17.0 g/dL    HCT 38.0 36.6 - 50.3 %    MCV 95.7 80.0 - 99.0 FL    MCH 31.0 26.0 - 34.0 PG    MCHC 32.4 30.0 - 36.5 g/dL    RDW 13.2 11.5 - 14.5 %    PLATELET 491 799 - 671 K/uL    MPV 10.3 8.9 - 12.9 FL    NRBC 0.0 0.0  WBC    ABSOLUTE NRBC 0.00 0.00 - 0.01 K/uL    NEUTROPHILS 66 32 - 75 %    LYMPHOCYTES 26 12 - 49 %    MONOCYTES 6 5 - 13 %    EOSINOPHILS 2 0 - 7 %    BASOPHILS 0 0 - 1 %    IMMATURE GRANULOCYTES 0 0 - 0.5 %    ABS. NEUTROPHILS 5.6 1.8 - 8.0 K/UL    ABS. LYMPHOCYTES 2.2 0.8 - 3.5 K/UL    ABS. MONOCYTES 0.5 0.0 - 1.0 K/UL    ABS. EOSINOPHILS 0.2 0.0 - 0.4 K/UL    ABS. BASOPHILS 0.0 0.0 - 0.1 K/UL    ABS. IMM.  GRANS. 0.0 0.00 - 0.04 K/UL    DF AUTOMATED     TROPONIN-HIGH SENSITIVITY    Collection Time: 01/22/22  7:19 PM   Result Value Ref Range    Troponin-High Sensitivity 7 0 - 76 ng/L   GLUCOSE, POC    Collection Time: 01/22/22  8:59 PM   Result Value Ref Range    Glucose (POC) 164 (H) 65 - 117 mg/dL    Performed by Brandon Mitchell    PTT    Collection Time: 01/23/22 12:20 AM   Result Value Ref Range    aPTT >153.0 (HH) 21.2 - 34.1 sec    aPTT, therapeutic range   82 - 960 sec   METABOLIC PANEL, COMPREHENSIVE    Collection Time: 01/23/22  3:36 AM   Result Value Ref Range    Sodium 136 136 - 145 mmol/L    Potassium 2.8 (L) 3.5 - 5.1 mmol/L    Chloride 96 (L) 97 - 108 mmol/L    CO2 36 (H) 21 - 32 mmol/L    Anion gap 4 (L) 5 - 15 mmol/L    Glucose 125 (H) 65 - 100 mg/dL    BUN 19 6 - 20 mg/dL    Creatinine 1.16 0.70 - 1.30 mg/dL    BUN/Creatinine ratio 16 12 - 20      GFR est AA >60 >60 ml/min/1.73m2    GFR est non-AA >60 >60 ml/min/1.73m2    Calcium 9.0 8.5 - 10.1 mg/dL    Bilirubin, total 0.2 0.2 - 1.0 mg/dL    AST (SGOT) 32 15 - 37 U/L    ALT (SGPT) 48 12 - 78 U/L    Alk. phosphatase 70 45 - 117 U/L    Protein, total 7.5 6.4 - 8.2 g/dL    Albumin 2.9 (L) 3.5 - 5.0 g/dL    Globulin 4.6 (H) 2.0 - 4.0 g/dL    A-G Ratio 0.6 (L) 1.1 - 2.2     CBC WITH AUTOMATED DIFF    Collection Time: 01/23/22  3:36 AM   Result Value Ref Range    WBC 6.1 4.1 - 11.1 K/uL    RBC 3.80 (L) 4.10 - 5.70 M/uL    HGB 12.1 12.1 - 17.0 g/dL    HCT 37.0 36.6 - 50.3 %    MCV 97.4 80.0 - 99.0 FL    MCH 31.8 26.0 - 34.0 PG    MCHC 32.7 30.0 - 36.5 g/dL    RDW 13.1 11.5 - 14.5 %    PLATELET 968 816 - 430 K/uL    MPV 10.0 8.9 - 12.9 FL    NRBC 0.0 0.0  WBC    ABSOLUTE NRBC 0.00 0.00 - 0.01 K/uL    NEUTROPHILS 57 32 - 75 %    LYMPHOCYTES 32 12 - 49 %    MONOCYTES 7 5 - 13 %    EOSINOPHILS 4 0 - 7 %    BASOPHILS 0 0 - 1 %    IMMATURE GRANULOCYTES 0 0 - 0.5 %    ABS. NEUTROPHILS 3.5 1.8 - 8.0 K/UL    ABS. LYMPHOCYTES 1.9 0.8 - 3.5 K/UL    ABS. MONOCYTES 0.4 0.0 - 1.0 K/UL    ABS. EOSINOPHILS 0.2 0.0 - 0.4 K/UL    ABS. BASOPHILS 0.0 0.0 - 0.1 K/UL    ABS. IMM. GRANS. 0.0 0.00 - 0.04 K/UL    DF AUTOMATED     PTT    Collection Time: 01/23/22  3:36 AM   Result Value Ref Range    aPTT 65.9 (H) 21.2 - 34.1 sec    aPTT, therapeutic range   82 - 109 sec   GLUCOSE, POC    Collection Time: 01/23/22  7:44 AM   Result Value Ref Range    Glucose (POC) 156 (H) 65 - 117 mg/dL    Performed by Jg CONTRERAS Results (most recent):  Results from Hospital Encounter encounter on 01/22/22    XR CHEST PORT    Narrative  Exam: AP chest    Comparison: 9/23/2021. Number of views: 1    Findings: Stable cardiomegaly. The exam is negative for evidence of  pleural  disease. The lungs are clear. Impression  Cardiomegaly. Lungs clear. XR CHEST PORT   Final Result   Cardiomegaly. Lungs clear. Review of Systems:  Constitutional: Negative for chills and fever. HENT: Negative. Eyes: Negative. Respiratory: Negative. Cardiovascular: Negative. Gastrointestinal: Negative for abdominal pain and nausea. Skin: Negative. Neurological: Negative. Objective:   VITALS:    Visit Vitals  /77 (BP Patient Position: At rest)   Pulse 88   Temp 98 °F (36.7 °C)   Resp 15   Ht 6' (1.829 m)   Wt 152 kg (335 lb)   SpO2 (P) 94%   BMI 45.43 kg/m²       Physical Exam:   Constitutional: pt is oriented to person, place, and time. HENT:   Head: Normocephalic and atraumatic. Eyes: Pupils are equal, round, and reactive to light. EOM are normal.   Cardiovascular: Normal rate, regular rhythm and normal heart sounds. Pulmonary/Chest: Breath sounds normal. No wheezes. No rales. Exhibits no tenderness. Abdominal: Soft. Bowel sounds are normal. There is no abdominal tenderness. There is no rebound and no guarding. Musculoskeletal: Normal range of motion. Neurological: pt is alert and oriented to person, place, and time. Alert. Normal strength. No cranial nerve deficit or sensory deficit. Displays a negative Romberg sign.         ASSESSMENT & PLAN:    Chest pain rule out MI  Type 2 diabetes  Hypertension  Hyperlipidemia  History of CVA   COPD  History of PFO closure  Hypokalemia        As patient was started on nitro drip and heparin drip patient admitted to ICU    Cardiology was consulted repeat 2D echo  Started on the following medications    Replace potassium        Current Facility-Administered Medications:     potassium chloride SR (KLOR-CON 10) tablet 40 mEq, 40 mEq, Oral, NOW, Lisa Lugo MD    nitroglycerin (Tridil) 200 mcg/ml infusion, 0-20 mcg/min, IntraVENous, TITRATE, Lisa Lugo MD, Last Rate: 4.5 mL/hr at 01/23/22 1000, 15 mcg/min at 01/23/22 1000    heparin 25,000 units in  ml infusion, 6-25 Units/kg/hr, IntraVENous, TITRATE, Lisa Lugo MD, Last Rate: 7.6 mL/hr at 01/23/22 0520, 5 Units/kg/hr at 01/23/22 0520    heparin (porcine) 1,000 unit/mL injection 4,000 Units, 4,000 Units, IntraVENous, PRN **OR** heparin (porcine) 1,000 unit/mL injection 2,000 Units, 2,000 Units, IntraVENous, PRN, Lisa Lugo MD, 2,000 Units at 01/23/22 0521    albuterol-ipratropium (DUO-NEB) 2.5 MG-0.5 MG/3 ML, 3 mL, Nebulization, Q6H PRN, Lisa Lugo MD    bumetanide (BUMEX) tablet 2 mg, 2 mg, Oral, DAILY, Lisa Lugo MD, 2 mg at 01/23/22 0856    gabapentin (NEURONTIN) capsule 600 mg, 600 mg, Oral, QID PRN, Lisa Lugo MD, 600 mg at 01/22/22 2109    insulin glargine (LANTUS) injection 20 Units, 20 Units, SubCUTAneous, QHS, Lisa Lugo MD, 20 Units at 01/22/22 2109    metoprolol succinate (TOPROL-XL) XL tablet 50 mg, 50 mg, Oral, DAILY, Lisa Lugo MD, 50 mg at 01/23/22 0802    potassium chloride (K-DUR, KLOR-CON M20) SR tablet 40 mEq, 40 mEq, Oral, DAILY, Lisa Lugo MD, 40 mEq at 01/23/22 0802    topiramate (TOPAMAX) tablet 50 mg, 50 mg, Oral, BID WITH MEALS, Lisa Lugo MD, 50 mg at 01/23/22 0802    traZODone (DESYREL) tablet 100 mg, 100 mg, Oral, QHS, Lisa Lugo MD, 100 mg at 01/22/22 2109    ARIPiprazole (ABILIFY) tablet 2 mg, 2 mg, Oral, DAILY, Marion Frey MD, 2 mg at 01/23/22 3720    aspirin chewable tablet 81 mg, 81 mg, Oral, DAILY, Lisa Lugo MD, 81 mg at 01/23/22 0801    atorvastatin (LIPITOR) tablet 20 mg, 20 mg, Oral, DAILY, Lisa Lugo MD, 20 mg at 01/23/22 0802    citalopram (CELEXA) tablet 40 mg, 40 mg, Oral, DAILY, Lisa Lugo MD, 40 mg at 01/23/22 0856    digoxin (LANOXIN) tablet 0.125 mg, 0.125 mg, Oral, DAILY, Luli Lugo MD    metFORMIN (GLUCOPHAGE) tablet 500 mg, 500 mg, Oral, BID WITH MEALS, Lisa Lugo MD, 500 mg at 01/23/22 0801    pantoprazole (PROTONIX) tablet 40 mg, 40 mg, Oral, BID, Lisa Lugo MD, 40 mg at 01/23/22 0802    insulin lispro (HUMALOG) injection, , SubCUTAneous, AC&HS, Lisa Lugo MD, 3 Units at 01/23/22 0730    glucose chewable tablet 16 g, 4 Tablet, Oral, PRN, MD Maggie Mckee dextrose (D50W) injection syrg 12.5-25 g, 25-50 mL, IntraVENous, PRN, Edmundo Lugo MD    glucagon [facility]) injection 1 mg, 1 mg, IntraMUSCular, PRN, Edmundo Lugo MD    acetaminophen (TYLENOL) tablet 650 mg, 650 mg, Oral, Q6H PRN, 650 mg at 01/23/22 0856 **OR** acetaminophen (TYLENOL) suppository 650 mg, 650 mg, Rectal, Q6H PRN, Lisa Lugo MD    polyethylene glycol (MIRALAX) packet 17 g, 17 g, Oral, DAILY PRN, Lisa Lugo MD    ondansetron (ZOFRAN ODT) tablet 4 mg, 4 mg, Oral, Q8H PRN **OR** ondansetron (ZOFRAN) injection 4 mg, 4 mg, IntraVENous, Q6H PRN, Robert Grewal MD        ________________________________________________________________________    Signed: Keenan Howe MD

## 2022-01-23 NOTE — ED NOTES
TRANSFER - OUT REPORT:    Verbal report given to ICU, RN (name) on Vee Guevara  being transferred to ICU (unit) for routine progression of care       Report consisted of patients Situation, Background, Assessment and   Recommendations(SBAR). Information from the following report(s) SBAR was reviewed with the receiving nurse. Lines:   Peripheral IV 01/22/22 Posterior;Right Hand (Active)       Peripheral IV 01/22/22 Posterior;Right Forearm (Active)        Opportunity for questions and clarification was provided.       Patient transported with:   Monitor        ;

## 2022-01-23 NOTE — ED NOTES
Pt ambulatory to restroom with steady gait. Pt provided hospital bed, verbalizes increased comfort. Pt medicated per MAR. Provided ice chips per request. Updated on plan of care. NAD noted. Family member at bedside. Call light in reach.

## 2022-01-23 NOTE — CONSULTS
Consult    Patient: Jennifer Rodriguez MRN: [id number]  SSN: [id number]    YOB: 1975  Age: 51 y.o. Sex: male       Subjective:      Date of  Admission: 1/22/2022     Admission type: Emergency    Jennifer Rodriguez is a 51 y.o. male with a history of coronary artery disease, prior CVA, status post PFO closure. He follows with [facility]. He presented to the hospital due to complaints of chest pain which occurred while he was at rest.  This was not associated with strenuous exertion. He reports a tightness sensation in the center of his chest with associated weakness and lightheadedness. The symptoms resolved after initial treatment in the ER and he remains chest pain-free. He reports having cardiac catheterization in the past in which she had blockages not tight enough to need a stent as per patient. Prior records are not available to me. He has had no other recent episodes of chest pain. There is no history of worsening dyspnea on exertion, orthopnea, PND or any other complaints.     Primary Care Provider: Esther Sanders MD  Past Medical History:   Diagnosis Date    Acute MI [facility])     COPD (chronic obstructive pulmonary disease) ([facility] [address]     Depression     Hypertension     Ill-defined condition     Stroke [facility])       Past Surgical History:   Procedure Laterality Date    HX HERNIA REPAIR      HX ORTHOPAEDIC      rotator cuff surgery    HX OTHER SURGICAL      Patent Foramen Ovale Repair    HX THYROIDECTOMY      IR FINE NEEDLE ASPIRATION W IMAGE       Family History   Problem Relation Age of Onset    Diabetes Maternal Uncle     Hypertension Maternal Uncle     Hypertension Paternal Uncle     Diabetes Paternal Uncle     Cancer Other    Theodoro Milder Cancer Mother     No Known Problems Father       Social History     Tobacco Use    Smoking status: Former Smoker    Smokeless tobacco: Never Used   Substance Use Topics    Alcohol use: Yes     Comment: socially       Current Facility-Administered Medications   Medication Dose Route Frequency    potassium chloride (K-DUR, KLOR-CON M20) SR tablet 40 mEq  40 mEq Oral NOW    nitroglycerin (Tridil) 200 mcg/ml infusion  0-20 mcg/min IntraVENous TITRATE    heparin 25,000 units in  ml infusion  6-25 Units/kg/hr IntraVENous TITRATE    heparin (porcine) 1,000 unit/mL injection 4,000 Units  4,000 Units IntraVENous PRN    Or    heparin (porcine) 1,000 unit/mL injection 2,000 Units  2,000 Units IntraVENous PRN    albuterol-ipratropium (DUO-NEB) 2.5 MG-0.5 MG/3 ML  3 mL Nebulization Q6H PRN    bumetanide (BUMEX) tablet 2 mg  2 mg Oral DAILY    gabapentin (NEURONTIN) capsule 600 mg  600 mg Oral QID PRN    insulin glargine (LANTUS) injection 20 Units  20 Units SubCUTAneous QHS    metoprolol succinate (TOPROL-XL) XL tablet 50 mg  50 mg Oral DAILY    potassium chloride (K-DUR, KLOR-CON M20) SR tablet 40 mEq  40 mEq Oral DAILY    topiramate (TOPAMAX) tablet 50 mg  50 mg Oral BID WITH MEALS    traZODone (DESYREL) tablet 100 mg  100 mg Oral QHS    ARIPiprazole (ABILIFY) tablet 2 mg  2 mg Oral DAILY    aspirin chewable tablet 81 mg  81 mg Oral DAILY    atorvastatin (LIPITOR) tablet 20 mg  20 mg Oral DAILY    citalopram (CELEXA) tablet 40 mg  40 mg Oral DAILY    digoxin (LANOXIN) tablet 0.125 mg  0.125 mg Oral DAILY    metFORMIN (GLUCOPHAGE) tablet 500 mg  500 mg Oral BID WITH MEALS    pantoprazole (PROTONIX) tablet 40 mg  40 mg Oral BID    insulin lispro (HUMALOG) injection   SubCUTAneous AC&HS    glucose chewable tablet 16 g  4 Tablet Oral PRN    dextrose (D50W) injection syrg 12.5-25 g  25-50 mL IntraVENous PRN    glucagon (GLUCAGEN) injection 1 mg  1 mg IntraMUSCular PRN    acetaminophen (TYLENOL) tablet 650 mg  650 mg Oral Q6H PRN    Or    acetaminophen (TYLENOL) suppository 650 mg  650 mg Rectal Q6H PRN    polyethylene glycol (MIRALAX) packet 17 g  17 g Oral DAILY PRN    ondansetron (ZOFRAN ODT) tablet 4 mg  4 mg Oral Q8H PRN    Or    ondansetron (ZOFRAN) injection 4 mg  4 mg IntraVENous Q6H PRN        Allergies   Allergen Reactions    Vancomycin Hives     Lyndsey syndrome        Review of Systems:  A comprehensive review of systems was negative except for that written in the History of Present Illness. Subjective:     Visit Vitals  /79 (BP Patient Position: At rest)   Pulse 83   Temp 98 °F (36.7 °C)   Resp 16   Ht 6' (1.829 m)   Wt 152 kg (335 lb)   SpO2 97%   BMI 45.43 kg/m²        Physical Exam:  Visit Vitals  /79 (BP Patient Position: At rest)   Pulse 83   Temp 98 °F (36.7 °C)   Resp 16   Ht 6' (1.829 m)   Wt 152 kg (335 lb)   SpO2 97%   BMI 45.43 kg/m²     General Appearance:  Well developed, well nourished,alert and oriented x 3, and individual in no acute distress. Ears/Nose/Mouth/Throat:   Hearing grossly normal.         Neck: Supple. Chest:   Lungs clear to auscultation bilaterally. Cardiovascular:  Regular rate and rhythm, S1, S2 normal, no murmur. Abdomen:   Soft, non-tender, bowel sounds are active. Extremities: No edema bilaterally. Skin: Warm and dry.                Cardiographics:  Telemetry: normal sinus rhythm  ECG: normal EKG, normal sinus rhythm    Data Reviewed:   BMP:   Lab Results   Component Value Date/Time     01/23/2022 03:36 AM    K 2.8 (L) 01/23/2022 03:36 AM    CL 96 (L) 01/23/2022 03:36 AM    CO2 36 (H) 01/23/2022 03:36 AM    AGAP 4 (L) 01/23/2022 03:36 AM     (H) 01/23/2022 03:36 AM    BUN 19 01/23/2022 03:36 AM    CREA 1.16 01/23/2022 03:36 AM    GFRAA >60 01/23/2022 03:36 AM    GFRNA >60 01/23/2022 03:36 AM     CMP:   Lab Results   Component Value Date/Time     01/23/2022 03:36 AM    K 2.8 (L) 01/23/2022 03:36 AM    CL 96 (L) 01/23/2022 03:36 AM    CO2 36 (H) 01/23/2022 03:36 AM    AGAP 4 (L) 01/23/2022 03:36 AM     (H) 01/23/2022 03:36 AM    BUN 19 01/23/2022 03:36 AM    CREA 1.16 01/23/2022 03:36 AM    GFRAA >60 01/23/2022 03:36 AM    GFRNA >60 01/23/2022 03:36 AM    CA 9.0 01/23/2022 03:36 AM    ALB 2.9 (L) 01/23/2022 03:36 AM    TP 7.5 01/23/2022 03:36 AM    GLOB 4.6 (H) 01/23/2022 03:36 AM    AGRAT 0.6 (L) 01/23/2022 03:36 AM    ALT 48 01/23/2022 03:36 AM     CBC:   Lab Results   Component Value Date/Time    WBC 6.1 01/23/2022 03:36 AM    HGB 12.1 01/23/2022 03:36 AM    HCT 37.0 01/23/2022 03:36 AM     01/23/2022 03:36 AM     All Cardiac Markers in the last 24 hours: No results found for: CPK, CK, CKMMB, CKMB, RCK3, CKMBT, CKNDX, CKND1, KAIDEN, TROPT, TROIQ, DALLAS, TROPT, TNIPOC, BNP, BNPP  Recent Glucose Results:   Lab Results   Component Value Date/Time     (H) 01/23/2022 03:36 AM     ABG: No results found for: PH, PHI, PCO2, PCO2I, PO2, PO2I, HCO3, HCO3I, FIO2, FIO2I  COAGS:   Lab Results   Component Value Date/Time    APTT >153.0 (HH) 01/23/2022 12:25 PM     Liver Panel:   Lab Results   Component Value Date/Time    ALB 2.9 (L) 01/23/2022 03:36 AM    TP 7.5 01/23/2022 03:36 AM    GLOB 4.6 (H) 01/23/2022 03:36 AM    AGRAT 0.6 (L) 01/23/2022 03:36 AM    ALT 48 01/23/2022 03:36 AM    AP 70 01/23/2022 03:36 AM        Assessment:   Chest pain  Hypokalemia  Diabetes mellitus  Hypertension  Hyperlipidemia  Coronary artery disease  COPD  History of CVA  Status post PFO closure     Plan:   58-year-old male with a past medical history as above who is seen for chest pain  -Patient with atypical chest pain however concerning symptoms in light of multiple risk factors, known prior coronary artery disease as per patient. Fortunately, his troponins have been negative. There are no EKG changes suggestive of ischemia.   We will plan for pharmacologic stress testing in the morning to rule out significant ischemia.  -He has profound hypokalemia on presentation, potassium remains 2.8 today he got 40 mEq by mouth in the morning I will give him additional dose and we will repeat the potassium levels in the evening.  -Continue remaining medications  -Rest as per echocardiogram, stress test findings    Thank you for allowing us to participate in the care of your patient

## 2022-01-24 ENCOUNTER — APPOINTMENT (OUTPATIENT)
Dept: NON INVASIVE DIAGNOSTICS | Age: 47
DRG: 198 | End: 2022-01-24
Attending: INTERNAL MEDICINE
Payer: MEDICAID

## 2022-01-24 ENCOUNTER — APPOINTMENT (OUTPATIENT)
Dept: NUCLEAR MEDICINE | Age: 47
DRG: 198 | End: 2022-01-24
Attending: INTERNAL MEDICINE
Payer: MEDICAID

## 2022-01-24 VITALS
DIASTOLIC BLOOD PRESSURE: 80 MMHG | HEIGHT: 72 IN | SYSTOLIC BLOOD PRESSURE: 136 MMHG | BODY MASS INDEX: 42.66 KG/M2 | RESPIRATION RATE: 18 BRPM | WEIGHT: 315 LBS | TEMPERATURE: 98.3 F | HEART RATE: 102 BPM | OXYGEN SATURATION: 94 %

## 2022-01-24 LAB
APTT PPP: 26.8 SEC (ref 21.2–34.1)
APTT PPP: 28.8 SEC (ref 21.2–34.1)
ECHO AO ROOT DIAM: 3.7 CM
ECHO AO ROOT INDEX: 1.4 CM/M2
ECHO AV PEAK GRADIENT: 4 MMHG
ECHO AV PEAK VELOCITY: 1 M/S
ECHO AV VELOCITY RATIO: 0.9
ECHO EST RA PRESSURE: 15 MMHG
ECHO LA DIAMETER INDEX: 1.81 CM/M2
ECHO LA DIAMETER: 4.8 CM
ECHO LA TO AORTIC ROOT RATIO: 1.3
ECHO LV E' LATERAL VELOCITY: 10 CM/S
ECHO LV E' SEPTAL VELOCITY: 6 CM/S
ECHO LV FRACTIONAL SHORTENING: 34 % (ref 28–44)
ECHO LV INTERNAL DIMENSION DIASTOLE INDEX: 2.19 CM/M2
ECHO LV INTERNAL DIMENSION DIASTOLIC: 5.8 CM (ref 4.2–5.9)
ECHO LV INTERNAL DIMENSION SYSTOLIC INDEX: 1.43 CM/M2
ECHO LV INTERNAL DIMENSION SYSTOLIC: 3.8 CM
ECHO LV IVSD: 1.3 CM (ref 0.6–1)
ECHO LV MASS 2D: 349.2 G (ref 88–224)
ECHO LV MASS INDEX 2D: 131.8 G/M2 (ref 49–115)
ECHO LV POSTERIOR WALL DIASTOLIC: 1.4 CM (ref 0.6–1)
ECHO LV RELATIVE WALL THICKNESS RATIO: 0.48
ECHO LVOT PEAK GRADIENT: 3 MMHG
ECHO LVOT PEAK VELOCITY: 0.9 M/S
ECHO MV A VELOCITY: 0.57 M/S
ECHO MV E DECELERATION TIME (DT): 155 MS
ECHO MV E VELOCITY: 0.61 M/S
ECHO MV E/A RATIO: 1.07
ECHO MV E/E' LATERAL: 6.1
ECHO MV E/E' RATIO (AVERAGED): 8.13
ECHO MV E/E' SEPTAL: 10.17
ECHO MV MAX VELOCITY: 0.9 M/S
ECHO MV MEAN GRADIENT: 1 MMHG
ECHO MV MEAN VELOCITY: 0.5 M/S
ECHO MV PEAK GRADIENT: 3 MMHG
ECHO MV VTI: 22 CM
ECHO PV MAX VELOCITY: 1.2 M/S
ECHO PV PEAK GRADIENT: 6 MMHG
ECHO RIGHT VENTRICULAR SYSTOLIC PRESSURE (RVSP): 26 MMHG
ECHO RV INTERNAL DIMENSION: 4.1 CM
ECHO TV REGURGITANT MAX VELOCITY: 1.65 M/S
ECHO TV REGURGITANT PEAK GRADIENT: 11 MMHG
GLUCOSE BLD STRIP.AUTO-MCNC: 134 MG/DL (ref 65–117)
NUC STRESS EJECTION FRACTION: 51 %
PERFORMED BY, TECHID: ABNORMAL
STRESS BASELINE DIAS BP: 76 MMHG
STRESS BASELINE HR: 87 BPM
STRESS BASELINE ST DEPRESSION: 0 MM
STRESS BASELINE SYS BP: 107 MMHG
STRESS PERCENT HR ACHIEVED: 85 %
STRESS POST PEAK HR: 147 BPM
STRESS ST DEPRESSION: 0 MM
STRESS STAGE 1 DURATION: NORMAL MIN:SEC
STRESS STAGE 1 HR: 99 BPM
STRESS STAGE 2 BP: NORMAL MMHG
STRESS STAGE 2 DURATION: NORMAL MIN:SEC
STRESS STAGE 2 HR: 96 BPM
STRESS STAGE 3 DURATION: NORMAL MIN:SEC
STRESS STAGE 3 HR: 95 BPM
STRESS STAGE 4 BP: NORMAL MMHG
STRESS STAGE 4 DURATION: NORMAL MIN:SEC
STRESS STAGE 4 HR: 93 BPM
STRESS TARGET HR: 173 BPM
THERAPEUTIC RANGE,PTTT: NORMAL SEC (ref 82–109)
THERAPEUTIC RANGE,PTTT: NORMAL SEC (ref 82–109)
TID: 1.03

## 2022-01-24 PROCEDURE — 85730 THROMBOPLASTIN TIME PARTIAL: CPT

## 2022-01-24 PROCEDURE — 74011250636 HC RX REV CODE- 250/636: Performed by: FAMILY MEDICINE

## 2022-01-24 PROCEDURE — 82962 GLUCOSE BLOOD TEST: CPT

## 2022-01-24 PROCEDURE — 77010033678 HC OXYGEN DAILY

## 2022-01-24 PROCEDURE — 93306 TTE W/DOPPLER COMPLETE: CPT

## 2022-01-24 PROCEDURE — 74011250636 HC RX REV CODE- 250/636

## 2022-01-24 PROCEDURE — 74011250637 HC RX REV CODE- 250/637: Performed by: FAMILY MEDICINE

## 2022-01-24 PROCEDURE — 36415 COLL VENOUS BLD VENIPUNCTURE: CPT

## 2022-01-24 PROCEDURE — A9500 TC99M SESTAMIBI: HCPCS

## 2022-01-24 RX ADMIN — BUMETANIDE 2 MG: 1 TABLET ORAL at 11:34

## 2022-01-24 RX ADMIN — HEPARIN SODIUM 4000 UNITS: 1000 INJECTION, SOLUTION INTRAVENOUS; SUBCUTANEOUS at 05:59

## 2022-01-24 RX ADMIN — METOPROLOL SUCCINATE 50 MG: 25 TABLET, EXTENDED RELEASE ORAL at 11:34

## 2022-01-24 RX ADMIN — DIGOXIN 0.12 MG: 125 TABLET ORAL at 11:34

## 2022-01-24 RX ADMIN — PANTOPRAZOLE SODIUM 40 MG: 40 TABLET, DELAYED RELEASE ORAL at 11:34

## 2022-01-24 RX ADMIN — CITALOPRAM HYDROBROMIDE 40 MG: 20 TABLET ORAL at 11:34

## 2022-01-24 RX ADMIN — REGADENOSON 0.4 MG: 0.08 INJECTION, SOLUTION INTRAVENOUS at 09:43

## 2022-01-24 RX ADMIN — METFORMIN HYDROCHLORIDE 500 MG: 500 TABLET ORAL at 11:34

## 2022-01-24 RX ADMIN — ARIPIPRAZOLE 2 MG: 2 TABLET ORAL at 11:34

## 2022-01-24 RX ADMIN — Medication 6 UNITS/KG/HR: at 01:18

## 2022-01-24 RX ADMIN — POTASSIUM CHLORIDE 40 MEQ: 1500 TABLET, EXTENDED RELEASE ORAL at 11:34

## 2022-01-24 RX ADMIN — ATORVASTATIN CALCIUM 20 MG: 20 TABLET, FILM COATED ORAL at 11:34

## 2022-01-24 RX ADMIN — GABAPENTIN 600 MG: 300 CAPSULE ORAL at 11:34

## 2022-01-24 RX ADMIN — TOPIRAMATE 50 MG: 25 TABLET, FILM COATED ORAL at 11:34

## 2022-01-24 RX ADMIN — ASPIRIN 81 MG CHEWABLE TABLET 81 MG: 81 TABLET CHEWABLE at 11:34

## 2022-01-24 NOTE — PROGRESS NOTES
1/24/2022      RE: Pratima Gallegos      To Whom it May Concern: This is to certify that Pratima Gallegos may return to work 1/25/2022. Please feel free to contact my office if you have any questions or concerns. Thank you for your assistance in this matter.     Sincerely,      Katlyn James RN

## 2022-01-24 NOTE — DISCHARGE SUMMARY
General Daily Progress Note          Patient Name:   Live Flores       YOB: 1975       Age:  51 y.o.       Admit Date: 1/22/2022      Subjective:       Patient is a 51y.o. year old male morbidly obese history of diabetes hypertension hyperlipidemia depression history of COPD CVA multinodular goiter cardiomyopathy carotid artery stenosis  Came to emergency room complaining of chest pain chest pain 10 out of 10/retrosternal radiates over the left no nausea no vomiting came to emergency room seen by the ER physician patient had a recent closure of the PFO     Seen by the ER physician patient was started on heparin drip and nitro drip admit to ICU for further work-up     Initial cardiac enzymes are negative       For stress test and echo      Objective:     Visit Vitals  /80 (BP 1 Location: Left upper arm, BP Patient Position: At rest;Supine)   Pulse (!) 102   Temp 98.3 °F (36.8 °C)   Resp 18   Ht 6' (1.829 m)   Wt 152 kg (335 lb)   SpO2 94%   BMI 45.43 kg/m²        Recent Results (from the past 24 hour(s))   GLUCOSE, POC    Collection Time: 01/23/22  4:43 PM   Result Value Ref Range    Glucose (POC) 148 (H) 65 - 117 mg/dL    Performed by ANDREW BERRY    GLUCOSE, POC    Collection Time: 01/23/22  8:25 PM   Result Value Ref Range    Glucose (POC) 156 (H) 65 - 117 mg/dL    Performed by Grayson Weaver    PTT    Collection Time: 01/23/22  9:46 PM   Result Value Ref Range    aPTT 24.1 21.2 - 34.1 sec    aPTT, therapeutic range   82 - 109 sec   PTT    Collection Time: 01/24/22  4:36 AM   Result Value Ref Range    aPTT 28.8 21.2 - 34.1 sec    aPTT, therapeutic range   82 - 109 sec   ECHO ADULT COMPLETE    Collection Time: 01/24/22  8:54 AM   Result Value Ref Range    AV Peak Velocity 1.0 m/s    AV Peak Gradient 4 mmHg    Aortic Root 3.7 cm    IVSd 1.3 (A) 0.6 - 1.0 cm    LVIDd 5.8 4.2 - 5.9 cm    LVIDs 3.8 cm    LVOT Peak Velocity 0.9 m/s    LVOT Peak Gradient 3 mmHg    LVPWd 1.4 (A) 0.6 - 1.0 cm LV E' Lateral Velocity 10 cm/s    LV E' Septal Velocity 6 cm/s    LA Diameter 4.8 cm    MV E Wave Deceleration Time 155.0 ms    MV A Velocity 0.57 m/s    MV E Velocity 0.61 m/s    MV Mean Gradient 1 mmHg    MV VTI 22.0 cm    MV Mean Velocity 0.5 m/s    MV Max Velocity 0.9 m/s    MV Peak Gradient 3 mmHg    PV Max Velocity 1.2 m/s    PV Peak Gradient 6 mmHg    Est. RA Pressure 15 mmHg    RVIDd 4.1 cm    TR Max Velocity 1.65 m/s    TR Peak Gradient 11 mmHg    Fractional Shortening 2D 34 28 - 44 %    LVIDd Index 2.19 cm/m2    LVIDs Index 1.43 cm/m2    LV RWT Ratio 0.48     LV Mass 2D 349.2 (A) 88 - 224 g    LV Mass 2D Index 131.8 (A) 49 - 115 g/m2    MV E/A 1.07     E/E' Ratio (Averaged) 8.13     E/E' Lateral 6.10     E/E' Septal 10.17     LA Size Index 1.81 cm/m2    LA/AO Root Ratio 1.30     Ao Root Index 1.40 cm/m2    AV Velocity Ratio 0.90     RVSP 26 mmHg   NUCLEAR CARDIAC STRESS TEST    Collection Time: 01/24/22 10:48 AM   Result Value Ref Range    Stress Target  bpm    Baseline HR 87 bpm    Baseline Systolic  mmHg    Baseline Diastolic BP 76 mmHg    Stress Peak  bpm    Stress Percent HR Achieved 85 %    Stress Stage 1 Duration 1 min min:sec    Stress Stage 1 HR 99 bpm    Stress Stage 2 Duration 2 min min:sec    Stress Stage 2 HR 96 bpm    Stress Stage 2 /68 mmHg    Stress Stage 3 Duration 3 min min:sec    Stress Stage 3 HR 95 bpm    Stress Stage 4 Duration 4 min min:sec    Stress Stage 4 HR 93 bpm    Stress Stage 4 /69 mmHg    Baseline ST Depression 0 mm    Stress ST Depression 0 mm    TID 1.03     Nuc Stress EF 51 %   PTT    Collection Time: 01/24/22 11:32 AM   Result Value Ref Range    aPTT 26.8 21.2 - 34.1 sec    aPTT, therapeutic range   82 - 109 sec   GLUCOSE, POC    Collection Time: 01/24/22 11:46 AM   Result Value Ref Range    Glucose (POC) 134 (H) 65 - 117 mg/dL    Performed by Miles Hanson      [unfilled]      Review of Systems    Constitutional: Negative for chills and fever. HENT: Negative. Eyes: Negative. Respiratory: Negative. Cardiovascular: Negative. Gastrointestinal: Negative for abdominal pain and nausea. Skin: Negative. Neurological: Negative. Physical Exam:      Constitutional: pt is oriented to person, place, and time. HENT:   Head: Normocephalic and atraumatic. Eyes: Pupils are equal, round, and reactive to light. EOM are normal.   Cardiovascular: Normal rate, regular rhythm and normal heart sounds. Pulmonary/Chest: Breath sounds normal. No wheezes. No rales. Exhibits no tenderness. Abdominal: Soft. Bowel sounds are normal. There is no abdominal tenderness. There is no rebound and no guarding. Musculoskeletal: Normal range of motion. Neurological: pt is alert and oriented to person, place, and time. XR CHEST PORT   Final Result   Cardiomegaly. Lungs clear.            Recent Results (from the past 24 hour(s))   GLUCOSE, POC    Collection Time: 01/23/22  4:43 PM   Result Value Ref Range    Glucose (POC) 148 (H) 65 - 117 mg/dL    Performed by ANDREW BERRY    GLUCOSE, POC    Collection Time: 01/23/22  8:25 PM   Result Value Ref Range    Glucose (POC) 156 (H) 65 - 117 mg/dL    Performed by Sonja Nino    PTT    Collection Time: 01/23/22  9:46 PM   Result Value Ref Range    aPTT 24.1 21.2 - 34.1 sec    aPTT, therapeutic range   82 - 109 sec   PTT    Collection Time: 01/24/22  4:36 AM   Result Value Ref Range    aPTT 28.8 21.2 - 34.1 sec    aPTT, therapeutic range   82 - 109 sec   ECHO ADULT COMPLETE    Collection Time: 01/24/22  8:54 AM   Result Value Ref Range    AV Peak Velocity 1.0 m/s    AV Peak Gradient 4 mmHg    Aortic Root 3.7 cm    IVSd 1.3 (A) 0.6 - 1.0 cm    LVIDd 5.8 4.2 - 5.9 cm    LVIDs 3.8 cm    LVOT Peak Velocity 0.9 m/s    LVOT Peak Gradient 3 mmHg    LVPWd 1.4 (A) 0.6 - 1.0 cm    LV E' Lateral Velocity 10 cm/s    LV E' Septal Velocity 6 cm/s    LA Diameter 4.8 cm    MV E Wave Deceleration Time 155.0 ms    MV A Velocity 0.57 m/s    MV E Velocity 0.61 m/s    MV Mean Gradient 1 mmHg    MV VTI 22.0 cm    MV Mean Velocity 0.5 m/s    MV Max Velocity 0.9 m/s    MV Peak Gradient 3 mmHg    PV Max Velocity 1.2 m/s    PV Peak Gradient 6 mmHg    Est. RA Pressure 15 mmHg    RVIDd 4.1 cm    TR Max Velocity 1.65 m/s    TR Peak Gradient 11 mmHg    Fractional Shortening 2D 34 28 - 44 %    LVIDd Index 2.19 cm/m2    LVIDs Index 1.43 cm/m2    LV RWT Ratio 0.48     LV Mass 2D 349.2 (A) 88 - 224 g    LV Mass 2D Index 131.8 (A) 49 - 115 g/m2    MV E/A 1.07     E/E' Ratio (Averaged) 8.13     E/E' Lateral 6.10     E/E' Septal 10.17     LA Size Index 1.81 cm/m2    LA/AO Root Ratio 1.30     Ao Root Index 1.40 cm/m2    AV Velocity Ratio 0.90     RVSP 26 mmHg   NUCLEAR CARDIAC STRESS TEST    Collection Time: 01/24/22 10:48 AM   Result Value Ref Range    Stress Target  bpm    Baseline HR 87 bpm    Baseline Systolic  mmHg    Baseline Diastolic BP 76 mmHg    Stress Peak  bpm    Stress Percent HR Achieved 85 %    Stress Stage 1 Duration 1 min min:sec    Stress Stage 1 HR 99 bpm    Stress Stage 2 Duration 2 min min:sec    Stress Stage 2 HR 96 bpm    Stress Stage 2 /68 mmHg    Stress Stage 3 Duration 3 min min:sec    Stress Stage 3 HR 95 bpm    Stress Stage 4 Duration 4 min min:sec    Stress Stage 4 HR 93 bpm    Stress Stage 4 /69 mmHg    Baseline ST Depression 0 mm    Stress ST Depression 0 mm    TID 1.03     Nuc Stress EF 51 %   PTT    Collection Time: 01/24/22 11:32 AM   Result Value Ref Range    aPTT 26.8 21.2 - 34.1 sec    aPTT, therapeutic range   82 - 109 sec   GLUCOSE, POC    Collection Time: 01/24/22 11:46 AM   Result Value Ref Range    Glucose (POC) 134 (H) 65 - 117 mg/dL    Performed by Dilcia Graves        Results     Procedure Component Value Units Date/Time    COVID-19 RAPID TEST [[id number]] Collected: 01/22/22 6277    Order Status: Completed Specimen: Nasopharyngeal Updated: 01/22/22 6322     Specimen source Please find results under separate order           COVID-19 rapid test Not Detected        Comment: Rapid Abbott ID Now   Rapid NAAT:  The specimen is NEGATIVE for SARS-CoV-2, the novel coronavirus associated with COVID-19. Negative results should be treated as presumptive and, if inconsistent with clinical signs and symptoms or necessary for patient management, should be tested with an alternative molecular assay. Negative results do not preclude SARS-CoV-2 infection and should not be used as the sole basis for patient management decisions. This test has been authorized by the FDA under   an Emergency Use Authorization (EUA) for use by authorized laboratories. Fact sheet for Healthcare Providers: ConventionUpdate.co.nz Fact sheet for Patients: ConventionUpdate.co.nz   Methodology: Isothermal Nucleic Acid Amplification                Labs:     Recent Labs     01/23/22  0336 01/22/22  1919   WBC 6.1 8.7   HGB 12.1 12.3   HCT 37.0 38.0    229     Recent Labs     01/23/22  0336 01/22/22  1347    133*   K 2.8* 2.7*   CL 96* 94*   CO2 36* 35*   BUN 19 19   CREA 1.16 1.12   * 107*   CA 9.0 10.1     Recent Labs     01/23/22  0336 01/22/22  1347   ALT 48 53   AP 70 84   TBILI 0.2 0.5   TP 7.5 8.8*   ALB 2.9* 3.6   GLOB 4.6* 5.2*     Recent Labs     01/24/22  1132 01/24/22  0436 01/23/22  2146   APTT 26.8 28.8 24.1      No results for input(s): FE, TIBC, PSAT, FERR in the last 72 hours. No results found for: FOL, RBCF   No results for input(s): PH, PCO2, PO2 in the last 72 hours. No results for input(s): CPK, CKNDX, TROIQ in the last 72 hours.     No lab exists for component: CPKMB  No results found for: CHOL, CHOLX, CHLST, CHOLV, HDL, HDLP, LDL, LDLC, DLDLP, TGLX, TRIGL, TRIGP, CHHD, CHHDX  Lab Results   Component Value Date/Time    Glucose (POC) 134 (H) 01/24/2022 11:46 AM    Glucose (POC) 156 (H) 01/23/2022 08:25 PM    Glucose (POC) 148 (H) 01/23/2022 04:43 PM    Glucose (POC) 153 (H) 01/23/2022 11:51 AM    Glucose (POC) 156 (H) 01/23/2022 07:44 AM     No results found for: COLOR, APPRN, SPGRU, REFSG, EARL, PROTU, GLUCU, KETU, BILU, UROU, RYLAN, LEUKU, GLUKE, EPSU, BACTU, WBCU, RBCU, CASTS, UCRY      Assessment:     Chest pain rule out MI  Type 2 diabetes  Hypertension  Hyperlipidemia  History of CVA   COPD  History of PFO closure  Hypokalemia      Plan:      Today's labs are pending    Continue Abilify 2 mg daily aspirin 80 mg daily Lipitor 20 mg daily Bumex 2 mg daily Celexa 40 mg daily digoxin 0.125 mg daily Lantus insulin 20 units subcu at bed time Metformin 500 twice a day metoprolol 50 mg daily Protonix 40 mg twice a day potassium 14 g daily Topamax 50 mg twice a day trazodone 100 mg at bedtime patient on heparin drip          Stress test neg    D/c home  Cleared by card

## 2022-01-24 NOTE — DISCHARGE INSTRUCTIONS
Patient Education        Using a Metered-Dose Inhaler: Care Instructions  Overview     A metered-dose inhaler lets you breathe medicine into your lungs quickly. Inhaled medicine works faster than the same medicine in a pill. An inhaler allows you to take less medicine than you would need if you took it as a pill. \"Metered-dose\" means that the inhaler gives a measured amount of medicine each time you use it. A metered-dose inhaler gives medicine in the form of a liquid mist.  Your doctor may want you to use a spacer with your inhaler. A spacer is a chamber that you attach to the inhaler. The chamber holds the medicine before you inhale it. That way, you can inhale the medicine in as many breaths as you need. Doctors recommend using a spacer with most metered-dose inhalers. This is even more important when using corticosteroid medicines. Follow-up care is a key part of your treatment and safety. Be sure to make and go to all appointments, and call your doctor if you are having problems. It's also a good idea to know your test results and keep a list of the medicines you take. How can you care for yourself at home? To get started  · Talk with your health care provider to be sure you are using your inhaler the right way. It might help if you practice using it in front of a mirror. Use the inhaler exactly as prescribed. · Check that you have the correct medicine. If you use more than one inhaler, put a label on each one. This will let you know which one to use at the right time. · Keep track of how much medicine is in the inhaler. Check the label to see how many doses are in the container. If you know how many puffs you can take, you can replace the inhaler before you run out. Ask your health care provider how you can keep track of how much medicine is left. · Talk to your health care provider about using a spacer with your inhaler. Spacers make it easier to get the medicine into your lungs.  You may need a spacer if you are using corticosteroid medicines. A spacer can also help if you have problems pressing the inhaler and breathing in at the same time. · If you are using a corticosteroid inhaler, gargle and rinse out your mouth with water after use. Do not swallow the water. Swallowing the water will increase the chance that the medicine will get into your bloodstream. This may make it more likely that you will have side effects. To use a spacer with an inhaler  1. Shake the inhaler. Remove the inhaler cap, and place the mouthpiece of the inhaler into the spacer. Check the inhaler instructions to see if you need to prime your inhaler before you use it. If it needs priming, follow the instructions on how to prime your inhaler. 2. Remove the cap from the spacer. 3. Hold the inhaler upright with the mouthpiece at the bottom. 4. Tilt your head back a little, and breathe out slowly and completely. 5. Place the spacer's mouthpiece in your mouth. 6. Press down on the inhaler to spray one puff of medicine into the spacer, and then start breathing in slowly. Wait to inhale until after you have pressed down on the inhaler. Some spacers have a whistle. If you hear it, you should breathe in more slowly. 7. Hold your breath for 10 seconds. This will let the medicine settle in your lungs. 8. If you need to take a second dose, wait 30 to 60 seconds to allow the inhaler valve to refill. To use an inhaler without a spacer  1. Shake the inhaler as directed. Remove the cap. Check the instructions to see if you need to prime your inhaler before you use it. If it needs priming, follow the instructions on how to prime your inhaler. 2. Hold the inhaler upright with the mouthpiece at the bottom. 3. Tilt your head back a little, and breathe out slowly and completely. 4. Position the inhaler in one of two ways:  ? You can place the inhaler in your mouth. This is easier for most people.  And it lowers the risk that any of the medicine will get into your eyes. ? Or you can place the inhaler 1 to 2 inches in front of your open mouth, without closing your lips over it. Try to open your mouth as wide as you can. Placing the inhaler in front of your open mouth may be better for getting the medicine into your lungs. But some people may find this too hard to do. 5. Start taking slow, even breaths through your mouth. Press down on the inhaler once, then inhale fully. 6. Hold your breath for 10 seconds. This will let the medicine settle in your lungs. 7. If you need to take a second dose, wait 30 to 60 seconds to allow the inhaler valve to refill. Where can you learn more? Go to http://www.pritchard.com/  Enter K111 in the search box to learn more about \"Using a Metered-Dose Inhaler: Care Instructions. \"  Current as of: July 6, 2021               Content Version: 13.0  © 2006-2021 [employer]. Care instructions adapted under license by [employer] (which disclaims liability or warranty for this information). If you have questions about a medical condition or this instruction, always ask your healthcare professional. [place] [address] any warranty or liability for your use of this information. Discharge Instructions       PATIENT ID: Telma Mayorga  MRN: [id number]   YOB: 1975    DATE OF ADMISSION: 1/22/2022  1:30 PM    DATE OF DISCHARGE: 1/24/2022    PRIMARY CARE PROVIDER: Kim Javed MD     ATTENDING PHYSICIAN: Gera Roman MD  DISCHARGING PROVIDER: Alla Browne MD    To contact this individual call [phone] and ask the  to page. If unavailable ask to be transferred the Adult Hospitalist Department.     DISCHARGE DIAGNOSES angina     CONSULTATIONS: IP CONSULT TO CARDIOLOGY  IP CONSULT TO CARDIOLOGY    PROCEDURES/SURGERIES: * No surgery found *    PENDING TEST RESULTS:   At the time of discharge the following test results are still pending: ***    FOLLOW UP APPOINTMENTS:   Follow-up Information     Follow up With Specialties Details Why Contact Info    Keyona Zelaya MD Internal Medicine In 1 week  [address] [facility]  [phone]             ADDITIONAL CARE RECOMMENDATIONS: ***    DIET: {diet:11720}      ACTIVITY: {discharge activity:71035}    Wound care: {[facility] Wound Care Instructions:42960}    EQUIPMENT needed: ***      DISCHARGE MEDICATIONS:   See Medication Reconciliation Form    · It is important that you take the medication exactly as they are prescribed. · Keep your medication in the bottles provided by the pharmacist and keep a list of the medication names, dosages, and times to be taken in your wallet. · Do not take other medications without consulting your doctor. NOTIFY YOUR PHYSICIAN FOR ANY OF THE FOLLOWING:   Fever over 101 degrees for 24 hours. Chest pain, shortness of breath, fever, chills, nausea, vomiting, diarrhea, change in mentation, falling, weakness, bleeding. Severe pain or pain not relieved by medications. Or, any other signs or symptoms that you may have questions about. DISPOSITION:    Home With:   OT  PT  HH  RN       SNF/Inpatient Rehab/LTAC    Independent/assisted living    Hospice    Other:         PROBLEM LIST Updated:  Yes ***       Signed:   Jah Rosen MD  1/24/2022  4:14 PM     Discharge Instructions       PATIENT ID: Pratima Gallegos  MRN: [id number]   YOB: 1975    DATE OF ADMISSION: 1/22/2022  1:30 PM    DATE OF DISCHARGE: 1/24/2022    PRIMARY CARE PROVIDER: Keyona Zelaya MD     ATTENDING PHYSICIAN: Beau Campbell MD  DISCHARGING PROVIDER: Jah Rosen MD    To contact this individual call [phone] and ask the  to page. If unavailable ask to be transferred the Adult Hospitalist Department.     DISCHARGE DIAGNOSES ***    CONSULTATIONS: IP CONSULT TO CARDIOLOGY  IP CONSULT TO CARDIOLOGY    PROCEDURES/SURGERIES: * No surgery found *    PENDING TEST RESULTS:   At the time of discharge the following test results are still pending: ***    FOLLOW UP APPOINTMENTS:   Follow-up Information     Follow up With Specialties Details Why Contact Info    Brielle Huff MD Internal Medicine In 1 week  [address] [facility]  [phone]             ADDITIONAL CARE RECOMMENDATIONS: ***    DIET: {diet:93599}      ACTIVITY: {discharge activity:49086}    Wound care: {[facility] Wound Care Instructions:88432}    EQUIPMENT needed: ***      DISCHARGE MEDICATIONS:   See Medication Reconciliation Form    · It is important that you take the medication exactly as they are prescribed. · Keep your medication in the bottles provided by the pharmacist and keep a list of the medication names, dosages, and times to be taken in your wallet. · Do not take other medications without consulting your doctor. NOTIFY YOUR PHYSICIAN FOR ANY OF THE FOLLOWING:   Fever over 101 degrees for 24 hours. Chest pain, shortness of breath, fever, chills, nausea, vomiting, diarrhea, change in mentation, falling, weakness, bleeding. Severe pain or pain not relieved by medications. Or, any other signs or symptoms that you may have questions about.       DISPOSITION:    Home With:   OT  PT  HH  RN       SNF/Inpatient Rehab/LTAC    Independent/assisted living    Hospice    Other:         PROBLEM LIST Updated:  Yes ***       Signed:   Alva Acevedo MD  1/24/2022  4:14 PM

## 2022-01-24 NOTE — PROGRESS NOTES
Reason for Admission:   Chest Pain                    RUR Score:     15%             PCP: First and Last name:   Gris Evans MD     Name of Practice:    Are you a current patient: Yes/No: yes   Approximate date of last visit: month ago, per patient   Can you participate in a virtual visit if needed: yes    Do you (patient/family) have any concerns for transition/discharge? None reported              Plan for utilizing home health:   Not qualified, works full time    Current Advanced Directive/Advance Care Plan:  Full Code      Healthcare Decision Maker:   Click here to complete [address] including selection of the Healthcare Decision Maker Relationship (ie \"Primary\")            Primary Decision Maker: Sonia Aaron - [phone]    Secondary Decision Maker: Paulina Dela Cruz - Girlfriend - [phone]    Transition of Care Plan:      Home self    CM met with patient to complete DCP assessment. Patient reports that he lives in a one story house, address [address] [place] [address]. [address]. Patient is independent at home, uses no DME, works full time. Patient drives and reports no issues with obtaining his medications. DCP is for patient to return home self care once medically stable, CM continues to follow.

## 2022-01-24 NOTE — PROGRESS NOTES
Report given to [facility]. SBAR format used. Patient transferred to nuclear medication without incident.

## 2022-01-24 NOTE — PROGRESS NOTES
Cardiology Progress Note      1/24/2022 4:48 PM    Admit Date: 1/22/2022    Admit Diagnosis: Chest pain [R07.9]      Subjective:   Patient seen and examined. He remains chest pain free. No overnight events.     Visit Vitals  /80 (BP 1 Location: Left upper arm, BP Patient Position: At rest;Supine)   Pulse (!) 102   Temp 98.3 °F (36.8 °C)   Resp 18   Ht 6' (1.829 m)   Wt 152 kg (335 lb)   SpO2 94%   BMI 45.43 kg/m²     Current Facility-Administered Medications   Medication Dose Route Frequency    heparin 25,000 units in  ml infusion  6-25 Units/kg/hr IntraVENous TITRATE    heparin (porcine) 1,000 unit/mL injection 4,000 Units  4,000 Units IntraVENous PRN    Or    heparin (porcine) 1,000 unit/mL injection 2,000 Units  2,000 Units IntraVENous PRN    albuterol-ipratropium (DUO-NEB) 2.5 MG-0.5 MG/3 ML  3 mL Nebulization Q6H PRN    bumetanide (BUMEX) tablet 2 mg  2 mg Oral DAILY    gabapentin (NEURONTIN) capsule 600 mg  600 mg Oral QID PRN    insulin glargine (LANTUS) injection 20 Units  20 Units SubCUTAneous QHS    metoprolol succinate (TOPROL-XL) XL tablet 50 mg  50 mg Oral DAILY    potassium chloride (K-DUR, KLOR-CON M20) SR tablet 40 mEq  40 mEq Oral DAILY    topiramate (TOPAMAX) tablet 50 mg  50 mg Oral BID WITH MEALS    traZODone (DESYREL) tablet 100 mg  100 mg Oral QHS    ARIPiprazole (ABILIFY) tablet 2 mg  2 mg Oral DAILY    aspirin chewable tablet 81 mg  81 mg Oral DAILY    atorvastatin (LIPITOR) tablet 20 mg  20 mg Oral DAILY    citalopram (CELEXA) tablet 40 mg  40 mg Oral DAILY    digoxin (LANOXIN) tablet 0.125 mg  0.125 mg Oral DAILY    metFORMIN (GLUCOPHAGE) tablet 500 mg  500 mg Oral BID WITH MEALS    pantoprazole (PROTONIX) tablet 40 mg  40 mg Oral BID    insulin lispro (HUMALOG) injection   SubCUTAneous AC&HS    glucose chewable tablet 16 g  4 Tablet Oral PRN    dextrose (D50W) injection syrg 12.5-25 g  25-50 mL IntraVENous PRN    glucagon (GLUCAGEN) injection 1 mg  1 mg IntraMUSCular PRN    acetaminophen (TYLENOL) tablet 650 mg  650 mg Oral Q6H PRN    Or    acetaminophen (TYLENOL) suppository 650 mg  650 mg Rectal Q6H PRN    polyethylene glycol (MIRALAX) packet 17 g  17 g Oral DAILY PRN    ondansetron (ZOFRAN ODT) tablet 4 mg  4 mg Oral Q8H PRN    Or    ondansetron (ZOFRAN) injection 4 mg  4 mg IntraVENous Q6H PRN         Objective:      Physical Exam:  Visit Vitals  /80 (BP 1 Location: Left upper arm, BP Patient Position: At rest;Supine)   Pulse (!) 102   Temp 98.3 °F (36.8 °C)   Resp 18   Ht 6' (1.829 m)   Wt 152 kg (335 lb)   SpO2 94%   BMI 45.43 kg/m²     General Appearance:  Well developed, well nourished,alert and oriented x 3, and individual in no acute distress. Ears/Nose/Mouth/Throat:   Hearing grossly normal.         Neck: Supple. Chest:   Lungs clear to auscultation bilaterally. Cardiovascular:  Regular rate and rhythm, S1, S2 normal, no murmur. Abdomen:   Soft, non-tender, bowel sounds are active. Extremities: No edema bilaterally. Skin: Warm and dry.                Data Review:   Labs:    Recent Results (from the past 24 hour(s))   GLUCOSE, POC    Collection Time: 01/23/22  8:25 PM   Result Value Ref Range    Glucose (POC) 156 (H) 65 - 117 mg/dL    Performed by Charles Jernigan    PTT    Collection Time: 01/23/22  9:46 PM   Result Value Ref Range    aPTT 24.1 21.2 - 34.1 sec    aPTT, therapeutic range   82 - 109 sec   PTT    Collection Time: 01/24/22  4:36 AM   Result Value Ref Range    aPTT 28.8 21.2 - 34.1 sec    aPTT, therapeutic range   82 - 109 sec   ECHO ADULT COMPLETE    Collection Time: 01/24/22  8:54 AM   Result Value Ref Range    AV Peak Velocity 1.0 m/s    AV Peak Gradient 4 mmHg    Aortic Root 3.7 cm    IVSd 1.3 (A) 0.6 - 1.0 cm    LVIDd 5.8 4.2 - 5.9 cm    LVIDs 3.8 cm    LVOT Peak Velocity 0.9 m/s    LVOT Peak Gradient 3 mmHg    LVPWd 1.4 (A) 0.6 - 1.0 cm    LV E' Lateral Velocity 10 cm/s    LV E' Septal Velocity 6 cm/s    LA Diameter 4.8 cm    MV E Wave Deceleration Time 155.0 ms    MV A Velocity 0.57 m/s    MV E Velocity 0.61 m/s    MV Mean Gradient 1 mmHg    MV VTI 22.0 cm    MV Mean Velocity 0.5 m/s    MV Max Velocity 0.9 m/s    MV Peak Gradient 3 mmHg    PV Max Velocity 1.2 m/s    PV Peak Gradient 6 mmHg    Est. RA Pressure 15 mmHg    RVIDd 4.1 cm    TR Max Velocity 1.65 m/s    TR Peak Gradient 11 mmHg    Fractional Shortening 2D 34 28 - 44 %    LVIDd Index 2.19 cm/m2    LVIDs Index 1.43 cm/m2    LV RWT Ratio 0.48     LV Mass 2D 349.2 (A) 88 - 224 g    LV Mass 2D Index 131.8 (A) 49 - 115 g/m2    MV E/A 1.07     E/E' Ratio (Averaged) 8.13     E/E' Lateral 6.10     E/E' Septal 10.17     LA Size Index 1.81 cm/m2    LA/AO Root Ratio 1.30     Ao Root Index 1.40 cm/m2    AV Velocity Ratio 0.90     RVSP 26 mmHg   NUCLEAR CARDIAC STRESS TEST    Collection Time: 01/24/22 10:48 AM   Result Value Ref Range    Stress Target  bpm    Baseline HR 87 bpm    Baseline Systolic  mmHg    Baseline Diastolic BP 76 mmHg    Stress Peak  bpm    Stress Percent HR Achieved 85 %    Stress Stage 1 Duration 1 min min:sec    Stress Stage 1 HR 99 bpm    Stress Stage 2 Duration 2 min min:sec    Stress Stage 2 HR 96 bpm    Stress Stage 2 /68 mmHg    Stress Stage 3 Duration 3 min min:sec    Stress Stage 3 HR 95 bpm    Stress Stage 4 Duration 4 min min:sec    Stress Stage 4 HR 93 bpm    Stress Stage 4 /69 mmHg    Baseline ST Depression 0 mm    Stress ST Depression 0 mm    TID 1.03     Nuc Stress EF 51 %   PTT    Collection Time: 01/24/22 11:32 AM   Result Value Ref Range    aPTT 26.8 21.2 - 34.1 sec    aPTT, therapeutic range   82 - 109 sec   GLUCOSE, POC    Collection Time: 01/24/22 11:46 AM   Result Value Ref Range    Glucose (POC) 134 (H) 65 - 117 mg/dL    Performed by Wild Gambino        Telemetry: normal sinus rhythm      Assessment:   Chest pain  Hypokalemia  Diabetes mellitus  Hypertension  Hyperlipidemia  Coronary artery disease  COPD  History of CVA  Status post PFO closure    Plan:   58-year-old male with a past medical history as above who is seen for chest pain  -Patient with atypical chest pain however concerning symptoms in light of multiple risk factors, known prior coronary artery disease as per patient. Fortunately, his troponins have been negative. There are no EKG changes suggestive of ischemia. -Echocardiogram showed preserved LV systolic function with no clear wall motion abnormalities. I reassured the patient regarding these findings.   -He underwent pharmacologic stress testing earlier today, showed no significant ischemia.   He can be discharged from the cardiac standpoint for outpatient follow-up with his regular cardiologist.  -Continue remaining medications       Thank you for allowing us to participate in the care of your patient

## 2022-01-24 NOTE — PROGRESS NOTES
General Daily Progress Note          Patient Name:   Erlin Ojeda       YOB: 1975       Age:  51 y.o.       Admit Date: 1/22/2022      Subjective:       Patient is a 51y.o. year old male morbidly obese history of diabetes hypertension hyperlipidemia depression history of COPD CVA multinodular goiter cardiomyopathy carotid artery stenosis  Came to emergency room complaining of chest pain chest pain 10 out of 10/retrosternal radiates over the left no nausea no vomiting came to emergency room seen by the ER physician patient had a recent closure of the PFO     Seen by the ER physician patient was started on heparin drip and nitro drip admit to ICU for further work-up     Initial cardiac enzymes are negative       For stress test and echo      Objective:     Visit Vitals  /72 (BP 1 Location: Left lower arm, BP Patient Position: At rest)   Pulse 83   Temp 97.7 °F (36.5 °C)   Resp 12   Ht 6' (1.829 m)   Wt 152 kg (335 lb)   SpO2 97%   BMI 45.43 kg/m²        Recent Results (from the past 24 hour(s))   GLUCOSE, POC    Collection Time: 01/23/22 11:51 AM   Result Value Ref Range    Glucose (POC) 153 (H) 65 - 117 mg/dL    Performed by Jessica Leigh    PTT    Collection Time: 01/23/22 12:25 PM   Result Value Ref Range    aPTT >153.0 (HH) 21.2 - 34.1 sec    aPTT, therapeutic range   82 - 109 sec   GLUCOSE, POC    Collection Time: 01/23/22  4:43 PM   Result Value Ref Range    Glucose (POC) 148 (H) 65 - 117 mg/dL    Performed by ANDREW BERRY    GLUCOSE, POC    Collection Time: 01/23/22  8:25 PM   Result Value Ref Range    Glucose (POC) 156 (H) 65 - 117 mg/dL    Performed by Sonja Nino    PTT    Collection Time: 01/23/22  9:46 PM   Result Value Ref Range    aPTT 24.1 21.2 - 34.1 sec    aPTT, therapeutic range   82 - 109 sec   PTT    Collection Time: 01/24/22  4:36 AM   Result Value Ref Range    aPTT 28.8 21.2 - 34.1 sec    aPTT, therapeutic range   82 - 109 sec   NUCLEAR CARDIAC STRESS TEST Collection Time: 01/24/22  8:08 AM   Result Value Ref Range    Stress Target  bpm   ECHO ADULT COMPLETE    Collection Time: 01/24/22  8:54 AM   Result Value Ref Range    AV Peak Velocity 1.0 m/s    AV Peak Gradient 4 mmHg    Aortic Root 3.7 cm    IVSd 1.3 (A) 0.6 - 1.0 cm    LVIDd 5.8 4.2 - 5.9 cm    LVIDs 3.8 cm    LVOT Peak Velocity 0.9 m/s    LVOT Peak Gradient 3 mmHg    LVPWd 1.4 (A) 0.6 - 1.0 cm    LV E' Lateral Velocity 10 cm/s    LV E' Septal Velocity 6 cm/s    LA Diameter 4.8 cm    MV E Wave Deceleration Time 155.0 ms    MV A Velocity 0.57 m/s    MV E Velocity 0.61 m/s    MV Mean Gradient 1 mmHg    MV VTI 22.0 cm    MV Mean Velocity 0.5 m/s    MV Max Velocity 0.9 m/s    MV Peak Gradient 3 mmHg    PV Max Velocity 1.2 m/s    PV Peak Gradient 6 mmHg    Est. RA Pressure 15 mmHg    RVIDd 4.1 cm    TR Max Velocity 1.65 m/s    TR Peak Gradient 11 mmHg    Fractional Shortening 2D 34 28 - 44 %    LVIDd Index 2.19 cm/m2    LVIDs Index 1.43 cm/m2    LV RWT Ratio 0.48     LV Mass 2D 349.2 (A) 88 - 224 g    LV Mass 2D Index 131.8 (A) 49 - 115 g/m2    MV E/A 1.07     E/E' Ratio (Averaged) 8.13     E/E' Lateral 6.10     E/E' Septal 10.17     LA Size Index 1.81 cm/m2    LA/AO Root Ratio 1.30     Ao Root Index 1.40 cm/m2    AV Velocity Ratio 0.90     RVSP 26 mmHg     [unfilled]      Review of Systems    Constitutional: Negative for chills and fever. HENT: Negative. Eyes: Negative. Respiratory: Negative. Cardiovascular: Negative. Gastrointestinal: Negative for abdominal pain and nausea. Skin: Negative. Neurological: Negative. Physical Exam:      Constitutional: pt is oriented to person, place, and time. HENT:   Head: Normocephalic and atraumatic. Eyes: Pupils are equal, round, and reactive to light. EOM are normal.   Cardiovascular: Normal rate, regular rhythm and normal heart sounds. Pulmonary/Chest: Breath sounds normal. No wheezes. No rales. Exhibits no tenderness.    Abdominal: Soft. Bowel sounds are normal. There is no abdominal tenderness. There is no rebound and no guarding. Musculoskeletal: Normal range of motion. Neurological: pt is alert and oriented to person, place, and time. XR CHEST PORT   Final Result   Cardiomegaly. Lungs clear.            Recent Results (from the past 24 hour(s))   GLUCOSE, POC    Collection Time: 01/23/22 11:51 AM   Result Value Ref Range    Glucose (POC) 153 (H) 65 - 117 mg/dL    Performed by Dorthey Boast    PTT    Collection Time: 01/23/22 12:25 PM   Result Value Ref Range    aPTT >153.0 (HH) 21.2 - 34.1 sec    aPTT, therapeutic range   82 - 109 sec   GLUCOSE, POC    Collection Time: 01/23/22  4:43 PM   Result Value Ref Range    Glucose (POC) 148 (H) 65 - 117 mg/dL    Performed by ANDREW BERRY    GLUCOSE, POC    Collection Time: 01/23/22  8:25 PM   Result Value Ref Range    Glucose (POC) 156 (H) 65 - 117 mg/dL    Performed by Mychal Torre    PTT    Collection Time: 01/23/22  9:46 PM   Result Value Ref Range    aPTT 24.1 21.2 - 34.1 sec    aPTT, therapeutic range   82 - 109 sec   PTT    Collection Time: 01/24/22  4:36 AM   Result Value Ref Range    aPTT 28.8 21.2 - 34.1 sec    aPTT, therapeutic range   82 - 109 sec   NUCLEAR CARDIAC STRESS TEST    Collection Time: 01/24/22  8:08 AM   Result Value Ref Range    Stress Target  bpm   ECHO ADULT COMPLETE    Collection Time: 01/24/22  8:54 AM   Result Value Ref Range    AV Peak Velocity 1.0 m/s    AV Peak Gradient 4 mmHg    Aortic Root 3.7 cm    IVSd 1.3 (A) 0.6 - 1.0 cm    LVIDd 5.8 4.2 - 5.9 cm    LVIDs 3.8 cm    LVOT Peak Velocity 0.9 m/s    LVOT Peak Gradient 3 mmHg    LVPWd 1.4 (A) 0.6 - 1.0 cm    LV E' Lateral Velocity 10 cm/s    LV E' Septal Velocity 6 cm/s    LA Diameter 4.8 cm    MV E Wave Deceleration Time 155.0 ms    MV A Velocity 0.57 m/s    MV E Velocity 0.61 m/s    MV Mean Gradient 1 mmHg    MV VTI 22.0 cm    MV Mean Velocity 0.5 m/s    MV Max Velocity 0.9 m/s    MV Peak Gradient 3 mmHg PV Max Velocity 1.2 m/s    PV Peak Gradient 6 mmHg    Est. RA Pressure 15 mmHg    RVIDd 4.1 cm    TR Max Velocity 1.65 m/s    TR Peak Gradient 11 mmHg    Fractional Shortening 2D 34 28 - 44 %    LVIDd Index 2.19 cm/m2    LVIDs Index 1.43 cm/m2    LV RWT Ratio 0.48     LV Mass 2D 349.2 (A) 88 - 224 g    LV Mass 2D Index 131.8 (A) 49 - 115 g/m2    MV E/A 1.07     E/E' Ratio (Averaged) 8.13     E/E' Lateral 6.10     E/E' Septal 10.17     LA Size Index 1.81 cm/m2    LA/AO Root Ratio 1.30     Ao Root Index 1.40 cm/m2    AV Velocity Ratio 0.90     RVSP 26 mmHg       Results     Procedure Component Value Units Date/Time    COVID-19 RAPID TEST [[id number]] Collected: 01/22/22 1744    Order Status: Completed Specimen: Nasopharyngeal Updated: 01/22/22 2320     Specimen source       Please find results under separate order           COVID-19 rapid test Not Detected        Comment: Rapid Abbott ID Now   Rapid NAAT:  The specimen is NEGATIVE for SARS-CoV-2, the novel coronavirus associated with COVID-19. Negative results should be treated as presumptive and, if inconsistent with clinical signs and symptoms or necessary for patient management, should be tested with an alternative molecular assay. Negative results do not preclude SARS-CoV-2 infection and should not be used as the sole basis for patient management decisions. This test has been authorized by the FDA under   an Emergency Use Authorization (EUA) for use by authorized laboratories.  Fact sheet for Healthcare Providers: ConventionUpdate.co.nz Fact sheet for Patients: ConventionUpdate.co.nz   Methodology: Isothermal Nucleic Acid Amplification                Labs:     Recent Labs     01/23/22  0336 01/22/22  1919   WBC 6.1 8.7   HGB 12.1 12.3   HCT 37.0 38.0    229     Recent Labs     01/23/22  0336 01/22/22  1347    133*   K 2.8* 2.7*   CL 96* 94*   CO2 36* 35*   BUN 19 19   CREA 1.16 1.12   * 107*   CA 9.0 10.1 Recent Labs     01/23/22  0336 01/22/22  1347   ALT 48 53   AP 70 84   TBILI 0.2 0.5   TP 7.5 8.8*   ALB 2.9* 3.6   GLOB 4.6* 5.2*     Recent Labs     01/24/22  0436 01/23/22  2146 01/23/22  1225   APTT 28.8 24.1 >153.0*      No results for input(s): FE, TIBC, PSAT, FERR in the last 72 hours. No results found for: FOL, RBCF   No results for input(s): PH, PCO2, PO2 in the last 72 hours. No results for input(s): CPK, CKNDX, TROIQ in the last 72 hours. No lab exists for component: CPKMB  No results found for: CHOL, CHOLX, CHLST, CHOLV, HDL, HDLP, LDL, LDLC, DLDLP, TGLX, TRIGL, TRIGP, CHHD, CHHDX  Lab Results   Component Value Date/Time    Glucose (POC) 156 (H) 01/23/2022 08:25 PM    Glucose (POC) 148 (H) 01/23/2022 04:43 PM    Glucose (POC) 153 (H) 01/23/2022 11:51 AM    Glucose (POC) 156 (H) 01/23/2022 07:44 AM    Glucose (POC) 164 (H) 01/22/2022 08:59 PM     No results found for: COLOR, APPRN, SPGRU, REFSG, EARL, PROTU, GLUCU, KETU, BILU, UROU, RYLAN, LEUKU, GLUKE, EPSU, BACTU, WBCU, RBCU, CASTS, UCRY      Assessment:     Chest pain rule out MI  Type 2 diabetes  Hypertension  Hyperlipidemia  History of CVA   COPD  History of PFO closure  Hypokalemia      Plan:      Today's labs are pending    Continue Abilify 2 mg daily aspirin 80 mg daily Lipitor 20 mg daily Bumex 2 mg daily Celexa 40 mg daily digoxin 0.125 mg daily Lantus insulin 20 units subcu at bed time Metformin 500 twice a day metoprolol 50 mg daily Protonix 40 mg twice a day potassium 14 g daily Topamax 50 mg twice a day trazodone 100 mg at bedtime patient on heparin drip    Current Facility-Administered Medications:     technetium sestamibi (CARDIOLITE) injection 33 millicurie, 33 millicurie, IntraVENous, ONCE, Dane Verma MD    heparin 25,000 units in  ml infusion, 6-25 Units/kg/hr, IntraVENous, TITRATE, Lisa Lugo MD, Last Rate: 15.2 mL/hr at 01/24/22 0601, 10 Units/kg/hr at 01/24/22 0601    heparin (porcine) 1,000 unit/mL injection 4,000 Units, 4,000 Units, IntraVENous, PRN, 4,000 Units at 01/24/22 0559 **OR** heparin (porcine) 1,000 unit/mL injection 2,000 Units, 2,000 Units, IntraVENous, PRN, Lisa Lugo MD, 2,000 Units at 01/23/22 0521    albuterol-ipratropium (DUO-NEB) 2.5 MG-0.5 MG/3 ML, 3 mL, Nebulization, Q6H PRN, Lisa Lugo MD    bumetanide (BUMEX) tablet 2 mg, 2 mg, Oral, DAILY, Lisa Lugo MD, 2 mg at 01/23/22 0856    gabapentin (NEURONTIN) capsule 600 mg, 600 mg, Oral, QID PRN, Lisa Lugo MD, 600 mg at 01/22/22 2109    insulin glargine (LANTUS) injection 20 Units, 20 Units, SubCUTAneous, QHS, Lisa Lugo MD, 20 Units at 01/23/22 2131    metoprolol succinate (TOPROL-XL) XL tablet 50 mg, 50 mg, Oral, DAILY, Lisa Lugo MD, 50 mg at 01/23/22 0802    potassium chloride (K-DUR, KLOR-CON M20) SR tablet 40 mEq, 40 mEq, Oral, DAILY, Lisa Lugo MD, 40 mEq at 01/23/22 0802    topiramate (TOPAMAX) tablet 50 mg, 50 mg, Oral, BID WITH MEALS, Lisa Lugo MD, 50 mg at 01/23/22 1715    traZODone (DESYREL) tablet 100 mg, 100 mg, Oral, QHS, Lisa Lugo MD, 100 mg at 01/23/22 2131    ARIPiprazole (ABILIFY) tablet 2 mg, 2 mg, Oral, DAILY, Lisa Lugo MD, 2 mg at 01/23/22 0321    aspirin chewable tablet 81 mg, 81 mg, Oral, DAILY, Lisa Lugo MD, 81 mg at 01/23/22 0801    atorvastatin (LIPITOR) tablet 20 mg, 20 mg, Oral, DAILY, Lisa Lugo MD, 20 mg at 01/23/22 0802    citalopram (CELEXA) tablet 40 mg, 40 mg, Oral, DAILY, Lisa Lugo MD, 40 mg at 01/23/22 0856    digoxin (LANOXIN) tablet 0.125 mg, 0.125 mg, Oral, DAILY, Lisa Lugo MD, 0.125 mg at 01/23/22 1337    metFORMIN (GLUCOPHAGE) tablet 500 mg, 500 mg, Oral, BID WITH MEALS, Lisa Lguo MD, 500 mg at 01/23/22 1715    pantoprazole (PROTONIX) tablet 40 mg, 40 mg, Oral, BID, Lisa Lugo MD, 40 mg at 01/23/22 2031    insulin lispro (HUMALOG) injection, , SubCUTAneous, AC&HS, Elenita Gibbs MD, 3 Units at 01/23/22 1630    glucose chewable tablet 16 g, 4 Tablet, Oral, PRN, Jose Lugo Res, MD    dextrose (D50W) injection syrg 12.5-25 g, 25-50 mL, IntraVENous, PRN, Jose Lugo Res, MD    glucagon [facility]) injection 1 mg, 1 mg, IntraMUSCular, PRN, Jose Lugo Res, MD    acetaminophen (TYLENOL) tablet 650 mg, 650 mg, Oral, Q6H PRN, 650 mg at 01/23/22 1550 **OR** acetaminophen (TYLENOL) suppository 650 mg, 650 mg, Rectal, Q6H PRN, Jose Lugo Res, MD    polyethylene glycol (MIRALAX) packet 17 g, 17 g, Oral, DAILY PRN, Lisa Lugo MD    ondansetron (ZOFRAN ODT) tablet 4 mg, 4 mg, Oral, Q8H PRN **OR** ondansetron (ZOFRAN) injection 4 mg, 4 mg, IntraVENous, Q6H PRN, Lisa Lugo MD        Follow-up stress test and echo today  Follow-up stress

## 2022-02-25 ENCOUNTER — TELEPHONE (OUTPATIENT)
Dept: ENDOCRINOLOGY | Age: 47
End: 2022-02-25

## 2022-02-25 DIAGNOSIS — E89.0 POSTOPERATIVE HYPOTHYROIDISM: Primary | ICD-10-CM

## 2022-02-25 NOTE — TELEPHONE ENCOUNTER
Called all numbers we have on file for pt but was unable to LVM 2 numbers are not in service ans one number the LVM has not been setup

## 2022-03-18 PROBLEM — E11.65 TYPE 2 DIABETES MELLITUS WITH HYPERGLYCEMIA, WITHOUT LONG-TERM CURRENT USE OF INSULIN (HCC): Status: ACTIVE | Noted: 2020-12-17

## 2022-03-18 PROBLEM — R91.8 PULMONARY NODULES: Status: ACTIVE | Noted: 2020-12-17

## 2022-03-18 PROBLEM — R04.2 HEMOPTYSIS: Status: ACTIVE | Noted: 2021-07-13

## 2022-03-18 PROBLEM — I42.9 CARDIOMYOPATHY (HCC): Status: ACTIVE | Noted: 2020-12-17

## 2022-03-19 PROBLEM — R55 SYNCOPE: Status: ACTIVE | Noted: 2021-08-03

## 2022-03-19 PROBLEM — I63.9 CVA (CEREBRAL VASCULAR ACCIDENT) (HCC): Status: ACTIVE | Noted: 2021-05-06

## 2022-03-19 PROBLEM — R56.9 SEIZURE AS LATE EFFECT OF CEREBROVASCULAR ACCIDENT (CVA) (HCC): Status: ACTIVE | Noted: 2021-08-03

## 2022-03-19 PROBLEM — J44.9 COPD (CHRONIC OBSTRUCTIVE PULMONARY DISEASE) (HCC): Status: ACTIVE | Noted: 2020-12-17

## 2022-03-19 PROBLEM — R13.14 PHARYNGOESOPHAGEAL DYSPHAGIA: Status: ACTIVE | Noted: 2021-02-22

## 2022-03-19 PROBLEM — E89.0 POSTOPERATIVE HYPOTHYROIDISM: Status: ACTIVE | Noted: 2021-05-06

## 2022-03-19 PROBLEM — I25.111 CORONARY ARTERY DISEASE INVOLVING NATIVE CORONARY ARTERY OF NATIVE HEART WITH ANGINA PECTORIS WITH DOCUMENTED SPASM (HCC): Status: ACTIVE | Noted: 2020-12-17

## 2022-03-19 PROBLEM — I10 ESSENTIAL HYPERTENSION: Status: ACTIVE | Noted: 2020-12-17

## 2022-03-19 PROBLEM — J44.1 COPD EXACERBATION (HCC): Status: ACTIVE | Noted: 2021-08-18

## 2022-03-19 PROBLEM — R07.9 CHEST PAIN: Status: ACTIVE | Noted: 2021-06-26

## 2022-03-19 PROBLEM — I24.9 ACS (ACUTE CORONARY SYNDROME) (HCC): Status: ACTIVE | Noted: 2021-07-13

## 2022-03-19 PROBLEM — E04.2 MULTINODULAR GOITER: Status: ACTIVE | Noted: 2020-12-30

## 2022-03-19 PROBLEM — I69.398 SEIZURE AS LATE EFFECT OF CEREBROVASCULAR ACCIDENT (CVA) (HCC): Status: ACTIVE | Noted: 2021-08-03

## 2022-03-19 PROBLEM — I65.29 CAROTID ARTERY STENOSIS: Status: ACTIVE | Noted: 2020-09-07

## 2022-03-20 PROBLEM — G47.33 OBSTRUCTIVE SLEEP APNEA: Status: ACTIVE | Noted: 2020-12-17

## 2022-03-20 PROBLEM — R09.02 HYPOXEMIA: Status: ACTIVE | Noted: 2021-08-18

## 2022-03-20 PROBLEM — E66.01 OBESITY, MORBID (HCC): Status: ACTIVE | Noted: 2020-09-07

## 2022-07-16 ENCOUNTER — APPOINTMENT (OUTPATIENT)
Dept: CT IMAGING | Age: 47
DRG: 058 | End: 2022-07-16
Attending: STUDENT IN AN ORGANIZED HEALTH CARE EDUCATION/TRAINING PROGRAM
Payer: MEDICAID

## 2022-07-16 ENCOUNTER — APPOINTMENT (OUTPATIENT)
Dept: GENERAL RADIOLOGY | Age: 47
DRG: 058 | End: 2022-07-16
Attending: STUDENT IN AN ORGANIZED HEALTH CARE EDUCATION/TRAINING PROGRAM
Payer: MEDICAID

## 2022-07-16 ENCOUNTER — HOSPITAL ENCOUNTER (INPATIENT)
Age: 47
LOS: 2 days | Discharge: HOME | DRG: 058 | End: 2022-07-18
Attending: STUDENT IN AN ORGANIZED HEALTH CARE EDUCATION/TRAINING PROGRAM | Admitting: INTERNAL MEDICINE
Payer: MEDICAID

## 2022-07-16 DIAGNOSIS — E83.42 HYPOMAGNESEMIA: ICD-10-CM

## 2022-07-16 DIAGNOSIS — R29.90 STROKE-LIKE SYMPTOM: Primary | ICD-10-CM

## 2022-07-16 DIAGNOSIS — E87.6 HYPOKALEMIA: ICD-10-CM

## 2022-07-16 LAB
ALBUMIN SERPL-MCNC: 3.7 G/DL (ref 3.5–5)
ALBUMIN/GLOB SERPL: 0.8 {RATIO} (ref 1.1–2.2)
ALP SERPL-CCNC: 73 U/L (ref 45–117)
ALT SERPL-CCNC: 54 U/L (ref 12–78)
ANION GAP SERPL CALC-SCNC: 10 MMOL/L (ref 5–15)
APTT PPP: 23.9 SEC (ref 21.2–34.1)
AST SERPL W P-5'-P-CCNC: 28 U/L (ref 15–37)
BASOPHILS # BLD: 0 K/UL (ref 0–0.1)
BASOPHILS NFR BLD: 0 % (ref 0–1)
BILIRUB SERPL-MCNC: 0.6 MG/DL (ref 0.2–1)
BNP SERPL-MCNC: 49 PG/ML
BUN SERPL-MCNC: 16 MG/DL (ref 6–20)
BUN/CREAT SERPL: 15 (ref 12–20)
CA-I BLD-MCNC: 9.4 MG/DL (ref 8.5–10.1)
CHLORIDE SERPL-SCNC: 93 MMOL/L (ref 97–108)
CO2 SERPL-SCNC: 31 MMOL/L (ref 21–32)
CREAT SERPL-MCNC: 1.09 MG/DL (ref 0.7–1.3)
DATE LAST DOSE: ABNORMAL
DIFFERENTIAL METHOD BLD: ABNORMAL
DIGOXIN SERPL-MCNC: <0.2 NG/ML (ref 0.9–2)
EOSINOPHIL # BLD: 0.1 K/UL (ref 0–0.4)
EOSINOPHIL NFR BLD: 1 % (ref 0–7)
ERYTHROCYTE [DISTWIDTH] IN BLOOD BY AUTOMATED COUNT: 13.2 % (ref 11.5–14.5)
GLOBULIN SER CALC-MCNC: 4.4 G/DL (ref 2–4)
GLUCOSE BLD STRIP.AUTO-MCNC: 107 MG/DL (ref 65–117)
GLUCOSE SERPL-MCNC: 108 MG/DL (ref 65–100)
HCT VFR BLD AUTO: 36.8 % (ref 36.6–50.3)
HGB BLD-MCNC: 12.9 G/DL (ref 12.1–17)
IMM GRANULOCYTES # BLD AUTO: 0 K/UL (ref 0–0.04)
IMM GRANULOCYTES NFR BLD AUTO: 0 % (ref 0–0.5)
INR PPP: 1 (ref 0.9–1.1)
LACTATE SERPL-SCNC: 1.9 MMOL/L (ref 0.4–2)
LYMPHOCYTES # BLD: 3.6 K/UL (ref 0.8–3.5)
LYMPHOCYTES NFR BLD: 33 % (ref 12–49)
MAGNESIUM SERPL-MCNC: 1.5 MG/DL (ref 1.6–2.4)
MCH RBC QN AUTO: 32.7 PG (ref 26–34)
MCHC RBC AUTO-ENTMCNC: 35.1 G/DL (ref 30–36.5)
MCV RBC AUTO: 93.2 FL (ref 80–99)
MONOCYTES # BLD: 0.7 K/UL (ref 0–1)
MONOCYTES NFR BLD: 7 % (ref 5–13)
NEUTS SEG # BLD: 6.3 K/UL (ref 1.8–8)
NEUTS SEG NFR BLD: 59 % (ref 32–75)
NRBC # BLD: 0 K/UL (ref 0–0.01)
NRBC BLD-RTO: 0 PER 100 WBC
PERFORMED BY, TECHID: NORMAL
PLATELET # BLD AUTO: 227 K/UL (ref 150–400)
PMV BLD AUTO: 9.6 FL (ref 8.9–12.9)
POTASSIUM SERPL-SCNC: 2.4 MMOL/L (ref 3.5–5.1)
PROT SERPL-MCNC: 8.1 G/DL (ref 6.4–8.2)
PROTHROMBIN TIME: 13.1 SEC (ref 11.9–14.6)
RBC # BLD AUTO: 3.95 M/UL (ref 4.1–5.7)
REPORTED DOSE,DOSE: ABNORMAL UNITS
SODIUM SERPL-SCNC: 134 MMOL/L (ref 136–145)
THERAPEUTIC RANGE,PTTT: NORMAL SEC (ref 82–109)
TROPONIN-HIGH SENSITIVITY: 9 NG/L (ref 0–76)
WBC # BLD AUTO: 10.8 K/UL (ref 4.1–11.1)

## 2022-07-16 PROCEDURE — 85025 COMPLETE CBC W/AUTO DIFF WBC: CPT

## 2022-07-16 PROCEDURE — 93005 ELECTROCARDIOGRAM TRACING: CPT

## 2022-07-16 PROCEDURE — 70450 CT HEAD/BRAIN W/O DYE: CPT

## 2022-07-16 PROCEDURE — 85730 THROMBOPLASTIN TIME PARTIAL: CPT

## 2022-07-16 PROCEDURE — 74011250636 HC RX REV CODE- 250/636: Performed by: STUDENT IN AN ORGANIZED HEALTH CARE EDUCATION/TRAINING PROGRAM

## 2022-07-16 PROCEDURE — 83735 ASSAY OF MAGNESIUM: CPT

## 2022-07-16 PROCEDURE — 36415 COLL VENOUS BLD VENIPUNCTURE: CPT

## 2022-07-16 PROCEDURE — 83880 ASSAY OF NATRIURETIC PEPTIDE: CPT

## 2022-07-16 PROCEDURE — 74011250637 HC RX REV CODE- 250/637: Performed by: STUDENT IN AN ORGANIZED HEALTH CARE EDUCATION/TRAINING PROGRAM

## 2022-07-16 PROCEDURE — 65270000029 HC RM PRIVATE

## 2022-07-16 PROCEDURE — 99285 EMERGENCY DEPT VISIT HI MDM: CPT

## 2022-07-16 PROCEDURE — 83605 ASSAY OF LACTIC ACID: CPT

## 2022-07-16 PROCEDURE — 84484 ASSAY OF TROPONIN QUANT: CPT

## 2022-07-16 PROCEDURE — 80053 COMPREHEN METABOLIC PANEL: CPT

## 2022-07-16 PROCEDURE — 71045 X-RAY EXAM CHEST 1 VIEW: CPT

## 2022-07-16 PROCEDURE — 82962 GLUCOSE BLOOD TEST: CPT

## 2022-07-16 PROCEDURE — 80162 ASSAY OF DIGOXIN TOTAL: CPT

## 2022-07-16 PROCEDURE — 85610 PROTHROMBIN TIME: CPT

## 2022-07-16 RX ORDER — MAGNESIUM SULFATE HEPTAHYDRATE 40 MG/ML
2 INJECTION, SOLUTION INTRAVENOUS
Status: COMPLETED | OUTPATIENT
Start: 2022-07-16 | End: 2022-07-17

## 2022-07-16 RX ORDER — POTASSIUM CHLORIDE 7.45 MG/ML
10 INJECTION INTRAVENOUS
Status: COMPLETED | OUTPATIENT
Start: 2022-07-16 | End: 2022-07-17

## 2022-07-16 RX ORDER — CLOPIDOGREL 300 MG/1
300 TABLET, FILM COATED ORAL ONCE
Status: COMPLETED | OUTPATIENT
Start: 2022-07-16 | End: 2022-07-16

## 2022-07-16 RX ORDER — POTASSIUM CHLORIDE 20 MEQ/1
40 TABLET, EXTENDED RELEASE ORAL
Status: COMPLETED | OUTPATIENT
Start: 2022-07-16 | End: 2022-07-16

## 2022-07-16 RX ADMIN — CLOPIDOGREL BISULFATE 300 MG: 300 TABLET, FILM COATED ORAL at 23:24

## 2022-07-16 RX ADMIN — MAGNESIUM SULFATE HEPTAHYDRATE 2 G: 2 INJECTION, SOLUTION INTRAVENOUS at 23:27

## 2022-07-16 RX ADMIN — POTASSIUM CHLORIDE 10 MEQ: 7.46 INJECTION, SOLUTION INTRAVENOUS at 23:27

## 2022-07-16 RX ADMIN — POTASSIUM CHLORIDE 40 MEQ: 1500 TABLET, EXTENDED RELEASE ORAL at 23:24

## 2022-07-16 NOTE — Clinical Note
Status[de-identified] INPATIENT [101]   Type of Bed: Telemetry [19]   Cardiac Monitoring Required?: Yes   Inpatient Hospitalization Certified Necessary for the Following Reasons: 3.  Patient receiving treatment that can only be provided in an inpatient setting (further clarification in H&P documentation)   Admitting Diagnosis: Stroke-like symptoms [[id number]]   Admitting Physician: Elle Mario   Attending Physician: Elle Mario   Estimated Length of Stay: 2 Midnights   Discharge Plan[de-identified] Home with Office Follow-up

## 2022-07-17 LAB
ANION GAP SERPL CALC-SCNC: 6 MMOL/L (ref 5–15)
ATRIAL RATE: 101 BPM
ATRIAL RATE: 83 BPM
BUN SERPL-MCNC: 13 MG/DL (ref 6–20)
BUN/CREAT SERPL: 14 (ref 12–20)
CA-I BLD-MCNC: 8.6 MG/DL (ref 8.5–10.1)
CALCULATED P AXIS, ECG09: 26 DEGREES
CALCULATED P AXIS, ECG09: 41 DEGREES
CALCULATED R AXIS, ECG10: 31 DEGREES
CALCULATED R AXIS, ECG10: 7 DEGREES
CALCULATED T AXIS, ECG11: 2 DEGREES
CALCULATED T AXIS, ECG11: 27 DEGREES
CHLORIDE SERPL-SCNC: 97 MMOL/L (ref 97–108)
CO2 SERPL-SCNC: 33 MMOL/L (ref 21–32)
CREAT SERPL-MCNC: 0.94 MG/DL (ref 0.7–1.3)
DIAGNOSIS, 93000: NORMAL
DIAGNOSIS, 93000: NORMAL
GLUCOSE BLD STRIP.AUTO-MCNC: 119 MG/DL (ref 65–117)
GLUCOSE BLD STRIP.AUTO-MCNC: 202 MG/DL (ref 65–117)
GLUCOSE BLD STRIP.AUTO-MCNC: 225 MG/DL (ref 65–117)
GLUCOSE SERPL-MCNC: 237 MG/DL (ref 65–100)
MAGNESIUM SERPL-MCNC: 2.2 MG/DL (ref 1.6–2.4)
P-R INTERVAL, ECG05: 158 MS
P-R INTERVAL, ECG05: 186 MS
PERFORMED BY, TECHID: ABNORMAL
POTASSIUM SERPL-SCNC: 2.7 MMOL/L (ref 3.5–5.1)
Q-T INTERVAL, ECG07: 450 MS
Q-T INTERVAL, ECG07: 464 MS
QRS DURATION, ECG06: 108 MS
QRS DURATION, ECG06: 116 MS
QTC CALCULATION (BEZET), ECG08: 528 MS
QTC CALCULATION (BEZET), ECG08: 601 MS
SODIUM SERPL-SCNC: 136 MMOL/L (ref 136–145)
TROPONIN-HIGH SENSITIVITY: 9 NG/L (ref 0–76)
VENTRICULAR RATE, ECG03: 101 BPM
VENTRICULAR RATE, ECG03: 83 BPM

## 2022-07-17 PROCEDURE — 74011250636 HC RX REV CODE- 250/636: Performed by: STUDENT IN AN ORGANIZED HEALTH CARE EDUCATION/TRAINING PROGRAM

## 2022-07-17 PROCEDURE — 80048 BASIC METABOLIC PNL TOTAL CA: CPT

## 2022-07-17 PROCEDURE — 94640 AIRWAY INHALATION TREATMENT: CPT

## 2022-07-17 PROCEDURE — 74011250637 HC RX REV CODE- 250/637: Performed by: INTERNAL MEDICINE

## 2022-07-17 PROCEDURE — 92610 EVALUATE SWALLOWING FUNCTION: CPT

## 2022-07-17 PROCEDURE — 93005 ELECTROCARDIOGRAM TRACING: CPT

## 2022-07-17 PROCEDURE — 74011250636 HC RX REV CODE- 250/636: Performed by: INTERNAL MEDICINE

## 2022-07-17 PROCEDURE — 65270000032 HC RM SEMIPRIVATE

## 2022-07-17 PROCEDURE — 82962 GLUCOSE BLOOD TEST: CPT

## 2022-07-17 PROCEDURE — 83735 ASSAY OF MAGNESIUM: CPT

## 2022-07-17 PROCEDURE — 36415 COLL VENOUS BLD VENIPUNCTURE: CPT

## 2022-07-17 RX ORDER — DIGOXIN 250 MCG
0.12 TABLET ORAL DAILY
Status: DISCONTINUED | OUTPATIENT
Start: 2022-07-17 | End: 2022-07-18 | Stop reason: HOSPADM

## 2022-07-17 RX ORDER — LEVOTHYROXINE SODIUM 100 UG/1
200 TABLET ORAL
Status: DISCONTINUED | OUTPATIENT
Start: 2022-07-18 | End: 2022-07-18 | Stop reason: HOSPADM

## 2022-07-17 RX ORDER — INSULIN GLARGINE 100 [IU]/ML
20 INJECTION, SOLUTION SUBCUTANEOUS
Status: DISCONTINUED | OUTPATIENT
Start: 2022-07-17 | End: 2022-07-18 | Stop reason: HOSPADM

## 2022-07-17 RX ORDER — POTASSIUM CHLORIDE 7.45 MG/ML
10 INJECTION INTRAVENOUS
Status: COMPLETED | OUTPATIENT
Start: 2022-07-17 | End: 2022-07-17

## 2022-07-17 RX ORDER — POTASSIUM CHLORIDE 750 MG/1
40 TABLET, FILM COATED, EXTENDED RELEASE ORAL EVERY 4 HOURS
Status: DISCONTINUED | OUTPATIENT
Start: 2022-07-17 | End: 2022-07-17

## 2022-07-17 RX ORDER — DEXTROSE MONOHYDRATE 100 MG/ML
0-250 INJECTION, SOLUTION INTRAVENOUS AS NEEDED
Status: DISCONTINUED | OUTPATIENT
Start: 2022-07-17 | End: 2022-07-18 | Stop reason: HOSPADM

## 2022-07-17 RX ORDER — MELATONIN
2000 DAILY
Status: DISCONTINUED | OUTPATIENT
Start: 2022-07-18 | End: 2022-07-18 | Stop reason: HOSPADM

## 2022-07-17 RX ORDER — BUDESONIDE AND FORMOTEROL FUMARATE DIHYDRATE 160; 4.5 UG/1; UG/1
2 AEROSOL RESPIRATORY (INHALATION)
Status: DISCONTINUED | OUTPATIENT
Start: 2022-07-17 | End: 2022-07-18 | Stop reason: HOSPADM

## 2022-07-17 RX ORDER — ARIPIPRAZOLE 2 MG/1
2 TABLET ORAL DAILY
Status: DISCONTINUED | OUTPATIENT
Start: 2022-07-17 | End: 2022-07-17

## 2022-07-17 RX ORDER — METFORMIN HYDROCHLORIDE 500 MG/1
500 TABLET ORAL
Status: DISCONTINUED | OUTPATIENT
Start: 2022-07-18 | End: 2022-07-18 | Stop reason: HOSPADM

## 2022-07-17 RX ORDER — CLOPIDOGREL BISULFATE 75 MG/1
75 TABLET ORAL DAILY
Status: DISCONTINUED | OUTPATIENT
Start: 2022-07-17 | End: 2022-07-18

## 2022-07-17 RX ORDER — MAGNESIUM SULFATE 100 %
4 CRYSTALS MISCELLANEOUS AS NEEDED
Status: DISCONTINUED | OUTPATIENT
Start: 2022-07-17 | End: 2022-07-18 | Stop reason: HOSPADM

## 2022-07-17 RX ORDER — ACETAMINOPHEN 650 MG/1
650 SUPPOSITORY RECTAL
Status: DISCONTINUED | OUTPATIENT
Start: 2022-07-17 | End: 2022-07-18 | Stop reason: HOSPADM

## 2022-07-17 RX ORDER — POTASSIUM CHLORIDE 750 MG/1
40 TABLET, FILM COATED, EXTENDED RELEASE ORAL EVERY 4 HOURS
Status: COMPLETED | OUTPATIENT
Start: 2022-07-17 | End: 2022-07-17

## 2022-07-17 RX ORDER — ATORVASTATIN CALCIUM 20 MG/1
20 TABLET, FILM COATED ORAL DAILY
Status: DISCONTINUED | OUTPATIENT
Start: 2022-07-17 | End: 2022-07-18 | Stop reason: HOSPADM

## 2022-07-17 RX ORDER — POTASSIUM CHLORIDE 20 MEQ/1
40 TABLET, EXTENDED RELEASE ORAL DAILY
Status: DISCONTINUED | OUTPATIENT
Start: 2022-07-17 | End: 2022-07-17

## 2022-07-17 RX ORDER — ZOLPIDEM TARTRATE 5 MG/1
5 TABLET ORAL
Status: DISCONTINUED | OUTPATIENT
Start: 2022-07-17 | End: 2022-07-18 | Stop reason: HOSPADM

## 2022-07-17 RX ORDER — GUAIFENESIN 100 MG/5ML
81 LIQUID (ML) ORAL DAILY
Status: DISCONTINUED | OUTPATIENT
Start: 2022-07-17 | End: 2022-07-18 | Stop reason: HOSPADM

## 2022-07-17 RX ORDER — PANTOPRAZOLE SODIUM 40 MG/1
40 TABLET, DELAYED RELEASE ORAL 2 TIMES DAILY
Status: DISCONTINUED | OUTPATIENT
Start: 2022-07-17 | End: 2022-07-18 | Stop reason: HOSPADM

## 2022-07-17 RX ORDER — TRAZODONE HYDROCHLORIDE 50 MG/1
100 TABLET ORAL
Status: DISCONTINUED | OUTPATIENT
Start: 2022-07-17 | End: 2022-07-18 | Stop reason: HOSPADM

## 2022-07-17 RX ORDER — METOPROLOL SUCCINATE 50 MG/1
25 TABLET, EXTENDED RELEASE ORAL DAILY
Status: DISCONTINUED | OUTPATIENT
Start: 2022-07-17 | End: 2022-07-18 | Stop reason: HOSPADM

## 2022-07-17 RX ORDER — BISACODYL 5 MG
5 TABLET, DELAYED RELEASE (ENTERIC COATED) ORAL DAILY PRN
Status: DISCONTINUED | OUTPATIENT
Start: 2022-07-17 | End: 2022-07-18 | Stop reason: HOSPADM

## 2022-07-17 RX ORDER — MAGNESIUM SULFATE HEPTAHYDRATE 40 MG/ML
2 INJECTION, SOLUTION INTRAVENOUS ONCE
Status: DISCONTINUED | OUTPATIENT
Start: 2022-07-17 | End: 2022-07-17

## 2022-07-17 RX ORDER — INSULIN LISPRO 100 [IU]/ML
INJECTION, SOLUTION INTRAVENOUS; SUBCUTANEOUS
Status: DISCONTINUED | OUTPATIENT
Start: 2022-07-17 | End: 2022-07-18 | Stop reason: HOSPADM

## 2022-07-17 RX ORDER — TOPIRAMATE 25 MG/1
50 TABLET ORAL 2 TIMES DAILY WITH MEALS
Status: DISCONTINUED | OUTPATIENT
Start: 2022-07-17 | End: 2022-07-18 | Stop reason: HOSPADM

## 2022-07-17 RX ORDER — AMOXICILLIN AND CLAVULANATE POTASSIUM 500; 125 MG/1; MG/1
1 TABLET, FILM COATED ORAL EVERY 8 HOURS
Status: DISCONTINUED | OUTPATIENT
Start: 2022-07-17 | End: 2022-07-18 | Stop reason: HOSPADM

## 2022-07-17 RX ORDER — IPRATROPIUM BROMIDE AND ALBUTEROL SULFATE 2.5; .5 MG/3ML; MG/3ML
3 SOLUTION RESPIRATORY (INHALATION)
Status: DISCONTINUED | OUTPATIENT
Start: 2022-07-17 | End: 2022-07-18 | Stop reason: HOSPADM

## 2022-07-17 RX ORDER — CITALOPRAM 20 MG/1
40 TABLET, FILM COATED ORAL DAILY
Status: DISCONTINUED | OUTPATIENT
Start: 2022-07-17 | End: 2022-07-18 | Stop reason: HOSPADM

## 2022-07-17 RX ORDER — ENOXAPARIN SODIUM 100 MG/ML
40 INJECTION SUBCUTANEOUS EVERY 24 HOURS
Status: DISCONTINUED | OUTPATIENT
Start: 2022-07-17 | End: 2022-07-18 | Stop reason: HOSPADM

## 2022-07-17 RX ORDER — ACETAMINOPHEN 325 MG/1
650 TABLET ORAL
Status: DISCONTINUED | OUTPATIENT
Start: 2022-07-17 | End: 2022-07-18 | Stop reason: HOSPADM

## 2022-07-17 RX ORDER — BUMETANIDE 1 MG/1
2 TABLET ORAL DAILY
Status: DISCONTINUED | OUTPATIENT
Start: 2022-07-17 | End: 2022-07-18 | Stop reason: HOSPADM

## 2022-07-17 RX ORDER — GUAIFENESIN 600 MG/1
600 TABLET, EXTENDED RELEASE ORAL EVERY 12 HOURS
Status: DISCONTINUED | OUTPATIENT
Start: 2022-07-17 | End: 2022-07-18 | Stop reason: HOSPADM

## 2022-07-17 RX ORDER — POTASSIUM CHLORIDE 20 MEQ/1
40 TABLET, EXTENDED RELEASE ORAL DAILY
Status: DISCONTINUED | OUTPATIENT
Start: 2022-07-18 | End: 2022-07-18 | Stop reason: HOSPADM

## 2022-07-17 RX ADMIN — CLOPIDOGREL BISULFATE 75 MG: 75 TABLET ORAL at 12:20

## 2022-07-17 RX ADMIN — ZOLPIDEM TARTRATE 5 MG: 5 TABLET ORAL at 22:12

## 2022-07-17 RX ADMIN — ATORVASTATIN CALCIUM 20 MG: 20 TABLET, FILM COATED ORAL at 12:20

## 2022-07-17 RX ADMIN — POTASSIUM CHLORIDE 10 MEQ: 7.46 INJECTION, SOLUTION INTRAVENOUS at 01:53

## 2022-07-17 RX ADMIN — ENOXAPARIN SODIUM 40 MG: 100 INJECTION SUBCUTANEOUS at 13:23

## 2022-07-17 RX ADMIN — METOPROLOL SUCCINATE 25 MG: 50 TABLET, EXTENDED RELEASE ORAL at 13:23

## 2022-07-17 RX ADMIN — MAGNESIUM SULFATE HEPTAHYDRATE 2 G: 2 INJECTION, SOLUTION INTRAVENOUS at 03:24

## 2022-07-17 RX ADMIN — MAGNESIUM SULFATE HEPTAHYDRATE 2 G: 2 INJECTION, SOLUTION INTRAVENOUS at 00:44

## 2022-07-17 RX ADMIN — PANTOPRAZOLE SODIUM 40 MG: 40 TABLET, DELAYED RELEASE ORAL at 13:23

## 2022-07-17 RX ADMIN — POTASSIUM CHLORIDE 40 MEQ: 750 TABLET, FILM COATED, EXTENDED RELEASE ORAL at 12:20

## 2022-07-17 RX ADMIN — AMOXICILLIN AND CLAVULANATE POTASSIUM 1 TABLET: 500; 125 TABLET, FILM COATED ORAL at 13:23

## 2022-07-17 RX ADMIN — POTASSIUM CHLORIDE 10 MEQ: 7.46 INJECTION, SOLUTION INTRAVENOUS at 00:44

## 2022-07-17 RX ADMIN — ASPIRIN 81 MG CHEWABLE TABLET 81 MG: 81 TABLET CHEWABLE at 12:20

## 2022-07-17 RX ADMIN — POTASSIUM CHLORIDE 10 MEQ: 7.46 INJECTION, SOLUTION INTRAVENOUS at 14:13

## 2022-07-17 RX ADMIN — POTASSIUM CHLORIDE 40 MEQ: 750 TABLET, FILM COATED, EXTENDED RELEASE ORAL at 14:13

## 2022-07-17 RX ADMIN — GUAIFENESIN 600 MG: 600 TABLET, EXTENDED RELEASE ORAL at 13:23

## 2022-07-17 RX ADMIN — BUDESONIDE AND FORMOTEROL FUMARATE DIHYDRATE 2 PUFF: 160; 4.5 AEROSOL RESPIRATORY (INHALATION) at 19:27

## 2022-07-17 RX ADMIN — TRAZODONE HYDROCHLORIDE 100 MG: 50 TABLET ORAL at 22:11

## 2022-07-17 RX ADMIN — POTASSIUM CHLORIDE 10 MEQ: 7.46 INJECTION, SOLUTION INTRAVENOUS at 15:32

## 2022-07-17 NOTE — ED PROVIDER NOTES
[id number]  EMERGENCY DEPARTMENT ENCOUNTER NOTE    Date: 7/16/2022  Patient Name: Ronni Ramos    History of Presenting Illness     Chief Complaint   Patient presents with    Extremity Weakness     HPI: Ronni Ramos, 51 y.o. male with a past medical history and outpatient medications as listed and reviewed below  presents for left arm weakness. He reports acute onset left arm weakness and left-sided facial numbness that happened at 6 PM, 4 hours prior to presentation. He reports that he was playing with his grandson when his symptoms started. He denies any headache, nausea, or vomiting. He reports chronic left lower extremity weakness and numbness that is not worse than usual.  Denies any chest pain, abdominal pain, palpitations, lightheadedness, or dizziness. He does report shortness of breath and has a history of CHF and is compliant with medicines. He also has history of stroke 1 year ago with left-sided weakness. He had normal vital signs and normal POC glucose on route by EMS.     Medical History   I reviewed the medical, surgical, family, and social history, as well as allergies:    PCP: Unruly Reeves MD    Past Medical History:  Past Medical History:   Diagnosis Date    Acute [facility] ([facility] [address]     COPD (chronic obstructive pulmonary disease) ([facility] [address]     Depression     Hypertension     Ill-defined condition     Stroke ([facility] [address]      Past Surgical History:  Past Surgical History:   Procedure Laterality Date    HX HERNIA REPAIR      HX ORTHOPAEDIC      rotator cuff surgery    HX OTHER SURGICAL      Patent Foramen Ovale Repair    HX THYROIDECTOMY      IR FINE NEEDLE ASPIRATION W IMAGE       Current Outpatient Medications:  Current Outpatient Medications   Medication Instructions    albuterol-ipratropium (DUO-NEB) 2.5 mg-0.5 mg/3 ml nebu 3 mL, Nebulization, EVERY 6 HOURS AS NEEDED    ARIPiprazole (ABILIFY) 2 mg, Oral, DAILY    aspirin 81 mg, Oral, DAILY    atorvastatin (LIPITOR) 20 mg, DAILY    bumetanide (BUMEX) 2 mg, Oral, DAILY    cholecalciferol (VITAMIN D3) (2,000 UNITS /50 MCG) cap capsule No dose, route, or frequency recorded.  citalopram (CELEXA) 40 mg tablet TAKE ONE TABLET BY MOUTH EVERY DAY    digoxin (LANOXIN) 0.125 mg tablet TAKE ONE TABLET BY MOUTH EVERY DAY    gabapentin (NEURONTIN) 600 mg, Oral, 4 TIMES DAILY AS NEEDED    guaiFENesin ER (MUCINEX) 600 mg, Oral, EVERY 12 HOURS    insulin glargine (LANTUS) 20 Units, SubCUTAneous, EVERY BEDTIME    levothyroxine (SYNTHROID) 200 mcg tablet TAKE 1 TABLET BY MOUTH ONCE DAILY BEFORE BREAKFAST (DISCONTINUE LEVOTHYROXINE 175MCG)    metFORMIN (GLUCOPHAGE) 500 mg tablet No dose, route, or frequency recorded.  metoprolol succinate (TOPROL-XL) 50 mg, Oral, DAILY    pantoprazole (PROTONIX) 40 mg tablet TAKE ONE TABLET BY MOUTH TWICE DAILY    potassium chloride (K-DUR, KLOR-CON) 20 mEq tablet 40 mEq, Oral, DAILY    topiramate (TOPAMAX) 50 mg, Oral, 2 TIMES DAILY WITH MEALS    traZODone (DESYREL) 100 mg, Oral, EVERY BEDTIME    zolpidem (AMBIEN) 5 mg, Oral, BEDTIME PRN      Family History:  Family History   Problem Relation Age of Onset    Diabetes Maternal Uncle     Hypertension Maternal Uncle     Hypertension Paternal Uncle     Diabetes Paternal Uncle     Cancer Other     Cancer Mother     No Known Problems Father      Social History:  Social History     Tobacco Use    Smoking status: Former Smoker    Smokeless tobacco: Never Used   Vaping Use    Vaping Use: Never used   Substance Use Topics    Alcohol use: Yes     Comment: socially     Drug use: Not Currently     Allergies: Allergies   Allergen Reactions    Vancomycin Hives     Lyndsey syndrome       Review of Systems     Review of Systems  Negative: All other systems negative. Physical Exam and Vital Signs   Vital Signs - Reviewed the patient's vital signs.     Patient Vitals for the past 12 hrs:   Temp Pulse Resp BP SpO2 07/16/22 2230 -- 81 16 (!) 149/71 97 %   07/16/22 2215 -- 83 19 (!) 144/80 98 %   07/16/22 2204 -- -- -- 135/83 98 %   07/16/22 2152 -- 83 18 137/86 98 %   07/16/22 2148 98.5 °F (36.9 °C) -- -- -- --     Physical Exam:    GENERAL: awake, alert, cooperative, not in distress  HEENT:  * Pupils equal, EOMI  * Head atraumatic  CV:  * audible heart sounds  * warm and perfused extremities bilaterally  PULMONARY: Good air movement, no wheezes, no crackles  ABDOMEN/: soft, no distension, no guarding, no abdominal tenderness  EXTREMITIES/BACK: warm and perfused, no tenderness, no edema  SKIN: no rashes or signs of trauma  NEURO:   * GCS = 15 (E=4, V=5, M=6)  * Awake, alert, oriented x 3, normal speech  * CNs II-XII intact  * Strength 5/5 in all extremities  * Sensory exam grossly normal with exception of subjective left-sided facial numbness  * No pronator drift or dysmetria  * NIHSS 3 (2 old for LLE weakness/numbness, 1 new for LUE weakness)      Medical Decision Making   - I am the first and primary provider for this patient and am the primary provider of record. - I reviewed the vital signs, available nursing notes, past medical history, past surgical history, family history and social history. - Initial assessment performed. The patients presenting problems have been discussed, and the staff are in agreement with the care plan formulated and outlined with them. I have encouraged them to ask questions as they arise throughout their visit. - Available medical records, nursing notes, old EKGs, and EMS run sheets (if patient was EMS transported) were reviewed    MDM:   Patient is a 51 y.o. male presenting for stroke like symptoms with last known normal time of 6pm. Vitals reveal no significant abnormalities, glucose was normal, and physical exam revealed NIHSS 3 but only 1 when adjusted to new symptoms. EKG reveals QTc prolongation.  Based on the history, physical exam, risk factors, and vital signs, I favor the following differential diagnoses: ischemic stroke, hemorrhagic stroke, ICH, seizure, TIA, vertebrobasilar insufficiency, carotid stenosis, psychosomatic symptoms. Patient presented within the TPA window with a LKN 6pm. A history was obtained and the TPA contraindications were reviewed with the patient. It was decided that the patient not a TPA candidate due to minimal sxs and low NIHSS at 4 hours window. Patient was promptly transported to the CT scan where the non-contrasted CT was done and read my myself as not having an intracranial hemorrhage. TPA was not given. Tele-Neurology consulted and agreeable. See ED Course and Reassessment sections for results, interventions, and interpretations. Results     Labs:  Recent Results (from the past 12 hour(s))   CBC WITH AUTOMATED DIFF    Collection Time: 07/16/22 10:09 PM   Result Value Ref Range    WBC 10.8 4.1 - 11.1 K/uL    RBC 3.95 (L) 4.10 - 5.70 M/uL    HGB 12.9 12.1 - 17.0 g/dL    HCT 36.8 36.6 - 50.3 %    MCV 93.2 80.0 - 99.0 FL    MCH 32.7 26.0 - 34.0 PG    MCHC 35.1 30.0 - 36.5 g/dL    RDW 13.2 11.5 - 14.5 %    PLATELET 850 284 - 845 K/uL    MPV 9.6 8.9 - 12.9 FL    NRBC 0.0 0.0  WBC    ABSOLUTE NRBC 0.00 0.00 - 0.01 K/uL    NEUTROPHILS 59 32 - 75 %    LYMPHOCYTES 33 12 - 49 %    MONOCYTES 7 5 - 13 %    EOSINOPHILS 1 0 - 7 %    BASOPHILS 0 0 - 1 %    IMMATURE GRANULOCYTES 0 0 - 0.5 %    ABS. NEUTROPHILS 6.3 1.8 - 8.0 K/UL    ABS. LYMPHOCYTES 3.6 (H) 0.8 - 3.5 K/UL    ABS. MONOCYTES 0.7 0.0 - 1.0 K/UL    ABS. EOSINOPHILS 0.1 0.0 - 0.4 K/UL    ABS. BASOPHILS 0.0 0.0 - 0.1 K/UL    ABS. IMM.  GRANS. 0.0 0.00 - 0.04 K/UL    DF AUTOMATED     METABOLIC PANEL, COMPREHENSIVE    Collection Time: 07/16/22 10:09 PM   Result Value Ref Range    Sodium 134 (L) 136 - 145 mmol/L    Potassium 2.4 (LL) 3.5 - 5.1 mmol/L    Chloride 93 (L) 97 - 108 mmol/L    CO2 31 21 - 32 mmol/L    Anion gap 10 5 - 15 mmol/L    Glucose 108 (H) 65 - 100 mg/dL    BUN 16 6 - 20 mg/dL Creatinine 1.09 0.70 - 1.30 mg/dL    BUN/Creatinine ratio 15 12 - 20      GFR est AA >60 >60 ml/min/1.73m2    GFR est non-AA >60 >60 ml/min/1.73m2    Calcium 9.4 8.5 - 10.1 mg/dL    Bilirubin, total 0.6 0.2 - 1.0 mg/dL    AST (SGOT) 28 15 - 37 U/L    ALT (SGPT) 54 12 - 78 U/L    Alk. phosphatase 73 45 - 117 U/L    Protein, total 8.1 6.4 - 8.2 g/dL    Albumin 3.7 3.5 - 5.0 g/dL    Globulin 4.4 (H) 2.0 - 4.0 g/dL    A-G Ratio 0.8 (L) 1.1 - 2.2     PROTHROMBIN TIME + INR    Collection Time: 07/16/22 10:09 PM   Result Value Ref Range    Prothrombin time 13.1 11.9 - 14.6 sec    INR 1.0 0.9 - 1.1     PTT    Collection Time: 07/16/22 10:09 PM   Result Value Ref Range    aPTT 23.9 21.2 - 34.1 sec    aPTT, therapeutic range   82 - 109 sec   TROPONIN-HIGH SENSITIVITY    Collection Time: 07/16/22 10:09 PM   Result Value Ref Range    Troponin-High Sensitivity 9 0 - 76 ng/L   NT-PRO BNP    Collection Time: 07/16/22 10:09 PM   Result Value Ref Range    NT pro-BNP 49 <125 pg/mL   LACTIC ACID    Collection Time: 07/16/22 10:09 PM   Result Value Ref Range    Lactic acid 1.9 0.4 - 2.0 mmol/L   MAGNESIUM    Collection Time: 07/16/22 10:09 PM   Result Value Ref Range    Magnesium 1.5 (L) 1.6 - 2.4 mg/dL   GLUCOSE, POC    Collection Time: 07/16/22 10:25 PM   Result Value Ref Range    Glucose (POC) 107 65 - 117 mg/dL    Performed by [facility] [employer]      Radiologic Studies:  CT Results  (Last 48 hours)               07/16/22 2148  CT CODE NEURO HEAD WO CONTRAST Final result    Impression:  No acute process or change compared to the prior exam.               Narrative:  EXAM: CT CODE NEURO HEAD WO CONTRAST       INDICATION: Acute left facial numbness       COMPARISON: 7/15/2021. CONTRAST: None. TECHNIQUE: Unenhanced CT of the head was performed using 5 mm images. Brain and   bone windows were generated. Coronal and sagittal reformats.  CT dose reduction   was achieved through use of a standardized protocol tailored for this examination and automatic exposure control for dose modulation. FINDINGS:   The ventricles and sulci are normal in size, shape and configuration. . Old left   posterior parietal infarct is unchanged. There is no intracranial hemorrhage,   extra-axial collection, or mass effect. The basilar cisterns are open. No CT   evidence of acute infarct. The bone windows demonstrate no abnormalities. The visualized portions of the   paranasal sinuses and mastoid air cells are clear. CXR Results  (Last 48 hours)               07/16/22 2226  XR CHEST PORT Final result    Impression:  1. Enlarged cardiac silhouette, otherwise no acute disease           Narrative:  INDICATION:  CHF CXR        EXAM: Single portable view of chest 2227 . Comparison:1/22/2022       Findings: Cardiac silhouette is enlarged. Pulmonary vasculature is not engorged. There are no focal parenchymal opacities, effusions, or pneumothorax. Medications ordered:  Medications   clopidogreL (PLAVIX) tablet 300 mg (has no administration in time range)   potassium chloride 10 mEq in 100 ml IVPB (has no administration in time range)   magnesium sulfate 2 g/50 ml IVPB (premix or compounded) (has no administration in time range)   potassium chloride (K-DUR, KLOR-CON M20) SR tablet 40 mEq (has no administration in time range)     ED Course and Reassessment     ED Course:     ED Course as of 07/16/22 2303   Sat Jul 16, 2022   2259 Chest x-ray negative for acute pathology: no CXR evidence of pulmonary edema, pleural effusion, pneumothorax, or pneumonia. [SS]   5357 Noted Hypokalemia. No other significant electrolyte derangements. Creatinine is not elevated more than baseline range making FRANCES unlikely. No significant transaminitis noted. Normal bilirubin. Magnesium low. Will replace mag and potassium. Lactate is within normal limits. No concern for severe sepsis, septic shock, or ischemia.     BNP is not significantly elevated making acute CHF less likely. Trop 9, will trend. CBC does not show any evidence of acute process. Leukocytosis not present to suggest infection. Hemoglobin not suggestive of acute anemia. [SS]      ED Course User Index  [SS] Andres Marin MD       Reassessment:    Will give Plavix per neurology recs. Will admit for MRI. Final Disposition     Admission: [facility]    After completion of ED workup and discussion of results and diagnoses, patient was admitted to the hospital. Case was discussed with admitting provider. ED Procedures   Performed by: Lani Rosenberg MD  Procedures     EKG interpretation (Preliminary):  Rhythm: normal sinus rhythm; and regular . Rate (approx.): 83. Axis: normal;  HI interval: normal;  QRS interval: normal ;  QTc prolonged at 528  ST/T wave: normal;  Other findings: abnormal.    ED Critical Care   and Critical Care  CNS CRITICAL CARE NOTE :  10:12 PM    Critical care time is being documented due to the fulfillment of at least one of the following:    - Critical conditions: condition that acutely impairs one or more vital organ systems such that there is a high probability of imminent or life-threatening deterioration in condition. Examples are diagnoses including but not limited to Afib RVR, DKA, PE, Etc. .    - Critical interventions: an action whose failure to initiate would likely allow a sudden, clinically significant decline in the patient's condition. These include   Requirement of transfer or ICU admission   Contemplation or provision of tPA   Drip initiation (pressors, antiarrhythmics, heparin, etc.)   Antidotes given (narcan, charcoal, epi for anaphylaxis, etc..)   >=2L fluid bolus   >=3 Duonebs   >1 IV/IM doses of sedatives, antiepileptics, BP meds, rate control meds, adenosine.    Procedures that are suggestive of critical care: chest tubes, cardioversion, BiPAP, IO, etc..    Critical care time is documented based on continuous or non-continuous provision of care that includes face-to-face time, placing orders, chart review, documentation, discussion with consultants, discussion with family. This time calculation is a best approximation and does not include time spent on CPR, EKG interpretation, central line placement, intubation, laceration repairs, and other separately billed procedures. Amount of Critical Care Time: 35minutes    Details of critical care provision is documented above. A general summary is listed below:    IMPENDING DETERIORATION - CNS  ASSOCIATED RISK FACTORS - CNS Decompensation  MANAGEMENT- Bedside Assessment  INTERPRETATION -  Xrays, CT Scan, ECG and Blood Pressure  INTERVENTIONS - Neurologic/Metabolic interventions  CASE REVIEW - Hospitalis  TREATMENT RESPONSE - Stable  PERFORMED BY - Self    NOTES   :  During this entire length of time I was immediately available to the patient . Tess Elias MD    Diagnosis     Clinical Impression:   1. Stroke-like symptom    2. Hypokalemia    3. Hypomagnesemia      Attestations:    Tess Elias MD    Please note that this dictation was completed with [employer], the computer voice recognition software. Quite often unanticipated grammatical, syntax, homophones, and other interpretive errors are inadvertently transcribed by the computer software. Please disregard these errors. Please excuse any errors that have escaped final proofreading. Thank you.

## 2022-07-17 NOTE — H&P
History and Physical    Patient: Bin Rashid MRN: [id number]  SSN: [id number]    YOB: 1975  Age: 51 y.o. Sex: male      Subjective:      Bin Rashid is a 51 y.o. male who has a history of acute MI, COPD, obstructive sleep apnea, morbid obesity, depression, hypertension, history of CVA in the past 1 year presented with a complaint of weakness on left side more on lower leg. Denies any blurring of vision Emergency Department CT of the head showed old stroke in the parietal region patient has stopped using Plavix by period of time claims compliance with other medications. His potassium is low at 2.4 today. Past Medical History:   Diagnosis Date    [facility]     COPD (chronic obstructive pulmonary disease) ([facility]     Depression     Hypertension     Ill-defined condition     Stroke ([facility]      Past Surgical History:   Procedure Laterality Date    HX HERNIA REPAIR      HX ORTHOPAEDIC      rotator cuff surgery    HX OTHER SURGICAL      Patent Foramen Ovale Repair    HX THYROIDECTOMY      IR FINE NEEDLE ASPIRATION W IMAGE        Family History   Problem Relation Age of Onset    Diabetes Maternal Uncle     Hypertension Maternal Uncle     Hypertension Paternal Uncle     Diabetes Paternal Uncle     Cancer Other    Karson Nabil Cancer Mother     No Known Problems Father      Social History     Tobacco Use    Smoking status: Former Smoker    Smokeless tobacco: Never Used   Substance Use Topics    Alcohol use: Yes     Comment: socially       Prior to Admission medications    Medication Sig Start Date End Date Taking? Authorizing Provider   levothyroxine (SYNTHROID) 200 mcg tablet TAKE 1 TABLET BY MOUTH ONCE DAILY BEFORE BREAKFAST (DISCONTINUE LEVOTHYROXINE 175MCG) 2/25/22  Yes Susie Jimenez MD   metFORMIN (GLUCOPHAGE) 500 mg tablet  3/1/21  Yes Provider, Historical   bumetanide (BUMEX) 2 mg tablet Take 1 Tablet by mouth daily.  8/20/21  Yes Shazia Arellano MD gabapentin (NEURONTIN) 300 mg capsule Take 2 Capsules by mouth four (4) times daily as needed for Pain. Max Daily Amount: 2,400 mg. 8/20/21  Yes Patric Mares MD   metoprolol succinate (TOPROL-XL) 50 mg XL tablet Take 1 Tablet by mouth daily. Indications: high blood pressure 8/21/21  Yes Patric Mares MD   traZODone (DESYREL) 100 mg tablet Take 1 Tablet by mouth nightly. 8/20/21  Yes Patric Mares MD   zolpidem (AMBIEN) 5 mg tablet Take 1 Tablet by mouth nightly as needed for Sleep. Max Daily Amount: 5 mg. 8/20/21  Yes Patric Mares MD   citalopram (CELEXA) 40 mg tablet TAKE ONE TABLET BY MOUTH EVERY DAY 12/8/20  Yes Provider, Historical   aspirin 81 mg chewable tablet Take 81 mg by mouth daily. Yes Other, MD Elizabeth   albuterol-ipratropium (DUO-NEB) 2.5 mg-0.5 mg/3 ml nebu 3 mL by Nebulization route every six (6) hours as needed for Wheezing. 9/17/21   Karlos BEGUM MD   potassium chloride (K-DUR, KLOR-CON) 20 mEq tablet Take 2 Tablets by mouth daily. Patient not taking: Reported on 7/17/2022 9/18/21   Patric Mares MD   guaiFENesin ER (MUCINEX) 600 mg ER tablet Take 1 Tablet by mouth every twelve (12) hours. Patient not taking: Reported on 7/17/2022 9/17/21   Patric Mares MD   insulin glargine (LANTUS) 100 unit/mL injection 20 Units by SubCUTAneous route nightly. Patient not taking: Reported on 7/17/2022 9/17/21   Patric Mares MD   ARIPiprazole (ABILIFY) 2 mg tablet Take 2 mg by mouth daily. Patient not taking: Reported on 7/17/2022 7/23/21   Provider, Historical   cholecalciferol (VITAMIN D3) (2,000 UNITS /50 MCG) cap capsule  4/5/21   Provider, Historical   topiramate (TOPAMAX) 50 mg tablet Take 1 Tablet by mouth two (2) times daily (with meals).   Patient not taking: Reported on 7/17/2022 8/3/21   Patric Mares MD   pantoprazole (PROTONIX) 40 mg tablet TAKE ONE TABLET BY MOUTH TWICE DAILY  Patient not taking: Reported on 7/17/2022 3/23/21   Provider, Historical digoxin (LANOXIN) 0.125 mg tablet TAKE ONE TABLET BY MOUTH EVERY DAY  Patient not taking: Reported on 7/17/2022 11/4/20   Provider, Historical   atorvastatin (LIPITOR) 20 mg tablet 20 mg daily. Patient not taking: Reported on 7/17/2022 8/3/20   Provider, Historical        Allergies   Allergen Reactions    Vancomycin Hives     Lyndsey syndrome       Review of Systems:  A comprehensive review of systems was negative except for that written in the History of Present Illness. Objective:     Vitals:    07/17/22 0000 07/17/22 0037 07/17/22 0400 07/17/22 0801   BP:  127/78  101/61   Pulse: 95 91 91 90   Resp:  18  16   Temp:  97.9 °F (36.6 °C)  98.1 °F (36.7 °C)   SpO2:  96%  93%   Weight:       Height:            Physical Exam:  General:  Alert, cooperative, no distress, appears stated age. Eyes:  Conjunctivae/corneas clear. PERRL, EOMs intact. Fundi benign   Ears:  Normal TMs and external ear canals both ears. Nose: Nares normal. Septum midline. Mucosa normal. No drainage or sinus tenderness. Mouth/Throat: Lips, mucosa, and tongue normal. Teeth and gums normal.   Neck: Supple, symmetrical, trachea midline, no adenopathy, thyroid: no enlargment/tenderness/nodules, no carotid bruit and no JVD. Back:   Symmetric, no curvature. ROM normal. No CVA tenderness. Lungs:    Rhonchi bilaterally more on the right than the left to auscultation bilaterally. Heart:  Regular rate and rhythm, S1, S2 normal, no murmur, click, rub or gallop. Abdomen:   Soft, non-tender. Bowel sounds normal. No masses,  No organomegaly. Extremities: Extremities normal, atraumatic, no cyanosis or edema. Pulses: 2+ and symmetric all extremities. Skin: Skin color, texture, turgor normal. No rashes or lesions   Lymph nodes: Cervical, supraclavicular, and axillary nodes normal.   Neurologic: CNII-XII intact. Normal strength, sensation and reflexes throughout.      Recent Results (from the past 24 hour(s))   CBC WITH AUTOMATED DIFF Collection Time: 07/16/22 10:09 PM   Result Value Ref Range    WBC 10.8 4.1 - 11.1 K/uL    RBC 3.95 (L) 4.10 - 5.70 M/uL    HGB 12.9 12.1 - 17.0 g/dL    HCT 36.8 36.6 - 50.3 %    MCV 93.2 80.0 - 99.0 FL    MCH 32.7 26.0 - 34.0 PG    MCHC 35.1 30.0 - 36.5 g/dL    RDW 13.2 11.5 - 14.5 %    PLATELET 783 620 - 133 K/uL    MPV 9.6 8.9 - 12.9 FL    NRBC 0.0 0.0  WBC    ABSOLUTE NRBC 0.00 0.00 - 0.01 K/uL    NEUTROPHILS 59 32 - 75 %    LYMPHOCYTES 33 12 - 49 %    MONOCYTES 7 5 - 13 %    EOSINOPHILS 1 0 - 7 %    BASOPHILS 0 0 - 1 %    IMMATURE GRANULOCYTES 0 0 - 0.5 %    ABS. NEUTROPHILS 6.3 1.8 - 8.0 K/UL    ABS. LYMPHOCYTES 3.6 (H) 0.8 - 3.5 K/UL    ABS. MONOCYTES 0.7 0.0 - 1.0 K/UL    ABS. EOSINOPHILS 0.1 0.0 - 0.4 K/UL    ABS. BASOPHILS 0.0 0.0 - 0.1 K/UL    ABS. IMM. GRANS. 0.0 0.00 - 0.04 K/UL    DF AUTOMATED     METABOLIC PANEL, COMPREHENSIVE    Collection Time: 07/16/22 10:09 PM   Result Value Ref Range    Sodium 134 (L) 136 - 145 mmol/L    Potassium 2.4 (LL) 3.5 - 5.1 mmol/L    Chloride 93 (L) 97 - 108 mmol/L    CO2 31 21 - 32 mmol/L    Anion gap 10 5 - 15 mmol/L    Glucose 108 (H) 65 - 100 mg/dL    BUN 16 6 - 20 mg/dL    Creatinine 1.09 0.70 - 1.30 mg/dL    BUN/Creatinine ratio 15 12 - 20      GFR est AA >60 >60 ml/min/1.73m2    GFR est non-AA >60 >60 ml/min/1.73m2    Calcium 9.4 8.5 - 10.1 mg/dL    Bilirubin, total 0.6 0.2 - 1.0 mg/dL    AST (SGOT) 28 15 - 37 U/L    ALT (SGPT) 54 12 - 78 U/L    Alk.  phosphatase 73 45 - 117 U/L    Protein, total 8.1 6.4 - 8.2 g/dL    Albumin 3.7 3.5 - 5.0 g/dL    Globulin 4.4 (H) 2.0 - 4.0 g/dL    A-G Ratio 0.8 (L) 1.1 - 2.2     PROTHROMBIN TIME + INR    Collection Time: 07/16/22 10:09 PM   Result Value Ref Range    Prothrombin time 13.1 11.9 - 14.6 sec    INR 1.0 0.9 - 1.1     PTT    Collection Time: 07/16/22 10:09 PM   Result Value Ref Range    aPTT 23.9 21.2 - 34.1 sec    aPTT, therapeutic range   82 - 109 sec   TROPONIN-HIGH SENSITIVITY    Collection Time: 07/16/22 10:09 PM   Result Value Ref Range    Troponin-High Sensitivity 9 0 - 76 ng/L   NT-PRO BNP    Collection Time: 07/16/22 10:09 PM   Result Value Ref Range    NT pro-BNP 49 <125 pg/mL   LACTIC ACID    Collection Time: 07/16/22 10:09 PM   Result Value Ref Range    Lactic acid 1.9 0.4 - 2.0 mmol/L   MAGNESIUM    Collection Time: 07/16/22 10:09 PM   Result Value Ref Range    Magnesium 1.5 (L) 1.6 - 2.4 mg/dL   DIGOXIN    Collection Time: 07/16/22 10:09 PM   Result Value Ref Range    Digoxin level <0.2 (L) 0.90 - 2.0 ng/mL    Reported dose date Please find results under separate order      Reported dose: Please find results under separate order Units   GLUCOSE, POC    Collection Time: 07/16/22 10:25 PM   Result Value Ref Range    Glucose (POC) 107 65 - 117 mg/dL    Performed by Nish Catalan    TROPONIN-HIGH SENSITIVITY    Collection Time: 07/16/22 11:10 PM   Result Value Ref Range    Troponin-High Sensitivity 9 0 - 76 ng/L       Assessment:     Hospital Problems  Date Reviewed: 8/26/2021          Codes Class Noted POA    Stroke-like symptoms ICD-10-CM: R29.90  ICD-9-CM: 781.99  7/16/2022 Unknown          diabetis mellitus type II      Plan:     Doubts new onset acute CVA patient was seen by telemetry neurologist recommends MRI patient may have some challenges with this because of his morbid obesity  Obstructive sleep apnea  COPD with acute exacerbation  Possible pneumonia aspiration type obtain speech swallow evaluation  Depression  . History of CVA  CAD.   This was discussed with the patient extensively  Prolonged care time of 60 minutes  Signed By: Sadaf Mays MD     July 17, 2022

## 2022-07-17 NOTE — ED TRIAGE NOTES
Patient arrived via EMS. Patient started having weakness on the left side, left sided facial numbness around 1800 this evening. No previous deficits from previous stroke. Hx heart attack, ischemic stroke previous year, multiple TIAs.

## 2022-07-17 NOTE — PROGRESS NOTES
SPEECH LANGUAGE PATHOLOGY BEDSIDE SWALLOW EVALUATION  Patient: Kelsey Anton (36 y.o. male)  Date: 7/17/2022  Primary Diagnosis: Stroke-like symptoms [R29.90]       Precautions:     ASSESSMENT :  Based on the objective data described below, the patient presents with largely [facility] swallow function. Patient with timely and efficient swallow of all presentations. Pharyngeal phase appears largely [facility] [place]. HLE adequate to palpation. Patient tolerated single and consecutive straw sips thin liquid, puree, and regular with no overt s/s of pen/aspiration. Patient reports no concerns regarding speech/language. PLAN :  Recommendations and Planned Interventions:  Rec diet upgrade to regular, thin liquids. Adhere to aspiration precautions. Frequency/Duration: ST is not warranted at this time. Please re-consult if medically indicated  Discharge Recommendations: To Be Determined     SUBJECTIVE:   Patient is agreeable to beginning eval    OBJECTIVE:     CXR Results  (Last 48 hours)               07/16/22 2226  XR CHEST PORT Final result    Impression:  1. Enlarged cardiac silhouette, otherwise no acute disease           Narrative:  INDICATION:  CHF CXR        EXAM: Single portable view of chest 2227 . Comparison:1/22/2022       Findings: Cardiac silhouette is enlarged. Pulmonary vasculature is not engorged. There are no focal parenchymal opacities, effusions, or pneumothorax. CT Results  (Last 48 hours)               07/16/22 2148  CT CODE NEURO HEAD WO CONTRAST Final result    Impression:  No acute process or change compared to the prior exam.               Narrative:  EXAM: CT CODE NEURO HEAD WO CONTRAST       INDICATION: Acute left facial numbness       COMPARISON: 7/15/2021. CONTRAST: None. TECHNIQUE: Unenhanced CT of the head was performed using 5 mm images. Brain and   bone windows were generated. Coronal and sagittal reformats.  CT dose reduction   was achieved through use of a standardized protocol tailored for this   examination and automatic exposure control for dose modulation. FINDINGS:   The ventricles and sulci are normal in size, shape and configuration. . Old left   posterior parietal infarct is unchanged. There is no intracranial hemorrhage,   extra-axial collection, or mass effect. The basilar cisterns are open. No CT   evidence of acute infarct. The bone windows demonstrate no abnormalities. The visualized portions of the   paranasal sinuses and mastoid air cells are clear. Past Medical History:   Diagnosis Date    [facility] ([facility]     COPD (chronic obstructive pulmonary disease) ([facility]     Depression     Hypertension     Ill-defined condition     Stroke ([facility]      Past Surgical History:   Procedure Laterality Date    HX HERNIA REPAIR      HX ORTHOPAEDIC      rotator cuff surgery    HX OTHER SURGICAL      Patent Foramen Ovale Repair    HX THYROIDECTOMY      IR FINE NEEDLE ASPIRATION W IMAGE       Prior Level of Function/Home Situation:   Home Situation  Home Environment: Other (comment)  One/Two Story Residence: One story  Living Alone: No  Support Systems: Spouse/Significant Other  Patient Expects to be Discharged to[de-identified] Home  Current DME Used/Available at Home: Glucometer  Diet prior to admission: Reg, thin  Current Diet: SBS, thin    Cognitive and Communication Status:  Neurologic State: Alert  Orientation Level: Oriented X4  Cognition: Follows commands    Swallowing Evaluation:   Oral Assessment:  Oral Assessment  Labial: No impairment  Dentition: Full;Natural  Oral Hygiene:  (Adequate)  Lingual: No impairment     P.O. Trials:  Patient Position:  (HOB raised)  Vocal quality prior to P.O.: No impairment  Consistency Presented: Thin liquid; Solid;Puree  How Presented: Self-fed/presented; Successive swallows;Straw;Spoon  How Much:  (Multiple presentations)  Bolus Acceptance: No impairment  Bolus Formation/Control: No impairment  Propulsion: No impairment  Oral Residue: None  Initiation of Swallow: No impairment  Laryngeal Elevation: Functional  Aspiration Signs/Symptoms: None  Pharyngeal Phase Characteristics: No impairment, issues, or problems   Oral Phase Severity: No impairment  Pharyngeal Phase Severity : No impairment     Voice:  Vocal Quality: No impairment    Pain: 0    After treatment:   Patient left in no apparent distress in bed, Call bell within reach and Nursing notified    COMMUNICATION/EDUCATION:   Patient was educated regarding purpose of SLP assessment, POC, diet recs and sw safety precautions. Patient demonstrated Good understanding as evidenced by verbal agreement. The patient's plan of care including recommendations, planned interventions, and recommended diet changes were discussed with: Registered nurse and Physician.      Thank you for this referral.  Oscar Gee M.S. CCC-SLP  Time Calculation: 10 mins

## 2022-07-17 NOTE — PROGRESS NOTES
Reason for Admission:   Stroke-like symptoms                     RUR Score: 15%                 PCP: First and Last name:   Katharine Dash MD     Name of Practice:    Are you a current patient: Yes/No: yes   Approximate date of last visit: february 2022   Can you participate in a virtual visit if needed:     Do you (patient/family) have any concerns for transition/discharge? None at this time                Plan for utilizing home health: none currently       Current Advanced Directive/Advance Care Plan:  Full Code      Healthcare Decision Maker:   Click here to complete [address] including selection of the [address] Relationship (ie \"Primary\")            Primary Decision Maker: Stephens Dandy - [phone]    Secondary Decision Maker: Syd Wang - Girlfrienbirgit - [phone]    Transition of Care Plan:        Spoke with patient at bedside. Lives at address on file, alone. Does not use any DME currently and attends to his own ADLs/IADLs. Drives himself to and from appointments. Uses [facility] for medications. Patient currently stated no needs as of yet. Current discharge plan home self care.

## 2022-07-17 NOTE — PROGRESS NOTES
Problem: Falls - Risk of  Goal: *Absence of Falls  Description: Document Earma Nakul Fall Risk and appropriate interventions in the flowsheet.   Outcome: Progressing Towards Goal  Note: Fall Risk Interventions:

## 2022-07-18 ENCOUNTER — APPOINTMENT (OUTPATIENT)
Dept: MRI IMAGING | Age: 47
DRG: 058 | End: 2022-07-18
Attending: INTERNAL MEDICINE
Payer: MEDICAID

## 2022-07-18 VITALS
SYSTOLIC BLOOD PRESSURE: 122 MMHG | HEART RATE: 92 BPM | RESPIRATION RATE: 18 BRPM | DIASTOLIC BLOOD PRESSURE: 77 MMHG | OXYGEN SATURATION: 92 % | HEIGHT: 76 IN | BODY MASS INDEX: 38.36 KG/M2 | TEMPERATURE: 98.1 F | WEIGHT: 315 LBS

## 2022-07-18 LAB
ANION GAP SERPL CALC-SCNC: 5 MMOL/L (ref 5–15)
BUN SERPL-MCNC: 9 MG/DL (ref 6–20)
BUN/CREAT SERPL: 10 (ref 12–20)
CA-I BLD-MCNC: 8.8 MG/DL (ref 8.5–10.1)
CHLORIDE SERPL-SCNC: 102 MMOL/L (ref 97–108)
CHOLEST SERPL-MCNC: 146 MG/DL
CO2 SERPL-SCNC: 32 MMOL/L (ref 21–32)
CREAT SERPL-MCNC: 0.9 MG/DL (ref 0.7–1.3)
ERYTHROCYTE [DISTWIDTH] IN BLOOD BY AUTOMATED COUNT: 13.5 % (ref 11.5–14.5)
EST. AVERAGE GLUCOSE BLD GHB EST-MCNC: 146 MG/DL
GLUCOSE BLD STRIP.AUTO-MCNC: 142 MG/DL (ref 65–117)
GLUCOSE BLD STRIP.AUTO-MCNC: 408 MG/DL (ref 65–117)
GLUCOSE SERPL-MCNC: 130 MG/DL (ref 65–100)
HBA1C MFR BLD: 6.7 % (ref 4–5.6)
HCT VFR BLD AUTO: 39.7 % (ref 36.6–50.3)
HDLC SERPL-MCNC: 44 MG/DL
HDLC SERPL: 3.3 {RATIO} (ref 0–5)
HGB BLD-MCNC: 13.2 G/DL (ref 12.1–17)
INR PPP: 1 (ref 0.9–1.1)
LDLC SERPL CALC-MCNC: 82.2 MG/DL (ref 0–100)
LIPID PROFILE,FLP: NORMAL
MCH RBC QN AUTO: 32.5 PG (ref 26–34)
MCHC RBC AUTO-ENTMCNC: 33.2 G/DL (ref 30–36.5)
MCV RBC AUTO: 97.8 FL (ref 80–99)
NRBC # BLD: 0 K/UL (ref 0–0.01)
NRBC BLD-RTO: 0 PER 100 WBC
PERFORMED BY, TECHID: ABNORMAL
PERFORMED BY, TECHID: ABNORMAL
PLATELET # BLD AUTO: 198 K/UL (ref 150–400)
PMV BLD AUTO: 9.5 FL (ref 8.9–12.9)
POTASSIUM SERPL-SCNC: 3.4 MMOL/L (ref 3.5–5.1)
PROTHROMBIN TIME: 12.9 SEC (ref 11.9–14.6)
RBC # BLD AUTO: 4.06 M/UL (ref 4.1–5.7)
SODIUM SERPL-SCNC: 139 MMOL/L (ref 136–145)
TRIGL SERPL-MCNC: 99 MG/DL (ref ?–150)
VLDLC SERPL CALC-MCNC: 19.8 MG/DL
WBC # BLD AUTO: 6.4 K/UL (ref 4.1–11.1)

## 2022-07-18 PROCEDURE — 36415 COLL VENOUS BLD VENIPUNCTURE: CPT

## 2022-07-18 PROCEDURE — 85027 COMPLETE CBC AUTOMATED: CPT

## 2022-07-18 PROCEDURE — 80048 BASIC METABOLIC PNL TOTAL CA: CPT

## 2022-07-18 PROCEDURE — 83036 HEMOGLOBIN GLYCOSYLATED A1C: CPT

## 2022-07-18 PROCEDURE — 94640 AIRWAY INHALATION TREATMENT: CPT

## 2022-07-18 PROCEDURE — 74011250637 HC RX REV CODE- 250/637: Performed by: INTERNAL MEDICINE

## 2022-07-18 PROCEDURE — 85610 PROTHROMBIN TIME: CPT

## 2022-07-18 PROCEDURE — 74011250637 HC RX REV CODE- 250/637: Performed by: PSYCHIATRY & NEUROLOGY

## 2022-07-18 PROCEDURE — 80061 LIPID PANEL: CPT

## 2022-07-18 PROCEDURE — 82962 GLUCOSE BLOOD TEST: CPT

## 2022-07-18 PROCEDURE — 74011636637 HC RX REV CODE- 636/637: Performed by: INTERNAL MEDICINE

## 2022-07-18 RX ORDER — METOPROLOL SUCCINATE 25 MG/1
25 TABLET, EXTENDED RELEASE ORAL DAILY
Qty: 30 TABLET | Refills: 0 | Status: SHIPPED | OUTPATIENT
Start: 2022-07-19

## 2022-07-18 RX ORDER — CLOPIDOGREL BISULFATE 75 MG/1
75 TABLET ORAL DAILY
Status: DISCONTINUED | OUTPATIENT
Start: 2022-07-18 | End: 2022-07-18 | Stop reason: HOSPADM

## 2022-07-18 RX ORDER — AMOXICILLIN AND CLAVULANATE POTASSIUM 500; 125 MG/1; MG/1
1 TABLET, FILM COATED ORAL EVERY 8 HOURS
Qty: 15 TABLET | Refills: 0 | Status: SHIPPED | OUTPATIENT
Start: 2022-07-18

## 2022-07-18 RX ORDER — CLOPIDOGREL BISULFATE 75 MG/1
75 TABLET ORAL DAILY
Qty: 30 TABLET | Refills: 0 | Status: SHIPPED | OUTPATIENT
Start: 2022-07-19

## 2022-07-18 RX ORDER — BUDESONIDE AND FORMOTEROL FUMARATE DIHYDRATE 160; 4.5 UG/1; UG/1
2 AEROSOL RESPIRATORY (INHALATION) 2 TIMES DAILY
Qty: 10.2 G | Refills: 0 | Status: SHIPPED | OUTPATIENT
Start: 2022-07-18

## 2022-07-18 RX ORDER — BISACODYL 5 MG
5 TABLET, DELAYED RELEASE (ENTERIC COATED) ORAL
Qty: 10 TABLET | Refills: 0 | Status: SHIPPED | OUTPATIENT
Start: 2022-07-18

## 2022-07-18 RX ORDER — BUMETANIDE 2 MG/1
1 TABLET ORAL DAILY
Qty: 30 TABLET | Refills: 0 | Status: SHIPPED | OUTPATIENT
Start: 2022-07-18

## 2022-07-18 RX ADMIN — METFORMIN HYDROCHLORIDE 500 MG: 500 TABLET ORAL at 08:21

## 2022-07-18 RX ADMIN — ASPIRIN 81 MG CHEWABLE TABLET 81 MG: 81 TABLET CHEWABLE at 08:21

## 2022-07-18 RX ADMIN — CLOPIDOGREL BISULFATE 75 MG: 75 TABLET ORAL at 11:11

## 2022-07-18 RX ADMIN — ATORVASTATIN CALCIUM 20 MG: 20 TABLET, FILM COATED ORAL at 08:20

## 2022-07-18 RX ADMIN — LEVOTHYROXINE SODIUM 200 MCG: 0.1 TABLET ORAL at 07:33

## 2022-07-18 RX ADMIN — TOPIRAMATE 50 MG: 25 TABLET, FILM COATED ORAL at 08:20

## 2022-07-18 RX ADMIN — BUDESONIDE AND FORMOTEROL FUMARATE DIHYDRATE 2 PUFF: 160; 4.5 AEROSOL RESPIRATORY (INHALATION) at 09:03

## 2022-07-18 RX ADMIN — TOPIRAMATE 50 MG: 25 TABLET, FILM COATED ORAL at 17:01

## 2022-07-18 RX ADMIN — POTASSIUM CHLORIDE 40 MEQ: 1500 TABLET, EXTENDED RELEASE ORAL at 08:21

## 2022-07-18 RX ADMIN — METOPROLOL SUCCINATE 25 MG: 50 TABLET, EXTENDED RELEASE ORAL at 08:21

## 2022-07-18 RX ADMIN — INSULIN LISPRO 15 UNITS: 100 INJECTION, SOLUTION INTRAVENOUS; SUBCUTANEOUS at 17:01

## 2022-07-18 RX ADMIN — Medication 2000 UNITS: at 08:20

## 2022-07-18 NOTE — PROGRESS NOTES
Orders received,approached patient for PT eval.Patient reported and exhibit no deficits. Patient able to move around indep with bed mob, transfers and gait. No lob. No skilled therapy required DC PT . Please reorder therapy if need arises. Thank you for the consult.

## 2022-07-18 NOTE — PROGRESS NOTES
Progress Note  Date:2022       Room:[facility]  Patient Name:Mitch Calderon     YOB: 1975     Age:46 y.o. Subjective    Subjective:  Symptoms:  No shortness of breath, cough, chest pain, weakness or diarrhea. ( 49 y.o. male with history of acute MI, COPD, obstructive sleep apnea, morbid obesity, depression, hypertension, and history of CVA in the past 1 year presented with complaint of left sided weakness. Patient reports that his weakness has resolved and he has been able to ambulate independently. ). Diet:  No nausea or vomiting. Review of Systems   Constitutional: Negative for chills, diaphoresis, fatigue and fever. HENT: Negative. Eyes: Negative. Respiratory: Negative for cough, chest tightness, shortness of breath and wheezing. Cardiovascular: Negative for chest pain, palpitations and leg swelling. Gastrointestinal: Negative for abdominal pain, constipation, diarrhea, nausea and vomiting. Endocrine: Negative. Genitourinary: Negative. Musculoskeletal: Negative for arthralgias, gait problem, joint swelling and myalgias. Skin: Negative. Allergic/Immunologic: Negative. Neurological: Negative for dizziness, syncope, speech difficulty, weakness, light-headedness, numbness and headaches. Hematological: Negative. Psychiatric/Behavioral: Negative. Objective         Vitals Last 24 Hours:  TEMPERATURE:  Temp  Av.9 °F (36.6 °C)  Min: 97.6 °F (36.4 °C)  Max: 98.4 °F (36.9 °C)  RESPIRATIONS RANGE: Resp  Av  Min: 16  Max: 18  PULSE OXIMETRY RANGE: SpO2  Av %  Min: 93 %  Max: 97 %  PULSE RANGE: Pulse  Av.8  Min: 73  Max: 100  BLOOD PRESSURE RANGE: Systolic (66HFH), ENH:898 , Min:105 , SZJ:123   ; Diastolic (78YSR), RKZ:69, Min:52, Max:79      I/O (24Hr): No intake or output data in the 24 hours ending 22  Objective:  General Appearance:  Comfortable, well-appearing and in no acute distress.     Vital signs: (most recent): Blood pressure (!) 105/52, pulse 81, temperature 97.8 °F (36.6 °C), resp. rate 16, height 6' 4\" (1.93 m), weight 154.7 kg (341 lb), SpO2 95 %. Vital signs are normal.  No fever. Output: Producing urine and producing stool. HEENT: Normal HEENT exam.    Lungs:  Normal effort and normal respiratory rate. Breath sounds clear to auscultation. Heart: Normal rate. Regular rhythm. S1 normal and S2 normal.    Abdomen: Abdomen is soft. Bowel sounds are normal.   There is no abdominal tenderness. Extremities: Normal range of motion. Pulses: Distal pulses are intact. Neurological: Patient is alert and oriented to person, place and time. Normal strength. Pupils:  Pupils are equal, round, and reactive to light. Skin:  Warm. Labs/Imaging/Diagnostics    Labs:  CBC:Recent Labs     07/18/22 0623 07/16/22 2209   WBC 6.4 10.8   RBC 4.06* 3.95*   HGB 13.2 12.9   HCT 39.7 36.8   MCV 97.8 93.2   RDW 13.5 13.2    227     CHEMISTRIES:  Recent Labs     07/18/22 0623 07/17/22  1305 07/16/22 2209    136 134*   K 3.4* 2.7* 2.4*    97 93*   CO2 32 33* 31   BUN 9 13 16   CREA 0.90 0.94 1.09   CA 8.8 8.6 9.4   MG  --  2.2 1.5*   PT/INR:  Recent Labs     07/18/22 0623 07/16/22 2209   INR 1.0 1.0     APTT:  Recent Labs     07/16/22 2209   APTT 23.9     LIVER PROFILE:  Recent Labs     07/16/22 2209   AST 28   ALT 54     Lab Results   Component Value Date/Time    ALT (SGPT) 54 07/16/2022 10:09 PM    AST (SGOT) 28 07/16/2022 10:09 PM    Alk. phosphatase 73 07/16/2022 10:09 PM    Bilirubin, total 0.6 07/16/2022 10:09 PM       Imaging Last 24 Hours:  No results found.   Assessment//Plan           Patient Active Problem List    Diagnosis Date Noted    Stroke-like symptoms 07/16/2022    Hypoxemia 08/18/2021    [facility] exacerb[facility] ([facility] 08/18/2021    Syncope 08/03/2021    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 (CVA) ([facility] 08/03/2021    ACS (acute coronary syndrome) ([facility] 07/13/2021    Hemoptysis 07/13/2021    Chest pain 06/26/2021    CVA (cerebral vascular accident) ([address] 05/06/2021    Postoperative hypothyroidism 05/06/2021    Pharyngoesophageal dysphagia 02/22/2021    Multinodular goiter 12/30/2020    Coronary artery disease involving native coronary artery of native heart with angina pectoris with documented spasm ([address] 12/17/2020    Cardiomyopathy ([address] 12/17/2020    COPD (chronic obstructive pulmonary disease) ([address] 12/17/2020    Pulmonary nodules 12/17/2020    Obstructive sleep apnea 12/17/2020    Type 2 diabetes mellitus with hyperglycemia, without long-term current use of insulin ([address] 12/17/2020    Essential hypertension 12/17/2020    Obesity, morbid ([address] 09/07/2020    Carotid artery stenosis 09/07/2020     Assessment:    Condition: In stable condition. Improving. Plan:   (Continue current treatment plan).            Electronically signed by Roz Hagen on 7/18/2022 at 9:24 AM

## 2022-07-18 NOTE — PROGRESS NOTES
VSS. Patient discharged home to self care. Discharge instructions given to patient at bedside. IV discontinued, telemetry discontinued, patient tolerated well. Discharge plan of care/case management plan validated with provider discharge order.

## 2022-07-18 NOTE — CONSULTS
NEURO CONSULT      REASON FOR ADMISSION:  Acute onset of dizziness and left-sided weakness      HISTORY:  Mr. Desiree Tucker is 51years old with a history of previous MI, COPD, obstructive sleep apnea, morbid obesity, depression, hypertension, stroke 1 year ago that left him with left-sided mild weakness, who was consulted to neurology for having had an acute onset of dizziness and mild left-sided weakness. Patient was brought to the ER. In the ER, patient had a head CT without contrast that did not show any acute process. He was subsequently admitted to the floor. He states that his left-sided weakness has mostly resolved.           ROS: As per above    General:                     No fever, no chills, no sweats, no generalized weakness, no weight loss/gain,                                       No loss of appetite   Eyes:                           No blurred vision, no eye pain, no loss of vision, no double vision  ENT:                            rhinorrhea, no pharyngitis   Respiratory:               No cough, no sputum production, no SOB, no WILKS, no wheezing, no pleuritic pain   Cardiology:                No chest pain, no palpitations, no orthopnea, no PND, no edema, no syncope   Gastrointestinal:       No abdominal pain , no N/V, no diarrhea, no dysphagia, no constipation, no bleeding   Genitourinary:           frequency, no urgency, no dysuria, no hematuria, no incontinence   Muskuloskeletal :      No arthralgia, no myalgia, no back pain  Hematology:              No easy bruising, no nose or gum bleeding, no lymphadenopathy   Dermatological:         No rash, no ulceration, no pruritis, no color change / jaundice  Endocrine:                 hot flashes or polydipsia   Neurological:             No headache, no dizziness, no confusion, no focal weakness, no paresthesia,                                      No Speech difficulties, no memory loss, no gait difficulty  Psychological:          No neelings of anxiety, no depression, no agitation      NEURO EXAM:    Mental status: Somnolent but arousable. Responds verbally. Holds an abnormal conversation. Not aphasic. Follows commands. Cranial nerves: Visual fields intact. Motor exam: Morbid obesity. Left-sided weakness mostly resolved. Clary Alexis Equivocal left total    Sensory exam: No tactile extinction    Coordination: Finger-to-nose is fair bilaterally. Heel-to-shin is also fair    Gait and Station: Patient was not ambulated    ASSESSMENT:  Possible TIA versus left parietal infarct.   For MRI of the brain without contrast stat  If patient is too large for MRI, would recommend open MRI for follow-up as outpatient  Plavix 5 mg p.o. daily  Fasting lipid profile  PT/OT consult  Patient can get EEG as an outpatient      PLAN:        ALLERGIES:    Allergies   Allergen Reactions    Vancomycin Hives     Lyndsey syndrome       MEDS:      Current Facility-Administered Medications:     albuterol-ipratropium (DUO-NEB) 2.5 MG-0.5 MG/3 ML, 3 mL, Nebulization, Q6H PRN, Adriel Bennett MD    aspirin chewable tablet 81 mg, 81 mg, Oral, DAILY, Adriel Sánchez MD, 81 mg at 07/17/22 1220    atorvastatin (LIPITOR) tablet 20 mg, 20 mg, Oral, DAILY, Adriel Sánchez MD, 20 mg at 07/17/22 1220    [Held by provider] bumetanide (BUMEX) tablet 2 mg, 2 mg, Oral, DAILY, Ashwin BEGUM MD    [Held by provider] citalopram (CELEXA) tablet 40 mg, 40 mg, Oral, DAILY, Ashwin BEGUM MD    [Held by provider] digoxin (LANOXIN) tablet 0.125 mg, 0.125 mg, Oral, DAILY, Adriel Sánchez MD    guaiFENesin ER (MUCINEX) tablet 600 mg, 600 mg, Oral, Q12H, Adriel Sánchez MD, 600 mg at 07/17/22 1323    insulin glargine (LANTUS) injection 20 Units, 20 Units, SubCUTAneous, QHS, Adriel Sánchez MD    levothyroxine (SYNTHROID) tablet 200 mcg, 200 mcg, Oral, ACB, Adriel Sánchez MD, 200 mcg at 07/18/22 0733    metoprolol succinate (TOPROL-XL) XL tablet 25 mg, 25 mg, Oral, DAILY, Agada, Mario Garcia MD, 25 mg at 07/17/22 1323    pantoprazole (PROTONIX) tablet 40 mg, 40 mg, Oral, BID, Jon BEUGM MD, 40 mg at 07/17/22 1323    topiramate (TOPAMAX) tablet 50 mg, 50 mg, Oral, BID WITH MEALS, Mario Sánchez MD    traZODone (DESYREL) tablet 100 mg, 100 mg, Oral, QHS, Jon BEGUM MD, 100 mg at 07/17/22 2211    zolpidem (AMBIEN) tablet 5 mg, 5 mg, Oral, QHS PRN, Marie Rooney MD, 5 mg at 07/17/22 2212    clopidogreL (PLAVIX) tablet 75 mg, 75 mg, Oral, DAILY, Jon BEGUM MD, 75 mg at 07/17/22 1220    acetaminophen (TYLENOL) tablet 650 mg, 650 mg, Oral, Q4H PRN **OR** acetaminophen (TYLENOL) solution 650 mg, 650 mg, Per NG tube, Q4H PRN **OR** acetaminophen (TYLENOL) suppository 650 mg, 650 mg, Rectal, Q4H PRN, Dannie Counter, Adriel BEGUM MD    bisacodyL (DULCOLAX) tablet 5 mg, 5 mg, Oral, DAILY PRN, Marie Rooney MD    enoxaparin (LOVENOX) injection 40 mg, 40 mg, SubCUTAneous, Q24H, Adriel Sánchez MD, 40 mg at 07/17/22 1323    budesonide-formoteroL (SYMBICORT) 160-4.5 mcg/actuation HFA inhaler 2 Puff, 2 Puff, Inhalation, BID RT, Marie Rooney MD, 2 Puff at 07/17/22 1927    amoxicillin-clavulanate (AUGMENTIN) 500-125 mg per tablet 1 Tablet, 1 Tablet, Oral, Q8H, Jon BEGUM MD, 1 Tablet at 07/17/22 1323    cholecalciferol (VITAMIN D3) (1000 Units /25 mcg) tablet 2,000 Units, 2,000 Units, Oral, DAILY, Adriel Sánchez MD    metFORMIN (GLUCOPHAGE) tablet 500 mg, 500 mg, Oral, DAILY WITH BREAKFAST, Adriel Sánchez MD    insulin lispro (HUMALOG) injection, , SubCUTAneous, AC&HS, Adriel Sánchez MD    glucose chewable tablet 16 g, 4 Tablet, Oral, PRN, Dannie Counter, Adriel BEGUM MD    glucagon (GLUCAGEN) injection 1 mg, 1 mg, IntraMUSCular, PRN, Dannie Counter, Adriel BEGUM MD    dextrose 10% infusion 0-250 mL, 0-250 mL, IntraVENous, PRN, Adriel Sánchez MD    potassium chloride (K-DUR, KLOR-CON M20) SR tablet 40 mEq, 40 mEq, Oral, DAILY, Marie Rooney MD    LABS:  Recent Results (from the past 24 hour(s))   GLUCOSE, POC    Collection Time: 07/17/22 11:29 AM   Result Value Ref Range    Glucose (POC) 225 (H) 65 - 117 mg/dL    Performed by Bella Del Cid    EKG, 12 LEAD, INITIAL    Collection Time: 07/17/22 12:56 PM   Result Value Ref Range    Ventricular Rate 101 BPM    Atrial Rate 101 BPM    P-R Interval 158 ms    QRS Duration 108 ms    Q-T Interval 464 ms    QTC Calculation (Bezet) 601 ms    Calculated P Axis 41 degrees    Calculated R Axis 31 degrees    Calculated T Axis 27 degrees    Diagnosis       Sinus tachycardia  Prolonged QT  Abnormal ECG  When compared with ECG of 16-JUL-2022 21:58, (Unconfirmed)  QT has lengthened  Confirmed by THELMA MYERS, [facility] ([id number]) on 7/17/2022 6:02:14 PM     METABOLIC PANEL, BASIC    Collection Time: 07/17/22  1:05 PM   Result Value Ref Range    Sodium 136 136 - 145 mmol/L    Potassium 2.7 (LL) 3.5 - 5.1 mmol/L    Chloride 97 97 - 108 mmol/L    CO2 33 (H) 21 - 32 mmol/L    Anion gap 6 5 - 15 mmol/L    Glucose 237 (H) 65 - 100 mg/dL    BUN 13 6 - 20 mg/dL    Creatinine 0.94 0.70 - 1.30 mg/dL    BUN/Creatinine ratio 14 12 - 20      GFR est AA >60 >60 ml/min/1.73m2    GFR est non-AA >60 >60 ml/min/1.73m2    Calcium 8.6 8.5 - 10.1 mg/dL   MAGNESIUM    Collection Time: 07/17/22  1:05 PM   Result Value Ref Range    Magnesium 2.2 1.6 - 2.4 mg/dL   GLUCOSE, POC    Collection Time: 07/17/22  5:01 PM   Result Value Ref Range    Glucose (POC) 119 (H) 65 - 117 mg/dL    Performed by JENNY GALEAS    GLUCOSE, POC    Collection Time: 07/17/22  9:20 PM   Result Value Ref Range    Glucose (POC) 202 (H) 65 - 117 mg/dL    Performed by Jose Nash    CBC W/O DIFF    Collection Time: 07/18/22  6:23 AM   Result Value Ref Range    WBC 6.4 4.1 - 11.1 K/uL    RBC 4.06 (L) 4.10 - 5.70 M/uL    HGB 13.2 12.1 - 17.0 g/dL    HCT 39.7 36.6 - 50.3 %    MCV 97.8 80.0 - 99.0 FL    MCH 32.5 26.0 - 34.0 PG    MCHC 33.2 30.0 - 36.5 g/dL    RDW 13.5 11.5 - 14.5 %    PLATELET 976 144 - 258 K/uL    MPV 9.5 8.9 - 12.9 FL    NRBC 0.0 0.0  WBC    ABSOLUTE NRBC 0.00 0.00 - 0.01 K/uL   PROTHROMBIN TIME + INR    Collection Time: 07/18/22  6:23 AM   Result Value Ref Range    Prothrombin time 12.9 11.9 - 14.6 sec    INR 1.0 0.9 - 1.1     METABOLIC PANEL, BASIC    Collection Time: 07/18/22  6:23 AM   Result Value Ref Range    Sodium 139 136 - 145 mmol/L    Potassium 3.4 (L) 3.5 - 5.1 mmol/L    Chloride 102 97 - 108 mmol/L    CO2 32 21 - 32 mmol/L    Anion gap 5 5 - 15 mmol/L    Glucose 130 (H) 65 - 100 mg/dL    BUN 9 6 - 20 mg/dL    Creatinine 0.90 0.70 - 1.30 mg/dL    BUN/Creatinine ratio 10 (L) 12 - 20      GFR est AA >60 >60 ml/min/1.73m2    GFR est non-AA >60 >60 ml/min/1.73m2    Calcium 8.8 8.5 - 10.1 mg/dL   GLUCOSE, POC    Collection Time: 07/18/22  7:09 AM   Result Value Ref Range    Glucose (POC) 142 (H) 65 - 117 mg/dL    Performed by Sofiya Shaw        Visit Vitals  BP (!) 105/52 (BP 1 Location: Right upper arm, BP Patient Position: Lying left side)   Pulse 81   Temp 97.8 °F (36.6 °C)   Resp 16   Ht 6' 4\" (1.93 m)   Wt 154.7 kg (341 lb)   SpO2 96%   BMI 41.51 kg/m²       Imaging:  XR CHEST PORT   Final Result   1.  Enlarged cardiac silhouette, otherwise no acute disease         CT CODE NEURO HEAD WO CONTRAST   Final Result   No acute process or change compared to the prior exam.            MRI BRAIN W WO CONT    (Results Pending)

## 2022-07-18 NOTE — DISCHARGE SUMMARY
Discharge Summary     Patient: Marianna Gill MRN: [id number]  SSN: [id number]    YOB: 1975  Age: 51 y.o.   Sex: male       Admit Date: 7/16/2022    Discharge Date: 7/18/2022      Admission Diagnoses: Stroke-like symptoms [R29.90]    Discharge Diagnoses:   Problem List as of 7/18/2022 Date Reviewed: 8/26/2021          Codes Class Noted - Resolved    Stroke-like symptoms ICD-10-CM: R29.90  ICD-9-CM: 781.99  7/16/2022 - Present        Hypoxemia ICD-10-CM: R09.02  ICD-9-CM: 799.02  8/18/2021 - Present        COPD exacerbation ([facility] ICD-10-CM: J44.1  ICD-9-CM: 491.21  8/18/2021 - Present        Syncope ICD-10-CM: R55  ICD-9-CM: 780.2  8/3/2021 - Present        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 (CVA) ([facility] ICD-10-CM: W65.002, R56.9  ICD-9-CM: 438.89, 780.39  8/3/2021 - Present        ACS (acute coronary syndrome) ([facility] ICD-10-CM: I24.9  ICD-9-CM: 411.1  7/13/2021 - Present        Hemoptysis ICD-10-CM: R04.2  ICD-9-CM: 786.30  7/13/2021 - Present        Chest pain ICD-10-CM: R07.9  ICD-9-CM: 786.50  6/26/2021 - Present        CVA (cerebral vascular accident) [facility]) ICD-10-CM: I63.9  ICD-9-CM: 434.91  5/6/2021 - Present        Postoperative hypothyroidism ICD-10-CM: E89.0  ICD-9-CM: 244.0  5/6/2021 - Present        Pharyngoesophageal dysphagia ICD-10-CM: R13.14  ICD-9-CM: 787.24  2/22/2021 - Present        Multinodular goiter ICD-10-CM: E04.2  ICD-9-CM: 241.1  12/30/2020 - Present        Coronary artery disease involving native coronary artery of native heart with angina pectoris with documented spasm ([facility] ICD-10-CM: I25.111  ICD-9-CM: 414.01, 413.9  12/17/2020 - Present        Cardiomyopathy ([facility] ICD-10-CM: I42.9  ICD-9-CM: 425.4  12/17/2020 - Present        COPD (chronic obstructive pulmonary disease) ([facility] ICD-10-CM: J44.9  ICD-9-CM: 496  12/17/2020 - Present        Pulmonary nodules ICD-10-CM: R91.8  ICD-9-CM: 793.19  12/17/2020 - Present        Obstructive sleep apnea ICD-10-CM: G47.33  ICD-9-CM: 327.23  12/17/2020 - Present        Type 2 diabetes mellitus with hyperglycemia, without long-term current use of insulin (HCC) ICD-10-CM: E11.65  ICD-9-CM: 250.00, 790.29  12/17/2020 - Present        Essential hypertension ICD-10-CM: I10  ICD-9-CM: 401.9  12/17/2020 - Present        Obesity, morbid ([facility] ICD-10-CM: E66.01  ICD-9-CM: 278.01  9/7/2020 - Present        Carotid artery stenosis ICD-10-CM: I65.29  ICD-9-CM: 433.10  9/7/2020 - Present               Discharge Condition: Good    Hospital Course: 61years old history of MI, COPD, obstructive sleep apnea, morbid obesity, depression, hypertension, history of CVA in the past year presented with weakness on the left side left leg of previous CVA CT of the head showed old stroke in the parietal region patient has stopped using Plavix and noncompliance with it. Potassium was low at 2.4 patient was admitted on seen by the neurologist.  Admitted for strokelike symptoms patient unable to have MRI because of her morbid obesity is to follow-up with neurologist and arrange for open MRI. Patient was advised on need for compliance with his medications changes were made to his medications as regards Bumex and also was started on Plavix.   His basic metabolic    Needs to be repeated and evaluated for electrolyte changes    Consults: Neurology    Significant Diagnostic Studies: labs:   Recent Results (from the past 24 hour(s))   GLUCOSE, POC    Collection Time: 07/17/22  9:20 PM   Result Value Ref Range    Glucose (POC) 202 (H) 65 - 117 mg/dL    Performed by [facility]    LIPID PANEL    Collection Time: 07/18/22  6:23 AM   Result Value Ref Range    LIPID PROFILE        Cholesterol, total 146 <200 mg/dL    Triglyceride 99 <150 mg/dL    HDL Cholesterol 44 mg/dL    LDL, calculated 82.2 0 - 100 mg/dL    VLDL, calculated 19.8 mg/dL    CHOL/HDL Ratio 3.3 0.0 - 5.0     HEMOGLOBIN A1C WITH EAG    Collection Time: 07/18/22  6:23 AM   Result Value Ref Range    Hemoglobin A1c 6.7 (H) 4.0 - 5.6 %    Est. average glucose 146 mg/dL   CBC W/O DIFF    Collection Time: 07/18/22  6:23 AM   Result Value Ref Range    WBC 6.4 4.1 - 11.1 K/uL    RBC 4.06 (L) 4.10 - 5.70 M/uL    HGB 13.2 12.1 - 17.0 g/dL    HCT 39.7 36.6 - 50.3 %    MCV 97.8 80.0 - 99.0 FL    MCH 32.5 26.0 - 34.0 PG    MCHC 33.2 30.0 - 36.5 g/dL    RDW 13.5 11.5 - 14.5 %    PLATELET 357 976 - 232 K/uL    MPV 9.5 8.9 - 12.9 FL    NRBC 0.0 0.0  WBC    ABSOLUTE NRBC 0.00 0.00 - 0.01 K/uL   PROTHROMBIN TIME + INR    Collection Time: 07/18/22  6:23 AM   Result Value Ref Range    Prothrombin time 12.9 11.9 - 14.6 sec    INR 1.0 0.9 - 1.1     METABOLIC PANEL, BASIC    Collection Time: 07/18/22  6:23 AM   Result Value Ref Range    Sodium 139 136 - 145 mmol/L    Potassium 3.4 (L) 3.5 - 5.1 mmol/L    Chloride 102 97 - 108 mmol/L    CO2 32 21 - 32 mmol/L    Anion gap 5 5 - 15 mmol/L    Glucose 130 (H) 65 - 100 mg/dL    BUN 9 6 - 20 mg/dL    Creatinine 0.90 0.70 - 1.30 mg/dL    BUN/Creatinine ratio 10 (L) 12 - 20      GFR est AA >60 >60 ml/min/1.73m2    GFR est non-AA >60 >60 ml/min/1.73m2    Calcium 8.8 8.5 - 10.1 mg/dL   GLUCOSE, POC    Collection Time: 07/18/22  7:09 AM   Result Value Ref Range    Glucose (POC) 142 (H) 65 - 117 mg/dL    Performed by Sofiya Shaw    GLUCOSE, POC    Collection Time: 07/18/22  4:50 PM   Result Value Ref Range    Glucose (POC) 408 (H) 65 - 117 mg/dL    Performed by Rock Bors          XR CHEST PORT   Final Result   1. Enlarged cardiac silhouette, otherwise no acute disease         CT CODE NEURO HEAD WO CONTRAST   Final Result   No acute process or change compared to the prior exam.                Disposition: home    Discharge Medications:   Current Discharge Medication List      START taking these medications    Details   amoxicillin-clavulanate (AUGMENTIN) 500-125 mg per tablet Take 1 Tablet by mouth every eight (8) hours.   Qty: 15 Tablet, Refills: 0      bisacodyL (DULCOLAX) 5 mg EC tablet Take 1 Tablet by mouth daily as needed for Constipation. Qty: 10 Tablet, Refills: 0      budesonide-formoteroL (SYMBICORT) 160-4.5 mcg/actuation HFAA Take 2 Puffs by inhalation two (2) times a day. Qty: 10.2 g, Refills: 0      clopidogreL (PLAVIX) 75 mg tab Take 1 Tablet by mouth daily. Qty: 30 Tablet, Refills: 0         CONTINUE these medications which have CHANGED    Details   bumetanide (BUMEX) 2 mg tablet Take 0.5 Tablets by mouth daily. Qty: 30 Tablet, Refills: 0      metoprolol succinate (TOPROL-XL) 25 mg XL tablet Take 1 Tablet by mouth daily. Indications: high blood pressure  Qty: 30 Tablet, Refills: 0         CONTINUE these medications which have NOT CHANGED    Details   levothyroxine (SYNTHROID) 200 mcg tablet TAKE 1 TABLET BY MOUTH ONCE DAILY BEFORE BREAKFAST (DISCONTINUE LEVOTHYROXINE 175MCG)  Qty: 30 Tablet, Refills: 1      metFORMIN (GLUCOPHAGE) 500 mg tablet       traZODone (DESYREL) 100 mg tablet Take 1 Tablet by mouth nightly. Qty: 30 Tablet, Refills: 0      zolpidem (AMBIEN) 5 mg tablet Take 1 Tablet by mouth nightly as needed for Sleep. Max Daily Amount: 5 mg. Qty: 12 Tablet, Refills: 0    Associated Diagnoses: Peripheral polyneuropathy      aspirin 81 mg chewable tablet Take 81 mg by mouth daily. albuterol-ipratropium (DUO-NEB) 2.5 mg-0.5 mg/3 ml nebu 3 mL by Nebulization route every six (6) hours as needed for Wheezing. Qty: 30 Nebule, Refills: 0      potassium chloride (K-DUR, KLOR-CON) 20 mEq tablet Take 2 Tablets by mouth daily. Qty: 30 Tablet, Refills: 0      guaiFENesin ER (MUCINEX) 600 mg ER tablet Take 1 Tablet by mouth every twelve (12) hours. Qty: 20 Tablet, Refills: 0      insulin glargine (LANTUS) 100 unit/mL injection 20 Units by SubCUTAneous route nightly.   Qty: 10 mL, Refills: 0      cholecalciferol (VITAMIN D3) (2,000 UNITS /50 MCG) cap capsule       topiramate (TOPAMAX) 50 mg tablet Take 1 Tablet by mouth two (2) times daily (with meals). Qty: 60 Tablet, Refills: 0      pantoprazole (PROTONIX) 40 mg tablet TAKE ONE TABLET BY MOUTH TWICE DAILY      digoxin (LANOXIN) 0.125 mg tablet TAKE ONE TABLET BY MOUTH EVERY DAY      atorvastatin (LIPITOR) 20 mg tablet 20 mg daily. STOP taking these medications       gabapentin (NEURONTIN) 300 mg capsule Comments:   Reason for Stopping:         citalopram (CELEXA) 40 mg tablet Comments:   Reason for Stopping:         ARIPiprazole (ABILIFY) 2 mg tablet Comments:   Reason for Stopping:           Patient to have outpatient open MRI neurologist to follow  Activity: Activity as tolerated  Diet: Diabetic Diet  Wound Care:      Follow-up Appointments   Procedures    FOLLOW UP VISIT Appointment in: 3 - 5 Days     Standing Status:   Standing     Number of Occurrences:   1     Order Specific Question:   Appointment in     Answer:   3 - 5 Days     45 minutes discharge time  Signed By: Enrrique Stacy MD     July 18, 2022

## 2022-07-18 NOTE — PROGRESS NOTES
Problem: Falls - Risk of  Goal: *Absence of Falls  Description: Document Liliane Navarrete Fall Risk and appropriate interventions in the flowsheet.   Outcome: Progressing Towards Goal  Note: Fall Risk Interventions:            Medication Interventions: Teach patient to arise slowly

## 2022-07-31 NOTE — PROCEDURES
[facility]  EEG    Name:  Emily Locke  MR#:  [id number]  :  1975  ACCOUNT #:  [de-identified]  DATE OF SERVICE:  2022    DIAGNOSIS:  Stroke. DESCRIPTION:  Recording is done digitally on computer. Electrodes are placed in a 10-20 international system. Initial recording is equipment calibration followed by biocalibration. Initial montages are bipolar montages. TECHNIQUE:  Tracings started with the patient awake, eyes closed with well-formed bioccipital alpha rhythms in the 9 Hz frequency. As the recording continues, the patient is hooked up to an EKG machine that shows a heart rate of 102. Tracings continued with the patient being exposed to photic stimulation without any abnormality. Hyperventilation is not done. IMPRESSION:  This is a normal EEG, awake.       Jayleen Hopkins MD      [id number]  D:  2022 13:11  T:  2022 19:51  JOB #:  [id number]

## 2022-11-11 ENCOUNTER — HOSPITAL ENCOUNTER (EMERGENCY)
Age: 47
Discharge: LEFT BEFORE DISCHARGE | End: 2022-11-11

## 2022-11-16 ENCOUNTER — APPOINTMENT (OUTPATIENT)
Dept: CT IMAGING | Age: 47
End: 2022-11-16
Attending: PHYSICIAN ASSISTANT
Payer: MEDICAID

## 2022-11-16 ENCOUNTER — HOSPITAL ENCOUNTER (EMERGENCY)
Age: 47
Discharge: HOME | End: 2022-11-17
Payer: MEDICAID

## 2022-11-16 DIAGNOSIS — S09.90XA CLOSED HEAD INJURY, INITIAL ENCOUNTER: Primary | ICD-10-CM

## 2022-11-16 DIAGNOSIS — S00.01XA ABRASION OF SCALP, INITIAL ENCOUNTER: ICD-10-CM

## 2022-11-16 LAB
ALBUMIN SERPL-MCNC: 3 G/DL (ref 3.5–5)
ALBUMIN/GLOB SERPL: 0.6 {RATIO} (ref 1.1–2.2)
ALP SERPL-CCNC: 72 U/L (ref 45–117)
ALT SERPL-CCNC: 56 U/L (ref 12–78)
ANION GAP SERPL CALC-SCNC: 6 MMOL/L (ref 5–15)
AST SERPL W P-5'-P-CCNC: 54 U/L (ref 15–37)
BASOPHILS # BLD: 0 K/UL (ref 0–0.1)
BASOPHILS NFR BLD: 0 % (ref 0–1)
BILIRUB SERPL-MCNC: 0.4 MG/DL (ref 0.2–1)
BUN SERPL-MCNC: 12 MG/DL (ref 6–20)
BUN/CREAT SERPL: 11 (ref 12–20)
CA-I BLD-MCNC: 8.5 MG/DL (ref 8.5–10.1)
CHLORIDE SERPL-SCNC: 99 MMOL/L (ref 97–108)
CO2 SERPL-SCNC: 32 MMOL/L (ref 21–32)
CREAT SERPL-MCNC: 1.12 MG/DL (ref 0.7–1.3)
DIFFERENTIAL METHOD BLD: ABNORMAL
EOSINOPHIL # BLD: 0.3 K/UL (ref 0–0.4)
EOSINOPHIL NFR BLD: 5 % (ref 0–7)
ERYTHROCYTE [DISTWIDTH] IN BLOOD BY AUTOMATED COUNT: 13.8 % (ref 11.5–14.5)
GLOBULIN SER CALC-MCNC: 4.9 G/DL (ref 2–4)
GLUCOSE BLD STRIP.AUTO-MCNC: 143 MG/DL (ref 65–100)
GLUCOSE SERPL-MCNC: 152 MG/DL (ref 65–100)
HCT VFR BLD AUTO: 38.4 % (ref 36.6–50.3)
HGB BLD-MCNC: 12.4 G/DL (ref 12.1–17)
IMM GRANULOCYTES # BLD AUTO: 0 K/UL (ref 0–0.04)
IMM GRANULOCYTES NFR BLD AUTO: 0 % (ref 0–0.5)
LYMPHOCYTES # BLD: 1.6 K/UL (ref 0.8–3.5)
LYMPHOCYTES NFR BLD: 21 % (ref 12–49)
MAGNESIUM SERPL-MCNC: 2 MG/DL (ref 1.6–2.4)
MCH RBC QN AUTO: 32.5 PG (ref 26–34)
MCHC RBC AUTO-ENTMCNC: 32.3 G/DL (ref 30–36.5)
MCV RBC AUTO: 100.5 FL (ref 80–99)
MONOCYTES # BLD: 0.6 K/UL (ref 0–1)
MONOCYTES NFR BLD: 8 % (ref 5–13)
NEUTS SEG # BLD: 5 K/UL (ref 1.8–8)
NEUTS SEG NFR BLD: 66 % (ref 32–75)
NRBC # BLD: 0 K/UL (ref 0–0.01)
NRBC BLD-RTO: 0 PER 100 WBC
PERFORMED BY, TECHID: ABNORMAL
PLATELET # BLD AUTO: 220 K/UL (ref 150–400)
PMV BLD AUTO: 9.7 FL (ref 8.9–12.9)
POTASSIUM SERPL-SCNC: 4 MMOL/L (ref 3.5–5.1)
PROT SERPL-MCNC: 7.9 G/DL (ref 6.4–8.2)
RBC # BLD AUTO: 3.82 M/UL (ref 4.1–5.7)
SODIUM SERPL-SCNC: 137 MMOL/L (ref 136–145)
TROPONIN-HIGH SENSITIVITY: 9 NG/L (ref 0–76)
WBC # BLD AUTO: 7.6 K/UL (ref 4.1–11.1)

## 2022-11-16 PROCEDURE — 74011250636 HC RX REV CODE- 250/636: Performed by: PHYSICIAN ASSISTANT

## 2022-11-16 PROCEDURE — 36415 COLL VENOUS BLD VENIPUNCTURE: CPT

## 2022-11-16 PROCEDURE — 80053 COMPREHEN METABOLIC PANEL: CPT

## 2022-11-16 PROCEDURE — 85025 COMPLETE CBC W/AUTO DIFF WBC: CPT

## 2022-11-16 PROCEDURE — 70450 CT HEAD/BRAIN W/O DYE: CPT

## 2022-11-16 PROCEDURE — 84484 ASSAY OF TROPONIN QUANT: CPT

## 2022-11-16 PROCEDURE — 99284 EMERGENCY DEPT VISIT MOD MDM: CPT

## 2022-11-16 PROCEDURE — 83735 ASSAY OF MAGNESIUM: CPT

## 2022-11-16 PROCEDURE — 93005 ELECTROCARDIOGRAM TRACING: CPT

## 2022-11-16 PROCEDURE — 82962 GLUCOSE BLOOD TEST: CPT

## 2022-11-16 RX ADMIN — SODIUM CHLORIDE 500 ML: 9 INJECTION, SOLUTION INTRAVENOUS at 22:49

## 2022-11-16 NOTE — Clinical Note
[facility] EMERGENCY DEPT  [address] [place] [id number]  [phone]    Work/School Note    Date: 11/16/2022    To Whom It May concern:    Ariel Pa was seen and treated today in the emergency room by the following provider(s):  Physician Assistant: Cherri Sigala PA-C. Ariel Pa is excused from work/school on 11/17/22 and 11/18/22. He is medically clear to return to work/school on 11/19/2022.        Sincerely,          Eze Del Valle PA-C

## 2022-11-17 VITALS
WEIGHT: 237 LBS | SYSTOLIC BLOOD PRESSURE: 149 MMHG | BODY MASS INDEX: 28.86 KG/M2 | RESPIRATION RATE: 18 BRPM | OXYGEN SATURATION: 98 % | TEMPERATURE: 98.3 F | HEART RATE: 97 BPM | DIASTOLIC BLOOD PRESSURE: 90 MMHG | HEIGHT: 76 IN

## 2022-11-17 LAB
ATRIAL RATE: 96 BPM
CALCULATED P AXIS, ECG09: 50 DEGREES
CALCULATED R AXIS, ECG10: 9 DEGREES
CALCULATED T AXIS, ECG11: 19 DEGREES
DIAGNOSIS, 93000: NORMAL
P-R INTERVAL, ECG05: 208 MS
Q-T INTERVAL, ECG07: 394 MS
QRS DURATION, ECG06: 112 MS
QTC CALCULATION (BEZET), ECG08: 497 MS
VENTRICULAR RATE, ECG03: 96 BPM

## 2022-11-17 PROCEDURE — 74011000250 HC RX REV CODE- 250: Performed by: PHYSICIAN ASSISTANT

## 2022-11-17 RX ORDER — BACITRACIN ZINC 500 UNIT/G
1 OINTMENT IN PACKET (EA) TOPICAL
Status: COMPLETED | OUTPATIENT
Start: 2022-11-17 | End: 2022-11-17

## 2022-11-17 RX ADMIN — Medication 1 PACKET: at 01:22

## 2022-11-17 NOTE — ED PROVIDER NOTES
EMERGENCY DEPARTMENT HISTORY AND PHYSICAL EXAM      Date: 11/16/2022  Patient Name: Breanne Becerril    History of Presenting Illness     Chief Complaint   Patient presents with    Laceration       History Provided By: Patient    HPI: Breanne Becerril, 49 y.o. male with a past medical history significant for HTN, CAD, COPD, CVA, and depression who presents to this ED with cc of fall. Patient reports mechanical ground-level fall in his hotel room this evening. States that he hit his head on a coffee table as he fell presents with a contusion to right forehead. Denies being on any blood thinners but record review does indicate that he takes plavix. Denies loss of consciousness. Denies any associated neck pain, changes in vision, lightheadedness, dizziness, chest pain, palpitations. He does present very somnolent but notes that he took his Ambien and trazodone shortly prior to his fall. Notes no alleviating or exacerbating factors. Denies treating symptoms with anything. States he is otherwise well has no further concerns. There are no other complaints, changes, or physical findings at this time. PCP: Braulio Thompson MD    No current facility-administered medications on file prior to encounter. Current Outpatient Medications on File Prior to Encounter   Medication Sig Dispense Refill    bumetanide (BUMEX) 2 mg tablet Take 0.5 Tablets by mouth daily. 30 Tablet 0    metoprolol succinate (TOPROL-XL) 25 mg XL tablet Take 1 Tablet by mouth daily. Indications: high blood pressure 30 Tablet 0    amoxicillin-clavulanate (AUGMENTIN) 500-125 mg per tablet Take 1 Tablet by mouth every eight (8) hours. 15 Tablet 0    bisacodyL (DULCOLAX) 5 mg EC tablet Take 1 Tablet by mouth daily as needed for Constipation. 10 Tablet 0    budesonide-formoteroL (SYMBICORT) 160-4.5 mcg/actuation HFAA Take 2 Puffs by inhalation two (2) times a day. 10.2 g 0    clopidogreL (PLAVIX) 75 mg tab Take 1 Tablet by mouth daily. 30 Tablet 0    levothyroxine (SYNTHROID) 200 mcg tablet TAKE 1 TABLET BY MOUTH ONCE DAILY BEFORE BREAKFAST (DISCONTINUE LEVOTHYROXINE 175MCG) 30 Tablet 1    albuterol-ipratropium (DUO-NEB) 2.5 mg-0.5 mg/3 ml nebu 3 mL by Nebulization route every six (6) hours as needed for Wheezing. 30 Nebule 0    potassium chloride (K-DUR, KLOR-CON) 20 mEq tablet Take 2 Tablets by mouth daily. (Patient not taking: Reported on 7/17/2022) 30 Tablet 0    guaiFENesin ER (MUCINEX) 600 mg ER tablet Take 1 Tablet by mouth every twelve (12) hours. (Patient not taking: Reported on 7/17/2022) 20 Tablet 0    insulin glargine (LANTUS) 100 unit/mL injection 20 Units by SubCUTAneous route nightly. (Patient not taking: Reported on 7/17/2022) 10 mL 0    metFORMIN (GLUCOPHAGE) 500 mg tablet       cholecalciferol (VITAMIN D3) (2,000 UNITS /50 MCG) cap capsule  (Patient not taking: Reported on 7/17/2022)      traZODone (DESYREL) 100 mg tablet Take 1 Tablet by mouth nightly. 30 Tablet 0    zolpidem (AMBIEN) 5 mg tablet Take 1 Tablet by mouth nightly as needed for Sleep. Max Daily Amount: 5 mg. 12 Tablet 0    topiramate (TOPAMAX) 50 mg tablet Take 1 Tablet by mouth two (2) times daily (with meals). (Patient not taking: Reported on 7/17/2022) 60 Tablet 0    pantoprazole (PROTONIX) 40 mg tablet TAKE ONE TABLET BY MOUTH TWICE DAILY (Patient not taking: Reported on 7/17/2022)      digoxin (LANOXIN) 0.125 mg tablet TAKE ONE TABLET BY MOUTH EVERY DAY (Patient not taking: Reported on 7/17/2022)      atorvastatin (LIPITOR) 20 mg tablet 20 mg daily. (Patient not taking: Reported on 7/17/2022)      aspirin 81 mg chewable tablet Take 81 mg by mouth daily.          Past History     Past Medical History:  Past Medical History:   Diagnosis Date    Acute MI ([facility] [address]     COPD (chronic obstructive pulmonary disease) ([facility] [address]     Depression     Hypertension     Ill-defined condition     Stroke [facility])        Past Surgical History:  Past Surgical History:   Procedure Laterality Date    HX HERNIA REPAIR      HX ORTHOPAEDIC      rotator cuff surgery    HX OTHER SURGICAL      Patent Foramen Ovale Repair    HX THYROIDECTOMY      IR FINE NEEDLE ASPIRATION W IMAGE         Family History:  Family History   Problem Relation Age of Onset    Diabetes Maternal Uncle     Hypertension Maternal Uncle     Hypertension Paternal Uncle     Diabetes Paternal Uncle     Cancer Other     Cancer Mother     No Known Problems Father        Social History:  Social History     Tobacco Use    Smoking status: Former    Smokeless tobacco: Never   Vaping Use    Vaping Use: Never used   Substance Use Topics    Alcohol use: Yes     Comment: socially     Drug use: Not Currently       Allergies: Allergies   Allergen Reactions    Vancomycin Hives     Lyndsey syndrome       Review of Systems   Review of Systems   Constitutional: Negative. Negative for chills, diaphoresis and fever. HENT: Negative. Negative for congestion, rhinorrhea and sore throat. Eyes: Negative. Negative for pain. Respiratory: Negative. Negative for cough and shortness of breath. Cardiovascular: Negative. Negative for chest pain. Gastrointestinal: Negative. Negative for abdominal pain, diarrhea, nausea and vomiting. Genitourinary: Negative. Negative for difficulty urinating, dysuria, frequency and hematuria. Musculoskeletal: Negative. Skin:  Positive for wound. Negative for rash. Neurological: Negative. Negative for dizziness, weakness, light-headedness, numbness and headaches. Psychiatric/Behavioral: Negative. All other systems reviewed and are negative. Physical Exam   Physical Exam  Vitals and nursing note reviewed. Constitutional:       General: He is not in acute distress. Appearance: Normal appearance. He is not ill-appearing or toxic-appearing. HENT:      Head: Normocephalic. Contusion (right forehead) present.       Mouth/Throat:      Mouth: Mucous membranes are moist.   Eyes:      Extraocular Movements: Extraocular movements intact. Conjunctiva/sclera: Conjunctivae normal.      Pupils: Pupils are equal, round, and reactive to light. Cardiovascular:      Rate and Rhythm: Normal rate and regular rhythm. Pulses: Normal pulses. Heart sounds: Normal heart sounds. No murmur heard. No friction rub. No gallop. Pulmonary:      Effort: Pulmonary effort is normal. No respiratory distress. Breath sounds: Normal breath sounds. No wheezing, rhonchi or rales. Abdominal:      General: Bowel sounds are normal. There is no distension. Palpations: Abdomen is soft. Tenderness: There is no abdominal tenderness. There is no guarding or rebound. Musculoskeletal:      Cervical back: Neck supple. No deformity, tenderness or bony tenderness. Thoracic back: No deformity or bony tenderness. Lumbar back: No deformity or bony tenderness. Comments: No step-offs or deformity   Skin:     General: Skin is warm and dry. Capillary Refill: Capillary refill takes less than 2 seconds. Findings: No rash. Neurological:      General: No focal deficit present. Mental Status: He is alert and oriented to person, place, and time. GCS: GCS eye subscore is 4. GCS verbal subscore is 5. GCS motor subscore is 6. Sensory: Sensation is intact. Motor: Motor function is intact. Gait: Gait is intact. Comments: Somnolent but arousable   Psychiatric:         Mood and Affect: Mood normal.         Behavior: Behavior normal.       Lab and Diagnostic Study Results   Labs -   No results found for this or any previous visit (from the past 12 hour(s)).     Radiologic Studies -   @lastxrresult@  CT Results  (Last 48 hours)      None          CXR Results  (Last 48 hours)      None            Medical Decision Making and ED Course   Differential Diagnosis & Medical Decision Making Provider Note:   Ddx: Mechanical ground-level fall, closed head injury, strain, sprain, contusion    Will assess with CT head, control pain, reassess, anticipate discharge with close outpatient follow-up. - I am the first provider for this patient. I reviewed the vital signs, available nursing notes, past medical history, past surgical history, family history and social history. The patients presenting problems have been discussed, and they are in agreement with the care plan formulated and outlined with them. I have encouraged them to ask questions as they arise throughout their visit. Vital Signs-Reviewed the patient's vital signs. No data found. ED Course:   1:17 AM  Patient reassessed and updated on results. CT head negative. Work-up otherwise unremarkable. Patient states that his pain is well controlled. We will clean and dress forehead abrasion and discharge shortly following. Patient educated on strict return precautions conveys understanding agreement care plan as outlined no new complaints or concerns and all his questions been answered. ALCOHOL/SUBSTANCE ABUSE COUNSELING: Upon evaluation, pt endorsed recent alcohol/illicit drug use. For approximately 15 minutes, pt has been counseled on the dangers of alcohol and illicit drug use on their health, and they were encouraged to quit as soon as possible in order to decrease further risks to their health. Pt has conveyed their understanding of the risks involved should they continue to use these products. Procedures   Performed by: Desire Lima PA-C  Procedures      Disposition   Disposition: DC- Adult Discharges: All of the diagnostic tests were reviewed and questions answered. Diagnosis, care plan and treatment options were discussed. The patient understands the instructions and will follow up as directed. The patients results have been reviewed with them. They have been counseled regarding their diagnosis.   The patient verbally convey understanding and agreement of the signs, symptoms, diagnosis, treatment and prognosis and additionally agrees to follow up as recommended with their PCP in 24 - 48 hours. They also agree with the care-plan and convey that all of their questions have been answered. I have also put together some discharge instructions for them that include: 1) educational information regarding their diagnosis, 2) how to care for their diagnosis at home, as well a 3) list of reasons why they would want to return to the ED prior to their follow-up appointment, should their condition change. DISCHARGE PLAN:  1. Current Discharge Medication List        CONTINUE these medications which have NOT CHANGED    Details   bumetanide (BUMEX) 2 mg tablet Take 0.5 Tablets by mouth daily. Qty: 30 Tablet, Refills: 0      metoprolol succinate (TOPROL-XL) 25 mg XL tablet Take 1 Tablet by mouth daily. Indications: high blood pressure  Qty: 30 Tablet, Refills: 0      amoxicillin-clavulanate (AUGMENTIN) 500-125 mg per tablet Take 1 Tablet by mouth every eight (8) hours. Qty: 15 Tablet, Refills: 0      bisacodyL (DULCOLAX) 5 mg EC tablet Take 1 Tablet by mouth daily as needed for Constipation. Qty: 10 Tablet, Refills: 0      budesonide-formoteroL (SYMBICORT) 160-4.5 mcg/actuation HFAA Take 2 Puffs by inhalation two (2) times a day. Qty: 10.2 g, Refills: 0      clopidogreL (PLAVIX) 75 mg tab Take 1 Tablet by mouth daily. Qty: 30 Tablet, Refills: 0      levothyroxine (SYNTHROID) 200 mcg tablet TAKE 1 TABLET BY MOUTH ONCE DAILY BEFORE BREAKFAST (DISCONTINUE LEVOTHYROXINE 175MCG)  Qty: 30 Tablet, Refills: 1      albuterol-ipratropium (DUO-NEB) 2.5 mg-0.5 mg/3 ml nebu 3 mL by Nebulization route every six (6) hours as needed for Wheezing. Qty: 30 Nebule, Refills: 0      potassium chloride (K-DUR, KLOR-CON) 20 mEq tablet Take 2 Tablets by mouth daily. Qty: 30 Tablet, Refills: 0      guaiFENesin ER (MUCINEX) 600 mg ER tablet Take 1 Tablet by mouth every twelve (12) hours.   Qty: 20 Tablet, Refills: 0      insulin glargine (LANTUS) 100 unit/mL injection 20 Units by SubCUTAneous route nightly. Qty: 10 mL, Refills: 0      metFORMIN (GLUCOPHAGE) 500 mg tablet       cholecalciferol (VITAMIN D3) (2,000 UNITS /50 MCG) cap capsule       traZODone (DESYREL) 100 mg tablet Take 1 Tablet by mouth nightly. Qty: 30 Tablet, Refills: 0      zolpidem (AMBIEN) 5 mg tablet Take 1 Tablet by mouth nightly as needed for Sleep. Max Daily Amount: 5 mg. Qty: 12 Tablet, Refills: 0    Associated Diagnoses: Peripheral polyneuropathy      topiramate (TOPAMAX) 50 mg tablet Take 1 Tablet by mouth two (2) times daily (with meals). Qty: 60 Tablet, Refills: 0      pantoprazole (PROTONIX) 40 mg tablet TAKE ONE TABLET BY MOUTH TWICE DAILY      digoxin (LANOXIN) 0.125 mg tablet TAKE ONE TABLET BY MOUTH EVERY DAY      atorvastatin (LIPITOR) 20 mg tablet 20 mg daily. aspirin 81 mg chewable tablet Take 81 mg by mouth daily. 2.   Follow-up Information       Follow up With Specialties Details Why Rich Officer, MD Internal Medicine Physician Schedule an appointment as soon as possible for a visit  As needed [facility] [address] [address] [place] [address] EMERGENCY DEPT Emergency Medicine  If symptoms worsen [address] [address]  [phone]          3. Return to ED if worse   4. Discharge Medication List as of 11/17/2022  1:29 AM        Remove if admitted/transferred    Diagnosis/Clinical Impression     Clinical Impression:   1. Closed head injury, initial encounter    2. Abrasion of scalp, initial encounter        Attestations: Vanita BEGUM PA-C, ivan the primary clinician of record. Please note that this dictation was completed with [employer], the [employer] voice recognition software. Quite often unanticipated grammatical, syntax, homophones, and other interpretive errors are inadvertently transcribed by the computer software. Please disregard these errors.   Please excuse any errors that have escaped final proofreading. Thank you.

## 2022-11-17 NOTE — ED NOTES
Pt discharged home with instructions. , understanding stated.  Pt trying to get a hold of daughter for a ride

## 2022-11-17 NOTE — ED TRIAGE NOTES
Pt states that he fell in the hotel room and as he went down he struck his head on the side of the coffee table. Pt states he doesn't think he suffered an LOC.  Pt states hes been having a problem with his legs over the past few days

## 2022-12-17 ENCOUNTER — APPOINTMENT (OUTPATIENT)
Dept: CT IMAGING | Age: 47
End: 2022-12-17
Attending: PHYSICIAN ASSISTANT
Payer: MEDICAID

## 2022-12-17 ENCOUNTER — HOSPITAL ENCOUNTER (EMERGENCY)
Age: 47
Discharge: HOME | End: 2022-12-17
Payer: MEDICAID

## 2022-12-17 VITALS
BODY MASS INDEX: 38.36 KG/M2 | HEART RATE: 94 BPM | HEIGHT: 76 IN | RESPIRATION RATE: 15 BRPM | TEMPERATURE: 98.1 F | WEIGHT: 315 LBS | SYSTOLIC BLOOD PRESSURE: 151 MMHG | DIASTOLIC BLOOD PRESSURE: 81 MMHG | OXYGEN SATURATION: 95 %

## 2022-12-17 DIAGNOSIS — S00.81XA ABRASION OF FACE, INITIAL ENCOUNTER: ICD-10-CM

## 2022-12-17 DIAGNOSIS — M50.30 DDD (DEGENERATIVE DISC DISEASE), CERVICAL: ICD-10-CM

## 2022-12-17 DIAGNOSIS — S09.90XA CLOSED HEAD INJURY, INITIAL ENCOUNTER: Primary | ICD-10-CM
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
## 2022-12-17 PROCEDURE — 70450 CT HEAD/BRAIN W/O DYE: CPT

## 2022-12-17 PROCEDURE — 72125 CT NECK SPINE W/O DYE: CPT

## 2022-12-17 PROCEDURE — 99284 EMERGENCY DEPT VISIT MOD MDM: CPT

## 2022-12-17 RX ORDER — LIDOCAINE HYDROCHLORIDE 10 MG/ML
10 INJECTION INFILTRATION; PERINEURAL ONCE
Status: DISCONTINUED | OUTPATIENT
Start: 2022-12-17 | End: 2022-12-17 | Stop reason: HOSPADM

## 2022-12-17 NOTE — ED TRIAGE NOTES
Sitting on edge of bed watching tv when he leaned forward and fell forward striking his forehead on piece of furniture sustaining lac between eyes. No loc, bleeding controlled.

## 2022-12-17 NOTE — Clinical Note
[facility] EMERGENCY DEPT  [address] [place] [id number]  [phone]    Work/School Note    Date: 12/17/2022    To Whom It May concern:    Alexa Gaona was seen and treated today in the emergency room by the following provider(s):  Physician Assistant: Trey Flowers PA-C. Alexa Gaona is excused from work/school on 12/17/22 and 12/18/22. He is medically clear to return to work/school on 12/19/2022.        Sincerely,          Jessica Del Valle PA-C

## 2022-12-17 NOTE — ED PROVIDER NOTES
EMERGENCY DEPARTMENT HISTORY AND PHYSICAL EXAM      Date: 12/17/2022  Patient Name: Ailin Jeong    History of Presenting Illness     Chief Complaint   Patient presents with    Fall       History Provided By: Patient    HPI: Ailin Jeong, 49 y.o. male with a past medical history significant for HTN, CAD, CVA, COPD, and depression who presents this ED with cc of fall. Patient reportedly sitting on the side of his bed this evening when he fell forward and hit his head on bedside table. He presents with a laceration to mid-forehead. Patient denies loss of consciousness. He does complain of a generalized headache and neck pain. Denies any changes in vision, numbness, weakness, lightheadedness, dizziness. Patient notes taking his Ambien and trazodone shortly prior to his fall. States that he also takes a baby aspirin daily but denies any blood thinners. Denies treating symptoms anything. Notes no alleviating or exacerbating factors. Moderate intensity symptoms. Patient states he is otherwise well and has no further concerns. There are no other complaints, changes, or physical findings at this time.     Past History     Past Medical History:  Past Medical History:   Diagnosis Date    Acute MI ([address]     COPD (chronic obstructive pulmonary disease) ([facility] [address]     Depression     Hypertension     Ill-defined condition     Stroke [facility])        Past Surgical History:  Past Surgical History:   Procedure Laterality Date    HX HERNIA REPAIR      HX ORTHOPAEDIC      rotator cuff surgery    HX OTHER SURGICAL      Patent Foramen [place]vale Repair    HX THYROIDECTOMY      IR FINE NEEDLE ASPIRATION W IMAGE         Family History:  Family History   Problem Relation Age of Onset    Diabetes Maternal Uncle     Hypertension Maternal Uncle     Hypertension Paternal Uncle     Diabetes Paternal Uncle     Cancer Other     Cancer Mother     No Known Problems Father        Social History:  Social History     Tobacco Use Smoking status: Former    Smokeless tobacco: Never   Vaping Use    Vaping Use: Never used   Substance Use Topics    Alcohol use: Yes     Comment: socially     Drug use: Not Currently       Allergies: Allergies   Allergen Reactions    Vancomycin Hives     Lyndsey syndrome       PCP: Mariella Camacho MD    No current facility-administered medications on file prior to encounter. Current Outpatient Medications on File Prior to Encounter   Medication Sig Dispense Refill    bumetanide (BUMEX) 2 mg tablet Take 0.5 Tablets by mouth daily. 30 Tablet 0    metoprolol succinate (TOPROL-XL) 25 mg XL tablet Take 1 Tablet by mouth daily. Indications: high blood pressure 30 Tablet 0    amoxicillin-clavulanate (AUGMENTIN) 500-125 mg per tablet Take 1 Tablet by mouth every eight (8) hours. 15 Tablet 0    bisacodyL (DULCOLAX) 5 mg EC tablet Take 1 Tablet by mouth daily as needed for Constipation. 10 Tablet 0    budesonide-formoteroL (SYMBICORT) 160-4.5 mcg/actuation HFAA Take 2 Puffs by inhalation two (2) times a day. 10.2 g 0    clopidogreL (PLAVIX) 75 mg tab Take 1 Tablet by mouth daily. 30 Tablet 0    levothyroxine (SYNTHROID) 200 mcg tablet TAKE 1 TABLET BY MOUTH ONCE DAILY BEFORE BREAKFAST (DISCONTINUE LEVOTHYROXINE 175MCG) 30 Tablet 1    albuterol-ipratropium (DUO-NEB) 2.5 mg-0.5 mg/3 ml nebu 3 mL by Nebulization route every six (6) hours as needed for Wheezing. 30 Nebule 0    potassium chloride (K-DUR, KLOR-CON) 20 mEq tablet Take 2 Tablets by mouth daily. (Patient not taking: Reported on 7/17/2022) 30 Tablet 0    guaiFENesin ER (MUCINEX) 600 mg ER tablet Take 1 Tablet by mouth every twelve (12) hours. (Patient not taking: Reported on 7/17/2022) 20 Tablet 0    insulin glargine (LANTUS) 100 unit/mL injection 20 Units by SubCUTAneous route nightly.  (Patient not taking: Reported on 7/17/2022) 10 mL 0    metFORMIN (GLUCOPHAGE) 500 mg tablet       cholecalciferol (VITAMIN D3) (2,000 UNITS /50 MCG) cap capsule  (Patient not taking: Reported on 7/17/2022)      traZODone (DESYREL) 100 mg tablet Take 1 Tablet by mouth nightly. 30 Tablet 0    zolpidem (AMBIEN) 5 mg tablet Take 1 Tablet by mouth nightly as needed for Sleep. Max Daily Amount: 5 mg. 12 Tablet 0    topiramate (TOPAMAX) 50 mg tablet Take 1 Tablet by mouth two (2) times daily (with meals). (Patient not taking: Reported on 7/17/2022) 60 Tablet 0    pantoprazole (PROTONIX) 40 mg tablet TAKE ONE TABLET BY MOUTH TWICE DAILY (Patient not taking: Reported on 7/17/2022)      digoxin (LANOXIN) 0.125 mg tablet TAKE ONE TABLET BY MOUTH EVERY DAY (Patient not taking: Reported on 7/17/2022)      atorvastatin (LIPITOR) 20 mg tablet 20 mg daily. (Patient not taking: Reported on 7/17/2022)      aspirin 81 mg chewable tablet Take 81 mg by mouth daily. Review of Systems   Review of Systems   Constitutional: Negative. Negative for chills, diaphoresis and fever. HENT: Negative. Negative for congestion, rhinorrhea and sore throat. Eyes: Negative. Negative for pain. Respiratory: Negative. Negative for cough, chest tightness, shortness of breath and wheezing. Cardiovascular: Negative. Negative for chest pain and palpitations. Gastrointestinal: Negative. Negative for abdominal pain, diarrhea, nausea and vomiting. Genitourinary: Negative. Negative for difficulty urinating, dysuria, flank pain, frequency and hematuria. Musculoskeletal:  Positive for neck pain. Skin:  Positive for wound. Negative for rash. Neurological:  Positive for headaches. Negative for dizziness, syncope, weakness, light-headedness and numbness. Psychiatric/Behavioral: Negative. All other systems reviewed and are negative. Physical Exam   Physical Exam  Vitals and nursing note reviewed. Constitutional:       General: He is not in acute distress. Appearance: Normal appearance. He is morbidly obese. He is not ill-appearing or toxic-appearing. HENT:      Head: Normocephalic. Abrasion present. No raccoon eyes or Murray's sign. Jaw: There is normal jaw occlusion. No tenderness, swelling or malocclusion. Mouth/Throat:      Mouth: Mucous membranes are moist.   Eyes:      Extraocular Movements: Extraocular movements intact. Conjunctiva/sclera: Conjunctivae normal.      Pupils: Pupils are equal, round, and reactive to light. Cardiovascular:      Rate and Rhythm: Regular rhythm. Tachycardia present. Pulses: Normal pulses. Heart sounds: Normal heart sounds. No murmur heard. No friction rub. No gallop. Pulmonary:      Effort: Pulmonary effort is normal. No respiratory distress. Breath sounds: Normal breath sounds. No wheezing, rhonchi or rales. Abdominal:      General: Bowel sounds are normal. There is no distension. Palpations: Abdomen is soft. Tenderness: There is no abdominal tenderness. There is no guarding or rebound. Musculoskeletal:      Cervical back: Normal and neck supple. No deformity, bony tenderness or crepitus. Normal range of motion. Thoracic back: Normal.      Lumbar back: Normal. No deformity or bony tenderness. Negative right straight leg raise test and negative left straight leg raise test.      Comments: No step-offs or deformity   Skin:     General: Skin is warm and dry. Capillary Refill: Capillary refill takes less than 2 seconds. Findings: No rash. Neurological:      General: No focal deficit present. Mental Status: He is alert and oriented to person, place, and time. Sensory: Sensation is intact. Motor: Motor function is intact. Gait: Gait is intact. Comments: Somnolent   Psychiatric:         Mood and Affect: Mood normal.         Behavior: Behavior normal.       Lab and Diagnostic Study Results   Labs -   No results found for this or any previous visit (from the past 12 hour(s)).     Radiologic Studies -   @lastxrresult@  CT Results  (Last 48 hours)                 12/17/22 0306  CT HEAD WO CONT Final result    Impression:  No acute intracranial process. Narrative:  CLINICAL HISTORY: fall/head injury   INDICATION: fall/head injury   COMPARISON: 11/16/2022. CT dose reduction was achieved through use of a standardized protocol tailored   for this examination and automatic exposure control for dose modulation. TECHNIQUE: Serial axial images with a collimation of 5 mm were obtained from the   skull base through the vertex     FINDINGS:    The sulci and ventricles are within normal limits for patient age. There is no   evidence of an acute infarction, hemorrhage, or mass-effect. There is no   evidence of midline shift or hydrocephalus. Posterior fossa structures are   unremarkable. No extra-axial collections are seen. Mastoid air cells are well pneumatized and clear. Minimal chronic foci of encephalomalacia in the left parietal lobe. 12/17/22 0306  CT SPINE CERV WO CONT Final result    Impression:  No fracture   Central stenosis C3-4 through C5-6   Neural foraminal stenosis as detailed above on the right at C4-5 and bilaterally   at C5-6       Narrative:  EXAM:  CT CERVICAL SPINE WITHOUT CONTRAST       INDICATION: fall/neck pain. COMPARISON: None. CONTRAST:  None. TECHNIQUE: Multislice helical CT of the cervical spine was performed without   intravenous contrast administration. Sagittal and coronal reformats were   generated. CT dose reduction was achieved through use of a standardized   protocol tailored for this examination and automatic exposure control for dose   modulation. FINDINGS:       There is a C shaped curvature with the apex directed to the right. There is   anterior dislocation of the left mandibular condyle and anterior subluxation of   the right mandibular condyle. There is no fracture or compression deformity. The odontoid process is intact. The craniocervical junction is within normal limits.        The incidentally imaged soft tissues are within normal limits. C2-C3: There is no spinal canal or neural foraminal stenosis. Anterior to   posterior dimension of 9.3 mm.       C3-C4: Disc osteophyte complex. Central canal measures 7.4 mm   anterior-posterior. Mild bilateral neural foraminal stenosis secondary to   uncovertebral joint hypertrophy. (Axial image 47). C4-C5: Disc osteophyte complex. Central canal measures 7.6 mm   anterior-posterior. Moderate right and mild left neural foraminal stenosis   secondary to uncovertebral joint hypertrophy. (Axial image 55). Erleen Schofield C5-C6: Disc osteophyte complex. Central canal measures 8 mm anterior-posterior. Severe right and moderate left neural foraminal stenosis secondary to   uncovertebral joint hypertrophy. (Axial image 62). C6-C7: There is no spinal canal or neural foraminal stenosis. C7-T1: There is no spinal canal or neural foraminal stenosis. CXR Results  (Last 48 hours)      None            Medical Decision Making and ED Course   Differential Diagnosis & Medical Decision Making Provider Note:   Ddx: Closed head injury, laceration, mechanical fall, adverse reaction to medication    Will assess with CT head and neck, control pain, repair forehead wound, reassess, anticipate discharge with outpatient follow-up as needed. - I am the first provider for this patient. I reviewed the vital signs, available nursing notes, past medical history, past surgical history, family history and social history. The patients presenting problems have been discussed, and they are in agreement with the care plan formulated and outlined with them. I have encouraged them to ask questions as they arise throughout their visit. Vital Signs-Reviewed the patient's vital signs. No data found. ED Course:   3:52 AM  Patient reassessed and updated on imaging results, which are negative for acute pathology. C-collar removed.   Patient's wound copiously irrigated and appears to be shallow and not requiring sutures. Will Dermabond and Steri-Strip. Patient encouraged to follow-up with his primary care within the next week. He is also educated on strict return precautions and conveys good understanding agreement care plan as outlined. Patient has no new complaints or concerns and all his questions have been answered. Patient stable for discharge home. Patient no longer tachycardic at time of discharge. ALCOHOL/SUBSTANCE ABUSE COUNSELING: Upon evaluation, pt endorsed recent alcohol/illicit drug use. For approximately 15 minutes, pt has been counseled on the dangers of alcohol and illicit drug use on their health, and they were encouraged to quit as soon as possible in order to decrease further risks to their health. Pt has conveyed their understanding of the risks involved should they continue to use these products. Procedures   Performed by: Justina Severino PA-C  Procedures      Disposition   Disposition: DC- Adult Discharges: All of the diagnostic tests were reviewed and questions answered. Diagnosis, care plan and treatment options were discussed. The patient understands the instructions and will follow up as directed. The patients results have been reviewed with them. They have been counseled regarding their diagnosis. The patient verbally convey understanding and agreement of the signs, symptoms, diagnosis, treatment and prognosis and additionally agrees to follow up as recommended with their PCP in 24 - 48 hours. They also agree with the care-plan and convey that all of their questions have been answered. I have also put together some discharge instructions for them that include: 1) educational information regarding their diagnosis, 2) how to care for their diagnosis at home, as well a 3) list of reasons why they would want to return to the ED prior to their follow-up appointment, should their condition change. DISCHARGE PLAN:  1.    Current Discharge Medication List        CONTINUE these medications which have NOT CHANGED    Details   bumetanide (BUMEX) 2 mg tablet Take 0.5 Tablets by mouth daily. Qty: 30 Tablet, Refills: 0      metoprolol succinate (TOPROL-XL) 25 mg XL tablet Take 1 Tablet by mouth daily. Indications: high blood pressure  Qty: 30 Tablet, Refills: 0      amoxicillin-clavulanate (AUGMENTIN) 500-125 mg per tablet Take 1 Tablet by mouth every eight (8) hours. Qty: 15 Tablet, Refills: 0      bisacodyL (DULCOLAX) 5 mg EC tablet Take 1 Tablet by mouth daily as needed for Constipation. Qty: 10 Tablet, Refills: 0      budesonide-formoteroL (SYMBICORT) 160-4.5 mcg/actuation HFAA Take 2 Puffs by inhalation two (2) times a day. Qty: 10.2 g, Refills: 0      clopidogreL (PLAVIX) 75 mg tab Take 1 Tablet by mouth daily. Qty: 30 Tablet, Refills: 0      levothyroxine (SYNTHROID) 200 mcg tablet TAKE 1 TABLET BY MOUTH ONCE DAILY BEFORE BREAKFAST (DISCONTINUE LEVOTHYROXINE 175MCG)  Qty: 30 Tablet, Refills: 1      albuterol-ipratropium (DUO-NEB) 2.5 mg-0.5 mg/3 ml nebu 3 mL by Nebulization route every six (6) hours as needed for Wheezing. Qty: 30 Nebule, Refills: 0      potassium chloride (K-DUR, KLOR-CON) 20 mEq tablet Take 2 Tablets by mouth daily. Qty: 30 Tablet, Refills: 0      guaiFENesin ER (MUCINEX) 600 mg ER tablet Take 1 Tablet by mouth every twelve (12) hours. Qty: 20 Tablet, Refills: 0      insulin glargine (LANTUS) 100 unit/mL injection 20 Units by SubCUTAneous route nightly. Qty: 10 mL, Refills: 0      metFORMIN (GLUCOPHAGE) 500 mg tablet       cholecalciferol (VITAMIN D3) (2,000 UNITS /50 MCG) cap capsule       traZODone (DESYREL) 100 mg tablet Take 1 Tablet by mouth nightly. Qty: 30 Tablet, Refills: 0      zolpidem (AMBIEN) 5 mg tablet Take 1 Tablet by mouth nightly as needed for Sleep. Max Daily Amount: 5 mg.   Qty: 12 Tablet, Refills: 0    Associated Diagnoses: Peripheral polyneuropathy      topiramate (TOPAMAX) 50 mg tablet Take 1 Tablet by mouth two (2) times daily (with meals). Qty: 60 Tablet, Refills: 0      pantoprazole (PROTONIX) 40 mg tablet TAKE ONE TABLET BY MOUTH TWICE DAILY      digoxin (LANOXIN) 0.125 mg tablet TAKE ONE TABLET BY MOUTH EVERY DAY      atorvastatin (LIPITOR) 20 mg tablet 20 mg daily. aspirin 81 mg chewable tablet Take 81 mg by mouth daily. 2.   Follow-up Information       Follow up With Specialties Details Why Mo Bruner MD Internal Medicine Physician Schedule an appointment as soon as possible for a visit  As needed [address] [facility]  [phone]      Shar Dsouza MD Orthopedic Surgery  As needed [address] [place] [id number]  [phone]      [facility] EMERGENCY DEPT Emergency Medicine  If symptoms worsen [address] [facility] [id number] [phone]          3. Return to ED if worse   4. Discharge Medication List as of 12/17/2022  3:57 AM        Remove if admitted/transferred    Diagnosis/Clinical Impression     Clinical Impression:   1. Closed head injury, initial encounter    2. Abrasion of face, initial encounter    3. Fall, initial encounter    4. DDD (degenerative disc disease), cervical        Attestations: Mayur BEGUM PA-C, ivan the primary clinician of record. Please note that this dictation was completed with [employer], the computer voice recognition software. Quite often unanticipated grammatical, syntax, homophones, and other interpretive errors are inadvertently transcribed by the computer software. Please disregard these errors. Please excuse any errors that have escaped final proofreading. Thank you.

## 2023-02-27 ENCOUNTER — APPOINTMENT (OUTPATIENT)
Dept: CT IMAGING | Age: 48
DRG: 045 | End: 2023-02-27
Attending: STUDENT IN AN ORGANIZED HEALTH CARE EDUCATION/TRAINING PROGRAM
Payer: MEDICAID

## 2023-02-27 ENCOUNTER — APPOINTMENT (OUTPATIENT)
Dept: GENERAL RADIOLOGY | Age: 48
DRG: 045 | End: 2023-02-27
Attending: STUDENT IN AN ORGANIZED HEALTH CARE EDUCATION/TRAINING PROGRAM
Payer: MEDICAID

## 2023-02-27 ENCOUNTER — HOSPITAL ENCOUNTER (INPATIENT)
Age: 48
LOS: 1 days | Discharge: HOME | DRG: 045 | End: 2023-03-01
Attending: STUDENT IN AN ORGANIZED HEALTH CARE EDUCATION/TRAINING PROGRAM | Admitting: INTERNAL MEDICINE
Payer: MEDICAID

## 2023-02-27 DIAGNOSIS — R07.9 CHEST PAIN, UNSPECIFIED TYPE: ICD-10-CM

## 2023-02-27 DIAGNOSIS — R29.898 LEFT ARM WEAKNESS: ICD-10-CM

## 2023-02-27 DIAGNOSIS — R29.90 STROKE-LIKE SYMPTOM: Primary | ICD-10-CM

## 2023-02-27 DIAGNOSIS — E87.6 HYPOKALEMIA: ICD-10-CM

## 2023-02-27 LAB
ALBUMIN SERPL-MCNC: 3.1 G/DL (ref 3.5–5)
ALBUMIN/GLOB SERPL: 0.7 (ref 1.1–2.2)
ALP SERPL-CCNC: 70 U/L (ref 45–117)
ALT SERPL-CCNC: 32 U/L (ref 12–78)
ANION GAP SERPL CALC-SCNC: 4 MMOL/L (ref 5–15)
AST SERPL W P-5'-P-CCNC: 19 U/L (ref 15–37)
BASOPHILS # BLD: 0 K/UL (ref 0–0.1)
BASOPHILS NFR BLD: 0 % (ref 0–1)
BILIRUB SERPL-MCNC: 0.2 MG/DL (ref 0.2–1)
BUN SERPL-MCNC: 14 MG/DL (ref 6–20)
BUN/CREAT SERPL: 14 (ref 12–20)
CA-I BLD-MCNC: 8.6 MG/DL (ref 8.5–10.1)
CHLORIDE SERPL-SCNC: 101 MMOL/L (ref 97–108)
CO2 SERPL-SCNC: 32 MMOL/L (ref 21–32)
CREAT SERPL-MCNC: 0.97 MG/DL (ref 0.7–1.3)
DIFFERENTIAL METHOD BLD: ABNORMAL
EOSINOPHIL # BLD: 0.2 K/UL (ref 0–0.4)
EOSINOPHIL NFR BLD: 3 % (ref 0–7)
ERYTHROCYTE [DISTWIDTH] IN BLOOD BY AUTOMATED COUNT: 14.1 % (ref 11.5–14.5)
GLOBULIN SER CALC-MCNC: 4.3 G/DL (ref 2–4)
GLUCOSE SERPL-MCNC: 144 MG/DL (ref 65–100)
HCT VFR BLD AUTO: 37.5 % (ref 36.6–50.3)
HGB BLD-MCNC: 12.2 G/DL (ref 12.1–17)
IMM GRANULOCYTES # BLD AUTO: 0 K/UL (ref 0–0.04)
IMM GRANULOCYTES NFR BLD AUTO: 0 % (ref 0–0.5)
INR PPP: 0.9 (ref 0.9–1.1)
LYMPHOCYTES # BLD: 1.9 K/UL (ref 0.8–3.5)
LYMPHOCYTES NFR BLD: 28 % (ref 12–49)
MCH RBC QN AUTO: 31.4 PG (ref 26–34)
MCHC RBC AUTO-ENTMCNC: 32.5 G/DL (ref 30–36.5)
MCV RBC AUTO: 96.6 FL (ref 80–99)
MONOCYTES # BLD: 0.5 K/UL (ref 0–1)
MONOCYTES NFR BLD: 8 % (ref 5–13)
NEUTS SEG # BLD: 4.2 K/UL (ref 1.8–8)
NEUTS SEG NFR BLD: 61 % (ref 32–75)
NRBC # BLD: 0 K/UL (ref 0–0.01)
NRBC BLD-RTO: 0 PER 100 WBC
PLATELET # BLD AUTO: 232 K/UL (ref 150–400)
PMV BLD AUTO: 10 FL (ref 8.9–12.9)
POTASSIUM SERPL-SCNC: 3.4 MMOL/L (ref 3.5–5.1)
PROT SERPL-MCNC: 7.4 G/DL (ref 6.4–8.2)
PROTHROMBIN TIME: 12.5 SEC (ref 11.9–14.6)
RBC # BLD AUTO: 3.88 M/UL (ref 4.1–5.7)
SODIUM SERPL-SCNC: 137 MMOL/L (ref 136–145)
TROPONIN I SERPL HS-MCNC: 5 NG/L (ref 0–76)
WBC # BLD AUTO: 6.8 K/UL (ref 4.1–11.1)

## 2023-02-27 PROCEDURE — 70450 CT HEAD/BRAIN W/O DYE: CPT

## 2023-02-27 PROCEDURE — 84484 ASSAY OF TROPONIN QUANT: CPT

## 2023-02-27 PROCEDURE — 85025 COMPLETE CBC W/AUTO DIFF WBC: CPT

## 2023-02-27 PROCEDURE — 70496 CT ANGIOGRAPHY HEAD: CPT

## 2023-02-27 PROCEDURE — 85610 PROTHROMBIN TIME: CPT

## 2023-02-27 PROCEDURE — 93005 ELECTROCARDIOGRAM TRACING: CPT

## 2023-02-27 PROCEDURE — 99285 EMERGENCY DEPT VISIT HI MDM: CPT

## 2023-02-27 PROCEDURE — 96365 THER/PROPH/DIAG IV INF INIT: CPT

## 2023-02-27 PROCEDURE — 74011000636 HC RX REV CODE- 636: Performed by: STUDENT IN AN ORGANIZED HEALTH CARE EDUCATION/TRAINING PROGRAM

## 2023-02-27 PROCEDURE — 71045 X-RAY EXAM CHEST 1 VIEW: CPT

## 2023-02-27 PROCEDURE — 36415 COLL VENOUS BLD VENIPUNCTURE: CPT

## 2023-02-27 PROCEDURE — 80053 COMPREHEN METABOLIC PANEL: CPT

## 2023-02-27 RX ORDER — POTASSIUM CHLORIDE 7.45 MG/ML
10 INJECTION INTRAVENOUS ONCE
Status: COMPLETED | OUTPATIENT
Start: 2023-02-27 | End: 2023-02-28

## 2023-02-27 RX ADMIN — IOPAMIDOL 100 ML: 755 INJECTION, SOLUTION INTRAVENOUS at 23:18

## 2023-02-28 ENCOUNTER — APPOINTMENT (OUTPATIENT)
Dept: NON INVASIVE DIAGNOSTICS | Age: 48
DRG: 045 | End: 2023-02-28
Attending: INTERNAL MEDICINE
Payer: MEDICAID

## 2023-02-28 LAB
ABO + RH BLD: NORMAL
ATRIAL RATE: 99 BPM
BLOOD GROUP ANTIBODIES SERPL: NEGATIVE
CALCULATED P AXIS, ECG09: 26 DEGREES
CALCULATED R AXIS, ECG10: 0 DEGREES
CALCULATED T AXIS, ECG11: 12 DEGREES
DIAGNOSIS, 93000: NORMAL
GLUCOSE BLD STRIP.AUTO-MCNC: 126 MG/DL (ref 65–100)
GLUCOSE BLD STRIP.AUTO-MCNC: 128 MG/DL (ref 65–100)
GLUCOSE BLD STRIP.AUTO-MCNC: 149 MG/DL (ref 65–100)
GLUCOSE BLD STRIP.AUTO-MCNC: 221 MG/DL (ref 65–100)
P-R INTERVAL, ECG05: 190 MS
PERFORMED BY, TECHID: ABNORMAL
Q-T INTERVAL, ECG07: 394 MS
QRS DURATION, ECG06: 100 MS
QTC CALCULATION (BEZET), ECG08: 505 MS
SPECIMEN EXP DATE BLD: NORMAL
TROPONIN I SERPL HS-MCNC: 5 NG/L (ref 0–76)
VENTRICULAR RATE, ECG03: 99 BPM

## 2023-02-28 PROCEDURE — 74011250637 HC RX REV CODE- 250/637: Performed by: INTERNAL MEDICINE

## 2023-02-28 PROCEDURE — 96365 THER/PROPH/DIAG IV INF INIT: CPT

## 2023-02-28 PROCEDURE — 94640 AIRWAY INHALATION TREATMENT: CPT

## 2023-02-28 PROCEDURE — 65270000029 HC RM PRIVATE

## 2023-02-28 PROCEDURE — 93306 TTE W/DOPPLER COMPLETE: CPT

## 2023-02-28 PROCEDURE — G0378 HOSPITAL OBSERVATION PER HR: HCPCS

## 2023-02-28 PROCEDURE — 36415 COLL VENOUS BLD VENIPUNCTURE: CPT

## 2023-02-28 PROCEDURE — 82962 GLUCOSE BLOOD TEST: CPT

## 2023-02-28 PROCEDURE — 74011250636 HC RX REV CODE- 250/636: Performed by: INTERNAL MEDICINE

## 2023-02-28 PROCEDURE — 92610 EVALUATE SWALLOWING FUNCTION: CPT

## 2023-02-28 PROCEDURE — 96372 THER/PROPH/DIAG INJ SC/IM: CPT

## 2023-02-28 PROCEDURE — 86900 BLOOD TYPING SEROLOGIC ABO: CPT

## 2023-02-28 PROCEDURE — 84484 ASSAY OF TROPONIN QUANT: CPT

## 2023-02-28 PROCEDURE — 74011250636 HC RX REV CODE- 250/636: Performed by: STUDENT IN AN ORGANIZED HEALTH CARE EDUCATION/TRAINING PROGRAM

## 2023-02-28 PROCEDURE — 93880 EXTRACRANIAL BILAT STUDY: CPT

## 2023-02-28 PROCEDURE — 74011636637 HC RX REV CODE- 636/637: Performed by: INTERNAL MEDICINE

## 2023-02-28 PROCEDURE — 95816 EEG AWAKE AND DROWSY: CPT | Performed by: PSYCHIATRY & NEUROLOGY

## 2023-02-28 RX ORDER — POTASSIUM CHLORIDE 20 MEQ/1
40 TABLET, EXTENDED RELEASE ORAL DAILY
Status: DISCONTINUED | OUTPATIENT
Start: 2023-02-28 | End: 2023-02-28

## 2023-02-28 RX ORDER — TRAZODONE HYDROCHLORIDE 50 MG/1
100 TABLET ORAL
Status: DISCONTINUED | OUTPATIENT
Start: 2023-02-28 | End: 2023-03-01 | Stop reason: HOSPADM

## 2023-02-28 RX ORDER — ATORVASTATIN CALCIUM 40 MG/1
40 TABLET, FILM COATED ORAL DAILY
Status: DISCONTINUED | OUTPATIENT
Start: 2023-02-28 | End: 2023-03-01 | Stop reason: HOSPADM

## 2023-02-28 RX ORDER — GUAIFENESIN 100 MG/5ML
81 LIQUID (ML) ORAL DAILY
Status: DISCONTINUED | OUTPATIENT
Start: 2023-02-28 | End: 2023-03-01 | Stop reason: HOSPADM

## 2023-02-28 RX ORDER — INSULIN GLARGINE 100 [IU]/ML
20 INJECTION, SOLUTION SUBCUTANEOUS
Status: DISCONTINUED | OUTPATIENT
Start: 2023-02-28 | End: 2023-03-01 | Stop reason: HOSPADM

## 2023-02-28 RX ORDER — DEXTROSE MONOHYDRATE 100 MG/ML
0-250 INJECTION, SOLUTION INTRAVENOUS AS NEEDED
Status: DISCONTINUED | OUTPATIENT
Start: 2023-02-28 | End: 2023-03-01 | Stop reason: HOSPADM

## 2023-02-28 RX ORDER — METFORMIN HYDROCHLORIDE 500 MG/1
500 TABLET ORAL
Status: DISCONTINUED | OUTPATIENT
Start: 2023-03-02 | End: 2023-03-01 | Stop reason: HOSPADM

## 2023-02-28 RX ORDER — ACETAMINOPHEN 650 MG/1
650 SUPPOSITORY RECTAL
Status: DISCONTINUED | OUTPATIENT
Start: 2023-02-28 | End: 2023-03-01 | Stop reason: HOSPADM

## 2023-02-28 RX ORDER — IPRATROPIUM BROMIDE AND ALBUTEROL SULFATE 2.5; .5 MG/3ML; MG/3ML
3 SOLUTION RESPIRATORY (INHALATION)
Status: DISCONTINUED | OUTPATIENT
Start: 2023-02-28 | End: 2023-03-01 | Stop reason: HOSPADM

## 2023-02-28 RX ORDER — DIGOXIN 250 MCG
0.12 TABLET ORAL DAILY
Status: DISCONTINUED | OUTPATIENT
Start: 2023-02-28 | End: 2023-03-01 | Stop reason: HOSPADM

## 2023-02-28 RX ORDER — HEPARIN SODIUM 5000 [USP'U]/ML
5000 INJECTION, SOLUTION INTRAVENOUS; SUBCUTANEOUS EVERY 8 HOURS
Status: DISCONTINUED | OUTPATIENT
Start: 2023-02-28 | End: 2023-03-01 | Stop reason: HOSPADM

## 2023-02-28 RX ORDER — BUMETANIDE 1 MG/1
1 TABLET ORAL DAILY
Status: DISCONTINUED | OUTPATIENT
Start: 2023-02-28 | End: 2023-03-01 | Stop reason: HOSPADM

## 2023-02-28 RX ORDER — METOPROLOL SUCCINATE 25 MG/1
25 TABLET, EXTENDED RELEASE ORAL DAILY
Status: DISCONTINUED | OUTPATIENT
Start: 2023-03-01 | End: 2023-03-01 | Stop reason: HOSPADM

## 2023-02-28 RX ORDER — IBUPROFEN 200 MG
4 TABLET ORAL AS NEEDED
Status: DISCONTINUED | OUTPATIENT
Start: 2023-02-28 | End: 2023-03-01 | Stop reason: HOSPADM

## 2023-02-28 RX ORDER — ACETAMINOPHEN 325 MG/1
650 TABLET ORAL
Status: DISCONTINUED | OUTPATIENT
Start: 2023-02-28 | End: 2023-03-01 | Stop reason: HOSPADM

## 2023-02-28 RX ORDER — BUDESONIDE AND FORMOTEROL FUMARATE DIHYDRATE 160; 4.5 UG/1; UG/1
2 AEROSOL RESPIRATORY (INHALATION) 2 TIMES DAILY
Status: DISCONTINUED | OUTPATIENT
Start: 2023-02-28 | End: 2023-03-01 | Stop reason: HOSPADM

## 2023-02-28 RX ORDER — BISACODYL 5 MG
5 TABLET, DELAYED RELEASE (ENTERIC COATED) ORAL
Status: DISCONTINUED | OUTPATIENT
Start: 2023-02-28 | End: 2023-03-01 | Stop reason: HOSPADM

## 2023-02-28 RX ORDER — LEVOTHYROXINE SODIUM 100 UG/1
200 TABLET ORAL
Status: DISCONTINUED | OUTPATIENT
Start: 2023-02-28 | End: 2023-03-01 | Stop reason: HOSPADM

## 2023-02-28 RX ORDER — PANTOPRAZOLE SODIUM 40 MG/1
40 TABLET, DELAYED RELEASE ORAL
Status: DISCONTINUED | OUTPATIENT
Start: 2023-02-28 | End: 2023-03-01 | Stop reason: HOSPADM

## 2023-02-28 RX ORDER — INSULIN LISPRO 100 [IU]/ML
INJECTION, SOLUTION INTRAVENOUS; SUBCUTANEOUS
Status: DISCONTINUED | OUTPATIENT
Start: 2023-02-28 | End: 2023-03-01 | Stop reason: HOSPADM

## 2023-02-28 RX ORDER — MELATONIN
2000 DAILY
Status: DISCONTINUED | OUTPATIENT
Start: 2023-03-01 | End: 2023-03-01 | Stop reason: HOSPADM

## 2023-02-28 RX ORDER — POTASSIUM CHLORIDE 20 MEQ/1
40 TABLET, EXTENDED RELEASE ORAL DAILY
Status: DISCONTINUED | OUTPATIENT
Start: 2023-02-28 | End: 2023-03-01 | Stop reason: HOSPADM

## 2023-02-28 RX ORDER — CLOPIDOGREL BISULFATE 75 MG/1
75 TABLET ORAL DAILY
Status: DISCONTINUED | OUTPATIENT
Start: 2023-02-28 | End: 2023-03-01

## 2023-02-28 RX ADMIN — PANTOPRAZOLE SODIUM 40 MG: 40 TABLET, DELAYED RELEASE ORAL at 16:25

## 2023-02-28 RX ADMIN — CLOPIDOGREL BISULFATE 75 MG: 75 TABLET ORAL at 16:25

## 2023-02-28 RX ADMIN — TRAZODONE HYDROCHLORIDE 100 MG: 50 TABLET ORAL at 22:21

## 2023-02-28 RX ADMIN — POTASSIUM CHLORIDE 40 MEQ: 1500 TABLET, EXTENDED RELEASE ORAL at 16:25

## 2023-02-28 RX ADMIN — HEPARIN SODIUM 5000 UNITS: 5000 INJECTION INTRAVENOUS; SUBCUTANEOUS at 11:27

## 2023-02-28 RX ADMIN — BUDESONIDE AND FORMOTEROL FUMARATE DIHYDRATE 2 PUFF: 160; 4.5 AEROSOL RESPIRATORY (INHALATION) at 22:09

## 2023-02-28 RX ADMIN — INSULIN GLARGINE 20 UNITS: 100 INJECTION, SOLUTION SUBCUTANEOUS at 22:21

## 2023-02-28 RX ADMIN — BUDESONIDE AND FORMOTEROL FUMARATE DIHYDRATE 2 PUFF: 160; 4.5 AEROSOL RESPIRATORY (INHALATION) at 11:10

## 2023-02-28 RX ADMIN — ATORVASTATIN CALCIUM 40 MG: 40 TABLET, FILM COATED ORAL at 11:26

## 2023-02-28 RX ADMIN — LEVOTHYROXINE SODIUM 200 MCG: 100 TABLET ORAL at 11:25

## 2023-02-28 RX ADMIN — POTASSIUM CHLORIDE 10 MEQ: 7.46 INJECTION, SOLUTION INTRAVENOUS at 00:11

## 2023-02-28 RX ADMIN — BUMETANIDE 1 MG: 1 TABLET ORAL at 11:26

## 2023-02-28 RX ADMIN — DIGOXIN 0.12 MG: 250 TABLET ORAL at 16:25

## 2023-02-28 RX ADMIN — HEPARIN SODIUM 5000 UNITS: 5000 INJECTION INTRAVENOUS; SUBCUTANEOUS at 18:12

## 2023-02-28 RX ADMIN — ASPIRIN 81 MG 81 MG: 81 TABLET ORAL at 11:26

## 2023-02-28 RX ADMIN — INSULIN LISPRO 4 UNITS: 100 INJECTION, SOLUTION INTRAVENOUS; SUBCUTANEOUS at 22:22

## 2023-02-28 NOTE — ED NOTES
Pt states that he is complaining of left sided U and L extremity weakness with left sided facial numbness. Stroke alert called 2240. MD notified. LWKT 2040 stated by patient .

## 2023-02-28 NOTE — ED PROVIDER NOTES
[id number]  EMERGENCY DEPARTMENT ENCOUNTER NOTE    Date: 2/27/2023  Patient Name: Rhea Kaur    History of Presenting Illness     Chief Complaint   Patient presents with    Chest Pain       History obtained from: Patient    HPI: Rhea Kaur, 49 y.o. male with a past medical history and outpatient medications as listed and reviewed below  presents for chest pain and strokelike symptoms. He reports that around 9 PM, he was sitting down when he started having pressure-like chest pain with some mild shortness of breath. Afterwards, he started feeling some numbness and weakness in the left arm as compared to the right arm. He also reports some numbness over the face. No droop. No aphasia. He reports a moderate headache. Has had has had TIAs before. No trauma. To note, the patient reported the strokelike symptoms after he was already triaged and back in the room. As soon as the patient reported the symptoms, a stroke alert was initiated.     Medical History   I reviewed the medical, surgical, family, and social history, as well as allergies:    PCP: Gabriela Anguiano MD    Past Medical History:  Past Medical History:   Diagnosis Date    Acute MI ([facility] [address]     COPD (chronic obstructive pulmonary disease) ([facility] [address]     Depression     Hypertension     Ill-defined condition     Stroke [facility])      Past Surgical History:  Past Surgical History:   Procedure Laterality Date    HX HERNIA REPAIR      HX ORTHOPAEDIC      rotator cuff surgery    HX OTHER SURGICAL      Patent Foramen Ovale Repair    HX THYROIDECTOMY      IR FINE NEEDLE ASPIRATION W IMAGE       Current Outpatient Medications:  Current Outpatient Medications   Medication Instructions    albuterol-ipratropium (DUO-NEB) 2.5 mg-0.5 mg/3 ml nebu 3 mL, Nebulization, EVERY 6 HOURS AS NEEDED    amoxicillin-clavulanate (AUGMENTIN) 500-125 mg per tablet 1 Tablet, Oral, EVERY 8 HOURS    aspirin 81 mg, Oral, DAILY atorvastatin (LIPITOR) 20 mg, DAILY    bisacodyL (DULCOLAX) 5 mg, Oral, DAILY AS NEEDED    budesonide-formoteroL (SYMBICORT) 160-4.5 mcg/actuation HFAA 2 Puffs, Inhalation, 2 TIMES DAILY    bumetanide (BUMEX) 1 mg, Oral, DAILY    cholecalciferol (VITAMIN D3) (2,000 UNITS /50 MCG) cap capsule No dose, route, or frequency recorded. clopidogreL (PLAVIX) 75 mg, Oral, DAILY    digoxin (LANOXIN) 0.125 mg tablet TAKE ONE TABLET BY MOUTH EVERY DAY    guaiFENesin ER (MUCINEX) 600 mg, Oral, EVERY 12 HOURS    insulin glargine (LANTUS) 20 Units, SubCUTAneous, EVERY BEDTIME    levothyroxine (SYNTHROID) 200 mcg tablet TAKE 1 TABLET BY MOUTH ONCE DAILY BEFORE BREAKFAST (DISCONTINUE LEVOTHYROXINE 175MCG)    metFORMIN (GLUCOPHAGE) 500 mg tablet No dose, route, or frequency recorded. metoprolol succinate (TOPROL-XL) 25 mg, Oral, DAILY    pantoprazole (PROTONIX) 40 mg tablet TAKE ONE TABLET BY MOUTH TWICE DAILY    potassium chloride (K-DUR, KLOR-CON) 20 mEq tablet 40 mEq, Oral, DAILY    topiramate (TOPAMAX) 50 mg, Oral, 2 TIMES DAILY WITH MEALS    traZODone (DESYREL) 100 mg, Oral, EVERY BEDTIME    zolpidem (AMBIEN) 5 mg, Oral, BEDTIME PRN      Family History:  Family History   Problem Relation Age of Onset    Diabetes Maternal Uncle     Hypertension Maternal Uncle     Hypertension Paternal Uncle     Diabetes Paternal Uncle     Cancer Other     Cancer Mother     No Known Problems Father      Social History:  Social History     Tobacco Use    Smoking status: Former    Smokeless tobacco: Never   Vaping Use    Vaping Use: Never used   Substance Use Topics    Alcohol use: Yes     Comment: socially     Drug use: Not Currently     Allergies: Allergies   Allergen Reactions    Vancomycin Hives     Lyndsey syndrome       Review of Systems     Review of Systems  Negative: Positives and pertinent negatives as per HPI. All other systems were reviewed and are negative.     Physical Exam & Vital Signs   Vital Signs - I reviewed the patient's vital signs. Patient Vitals for the past 12 hrs:   Temp Pulse Resp BP SpO2   02/28/23 0034 -- 100 26 119/61 93 %   02/28/23 0029 -- 92 15 -- 93 %   02/28/23 0019 -- 91 13 -- 96 %   02/28/23 0014 -- 90 13 115/62 91 %   02/28/23 0013 -- 94 14 -- 92 %   02/27/23 2358 -- 93 14 118/70 92 %   02/27/23 2343 -- 93 16 131/72 93 %   02/27/23 2328 -- 95 11 114/69 93 %   02/27/23 2308 -- 95 18 107/68 93 %   02/27/23 2300 -- 91 20 120/70 94 %   02/27/23 2227 98.7 °F (37.1 °C) 96 16 123/67 95 %     Physical Exam:    GENERAL: awake, alert, cooperative, not in distress  HEENT:  * Pupils equal, EOMI  * Head atraumatic  CV:  * audible heart sounds  * warm and perfused extremities bilaterally  PULMONARY: Good air movement, no wheezes, no crackles  ABDOMEN/: soft, no distension, no guarding, no abdominal tenderness  EXTREMITIES/BACK: warm and perfused, noted L calf tenderness, no edema  SKIN: no rashes or signs of trauma  NEURO:   * GCS = 15 (E=4, V=5, M=6)  * Awake, alert, oriented x 3, normal speech  * CNs II-XII intact  * Strength 5/5 in RUE and RLE. 4/5 in LUE and LLE  * Sensory exam grossly normal on R side, subjective decrease over L face and L arm  * No pronator drift or dysmetria  * NIHSS 2      Medical Decision Making     Patient is a 49 y.o. male presenting for CP. Vitals reveal no significant abnormalities and physical exam reveals  NIHSS2 . EKG showed no significant abnormalities. Based on the history, physical exam, risk factors, and vital signs, differential includes: ACS, ICH, pneumonia, pulmonary edema, pleural effusion, DVT, paradoxical embolism. See ED Course and Reassessment for evaluation and discussion.         Consultant Discussions/Recs: None  Records Reviewed: Nursing Notes, Old Medical Records, Previous Radiology Studies, and Previous Laboratory Studies  Social Determinants of health affecting management: None    ED Course & Reassessment     ED Course:     ED Course as of 02/28/23 0058   Mon Feb 27, 2023 2323 Troponin is <6, ACS ruled out per the high-sensitivity troponin algorithm as the duration of symptoms does not warrant repeat troponin level. INR and PT are within normal limits. [SS]   0074 Head CT was negative for acute process making acute intracranial bleed unlikely. No evidence of large subacute stroke, ventriculomegaly, or masses. [SS]   9167 Noted Hypokalemia. No other significant electrolyte derangements. Creatinine is not elevated more than baseline range making FRANCES unlikely. No significant transaminitis noted. Normal bilirubin. [SS]   Tue Feb 28, 2023   0011 Chest x-ray negative for acute pathology: no CXR evidence of pulmonary edema, pleural effusion, pneumothorax, or pneumonia. This study was interpreted by me and confirmed on report.   [SS]   0011 CBC does not show any evidence of acute process. Leukocytosis not present to suggest infection. Hemoglobin not suggestive of acute anemia. [SS]      ED Course User Index  [SS] Inna Hurt MD       Reassessment:    Will admit. Diagnosis     Clinical Impression:   1. Stroke-like symptom    2. Hypokalemia    3. Left arm weakness    4. Chest pain, unspecified type        Final Disposition     Admission: [place] [id number]    After completion of ED workup and discussion of results and diagnoses, patient was admitted to the hospital. Case was discussed with admitting provider. Procedures, Critical Care, & Clinical Tools   Performed by: Anisa Baires MD  Procedures     EKG interpretation (Preliminary interpretation by me):  Rhythm: normal sinus rhythm; and regular . Rate (approx.): 99.   Axis: normal;  MA interval: normal;  QRS interval: normal ;  ST/T wave: normal;  Other findings: normal.        CRITICAL CARE DOCUMENTATION  CNS CRITICAL CARE NOTE :  11:05 PM    Critical care time is being documented due to the fulfillment of at least one of the following:    - Critical conditions: condition that acutely impairs one or more vital organ systems such that there is a high probability of imminent or life-threatening deterioration in condition. Examples are diagnoses including but not limited to Afib RVR, DKA, PE, Etc. .    - Critical interventions: an action whose failure to initiate would likely allow a sudden, clinically significant decline in the patient's condition. These include  Requirement of transfer or ICU admission  Contemplation or provision of tPA  Drip initiation (pressors, antiarrhythmics, heparin, etc.)  Antidotes given (narcan, charcoal, epi for anaphylaxis, etc..)  >=2L fluid bolus  >=3 Duonebs  >1 IV/IM doses of sedatives, antiepileptics, BP meds, rate control meds, adenosine. Procedures that are suggestive of critical care: chest tubes, cardioversion, BiPAP, IO, etc..    Critical care time is documented based on continuous or non-continuous provision of care that includes face-to-face time, placing orders, chart review, documentation, discussion with consultants, discussion with family. This time calculation is a best approximation and does not include time spent on CPR, EKG interpretation, central line placement, intubation, laceration repairs, and other separately billed procedures. Amount of Critical Care Time: 35minutes    Details of critical care provision is documented above. A general summary is listed below:    IMPENDING DETERIORATION - CNS  ASSOCIATED RISK FACTORS - CNS Decompensation  MANAGEMENT- Bedside Assessment  INTERPRETATION -  Xrays, CT Scan, ECG, and Blood Pressure  INTERVENTIONS - Neurologic/Metabolic interventions  CASE REVIEW - Hospitalist, neurologist  TREATMENT RESPONSE - Stable  PERFORMED BY - Self    NOTES   :  During this entire length of time I was immediately available to the patient .     Travis Nicholson MD        HEART SCORE  History: Slightly or Non-suspicious  ECG: Normal  Age: Greater than 45 years - Less than 65 years  Risk Factors: 1 or 2 risk factors  Troponin: Less than or equal to normal limit HEART Score Total : 2     Management   Scores 0-3: 0.9-1.7% risk of adverse cardiac event. In the HEART Score study, these patients were discharged (0.99% in the retrospective study, 1.7% in the prospective study)   Scores 4-6: 12-16.6% risk of adverse cardiac event. In the HEART Score study, these patients were admitted to the hospital. (11.6% retrospective, 16.6% prospective)   Scores ? 7: 50-65% risk of adverse cardiac event. In the HEART Score study, these patients were candidates for early invasive measures. (65.2% retrospective, 50.1% prospective)   A MACE (Major Adverse Cardiac Event) was defined as all-cause mortality, myocardial infarction, or coronary revascularization. Original/Primary Reference  Manuel CELAYA, Olvin GEORGE, Huber MALDONADO. Chest pain in the emergency room: value of the HEART score. [facility] Heart J. 2008;16(6):191-6. Validation  Manuel Telles, Huber MALDONADO, et al. A prospective validation of the HEART score for chest pain patients at the emergency department. Int J Cardiol. 2013;168(3):2153-8. Manuel Telles, Clifford Perdue, et al. Chest pain in the emergency room: a multicenter validation of the HEART Score. Crit Pathw Cardiol. 2010;9(3):164-9. Alexis BRITTANY, Demario RF, Jaskaran JENSEN, et al. The HEART Pathway Randomized Controlled Trial One Year Outcomes. [facility] 2018;         Results, Consults, Medications     Consults:  None   Labs:  Recent Results (from the past 12 hour(s))   TROPONIN-HIGH SENSITIVITY    Collection Time: 02/27/23 10:32 PM   Result Value Ref Range    Troponin-High Sensitivity 5 0 - 76 ng/L   METABOLIC PANEL, COMPREHENSIVE    Collection Time: 02/27/23 10:32 PM   Result Value Ref Range    Sodium 137 136 - 145 mmol/L    Potassium 3.4 (L) 3.5 - 5.1 mmol/L    Chloride 101 97 - 108 mmol/L    CO2 32 21 - 32 mmol/L    Anion gap 4 (L) 5 - 15 mmol/L    Glucose 144 (H) 65 - 100 mg/dL    BUN 14 6 - 20 mg/dL    Creatinine 0.97 0.70 - 1.30 mg/dL    BUN/Creatinine ratio 14 12 - 20      eGFR >60 >60 ml/min/1.73m2    Calcium 8.6 8.5 - 10.1 mg/dL    Bilirubin, total 0.2 0.2 - 1.0 mg/dL    AST (SGOT) 19 15 - 37 U/L    ALT (SGPT) 32 12 - 78 U/L    Alk. phosphatase 70 45 - 117 U/L    Protein, total 7.4 6.4 - 8.2 g/dL    Albumin 3.1 (L) 3.5 - 5.0 g/dL    Globulin 4.3 (H) 2.0 - 4.0 g/dL    A-G Ratio 0.7 (L) 1.1 - 2.2     CBC WITH AUTOMATED DIFF    Collection Time: 02/27/23 10:32 PM   Result Value Ref Range    WBC 6.8 4.1 - 11.1 K/uL    RBC 3.88 (L) 4.10 - 5.70 M/uL    HGB 12.2 12.1 - 17.0 g/dL    HCT 37.5 36.6 - 50.3 %    MCV 96.6 80.0 - 99.0 FL    MCH 31.4 26.0 - 34.0 PG    MCHC 32.5 30.0 - 36.5 g/dL    RDW 14.1 11.5 - 14.5 %    PLATELET 773 126 - 985 K/uL    MPV 10.0 8.9 - 12.9 FL    NRBC 0.0 0.0  WBC    ABSOLUTE NRBC 0.00 0.00 - 0.01 K/uL    NEUTROPHILS 61 32 - 75 %    LYMPHOCYTES 28 12 - 49 %    MONOCYTES 8 5 - 13 %    EOSINOPHILS 3 0 - 7 %    BASOPHILS 0 0 - 1 %    IMMATURE GRANULOCYTES 0 0 - 0.5 %    ABS. NEUTROPHILS 4.2 1.8 - 8.0 K/UL    ABS. LYMPHOCYTES 1.9 0.8 - 3.5 K/UL    ABS. MONOCYTES 0.5 0.0 - 1.0 K/UL    ABS. EOSINOPHILS 0.2 0.0 - 0.4 K/UL    ABS. BASOPHILS 0.0 0.0 - 0.1 K/UL    ABS. IMM. GRANS. 0.0 0.00 - 0.04 K/UL    DF AUTOMATED     PROTHROMBIN TIME + INR    Collection Time: 02/27/23 10:48 PM   Result Value Ref Range    Prothrombin time 12.5 11.9 - 14.6 sec    INR 0.9 0.9 - 1.1       Radiologic Studies:  CT Results  (Last 48 hours)                 02/27/23 7288  CTA CODE NEURO HEAD AND NECK W CONT Preliminary result      This result has not been signed. Information might be incomplete. Narrative:  *PRELIMINARY REPORT*       No acute large vessel occlusion allowing for slightly suboptimal contrast bolus   timing particularly the intracranial structures. Cerebral perfusion not   performed, consider MRI. 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 Enlarged, nodular bilateral palatine tonsils recommend direct   inspection.        Preliminary report was provided by Dr. Jaxon Bryant the on-call radiologist.       Final report to follow. *END PRELIMINARY REPORT*                           02/27/23 2310  CT CODE NEURO HEAD WO CONTRAST Final result    Impression:      No acute process. Narrative:  INDICATION: left arm weakness x 2 hours       EXAM:  HEAD CT WITHOUT CONTRAST       COMPARISON: December 17, 2022       TECHNIQUE:  Routine noncontrast axial head CT was performed. Sagittal and   coronal reconstructions were generated. CT dose reduction was achieved through use of a standardized protocol tailored   for this examination and automatic exposure control for dose modulation. FINDINGS:       Ventricles: Midline, no hydrocephalus. Intracranial Hemorrhage: None. Brain Parenchyma/Brainstem: Chronic left occipital infarction. Basal Cisterns: Normal.   Paranasal Sinuses: Visualized sinuses are clear. Additional Comments: N/A. CXR Results  (Last 48 hours)                 02/27/23 2335  XR CHEST PORT Final result    Impression:  No acute process. Narrative:  INDICATION: Chest pain       EXAM:  AP CHEST RADIOGRAPH       COMPARISON: July 16/22       FINDINGS:       AP portable view of the chest demonstrates cardiomegaly. There is no edema,   effusion, consolidation, or pneumothorax. The osseous structures are   unremarkable. Medications ordered:  Medications   potassium chloride 10 mEq in 100 ml IVPB (10 mEq IntraVENous New Bag 2/28/23 0011)   iopamidoL (ISOVUE-370) 370 mg iodine /mL (76 %) injection 100 mL (100 mL IntraVENous Given 2/27/23 3488)       Documentation Comments   - I am the first and primary provider for this patient and am the primary provider of record. - Initial assessment performed. The patients presenting problems have been discussed, and the staff are in agreement with the care plan formulated and outlined with them.   I have encouraged them to ask questions as they arise throughout their visit.  - Available medical records, nursing notes, old EKGs, and EMS run sheets (if patient was EMS transported) were reviewed    Please note that this dictation was completed with [employer], the computer voice recognition software. Quite often unanticipated grammatical, syntax, homophones, and other interpretive errors are inadvertently transcribed by the computer software. Please disregard these errors. Please excuse any errors that have escaped final proofreading.

## 2023-02-28 NOTE — H&P
History and Physical    Patient: Margaret Wilson MRN: [id number]  SSN: [id number]    YOB: 1975  Age: 49 y.o. Sex: male      Subjective:      Margaret Wilson is a 49 y.o. male who has a history of COPD, hypertension, history of PFO history of patch. Developed chest pain yesterday and also numbness of the left upper extremity and weakness patient also complains of weakness of the lower extremity and numbness of the face. Patient claims to be compliant with his medications and has been on Plavix and aspirin    Past Medical History:   Diagnosis Date    Acute MI ([facility] [address]     COPD (chronic obstructive pulmonary disease) (HCC)     Depression     Hypertension     Ill-defined condition     Stroke [facility])      Past Surgical History:   Procedure Laterality Date    HX HERNIA REPAIR      HX ORTHOPAEDIC      rotator cuff surgery    HX OTHER SURGICAL      Patent Foramen Ovale Repair    HX THYROIDECTOMY      IR FINE NEEDLE ASPIRATION W IMAGE        Family History   Problem Relation Age of Onset    Diabetes Maternal Uncle     Hypertension Maternal Uncle     Hypertension Paternal Uncle     Diabetes Paternal Uncle     Cancer Other     Cancer Mother     No Known Problems Father      Social History     Tobacco Use    Smoking status: Former    Smokeless tobacco: Never   Substance Use Topics    Alcohol use: Yes     Comment: socially       Prior to Admission medications    Medication Sig Start Date End Date Taking? Authorizing Provider   bumetanide (BUMEX) 2 mg tablet Take 0.5 Tablets by mouth daily. 7/18/22   Sudhir BEGUM MD   metoprolol succinate (TOPROL-XL) 25 mg XL tablet Take 1 Tablet by mouth daily. Indications: high blood pressure 7/19/22   Sudhir BEGUM MD   amoxicillin-clavulanate (AUGMENTIN) 500-125 mg per tablet Take 1 Tablet by mouth every eight (8) hours. 7/18/22   Sudhir BEGUM MD   bisacodyL (DULCOLAX) 5 mg EC tablet Take 1 Tablet by mouth daily as needed for Constipation. 7/18/22   Orlando Graves MD   budesonide-formoteroL (SYMBICORT) 160-4.5 mcg/actuation HFAA Take 2 Puffs by inhalation two (2) times a day. 7/18/22   Orlando Graves MD   clopidogreL (PLAVIX) 75 mg tab Take 1 Tablet by mouth daily. 7/19/22   Orlando Graves MD   levothyroxine (SYNTHROID) 200 mcg tablet TAKE 1 TABLET BY MOUTH ONCE DAILY BEFORE BREAKFAST (DISCONTINUE LEVOTHYROXINE 175MCG) 2/25/22   Jackie Thornton MD   albuterol-ipratropium (DUO-NEB) 2.5 mg-0.5 mg/3 ml nebu 3 mL by Nebulization route every six (6) hours as needed for Wheezing. 9/17/21   Adrien BEGUM MD   potassium chloride (K-DUR, KLOR-CON) 20 mEq tablet Take 2 Tablets by mouth daily. Patient not taking: Reported on 7/17/2022 9/18/21   Orlando Graves MD   guaiFENesin ER (MUCINEX) 600 mg ER tablet Take 1 Tablet by mouth every twelve (12) hours. Patient not taking: Reported on 7/17/2022 9/17/21   Orlando Graves MD   insulin glargine (LANTUS) 100 unit/mL injection 20 Units by SubCUTAneous route nightly. Patient not taking: Reported on 7/17/2022 9/17/21   Orlando Graves MD   metFORMIN (GLUCOPHAGE) 500 mg tablet  3/1/21   Provider, Historical   cholecalciferol (VITAMIN D3) (2,000 UNITS /50 MCG) cap capsule  4/5/21   Provider, Historical   traZODone (DESYREL) 100 mg tablet Take 1 Tablet by mouth nightly. 8/20/21   Orlando Graves MD   zolpidem (AMBIEN) 5 mg tablet Take 1 Tablet by mouth nightly as needed for Sleep. Max Daily Amount: 5 mg. 8/20/21   Adrien BEGUM MD   topiramate (TOPAMAX) 50 mg tablet Take 1 Tablet by mouth two (2) times daily (with meals).   Patient not taking: Reported on 7/17/2022 8/3/21   Orlando Graves MD   pantoprazole (PROTONIX) 40 mg tablet TAKE ONE TABLET BY MOUTH TWICE DAILY  Patient not taking: Reported on 7/17/2022 3/23/21   Provider, Historical   digoxin (LANOXIN) 0.125 mg tablet TAKE ONE TABLET BY MOUTH EVERY DAY  Patient not taking: Reported on 7/17/2022 11/4/20   Provider, Historical atorvastatin (LIPITOR) 20 mg tablet 20 mg daily. Patient not taking: Reported on 7/17/2022 8/3/20   Provider, Historical   aspirin 81 mg chewable tablet Take 81 mg by mouth daily. Other, MD Elizabeth        Allergies   Allergen Reactions    Vancomycin Hives     Lyndsey syndrome       Review of Systems:  A comprehensive review of systems was negative except for that written in the History of Present Illness. Objective:     Vitals:    02/28/23 0550 02/28/23 0607 02/28/23 0848 02/28/23 0957   BP: 139/88 135/73 120/75    Pulse: 93 96 63    Resp: 13 11 18    Temp:  98.2 °F (36.8 °C) 98.3 °F (36.8 °C)    SpO2: 93% 96% 95% 92%   Weight:       Height:            Physical Exam:  General:  Alert, cooperative, no distress, appears stated age. Eyes:  Conjunctivae/corneas clear. PERRL, EOMs intact. Fundi benign   Ears:  Normal TMs and external ear canals both ears. Nose: Nares normal. Septum midline. Mucosa normal. No drainage or sinus tenderness. Mouth/Throat: Lips, mucosa, and tongue normal. Teeth and gums normal.   Neck: Supple, symmetrical, trachea midline, no adenopathy, thyroid: no enlargment/tenderness/nodules, no carotid bruit and no JVD. Back:   Symmetric, no curvature. ROM normal. No CVA tenderness. Lungs:   Clear to auscultation bilaterally. Heart:  Regular rate and rhythm, S1, S2 normal, no murmur, click, rub or gallop. Abdomen:   Soft, non-tender. Bowel sounds normal. No masses,  No organomegaly. Extremities: Extremities normal, atraumatic, no cyanosis or edema. Pulses: 2+ and symmetric all extremities. Skin: Skin color, texture, turgor normal. No rashes or lesions   Lymph nodes: Cervical, supraclavicular, and axillary nodes normal.   Neurologic: CNII-XII intact. Normal strength, sensation and reflexes throughout.      Recent Results (from the past 24 hour(s))   EKG, 12 LEAD, INITIAL    Collection Time: 02/27/23 10:22 PM   Result Value Ref Range    Ventricular Rate 99 BPM    Atrial Rate 99 BPM P-R Interval 190 ms    QRS Duration 100 ms    Q-T Interval 394 ms    QTC Calculation (Bezet) 505 ms    Calculated P Axis 26 degrees    Calculated R Axis 0 degrees    Calculated T Axis 12 degrees    Diagnosis       Normal sinus rhythm  Minimal voltage criteria for LVH, may be normal variant  Inferior infarct , age undetermined  Prolonged QT  Abnormal ECG  No previous ECGs available  Confirmed by Alexandria Melo MD, [facility] ([id number]) on 2/28/2023 8:44:38 AM     TROPONIN-HIGH SENSITIVITY    Collection Time: 02/27/23 10:32 PM   Result Value Ref Range    Troponin-High Sensitivity 5 0 - 76 ng/L   METABOLIC PANEL, COMPREHENSIVE    Collection Time: 02/27/23 10:32 PM   Result Value Ref Range    Sodium 137 136 - 145 mmol/L    Potassium 3.4 (L) 3.5 - 5.1 mmol/L    Chloride 101 97 - 108 mmol/L    CO2 32 21 - 32 mmol/L    Anion gap 4 (L) 5 - 15 mmol/L    Glucose 144 (H) 65 - 100 mg/dL    BUN 14 6 - 20 mg/dL    Creatinine 0.97 0.70 - 1.30 mg/dL    BUN/Creatinine ratio 14 12 - 20      eGFR >60 >60 ml/min/1.73m2    Calcium 8.6 8.5 - 10.1 mg/dL    Bilirubin, total 0.2 0.2 - 1.0 mg/dL    AST (SGOT) 19 15 - 37 U/L    ALT (SGPT) 32 12 - 78 U/L    Alk. phosphatase 70 45 - 117 U/L    Protein, total 7.4 6.4 - 8.2 g/dL    Albumin 3.1 (L) 3.5 - 5.0 g/dL    Globulin 4.3 (H) 2.0 - 4.0 g/dL    A-G Ratio 0.7 (L) 1.1 - 2.2     CBC WITH AUTOMATED DIFF    Collection Time: 02/27/23 10:32 PM   Result Value Ref Range    WBC 6.8 4.1 - 11.1 K/uL    RBC 3.88 (L) 4.10 - 5.70 M/uL    HGB 12.2 12.1 - 17.0 g/dL    HCT 37.5 36.6 - 50.3 %    MCV 96.6 80.0 - 99.0 FL    MCH 31.4 26.0 - 34.0 PG    MCHC 32.5 30.0 - 36.5 g/dL    RDW 14.1 11.5 - 14.5 %    PLATELET 976 423 - 611 K/uL    MPV 10.0 8.9 - 12.9 FL    NRBC 0.0 0.0  WBC    ABSOLUTE NRBC 0.00 0.00 - 0.01 K/uL    NEUTROPHILS 61 32 - 75 %    LYMPHOCYTES 28 12 - 49 %    MONOCYTES 8 5 - 13 %    EOSINOPHILS 3 0 - 7 %    BASOPHILS 0 0 - 1 %    IMMATURE GRANULOCYTES 0 0 - 0.5 %    ABS.  NEUTROPHILS 4.2 1.8 - 8.0 K/UL ABS. LYMPHOCYTES 1.9 0.8 - 3.5 K/UL    ABS. MONOCYTES 0.5 0.0 - 1.0 K/UL    ABS. EOSINOPHILS 0.2 0.0 - 0.4 K/UL    ABS. BASOPHILS 0.0 0.0 - 0.1 K/UL    ABS. IMM.  GRANS. 0.0 0.00 - 0.04 K/UL    DF AUTOMATED     PROTHROMBIN TIME + INR    Collection Time: 02/27/23 10:48 PM   Result Value Ref Range    Prothrombin time 12.5 11.9 - 14.6 sec    INR 0.9 0.9 - 1.1     TYPE & SCREEN    Collection Time: 02/28/23 12:45 AM   Result Value Ref Range    Crossmatch Expiration 03/03/2023,2359     ABO/Rh(D) A Positive     Antibody screen Negative    TROPONIN-HIGH SENSITIVITY    Collection Time: 02/28/23 12:45 AM   Result Value Ref Range    Troponin-High Sensitivity 5 0 - 76 ng/L   GLUCOSE, POC    Collection Time: 02/28/23  8:51 AM   Result Value Ref Range    Glucose (POC) 126 (H) 65 - 100 mg/dL    Performed by Karly Failing        Assessment:     Hospital Problems  Date Reviewed: 8/26/2021            Codes Class Noted POA    CVA (cerebral vascular accident) [facility]) ICD-10-CM: I63.9  ICD-9-CM: 434.91  5/6/2021 Unknown       Possible acute CVA with hydronephrosis  Chest pain  COPD  Obesity  Possible obstructive sleep apnea  History of PFO with patch application    Plan:     Consult neurology and cardiology obtain echocardiogram with bubble study showed discussed with the patient care time of 50 minutes    Signed By: Fransisco Hernandez MD     February 28, 2023

## 2023-02-28 NOTE — PROGRESS NOTES
Problem: Falls - Risk of  Goal: *Absence of Falls  Description: Document Sylvia Sow Fall Risk and appropriate interventions in the flowsheet.   Outcome: Progressing Towards Goal  Note: Fall Risk Interventions:                                Problem: Patient Education: Go to Patient Education Activity  Goal: Patient/Family Education  Outcome: Progressing Towards Goal

## 2023-02-28 NOTE — PROGRESS NOTES
OT eval order received and acknowledged. Pt screened and reports baseline independence for self care tasks and functional mobility/transfers. OT educated pt on \"BE FAST\" phrase for signs/symptoms of CVA; pt verbalized understanding. Pt reports no need for skilled OT services at this time. OT evaluation order will therefore be discontinued this pt has no acute OT needs. Please reorder OT if pt's functional status changes. Thank you. [facility] AM-PACTM \"6 Clicks\"                                                       Daily Activity Inpatient Short Form  How much help from another person does the patient currently need. .. Total; A Lot A Little None   1. Putting on and taking off regular lower body clothing? []  1 []  2 []  3 [x]  4   2. Bathing (including washing, rinsing, drying)? []  1 []  2 []  3 [x]  4   3. Toileting, which includes using toilet, bedpan or urinal? [] 1 []  2 []  3 [x]  4   4. Putting on and taking off regular upper body clothing? []  1 []  2 []  3 [x]  4   5. Taking care of personal grooming such as brushing teeth? []  1 []  2 []  3 [x]  4   6. Eating meals? []  1 []  2 []  3 [x]  4   © 2007, Trustees of [facility], under license to [employer].  All rights reserved     Score: 24/24

## 2023-02-28 NOTE — ACP (ADVANCE CARE PLANNING)
Advance Care Planning   Healthcare Decision Maker:       Primary Decision Maker: Dolly Lange - [place] - [phone]    Secondary Decision Maker: aNnda Ivey - Girlfriend - [phone]

## 2023-02-28 NOTE — CONSULTS
Consult    Patient: Anthony Kelley MRN: [id number]  SSN: [id number]    YOB: 1975  Age: 49 y.o. Sex: male      Subjective:      Anthony Kelley is a 49 y.o. male who is being seen for chest pain. Patient is a 58-year-old white male with history of PFO s/p closure by Dr. Melina Caraballo, history of AVM status post coiling and stenting done about 10 years ago. He has a history of hypertension. Multiple strokes, COPD. He works in "[employer] He was doing just fine until yesterday evening when he started having sharp chest pain, nonradiating without nausea or vomiting or excessive sweating. Denied recent change in his weight. He has left lower extremity swelling at baseline. Right after the chest pain started he started noticing left arm and lower extremity weakness and left facial numbness. Denied any difficulty swallowing or slurring speech. His symptoms resolved completely. He is supposed to have an MRI but this was delayed due to claustrophobia. Currently chest pain-free.           Past Medical History:   Diagnosis Date    Acute MI ([facility] [address]     COPD (chronic obstructive pulmonary disease) ([facility] [address]     Depression     Hypertension     Ill-defined condition     Stroke [facility])      Past Surgical History:   Procedure Laterality Date    HX HERNIA REPAIR      HX ORTHOPAEDIC      rotator cuff surgery    HX OTHER SURGICAL      Patent Foramen Ovale Repair    HX THYROIDECTOMY      IR FINE NEEDLE ASPIRATION W IMAGE        Family History   Problem Relation Age of Onset    Diabetes Maternal Uncle     Hypertension Maternal Uncle     Hypertension Paternal Uncle     Diabetes Paternal Uncle     Cancer Other     Cancer Mother     No Known Problems Father      Social History     Tobacco Use    Smoking status: Former    Smokeless tobacco: Never   Substance Use Topics    Alcohol use: Yes     Comment: socially       Current Facility-Administered Medications   Medication Dose Route Frequency Provider Last Rate Last Admin    albuterol-ipratropium (DUO-NEB) 2.5 MG-0.5 MG/3 ML  3 mL Nebulization Q6H PRN Claus BEGUM MD        aspirin chewable tablet 81 mg  81 mg Oral DAILY Claus BEGUM MD   81 mg at 02/28/23 1126    atorvastatin (LIPITOR) tablet 40 mg  40 mg Oral DAILY Claus BEGUM MD   40 mg at 02/28/23 1126    bisacodyL (DULCOLAX) tablet 5 mg  5 mg Oral DAILY PRN Claus BEGUM MD        budesonide-formoteroL (SYMBICORT) 160-4.5 mcg/actuation HFA inhaler 2 Puff  2 Puff Inhalation BID Claus BEGUM MD   2 Puff at 02/28/23 1110    bumetanide (BUMEX) tablet 1 mg  1 mg Oral DAILY Claus BEGUM MD   1 mg at 02/28/23 1126    . PHARMACY TO SUBSTITUTE PER PROTOCOL (Reordered from: cholecalciferol (VITAMIN D3) (2,000 UNITS /50 MCG) cap capsule)    Per Protocol Mai Whitlock MD        clopidogreL (PLAVIX) tablet 75 mg  75 mg Oral DAILY Claus BEGUM MD        digoxin (LANOXIN) tablet 0.125 mg  0.125 mg Oral DAILY Claus BEGUM MD        insulin glargine (LANTUS) injection 20 Units  20 Units SubCUTAneous QHS Claus BEGUM MD        levothyroxine (SYNTHROID) tablet 200 mcg  200 mcg Oral Sona BEGUM MD   200 mcg at 02/28/23 1125    metFORMIN (GLUCOPHAGE) tablet 500 mg  500 mg Oral DAILY WITH BREAKFAST Claus BEGUM MD        metoprolol succinate (TOPROL-XL) XL tablet 25 mg  25 mg Oral DAILY Clasu BEGUM MD        pantoprazole (PROTONIX) tablet 40 mg  40 mg Oral BID Claus BEGUM MD        potassium chloride (K-DUR, KLOR-CON M20) SR tablet 40 mEq  40 mEq Oral DAILY Claus BEGUM MD        traZODone (DESYREL) tablet 100 mg  100 mg Oral QHS Claus BEGUM MD        acetaminophen (TYLENOL) tablet 650 mg  650 mg Oral Q4H PRN Claus BEGUM MD        Or    acetaminophen (TYLENOL) solution 650 mg  650 mg Per NG tube Q4H PRN Claus BEGUM MD        Or    acetaminophen (TYLENOL) suppository 650 mg  650 mg Rectal Q4H PRN Mai Whitlock MD        heparin (porcine) injection 5,000 Units  5,000 Units SubCUTAneous Q8H Nasim BEGUM MD   5,000 Units at 02/28/23 1127    insulin lispro (HUMALOG) injection   SubCUTAneous AC&HS Nasim BEGUM MD        glucose chewable tablet 16 g  4 Tablet Oral PRN Nasim BEGUM MD        glucagon (GLUCAGEN) injection 1 mg  1 mg IntraMUSCular PRN Nasim BEGUM MD        dextrose 10% infusion 0-250 mL  0-250 mL IntraVENous PRN Nasim BEGUM MD            Allergies   Allergen Reactions    Vancomycin Hives     Lyndsey syndrome       Review of Systems:  A comprehensive review of systems was negative except for that written in the History of Present Illness. Objective:     Vitals:    02/28/23 0848 02/28/23 0957 02/28/23 1110 02/28/23 1151   BP: 120/75   (!) 97/59   Pulse: 63   85   Resp: 18   22   Temp: 98.3 °F (36.8 °C)   97.7 °F (36.5 °C)   SpO2: 95% 92% 96% 93%   Weight:       Height:            Physical Exam:  General:  Alert, cooperative, no distress, appears stated age. Eyes:  Conjunctivae/corneas clear. PERRL, EOMs intact. Fundi benign   Ears:  Normal TMs and external ear canals both ears. Nose: Nares normal. Septum midline. Mucosa normal. No drainage or sinus tenderness. Mouth/Throat: Lips, mucosa, and tongue normal. Teeth and gums normal.   Neck: Supple, symmetrical, trachea midline, no adenopathy, thyroid: no enlargment/tenderness/nodules, no carotid bruit and no JVD. Back:   Symmetric, no curvature. ROM normal. No CVA tenderness. Lungs:   Clear to auscultation bilaterally. Heart:  Regular rate and rhythm, S1, S2 normal, no murmur, click, rub or gallop. Abdomen:   Soft, non-tender. Bowel sounds normal. No masses,  No organomegaly. Extremities: Extremities normal, atraumatic, no cyanosis or edema. Pulses: 2+ and symmetric all extremities.    Skin: Skin color, texture, turgor normal. No rashes or lesions   Lymph nodes: Cervical, supraclavicular, and axillary nodes normal.   Neurologic: CNII-XII intact. Normal strength, sensation and reflexes throughout. Assessment:     Hospital Problems  Date Reviewed: 8/26/2021            Codes Class Noted POA    CVA (cerebral vascular accident) [facility]) ICD-10-CM: I63.9  ICD-9-CM: 434.91  5/6/2021 Unknown         Patient is a 70-year-old white male with:  1. Chest pain, atypical for angina  2. Multiple stroke status post PFO closure  3. Obesity  4. Hypertension  5. History of peripheral vascular disease/AVM status post angioplasty and coiling  6. Left lower extremity swelling, stable  7. COPD  8. Depression          Plan:       EKG showed normal sinus rhythm, inferior infarct age undetermined. Chest pain is atypical for angina. He had a cardiac catheterization 2017. I reviewed images and he had no coronary artery disease at that time. Cardiac markers are negative. Liver function test and kidney function test is normal.  Potassium is low. Has been compliant with medication. Echocardiogram pending. If echocardiogram is unremarkable then he can follow-up with his primary cardiologist in 2 to 4 weeks. Follow-up on MRI results. I personally reviewed telemetry that showed normal sinus rhythm without arrhythmias      Thank you  For involving me in Mr. Derek thomson.   Signed By: Lina Ponce MD     February 28, 2023

## 2023-02-28 NOTE — ACP (ADVANCE CARE PLANNING)
Advance Care Planning   Healthcare Decision Maker:       Primary Decision Maker: Hayward Simmonds  [place] - [phone]

## 2023-02-28 NOTE — CONSULTS
NEURO CONSULT      REASON FOR ADMISSION:  Acute onset of left-sided numbness and tingling      HISTORY:  Mr. Joshua Bower is 49years old with a history of previous stroke, COPD, hypertension, history of PFO, who is consulted to neurology for having developed some chest pain as well as left-sided numbness. Patient subsequently presented to the ED. In the ED, patient had a head CT without contrast that did not show any acute process and was subsequently admitted to the floor. Currently he states that his symptoms are resolving. Patient has also been seen by cardiology.           ROS: As per above plus possibly more    General:                     No fever, no chills, no sweats, no generalized weakness, no weight loss/gain,                                       No loss of appetite   Eyes:                           No blurred vision, no eye pain, no loss of vision, no double vision  ENT:                            rhinorrhea, no pharyngitis   Respiratory:               No cough, no sputum production, no SOB, no WILKS, no wheezing, no pleuritic pain   Cardiology:                No chest pain, no palpitations, no orthopnea, no PND, no edema, no syncope   Gastrointestinal:       No abdominal pain , no N/V, no diarrhea, no dysphagia, no constipation, no bleeding   Genitourinary:           frequency, no urgency, no dysuria, no hematuria, no incontinence   Muskuloskeletal :      No arthralgia, no myalgia, no back pain  Hematology:              No easy bruising, no nose or gum bleeding, no lymphadenopathy   Dermatological:         No rash, no ulceration, no pruritis, no color change / jaundice  Endocrine:                 hot flashes or polydipsia   Neurological:             No headache, no dizziness, no confusion, no focal weakness, no paresthesia,                                      No Speech difficulties, no memory loss, no gait difficulty  Psychological:          No neelings of anxiety, no depression, no agitation      NEURO EXAM:    Mental status: Awake, alert, oriented, not aphasic. Holds an ordinary conversation. Cranial nerves: Cranial nerves intact    Motor exam: Large body mass, motor exam intact    Sensory exam: Patient had just mild residual left-sided numbness and tingling. Coordination: Coordination intact    Gait and Station: Gait and station intact    ASSESSMENT:  Possible TIA  The possibility of an exacerbation of cervical disc disease needs to be entertained  Doubt seizure        PLAN:  TIA work-up  Carotid duplex if not yet done  Cardiology on case  EEG  Prolactin      ALLERGIES:    Allergies   Allergen Reactions    Vancomycin Hives     Lyndsey syndrome       MEDS:      Current Facility-Administered Medications:     albuterol-ipratropium (DUO-NEB) 2.5 MG-0.5 MG/3 ML, 3 mL, Nebulization, Q6H PRN, Adriel Sánchez MD    aspirin chewable tablet 81 mg, 81 mg, Oral, DAILY, Adriel Sánchez MD, 81 mg at 02/28/23 1126    atorvastatin (LIPITOR) tablet 40 mg, 40 mg, Oral, DAILY, Adriel Sánchez MD, 40 mg at 02/28/23 1126    bisacodyL (DULCOLAX) tablet 5 mg, 5 mg, Oral, DAILY PRN, Memo BEGUM MD    budesonide-formoteroL (SYMBICORT) 160-4.5 mcg/actuation HFA inhaler 2 Puff, 2 Puff, Inhalation, BID, Adriel Sánchez MD, 2 Puff at 02/28/23 1110    bumetanide (BUMEX) tablet 1 mg, 1 mg, Oral, DAILY, Adriel Sánchez MD, 1 mg at 02/28/23 1126    . PHARMACY TO SUBSTITUTE PER PROTOCOL (Reordered from: cholecalciferol (VITAMIN D3) (2,000 UNITS /50 MCG) cap capsule), , , Per Protocol, Memo BEGUM MD    clopidogreL (PLAVIX) tablet 75 mg, 75 mg, Oral, DAILY, Adriel Sánchez MD    digoxin (LANOXIN) tablet 0.125 mg, 0.125 mg, Oral, DAILY, Adriel Sánchez MD    insulin glargine (LANTUS) injection 20 Units, 20 Units, SubCUTAneous, QHS, Adriel Sánchez MD    levothyroxine (SYNTHROID) tablet 200 mcg, 200 mcg, Oral, 6am, Adriel Sánchez MD, 200 mcg at 02/28/23 1125    metFORMIN (GLUCOPHAGE) tablet 500 mg, 500 mg, Oral, DAILY WITH BREAKFAST, Sudhir BEGUM MD    [START ON 3/1/2023] metoprolol succinate (TOPROL-XL) XL tablet 25 mg, 25 mg, Oral, DAILY, Adriel Sánchez MD    pantoprazole (PROTONIX) tablet 40 mg, 40 mg, Oral, BID, Adriel Sánchez MD    potassium chloride (K-DUR, KLOR-CON M20) SR tablet 40 mEq, 40 mEq, Oral, DAILY, Adriel Sánchez MD    traZODone (DESYREL) tablet 100 mg, 100 mg, Oral, QHS, Adriel Sánchez MD    acetaminophen (TYLENOL) tablet 650 mg, 650 mg, Oral, Q4H PRN **OR** acetaminophen (TYLENOL) solution 650 mg, 650 mg, Per NG tube, Q4H PRN **OR** acetaminophen (TYLENOL) suppository 650 mg, 650 mg, Rectal, Q4H PRN, Adriel Farley MD    heparin (porcine) injection 5,000 Units, 5,000 Units, SubCUTAneous, Q8H, Adriel Sánchez MD, 5,000 Units at 02/28/23 1127    insulin lispro (HUMALOG) injection, , SubCUTAneous, AC&HS, Adriel Sánchez MD    glucose chewable tablet 16 g, 4 Tablet, Oral, PRN, Adriel Farley MD    glucagon (GLUCAGEN) injection 1 mg, 1 mg, IntraMUSCular, PRN, Adriel Sánchez MD    dextrose 10% infusion 0-250 mL, 0-250 mL, IntraVENous, PRN, Adriel Farley MD    LABS:  Recent Results (from the past 24 hour(s))   EKG, 12 LEAD, INITIAL    Collection Time: 02/27/23 10:22 PM   Result Value Ref Range    Ventricular Rate 99 BPM    Atrial Rate 99 BPM    P-R Interval 190 ms    QRS Duration 100 ms    Q-T Interval 394 ms    QTC Calculation (Bezet) 505 ms    Calculated P Axis 26 degrees    Calculated R Axis 0 degrees    Calculated T Axis 12 degrees    Diagnosis       Normal sinus rhythm  Minimal voltage criteria for LVH, may be normal variant  Inferior infarct , age undetermined  Prolonged QT  Abnormal ECG  No previous ECGs available  Confirmed by Erling Mcardle MD, [facility] ([id number]) on 2/28/2023 8:44:38 AM     TROPONIN-HIGH SENSITIVITY    Collection Time: 02/27/23 10:32 PM   Result Value Ref Range    Troponin-High Sensitivity 5 0 - 76 ng/L   METABOLIC PANEL, COMPREHENSIVE Collection Time: 02/27/23 10:32 PM   Result Value Ref Range    Sodium 137 136 - 145 mmol/L    Potassium 3.4 (L) 3.5 - 5.1 mmol/L    Chloride 101 97 - 108 mmol/L    CO2 32 21 - 32 mmol/L    Anion gap 4 (L) 5 - 15 mmol/L    Glucose 144 (H) 65 - 100 mg/dL    BUN 14 6 - 20 mg/dL    Creatinine 0.97 0.70 - 1.30 mg/dL    BUN/Creatinine ratio 14 12 - 20      eGFR >60 >60 ml/min/1.73m2    Calcium 8.6 8.5 - 10.1 mg/dL    Bilirubin, total 0.2 0.2 - 1.0 mg/dL    AST (SGOT) 19 15 - 37 U/L    ALT (SGPT) 32 12 - 78 U/L    Alk. phosphatase 70 45 - 117 U/L    Protein, total 7.4 6.4 - 8.2 g/dL    Albumin 3.1 (L) 3.5 - 5.0 g/dL    Globulin 4.3 (H) 2.0 - 4.0 g/dL    A-G Ratio 0.7 (L) 1.1 - 2.2     CBC WITH AUTOMATED DIFF    Collection Time: 02/27/23 10:32 PM   Result Value Ref Range    WBC 6.8 4.1 - 11.1 K/uL    RBC 3.88 (L) 4.10 - 5.70 M/uL    HGB 12.2 12.1 - 17.0 g/dL    HCT 37.5 36.6 - 50.3 %    MCV 96.6 80.0 - 99.0 FL    MCH 31.4 26.0 - 34.0 PG    MCHC 32.5 30.0 - 36.5 g/dL    RDW 14.1 11.5 - 14.5 %    PLATELET 463 317 - 692 K/uL    MPV 10.0 8.9 - 12.9 FL    NRBC 0.0 0.0  WBC    ABSOLUTE NRBC 0.00 0.00 - 0.01 K/uL    NEUTROPHILS 61 32 - 75 %    LYMPHOCYTES 28 12 - 49 %    MONOCYTES 8 5 - 13 %    EOSINOPHILS 3 0 - 7 %    BASOPHILS 0 0 - 1 %    IMMATURE GRANULOCYTES 0 0 - 0.5 %    ABS. NEUTROPHILS 4.2 1.8 - 8.0 K/UL    ABS. LYMPHOCYTES 1.9 0.8 - 3.5 K/UL    ABS. MONOCYTES 0.5 0.0 - 1.0 K/UL    ABS. EOSINOPHILS 0.2 0.0 - 0.4 K/UL    ABS. BASOPHILS 0.0 0.0 - 0.1 K/UL    ABS. IMM.  GRANS. 0.0 0.00 - 0.04 K/UL    DF AUTOMATED     PROTHROMBIN TIME + INR    Collection Time: 02/27/23 10:48 PM   Result Value Ref Range    Prothrombin time 12.5 11.9 - 14.6 sec    INR 0.9 0.9 - 1.1     TYPE & SCREEN    Collection Time: 02/28/23 12:45 AM   Result Value Ref Range    Crossmatch Expiration 03/03/2023,2359     ABO/Rh(D) A Positive     Antibody screen Negative    TROPONIN-HIGH SENSITIVITY    Collection Time: 02/28/23 12:45 AM   Result Value Ref Range    Troponin-High Sensitivity 5 0 - 76 ng/L   GLUCOSE, POC    Collection Time: 02/28/23  8:51 AM   Result Value Ref Range    Glucose (POC) 126 (H) 65 - 100 mg/dL    Performed by Coleen 30, POC    Collection Time: 02/28/23 11:50 AM   Result Value Ref Range    Glucose (POC) 149 (H) 65 - 100 mg/dL    Performed by Burton Eisenmenger        Visit Vitals  BP (!) 97/59 (BP 1 Location: Right upper arm, BP Patient Position: Supine)   Pulse 85   Temp 97.7 °F (36.5 °C)   Resp 22   Ht 6' 4\" (1.93 m)   Wt 154.2 kg (340 lb)   SpO2 93%   BMI 41.39 kg/m²       Imaging:  XR CHEST PORT   Final Result   No acute process. CTA CODE NEURO HEAD AND NECK W CONT   Final Result      Suboptimal intracranial arterial contrast opacification. No evidence for acute   large vessel central arterial occlusion. Asymmetric enlargement of the parapharyngeal space/tonsils bilaterally. Recommend nonemergent ENT consultation. CT CODE NEURO HEAD WO CONTRAST   Final Result      No acute process.       MRI BRAIN W CONT    (Results Pending)   DUPLEX CAROTID BILATERAL    (Results Pending)

## 2023-02-28 NOTE — ROUTINE PROCESS
TRANSFER - OUT REPORT:    Verbal report given to VISHAL SALAZAR [facility]) on Glen Jenningsri  being transferred to 461(unit) for routine progression of care       Report consisted of patients Situation, Background, Assessment and   Recommendations(SBAR). Information from the following report(s) SBAR was reviewed with the receiving nurse. Lines:   Peripheral IV 02/27/23 Right Hand (Active)   Site Assessment Clean, dry, & intact 02/27/23 2245   Dressing Status Clean, dry, & intact 02/27/23 2245   Dressing Type 4 X 4 02/27/23 2245       Peripheral IV 02/27/23 Left Forearm (Active)   Site Assessment Clean, dry, & intact 02/27/23 2327   Phlebitis Assessment 0 02/27/23 2327   Infiltration Assessment 0 02/27/23 2327   Dressing Status Clean, dry, & intact 02/27/23 2327        Opportunity for questions and clarification was provided.       Patient transported with:   [facility]

## 2023-02-28 NOTE — PROGRESS NOTES
Reason for Admission:  Stroke                     RUR Score:  12%                   Plan for utilizing home health: None @ this time. No DME on ambulation. Home O2 @ 3lpm @ HS. Does own ADLs. No stairs inside/outside home. PCP: First and Last name:  Christine Hernandez MD     Name of Practice:    Are you a current patient: Yes/No: Yes   Approximate date of last visit: Been a while since last visit. Can you participate in a virtual visit with your PCP: Yes/Call/Has cell phone. Current Advanced Directive/Advance Care Plan: Full Code      Healthcare Decision Maker:              Primary Decision Maker: Sravan Oneill Brother - [phone]    Secondary Decision Maker: Elena Crandall - Girlfriend - [phone]                  Transition of Care Plan:                    D/C Plan is home alone with home O2 via cab/transport. Send Rxs to [facility] in [place] upon discharge.

## 2023-02-28 NOTE — PROGRESS NOTES
Health Maintenance Due   Topic Date Due   • COVID-19 Vaccine (3 - Booster for Pfizer series) 07/19/2021   • Depression Screening  08/30/2022       Patient is due for topics as listed above but is not proceeding with Immunization(s) COVID-19 at this time.     Recent Review Flowsheet Data     Date 4/25/2022    Adult PHQ 2 Score 0    Adult PHQ 2 Interpretation No further screening needed    Little interest or pleasure in activity? Not at all    Feeling down, depressed or hopeless? Not at all           PT eval order received and acknowledged. Pt screened and is currently presenting at baseline I for functional mobility/transfers. PT evaluation order will be discontinued at this time as pt has no acute PT needs. Please reorder PT if pt functional status changes. Thank you. Functional Measure:  [facility] AM-PAC 6 Clicks         Basic Mobility Inpatient Short Form  How much difficulty does the patient currently have. .. Unable A Lot A Little None   1. Turning over in bed (including adjusting bedclothes, sheets and blankets)? [] 1   [] 2   [] 3   [x] 4   2. Sitting down on and standing up from a chair with arms ( e.g., wheelchair, bedside commode, etc.)   [] 1   [] 2   [] 3   [x] 4   3. Moving from lying on back to sitting on the side of the bed? [] 1   [] 2   [] 3   [x] 4          How much help from another person does the patient currently need. .. Total A Lot A Little None   4. Moving to and from a bed to a chair (including a wheelchair)? [] 1   [] 2   [] 3   [x] 4   5. Need to walk in hospital room? [] 1   [] 2   [] 3   [x] 4   6. Climbing 3-5 steps with a railing? [] 1   [] 2   [] 3   [x] 4   © 2007, Trustees of [facility], under license to [employer]. All rights reserved     Score:  Initial: 24/24 Most Recent: X (Date: 2/28/2023 )   Interpretation of Tool:  Represents activities that are increasingly more difficult (i.e. Bed mobility, Transfers, Gait).   Score 24 23 22-20 19-15 14-10 9-7 6   Modifier CH CI CJ CK CL CM CN         Physical Therapy Evaluation Charge Determination   History Examination Presentation Decision-Making   HIGH Complexity :3+ comorbidities / personal factors will impact the outcome/ POC  HIGH Complexity : 4+ Standardized tests and measures addressing body structure, function, activity limitation and / or participation in recreation  LOW Complexity : Stable, uncomplicated  Other outcome measures ampa 6  low      Based on the above components, the patient evaluation is determined to be of the following complexity level: LOW

## 2023-02-28 NOTE — PROGRESS NOTES
SPEECH LANGUAGE PATHOLOGY BEDSIDE SWALLOW EVALUATIONS  Patient: Juanpablo Hernandez  (1 y.o. )  Date: 2/28/2023  Primary Diagnosis: TIA   Precautions:      ASSESSMENT :  Patient is A&Ox4 and follows basic commands. No facial droop or dysarthria. Informally, speech language is wfl. Oropharyngeal swallow fxn is wfl. No pen/asp observed. PLAN :  Recommendations and Planned Interventions:  Rec regular/thin diet w/ general asp/GERD precautions. No further ST intervention at this time. Please re-consult as medically indicated. SUBJECTIVE:   Patient denies dysphagia and speech language deficits. He reports resolution of sxs. OBJECTIVE:   Patient admitted w/ chest pain and LUE weakness and paraesthesia. Past Medical History:   Diagnosis Date    Acute [facility])     COPD (chronic obstructive pulmonary disease) ([facility] [address]     Depression     Hypertension     Ill-defined condition     Stroke ([facility] [address]        CXR Results  (Last 48 hours)                 02/27/23 2335  XR CHEST PORT Final result    Impression:  No acute process. Narrative:  INDICATION: Chest pain       EXAM:  AP CHEST RADIOGRAPH       COMPARISON: July 16/22       FINDINGS:       AP portable view of the chest demonstrates cardiomegaly. There is no edema,   effusion, consolidation, or pneumothorax. The osseous structures are   unremarkable. Diet prior to admission: Regular  Current Diet:  DIET ADULT     Cognitive and Communication Status:  Neurologic State: Alert  Orientation Level: Oriented X4  Cognition: Follows commands  Perception: Appears intact  Perseveration: No perseveration noted  Safety/Judgement: Awareness of environment  Swallowing Evaluation:   Oral Assessment:  Oral Assessment  Labial: No impairment  Dentition: Intact  Oral Hygiene: wfl  Lingual: No impairment  Velum: No impairment  Mandible: No impairment  P.O.  Trials:  Patient Position: upright in bed  Vocal quality prior to P.O.: No impairment  Consistency Presented: Thin liquid; Solid  How Presented: SLP-fed/presented;Cup/sip; Successive swallows     Bolus Acceptance: No impairment  Bolus Formation/Control: No impairment     Propulsion: No impairment  Oral Residue: None  Initiation of Swallow: No impairment  Laryngeal Elevation: Functional  Aspiration Signs/Symptoms: None  Pharyngeal Phase Characteristics: No impairment, issues, or problems              Oral Phase Severity: No impairment  Pharyngeal Phase Severity : No impairment  Voice:     Vocal Quality: No impairment          Pain:  Pain Scale 1: Numeric (0 - 10)  Pain Intensity 1: 0         After treatment:   Patient left in no apparent distress in bed, Call bell within reach, and Nursing notified    COMMUNICATION/EDUCATION:   Patient was educated regarding diet recs, s/s aspiration, aspiration precautions and BEFAST. He demonstrated Good understanding as evidenced by engagement and appropriate questions. The patient's plan of care including recommendations, planned interventions, and recommended diet changes were discussed with: Registered nurse.        Thank you for this referral.  Eusebia Ledesma M.S., M.Ed., CCC-SLP  Time Calculation: 10 mins

## 2023-02-28 NOTE — ED TRIAGE NOTES
Patient comes in for general weakness and chest pain, patient states he has left sided weakness from a previous stroke that is baseline for him.  Chest pain began about an hour prior to calling EMS, took one 81 mg aspirin at home today

## 2023-03-01 VITALS
WEIGHT: 315 LBS | TEMPERATURE: 98.4 F | HEART RATE: 83 BPM | RESPIRATION RATE: 16 BRPM | DIASTOLIC BLOOD PRESSURE: 75 MMHG | BODY MASS INDEX: 38.36 KG/M2 | HEIGHT: 76 IN | OXYGEN SATURATION: 97 % | SYSTOLIC BLOOD PRESSURE: 130 MMHG

## 2023-03-01 LAB
CHOLEST SERPL-MCNC: 166 MG/DL
ECHO AO ROOT DIAM: 4.3 CM
ECHO AO ROOT INDEX: 1.55 CM/M2
ECHO AV PEAK GRADIENT: 6 MMHG
ECHO AV PEAK VELOCITY: 1.2 M/S
ECHO AV VELOCITY RATIO: 0.75
ECHO EST RA PRESSURE: 15 MMHG
ECHO LA DIAMETER INDEX: 1.8 CM/M2
ECHO LA DIAMETER: 5 CM
ECHO LA TO AORTIC ROOT RATIO: 1.16
ECHO LV E' LATERAL VELOCITY: 10 CM/S
ECHO LV E' SEPTAL VELOCITY: 6 CM/S
ECHO LV EJECTION FRACTION BIPLANE: 60 % (ref 55–100)
ECHO LV FRACTIONAL SHORTENING: 33 % (ref 28–44)
ECHO LV INTERNAL DIMENSION DIASTOLE INDEX: 2.09 CM/M2
ECHO LV INTERNAL DIMENSION DIASTOLIC: 5.8 CM (ref 4.2–5.9)
ECHO LV INTERNAL DIMENSION SYSTOLIC INDEX: 1.4 CM/M2
ECHO LV INTERNAL DIMENSION SYSTOLIC: 3.9 CM
ECHO LV IVSD: 1.9 CM (ref 0.6–1)
ECHO LV MASS 2D: 349.2 G (ref 88–224)
ECHO LV MASS INDEX 2D: 125.6 G/M2 (ref 49–115)
ECHO LV POSTERIOR WALL DIASTOLIC: 0.8 CM (ref 0.6–1)
ECHO LV RELATIVE WALL THICKNESS RATIO: 0.28
ECHO LVOT PEAK GRADIENT: 3 MMHG
ECHO LVOT PEAK VELOCITY: 0.9 M/S
ECHO MV A VELOCITY: 0.84 M/S
ECHO MV E DECELERATION TIME (DT): 437 MS
ECHO MV E VELOCITY: 0.7 M/S
ECHO MV E/A RATIO: 0.83
ECHO MV E/E' LATERAL: 7
ECHO MV E/E' RATIO (AVERAGED): 9.33
ECHO MV E/E' SEPTAL: 11.67
ECHO PV MAX VELOCITY: 1.2 M/S
ECHO PV PEAK GRADIENT: 6 MMHG
ECHO RIGHT VENTRICULAR SYSTOLIC PRESSURE (RVSP): 27 MMHG
ECHO RV INTERNAL DIMENSION: 4.2 CM
ECHO TV REGURGITANT MAX VELOCITY: 1.7 M/S
ECHO TV REGURGITANT PEAK GRADIENT: 12 MMHG
EST. AVERAGE GLUCOSE BLD GHB EST-MCNC: 143 MG/DL
GLUCOSE BLD STRIP.AUTO-MCNC: 106 MG/DL (ref 65–100)
GLUCOSE BLD STRIP.AUTO-MCNC: 112 MG/DL (ref 65–100)
GLUCOSE BLD STRIP.AUTO-MCNC: 119 MG/DL (ref 65–100)
HBA1C MFR BLD: 6.6 % (ref 4–5.6)
HDLC SERPL-MCNC: 39 MG/DL
HDLC SERPL: 4.3 (ref 0–5)
LDLC SERPL CALC-MCNC: 103.6 MG/DL (ref 0–100)
LEFT CCA DIST DIAS: 22.9 CM/S
LEFT CCA DIST SYS: 84.4 CM/S
LEFT CCA PROX DIAS: 22.9 CM/S
LEFT CCA PROX SYS: 134 CM/S
LEFT ECA DIAS: 16.5 CM/S
LEFT ECA SYS: 136 CM/S
LEFT ICA DIST DIAS: 26.1 CM/S
LEFT ICA DIST SYS: 73.6 CM/S
LEFT ICA PROX DIAS: 31.6 CM/S
LEFT ICA PROX SYS: 94.2 CM/S
LEFT ICA/CCA SYS: 1.12
LEFT VERTEBRAL DIAS: 11.3 CM/S
LEFT VERTEBRAL SYS: 55.9 CM/S
LIPID PROFILE,FLP: ABNORMAL
PERFORMED BY, TECHID: ABNORMAL
RIGHT CCA DIST DIAS: 18.3 CM/S
RIGHT CCA DIST SYS: 90.9 CM/S
RIGHT CCA PROX DIAS: 17.6 CM/S
RIGHT CCA PROX SYS: 105 CM/S
RIGHT ECA DIAS: 13 CM/S
RIGHT ECA SYS: 110 CM/S
RIGHT ICA DIST DIAS: 24.7 CM/S
RIGHT ICA DIST SYS: 76.3 CM/S
RIGHT ICA PROX DIAS: 24.1 CM/S
RIGHT ICA PROX SYS: 66.7 CM/S
RIGHT ICA/CCA SYS: 0.73
RIGHT VERTEBRAL DIAS: 14.8 CM/S
RIGHT VERTEBRAL SYS: 52.8 CM/S
TRIGL SERPL-MCNC: 117 MG/DL (ref ?–150)
VAS LEFT SUBCLAVIAN PROX EDV: 0 CM/S
VAS LEFT SUBCLAVIAN PROX PSV: 148 CM/S
VAS RIGHT SUBCLAVIAN PROX EDV: 0 CM/S
VAS RIGHT SUBCLAVIAN PROX PSV: 135 CM/S
VLDLC SERPL CALC-MCNC: 23.4 MG/DL

## 2023-03-01 PROCEDURE — 36415 COLL VENOUS BLD VENIPUNCTURE: CPT

## 2023-03-01 PROCEDURE — 83036 HEMOGLOBIN GLYCOSYLATED A1C: CPT

## 2023-03-01 PROCEDURE — 82962 GLUCOSE BLOOD TEST: CPT

## 2023-03-01 PROCEDURE — 74011250637 HC RX REV CODE- 250/637: Performed by: INTERNAL MEDICINE

## 2023-03-01 PROCEDURE — 74011250636 HC RX REV CODE- 250/636: Performed by: INTERNAL MEDICINE

## 2023-03-01 PROCEDURE — 80061 LIPID PANEL: CPT

## 2023-03-01 PROCEDURE — 93880 EXTRACRANIAL BILAT STUDY: CPT | Performed by: SURGERY

## 2023-03-01 PROCEDURE — 94640 AIRWAY INHALATION TREATMENT: CPT

## 2023-03-01 PROCEDURE — 96372 THER/PROPH/DIAG INJ SC/IM: CPT

## 2023-03-01 PROCEDURE — G0378 HOSPITAL OBSERVATION PER HR: HCPCS

## 2023-03-01 RX ORDER — CLOPIDOGREL BISULFATE 75 MG/1
75 TABLET ORAL DAILY
Qty: 30 TABLET | Refills: 0 | Status: SHIPPED | OUTPATIENT
Start: 2023-03-01

## 2023-03-01 RX ORDER — METOPROLOL SUCCINATE 25 MG/1
25 TABLET, EXTENDED RELEASE ORAL DAILY
Qty: 30 TABLET | Refills: 0 | Status: SHIPPED | OUTPATIENT
Start: 2023-03-02

## 2023-03-01 RX ORDER — ATORVASTATIN CALCIUM 40 MG/1
40 TABLET, FILM COATED ORAL DAILY
Qty: 30 TABLET | Refills: 0 | Status: SHIPPED | OUTPATIENT
Start: 2023-03-02

## 2023-03-01 RX ADMIN — HEPARIN SODIUM 5000 UNITS: 5000 INJECTION INTRAVENOUS; SUBCUTANEOUS at 08:55

## 2023-03-01 RX ADMIN — ASPIRIN 81 MG 81 MG: 81 TABLET ORAL at 08:55

## 2023-03-01 RX ADMIN — PANTOPRAZOLE SODIUM 40 MG: 40 TABLET, DELAYED RELEASE ORAL at 08:55

## 2023-03-01 RX ADMIN — METOPROLOL SUCCINATE 25 MG: 25 TABLET, EXTENDED RELEASE ORAL at 08:55

## 2023-03-01 RX ADMIN — LEVOTHYROXINE SODIUM 200 MCG: 100 TABLET ORAL at 09:32

## 2023-03-01 RX ADMIN — BUDESONIDE AND FORMOTEROL FUMARATE DIHYDRATE 2 PUFF: 160; 4.5 AEROSOL RESPIRATORY (INHALATION) at 08:34

## 2023-03-01 RX ADMIN — POTASSIUM CHLORIDE 40 MEQ: 1500 TABLET, EXTENDED RELEASE ORAL at 08:55

## 2023-03-01 RX ADMIN — BUMETANIDE 1 MG: 1 TABLET ORAL at 08:55

## 2023-03-01 RX ADMIN — CLOPIDOGREL BISULFATE 75 MG: 75 TABLET ORAL at 08:55

## 2023-03-01 RX ADMIN — DIGOXIN 0.12 MG: 250 TABLET ORAL at 08:55

## 2023-03-01 RX ADMIN — Medication 2000 UNITS: at 08:54

## 2023-03-01 RX ADMIN — HEPARIN SODIUM 5000 UNITS: 5000 INJECTION INTRAVENOUS; SUBCUTANEOUS at 03:01

## 2023-03-01 RX ADMIN — ATORVASTATIN CALCIUM 40 MG: 40 TABLET, FILM COATED ORAL at 08:55

## 2023-03-01 NOTE — PROGRESS NOTES
NEURO PROGRESS NOTE        SUBJECTIVE:   Acute onset of left-sided numbness and tingling      EXAM:  Awake, in relative pleasant mood, oriented, not aphasic  Left-sided symptoms essentially resolved  No new focality      ASSESSMENT/PLAN:  Patient asking about being discharged. He could have an EEG as an outpatient if he is discharged today.   Patient is to follow-up with me in the 1 to 2 weeks by calling [phone] for appointment    ALLERGIES:    Allergies   Allergen Reactions    Vancomycin Hives     Lyndsey syndrome       MEDS:      Current Facility-Administered Medications:     albuterol-ipratropium (DUO-NEB) 2.5 MG-0.5 MG/3 ML, 3 mL, Nebulization, Q6H PRN, Adriel Sánchez MD    aspirin chewable tablet 81 mg, 81 mg, Oral, DAILY, Adriel Sánchez MD, 81 mg at 03/01/23 0855    atorvastatin (LIPITOR) tablet 40 mg, 40 mg, Oral, DAILY, Adriel Sánchez MD, 40 mg at 03/01/23 0855    bisacodyL (DULCOLAX) tablet 5 mg, 5 mg, Oral, DAILY PRN, Haily BEGUM MD    budesonide-formoteroL (SYMBICORT) 160-4.5 mcg/actuation HFA inhaler 2 Puff, 2 Puff, Inhalation, BID, Haily BEGUM MD, 2 Puff at 03/01/23 0834    bumetanide (BUMEX) tablet 1 mg, 1 mg, Oral, DAILY, Adriel Sánchez MD, 1 mg at 03/01/23 0855    cholecalciferol (VITAMIN D3) (1000 Units /25 mcg) tablet 2,000 Units, 2,000 Units, Oral, DAILY, Haily BEGUM MD, 2,000 Units at 03/01/23 0854    clopidogreL (PLAVIX) tablet 75 mg, 75 mg, Oral, DAILY, Adriel Sánchez MD, 75 mg at 03/01/23 0855    digoxin (LANOXIN) tablet 0.125 mg, 0.125 mg, Oral, DAILY, Adriel Sánchez MD, 0.125 mg at 03/01/23 0855    insulin glargine (LANTUS) injection 20 Units, 20 Units, SubCUTAneous, QHS, Adriel Sánchez MD, 20 Units at 02/28/23 2221    levothyroxine (SYNTHROID) tablet 200 mcg, 200 mcg, Oral, 6am, Adriel Sánchez MD, 200 mcg at 03/01/23 0932    [START ON 3/2/2023] metFORMIN (GLUCOPHAGE) tablet 500 mg, 500 mg, Oral, DAILY WITH BREAKFAST, Kleber Glaser MD metoprolol succinate (TOPROL-XL) XL tablet 25 mg, 25 mg, Oral, DAILY, Adriel Sánchez MD, 25 mg at 03/01/23 0855    pantoprazole (PROTONIX) tablet 40 mg, 40 mg, Oral, ACB&D, Laury BEGUM MD, 40 mg at 03/01/23 0855    traZODone (DESYREL) tablet 100 mg, 100 mg, Oral, QHS, Adriel Sánchez MD, 100 mg at 02/28/23 2221    acetaminophen (TYLENOL) tablet 650 mg, 650 mg, Oral, Q4H PRN **OR** acetaminophen (TYLENOL) solution 650 mg, 650 mg, Per NG tube, Q4H PRN **OR** acetaminophen (TYLENOL) suppository 650 mg, 650 mg, Rectal, Q4H PRN, Adriel Tang MD    heparin (porcine) injection 5,000 Units, 5,000 Units, SubCUTAneous, Q8H, Adriel Sánchez MD, 5,000 Units at 03/01/23 0855    insulin lispro (HUMALOG) injection, , SubCUTAneous, AC&HS, Candy Fernandez MD, 4 Units at 02/28/23 2222    glucose chewable tablet 16 g, 4 Tablet, Oral, PRN, Adriel Tang MD    glucagon (GLUCAGEN) injection 1 mg, 1 mg, IntraMUSCular, PRN, Adriel Sánchez MD    dextrose 10% infusion 0-250 mL, 0-250 mL, IntraVENous, PRN, Adriel Sánchez MD    potassium chloride (K-DUR, KLOR-CON M20) SR tablet 40 mEq, 40 mEq, Oral, DAILY, Adriel Sánchez MD, 40 mEq at 03/01/23 3507    LABS:  Recent Results (from the past 24 hour(s))   GLUCOSE, POC    Collection Time: 02/28/23 11:50 AM   Result Value Ref Range    Glucose (POC) 149 (H) 65 - 100 mg/dL    Performed by RITA PACSUAL    DUPLEX CAROTID BILATERAL    Collection Time: 02/28/23  3:55 PM   Result Value Ref Range    Left CCA dist sys 84.4 cm/s    Left CCA dist emmanuel 22.9 cm/s    Left CCA prox sys 134.0 cm/s    Left CCA prox emmanuel 22.9 cm/s    Left ICA dist sys 73.6 cm/s    Left ICA dist emmanuel 26.1 cm/s    Left ICA prox sys 94.2 cm/s    Left ICA prox emmanuel 31.6 cm/s    Left ECA sys 136.0 cm/s    LEFT EXTERNAL CAROTID ARTERY D 16.50 cm/s    Left subclavian prox .0 cm/s    Left subclavian prox EDV 0.0 cm/s    Left vertebral sys 55.9 cm/s    LEFT VERTEBRAL ARTERY D 11.30 cm/s    Right cca dist sys 90.9 cm/s    Right CCA dist emmanuel 18.3 cm/s    Right CCA prox sys 105.0 cm/s    Right CCA prox emmanuel 17.6 cm/s    Right ICA dist sys 76.3 cm/s    Right ICA dist emmanuel 24.7 cm/s    Right ICA prox sys 66.7 cm/s    Right ICA prox emmanuel 24.1 cm/s    Right eca sys 110.0 cm/s    RIGHT EXTERNAL CAROTID ARTERY D 13.00 cm/s    Right subclavian prox .0 cm/s    Right subclavian prox EDV 0.0 cm/s    Right vertebral sys 52.8 cm/s    RIGHT VERTEBRAL ARTERY D 14.80 cm/s    Right ICA/CCA sys 0.73     Left ICA/CCA sys 1.12    ECHO ADULT COMPLETE    Collection Time: 02/28/23  3:55 PM   Result Value Ref Range    AV Peak Velocity 1.2 m/s    AV Peak Gradient 6 mmHg    Aortic Root 4.3 cm    IVSd 1.9 (A) 0.6 - 1.0 cm    LVIDd 5.8 4.2 - 5.9 cm    LVIDs 3.9 cm    LVOT Peak Velocity 0.9 m/s    LVOT Peak Gradient 3 mmHg    LVPWd 0.8 0.6 - 1.0 cm    LV E' Lateral Velocity 10 cm/s    LV E' Septal Velocity 6 cm/s    LA Diameter 5.0 cm    MV E Wave Deceleration Time 437.0 ms    MV A Velocity 0.84 m/s    MV E Velocity 0.70 m/s    PV Max Velocity 1.2 m/s    PV Peak Gradient 6 mmHg    Est. RA Pressure 15 mmHg    RVIDd 4.2 cm    TR Max Velocity 1.70 m/s    TR Peak Gradient 12 mmHg    Fractional Shortening 2D 33 28 - 44 %    LVIDd Index 2.09 cm/m2    LVIDs Index 1.40 cm/m2    LV RWT Ratio 0.28     LV Mass 2D 349.2 (A) 88 - 224 g    LV Mass 2D Index 125.6 (A) 49 - 115 g/m2    MV E/A 0.83     E/E' Ratio (Averaged) 9.33     E/E' Lateral 7.00     E/E' Septal 11.67     LA Size Index 1.80 cm/m2    LA/AO Root Ratio 1.16     Ao Root Index 1.55 cm/m2    AV Velocity Ratio 0.75     RVSP 27 mmHg    EF BP 60 55 - 100 %   GLUCOSE, POC    Collection Time: 02/28/23  4:10 PM   Result Value Ref Range    Glucose (POC) 128 (H) 65 - 100 mg/dL    Performed by Jillian Ohara, POC    Collection Time: 02/28/23  9:27 PM   Result Value Ref Range    Glucose (POC) 221 (H) 65 - 100 mg/dL    Performed by Aurelio Pantoja.     GLUCOSE, POC    Collection Time: 03/01/23 8:18 AM   Result Value Ref Range    Glucose (POC) 119 (H) 65 - 100 mg/dL    Performed by Bandar Masker        Visit Vitals  /74 (BP 1 Location: Right upper arm, BP Patient Position: Semi fowlers)   Pulse 99   Temp 98.5 °F (36.9 °C)   Resp 20   Ht 6' 4\" (1.93 m)   Wt 154.2 kg (340 lb)   SpO2 93%   BMI 41.39 kg/m²       Imaging:  DUPLEX CAROTID BILATERAL   Final Result      XR CHEST PORT   Final Result   No acute process. CTA CODE NEURO HEAD AND NECK W CONT   Final Result      Suboptimal intracranial arterial contrast opacification. No evidence for acute   large vessel central arterial occlusion. Asymmetric enlargement of the parapharyngeal space/tonsils bilaterally. Recommend nonemergent ENT consultation. CT CODE NEURO HEAD WO CONTRAST   Final Result      No acute process.       MRI BRAIN W CONT    (Results Pending)

## 2023-03-01 NOTE — PROGRESS NOTES
Patient DCP remains home alone with home oxygen at night. According to medical record patient has extreme claustrophobia and will need open sided MRI if absolutely necessary. Per neuro note, patient can have EEG outpatient if plan is to discharge today. Discharge plan of care/case management plan validated with provider discharge order.

## 2023-03-01 NOTE — PROGRESS NOTES
MRI called RN and stated Dr. Perry Maxwell wants the MRI done while patient is inpatient and stated PO ativan could be given. RN asked patient and patient does not think the ativan will help and would prefer to do MRI outpatient at an open MRI. MRI and Dr. Perry Maxwell notified.

## 2023-03-01 NOTE — PROGRESS NOTES
Progress Note    Patient: Glen Howell MRN: [id number]  SSN: [id number]    YOB: 1975  Age: 49 y.o. Sex: male      Admit Date: 2/27/2023    LOS: 1 day     Subjective:     No acute events overnight. He has been up walking without any difficulties. Denied having any chest pain or any weakness. Objective:     Vitals:    02/28/23 2209 02/28/23 2247 03/01/23 0307 03/01/23 0805   BP:  112/62 126/65 118/74   Pulse:  96 89 99   Resp:  22 22 20   Temp:  97.7 °F (36.5 °C) 97.7 °F (36.5 °C) 98.5 °F (36.9 °C)   SpO2: 94% 91% 94%    Weight:       Height:            Intake and Output:  Current Shift: No intake/output data recorded. Last three shifts: 02/27 1901 - 03/01 0700  In: 100 [I.V.:100]  Out: -     Physical Exam:   General:  Alert, cooperative, no distress, appears stated age. Eyes:  Conjunctivae/corneas clear. PERRL, EOMs intact. Fundi benign   Ears:  Normal TMs and external ear canals both ears. Nose: Nares normal. Septum midline. Mucosa normal. No drainage or sinus tenderness. Mouth/Throat: Lips, mucosa, and tongue normal. Teeth and gums normal.   Neck: Supple, symmetrical, trachea midline, no adenopathy, thyroid: no enlargment/tenderness/nodules, no carotid bruit and no JVD. Back:   Symmetric, no curvature. ROM normal. No CVA tenderness. Lungs:   Clear to auscultation bilaterally. Heart:  Regular rate and rhythm, S1, S2 normal, no murmur, click, rub or gallop. Abdomen:   Soft, non-tender. Bowel sounds normal. No masses,  No organomegaly. Extremities: Extremities normal, atraumatic, no cyanosis or edema. Pulses: 2+ and symmetric all extremities. Skin: Skin color, texture, turgor normal. No rashes or lesions   Lymph nodes: Cervical, supraclavicular, and axillary nodes normal.   Neurologic: CNII-XII intact. Normal strength, sensation and reflexes throughout. Lab/Data Review: All lab results for the last 24 hours reviewed.          Assessment:     Active Problems:    CVA (cerebral vascular accident) ([facility] (5/6/2021)    Patient is a 79-year-old white male with:  1. Chest pain, atypical for angina  2. Multiple stroke status post PFO closure  3. Obesity  4. Hypertension  5. History of peripheral vascular disease/AVM status post angioplasty and coiling  6. Left lower extremity swelling, stable  7. COPD  8. Depression    Plan:     No further cardiac testing is planned at this time. Will order lipid profile. Anyhow he is on 40 mg of atorvastatin, aspirin, Bumex, Plavix, digoxin levothyroxine metoprolol. He will follow-up with Dr. Oscar Coronado upon discharge. He did not have his MRI yesterday. Echocardiogram results were discussed with the patient. Bubble study was negative.     Signed By: Reji Mcclelland MD     March 1, 2023

## 2023-03-01 NOTE — DISCHARGE SUMMARY
Discharge Summary     Patient: Husam Smith MRN: [id number]  SSN: [id number]    YOB: 1975  Age: 49 y.o.   Sex: male       Admit Date: 2/27/2023    Discharge Date: 3/1/2023      Admission Diagnoses: CVA (cerebral vascular accident) [facility]) [I63.9]    Discharge Diagnoses:   Problem List as of 3/1/2023 Date Reviewed: 8/26/2021            Codes Class Noted - Resolved    Stroke-like symptoms ICD-10-CM: R29.90  ICD-9-CM: 781.99  7/16/2022 - Present        Hypoxemia ICD-10-CM: R09.02  ICD-9-CM: 799.02  8/18/2021 - Present        COPD exacerbation ([facility] ICD-10-CM: J44.1  ICD-9-CM: 491.21  8/18/2021 - Present        Syncope ICD-10-CM: R55  ICD-9-CM: 780.2  8/3/2021 - Present        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 (CVA) ([facility] ICD-10-CM: B90.871, R56.9  ICD-9-CM: 438.89, 780.39  8/3/2021 - Present        ACS (acute coronary syndrome) ([facility] ICD-10-CM: I24.9  ICD-9-CM: 411.1  7/13/2021 - Present        Hemoptysis ICD-10-CM: R04.2  ICD-9-CM: 786.30  7/13/2021 - Present        Chest pain ICD-10-CM: R07.9  ICD-9-CM: 786.50  6/26/2021 - Present        CVA (cerebral vascular accident) [facility]) ICD-10-CM: I63.9  ICD-9-CM: 434.91  5/6/2021 - Present        Postoperative hypothyroidism ICD-10-CM: E89.0  ICD-9-CM: 244.0  5/6/2021 - Present        Pharyngoesophageal dysphagia ICD-10-CM: R13.14  ICD-9-CM: 787.24  2/22/2021 - Present        Multinodular goiter ICD-10-CM: E04.2  ICD-9-CM: 241.1  12/30/2020 - Present        Coronary artery disease involving native coronary artery of native heart with angina pectoris with documented spasm ([facility] ICD-10-CM: I25.111  ICD-9-CM: 414.01, 413.9  12/17/2020 - Present        Cardiomyopathy ([facility] ICD-10-CM: I42.9  ICD-9-CM: 425.4  12/17/2020 - Present        COPD (chronic obstructive pulmonary disease) ([facility] ICD-10-CM: J44.9  ICD-9-CM: 496  12/17/2020 - Present        Pulmonary nodules ICD-10-CM: R91.8  ICD-9-CM: 793.19  12/17/2020 - Present        Obstructive sleep apnea ICD-10-CM: G47.33  ICD-9-CM: 327.23  12/17/2020 - Present        Type 2 diabetes mellitus with hyperglycemia, without long-term current use of insulin (HCC) ICD-10-CM: E11.65  ICD-9-CM: 250.00, 790.29  12/17/2020 - Present        Essential hypertension ICD-10-CM: I10  ICD-9-CM: 401.9  12/17/2020 - Present        Obesity, morbid ([facility] ICD-10-CM: E66.01  ICD-9-CM: 278.01  9/7/2020 - Present        Carotid artery stenosis ICD-10-CM: I65.29  ICD-9-CM: 433.10  9/7/2020 - Present            Discharge Condition: Good    Hospital Course: 49years old presenting with a complaint of chest pain radiating to the neck impression was admitted for possible stroke work-up. Patient was unable to have a closed MRI done because of severe panic attack cervical enzymes were negative was seen by cardiologist and requested to follow-up with his own primary cardiologist and also to follow-up with his primary care physician and the neurologist.    Consults: Cardiology and Neurology    Significant Diagnostic Studies: labs:     DUPLEX CAROTID BILATERAL   Final Result      XR CHEST PORT   Final Result   No acute process. CTA CODE NEURO HEAD AND NECK W CONT   Final Result      Suboptimal intracranial arterial contrast opacification. No evidence for acute   large vessel central arterial occlusion. Asymmetric enlargement of the parapharyngeal space/tonsils bilaterally. Recommend nonemergent ENT consultation. CT CODE NEURO HEAD WO CONTRAST   Final Result      No acute process. Disposition: home    Discharge Medications:   Current Discharge Medication List        CONTINUE these medications which have CHANGED    Details   atorvastatin (LIPITOR) 40 mg tablet Take 1 Tablet by mouth daily. Qty: 30 Tablet, Refills: 0      metoprolol succinate (TOPROL-XL) 25 mg XL tablet Take 1 Tablet by mouth daily.  Indications: high blood pressure  Qty: 30 Tablet, Refills: 0           CONTINUE these medications which have NOT CHANGED    Details   bumetanide (BUMEX) 2 mg tablet Take 0.5 Tablets by mouth daily. Qty: 30 Tablet, Refills: 0      budesonide-formoteroL (SYMBICORT) 160-4.5 mcg/actuation HFAA Take 2 Puffs by inhalation two (2) times a day. Qty: 10.2 g, Refills: 0      levothyroxine (SYNTHROID) 200 mcg tablet TAKE 1 TABLET BY MOUTH ONCE DAILY BEFORE BREAKFAST (DISCONTINUE LEVOTHYROXINE 175MCG)  Qty: 30 Tablet, Refills: 1      albuterol-ipratropium (DUO-NEB) 2.5 mg-0.5 mg/3 ml nebu 3 mL by Nebulization route every six (6) hours as needed for Wheezing. Qty: 30 Nebule, Refills: 0      potassium chloride (K-DUR, KLOR-CON) 20 mEq tablet Take 2 Tablets by mouth daily. Qty: 30 Tablet, Refills: 0      guaiFENesin ER (MUCINEX) 600 mg ER tablet Take 1 Tablet by mouth every twelve (12) hours. Qty: 20 Tablet, Refills: 0      insulin glargine (LANTUS) 100 unit/mL injection 20 Units by SubCUTAneous route nightly. Qty: 10 mL, Refills: 0      metFORMIN (GLUCOPHAGE) 500 mg tablet       traZODone (DESYREL) 100 mg tablet Take 1 Tablet by mouth nightly. Qty: 30 Tablet, Refills: 0      digoxin (LANOXIN) 0.125 mg tablet TAKE ONE TABLET BY MOUTH EVERY DAY      aspirin 81 mg chewable tablet Take 81 mg by mouth daily. bisacodyL (DULCOLAX) 5 mg EC tablet Take 1 Tablet by mouth daily as needed for Constipation. Qty: 10 Tablet, Refills: 0      clopidogreL (PLAVIX) 75 mg tab Take 1 Tablet by mouth daily.   Qty: 30 Tablet, Refills: 0      cholecalciferol (VITAMIN D3) (2,000 UNITS /50 MCG) cap capsule       pantoprazole (PROTONIX) 40 mg tablet TAKE ONE TABLET BY MOUTH TWICE DAILY           STOP taking these medications       zolpidem (AMBIEN) 5 mg tablet Comments:   Reason for Stopping:         amoxicillin-clavulanate (AUGMENTIN) 500-125 mg per tablet Comments:   Reason for Stopping:         topiramate (TOPAMAX) 50 mg tablet Comments:   Reason for Stopping:               Activity: Activity as tolerated  Diet: Cardiac Diet  Wound Care: Keep wound clean and dry    Follow-up Appointments   Procedures    FOLLOW UP VISIT Appointment in: 3 - 5 Days     Standing Status:   Standing     Number of Occurrences:   1     Order Specific Question:   Appointment in     Answer:   3 - 5 Days     48 minutes discharge time  Signed By: Winter Boston MD     March 1, 2023

## 2023-03-04 NOTE — PROGRESS NOTES
Physician Progress Note      PATIENT:               Adry CAMARGO #:                  [id number]  :                       1975  ADMIT DATE:       2023 10:20 PM  Nahomi Epstein DATE:        3/1/2023 4:38 PM  RESPONDING  PROVIDER #:        Adiel Belcher MD          QUERY TEXT:    Dear Dr. John Vargas,    Pt admitted with left-sided and facial weakness and numbness. Pt noted to have a history of CVA with left-sided weakness. If possible, please document in progress notes and discharge summary if you are evaluating and /or treating any of the following: The medical record reflects the following:  Risk Factors: 49 M presents with left-sided and facial numbness and weakness; admitted with \"Possible acute CVA with hydronephrosis\" per H&P    Clinical Indicators:  -patient presented with left-sided and facial weakness/numbness  -patient with history of CVA with left-sided weakness  -NIHSS = 3  -SOC consulted in ED - notes recurrent stroke versus recrudescence of prior stroke  -Neurology consult notes Possible TIA, possibility of an exacerbation of cervical disc disease needs to be entertained; doubts seizure  -CT, CTA, carotid duplex studies negative  -patient unable to have MRI due to extreme claustrophobia  -patient for outpatient Neurology follow-up, EEG  -per 3/1 Neurology note, left-sided weakness essentially resolved  -22 neck CT notes \"Central stenosis C3-4 through C5-6.  Neural foraminal stenosis as detailed above on the right at C4-5 and bilaterally at C5-6\"    Treatment: labs, CT, CTA, Duplex, [facility] consult, Neurology consult, ASA, Lipitor, Plavix    Thank you,  AVERY Jaime, RN, [facility]  Clinical   [email or web address] or contact via [employer]  Options provided:  -- Presenting symptoms of left-sided and facial weakness/numbness are due to recrudescence of prior CVA  -- Presenting symptoms of left-sided and facial weakness/numbness are due to exacerbation of cervical disc disease  -- Other - I will add my own diagnosis  -- Disagree - Not applicable / Not valid  -- Disagree - Clinically unable to determine / Unknown  -- Refer to Clinical Documentation Reviewer    PROVIDER RESPONSE TEXT:    Presenting symptoms of left-sided and facial weakness/numbness are due to recrudescence of prior CVA.     Query created by: Glory Lopez on 3/3/2023 7:23 AM      Electronically signed by:  Lavelle Lobo MD 3/4/2023 1:34 PM

## 2023-03-04 NOTE — PROCEDURES
[facility]  EEG    Name:  Amberly Temple  MR#:  [id number]  :  1975  ACCOUNT #:  [de-identified]  DATE OF SERVICE:  2023    DIAGNOSES:  Left-sided weakness, stroke. DESCRIPTION:  Recording is done digitally on computer. Electrodes are placed in a 10-20 international system. Initial recording is equipment calibration followed by biocalibration. Initial montages are bipolar montages. Tracings started with the patient awake, eyes closed, with well-formed bioccipital alpha rhythms in the 9 Hz frequency. As the recording continues, the patient is hooked up to an EKG machine that shows a heart rate of 114. Tracings continue with the patient being exposed to photic stimulation without any abnormality. Hyperventilation is not done. IMPRESSION:  This is a normal EEG, awake.       Ángel Lu MD      TT/[email or web address]/[id number]  D:  2023 13:40  T:  2023 1:30  JOB #:  [id number]

## 2023-07-23 ENCOUNTER — APPOINTMENT (OUTPATIENT)
Facility: HOSPITAL | Age: 48
End: 2023-07-23
Payer: MEDICAID

## 2023-07-23 ENCOUNTER — HOSPITAL ENCOUNTER (EMERGENCY)
Facility: HOSPITAL | Age: 48
Discharge: HOME | End: 2023-07-23
Attending: STUDENT IN AN ORGANIZED HEALTH CARE EDUCATION/TRAINING PROGRAM
Payer: MEDICAID

## 2023-07-23 VITALS
DIASTOLIC BLOOD PRESSURE: 85 MMHG | WEIGHT: 315 LBS | BODY MASS INDEX: 38.36 KG/M2 | TEMPERATURE: 97.9 F | HEIGHT: 76 IN | SYSTOLIC BLOOD PRESSURE: 152 MMHG | HEART RATE: 84 BPM | RESPIRATION RATE: 18 BRPM | OXYGEN SATURATION: 98 %

## 2023-07-23 DIAGNOSIS — I82.812 ACUTE SUPERFICIAL VENOUS THROMBOSIS OF LOWER EXTREMITY, LEFT: Primary | ICD-10-CM

## 2023-07-23 LAB — ECHO BSA: 2.83 M2

## 2023-07-23 PROCEDURE — 6360000002 HC RX W HCPCS

## 2023-07-23 PROCEDURE — 73562 X-RAY EXAM OF KNEE 3: CPT

## 2023-07-23 PROCEDURE — 96372 THER/PROPH/DIAG INJ SC/IM: CPT

## 2023-07-23 PROCEDURE — 99284 EMERGENCY DEPT VISIT MOD MDM: CPT

## 2023-07-23 PROCEDURE — 93971 EXTREMITY STUDY: CPT

## 2023-07-23 RX ORDER — NAPROXEN 500 MG/1
500 TABLET ORAL 2 TIMES DAILY
Qty: 60 TABLET | Refills: 0 | Status: SHIPPED | OUTPATIENT
Start: 2023-07-23

## 2023-07-23 RX ORDER — KETOROLAC TROMETHAMINE 30 MG/ML
30 INJECTION, SOLUTION INTRAMUSCULAR; INTRAVENOUS
Status: COMPLETED | OUTPATIENT
Start: 2023-07-23 | End: 2023-07-23

## 2023-07-23 RX ADMIN — KETOROLAC TROMETHAMINE 30 MG: 30 INJECTION, SOLUTION INTRAMUSCULAR; INTRAVENOUS at 11:15

## 2023-07-23 ASSESSMENT — LIFESTYLE VARIABLES
HOW MANY STANDARD DRINKS CONTAINING ALCOHOL DO YOU HAVE ON A TYPICAL DAY: PATIENT DOES NOT DRINK
HOW OFTEN DO YOU HAVE A DRINK CONTAINING ALCOHOL: NEVER

## 2023-07-23 ASSESSMENT — PAIN DESCRIPTION - ORIENTATION: ORIENTATION: LEFT

## 2023-07-23 ASSESSMENT — PAIN - FUNCTIONAL ASSESSMENT
PAIN_FUNCTIONAL_ASSESSMENT: PREVENTS OR INTERFERES SOME ACTIVE ACTIVITIES AND ADLS
PAIN_FUNCTIONAL_ASSESSMENT: 0-10

## 2023-07-23 ASSESSMENT — PAIN SCALES - GENERAL: PAINLEVEL_OUTOF10: 8

## 2023-07-23 ASSESSMENT — PAIN DESCRIPTION - LOCATION: LOCATION: KNEE

## 2023-07-23 NOTE — DISCHARGE INSTRUCTIONS
Thank you! Thank you for allowing me to care for you in the emergency department. It is my goal to provide you with excellent care. If you have not received excellent quality care, please ask to speak to the nurse manager. Please fill out the survey that will come to you by mail or email since we listen to your feedback! Below you will find a list of your tests from today's visit. Should you have any questions, please do not hesitate to call the emergency department. Labs  Recent Results (from the past 12 hour(s))   Vascular duplex lower extremity venous left    Collection Time: 07/23/23 10:57 AM   Result Value Ref Range    Body Surface Area 2.83 m2       Radiologic Studies  Vascular duplex lower extremity venous left         XR KNEE LEFT (3 VIEWS)   Final Result   1. No fracture. 2. Mild osteoarthritis. Joint effusion. [unfilled]  [unfilled]  ------------------------------------------------------------------------------------------------------------  The exam and treatment you received in the Emergency Department were for an urgent problem and are not intended as complete care. It is important that you follow-up with a doctor, nurse practitioner, or physician assistant to:  (1) confirm your diagnosis,  (2) re-evaluation of changes in your illness and treatment, and  (3) for ongoing care. Please take your discharge instructions with you when you go to your follow-up appointment. If you have any problem arranging a follow-up appointment, contact the Emergency Department. If your symptoms become worse or you do not improve as expected and you are unable to reach your health care provider, please return to the Emergency Department. We are available 24 hours a day. If a prescription has been provided, please have it filled as soon as possible to prevent a delay in treatment.  If you have any questions or reservations about taking the medication due to side effects or interactions with other

## 2023-07-23 NOTE — ED TRIAGE NOTES
Pt complaining of left knee pain that started about a week ago. Pt has been taking tylenol with not relief. Noticeable swelling.

## 2023-07-23 NOTE — ED PROVIDER NOTES
chewable tablet     atorvastatin 40 MG tablet  Commonly known as: LIPITOR     bisacodyl 5 MG EC tablet  Commonly known as: DULCOLAX     bumetanide 2 MG tablet  Commonly known as: BUMEX     Cholecalciferol 50 MCG (2000 UT) Caps     clopidogrel 75 MG tablet  Commonly known as: PLAVIX     digoxin 125 MCG tablet  Commonly known as: LANOXIN     guaiFENesin 600 MG extended release tablet  Commonly known as: MUCINEX     insulin glargine 100 UNIT/ML injection vial  Commonly known as: LANTUS     ipratropium 0.5 mg-albuterol 2.5 mg 0.5-2.5 (3) MG/3ML Soln nebulizer solution  Commonly known as: DUONEB     levothyroxine 200 MCG tablet  Commonly known as: SYNTHROID     metFORMIN 500 MG tablet  Commonly known as: GLUCOPHAGE     metoprolol succinate 25 MG extended release tablet  Commonly known as: TOPROL XL     pantoprazole 40 MG tablet  Commonly known as: PROTONIX     potassium chloride 20 MEQ extended release tablet  Commonly known as: KLOR-CON M     traZODone 100 MG tablet  Commonly known as: DESYREL               Where to Get Your Medications        These medications were sent to [address] [place] [address], [facility] Joel Martinez Juanisddell Hodgkin [phone] [facility] [phone]  [address] [place] [address]      Phone: [phone]   naproxen 500 MG tablet  rivaroxaban 15 & 20 MG Starter Pack           DISCONTINUED MEDICATIONS:  Discharge Medication List as of 7/23/2023 12:12 PM        Face-To-Face Shared Encounter with an [place]'s Patient:      The patient presents with left knee pain  My exam shows left lower extremity swollen, greater size of the right lower extremity. Impression/Plan: Plan to rule out DVT with ultrasound,    Critical Care: I independently provided 30 minutes of non-concurrent critical care out of the total shared critical care time provided. This time excludes time spent in any separate billed procedures.   During this entire length of time I was immediately available to the patient

## 2023-08-16 ENCOUNTER — APPOINTMENT (OUTPATIENT)
Facility: HOSPITAL | Age: 48
End: 2023-08-16
Attending: STUDENT IN AN ORGANIZED HEALTH CARE EDUCATION/TRAINING PROGRAM
Payer: MEDICAID

## 2023-08-16 ENCOUNTER — HOSPITAL ENCOUNTER (EMERGENCY)
Facility: HOSPITAL | Age: 48
Discharge: HOME | End: 2023-08-16
Attending: EMERGENCY MEDICINE
Payer: MEDICAID

## 2023-08-16 VITALS
BODY MASS INDEX: 38.36 KG/M2 | RESPIRATION RATE: 15 BRPM | HEIGHT: 76 IN | OXYGEN SATURATION: 98 % | HEART RATE: 100 BPM | DIASTOLIC BLOOD PRESSURE: 72 MMHG | WEIGHT: 315 LBS | SYSTOLIC BLOOD PRESSURE: 121 MMHG | TEMPERATURE: 98.3 F

## 2023-08-16 DIAGNOSIS — M79.605 LEFT LEG PAIN: ICD-10-CM

## 2023-08-16 DIAGNOSIS — I82.812 SUPERFICIAL THROMBOSIS OF LEG, LEFT: Primary | ICD-10-CM

## 2023-08-16 DIAGNOSIS — M79.89 LEG SWELLING: ICD-10-CM

## 2023-08-16 LAB — ECHO BSA: 2.92 M2

## 2023-08-16 PROCEDURE — 93971 EXTREMITY STUDY: CPT

## 2023-08-16 PROCEDURE — 99284 EMERGENCY DEPT VISIT MOD MDM: CPT

## 2023-08-16 ASSESSMENT — PAIN SCALES - GENERAL: PAINLEVEL_OUTOF10: 10

## 2023-08-16 ASSESSMENT — PAIN DESCRIPTION - LOCATION: LOCATION: LEG;FOOT

## 2023-08-16 ASSESSMENT — LIFESTYLE VARIABLES
HOW OFTEN DO YOU HAVE A DRINK CONTAINING ALCOHOL: NEVER
HOW MANY STANDARD DRINKS CONTAINING ALCOHOL DO YOU HAVE ON A TYPICAL DAY: PATIENT DOES NOT DRINK

## 2023-08-16 ASSESSMENT — PAIN - FUNCTIONAL ASSESSMENT: PAIN_FUNCTIONAL_ASSESSMENT: 0-10

## 2023-08-16 NOTE — ED PROVIDER NOTES
deficit present. Mental Status: He is alert and oriented to person, place, and time. Sensory: No sensory deficit. Motor: No weakness. Coordination: Coordination normal.      Gait: Gait normal.   Psychiatric:         Mood and Affect: Mood normal.         Behavior: Behavior normal.         Thought Content: Thought content normal.         Judgment: Judgment normal.         SCREENINGS              LAB, EKG AND DIAGNOSTIC RESULTS   Labs:  Recent Results (from the past 12 hour(s))   Vascular duplex lower extremity venous left    Collection Time: 08/16/23  3:58 PM   Result Value Ref Range    Body Surface Area 2.92 m2       EKG: Not Applicable    Radiologic Studies:  Non-plain film images such as CT, Ultrasound and MRI are read by the radiologist. Plain radiographic images are visualized and preliminarily interpreted by the ED Physician with the following findings: See ED Course Below    Interpretation per the Radiologist below, if available at the time of this note:  Vascular duplex lower extremity venous left   Final Result           ED COURSE and DIFFERENTIAL DIAGNOSIS/MDM   CC/HPI Summary, DDx, ED Course, and Reassessment: Patient presents with lower leg edema and pain. DDx include Lymphedema, DVT, cellulitis, PAD. Will get labs and Doppler Leg PRN. Patient was sent for evaluation from his hematologist for Doppler study to determine advancement of his known superficial thrombus. Venous duplex Doppler study consistent with previous study performed on 7/23/2023, unchanged. History, physical exam and available lab work/imaging discussed today with Dr. Magdiel Rahman . Patient deemed stable for discharge with prompt follow-up with PCP and hematologist, advised to continue with current medication regiment to include anticoagulant. Referral to vascular surgery provided to the patient for evaluation and treatment recommendations.   Patient provided with strict return precautions    Patient initially

## 2023-08-16 NOTE — ED NOTES
Pt understanding of dc instructions and followup , alert orientd and ambulatory at discharge     Magdalena Randall RN  08/16/23 1705

## 2023-09-18 ENCOUNTER — HOSPITAL ENCOUNTER (INPATIENT)
Facility: HOSPITAL | Age: 48
LOS: 2 days | Discharge: HOME | DRG: 140 | End: 2023-09-20
Attending: STUDENT IN AN ORGANIZED HEALTH CARE EDUCATION/TRAINING PROGRAM | Admitting: INTERNAL MEDICINE
Payer: MEDICAID

## 2023-09-18 ENCOUNTER — APPOINTMENT (OUTPATIENT)
Facility: HOSPITAL | Age: 48
DRG: 140 | End: 2023-09-18
Payer: MEDICAID

## 2023-09-18 DIAGNOSIS — J44.1 COPD EXACERBATION (HCC): Primary | ICD-10-CM

## 2023-09-18 DIAGNOSIS — R93.89 ABNORMAL CT SCAN, CHEST: ICD-10-CM

## 2023-09-18 DIAGNOSIS — I20.8 STABLE ANGINA PECTORIS (HCC): ICD-10-CM

## 2023-09-18 LAB
ALBUMIN SERPL-MCNC: 3.5 G/DL (ref 3.5–5)
ALBUMIN/GLOB SERPL: 0.9 (ref 1.1–2.2)
ALP SERPL-CCNC: 65 U/L (ref 45–117)
ALT SERPL-CCNC: 53 U/L (ref 12–78)
ANION GAP SERPL CALC-SCNC: 4 MMOL/L (ref 5–15)
AST SERPL W P-5'-P-CCNC: 27 U/L (ref 15–37)
BASOPHILS # BLD: 0 K/UL (ref 0–0.1)
BASOPHILS NFR BLD: 1 % (ref 0–1)
BILIRUB SERPL-MCNC: 0.2 MG/DL (ref 0.2–1)
BUN SERPL-MCNC: 17 MG/DL (ref 6–20)
BUN/CREAT SERPL: 17 (ref 12–20)
CA-I BLD-MCNC: 8.9 MG/DL (ref 8.5–10.1)
CHLORIDE SERPL-SCNC: 107 MMOL/L (ref 97–108)
CO2 SERPL-SCNC: 31 MMOL/L (ref 21–32)
CREAT SERPL-MCNC: 1 MG/DL (ref 0.7–1.3)
DIFFERENTIAL METHOD BLD: ABNORMAL
EKG ATRIAL RATE: 92 BPM
EKG DIAGNOSIS: NORMAL
EKG P AXIS: 46 DEGREES
EKG P-R INTERVAL: 190 MS
EKG Q-T INTERVAL: 394 MS
EKG QRS DURATION: 100 MS
EKG QTC CALCULATION (BAZETT): 487 MS
EKG R AXIS: 16 DEGREES
EKG T AXIS: 33 DEGREES
EKG VENTRICULAR RATE: 92 BPM
EOSINOPHIL # BLD: 0.2 K/UL (ref 0–0.4)
EOSINOPHIL NFR BLD: 4 % (ref 0–7)
ERYTHROCYTE [DISTWIDTH] IN BLOOD BY AUTOMATED COUNT: 13.7 % (ref 11.5–14.5)
GLOBULIN SER CALC-MCNC: 4 G/DL (ref 2–4)
GLUCOSE BLD STRIP.AUTO-MCNC: 183 MG/DL (ref 65–100)
GLUCOSE BLD STRIP.AUTO-MCNC: 290 MG/DL (ref 65–100)
GLUCOSE SERPL-MCNC: 113 MG/DL (ref 65–100)
HCT VFR BLD AUTO: 37.7 % (ref 36.6–50.3)
HGB BLD-MCNC: 12.4 G/DL (ref 12.1–17)
IMM GRANULOCYTES # BLD AUTO: 0 K/UL (ref 0–0.04)
IMM GRANULOCYTES NFR BLD AUTO: 0 % (ref 0–0.5)
LYMPHOCYTES # BLD: 1.2 K/UL (ref 0.8–3.5)
LYMPHOCYTES NFR BLD: 24 % (ref 12–49)
MCH RBC QN AUTO: 32 PG (ref 26–34)
MCHC RBC AUTO-ENTMCNC: 32.9 G/DL (ref 30–36.5)
MCV RBC AUTO: 97.4 FL (ref 80–99)
MONOCYTES # BLD: 0.5 K/UL (ref 0–1)
MONOCYTES NFR BLD: 9 % (ref 5–13)
NEUTS SEG # BLD: 3.1 K/UL (ref 1.8–8)
NEUTS SEG NFR BLD: 62 % (ref 32–75)
NRBC # BLD: 0 K/UL (ref 0–0.01)
NRBC BLD-RTO: 0 PER 100 WBC
PERFORMED BY:: ABNORMAL
PERFORMED BY:: ABNORMAL
PLATELET # BLD AUTO: 207 K/UL (ref 150–400)
PMV BLD AUTO: 10 FL (ref 8.9–12.9)
POTASSIUM SERPL-SCNC: 4.2 MMOL/L (ref 3.5–5.1)
POTASSIUM SERPL-SCNC: 4.5 MMOL/L (ref 3.5–5.1)
PROT SERPL-MCNC: 7.5 G/DL (ref 6.4–8.2)
RBC # BLD AUTO: 3.87 M/UL (ref 4.1–5.7)
SODIUM SERPL-SCNC: 142 MMOL/L (ref 136–145)
TROPONIN I SERPL HS-MCNC: 10 NG/L (ref 0–76)
TROPONIN I SERPL HS-MCNC: 10 NG/L (ref 0–76)
TROPONIN I SERPL HS-MCNC: 8 NG/L (ref 0–76)
WBC # BLD AUTO: 5 K/UL (ref 4.1–11.1)

## 2023-09-18 PROCEDURE — 96374 THER/PROPH/DIAG INJ IV PUSH: CPT

## 2023-09-18 PROCEDURE — 93005 ELECTROCARDIOGRAM TRACING: CPT | Performed by: STUDENT IN AN ORGANIZED HEALTH CARE EDUCATION/TRAINING PROGRAM

## 2023-09-18 PROCEDURE — 6360000002 HC RX W HCPCS: Performed by: STUDENT IN AN ORGANIZED HEALTH CARE EDUCATION/TRAINING PROGRAM

## 2023-09-18 PROCEDURE — 71275 CT ANGIOGRAPHY CHEST: CPT

## 2023-09-18 PROCEDURE — 6360000004 HC RX CONTRAST MEDICATION: Performed by: STUDENT IN AN ORGANIZED HEALTH CARE EDUCATION/TRAINING PROGRAM

## 2023-09-18 PROCEDURE — 82962 GLUCOSE BLOOD TEST: CPT

## 2023-09-18 PROCEDURE — 6370000000 HC RX 637 (ALT 250 FOR IP): Performed by: STUDENT IN AN ORGANIZED HEALTH CARE EDUCATION/TRAINING PROGRAM

## 2023-09-18 PROCEDURE — 6360000002 HC RX W HCPCS: Performed by: INTERNAL MEDICINE

## 2023-09-18 PROCEDURE — 36415 COLL VENOUS BLD VENIPUNCTURE: CPT

## 2023-09-18 PROCEDURE — 1100000000 HC RM PRIVATE

## 2023-09-18 PROCEDURE — 6370000000 HC RX 637 (ALT 250 FOR IP): Performed by: INTERNAL MEDICINE

## 2023-09-18 PROCEDURE — 84132 ASSAY OF SERUM POTASSIUM: CPT

## 2023-09-18 PROCEDURE — 94761 N-INVAS EAR/PLS OXIMETRY MLT: CPT

## 2023-09-18 PROCEDURE — 85025 COMPLETE CBC W/AUTO DIFF WBC: CPT

## 2023-09-18 PROCEDURE — 84484 ASSAY OF TROPONIN QUANT: CPT

## 2023-09-18 PROCEDURE — 94640 AIRWAY INHALATION TREATMENT: CPT

## 2023-09-18 PROCEDURE — 99285 EMERGENCY DEPT VISIT HI MDM: CPT

## 2023-09-18 PROCEDURE — 2580000003 HC RX 258: Performed by: INTERNAL MEDICINE

## 2023-09-18 PROCEDURE — 71045 X-RAY EXAM CHEST 1 VIEW: CPT

## 2023-09-18 PROCEDURE — 2500000003 HC RX 250 WO HCPCS: Performed by: INTERNAL MEDICINE

## 2023-09-18 PROCEDURE — 80053 COMPREHEN METABOLIC PANEL: CPT

## 2023-09-18 RX ORDER — ONDANSETRON 4 MG/1
4 TABLET, ORALLY DISINTEGRATING ORAL EVERY 8 HOURS PRN
Status: DISCONTINUED | OUTPATIENT
Start: 2023-09-18 | End: 2023-09-20 | Stop reason: HOSPADM

## 2023-09-18 RX ORDER — METOPROLOL SUCCINATE 25 MG/1
25 TABLET, EXTENDED RELEASE ORAL DAILY
Status: DISCONTINUED | OUTPATIENT
Start: 2023-09-18 | End: 2023-09-20 | Stop reason: HOSPADM

## 2023-09-18 RX ORDER — LEVOTHYROXINE SODIUM 0.1 MG/1
200 TABLET ORAL DAILY
Status: DISCONTINUED | OUTPATIENT
Start: 2023-09-18 | End: 2023-09-20 | Stop reason: HOSPADM

## 2023-09-18 RX ORDER — MORPHINE SULFATE 2 MG/ML
2 INJECTION, SOLUTION INTRAMUSCULAR; INTRAVENOUS
Status: COMPLETED | OUTPATIENT
Start: 2023-09-18 | End: 2023-09-18

## 2023-09-18 RX ORDER — ACETAMINOPHEN 650 MG/1
650 SUPPOSITORY RECTAL EVERY 6 HOURS PRN
Status: DISCONTINUED | OUTPATIENT
Start: 2023-09-18 | End: 2023-09-20 | Stop reason: HOSPADM

## 2023-09-18 RX ORDER — IPRATROPIUM BROMIDE AND ALBUTEROL SULFATE 2.5; .5 MG/3ML; MG/3ML
1 SOLUTION RESPIRATORY (INHALATION)
Status: DISCONTINUED | OUTPATIENT
Start: 2023-09-18 | End: 2023-09-19

## 2023-09-18 RX ORDER — INSULIN LISPRO 100 [IU]/ML
0-8 INJECTION, SOLUTION INTRAVENOUS; SUBCUTANEOUS
Status: DISCONTINUED | OUTPATIENT
Start: 2023-09-19 | End: 2023-09-20 | Stop reason: HOSPADM

## 2023-09-18 RX ORDER — SODIUM CHLORIDE 0.9 % (FLUSH) 0.9 %
5-40 SYRINGE (ML) INJECTION EVERY 12 HOURS SCHEDULED
Status: DISCONTINUED | OUTPATIENT
Start: 2023-09-18 | End: 2023-09-20 | Stop reason: HOSPADM

## 2023-09-18 RX ORDER — POLYETHYLENE GLYCOL 3350 17 G/17G
17 POWDER, FOR SOLUTION ORAL DAILY PRN
Status: DISCONTINUED | OUTPATIENT
Start: 2023-09-18 | End: 2023-09-20 | Stop reason: HOSPADM

## 2023-09-18 RX ORDER — KETOROLAC TROMETHAMINE 30 MG/ML
30 INJECTION, SOLUTION INTRAMUSCULAR; INTRAVENOUS
Status: COMPLETED | OUTPATIENT
Start: 2023-09-18 | End: 2023-09-18

## 2023-09-18 RX ORDER — ONDANSETRON 2 MG/ML
4 INJECTION INTRAMUSCULAR; INTRAVENOUS EVERY 6 HOURS PRN
Status: DISCONTINUED | OUTPATIENT
Start: 2023-09-18 | End: 2023-09-20 | Stop reason: HOSPADM

## 2023-09-18 RX ORDER — HYDROMORPHONE HYDROCHLORIDE 1 MG/ML
0.5 INJECTION, SOLUTION INTRAMUSCULAR; INTRAVENOUS; SUBCUTANEOUS EVERY 4 HOURS PRN
Status: DISPENSED | OUTPATIENT
Start: 2023-09-18 | End: 2023-09-20

## 2023-09-18 RX ORDER — INSULIN LISPRO 100 [IU]/ML
0-4 INJECTION, SOLUTION INTRAVENOUS; SUBCUTANEOUS NIGHTLY
Status: DISCONTINUED | OUTPATIENT
Start: 2023-09-18 | End: 2023-09-20 | Stop reason: HOSPADM

## 2023-09-18 RX ORDER — DEXTROSE MONOHYDRATE 100 MG/ML
INJECTION, SOLUTION INTRAVENOUS CONTINUOUS PRN
Status: DISCONTINUED | OUTPATIENT
Start: 2023-09-18 | End: 2023-09-20 | Stop reason: HOSPADM

## 2023-09-18 RX ORDER — IPRATROPIUM BROMIDE AND ALBUTEROL SULFATE 2.5; .5 MG/3ML; MG/3ML
1 SOLUTION RESPIRATORY (INHALATION)
Status: DISCONTINUED | OUTPATIENT
Start: 2023-09-18 | End: 2023-09-18

## 2023-09-18 RX ORDER — HYDROCODONE BITARTRATE AND ACETAMINOPHEN 5; 325 MG/1; MG/1
1 TABLET ORAL EVERY 6 HOURS PRN
Status: DISCONTINUED | OUTPATIENT
Start: 2023-09-18 | End: 2023-09-18

## 2023-09-18 RX ORDER — GUAIFENESIN/DEXTROMETHORPHAN 100-10MG/5
5 SYRUP ORAL EVERY 4 HOURS PRN
Status: DISCONTINUED | OUTPATIENT
Start: 2023-09-18 | End: 2023-09-20 | Stop reason: HOSPADM

## 2023-09-18 RX ORDER — SODIUM CHLORIDE 9 MG/ML
INJECTION, SOLUTION INTRAVENOUS PRN
Status: DISCONTINUED | OUTPATIENT
Start: 2023-09-18 | End: 2023-09-20 | Stop reason: HOSPADM

## 2023-09-18 RX ORDER — PANTOPRAZOLE SODIUM 40 MG/1
40 TABLET, DELAYED RELEASE ORAL
Status: DISCONTINUED | OUTPATIENT
Start: 2023-09-19 | End: 2023-09-20 | Stop reason: HOSPADM

## 2023-09-18 RX ORDER — TRAZODONE HYDROCHLORIDE 50 MG/1
50 TABLET ORAL NIGHTLY
Status: DISCONTINUED | OUTPATIENT
Start: 2023-09-18 | End: 2023-09-20 | Stop reason: HOSPADM

## 2023-09-18 RX ORDER — PREDNISONE 20 MG/1
60 TABLET ORAL
Status: COMPLETED | OUTPATIENT
Start: 2023-09-18 | End: 2023-09-18

## 2023-09-18 RX ORDER — IPRATROPIUM BROMIDE AND ALBUTEROL SULFATE 2.5; .5 MG/3ML; MG/3ML
1 SOLUTION RESPIRATORY (INHALATION) ONCE
Status: COMPLETED | OUTPATIENT
Start: 2023-09-18 | End: 2023-09-18

## 2023-09-18 RX ORDER — ACETAMINOPHEN 325 MG/1
650 TABLET ORAL EVERY 6 HOURS PRN
Status: DISCONTINUED | OUTPATIENT
Start: 2023-09-18 | End: 2023-09-20 | Stop reason: HOSPADM

## 2023-09-18 RX ORDER — PREDNISONE 20 MG/1
40 TABLET ORAL DAILY
Status: DISCONTINUED | OUTPATIENT
Start: 2023-09-21 | End: 2023-09-20 | Stop reason: HOSPADM

## 2023-09-18 RX ORDER — SODIUM CHLORIDE 0.9 % (FLUSH) 0.9 %
5-40 SYRINGE (ML) INJECTION PRN
Status: DISCONTINUED | OUTPATIENT
Start: 2023-09-18 | End: 2023-09-20 | Stop reason: HOSPADM

## 2023-09-18 RX ADMIN — IPRATROPIUM BROMIDE AND ALBUTEROL SULFATE 1 DOSE: 2.5; .5 SOLUTION RESPIRATORY (INHALATION) at 09:19

## 2023-09-18 RX ADMIN — HYDROMORPHONE HYDROCHLORIDE 0.5 MG: 1 INJECTION, SOLUTION INTRAMUSCULAR; INTRAVENOUS; SUBCUTANEOUS at 22:00

## 2023-09-18 RX ADMIN — IPRATROPIUM BROMIDE AND ALBUTEROL SULFATE 1 DOSE: 2.5; .5 SOLUTION RESPIRATORY (INHALATION) at 12:41

## 2023-09-18 RX ADMIN — METFORMIN HYDROCHLORIDE 500 MG: 500 TABLET ORAL at 17:50

## 2023-09-18 RX ADMIN — HYDROCODONE BITARTRATE AND ACETAMINOPHEN 1 TABLET: 5; 325 TABLET ORAL at 17:41

## 2023-09-18 RX ADMIN — MORPHINE SULFATE 2 MG: 2 INJECTION, SOLUTION INTRAMUSCULAR; INTRAVENOUS at 10:16

## 2023-09-18 RX ADMIN — RIVAROXABAN 20 MG: 20 TABLET, FILM COATED ORAL at 17:41

## 2023-09-18 RX ADMIN — KETOROLAC TROMETHAMINE 30 MG: 30 INJECTION, SOLUTION INTRAMUSCULAR at 10:16

## 2023-09-18 RX ADMIN — PREDNISONE 60 MG: 20 TABLET ORAL at 12:38

## 2023-09-18 RX ADMIN — SODIUM CHLORIDE, PRESERVATIVE FREE 10 ML: 5 INJECTION INTRAVENOUS at 22:06

## 2023-09-18 RX ADMIN — IOPAMIDOL 100 ML: 755 INJECTION, SOLUTION INTRAVENOUS at 09:18

## 2023-09-18 RX ADMIN — IPRATROPIUM BROMIDE AND ALBUTEROL SULFATE 1 DOSE: 2.5; .5 SOLUTION RESPIRATORY (INHALATION) at 20:31

## 2023-09-18 RX ADMIN — TRAZODONE HYDROCHLORIDE 50 MG: 50 TABLET ORAL at 22:06

## 2023-09-18 RX ADMIN — METHYLPREDNISOLONE SODIUM SUCCINATE 40 MG: 40 INJECTION, POWDER, FOR SOLUTION INTRAMUSCULAR; INTRAVENOUS at 23:21

## 2023-09-18 RX ADMIN — METHYLPREDNISOLONE SODIUM SUCCINATE 40 MG: 40 INJECTION, POWDER, FOR SOLUTION INTRAMUSCULAR; INTRAVENOUS at 17:41

## 2023-09-18 ASSESSMENT — PAIN - FUNCTIONAL ASSESSMENT
PAIN_FUNCTIONAL_ASSESSMENT: 0-10
PAIN_FUNCTIONAL_ASSESSMENT: ACTIVITIES ARE NOT PREVENTED

## 2023-09-18 ASSESSMENT — PAIN SCALES - GENERAL
PAINLEVEL_OUTOF10: 9
PAINLEVEL_OUTOF10: 4
PAINLEVEL_OUTOF10: 4
PAINLEVEL_OUTOF10: 7

## 2023-09-18 ASSESSMENT — PAIN DESCRIPTION - PAIN TYPE: TYPE: ACUTE PAIN

## 2023-09-18 ASSESSMENT — PAIN DESCRIPTION - ORIENTATION
ORIENTATION: ANTERIOR;MID;LOWER
ORIENTATION: MID

## 2023-09-18 ASSESSMENT — PAIN DESCRIPTION - DESCRIPTORS
DESCRIPTORS: PRESSURE
DESCRIPTORS: ACHING

## 2023-09-18 ASSESSMENT — LIFESTYLE VARIABLES
HOW MANY STANDARD DRINKS CONTAINING ALCOHOL DO YOU HAVE ON A TYPICAL DAY: 1 OR 2
HOW OFTEN DO YOU HAVE A DRINK CONTAINING ALCOHOL: MONTHLY OR LESS

## 2023-09-18 ASSESSMENT — PAIN DESCRIPTION - LOCATION
LOCATION: CHEST

## 2023-09-18 NOTE — CARE COORDINATION
09/18/23 1259   Service Assessment   Patient Orientation Alert and Oriented   Cognition Alert   History Provided By Patient   Primary Caregiver Self   Accompanied By/Relationship Pt alone during interview. Support Systems Family Members  (Scooter Stock)   [address] is: Legal Next of [address] [place] [address]   PCP Verified by CM Yes  Janice Wagoner - seen 2 mos ago.)   Prior Functional Level Independent in ADLs/IADLs   Current Functional Level Independent in ADLs/IADLs   Can patient return to prior living arrangement Yes   Ability to make needs known: Good   Family able to assist with home care needs: Yes   Would you like for me to discuss the discharge plan with any other family members/significant others, and if so, who? Yes  (Cousin Marquita Davila)   Financial Resources [employer]   Community Resources None   Social/Functional History   Lives With Alone   Type of Home Homeless  (Lives in a hotel.)   Home Layout One level   Home Access Level entry  ([address]st [address] [facility] [address]   [address] [place] [address] Help From Friend(s); Family; Neighbor   ADL Assistance Independent   Homemaking Assistance Independent   Homemaking Responsibilities Yes   Ambulation Assistance Independent   Transfer Assistance Independent   Active  Yes   Mode of Transportation Car   Occupation Full time employment   Discharge Planning   Type of Residence Other (Comment)  [facility])   [address] Prior To Admission None   Potential Assistance Needed N/A   DME Ordered? No   Potential Assistance Purchasing Medications No   Type of Home Care Services None   Patient expects to be discharged to: Other (comment)  (Hotel)   One/Two Story Residence One story   History of falls?  0   Services At/After Discharge   Transition of Care Consult (CM Consult) Discharge Planning   Services [address] [facility] Discharge None   [employer]

## 2023-09-18 NOTE — ED TRIAGE NOTES
[facility] stated that pt called for increasing CP; 324 ASA administered by EMS; pt stated that he has a blood clot to his LLE and is on xarelto

## 2023-09-18 NOTE — ED PROVIDER NOTES
medications:  Medications   morphine (PF) injection 2 mg (2 mg IntraVENous Given 9/18/23 1016)   ketorolac (TORADOL) injection 30 mg (30 mg IntraVENous Given 9/18/23 1016)   ipratropium 0.5 mg-albuterol 2.5 mg (DUONEB) nebulizer solution 1 Dose (1 Dose Inhalation Given 9/18/23 0919)   iopamidol (ISOVUE-370) 76 % injection 100 mL (100 mLs IntraVENous Given 9/18/23 0918)       CONSULTS: (Who and What was discussed)  None     Social Determinants affecting Dx or Tx: None    PROCEDURES   Unless otherwise noted above, none  Procedures      CRITICAL CARE TIME   Patient does not meet Critical Care Time, 0 minutes    ED FINAL IMPRESSION     1. COPD exacerbation ([address])    2. Abnormal CT scan, chest    3. Stable angina pectoris ([address])          DISPOSITION/PLAN   DISPOSITION      Admit Note: Pt is being admitted by medicine. The results of their tests and reason(s) for their admission have been discussed with pt and/or available family. They convey agreement and understanding for the need to be admitted and for the admission diagnosis. PATIENT REFERRED TO:  No follow-up provider specified.       DISCHARGE MEDICATIONS:     Medication List        ASK your doctor about these medications      aspirin 81 MG chewable tablet     atorvastatin 40 MG tablet  Commonly known as: LIPITOR     bisacodyl 5 MG EC tablet  Commonly known as: DULCOLAX     bumetanide 2 MG tablet  Commonly known as: BUMEX     clopidogrel 75 MG tablet  Commonly known as: PLAVIX     digoxin 125 MCG tablet  Commonly known as: LANOXIN     guaiFENesin 600 MG extended release tablet  Commonly known as: MUCINEX     insulin glargine 100 UNIT/ML injection vial  Commonly known as: LANTUS     ipratropium 0.5 mg-albuterol 2.5 mg 0.5-2.5 (3) MG/3ML Soln nebulizer solution  Commonly known as: DUONEB     levothyroxine 200 MCG tablet  Commonly known as: SYNTHROID     metFORMIN 500 MG tablet  Commonly known as: GLUCOPHAGE     metoprolol succinate 25 MG extended release

## 2023-09-19 ENCOUNTER — APPOINTMENT (OUTPATIENT)
Facility: HOSPITAL | Age: 48
DRG: 140 | End: 2023-09-19
Attending: INTERNAL MEDICINE
Payer: MEDICAID

## 2023-09-19 LAB
ANION GAP SERPL CALC-SCNC: 6 MMOL/L (ref 5–15)
BUN SERPL-MCNC: 17 MG/DL (ref 6–20)
BUN/CREAT SERPL: 14 (ref 12–20)
CA-I BLD-MCNC: 9 MG/DL (ref 8.5–10.1)
CHLORIDE SERPL-SCNC: 105 MMOL/L (ref 97–108)
CO2 SERPL-SCNC: 26 MMOL/L (ref 21–32)
CREAT SERPL-MCNC: 1.22 MG/DL (ref 0.7–1.3)
ECHO AO ASC DIAM: 3.6 CM
ECHO AO ASCENDING AORTA INDEX: 1.31 CM/M2
ECHO AO ROOT DIAM: 3.6 CM
ECHO AO ROOT INDEX: 1.31 CM/M2
ECHO AV AREA PEAK VELOCITY: 3.3 CM2
ECHO AV AREA VTI: 3.2 CM2
ECHO AV AREA/BSA PEAK VELOCITY: 1.2 CM2/M2
ECHO AV AREA/BSA VTI: 1.2 CM2/M2
ECHO AV CUSP MM: 3.1 CM
ECHO AV MEAN GRADIENT: 6 MMHG
ECHO AV MEAN VELOCITY: 1.1 M/S
ECHO AV PEAK GRADIENT: 10 MMHG
ECHO AV PEAK VELOCITY: 1.6 M/S
ECHO AV VELOCITY RATIO: 0.88
ECHO AV VTI: 27.7 CM
ECHO BSA: 2.83 M2
ECHO LA AREA 2C: 19.3 CM2
ECHO LA AREA 4C: 19.1 CM2
ECHO LA DIAMETER INDEX: 1.64 CM/M2
ECHO LA DIAMETER: 4.5 CM
ECHO LA MAJOR AXIS: 5.3 CM
ECHO LA MINOR AXIS: 5.6 CM
ECHO LA TO AORTIC ROOT RATIO: 1.25
ECHO LA VOL 2C: 55 ML (ref 18–58)
ECHO LA VOL 4C: 56 ML (ref 18–58)
ECHO LA VOL BP: 57 ML (ref 18–58)
ECHO LA VOL/BSA BIPLANE: 21 ML/M2 (ref 16–34)
ECHO LA VOLUME INDEX A2C: 20 ML/M2 (ref 16–34)
ECHO LA VOLUME INDEX A4C: 20 ML/M2 (ref 16–34)
ECHO LV E' LATERAL VELOCITY: 8 CM/S
ECHO LV E' SEPTAL VELOCITY: 16 CM/S
ECHO LV EDV A2C: 96 ML
ECHO LV EDV A4C: 126 ML
ECHO LV EDV INDEX A4C: 46 ML/M2
ECHO LV EDV NDEX A2C: 35 ML/M2
ECHO LV EJECTION FRACTION A2C: 68 %
ECHO LV EJECTION FRACTION A4C: 55 %
ECHO LV EJECTION FRACTION BIPLANE: 60 % (ref 55–100)
ECHO LV ESV A2C: 30 ML
ECHO LV ESV A4C: 57 ML
ECHO LV ESV INDEX A2C: 11 ML/M2
ECHO LV ESV INDEX A4C: 21 ML/M2
ECHO LV FRACTIONAL SHORTENING: 40 % (ref 28–44)
ECHO LV INTERNAL DIMENSION DIASTOLE INDEX: 2.01 CM/M2
ECHO LV INTERNAL DIMENSION DIASTOLIC MMODE: 7.4 CM (ref 4.2–5.9)
ECHO LV INTERNAL DIMENSION DIASTOLIC: 5.5 CM (ref 4.2–5.9)
ECHO LV INTERNAL DIMENSION SYSTOLIC INDEX: 1.2 CM/M2
ECHO LV INTERNAL DIMENSION SYSTOLIC MMODE: 4.3 CM
ECHO LV INTERNAL DIMENSION SYSTOLIC: 3.3 CM
ECHO LV IVSD MMODE: 1.1 CM (ref 0.6–1)
ECHO LV IVSD: 1.4 CM (ref 0.6–1)
ECHO LV MASS 2D: 355.3 G (ref 88–224)
ECHO LV MASS INDEX 2D: 129.7 G/M2 (ref 49–115)
ECHO LV POSTERIOR WALL DIASTOLIC MMODE: 1.3 CM (ref 0.6–1)
ECHO LV POSTERIOR WALL DIASTOLIC: 1.5 CM (ref 0.6–1)
ECHO LV RELATIVE WALL THICKNESS RATIO: 0.55
ECHO LVOT AREA: 3.8 CM2
ECHO LVOT AV VTI INDEX: 0.83
ECHO LVOT DIAM: 2.2 CM
ECHO LVOT MEAN GRADIENT: 4 MMHG
ECHO LVOT PEAK GRADIENT: 7 MMHG
ECHO LVOT PEAK VELOCITY: 1.4 M/S
ECHO LVOT STROKE VOLUME INDEX: 31.9 ML/M2
ECHO LVOT SV: 87.4 ML
ECHO LVOT VTI: 23 CM
ECHO MV A VELOCITY: 0.99 M/S
ECHO MV E DECELERATION TIME (DT): 140 MS
ECHO MV E VELOCITY: 1 M/S
ECHO MV E/A RATIO: 1.01
ECHO MV E/E' LATERAL: 12.5
ECHO MV E/E' RATIO (AVERAGED): 9.38
ECHO MV E/E' SEPTAL: 6.25
ECHO PV AREA CONTINUITY EQ VELOCITY: 5.1 CM2
ECHO PV MAX VELOCITY: 1.4 M/S
ECHO PV PEAK GRADIENT: 8 MMHG
ECHO QP:QS RATIO: 1.7 NO UNITS
ECHO RA AREA 4C: 18.1 CM2
ECHO RA END SYSTOLIC VOLUME APICAL 4 CHAMBER INDEX BSA: 16 ML/M2
ECHO RA VOLUME: 45 ML
ECHO RV BASAL DIMENSION: 3.9 CM
ECHO RV LONGITUDINAL DIMENSION: 9.2 CM
ECHO RV MID DIMENSION: 3.8 CM
ECHO RV TAPSE: 2.7 CM (ref 1.7–?)
ECHO RVOT AREA: 5.7 CM2
ECHO RVOT DIAMETER: 2.7 CM
ECHO RVOT MEAN GRADIENT: 3 MMHG
ECHO RVOT PEAK GRADIENT: 6 MMHG
ECHO RVOT PEAK VELOCITY: 1.3 M/S
ECHO RVOT STROKE VOLUME: 148.8 ML
ECHO RVOT VTI: 26 CM
GLUCOSE BLD STRIP.AUTO-MCNC: 238 MG/DL (ref 65–100)
GLUCOSE BLD STRIP.AUTO-MCNC: 252 MG/DL (ref 65–100)
GLUCOSE BLD STRIP.AUTO-MCNC: 304 MG/DL (ref 65–100)
GLUCOSE BLD STRIP.AUTO-MCNC: 312 MG/DL (ref 65–100)
GLUCOSE BLD STRIP.AUTO-MCNC: 314 MG/DL (ref 65–100)
GLUCOSE BLD STRIP.AUTO-MCNC: 338 MG/DL (ref 65–100)
GLUCOSE SERPL-MCNC: 302 MG/DL (ref 65–100)
PERFORMED BY:: ABNORMAL
POTASSIUM SERPL-SCNC: 4.3 MMOL/L (ref 3.5–5.1)
SODIUM SERPL-SCNC: 137 MMOL/L (ref 136–145)

## 2023-09-19 PROCEDURE — C8929 TTE W OR WO FOL WCON,DOPPLER: HCPCS

## 2023-09-19 PROCEDURE — 2580000003 HC RX 258: Performed by: INTERNAL MEDICINE

## 2023-09-19 PROCEDURE — 94761 N-INVAS EAR/PLS OXIMETRY MLT: CPT

## 2023-09-19 PROCEDURE — 36415 COLL VENOUS BLD VENIPUNCTURE: CPT

## 2023-09-19 PROCEDURE — 94640 AIRWAY INHALATION TREATMENT: CPT

## 2023-09-19 PROCEDURE — 2500000003 HC RX 250 WO HCPCS: Performed by: INTERNAL MEDICINE

## 2023-09-19 PROCEDURE — 80048 BASIC METABOLIC PNL TOTAL CA: CPT

## 2023-09-19 PROCEDURE — 82962 GLUCOSE BLOOD TEST: CPT

## 2023-09-19 PROCEDURE — 1100000000 HC RM PRIVATE

## 2023-09-19 PROCEDURE — 6360000004 HC RX CONTRAST MEDICATION

## 2023-09-19 PROCEDURE — 6360000002 HC RX W HCPCS: Performed by: INTERNAL MEDICINE

## 2023-09-19 PROCEDURE — 6370000000 HC RX 637 (ALT 250 FOR IP): Performed by: INTERNAL MEDICINE

## 2023-09-19 RX ORDER — INSULIN GLARGINE 100 [IU]/ML
20 INJECTION, SOLUTION SUBCUTANEOUS DAILY
Status: DISCONTINUED | OUTPATIENT
Start: 2023-09-19 | End: 2023-09-20 | Stop reason: HOSPADM

## 2023-09-19 RX ORDER — INSULIN LISPRO 100 [IU]/ML
5 INJECTION, SOLUTION INTRAVENOUS; SUBCUTANEOUS ONCE
Status: COMPLETED | OUTPATIENT
Start: 2023-09-19 | End: 2023-09-19

## 2023-09-19 RX ORDER — DILTIAZEM HYDROCHLORIDE 60 MG/1
60 CAPSULE, EXTENDED RELEASE ORAL 2 TIMES DAILY
Status: DISCONTINUED | OUTPATIENT
Start: 2023-09-19 | End: 2023-09-20 | Stop reason: HOSPADM

## 2023-09-19 RX ADMIN — METHYLPREDNISOLONE SODIUM SUCCINATE 40 MG: 40 INJECTION, POWDER, FOR SOLUTION INTRAMUSCULAR; INTRAVENOUS at 16:54

## 2023-09-19 RX ADMIN — METHYLPREDNISOLONE SODIUM SUCCINATE 40 MG: 40 INJECTION, POWDER, FOR SOLUTION INTRAMUSCULAR; INTRAVENOUS at 22:57

## 2023-09-19 RX ADMIN — IPRATROPIUM BROMIDE AND ALBUTEROL SULFATE 1 DOSE: 2.5; .5 SOLUTION RESPIRATORY (INHALATION) at 09:57

## 2023-09-19 RX ADMIN — METHYLPREDNISOLONE SODIUM SUCCINATE 40 MG: 40 INJECTION, POWDER, FOR SOLUTION INTRAMUSCULAR; INTRAVENOUS at 11:05

## 2023-09-19 RX ADMIN — DILTIAZEM HYDROCHLORIDE 60 MG: 60 CAPSULE, EXTENDED RELEASE ORAL at 16:53

## 2023-09-19 RX ADMIN — HYDROMORPHONE HYDROCHLORIDE 0.5 MG: 1 INJECTION, SOLUTION INTRAMUSCULAR; INTRAVENOUS; SUBCUTANEOUS at 13:43

## 2023-09-19 RX ADMIN — INSULIN LISPRO 2 UNITS: 100 INJECTION, SOLUTION INTRAVENOUS; SUBCUTANEOUS at 08:05

## 2023-09-19 RX ADMIN — SODIUM CHLORIDE, PRESERVATIVE FREE 10 ML: 5 INJECTION INTRAVENOUS at 22:06

## 2023-09-19 RX ADMIN — INSULIN LISPRO 4 UNITS: 100 INJECTION, SOLUTION INTRAVENOUS; SUBCUTANEOUS at 20:55

## 2023-09-19 RX ADMIN — HYDROMORPHONE HYDROCHLORIDE 0.5 MG: 1 INJECTION, SOLUTION INTRAMUSCULAR; INTRAVENOUS; SUBCUTANEOUS at 18:03

## 2023-09-19 RX ADMIN — IPRATROPIUM BROMIDE AND ALBUTEROL SULFATE 1 DOSE: 2.5; .5 SOLUTION RESPIRATORY (INHALATION) at 13:15

## 2023-09-19 RX ADMIN — LEVOTHYROXINE SODIUM 200 MCG: 0.1 TABLET ORAL at 05:56

## 2023-09-19 RX ADMIN — INSULIN LISPRO 5 UNITS: 100 INJECTION, SOLUTION INTRAVENOUS; SUBCUTANEOUS at 01:59

## 2023-09-19 RX ADMIN — METOPROLOL SUCCINATE 25 MG: 25 TABLET, EXTENDED RELEASE ORAL at 08:05

## 2023-09-19 RX ADMIN — RIVAROXABAN 20 MG: 20 TABLET, FILM COATED ORAL at 16:53

## 2023-09-19 RX ADMIN — INSULIN LISPRO 6 UNITS: 100 INJECTION, SOLUTION INTRAVENOUS; SUBCUTANEOUS at 16:54

## 2023-09-19 RX ADMIN — INSULIN LISPRO 4 UNITS: 100 INJECTION, SOLUTION INTRAVENOUS; SUBCUTANEOUS at 11:57

## 2023-09-19 RX ADMIN — HYDROMORPHONE HYDROCHLORIDE 0.5 MG: 1 INJECTION, SOLUTION INTRAMUSCULAR; INTRAVENOUS; SUBCUTANEOUS at 22:05

## 2023-09-19 RX ADMIN — PERFLUTREN 1 ML: 6.52 INJECTION, SUSPENSION INTRAVENOUS at 11:35

## 2023-09-19 RX ADMIN — METHYLPREDNISOLONE SODIUM SUCCINATE 40 MG: 40 INJECTION, POWDER, FOR SOLUTION INTRAMUSCULAR; INTRAVENOUS at 05:56

## 2023-09-19 RX ADMIN — HYDROMORPHONE HYDROCHLORIDE 0.5 MG: 1 INJECTION, SOLUTION INTRAMUSCULAR; INTRAVENOUS; SUBCUTANEOUS at 09:47

## 2023-09-19 RX ADMIN — SODIUM CHLORIDE, PRESERVATIVE FREE 10 ML: 5 INJECTION INTRAVENOUS at 08:05

## 2023-09-19 RX ADMIN — TRAZODONE HYDROCHLORIDE 50 MG: 50 TABLET ORAL at 22:05

## 2023-09-19 RX ADMIN — PANTOPRAZOLE SODIUM 40 MG: 40 TABLET, DELAYED RELEASE ORAL at 05:56

## 2023-09-19 RX ADMIN — INSULIN LISPRO 5 UNITS: 100 INJECTION, SOLUTION INTRAVENOUS; SUBCUTANEOUS at 06:13

## 2023-09-19 RX ADMIN — ACETAMINOPHEN 650 MG: 325 TABLET ORAL at 20:03

## 2023-09-19 RX ADMIN — ACETAMINOPHEN 650 MG: 325 TABLET ORAL at 08:05

## 2023-09-19 RX ADMIN — INSULIN GLARGINE 20 UNITS: 100 INJECTION, SOLUTION SUBCUTANEOUS at 08:12

## 2023-09-19 RX ADMIN — HYDROMORPHONE HYDROCHLORIDE 0.5 MG: 1 INJECTION, SOLUTION INTRAMUSCULAR; INTRAVENOUS; SUBCUTANEOUS at 05:56

## 2023-09-19 RX ADMIN — HYDROMORPHONE HYDROCHLORIDE 0.5 MG: 1 INJECTION, SOLUTION INTRAMUSCULAR; INTRAVENOUS; SUBCUTANEOUS at 01:59

## 2023-09-19 ASSESSMENT — PAIN SCALES - GENERAL
PAINLEVEL_OUTOF10: 7
PAINLEVEL_OUTOF10: 3
PAINLEVEL_OUTOF10: 7
PAINLEVEL_OUTOF10: 2
PAINLEVEL_OUTOF10: 3
PAINLEVEL_OUTOF10: 7
PAINLEVEL_OUTOF10: 7
PAINLEVEL_OUTOF10: 3
PAINLEVEL_OUTOF10: 7

## 2023-09-19 ASSESSMENT — PAIN DESCRIPTION - ORIENTATION
ORIENTATION: ANTERIOR;MID;LOWER

## 2023-09-19 ASSESSMENT — PAIN DESCRIPTION - DESCRIPTORS
DESCRIPTORS: TIGHTNESS
DESCRIPTORS: TIGHTNESS
DESCRIPTORS: PRESSURE
DESCRIPTORS: TIGHTNESS
DESCRIPTORS: TIGHTNESS
DESCRIPTORS: PRESSURE
DESCRIPTORS: TIGHTNESS

## 2023-09-19 ASSESSMENT — PAIN - FUNCTIONAL ASSESSMENT
PAIN_FUNCTIONAL_ASSESSMENT: ACTIVITIES ARE NOT PREVENTED

## 2023-09-19 ASSESSMENT — PAIN DESCRIPTION - LOCATION
LOCATION: CHEST

## 2023-09-19 NOTE — CONSULTS
Cardiology Consult    NAME: Savage Ortiz   :  1975   MRN:  [id number]     Date/Time:  2023 11:50 AM    Patient PCP: Tram Palomino MD  ________________________________________________________________________    Problem List  -Status post PFO close per interatrial septum closing device probably Amplatz.  -Admitted to the hospital with chest pain and shortness of breath  -Central obesity  -COPD  -Obstructive sleep apnea  -Diabetes mellitus  -Hypertension  -Left lower extremity DVT on Xarelto         Assessment and Plan:   59-year-old white male with no known established coronary artery disease per cardiac catheterization in  prior to Amplatz device closure for PFO admitted to the hospital with chest pain and shortness of breath. -EKG shows no acute changes and cardiac enzyme has been unremarkable.  -When I saw the patient he was sitting up in the bed denies any symptoms and wants to go home.  -Echocardiogram shows normal ejection fraction of 60% and well-seated Amplatz device across interatrial septum. -Multiple coronary artery disease risk factors but patient do not want to stay in the hospital for a possible stress test when discussed with him in detail and would like to proceed with further evaluation for coronary artery disease as an outpatient.  -Based on my overall cardiovascular assessment patient has no acute cardiac issue at this time and can be discharged to follow-up with primary cardiologist in 2 to 4 weeks or earlier as needed. Thank you for the consultation and letting me participate in the care of your patient. []        High complexity decision making was performed        Subjective:   HISTORY OF PRESENT ILLNESS:     Savage Ortiz is a 49 y.o. White (non-) male who has no known established coronary artery disease per cardiac catheterization 2 years ago admitted to the hospital with chest pain and shortness of breath.   Patient heart history
now resolved with Solu-Medrol 40 mg IV every 6 hours and DuoNeb nebulizers every 4 hours  -Saturating well on room air, no further wheezing  -Patient is cleared for discharge from a pulmonary standpoint  -He will need to see Dr. Josefina Fu in the outpatient setting soon following discharge  -Patient needs a prescription for Spiriva Respimat at discharge, states that his last inhaler ran out several days ago  -Would recommend prednisone 40 mg daily x5 days at discharge along with azithromycin 250 mg daily x5 days    2.)  Hyperglycemia in the setting of insulin-dependent type 2 diabetes mellitus  -Blood sugars in the 300s on arrival, further glycemic control per the primary team  -Continue long-acting/short acting insulin, A1c pending    3.)  Obstructive sleep apnea  -States that his BiPAP was taken away from him for poor compliance about 1 month ago, needs to get set up with BiPAP therapy again  -Patient is supposed to be on BiPAP 19/15 cm H2O  -We will get the patient set up with a follow-up in our clinic with Dr. Josefina Fu within the next 4 to 6 weeks    4.)  Left lower extremity superficial vein thrombosis  -Diagnosed July 2023, started on Xarelto  -Confirmed again on repeat lower extremity venous Doppler in August  -Typically you do not anticoagulate superficial vein thromboses, and consider outpatient hematology evaluation given that has not resolved despite 1 month of anticoagulation  -? Compliance    5.)  Morbid obesity  -BMI 40.2, due to excess calories  -Weight loss recommended   -SAM management as above    6.)  Pulmonary nodules  -Recent CTA chest from 9/18/2023 demonstrating stable 3 mm nodule in the right lower lobe and 5 mm nodule in the lingula. -Can discuss further with Dr. Josefina Fu in the outpatient setting, but can consider repeat CT imaging in 1 year for nodules        CODE STATUS: Full Code       Disposition and Family: Stable to transfer to floor.     Total time spent with patient: 55 minutes      Raiza Blanco

## 2023-09-20 VITALS
BODY MASS INDEX: 38.36 KG/M2 | HEART RATE: 94 BPM | WEIGHT: 315 LBS | SYSTOLIC BLOOD PRESSURE: 171 MMHG | TEMPERATURE: 97.7 F | OXYGEN SATURATION: 98 % | RESPIRATION RATE: 17 BRPM | HEIGHT: 76 IN | DIASTOLIC BLOOD PRESSURE: 111 MMHG

## 2023-09-20 LAB
ALBUMIN SERPL-MCNC: 3.6 G/DL (ref 3.5–5)
ANION GAP SERPL CALC-SCNC: 5 MMOL/L (ref 5–15)
BNP SERPL-MCNC: 268 PG/ML
BUN SERPL-MCNC: 17 MG/DL (ref 6–20)
BUN/CREAT SERPL: 16 (ref 12–20)
CA-I BLD-MCNC: 9.1 MG/DL (ref 8.5–10.1)
CHLORIDE SERPL-SCNC: 104 MMOL/L (ref 97–108)
CO2 SERPL-SCNC: 28 MMOL/L (ref 21–32)
CREAT SERPL-MCNC: 1.05 MG/DL (ref 0.7–1.3)
ERYTHROCYTE [DISTWIDTH] IN BLOOD BY AUTOMATED COUNT: 13.9 % (ref 11.5–14.5)
EST. AVERAGE GLUCOSE BLD GHB EST-MCNC: 140 MG/DL
GLUCOSE BLD STRIP.AUTO-MCNC: 184 MG/DL (ref 65–100)
GLUCOSE SERPL-MCNC: 219 MG/DL (ref 65–100)
HBA1C MFR BLD: 6.5 % (ref 4–5.6)
HCT VFR BLD AUTO: 38.4 % (ref 36.6–50.3)
HGB BLD-MCNC: 12.5 G/DL (ref 12.1–17)
M PNEUMO IGM SER IA-ACNC: NONREACTIVE
MAGNESIUM SERPL-MCNC: 2.2 MG/DL (ref 1.6–2.4)
MCH RBC QN AUTO: 31.8 PG (ref 26–34)
MCHC RBC AUTO-ENTMCNC: 32.6 G/DL (ref 30–36.5)
MCV RBC AUTO: 97.7 FL (ref 80–99)
NRBC # BLD: 0 K/UL (ref 0–0.01)
NRBC BLD-RTO: 0 PER 100 WBC
PERFORMED BY:: ABNORMAL
PHOSPHATE SERPL-MCNC: 3.5 MG/DL (ref 2.6–4.7)
PLATELET # BLD AUTO: 208 K/UL (ref 150–400)
PMV BLD AUTO: 10.1 FL (ref 8.9–12.9)
POTASSIUM SERPL-SCNC: 4.4 MMOL/L (ref 3.5–5.1)
RBC # BLD AUTO: 3.93 M/UL (ref 4.1–5.7)
SODIUM SERPL-SCNC: 137 MMOL/L (ref 136–145)
WBC # BLD AUTO: 14.2 K/UL (ref 4.1–11.1)

## 2023-09-20 PROCEDURE — 86738 MYCOPLASMA ANTIBODY: CPT

## 2023-09-20 PROCEDURE — 83735 ASSAY OF MAGNESIUM: CPT

## 2023-09-20 PROCEDURE — 83036 HEMOGLOBIN GLYCOSYLATED A1C: CPT

## 2023-09-20 PROCEDURE — 6370000000 HC RX 637 (ALT 250 FOR IP): Performed by: INTERNAL MEDICINE

## 2023-09-20 PROCEDURE — 36415 COLL VENOUS BLD VENIPUNCTURE: CPT

## 2023-09-20 PROCEDURE — 6360000002 HC RX W HCPCS: Performed by: INTERNAL MEDICINE

## 2023-09-20 PROCEDURE — 2580000003 HC RX 258: Performed by: INTERNAL MEDICINE

## 2023-09-20 PROCEDURE — 83880 ASSAY OF NATRIURETIC PEPTIDE: CPT

## 2023-09-20 PROCEDURE — 85027 COMPLETE CBC AUTOMATED: CPT

## 2023-09-20 PROCEDURE — 82962 GLUCOSE BLOOD TEST: CPT

## 2023-09-20 PROCEDURE — 2500000003 HC RX 250 WO HCPCS: Performed by: INTERNAL MEDICINE

## 2023-09-20 PROCEDURE — 80069 RENAL FUNCTION PANEL: CPT

## 2023-09-20 RX ORDER — DILTIAZEM HYDROCHLORIDE 60 MG/1
60 CAPSULE, EXTENDED RELEASE ORAL 2 TIMES DAILY
Qty: 60 CAPSULE | Refills: 3 | Status: SHIPPED | OUTPATIENT
Start: 2023-09-20

## 2023-09-20 RX ORDER — POLYETHYLENE GLYCOL 3350 17 G/17G
17 POWDER, FOR SOLUTION ORAL DAILY PRN
Qty: 30 PACKET | Refills: 0 | Status: SHIPPED | OUTPATIENT
Start: 2023-09-20 | End: 2023-10-20

## 2023-09-20 RX ORDER — INSULIN LISPRO 100 [IU]/ML
0-8 INJECTION, SOLUTION INTRAVENOUS; SUBCUTANEOUS
Qty: 10 ML | Refills: 0 | Status: SHIPPED | OUTPATIENT
Start: 2023-09-20 | End: 2023-09-20 | Stop reason: SDUPTHER

## 2023-09-20 RX ORDER — INSULIN LISPRO 100 [IU]/ML
0-8 INJECTION, SOLUTION INTRAVENOUS; SUBCUTANEOUS
Qty: 10 ML | Refills: 0 | Status: SHIPPED | OUTPATIENT
Start: 2023-09-20

## 2023-09-20 RX ORDER — METOPROLOL SUCCINATE 25 MG/1
25 TABLET, EXTENDED RELEASE ORAL DAILY
Qty: 30 TABLET | Refills: 3 | Status: SHIPPED | OUTPATIENT
Start: 2023-09-21

## 2023-09-20 RX ADMIN — SODIUM CHLORIDE, PRESERVATIVE FREE 10 ML: 5 INJECTION INTRAVENOUS at 08:15

## 2023-09-20 RX ADMIN — DILTIAZEM HYDROCHLORIDE 60 MG: 60 CAPSULE, EXTENDED RELEASE ORAL at 08:14

## 2023-09-20 RX ADMIN — INSULIN GLARGINE 20 UNITS: 100 INJECTION, SOLUTION SUBCUTANEOUS at 08:05

## 2023-09-20 RX ADMIN — HYDROMORPHONE HYDROCHLORIDE 0.5 MG: 1 INJECTION, SOLUTION INTRAMUSCULAR; INTRAVENOUS; SUBCUTANEOUS at 08:10

## 2023-09-20 RX ADMIN — METOPROLOL SUCCINATE 25 MG: 25 TABLET, EXTENDED RELEASE ORAL at 08:04

## 2023-09-20 RX ADMIN — LEVOTHYROXINE SODIUM 200 MCG: 0.1 TABLET ORAL at 08:04

## 2023-09-20 RX ADMIN — PANTOPRAZOLE SODIUM 40 MG: 40 TABLET, DELAYED RELEASE ORAL at 08:04

## 2023-09-20 RX ADMIN — HYDROMORPHONE HYDROCHLORIDE 0.5 MG: 1 INJECTION, SOLUTION INTRAMUSCULAR; INTRAVENOUS; SUBCUTANEOUS at 02:05

## 2023-09-20 ASSESSMENT — PAIN SCALES - GENERAL
PAINLEVEL_OUTOF10: 7

## 2023-09-20 ASSESSMENT — PAIN DESCRIPTION - DESCRIPTORS: DESCRIPTORS: TIGHTNESS

## 2023-09-20 ASSESSMENT — PAIN DESCRIPTION - LOCATION
LOCATION: CHEST
LOCATION: ABDOMEN;CHEST
LOCATION: CHEST

## 2023-09-20 ASSESSMENT — PAIN - FUNCTIONAL ASSESSMENT: PAIN_FUNCTIONAL_ASSESSMENT: ACTIVITIES ARE NOT PREVENTED

## 2023-09-20 NOTE — PROGRESS NOTES
[address] and set up  Taxi for patient to go [facility] in [facility] to  his car. Reference #[id number]. [phone]
4 Eyes Skin Assessment     NAME:  Daryl George  YOB: 1975  MEDICAL RECORD NUMBER:  [id number]    The patient is being assessed for  Admission    I agree that at least one RN has performed a thorough Head to Toe Skin Assessment on the patient. ALL assessment sites listed below have been assessed. Areas assessed by both nurses:    Head, Face, Ears, Shoulders, Back, Chest, Arms, Elbows, Hands, Sacrum. Buttock, Coccyx, Ischium, Legs. Feet and Heels, and Under Medical Devices         Does the Patient have a Wound?  No noted wound(s)       Yoan Prevention initiated by RN: Yes  Wound Care Orders initiated by RN: NO  Pressure Injury (Stage 3,4, Unstageable, DTI, NWPT, and Complex wounds) if present, place Wound referral order by RN under : No    New Ostomies, if present place, Ostomy referral order under : No     Nurse 1 eSignature: Electronically signed by Nicole Xavier RN on 9/18/23 at 5:28 PM EDT    **SHARE this note so that the co-signing nurse can place an eSignature**    Nurse 2 eSignature: Electronically signed by Jolly Stanford RN on 9/18/23 at 5:29 PM EDT
Dr. Floyd Batres notified of patient sustained heart rate in the 120s with accompanying chest tightness; orders received to discontinue duonebs and contact cardiologist for any additional orders
MD Kia Redding aware patient is on unit verbal order given for diabetic cardiac diet.
Orders received from Dr Elie Sarmiento for one dose of cardizem 60 mg now and then BID
Progress Note      9/20/2023 1:06 PM  NAME: Carl Webb   MRN:  [id number]   Admit Diagnosis: COPD exacerbation ([address]) [J44.1]  Abnormal CT scan, chest [R93.89]  COPD (chronic obstructive pulmonary disease) ([address]) [J44.9]      Problem List:   -Status post PFO close per interatrial septum closing device probably Amplatz.  -Admitted to the hospital with chest pain and shortness of breath  -Central obesity  -COPD  -Obstructive sleep apnea  -Diabetes mellitus  -Hypertension  -Left lower extremity DVT on Xarelto         Assessment/Plan:   .  9/20/2020  -Follow visit from cardiology. Patient was supposed to discharge yesterday but he started having sinus tachycardia with heart rate up to 120s responded to p.o. Cardizem. -Sinus tachycardia with patient heart rate is low 100.  -Normal ejection fraction of 60% with well-seated Amplatz device because interatrial septum as per echocardiogram as of yesterday.  -No acute event overnight.  -Continue cardiac 6 mg p.o. twice daily watch him throughout the day and if patient's heart rate is stable under 100 bpm, he can be discharged to follow-up with primary cardiologist as per my discussion with him. []       High complexity decision making was performed in this patient at high risk for decompensation with multiple organ involvement. Subjective:     Carl Webb is a 49 y.o. White (non-) male who has no known established coronary artery disease per cardiac catheterization 2 years ago admitted to the hospital with chest pain and shortness of breath. Patient heart history significant for PFO closure per Amplatzer occluder in 2022 at [facility].  Echocardiogram shows normal ejection fraction of 60% and well-seated device across the intra-atrial septum with no residual PFO. Cardiac enzyme has been unremarkable and EKG shows no acute changes.   Based on my overall cardiovascular assessment he can be discharged to follow-up
Sent message to Dr Edwin Baltazar regarding patient's heart rate; awaiting response
Spoke with provider regarding discharge medication order for insulin; provider made aware that there was no sliding scale listed on discharge instructions; provider states that the patient is to take insulin once home until he follows up with PCP; provider again made aware that there was no sliding scale for the patient to go by other than 0-8 units 3 times daily with meals; provider states that that is fine for the patient to discharge with; patient discharged with copy of sliding scale from [facility] rec
ulcers  Hematologic/Lymphatic/Immunlogic:  No jaundice nor lymph node swelling  :     Eyes:  EOMI. Anicteric sclerae, PERRL. Data Review:    Review and/or order of clinical lab test      Labs:     Recent Labs     09/18/23  0738   WBC 5.0   HGB 12.4   HCT 37.7        Recent Labs     09/18/23  0738 09/18/23  0948 09/19/23  0553     --  137   K 4.5 4.2 4.3     --  105   CO2 31  --  26   BUN 17  --  17     Recent Labs     09/18/23  0738   ALT 53   GLOB 4.0     No results for input(s): \"INR\", \"APTT\" in the last 72 hours. Invalid input(s): \"PTP\"   No results for input(s): \"TIBC\", \"FERR\" in the last 72 hours. Invalid input(s): \"FE\", \"PSAT\"   No results found for: \"FOL\", \"RBCF\"   No results for input(s): \"PH\", \"PCO2\", \"PO2\" in the last 72 hours. No results for input(s): \"CPK\" in the last 72 hours.     Invalid input(s): \"CPKMB\", \"CKNDX\", \"TROIQ\"  Lab Results   Component Value Date/Time    CHOL 166 03/01/2023 08:30 AM    HDL 39 03/01/2023 08:30 AM     No results found for: \"GLUCPOC\"  [unfilled]      Medications Reviewed:     Current Facility-Administered Medications   Medication Dose Route Frequency    insulin glargine (LANTUS) injection vial 20 Units  20 Units SubCUTAneous Daily    levothyroxine (SYNTHROID) tablet 200 mcg  200 mcg Oral Daily    [Held by provider] metFORMIN (GLUCOPHAGE) tablet 500 mg  500 mg Oral BID WC    metoprolol succinate (TOPROL XL) extended release tablet 25 mg  25 mg Oral Daily    pantoprazole (PROTONIX) tablet 40 mg  40 mg Oral QAM AC    rivaroxaban (XARELTO) tablet 20 mg  20 mg Oral Daily    sodium chloride flush 0.9 % injection 5-40 mL  5-40 mL IntraVENous 2 times per day    sodium chloride flush 0.9 % injection 5-40 mL  5-40 mL IntraVENous PRN    0.9 % sodium chloride infusion   IntraVENous PRN    ondansetron (ZOFRAN-ODT) disintegrating tablet 4 mg  4 mg Oral Q8H PRN    Or    ondansetron (ZOFRAN) injection 4 mg  4 mg IntraVENous Q6H PRN    polyethylene glycol
3.5 - 5.1 mmol/L    Chloride 104 97 - 108 mmol/L    CO2 28 21 - 32 mmol/L    Anion Gap 5 5 - 15 mmol/L    Glucose 219 (H) 65 - 100 mg/dL    BUN 17 6 - 20 mg/dL    Creatinine 1.05 0.70 - 1.30 mg/dL    Bun/Cre Ratio 16 12 - 20      Est, Glom Filt Rate >60 >60 ml/min/1.73m2    Calcium 9.1 8.5 - 10.1 mg/dL    Phosphorus 3.5 2.6 - 4.7 mg/dL    Albumin 3.6 3.5 - 5.0 g/dL   Magnesium    Collection Time: 09/20/23  6:16 AM   Result Value Ref Range    Magnesium 2.2 1.6 - 2.4 mg/dL   Brain Natriuretic Peptide    Collection Time: 09/20/23  6:16 AM   Result Value Ref Range    NT Pro- (H) <125 pg/mL   Hemoglobin A1C    Collection Time: 09/20/23  6:16 AM   Result Value Ref Range    Hemoglobin A1C 6.5 (H) 4.0 - 5.6 %    eAG 140 mg/dL   POCT Glucose    Collection Time: 09/20/23  7:35 AM   Result Value Ref Range    POC Glucose 184 (H) 65 - 100 mg/dL    Performed by: Almyra Nyhan        CTA chest (9/18/2023): FINDINGS: This is a suboptimal quality study for the evaluation of pulmonary  embolism due to relatively diminished opacification within the pulmonary  arteries compared with the systemic arteries. No pulmonary embolism is  demonstrated. .     CHEST WALL: Bilateral gynecomastia again shown. No axillary adenopathy. THYROID: Thyroidectomy. MEDIASTINUM: There is an midline nodular soft tissue opacity measuring 18 mm  compared with 15 mm. There is lobular right para-aortic and para atrial  opacification of fluid attenuation measuring up to 7.5 x 2.1 cm transverse  compared with 5.7 x 1.5 cm transverse, and 6.4 cm in craniocaudal compared with  4.2 cm craniocaudal.  JUSTUS: No mass or lymphadenopathy. THORACIC AORTA: No aneurysm. HEART: Intra-atrial surgical artifacts again shown. Cardiac size is normal. No  pericardial effusion. ESOPHAGUS: No wall thickening or dilatation. TRACHEA/BRONCHI: Patent. PLEURA: No effusion or pneumothorax. LUNGS: 4.1 x 2.1 x 3.0 cm bulla at medial margin of right middle lobe again  shown.

## 2023-09-20 NOTE — DISCHARGE SUMMARY
Discharge Summary    Leopold Gimenez  :  1975  MRN:  [id number]    ADMIT DATE:  2023  DISCHARGE DATE:  2023    PRIMARY CARE PHYSICIAN:  Eliezer Salomon MD    VISIT STATUS: Admission    CODE STATUS:  Full Code    DISCHARGE DIAGNOSES:  Principal Problem:    COPD exacerbation ([address])  Active Problems:    COPD (chronic obstructive pulmonary disease) ([address])  Resolved Problems:    * No resolved hospital problems.  *  Pericardial cyst  Obstructive sleep apnea  Morbid obesity    HOSPITAL COURSE:  Patient was admitted with a complaint of chest pain CTA of the chest was negative for PE shows pericardial cyst consultation to cardiology was made patient was seen by cardiology  Adjustments were made to his medication outpatient discharge and recommendation was made patient was also treated for obstructive sleep apnea and COPD and acute extubation with morbid obesity he has been advised to follow-up the CTA of the chest findings with his cardiology and also cardiothoracic surgery  SIGNIFICANT DIAGNOSTIC STUDIES:  CTA of the chest  CONSULTANTS:  cardiology  RECOMMENDED NEXT STEPS:        DISCHARGE MEDICATIONS:         Medication List        START taking these medications      dilTIAZem 60 MG extended release capsule  Commonly known as: CARDIZEM 12 HR  Take 1 capsule by mouth 2 times daily     glucose 4 g chewable tablet  Take 4 tablets by mouth as needed for Low blood sugar     guaiFENesin 600 MG extended release tablet  Commonly known as: MUCINEX     insulin glargine 100 UNIT/ML injection vial  Commonly known as: LANTUS     insulin lispro 100 UNIT/ML Soln injection vial  Commonly known as: HUMALOG  Inject 0-8 Units into the skin 3 times daily (with meals)     ipratropium 0.5 mg-albuterol 2.5 mg 0.5-2.5 (3) MG/3ML Soln nebulizer solution  Commonly known as: DUONEB     pantoprazole 40 MG tablet  Commonly known as: PROTONIX     polyethylene glycol 17 g packet  Commonly known as: GLYCOLAX  Take 1 packet by mouth

## 2023-09-20 NOTE — CARE COORDINATION
DCP: Home self care    [facility] [place] [place] Transition of Care Plan:    RUR: 13%  Prior Level of Functioning: home self care  Disposition: home self care  If SNF or IPR: Date FOC offered: n/a  Date FOC received:   Accepting facility:   Date authorization started with reference number:   Date authorization received and expires: Follow up appointments: unit secretary will setup as needed  DME needed: none  Transportation at discharge: Medicaid cab  IM/IMM Medicare/ letter given: n/a  Is patient a [place] and connected with [facility]? no   If yes, was [employer] transfer form completed and VA notified? Caregiver Contact: pt alert & oriented and can notify  Discharge Caregiver contacted prior to discharge?  N/a  Care Conference needed? no  Barriers to discharge: none

## 2023-09-20 NOTE — CARE COORDINATION
9098: BETH has reviewed pt chart    DCP: return to [facility]; will need cab to [employer] to  his car      0947: BETH met with pt at bedside to discuss dispo needs. Pt will need cab  to [facility] [employer] in [facility] at [employer] [facility].  No other dc needs at this time

## 2023-10-11 ENCOUNTER — APPOINTMENT (OUTPATIENT)
Facility: HOSPITAL | Age: 48
End: 2023-10-11
Payer: MEDICAID

## 2023-10-11 ENCOUNTER — APPOINTMENT (OUTPATIENT)
Facility: HOSPITAL | Age: 48
End: 2023-10-11
Attending: STUDENT IN AN ORGANIZED HEALTH CARE EDUCATION/TRAINING PROGRAM
Payer: MEDICAID

## 2023-10-11 ENCOUNTER — HOSPITAL ENCOUNTER (EMERGENCY)
Facility: HOSPITAL | Age: 48
Discharge: HOME | End: 2023-10-11
Attending: STUDENT IN AN ORGANIZED HEALTH CARE EDUCATION/TRAINING PROGRAM
Payer: MEDICAID

## 2023-10-11 VITALS
HEART RATE: 80 BPM | BODY MASS INDEX: 38.36 KG/M2 | DIASTOLIC BLOOD PRESSURE: 74 MMHG | SYSTOLIC BLOOD PRESSURE: 130 MMHG | OXYGEN SATURATION: 92 % | TEMPERATURE: 98.6 F | HEIGHT: 76 IN | RESPIRATION RATE: 18 BRPM | WEIGHT: 315 LBS

## 2023-10-11 DIAGNOSIS — J44.1 CHRONIC OBSTRUCTIVE PULMONARY DISEASE WITH ACUTE EXACERBATION (HCC): ICD-10-CM

## 2023-10-11 DIAGNOSIS — I87.8 VENOUS STASIS: Primary | ICD-10-CM

## 2023-10-11 DIAGNOSIS — I82.890 SUPERFICIAL VEIN THROMBOSIS: ICD-10-CM

## 2023-10-11 LAB
ALBUMIN SERPL-MCNC: 3.3 G/DL (ref 3.5–5)
ALBUMIN/GLOB SERPL: 0.8 (ref 1.1–2.2)
ALP SERPL-CCNC: 67 U/L (ref 45–117)
ALT SERPL-CCNC: 65 U/L (ref 12–78)
ANION GAP SERPL CALC-SCNC: 6 MMOL/L (ref 5–15)
AST SERPL W P-5'-P-CCNC: 30 U/L (ref 15–37)
BASOPHILS # BLD: 0 K/UL (ref 0–0.1)
BASOPHILS NFR BLD: 0 % (ref 0–1)
BILIRUB SERPL-MCNC: 0.4 MG/DL (ref 0.2–1)
BUN SERPL-MCNC: 14 MG/DL (ref 6–20)
BUN/CREAT SERPL: 12 (ref 12–20)
CA-I BLD-MCNC: 8.9 MG/DL (ref 8.5–10.1)
CHLORIDE SERPL-SCNC: 104 MMOL/L (ref 97–108)
CK SERPL-CCNC: 152 U/L (ref 39–308)
CO2 SERPL-SCNC: 29 MMOL/L (ref 21–32)
CREAT SERPL-MCNC: 1.17 MG/DL (ref 0.7–1.3)
DIFFERENTIAL METHOD BLD: ABNORMAL
ECHO BSA: 2.82 M2
EKG ATRIAL RATE: 97 BPM
EKG DIAGNOSIS: NORMAL
EKG P AXIS: 26 DEGREES
EKG P-R INTERVAL: 180 MS
EKG Q-T INTERVAL: 386 MS
EKG QRS DURATION: 98 MS
EKG QTC CALCULATION (BAZETT): 490 MS
EKG R AXIS: 18 DEGREES
EKG T AXIS: 27 DEGREES
EKG VENTRICULAR RATE: 97 BPM
EOSINOPHIL # BLD: 0 K/UL (ref 0–0.4)
EOSINOPHIL NFR BLD: 0 % (ref 0–7)
ERYTHROCYTE [DISTWIDTH] IN BLOOD BY AUTOMATED COUNT: 13.9 % (ref 11.5–14.5)
GLOBULIN SER CALC-MCNC: 3.9 G/DL (ref 2–4)
GLUCOSE SERPL-MCNC: 133 MG/DL (ref 65–100)
HCT VFR BLD AUTO: 36.2 % (ref 36.6–50.3)
HGB BLD-MCNC: 12.1 G/DL (ref 12.1–17)
IMM GRANULOCYTES # BLD AUTO: 0 K/UL
IMM GRANULOCYTES NFR BLD AUTO: 0 %
LYMPHOCYTES # BLD: 1.6 K/UL (ref 0.8–3.5)
LYMPHOCYTES NFR BLD: 29 % (ref 12–49)
MAGNESIUM SERPL-MCNC: 1.8 MG/DL (ref 1.6–2.4)
MCH RBC QN AUTO: 32.4 PG (ref 26–34)
MCHC RBC AUTO-ENTMCNC: 33.4 G/DL (ref 30–36.5)
MCV RBC AUTO: 96.8 FL (ref 80–99)
MONOCYTES # BLD: 0.3 K/UL (ref 0–1)
MONOCYTES NFR BLD: 6 % (ref 5–13)
NEUTS SEG # BLD: 3.6 K/UL (ref 1.8–8)
NEUTS SEG NFR BLD: 65 % (ref 32–75)
NRBC # BLD: 0 K/UL (ref 0–0.01)
NRBC BLD-RTO: 0 PER 100 WBC
PLATELET # BLD AUTO: 190 K/UL (ref 150–400)
PMV BLD AUTO: 10 FL (ref 8.9–12.9)
POTASSIUM SERPL-SCNC: 3.7 MMOL/L (ref 3.5–5.1)
PROT SERPL-MCNC: 7.2 G/DL (ref 6.4–8.2)
RBC # BLD AUTO: 3.74 M/UL (ref 4.1–5.7)
RBC MORPH BLD: ABNORMAL
SODIUM SERPL-SCNC: 139 MMOL/L (ref 136–145)
TROPONIN I SERPL HS-MCNC: 9 NG/L (ref 0–76)
TROPONIN I SERPL HS-MCNC: 9 NG/L (ref 0–76)
WBC # BLD AUTO: 5.5 K/UL (ref 4.1–11.1)

## 2023-10-11 PROCEDURE — 71275 CT ANGIOGRAPHY CHEST: CPT

## 2023-10-11 PROCEDURE — 36415 COLL VENOUS BLD VENIPUNCTURE: CPT

## 2023-10-11 PROCEDURE — 94640 AIRWAY INHALATION TREATMENT: CPT

## 2023-10-11 PROCEDURE — 84484 ASSAY OF TROPONIN QUANT: CPT

## 2023-10-11 PROCEDURE — 96376 TX/PRO/DX INJ SAME DRUG ADON: CPT

## 2023-10-11 PROCEDURE — 93005 ELECTROCARDIOGRAM TRACING: CPT | Performed by: STUDENT IN AN ORGANIZED HEALTH CARE EDUCATION/TRAINING PROGRAM

## 2023-10-11 PROCEDURE — 83735 ASSAY OF MAGNESIUM: CPT

## 2023-10-11 PROCEDURE — 96374 THER/PROPH/DIAG INJ IV PUSH: CPT

## 2023-10-11 PROCEDURE — 80053 COMPREHEN METABOLIC PANEL: CPT

## 2023-10-11 PROCEDURE — 6370000000 HC RX 637 (ALT 250 FOR IP): Performed by: STUDENT IN AN ORGANIZED HEALTH CARE EDUCATION/TRAINING PROGRAM

## 2023-10-11 PROCEDURE — 71045 X-RAY EXAM CHEST 1 VIEW: CPT

## 2023-10-11 PROCEDURE — 99285 EMERGENCY DEPT VISIT HI MDM: CPT

## 2023-10-11 PROCEDURE — 82550 ASSAY OF CK (CPK): CPT

## 2023-10-11 PROCEDURE — 6360000004 HC RX CONTRAST MEDICATION: Performed by: STUDENT IN AN ORGANIZED HEALTH CARE EDUCATION/TRAINING PROGRAM

## 2023-10-11 PROCEDURE — 85025 COMPLETE CBC W/AUTO DIFF WBC: CPT

## 2023-10-11 PROCEDURE — 6360000002 HC RX W HCPCS: Performed by: STUDENT IN AN ORGANIZED HEALTH CARE EDUCATION/TRAINING PROGRAM

## 2023-10-11 PROCEDURE — 93971 EXTREMITY STUDY: CPT

## 2023-10-11 RX ORDER — HYDROCODONE BITARTRATE AND ACETAMINOPHEN 5; 325 MG/1; MG/1
1 TABLET ORAL
Status: COMPLETED | OUTPATIENT
Start: 2023-10-11 | End: 2023-10-11

## 2023-10-11 RX ORDER — IPRATROPIUM BROMIDE AND ALBUTEROL SULFATE 2.5; .5 MG/3ML; MG/3ML
1 SOLUTION RESPIRATORY (INHALATION) ONCE
Status: DISCONTINUED | OUTPATIENT
Start: 2023-10-11 | End: 2023-10-11 | Stop reason: HOSPADM

## 2023-10-11 RX ORDER — FUROSEMIDE 40 MG/1
20 TABLET ORAL ONCE
Status: COMPLETED | OUTPATIENT
Start: 2023-10-11 | End: 2023-10-11

## 2023-10-11 RX ORDER — PREDNISONE 20 MG/1
40 TABLET ORAL DAILY
Qty: 10 TABLET | Refills: 0 | Status: SHIPPED | OUTPATIENT
Start: 2023-10-11 | End: 2023-10-16

## 2023-10-11 RX ORDER — METHOCARBAMOL 750 MG/1
750 TABLET, FILM COATED ORAL 3 TIMES DAILY PRN
Qty: 20 TABLET | Refills: 0 | Status: SHIPPED | OUTPATIENT
Start: 2023-10-11

## 2023-10-11 RX ORDER — IPRATROPIUM BROMIDE AND ALBUTEROL SULFATE 2.5; .5 MG/3ML; MG/3ML
1 SOLUTION RESPIRATORY (INHALATION)
Status: DISCONTINUED | OUTPATIENT
Start: 2023-10-11 | End: 2023-10-11

## 2023-10-11 RX ORDER — METHOCARBAMOL 750 MG/1
1500 TABLET, FILM COATED ORAL ONCE
Status: COMPLETED | OUTPATIENT
Start: 2023-10-11 | End: 2023-10-11

## 2023-10-11 RX ORDER — ALBUTEROL SULFATE 90 UG/1
2 AEROSOL, METERED RESPIRATORY (INHALATION) EVERY 4 HOURS PRN
Qty: 18 G | Refills: 2 | Status: SHIPPED | OUTPATIENT
Start: 2023-10-11

## 2023-10-11 RX ORDER — PREDNISONE 20 MG/1
40 TABLET ORAL ONCE
Status: COMPLETED | OUTPATIENT
Start: 2023-10-11 | End: 2023-10-11

## 2023-10-11 RX ORDER — IPRATROPIUM BROMIDE AND ALBUTEROL SULFATE 2.5; .5 MG/3ML; MG/3ML
1 SOLUTION RESPIRATORY (INHALATION)
Status: COMPLETED | OUTPATIENT
Start: 2023-10-11 | End: 2023-10-11

## 2023-10-11 RX ORDER — MORPHINE SULFATE 4 MG/ML
4 INJECTION INTRAVENOUS ONCE
Status: COMPLETED | OUTPATIENT
Start: 2023-10-11 | End: 2023-10-11

## 2023-10-11 RX ORDER — MORPHINE SULFATE 4 MG/ML
6 INJECTION INTRAVENOUS ONCE
Status: COMPLETED | OUTPATIENT
Start: 2023-10-11 | End: 2023-10-11

## 2023-10-11 RX ORDER — FUROSEMIDE 20 MG/1
20 TABLET ORAL DAILY
Qty: 7 TABLET | Refills: 0 | Status: SHIPPED | OUTPATIENT
Start: 2023-10-11 | End: 2023-10-18

## 2023-10-11 RX ADMIN — IPRATROPIUM BROMIDE AND ALBUTEROL SULFATE 1 DOSE: 2.5; .5 SOLUTION RESPIRATORY (INHALATION) at 05:47

## 2023-10-11 RX ADMIN — PREDNISONE 40 MG: 20 TABLET ORAL at 05:08

## 2023-10-11 RX ADMIN — METHOCARBAMOL 1500 MG: 750 TABLET ORAL at 09:38

## 2023-10-11 RX ADMIN — FUROSEMIDE 20 MG: 40 TABLET ORAL at 09:36

## 2023-10-11 RX ADMIN — MORPHINE SULFATE 4 MG: 4 INJECTION, SOLUTION INTRAMUSCULAR; INTRAVENOUS at 05:08

## 2023-10-11 RX ADMIN — IPRATROPIUM BROMIDE AND ALBUTEROL SULFATE 1 DOSE: 2.5; .5 SOLUTION RESPIRATORY (INHALATION) at 06:00

## 2023-10-11 RX ADMIN — IPRATROPIUM BROMIDE AND ALBUTEROL SULFATE 1 DOSE: 2.5; .5 SOLUTION RESPIRATORY (INHALATION) at 05:52

## 2023-10-11 RX ADMIN — IOPAMIDOL 100 ML: 755 INJECTION, SOLUTION INTRAVENOUS at 07:28

## 2023-10-11 RX ADMIN — MORPHINE SULFATE 6 MG: 4 INJECTION, SOLUTION INTRAMUSCULAR; INTRAVENOUS at 06:30

## 2023-10-11 RX ADMIN — HYDROCODONE BITARTRATE AND ACETAMINOPHEN 1 TABLET: 5; 325 TABLET ORAL at 09:38

## 2023-10-11 ASSESSMENT — PAIN SCALES - GENERAL
PAINLEVEL_OUTOF10: 10
PAINLEVEL_OUTOF10: 7
PAINLEVEL_OUTOF10: 0
PAINLEVEL_OUTOF10: 10

## 2023-10-11 ASSESSMENT — PAIN DESCRIPTION - ORIENTATION
ORIENTATION: LEFT
ORIENTATION_2: LEFT
ORIENTATION: LEFT
ORIENTATION: LEFT

## 2023-10-11 ASSESSMENT — PAIN DESCRIPTION - LOCATION
LOCATION: LEG;FOOT
LOCATION_2: CHEST
LOCATION: CHEST;FOOT;LEG
LOCATION: LEG;FOOT

## 2023-10-11 ASSESSMENT — PAIN DESCRIPTION - DESCRIPTORS
DESCRIPTORS: TENDER
DESCRIPTORS_2: SHARP

## 2023-10-11 ASSESSMENT — PAIN - FUNCTIONAL ASSESSMENT: PAIN_FUNCTIONAL_ASSESSMENT: 0-10

## 2023-10-11 ASSESSMENT — PAIN DESCRIPTION - INTENSITY: RATING_2: 8

## 2023-10-11 NOTE — ED NOTES
Pt placed on continuous pulse ox and blood pressure monitoring at this time.         Patricia Varma RN  10/11/23 0422

## 2023-10-11 NOTE — DISCHARGE INSTRUCTIONS
Thank you! Thank you for allowing me to care for you in the emergency department. I sincerely hope that you are satisfied with your visit today. It is my goal to provide you with excellent care. Below you will find a list of your labs and imaging from your visit today if applicable. Should you have any questions regarding these results please do not hesitate to call the emergency department. Please review [employer] for a more detailed result list since the below list may not be comprehensive. Instructions on how to sign up to [employer] should be provided in this packet.     Labs -  Recent Results (from the past 12 hour(s))   Comprehensive Metabolic Panel    Collection Time: 10/11/23  5:06 AM   Result Value Ref Range    Sodium 139 136 - 145 mmol/L    Potassium 3.7 3.5 - 5.1 mmol/L    Chloride 104 97 - 108 mmol/L    CO2 29 21 - 32 mmol/L    Anion Gap 6 5 - 15 mmol/L    Glucose 133 (H) 65 - 100 mg/dL    BUN 14 6 - 20 mg/dL    Creatinine 1.17 0.70 - 1.30 mg/dL    Bun/Cre Ratio 12 12 - 20      Est, Glom Filt Rate >60 >60 ml/min/1.73m2    Calcium 8.9 8.5 - 10.1 mg/dL    Total Bilirubin 0.4 0.2 - 1.0 mg/dL    AST 30 15 - 37 U/L    ALT 65 12 - 78 U/L    Alk Phosphatase 67 45 - 117 U/L    Total Protein 7.2 6.4 - 8.2 g/dL    Albumin 3.3 (L) 3.5 - 5.0 g/dL    Globulin 3.9 2.0 - 4.0 g/dL    Albumin/Globulin Ratio 0.8 (L) 1.1 - 2.2     CBC with Auto Differential    Collection Time: 10/11/23  5:06 AM   Result Value Ref Range    WBC 5.5 4.1 - 11.1 K/uL    RBC 3.74 (L) 4.10 - 5.70 M/uL    Hemoglobin 12.1 12.1 - 17.0 g/dL    Hematocrit 36.2 (L) 36.6 - 50.3 %    MCV 96.8 80.0 - 99.0 FL    MCH 32.4 26.0 - 34.0 PG    MCHC 33.4 30.0 - 36.5 g/dL    RDW 13.9 11.5 - 14.5 %    Platelets 352 167 - 989 K/uL    MPV 10.0 8.9 - 12.9 FL    Nucleated RBCs 0.0 0.0  WBC    nRBC 0.00 0.00 - 0.01 K/uL    Neutrophils % 65 32 - 75 %    Lymphocytes % 29 12 - 49 %    Monocytes % 6 5 - 13 %    Eosinophils % 0 0 - 7 %    Basophils % 0 0 - 1 %

## 2023-10-11 NOTE — ED NOTES
Pt had an allergic reaction, hives near his IV site, to his second dose of morphine.  MD made aware at this time     Yu Azar RN  10/11/23 0700

## 2023-10-18 ENCOUNTER — APPOINTMENT (OUTPATIENT)
Facility: HOSPITAL | Age: 48
End: 2023-10-18
Payer: MEDICAID

## 2023-10-18 ENCOUNTER — HOSPITAL ENCOUNTER (INPATIENT)
Facility: HOSPITAL | Age: 48
LOS: 2 days | Discharge: HOME | End: 2023-10-20
Attending: EMERGENCY MEDICINE | Admitting: INTERNAL MEDICINE
Payer: MEDICAID

## 2023-10-18 DIAGNOSIS — E87.20 LACTIC ACIDOSIS: ICD-10-CM

## 2023-10-18 DIAGNOSIS — R09.02 HYPOXIA: ICD-10-CM

## 2023-10-18 DIAGNOSIS — E87.3 RESPIRATORY ALKALOSIS: ICD-10-CM

## 2023-10-18 DIAGNOSIS — R07.9 ACUTE CHEST PAIN: Primary | ICD-10-CM

## 2023-10-18 PROBLEM — Q24.8 PERICARDIAL CYST: Status: ACTIVE | Noted: 2023-10-18

## 2023-10-18 LAB
ALBUMIN SERPL-MCNC: 3.9 G/DL (ref 3.5–5)
ALBUMIN/GLOB SERPL: 1 (ref 1.1–2.2)
ALP SERPL-CCNC: 77 U/L (ref 45–117)
ALT SERPL-CCNC: 56 U/L (ref 12–78)
AMPHET UR QL SCN: NEGATIVE
ANION GAP SERPL CALC-SCNC: 9 MMOL/L (ref 5–15)
APPEARANCE UR: CLEAR
AST SERPL W P-5'-P-CCNC: 26 U/L (ref 15–37)
BACTERIA URNS QL MICRO: NEGATIVE /HPF
BARBITURATES UR QL SCN: NEGATIVE
BASE EXCESS BLD CALC-SCNC: 6.6 MMOL/L
BASOPHILS # BLD: 0 K/UL (ref 0–0.1)
BASOPHILS NFR BLD: 0 % (ref 0–1)
BENZODIAZ UR QL: NEGATIVE
BILIRUB SERPL-MCNC: 0.4 MG/DL (ref 0.2–1)
BILIRUB UR QL: NEGATIVE
BNP SERPL-MCNC: 28 PG/ML
BUN SERPL-MCNC: 10 MG/DL (ref 6–20)
BUN/CREAT SERPL: 8 (ref 12–20)
BUPRENORPHINE UR QL: NEGATIVE
CA-I BLD-MCNC: 8.9 MG/DL (ref 8.5–10.1)
CANNABINOIDS UR QL SCN: NEGATIVE
CHLORIDE SERPL-SCNC: 97 MMOL/L (ref 97–108)
CO2 SERPL-SCNC: 33 MMOL/L (ref 21–32)
COCAINE UR QL SCN: NEGATIVE
COLOR UR: YELLOW
CREAT SERPL-MCNC: 1.24 MG/DL (ref 0.7–1.3)
DIFFERENTIAL METHOD BLD: ABNORMAL
EKG ATRIAL RATE: 103 BPM
EKG DIAGNOSIS: NORMAL
EKG P AXIS: 36 DEGREES
EKG P-R INTERVAL: 176 MS
EKG Q-T INTERVAL: 386 MS
EKG QRS DURATION: 102 MS
EKG QTC CALCULATION (BAZETT): 505 MS
EKG R AXIS: 19 DEGREES
EKG T AXIS: 31 DEGREES
EKG VENTRICULAR RATE: 103 BPM
EOSINOPHIL # BLD: 0.1 K/UL (ref 0–0.4)
EOSINOPHIL NFR BLD: 3 % (ref 0–7)
EPITH CASTS URNS QL MICRO: ABNORMAL /LPF
ERYTHROCYTE [DISTWIDTH] IN BLOOD BY AUTOMATED COUNT: 13.6 % (ref 11.5–14.5)
FLUAV AG NPH QL IA: NEGATIVE
FLUBV AG NOSE QL IA: NEGATIVE
GLOBULIN SER CALC-MCNC: 4.1 G/DL (ref 2–4)
GLUCOSE BLD STRIP.AUTO-MCNC: 109 MG/DL (ref 65–100)
GLUCOSE SERPL-MCNC: 154 MG/DL (ref 65–100)
GLUCOSE UR STRIP.AUTO-MCNC: NEGATIVE MG/DL
HCO3 BLD-SCNC: 28.4 MMOL/L (ref 19–28)
HCT VFR BLD AUTO: 40.5 % (ref 36.6–50.3)
HGB BLD-MCNC: 13.2 G/DL (ref 12.1–17)
HGB UR QL STRIP: ABNORMAL
IMM GRANULOCYTES # BLD AUTO: 0 K/UL (ref 0–0.04)
IMM GRANULOCYTES NFR BLD AUTO: 0 % (ref 0–0.5)
INR PPP: 1.2 (ref 0.9–1.1)
KETONES UR QL STRIP.AUTO: NEGATIVE MG/DL
LACTATE BLD-SCNC: 3.52 MMOL/L (ref 0.4–2)
LEUKOCYTE ESTERASE UR QL STRIP.AUTO: NEGATIVE
LYMPHOCYTES # BLD: 1.2 K/UL (ref 0.8–3.5)
LYMPHOCYTES NFR BLD: 24 % (ref 12–49)
Lab: NORMAL
MAGNESIUM SERPL-MCNC: 1.6 MG/DL (ref 1.6–2.4)
MCH RBC QN AUTO: 32.6 PG (ref 26–34)
MCHC RBC AUTO-ENTMCNC: 32.6 G/DL (ref 30–36.5)
MCV RBC AUTO: 100 FL (ref 80–99)
METHADONE UR QL: NEGATIVE
METHAMPHET UR QL: NEGATIVE
MONOCYTES # BLD: 0.4 K/UL (ref 0–1)
MONOCYTES NFR BLD: 8 % (ref 5–13)
MRSA DNA SPEC QL NAA+PROBE: NOT DETECTED
NEUTS SEG # BLD: 3.1 K/UL (ref 1.8–8)
NEUTS SEG NFR BLD: 65 % (ref 32–75)
NITRITE UR QL STRIP.AUTO: NEGATIVE
OPIATES UR QL: NEGATIVE
OXYCODONE UR QL SCN: NEGATIVE
PCO2 BLD: 31.3 MMHG (ref 35–45)
PCP UR QL: NEGATIVE
PERFORMED BY:: ABNORMAL
PERFORMED BY:: ABNORMAL
PH BLD: 7.57 (ref 7.35–7.45)
PH UR STRIP: 5 (ref 5–8)
PLATELET # BLD AUTO: 230 K/UL (ref 150–400)
PMV BLD AUTO: 9.4 FL (ref 8.9–12.9)
PO2 BLD: 152 MMHG (ref 75–100)
POTASSIUM SERPL-SCNC: 3.6 MMOL/L (ref 3.5–5.1)
PROPOXYPH UR QL: NEGATIVE
PROT SERPL-MCNC: 8 G/DL (ref 6.4–8.2)
PROT UR STRIP-MCNC: NEGATIVE MG/DL
PROTHROMBIN TIME: 11.7 SEC (ref 9–11.1)
RBC # BLD AUTO: 4.05 M/UL (ref 4.1–5.7)
RBC #/AREA URNS HPF: ABNORMAL /HPF (ref 0–5)
SAO2 % BLD: 99.6 %
SERVICE CMNT-IMP: ABNORMAL
SODIUM SERPL-SCNC: 139 MMOL/L (ref 136–145)
SP GR UR REFRACTOMETRY: 1.01 (ref 1–1.03)
SPECIMEN TYPE: ABNORMAL
TRICYCLICS UR QL: NEGATIVE
TROPONIN I SERPL HS-MCNC: 11 NG/L (ref 0–76)
TROPONIN I SERPL HS-MCNC: 7 NG/L (ref 0–76)
TROPONIN I SERPL HS-MCNC: 9 NG/L (ref 0–76)
UROBILINOGEN UR QL STRIP.AUTO: 0.1 EU/DL (ref 0.2–1)
WBC # BLD AUTO: 4.8 K/UL (ref 4.1–11.1)
WBC URNS QL MICRO: ABNORMAL /HPF (ref 0–4)

## 2023-10-18 PROCEDURE — 87641 MR-STAPH DNA AMP PROBE: CPT

## 2023-10-18 PROCEDURE — 83735 ASSAY OF MAGNESIUM: CPT

## 2023-10-18 PROCEDURE — 84484 ASSAY OF TROPONIN QUANT: CPT

## 2023-10-18 PROCEDURE — 71275 CT ANGIOGRAPHY CHEST: CPT

## 2023-10-18 PROCEDURE — 6370000000 HC RX 637 (ALT 250 FOR IP): Performed by: INTERNAL MEDICINE

## 2023-10-18 PROCEDURE — 6360000002 HC RX W HCPCS: Performed by: FAMILY MEDICINE

## 2023-10-18 PROCEDURE — 71045 X-RAY EXAM CHEST 1 VIEW: CPT

## 2023-10-18 PROCEDURE — 83605 ASSAY OF LACTIC ACID: CPT

## 2023-10-18 PROCEDURE — 81001 URINALYSIS AUTO W/SCOPE: CPT

## 2023-10-18 PROCEDURE — 2580000003 HC RX 258: Performed by: INTERNAL MEDICINE

## 2023-10-18 PROCEDURE — 82962 GLUCOSE BLOOD TEST: CPT

## 2023-10-18 PROCEDURE — 87086 URINE CULTURE/COLONY COUNT: CPT

## 2023-10-18 PROCEDURE — 83880 ASSAY OF NATRIURETIC PEPTIDE: CPT

## 2023-10-18 PROCEDURE — 87040 BLOOD CULTURE FOR BACTERIA: CPT

## 2023-10-18 PROCEDURE — 93005 ELECTROCARDIOGRAM TRACING: CPT | Performed by: EMERGENCY MEDICINE

## 2023-10-18 PROCEDURE — 36415 COLL VENOUS BLD VENIPUNCTURE: CPT

## 2023-10-18 PROCEDURE — 99285 EMERGENCY DEPT VISIT HI MDM: CPT

## 2023-10-18 PROCEDURE — 80307 DRUG TEST PRSMV CHEM ANLYZR: CPT

## 2023-10-18 PROCEDURE — 80053 COMPREHEN METABOLIC PANEL: CPT

## 2023-10-18 PROCEDURE — 85610 PROTHROMBIN TIME: CPT

## 2023-10-18 PROCEDURE — 2060000000 HC ICU INTERMEDIATE R&B

## 2023-10-18 PROCEDURE — 6360000002 HC RX W HCPCS: Performed by: EMERGENCY MEDICINE

## 2023-10-18 PROCEDURE — 96375 TX/PRO/DX INJ NEW DRUG ADDON: CPT

## 2023-10-18 PROCEDURE — 96374 THER/PROPH/DIAG INJ IV PUSH: CPT

## 2023-10-18 PROCEDURE — 6360000004 HC RX CONTRAST MEDICATION: Performed by: EMERGENCY MEDICINE

## 2023-10-18 PROCEDURE — 87804 INFLUENZA ASSAY W/OPTIC: CPT

## 2023-10-18 PROCEDURE — 2580000003 HC RX 258: Performed by: EMERGENCY MEDICINE

## 2023-10-18 PROCEDURE — 82803 BLOOD GASES ANY COMBINATION: CPT

## 2023-10-18 PROCEDURE — 85025 COMPLETE CBC W/AUTO DIFF WBC: CPT

## 2023-10-18 PROCEDURE — 6360000002 HC RX W HCPCS: Performed by: INTERNAL MEDICINE

## 2023-10-18 PROCEDURE — 6370000000 HC RX 637 (ALT 250 FOR IP): Performed by: EMERGENCY MEDICINE

## 2023-10-18 RX ORDER — MORPHINE SULFATE 2 MG/ML
1 INJECTION, SOLUTION INTRAMUSCULAR; INTRAVENOUS EVERY 6 HOURS PRN
Status: DISPENSED | OUTPATIENT
Start: 2023-10-18 | End: 2023-10-20

## 2023-10-18 RX ORDER — CITALOPRAM 40 MG/1
40 TABLET ORAL DAILY
Status: ON HOLD | COMMUNITY
End: 2023-10-20 | Stop reason: HOSPADM

## 2023-10-18 RX ORDER — ONDANSETRON 4 MG/1
4 TABLET, ORALLY DISINTEGRATING ORAL EVERY 8 HOURS PRN
Status: DISCONTINUED | OUTPATIENT
Start: 2023-10-18 | End: 2023-10-20 | Stop reason: HOSPADM

## 2023-10-18 RX ORDER — HYDROCODONE BITARTRATE AND ACETAMINOPHEN 5; 325 MG/1; MG/1
1 TABLET ORAL EVERY 6 HOURS PRN
Status: DISCONTINUED | OUTPATIENT
Start: 2023-10-18 | End: 2023-10-20 | Stop reason: HOSPADM

## 2023-10-18 RX ORDER — SODIUM CHLORIDE 0.9 % (FLUSH) 0.9 %
5-40 SYRINGE (ML) INJECTION EVERY 12 HOURS SCHEDULED
Status: DISCONTINUED | OUTPATIENT
Start: 2023-10-18 | End: 2023-10-20 | Stop reason: HOSPADM

## 2023-10-18 RX ORDER — NITROGLYCERIN 0.4 MG/1
0.4 TABLET SUBLINGUAL
Status: COMPLETED | OUTPATIENT
Start: 2023-10-18 | End: 2023-10-18

## 2023-10-18 RX ORDER — ACETAMINOPHEN 325 MG/1
650 TABLET ORAL EVERY 6 HOURS PRN
Status: DISCONTINUED | OUTPATIENT
Start: 2023-10-18 | End: 2023-10-20 | Stop reason: HOSPADM

## 2023-10-18 RX ORDER — BUDESONIDE 0.5 MG/2ML
250 INHALANT ORAL 2 TIMES DAILY
Status: DISCONTINUED | OUTPATIENT
Start: 2023-10-18 | End: 2023-10-20 | Stop reason: HOSPADM

## 2023-10-18 RX ORDER — 0.9 % SODIUM CHLORIDE 0.9 %
500 INTRAVENOUS SOLUTION INTRAVENOUS
Status: COMPLETED | OUTPATIENT
Start: 2023-10-18 | End: 2023-10-18

## 2023-10-18 RX ORDER — TRAZODONE HYDROCHLORIDE 50 MG/1
100 TABLET ORAL NIGHTLY
Status: DISCONTINUED | OUTPATIENT
Start: 2023-10-18 | End: 2023-10-20 | Stop reason: HOSPADM

## 2023-10-18 RX ORDER — PREDNISONE 20 MG/1
40 TABLET ORAL DAILY
Status: DISCONTINUED | OUTPATIENT
Start: 2023-10-21 | End: 2023-10-19

## 2023-10-18 RX ORDER — MORPHINE SULFATE 4 MG/ML
4 INJECTION, SOLUTION INTRAMUSCULAR; INTRAVENOUS
Status: COMPLETED | OUTPATIENT
Start: 2023-10-18 | End: 2023-10-18

## 2023-10-18 RX ORDER — INSULIN LISPRO 100 [IU]/ML
0-16 INJECTION, SOLUTION INTRAVENOUS; SUBCUTANEOUS
Status: DISCONTINUED | OUTPATIENT
Start: 2023-10-18 | End: 2023-10-18

## 2023-10-18 RX ORDER — PANTOPRAZOLE SODIUM 40 MG/1
40 TABLET, DELAYED RELEASE ORAL 2 TIMES DAILY
Status: DISCONTINUED | OUTPATIENT
Start: 2023-10-18 | End: 2023-10-19

## 2023-10-18 RX ORDER — GUAIFENESIN 600 MG/1
600 TABLET, EXTENDED RELEASE ORAL EVERY 12 HOURS
Status: DISCONTINUED | OUTPATIENT
Start: 2023-10-18 | End: 2023-10-19

## 2023-10-18 RX ORDER — IPRATROPIUM BROMIDE AND ALBUTEROL SULFATE 2.5; .5 MG/3ML; MG/3ML
1 SOLUTION RESPIRATORY (INHALATION)
Status: COMPLETED | OUTPATIENT
Start: 2023-10-18 | End: 2023-10-18

## 2023-10-18 RX ORDER — LEVOTHYROXINE SODIUM 0.1 MG/1
200 TABLET ORAL DAILY
Status: DISCONTINUED | OUTPATIENT
Start: 2023-10-18 | End: 2023-10-20 | Stop reason: HOSPADM

## 2023-10-18 RX ORDER — ENOXAPARIN SODIUM 100 MG/ML
40 INJECTION SUBCUTANEOUS 2 TIMES DAILY
Status: DISCONTINUED | OUTPATIENT
Start: 2023-10-18 | End: 2023-10-18

## 2023-10-18 RX ORDER — ACETAMINOPHEN 650 MG/1
650 SUPPOSITORY RECTAL EVERY 6 HOURS PRN
Status: DISCONTINUED | OUTPATIENT
Start: 2023-10-18 | End: 2023-10-20 | Stop reason: HOSPADM

## 2023-10-18 RX ORDER — POLYETHYLENE GLYCOL 3350 17 G/17G
17 POWDER, FOR SOLUTION ORAL DAILY PRN
Status: DISCONTINUED | OUTPATIENT
Start: 2023-10-18 | End: 2023-10-18 | Stop reason: SDUPTHER

## 2023-10-18 RX ORDER — NITROGLYCERIN 0.4 MG/1
0.4 TABLET SUBLINGUAL EVERY 5 MIN PRN
Status: ON HOLD | COMMUNITY
End: 2023-10-20 | Stop reason: HOSPADM

## 2023-10-18 RX ORDER — DILTIAZEM HYDROCHLORIDE 60 MG/1
60 CAPSULE, EXTENDED RELEASE ORAL 2 TIMES DAILY
Status: DISCONTINUED | OUTPATIENT
Start: 2023-10-18 | End: 2023-10-20 | Stop reason: HOSPADM

## 2023-10-18 RX ORDER — ASPIRIN 81 MG/1
81 TABLET, CHEWABLE ORAL DAILY
Status: DISCONTINUED | OUTPATIENT
Start: 2023-10-18 | End: 2023-10-20 | Stop reason: HOSPADM

## 2023-10-18 RX ORDER — INSULIN GLARGINE 100 [IU]/ML
20 INJECTION, SOLUTION SUBCUTANEOUS NIGHTLY
Status: DISCONTINUED | OUTPATIENT
Start: 2023-10-18 | End: 2023-10-20 | Stop reason: HOSPADM

## 2023-10-18 RX ORDER — SODIUM CHLORIDE 9 MG/ML
INJECTION, SOLUTION INTRAVENOUS PRN
Status: DISCONTINUED | OUTPATIENT
Start: 2023-10-18 | End: 2023-10-20 | Stop reason: HOSPADM

## 2023-10-18 RX ORDER — INSULIN LISPRO 100 [IU]/ML
0-8 INJECTION, SOLUTION INTRAVENOUS; SUBCUTANEOUS
Status: DISCONTINUED | OUTPATIENT
Start: 2023-10-18 | End: 2023-10-20 | Stop reason: HOSPADM

## 2023-10-18 RX ORDER — ATORVASTATIN CALCIUM 40 MG/1
40 TABLET, FILM COATED ORAL DAILY
Status: DISCONTINUED | OUTPATIENT
Start: 2023-10-18 | End: 2023-10-19

## 2023-10-18 RX ORDER — GABAPENTIN 600 MG/1
600 TABLET ORAL DAILY
Status: ON HOLD | COMMUNITY
End: 2023-10-20 | Stop reason: HOSPADM

## 2023-10-18 RX ORDER — FUROSEMIDE 40 MG/1
20 TABLET ORAL DAILY
Status: DISCONTINUED | OUTPATIENT
Start: 2023-10-18 | End: 2023-10-20 | Stop reason: HOSPADM

## 2023-10-18 RX ORDER — COLCHICINE 0.6 MG/1
0.6 TABLET ORAL DAILY
Status: ON HOLD | COMMUNITY
End: 2023-10-20 | Stop reason: HOSPADM

## 2023-10-18 RX ORDER — METOPROLOL SUCCINATE 25 MG/1
25 TABLET, EXTENDED RELEASE ORAL DAILY
Status: DISCONTINUED | OUTPATIENT
Start: 2023-10-18 | End: 2023-10-20 | Stop reason: HOSPADM

## 2023-10-18 RX ORDER — ALBUTEROL SULFATE 2.5 MG/3ML
2.5 SOLUTION RESPIRATORY (INHALATION)
Status: DISCONTINUED | OUTPATIENT
Start: 2023-10-18 | End: 2023-10-19

## 2023-10-18 RX ORDER — MORPHINE SULFATE 2 MG/ML
2 INJECTION, SOLUTION INTRAMUSCULAR; INTRAVENOUS
Status: COMPLETED | OUTPATIENT
Start: 2023-10-18 | End: 2023-10-18

## 2023-10-18 RX ORDER — INSULIN LISPRO 100 [IU]/ML
0-4 INJECTION, SOLUTION INTRAVENOUS; SUBCUTANEOUS NIGHTLY
Status: DISCONTINUED | OUTPATIENT
Start: 2023-10-18 | End: 2023-10-18

## 2023-10-18 RX ORDER — SODIUM CHLORIDE 0.9 % (FLUSH) 0.9 %
5-40 SYRINGE (ML) INJECTION PRN
Status: DISCONTINUED | OUTPATIENT
Start: 2023-10-18 | End: 2023-10-20 | Stop reason: HOSPADM

## 2023-10-18 RX ORDER — LEVOFLOXACIN 5 MG/ML
750 INJECTION, SOLUTION INTRAVENOUS ONCE
Status: COMPLETED | OUTPATIENT
Start: 2023-10-18 | End: 2023-10-18

## 2023-10-18 RX ORDER — POLYETHYLENE GLYCOL 3350 17 G/17G
17 POWDER, FOR SOLUTION ORAL DAILY PRN
Status: DISCONTINUED | OUTPATIENT
Start: 2023-10-18 | End: 2023-10-20 | Stop reason: HOSPADM

## 2023-10-18 RX ORDER — ONDANSETRON 2 MG/ML
4 INJECTION INTRAMUSCULAR; INTRAVENOUS EVERY 6 HOURS PRN
Status: DISCONTINUED | OUTPATIENT
Start: 2023-10-18 | End: 2023-10-20 | Stop reason: HOSPADM

## 2023-10-18 RX ADMIN — METHYLPREDNISOLONE SODIUM SUCCINATE 40 MG: 40 INJECTION, POWDER, FOR SOLUTION INTRAMUSCULAR; INTRAVENOUS at 22:31

## 2023-10-18 RX ADMIN — MORPHINE SULFATE 4 MG: 4 INJECTION, SOLUTION INTRAMUSCULAR; INTRAVENOUS at 10:39

## 2023-10-18 RX ADMIN — MORPHINE SULFATE 1 MG: 2 INJECTION, SOLUTION INTRAMUSCULAR; INTRAVENOUS at 23:39

## 2023-10-18 RX ADMIN — SODIUM CHLORIDE 500 ML: 9 INJECTION, SOLUTION INTRAVENOUS at 11:58

## 2023-10-18 RX ADMIN — MORPHINE SULFATE 2 MG: 2 INJECTION, SOLUTION INTRAMUSCULAR; INTRAVENOUS at 17:02

## 2023-10-18 RX ADMIN — NITROGLYCERIN 0.4 MG: 0.4 TABLET, ORALLY DISINTEGRATING SUBLINGUAL at 09:25

## 2023-10-18 RX ADMIN — HYDROCODONE BITARTRATE AND ACETAMINOPHEN 1 TABLET: 5; 325 TABLET ORAL at 22:31

## 2023-10-18 RX ADMIN — IPRATROPIUM BROMIDE AND ALBUTEROL SULFATE 1 DOSE: 2.5; .5 SOLUTION RESPIRATORY (INHALATION) at 09:25

## 2023-10-18 RX ADMIN — LEVOFLOXACIN 750 MG: 5 INJECTION, SOLUTION INTRAVENOUS at 13:15

## 2023-10-18 RX ADMIN — TRAZODONE HYDROCHLORIDE 100 MG: 50 TABLET ORAL at 23:18

## 2023-10-18 RX ADMIN — CEFTRIAXONE SODIUM 1000 MG: 1 INJECTION, POWDER, FOR SOLUTION INTRAMUSCULAR; INTRAVENOUS at 13:05

## 2023-10-18 RX ADMIN — IOPAMIDOL 100 ML: 755 INJECTION, SOLUTION INTRAVENOUS at 10:14

## 2023-10-18 RX ADMIN — SODIUM CHLORIDE 500 ML: 9 INJECTION, SOLUTION INTRAVENOUS at 15:54

## 2023-10-18 RX ADMIN — SODIUM CHLORIDE, PRESERVATIVE FREE 10 ML: 5 INJECTION INTRAVENOUS at 22:41

## 2023-10-18 ASSESSMENT — PAIN DESCRIPTION - ORIENTATION
ORIENTATION: MID
ORIENTATION: ANTERIOR
ORIENTATION: ANTERIOR;MID
ORIENTATION: LEFT

## 2023-10-18 ASSESSMENT — PAIN DESCRIPTION - LOCATION
LOCATION: CHEST
LOCATION: ARM;CHEST

## 2023-10-18 ASSESSMENT — HEART SCORE: ECG: 1

## 2023-10-18 ASSESSMENT — PAIN SCALES - GENERAL
PAINLEVEL_OUTOF10: 8
PAINLEVEL_OUTOF10: 7
PAINLEVEL_OUTOF10: 9
PAINLEVEL_OUTOF10: 8
PAINLEVEL_OUTOF10: 8

## 2023-10-18 ASSESSMENT — PAIN DESCRIPTION - PAIN TYPE
TYPE: ACUTE PAIN

## 2023-10-18 ASSESSMENT — PAIN DESCRIPTION - DESCRIPTORS
DESCRIPTORS: SHARP
DESCRIPTORS: DISCOMFORT;HEAVINESS;SHARP
DESCRIPTORS: ACHING
DESCRIPTORS: ACHING;HEAVINESS;SHARP

## 2023-10-18 ASSESSMENT — PAIN DESCRIPTION - FREQUENCY
FREQUENCY: CONTINUOUS

## 2023-10-18 ASSESSMENT — PAIN - FUNCTIONAL ASSESSMENT: PAIN_FUNCTIONAL_ASSESSMENT: 0-10

## 2023-10-18 NOTE — ED PROVIDER NOTES
DO      Disposition       Disposition: Admit to Floor    ADMISSION: Patient is stable for Admission to the Hospital at this time. Discussed with admitting provider at time of hand off. Diagnosis     Clinical Impression:   1. Acute chest pain    2. Lactic acidosis    3. Respiratory alkalosis    4. Hypoxia        PATIENT REFERRED TO:  No follow-up provider specified. DISCHARGE MEDICATIONS:  New Prescriptions    No medications on file       Attestations:    Valeri Stewart DO    Please note that this dictation was completed with [employer], the computer voice recognition software. Quite often unanticipated grammatical, syntax, homophones, and other interpretive errors are inadvertently transcribed by the computer software. Please disregard these errors. Please excuse any errors that have escaped final proofreading. Thank you.          Valeri Stewart DO  10/18/23 6996

## 2023-10-18 NOTE — ED TRIAGE NOTES
PT. TO ED WITH C/O CHEST PAIN THAT STARTED THIS MORNING , CONSTANT SHARP . HX CAD AND BLOOD CLOT LLE. SAW PCP ON YESTERDAY. PLAN TO GO TO HEMATOLOGY.

## 2023-10-18 NOTE — H&P
HISTORY AND PHYSICAL             Date: 10/18/2023        Patient Name: Agustin Alfaro     YOB: 1975      Age:  49 y.o. Chief Complaint     Chief Complaint   Patient presents with    Chest Pain          History Obtained From   patient    History of Present Illness   49years old patient history of pericardial cyst, diabetes, obstructive sleep apnea, morbid obesity, presented with a complaint of chest pain patient has similar complaints a few months ago he was diagnosed with pericardial cyst and was advised to follow-up with cardiologist patient has not been compliant with his follow-up instructions denies any fever no chills    Past Medical History     Past Medical History:   Diagnosis Date    Acute MI ([address])     COPD (chronic obstructive pulmonary disease) ([address])     Depression     Hypertension     Ill-defined condition     Stroke [facility])         Past Surgical History     Past Surgical History:   Procedure Laterality Date    HERNIA REPAIR      IR FNA WITH IMAGING      ORTHOPEDIC SURGERY      rotator cuff surgery    OTHER SURGICAL HISTORY      Patent Foramen [place]vale Repair    THYROIDECTOMY          Medications Prior to Admission     Prior to Admission medications    Medication Sig Start Date End Date Taking?  Authorizing Provider   metFORMIN (GLUCOPHAGE) 500 MG tablet Take 1 tablet by mouth 2 times daily (with meals)   Yes Provider, MD Addy   furosemide (LASIX) 20 MG tablet Take 1 tablet by mouth daily for 7 days 10/11/23 10/18/23  Jair Castaneda MD   methocarbamol (ROBAXIN-750) 750 MG tablet Take 1 tablet by mouth 3 times daily as needed (pain) 10/11/23   Jair Castaneda MD   Acetaminophen (TYLENOL) 325 MG CAPS Take 650 mg by mouth every 6 hours as needed (for pain or fever) 10/11/23   Slick Campos MD   albuterol sulfate HFA (PROVENTIL HFA) 108 (90 Base) MCG/ACT inhaler Inhale 2 puffs into the lungs every 4 hours as needed for Wheezing 10/11/23   Jair Castaneda MD   rivaroxaban Seema Reynolds) 20 Urobilinogen, Urine 0.1 (L) 0.2 - 1.0 EU/dL    Nitrite, Urine Negative Negative      Leukocyte Esterase, Urine Negative Negative      WBC, UA 0-4 0 - 4 /hpf    RBC, UA 0-5 0 - 5 /hpf    Epithelial Cells UA Few Few /lpf    BACTERIA, URINE Negative Negative /hpf   Urine Drug Screen    Collection Time: 10/18/23 12:30 PM   Result Value Ref Range    Amphetamine, Urine Negative Negative      Barbiturates, Urine Negative Negative      Buprenorphine Urine Negative Negative      Benzodiazepines, Urine Negative Negative      Cocaine, Urine Negative Negative      Methadone, Urine Negative Negative      Methamphetamine, Urine Negative Negative      Opiates, Urine Negative Negative      Oxycodone Urine Negative Negative      PCP, Urine Negative Negative      Propoxyphene, Urine Negative Negative      THC, TH-Cannabinol, Urine Negative Negative      Tricyclic Negative Negative      Comments:       Blood Culture 1    Collection Time: 10/18/23 12:45 PM    Specimen: Blood   Result Value Ref Range    Special Requests No Special Requests      Culture No growth after 2 hours          Imaging/Diagnostics Last 24 Hours   CTA CHEST W WO CONTRAST PE Eval    Result Date: 10/18/2023  EXAM:  CTA CHEST W WO CONTRAST INDICATION: CP, SOB; Hx of DVT/PE; Hypoxia COMPARISON: 10/18/2023 radiograph and 10/11/2023 CTA. CONTRAST:  100 mL of Isovue-370. ? Technique: Thin axial images were obtained through the chest following the uneventful intravenous administration of contrast according to departmental PE protocol. Coronal and sagittal reconstructions were generated. MIP image reconstructions were also performed. CT dose reduction was achieved through use of a standardized protocol tailored for this examination and automatic exposure control for dose modulation. ? Findings: This is a fair quality study for the evaluation of pulmonary embolism to the segmental arterial level.  Vascular: No evidence of acute or chronic pulmonary embolism in the

## 2023-10-19 LAB
ALBUMIN SERPL-MCNC: 3.2 G/DL (ref 3.5–5)
ALBUMIN/GLOB SERPL: 0.8 (ref 1.1–2.2)
ALP SERPL-CCNC: 72 U/L (ref 45–117)
ALT SERPL-CCNC: 55 U/L (ref 12–78)
ANION GAP SERPL CALC-SCNC: 6 MMOL/L (ref 5–15)
AST SERPL W P-5'-P-CCNC: 26 U/L (ref 15–37)
BACTERIA SPEC CULT: NORMAL
BASOPHILS # BLD: 0 K/UL (ref 0–0.1)
BASOPHILS NFR BLD: 0 % (ref 0–1)
BILIRUB SERPL-MCNC: 0.2 MG/DL (ref 0.2–1)
BUN SERPL-MCNC: 14 MG/DL (ref 6–20)
BUN/CREAT SERPL: 9 (ref 12–20)
CA-I BLD-MCNC: 8.8 MG/DL (ref 8.5–10.1)
CHLORIDE SERPL-SCNC: 100 MMOL/L (ref 97–108)
CO2 SERPL-SCNC: 30 MMOL/L (ref 21–32)
CREAT SERPL-MCNC: 1.52 MG/DL (ref 0.7–1.3)
DIFFERENTIAL METHOD BLD: ABNORMAL
EKG ATRIAL RATE: 113 BPM
EKG DIAGNOSIS: NORMAL
EKG P AXIS: 49 DEGREES
EKG P-R INTERVAL: 182 MS
EKG Q-T INTERVAL: 338 MS
EKG QRS DURATION: 110 MS
EKG QTC CALCULATION (BAZETT): 463 MS
EKG R AXIS: 21 DEGREES
EKG T AXIS: 3 DEGREES
EKG VENTRICULAR RATE: 113 BPM
EOSINOPHIL # BLD: 0.1 K/UL (ref 0–0.4)
EOSINOPHIL NFR BLD: 1 % (ref 0–7)
ERYTHROCYTE [DISTWIDTH] IN BLOOD BY AUTOMATED COUNT: 13.8 % (ref 11.5–14.5)
GLOBULIN SER CALC-MCNC: 4.2 G/DL (ref 2–4)
GLUCOSE BLD STRIP.AUTO-MCNC: 243 MG/DL (ref 65–100)
GLUCOSE BLD STRIP.AUTO-MCNC: 261 MG/DL (ref 65–100)
GLUCOSE BLD STRIP.AUTO-MCNC: 279 MG/DL (ref 65–100)
GLUCOSE SERPL-MCNC: 249 MG/DL (ref 65–100)
HCT VFR BLD AUTO: 37 % (ref 36.6–50.3)
HGB BLD-MCNC: 12.2 G/DL (ref 12.1–17)
IMM GRANULOCYTES # BLD AUTO: 0 K/UL (ref 0–0.04)
IMM GRANULOCYTES NFR BLD AUTO: 0 % (ref 0–0.5)
LACTATE SERPL-SCNC: 4.2 MMOL/L (ref 0.4–2)
LYMPHOCYTES # BLD: 0.7 K/UL (ref 0.8–3.5)
LYMPHOCYTES NFR BLD: 10 % (ref 12–49)
Lab: NORMAL
MCH RBC QN AUTO: 32.1 PG (ref 26–34)
MCHC RBC AUTO-ENTMCNC: 33 G/DL (ref 30–36.5)
MCV RBC AUTO: 97.4 FL (ref 80–99)
MONOCYTES # BLD: 0.2 K/UL (ref 0–1)
MONOCYTES NFR BLD: 3 % (ref 5–13)
NEUTS SEG # BLD: 6.2 K/UL (ref 1.8–8)
NEUTS SEG NFR BLD: 86 % (ref 32–75)
NRBC # BLD: 0 K/UL (ref 0–0.01)
NRBC BLD-RTO: 0 PER 100 WBC
PERFORMED BY:: ABNORMAL
PLATELET # BLD AUTO: 218 K/UL (ref 150–400)
PMV BLD AUTO: 9.9 FL (ref 8.9–12.9)
POTASSIUM SERPL-SCNC: 3.5 MMOL/L (ref 3.5–5.1)
PROT SERPL-MCNC: 7.4 G/DL (ref 6.4–8.2)
RBC # BLD AUTO: 3.8 M/UL (ref 4.1–5.7)
SODIUM SERPL-SCNC: 136 MMOL/L (ref 136–145)
TROPONIN I SERPL HS-MCNC: 10 NG/L (ref 0–76)
TROPONIN I SERPL HS-MCNC: 11 NG/L (ref 0–76)
TROPONIN I SERPL HS-MCNC: 11 NG/L (ref 0–76)
TROPONIN I SERPL HS-MCNC: 12 NG/L (ref 0–76)
TROPONIN I SERPL HS-MCNC: 8 NG/L (ref 0–76)
WBC # BLD AUTO: 7.2 K/UL (ref 4.1–11.1)

## 2023-10-19 PROCEDURE — 6370000000 HC RX 637 (ALT 250 FOR IP): Performed by: INTERNAL MEDICINE

## 2023-10-19 PROCEDURE — 80053 COMPREHEN METABOLIC PANEL: CPT

## 2023-10-19 PROCEDURE — 6360000002 HC RX W HCPCS

## 2023-10-19 PROCEDURE — 94640 AIRWAY INHALATION TREATMENT: CPT

## 2023-10-19 PROCEDURE — 94761 N-INVAS EAR/PLS OXIMETRY MLT: CPT

## 2023-10-19 PROCEDURE — 85025 COMPLETE CBC W/AUTO DIFF WBC: CPT

## 2023-10-19 PROCEDURE — 84484 ASSAY OF TROPONIN QUANT: CPT

## 2023-10-19 PROCEDURE — 2060000000 HC ICU INTERMEDIATE R&B

## 2023-10-19 PROCEDURE — 36415 COLL VENOUS BLD VENIPUNCTURE: CPT

## 2023-10-19 PROCEDURE — 2580000003 HC RX 258: Performed by: INTERNAL MEDICINE

## 2023-10-19 PROCEDURE — 83605 ASSAY OF LACTIC ACID: CPT

## 2023-10-19 PROCEDURE — 6360000002 HC RX W HCPCS: Performed by: FAMILY MEDICINE

## 2023-10-19 PROCEDURE — 82962 GLUCOSE BLOOD TEST: CPT

## 2023-10-19 PROCEDURE — 6360000002 HC RX W HCPCS: Performed by: INTERNAL MEDICINE

## 2023-10-19 RX ORDER — IPRATROPIUM BROMIDE AND ALBUTEROL SULFATE 2.5; .5 MG/3ML; MG/3ML
1 SOLUTION RESPIRATORY (INHALATION)
Status: DISCONTINUED | OUTPATIENT
Start: 2023-10-19 | End: 2023-10-20 | Stop reason: HOSPADM

## 2023-10-19 RX ORDER — ATORVASTATIN CALCIUM 40 MG/1
40 TABLET, FILM COATED ORAL DAILY
Status: DISCONTINUED | OUTPATIENT
Start: 2023-10-19 | End: 2023-10-20 | Stop reason: HOSPADM

## 2023-10-19 RX ORDER — DEXTROSE MONOHYDRATE 100 MG/ML
INJECTION, SOLUTION INTRAVENOUS CONTINUOUS PRN
Status: DISCONTINUED | OUTPATIENT
Start: 2023-10-19 | End: 2023-10-20 | Stop reason: HOSPADM

## 2023-10-19 RX ORDER — IPRATROPIUM BROMIDE AND ALBUTEROL SULFATE 2.5; .5 MG/3ML; MG/3ML
1 SOLUTION RESPIRATORY (INHALATION) EVERY 4 HOURS PRN
Status: DISCONTINUED | OUTPATIENT
Start: 2023-10-19 | End: 2023-10-20 | Stop reason: HOSPADM

## 2023-10-19 RX ADMIN — FUROSEMIDE 20 MG: 40 TABLET ORAL at 09:19

## 2023-10-19 RX ADMIN — MORPHINE SULFATE 1 MG: 2 INJECTION, SOLUTION INTRAMUSCULAR; INTRAVENOUS at 13:20

## 2023-10-19 RX ADMIN — LEVOTHYROXINE SODIUM 200 MCG: 0.1 TABLET ORAL at 05:39

## 2023-10-19 RX ADMIN — BUDESONIDE INHALATION 250 MCG: 0.5 SUSPENSION RESPIRATORY (INHALATION) at 07:53

## 2023-10-19 RX ADMIN — METHYLPREDNISOLONE SODIUM SUCCINATE 40 MG: 40 INJECTION, POWDER, FOR SOLUTION INTRAMUSCULAR; INTRAVENOUS at 23:22

## 2023-10-19 RX ADMIN — DILTIAZEM HYDROCHLORIDE 60 MG: 60 CAPSULE, EXTENDED RELEASE ORAL at 20:22

## 2023-10-19 RX ADMIN — ATORVASTATIN CALCIUM 40 MG: 40 TABLET, FILM COATED ORAL at 15:26

## 2023-10-19 RX ADMIN — ALBUTEROL SULFATE 2.5 MG: 2.5 SOLUTION RESPIRATORY (INHALATION) at 07:53

## 2023-10-19 RX ADMIN — METHYLPREDNISOLONE SODIUM SUCCINATE 40 MG: 40 INJECTION, POWDER, FOR SOLUTION INTRAMUSCULAR; INTRAVENOUS at 05:39

## 2023-10-19 RX ADMIN — METFORMIN HYDROCHLORIDE 500 MG: 500 TABLET ORAL at 16:26

## 2023-10-19 RX ADMIN — SODIUM CHLORIDE, PRESERVATIVE FREE 10 ML: 5 INJECTION INTRAVENOUS at 09:20

## 2023-10-19 RX ADMIN — SODIUM CHLORIDE, PRESERVATIVE FREE 10 ML: 5 INJECTION INTRAVENOUS at 20:20

## 2023-10-19 RX ADMIN — CEFTRIAXONE SODIUM 1000 MG: 1 INJECTION, POWDER, FOR SOLUTION INTRAMUSCULAR; INTRAVENOUS at 13:31

## 2023-10-19 RX ADMIN — METOPROLOL SUCCINATE 25 MG: 25 TABLET, FILM COATED, EXTENDED RELEASE ORAL at 09:19

## 2023-10-19 RX ADMIN — HYDROCODONE BITARTRATE AND ACETAMINOPHEN 1 TABLET: 5; 325 TABLET ORAL at 03:33

## 2023-10-19 RX ADMIN — MORPHINE SULFATE 1 MG: 2 INJECTION, SOLUTION INTRAMUSCULAR; INTRAVENOUS at 20:20

## 2023-10-19 RX ADMIN — MORPHINE SULFATE 1 MG: 2 INJECTION, SOLUTION INTRAMUSCULAR; INTRAVENOUS at 05:39

## 2023-10-19 RX ADMIN — METFORMIN HYDROCHLORIDE 500 MG: 500 TABLET ORAL at 09:19

## 2023-10-19 RX ADMIN — RIVAROXABAN 20 MG: 20 TABLET, FILM COATED ORAL at 09:19

## 2023-10-19 RX ADMIN — TRAZODONE HYDROCHLORIDE 100 MG: 50 TABLET ORAL at 20:22

## 2023-10-19 RX ADMIN — METHYLPREDNISOLONE SODIUM SUCCINATE 40 MG: 40 INJECTION, POWDER, FOR SOLUTION INTRAMUSCULAR; INTRAVENOUS at 16:26

## 2023-10-19 RX ADMIN — ASPIRIN 81 MG CHEWABLE TABLET 81 MG: 81 TABLET CHEWABLE at 09:19

## 2023-10-19 ASSESSMENT — PAIN SCALES - GENERAL
PAINLEVEL_OUTOF10: 7
PAINLEVEL_OUTOF10: 9
PAINLEVEL_OUTOF10: 8
PAINLEVEL_OUTOF10: 9
PAINLEVEL_OUTOF10: 5
PAINLEVEL_OUTOF10: 2

## 2023-10-19 ASSESSMENT — PAIN DESCRIPTION - DESCRIPTORS
DESCRIPTORS: SHARP
DESCRIPTORS: SHARP
DESCRIPTORS: DISCOMFORT

## 2023-10-19 ASSESSMENT — PAIN DESCRIPTION - ORIENTATION
ORIENTATION: LEFT
ORIENTATION: ANTERIOR

## 2023-10-19 ASSESSMENT — PAIN DESCRIPTION - FREQUENCY
FREQUENCY: CONTINUOUS
FREQUENCY: CONTINUOUS

## 2023-10-19 ASSESSMENT — PAIN DESCRIPTION - LOCATION
LOCATION: CHEST

## 2023-10-19 ASSESSMENT — PAIN - FUNCTIONAL ASSESSMENT: PAIN_FUNCTIONAL_ASSESSMENT: ACTIVITIES ARE NOT PREVENTED

## 2023-10-19 ASSESSMENT — PAIN SCALES - WONG BAKER: WONGBAKER_NUMERICALRESPONSE: 0

## 2023-10-19 NOTE — PROGRESS NOTES
Dual skin assessment performed with second RN. No open wounds. Redness noted to bilateral lower extremities. CHG bath performed and tele box placed.

## 2023-10-19 NOTE — CONSULTS
Microscopic    Collection Time: 10/18/23 12:30 PM   Result Value Ref Range    Color, UA Yellow      Appearance Clear Clear      Specific Gravity, UA 1.010 1.003 - 1.030      pH, Urine 5.0 5.0 - 8.0      Protein, UA Negative Negative mg/dL    Glucose, UA Negative Negative mg/dL    Ketones, Urine Negative Negative mg/dL    Bilirubin Urine Negative Negative      Blood, Urine Trace (A) Negative      Urobilinogen, Urine 0.1 (L) 0.2 - 1.0 EU/dL    Nitrite, Urine Negative Negative      Leukocyte Esterase, Urine Negative Negative      WBC, UA 0-4 0 - 4 /hpf    RBC, UA 0-5 0 - 5 /hpf    Epithelial Cells UA Few Few /lpf    BACTERIA, URINE Negative Negative /hpf   Urine Drug Screen    Collection Time: 10/18/23 12:30 PM   Result Value Ref Range    Amphetamine, Urine Negative Negative      Barbiturates, Urine Negative Negative      Buprenorphine Urine Negative Negative      Benzodiazepines, Urine Negative Negative      Cocaine, Urine Negative Negative      Methadone, Urine Negative Negative      Methamphetamine, Urine Negative Negative      Opiates, Urine Negative Negative      Oxycodone Urine Negative Negative      PCP, Urine Negative Negative      Propoxyphene, Urine Negative Negative      THC, TH-Cannabinol, Urine Negative Negative      Tricyclic Negative Negative      Comments:       Blood Culture 1    Collection Time: 10/18/23 12:45 PM    Specimen: Blood   Result Value Ref Range    Special Requests No Special Requests      Culture No growth after 7 hours     MRSA by PCR    Collection Time: 10/18/23 12:58 PM    Specimen: Swab   Result Value Ref Range    MRSA by PCR Not Detected     Rapid influenza A/B antigens    Collection Time: 10/18/23  6:35 PM    Specimen: Nasal Washing   Result Value Ref Range    Influenza A Ag Negative Negative      Influenza B Ag Negative Negative     POCT Glucose    Collection Time: 10/18/23  9:59 PM   Result Value Ref Range    POC Glucose 109 (H) 65 - 100 mg/dL    Performed by: Naveen Horan 10/19/23  2:50 AM   Result Value Ref Range    Lactic Acid, Plasma 4.2 (HH) 0.4 - 2.0 mmol/L   Troponin    Collection Time: 10/19/23  6:58 AM   Result Value Ref Range    Troponin, High Sensitivity 8 0 - 76 ng/L   POCT Glucose    Collection Time: 10/19/23  7:42 AM   Result Value Ref Range    POC Glucose 243 (H) 65 - 100 mg/dL    Performed by: Risa Beth MD

## 2023-10-19 NOTE — CONSULTS
Consult    Subjective:     Patient is a 49y.o. year old male morbidly obese male with history of pericardial cyst, DM, obstructive sleep apnea was consulted by cardiology due to chest pain. He reports a 5/10 pain today. He had similar complaints of chest pain a few months ago where he was diagnosed with the pericardial cyst and was advised to follow up with a cardiologist which he has not been compliant. Denies SOB.      Past Medical History:   Diagnosis Date    Acute MI ([address])     COPD (chronic obstructive pulmonary disease) ([address])     Depression     Hypertension     Ill-defined condition     Stroke ([address])       Past Surgical History:   Procedure Laterality Date    HERNIA REPAIR      IR FNA WITH IMAGING      ORTHOPEDIC SURGERY      rotator cuff surgery    OTHER SURGICAL HISTORY      Patent Foramen Ovale Repair    THYROIDECTOMY       Family History   Problem Relation Age of Onset    Diabetes Maternal Uncle     Hypertension Maternal Uncle     Hypertension Paternal Uncle     Diabetes Paternal Uncle     Cancer Other     No Known Problems Father     Cancer Mother       Social History     Tobacco Use    Smoking status: Former    Smokeless tobacco: Never   Substance Use Topics    Alcohol use: Yes       Current Facility-Administered Medications   Medication Dose Route Frequency Provider Last Rate Last Admin    ipratropium 0.5 mg-albuterol 2.5 mg (DUONEB) nebulizer solution 1 Dose  1 Dose Inhalation BID RT Jim Mcknight MD        ipratropium 0.5 mg-albuterol 2.5 mg (DUONEB) nebulizer solution 1 Dose  1 Dose Inhalation Q4H PRN Lisseth FINNEY MD        sodium chloride flush 0.9 % injection 5-40 mL  5-40 mL IntraVENous 2 times per day Adan Davis MD   10 mL at 10/19/23 0920    sodium chloride flush 0.9 % injection 5-40 mL  5-40 mL IntraVENous PRN Lisseth FINNEY MD        0.9 % sodium chloride infusion   IntraVENous PRN Lisseth FINNEY MD        ondansetron (ZOFRAN-ODT) disintegrating tablet 4 mg  4 mg Oral Q8H

## 2023-10-19 NOTE — PROGRESS NOTES
Patient c/o chest pain after receiving prn hydrocodone. Contacted  who is on call for  and received verbal orders for Morphine 1mg IV Q6 for severe pain.

## 2023-10-20 VITALS
RESPIRATION RATE: 16 BRPM | OXYGEN SATURATION: 98 % | HEART RATE: 92 BPM | TEMPERATURE: 98.1 F | SYSTOLIC BLOOD PRESSURE: 152 MMHG | BODY MASS INDEX: 38.36 KG/M2 | WEIGHT: 315 LBS | DIASTOLIC BLOOD PRESSURE: 84 MMHG | HEIGHT: 76 IN

## 2023-10-20 LAB
ALBUMIN SERPL-MCNC: 3.5 G/DL (ref 3.5–5)
ALBUMIN/GLOB SERPL: 0.8 (ref 1.1–2.2)
ALP SERPL-CCNC: 68 U/L (ref 45–117)
ALT SERPL-CCNC: 50 U/L (ref 12–78)
ANION GAP SERPL CALC-SCNC: 5 MMOL/L (ref 5–15)
AST SERPL W P-5'-P-CCNC: 17 U/L (ref 15–37)
BILIRUB SERPL-MCNC: 0.2 MG/DL (ref 0.2–1)
BUN SERPL-MCNC: 17 MG/DL (ref 6–20)
BUN/CREAT SERPL: 16 (ref 12–20)
CA-I BLD-MCNC: 9.4 MG/DL (ref 8.5–10.1)
CHLORIDE SERPL-SCNC: 102 MMOL/L (ref 97–108)
CO2 SERPL-SCNC: 28 MMOL/L (ref 21–32)
CREAT SERPL-MCNC: 1.09 MG/DL (ref 0.7–1.3)
ERYTHROCYTE [DISTWIDTH] IN BLOOD BY AUTOMATED COUNT: 13.8 % (ref 11.5–14.5)
GLOBULIN SER CALC-MCNC: 4.3 G/DL (ref 2–4)
GLUCOSE BLD STRIP.AUTO-MCNC: 189 MG/DL (ref 65–100)
GLUCOSE BLD STRIP.AUTO-MCNC: 191 MG/DL (ref 65–100)
GLUCOSE BLD STRIP.AUTO-MCNC: 199 MG/DL (ref 65–100)
GLUCOSE SERPL-MCNC: 235 MG/DL (ref 65–100)
HCT VFR BLD AUTO: 37.6 % (ref 36.6–50.3)
HGB BLD-MCNC: 12.4 G/DL (ref 12.1–17)
MCH RBC QN AUTO: 32.5 PG (ref 26–34)
MCHC RBC AUTO-ENTMCNC: 33 G/DL (ref 30–36.5)
MCV RBC AUTO: 98.4 FL (ref 80–99)
NRBC # BLD: 0 K/UL (ref 0–0.01)
NRBC BLD-RTO: 0 PER 100 WBC
PERFORMED BY:: ABNORMAL
PLATELET # BLD AUTO: 242 K/UL (ref 150–400)
PMV BLD AUTO: 10 FL (ref 8.9–12.9)
POTASSIUM SERPL-SCNC: 4.1 MMOL/L (ref 3.5–5.1)
PROT SERPL-MCNC: 7.8 G/DL (ref 6.4–8.2)
RBC # BLD AUTO: 3.82 M/UL (ref 4.1–5.7)
SODIUM SERPL-SCNC: 135 MMOL/L (ref 136–145)
TROPONIN I SERPL HS-MCNC: 10 NG/L (ref 0–76)
TROPONIN I SERPL HS-MCNC: 11 NG/L (ref 0–76)
WBC # BLD AUTO: 11.8 K/UL (ref 4.1–11.1)

## 2023-10-20 PROCEDURE — 6360000002 HC RX W HCPCS: Performed by: INTERNAL MEDICINE

## 2023-10-20 PROCEDURE — 80053 COMPREHEN METABOLIC PANEL: CPT

## 2023-10-20 PROCEDURE — 94761 N-INVAS EAR/PLS OXIMETRY MLT: CPT

## 2023-10-20 PROCEDURE — 82962 GLUCOSE BLOOD TEST: CPT

## 2023-10-20 PROCEDURE — 2580000003 HC RX 258: Performed by: INTERNAL MEDICINE

## 2023-10-20 PROCEDURE — 6370000000 HC RX 637 (ALT 250 FOR IP): Performed by: INTERNAL MEDICINE

## 2023-10-20 PROCEDURE — 36415 COLL VENOUS BLD VENIPUNCTURE: CPT

## 2023-10-20 PROCEDURE — 94640 AIRWAY INHALATION TREATMENT: CPT

## 2023-10-20 PROCEDURE — 84484 ASSAY OF TROPONIN QUANT: CPT

## 2023-10-20 PROCEDURE — 85027 COMPLETE CBC AUTOMATED: CPT

## 2023-10-20 RX ORDER — PREDNISONE 20 MG/1
20 TABLET ORAL DAILY
Qty: 10 TABLET | Refills: 0 | Status: SHIPPED | OUTPATIENT
Start: 2023-10-20 | End: 2023-10-30

## 2023-10-20 RX ORDER — BUDESONIDE 0.5 MG/2ML
250 INHALANT ORAL 2 TIMES DAILY
Qty: 60 EACH | Refills: 3 | Status: SHIPPED | OUTPATIENT
Start: 2023-10-20

## 2023-10-20 RX ORDER — INSULIN GLARGINE 100 [IU]/ML
20 INJECTION, SOLUTION SUBCUTANEOUS NIGHTLY
Qty: 10 ML | Refills: 3 | Status: SHIPPED | OUTPATIENT
Start: 2023-10-20

## 2023-10-20 RX ADMIN — CEFTRIAXONE SODIUM 1000 MG: 1 INJECTION, POWDER, FOR SOLUTION INTRAMUSCULAR; INTRAVENOUS at 13:28

## 2023-10-20 RX ADMIN — METFORMIN HYDROCHLORIDE 500 MG: 500 TABLET ORAL at 08:55

## 2023-10-20 RX ADMIN — RIVAROXABAN 20 MG: 20 TABLET, FILM COATED ORAL at 08:56

## 2023-10-20 RX ADMIN — DILTIAZEM HYDROCHLORIDE 60 MG: 60 CAPSULE, EXTENDED RELEASE ORAL at 08:56

## 2023-10-20 RX ADMIN — METHYLPREDNISOLONE SODIUM SUCCINATE 40 MG: 40 INJECTION, POWDER, FOR SOLUTION INTRAMUSCULAR; INTRAVENOUS at 11:36

## 2023-10-20 RX ADMIN — ASPIRIN 81 MG CHEWABLE TABLET 81 MG: 81 TABLET CHEWABLE at 08:56

## 2023-10-20 RX ADMIN — FUROSEMIDE 20 MG: 40 TABLET ORAL at 08:55

## 2023-10-20 RX ADMIN — LEVOTHYROXINE SODIUM 200 MCG: 0.1 TABLET ORAL at 06:00

## 2023-10-20 RX ADMIN — METHYLPREDNISOLONE SODIUM SUCCINATE 40 MG: 40 INJECTION, POWDER, FOR SOLUTION INTRAMUSCULAR; INTRAVENOUS at 05:58

## 2023-10-20 RX ADMIN — ATORVASTATIN CALCIUM 40 MG: 40 TABLET, FILM COATED ORAL at 08:56

## 2023-10-20 RX ADMIN — IPRATROPIUM BROMIDE AND ALBUTEROL SULFATE 1 DOSE: 2.5; .5 SOLUTION RESPIRATORY (INHALATION) at 09:26

## 2023-10-20 RX ADMIN — METOPROLOL SUCCINATE 25 MG: 25 TABLET, FILM COATED, EXTENDED RELEASE ORAL at 08:56

## 2023-10-20 RX ADMIN — BUDESONIDE INHALATION 250 MCG: 0.5 SUSPENSION RESPIRATORY (INHALATION) at 09:26

## 2023-10-20 RX ADMIN — SODIUM CHLORIDE, PRESERVATIVE FREE 10 ML: 5 INJECTION INTRAVENOUS at 08:56

## 2023-10-20 ASSESSMENT — PAIN SCALES - GENERAL
PAINLEVEL_OUTOF10: 0
PAINLEVEL_OUTOF10: 0

## 2023-10-20 NOTE — PROGRESS NOTES
VSS. Patient given discharge instructions. Medications and follow-up appointments reviewed. IV and Telemetry removed. Case management, provider, and primary nurse aware of discharge. Discharge plan of care/case management plan validated with provider discharge order.  Waiting for ride

## 2023-10-20 NOTE — DISCHARGE SUMMARY
100 UNIT/ML injection vial  predniSONE 20 MG tablet             NOTIFY YOUR PHYSICIAN FOR ANY OF THE FOLLOWING:   Fever over 101 degrees for 24 hours. Chest pain, shortness of breath, fever, chills, nausea, vomiting, diarrhea, change in mentation, falling, weakness, bleeding. Severe pain or pain not relieved by medications. Or, any other signs or symptoms that you may have questions about. DISPOSITION:  x  Home With:   OT  PT  HH  RN       Long term SNF/Inpatient Rehab    Independent/assisted living    Hospice    Other:       PATIENT CONDITION AT DISCHARGE: Stable      PHYSICAL EXAMINATION AT DISCHARGE:  General:          Alert, cooperative, no distress, appears stated age. HEENT:           Atraumatic, anicteric sclerae, pink conjunctivae                          No oral ulcers, mucosa moist, throat clear, dentition fair  Neck:               Supple, symmetrical  Lungs:             Clear to auscultation bilaterally. No Wheezing or Rhonchi. No rales. Chest wall:      No tenderness  No Accessory muscle use. Heart:              Regular  rhythm,  No  murmur   No edema  Abdomen:        Soft, non-tender. Not distended. Bowel sounds normal  Extremities:     No cyanosis. No clubbing,                            Skin turgor normal, Capillary refill normal  Skin:                Not pale. Not Jaundiced  No rashes   Psych:             Not anxious or agitated. Neurologic:      Alert, moves all extremities, answers questions appropriately and responds to commands     CTA CHEST W WO CONTRAST PE Eval   Final Result   No pulmonary embolism. No thoracic aortic dissection or aneurysm. No acute cardiopulmonary abnormality. Incidental notice of left adrenal myelolipomas measuring up to 2.4 x 3.3 cm.       XR CHEST PORTABLE   Final Result      No acute process on portable chest.              Recent Results (from the past 24 hour(s))   POCT Glucose    Collection Time: 10/19/23 11:57 AM   Result Value Ref Range 11 0 - 76 ng/L   POCT Glucose    Collection Time: 10/20/23  7:49 AM   Result Value Ref Range    POC Glucose 189 (H) 65 - 100 mg/dL    Performed by: Candice Ponce       No results found. HOSPITAL COURSE:    Patient is a 49y.o. year old male morbidly obese male with history of pericardial cyst, DM, obstructive sleep apnea was consulted by cardiology due to chest pain. He reports a 5/10 pain today. He had similar complaints of chest pain a few months ago where he was diagnosed with the pericardial cyst and was advised to follow up with a cardiologist which he has not been compliant.  Denies SOB      Patient denies any chest pain shortness of breath nausea no vomiting patient was cleared by the cardiology    Medication reconciliation done time discharge patient 35 minutes 50% time spent counseling and coordination of care      Signed:   Cody Butt MD  10/20/2023  11:01 AM

## 2023-10-20 NOTE — PROGRESS NOTES
Progress Note      10/20/2023 12:01 PM  NAME: Kyra Sullivan   MRN:  [id number]   Admit Diagnosis: Lactic acidosis [E87.20]  Respiratory alkalosis [E87.3]  Pericardial cyst [Q24.8]  Hypoxia [R09.02]  Acute chest pain [R07.9]      Problem List:   -Admitted to the hospital with chest pain  -History of pleural cysts consistent with bullae measuring 4.8 in two 2.14 to 3.3 cm cyst.  -Amplatz device for ASD closure is in place per CTA  -History of diabetes mellitus  -Obstructive sleep apnea  -Morbid obesity  -History of old myocardial infarction  -COPD  -Hypertension  -History of a stroke not otherwise specified details not available though residual weakness  -Anxiety and depression       Assessment/Plan:   Note dictated October 28, 2023  -Patient is sitting up in the bed and denies any symptoms of chest pain shortness of breath or any other anginal equivalent.  -No acute event overnight and he is otherwise clinically and hemodynamically stable per my cardiac evaluation. -EKG shows no acute changes telemetry shows no cardiac arrhythmias or ST-T wave deviation and cardiac enzyme has been unremarkable.  -Okay to discharge to follow-up with us in 2 weeks or earlier as needed as per my discussion with the patient. []       High complexity decision making was performed in this patient at high risk for decompensation with multiple organ involvement. Subjective:     Kyra Sullivan is a 49 y.o. White (non-) male who has no known established coronary artery disease and admitted to the hospital with chest pain and has no acute EKG changes other than sinus tachycardia with normal cardiac enzymes. Patient has history of ASD closure which seems to be fine for CTA of the chest as Amplatz device is in place with no intracardiac shunt.   Patient is otherwise clinically and hemodynamically stable and per my cardiac evaluation can be discharged to follow-up with us in 2 to 4 weeks or earlier ipratropium 0.5 mg-albuterol 2.5 mg (DUONEB) nebulizer solution 1 Dose  1 Dose Inhalation Q4H PRN    atorvastatin (LIPITOR) tablet 40 mg  40 mg Oral Daily    glucose chewable tablet 16 g  4 tablet Oral PRN    dextrose bolus 10% 125 mL  125 mL IntraVENous PRN    Or    dextrose bolus 10% 250 mL  250 mL IntraVENous PRN    glucagon injection 1 mg  1 mg SubCUTAneous PRN    dextrose 10 % infusion   IntraVENous Continuous PRN    sodium chloride flush 0.9 % injection 5-40 mL  5-40 mL IntraVENous 2 times per day    sodium chloride flush 0.9 % injection 5-40 mL  5-40 mL IntraVENous PRN    0.9 % sodium chloride infusion   IntraVENous PRN    ondansetron (ZOFRAN-ODT) disintegrating tablet 4 mg  4 mg Oral Q8H PRN    Or    ondansetron (ZOFRAN) injection 4 mg  4 mg IntraVENous Q6H PRN    acetaminophen (TYLENOL) tablet 650 mg  650 mg Oral Q6H PRN    Or    acetaminophen (TYLENOL) suppository 650 mg  650 mg Rectal Q6H PRN    budesonide (PULMICORT) nebulizer suspension 250 mcg  250 mcg Nebulization BID    methylPREDNISolone sodium (PF) (SOLU-MEDROL PF) injection 40 mg  40 mg IntraVENous Q6H    aspirin chewable tablet 81 mg  81 mg Oral Daily    dilTIAZem (CARDIZEM 12 HR) extended release capsule 60 mg  60 mg Oral BID    furosemide (LASIX) tablet 20 mg  20 mg Oral Daily    glucose chewable tablet 16 g  4 tablet Oral PRN    insulin glargine (LANTUS) injection vial 20 Units  20 Units SubCUTAneous Nightly    insulin lispro (HUMALOG) injection vial 0-8 Units  0-8 Units SubCUTAneous TID WC    levothyroxine (SYNTHROID) tablet 200 mcg  200 mcg Oral Daily    metFORMIN (GLUCOPHAGE) tablet 500 mg  500 mg Oral BID WC    metoprolol succinate (TOPROL XL) extended release tablet 25 mg  25 mg Oral Daily    polyethylene glycol (GLYCOLAX) packet 17 g  17 g Oral Daily PRN    rivaroxaban (XARELTO) tablet 20 mg  20 mg Oral Daily    traZODone (DESYREL) tablet 100 mg  100 mg Oral Nightly    cefTRIAXone (ROCEPHIN) 1,000 mg in sterile water 10 mL IV syringe

## 2023-10-20 NOTE — DISCHARGE INSTRUCTIONS
Discharge Instructions       PATIENT ID: Damien Michele  MRN: [id number]   YOB: 1975    DATE OF ADMISSION: 10/18/2023  DATE OF DISCHARGE: 10/20/2023    PRIMARY CARE PROVIDER: [unfilled]     ATTENDING PHYSICIAN: Dario Mcdaniels MD   DISCHARGING PROVIDER: Brandon Salcedo MD    To contact this individual call [phone] and ask the  to page. If unavailable, ask to be transferred the Adult Hospitalist Department. DISCHARGE DIAGNOSES CAD COPD    CONSULTATIONS: [unfilled]      PROCEDURES/SURGERIES: * No surgery found *    PENDING TEST RESULTS:   At the time of discharge the following test results are still pending: None    FOLLOW UP APPOINTMENTS:   Sahra Patterson, [address] [facility]  [phone]    Follow up in 1 week(s)         ADDITIONAL CARE RECOMMENDATIONS: Follow with the cardiology pulmonary and PCP    DIET: Diabetic cardiac diet      ACTIVITY: Activity as tolerated    Wound care: {Discharge Wound Care Instructions:18284}    EQUIPMENT needed: ***      DISCHARGE MEDICATIONS:   See Medication Reconciliation Form    It is important that you take the medication exactly as they are prescribed. Keep your medication in the bottles provided by the pharmacist and keep a list of the medication names, dosages, and times to be taken in your wallet. Do not take other medications without consulting your doctor. NOTIFY YOUR PHYSICIAN FOR ANY OF THE FOLLOWING:   Fever over 101 degrees for 24 hours. Chest pain, shortness of breath, fever, chills, nausea, vomiting, diarrhea, change in mentation, falling, weakness, bleeding. Severe pain or pain not relieved by medications. Or, any other signs or symptoms that you may have questions about.       DISPOSITION:    Home With:   OT  PT  HH  RN       SNF/Inpatient Rehab/LTAC    Independent/assisted living    Hospice    Other:         PROBLEM LIST Updated:  Yes ***       Signed:   Brandon Salcedo MD  10/20/2023  11:00 AM

## 2023-10-22 LAB
BACTERIA SPEC CULT: NORMAL
BACTERIA SPEC CULT: NORMAL
Lab: NORMAL
Lab: NORMAL

## 2023-10-24 LAB
BACTERIA SPEC CULT: NORMAL
BACTERIA SPEC CULT: NORMAL
Lab: NORMAL
Lab: NORMAL

## 2023-11-02 ENCOUNTER — APPOINTMENT (OUTPATIENT)
Facility: HOSPITAL | Age: 48
DRG: 861 | End: 2023-11-02
Payer: MEDICAID

## 2023-11-02 ENCOUNTER — HOSPITAL ENCOUNTER (INPATIENT)
Facility: HOSPITAL | Age: 48
LOS: 7 days | Discharge: HOME | DRG: 861 | End: 2023-11-09
Attending: STUDENT IN AN ORGANIZED HEALTH CARE EDUCATION/TRAINING PROGRAM | Admitting: INTERNAL MEDICINE
Payer: MEDICAID

## 2023-11-02 DIAGNOSIS — R29.898 LEFT HAND WEAKNESS: Primary | ICD-10-CM

## 2023-11-02 DIAGNOSIS — I63.9 CEREBROVASCULAR ACCIDENT (CVA), UNSPECIFIED MECHANISM (HCC): ICD-10-CM

## 2023-11-02 DIAGNOSIS — J44.1 COPD EXACERBATION (HCC): ICD-10-CM

## 2023-11-02 DIAGNOSIS — I61.9 CVA (CEREBROVASCULAR ACCIDENT DUE TO INTRACEREBRAL HEMORRHAGE) (HCC): ICD-10-CM

## 2023-11-02 DIAGNOSIS — G45.9 TIA (TRANSIENT ISCHEMIC ATTACK): ICD-10-CM

## 2023-11-02 LAB
ALBUMIN SERPL-MCNC: 3.8 G/DL (ref 3.5–5)
ALBUMIN/GLOB SERPL: 0.9 (ref 1.1–2.2)
ALP SERPL-CCNC: 74 U/L (ref 45–117)
ALT SERPL-CCNC: 74 U/L (ref 12–78)
ANION GAP SERPL CALC-SCNC: 7 MMOL/L (ref 5–15)
AST SERPL W P-5'-P-CCNC: 37 U/L (ref 15–37)
BASOPHILS # BLD: 0 K/UL (ref 0–0.1)
BASOPHILS NFR BLD: 0 % (ref 0–1)
BILIRUB SERPL-MCNC: 0.4 MG/DL (ref 0.2–1)
BUN SERPL-MCNC: 11 MG/DL (ref 6–20)
BUN/CREAT SERPL: 10 (ref 12–20)
CA-I BLD-MCNC: 9 MG/DL (ref 8.5–10.1)
CHLORIDE SERPL-SCNC: 101 MMOL/L (ref 97–108)
CO2 SERPL-SCNC: 32 MMOL/L (ref 21–32)
CREAT SERPL-MCNC: 1.14 MG/DL (ref 0.7–1.3)
DIFFERENTIAL METHOD BLD: ABNORMAL
EOSINOPHIL # BLD: 0.1 K/UL (ref 0–0.4)
EOSINOPHIL NFR BLD: 2 % (ref 0–7)
ERYTHROCYTE [DISTWIDTH] IN BLOOD BY AUTOMATED COUNT: 13.7 % (ref 11.5–14.5)
GLOBULIN SER CALC-MCNC: 4.1 G/DL (ref 2–4)
GLUCOSE BLD STRIP.AUTO-MCNC: 111 MG/DL (ref 65–100)
GLUCOSE BLD STRIP.AUTO-MCNC: 174 MG/DL (ref 65–100)
GLUCOSE SERPL-MCNC: 106 MG/DL (ref 65–100)
HCT VFR BLD AUTO: 39.1 % (ref 36.6–50.3)
HGB BLD-MCNC: 13 G/DL (ref 12.1–17)
IMM GRANULOCYTES # BLD AUTO: 0 K/UL (ref 0–0.04)
IMM GRANULOCYTES NFR BLD AUTO: 0 % (ref 0–0.5)
INR PPP: 1.2 (ref 0.9–1.1)
LYMPHOCYTES # BLD: 1.5 K/UL (ref 0.8–3.5)
LYMPHOCYTES NFR BLD: 20 % (ref 12–49)
MCH RBC QN AUTO: 32.8 PG (ref 26–34)
MCHC RBC AUTO-ENTMCNC: 33.2 G/DL (ref 30–36.5)
MCV RBC AUTO: 98.7 FL (ref 80–99)
MONOCYTES # BLD: 0.5 K/UL (ref 0–1)
MONOCYTES NFR BLD: 7 % (ref 5–13)
NEUTS SEG # BLD: 5.3 K/UL (ref 1.8–8)
NEUTS SEG NFR BLD: 71 % (ref 32–75)
PERFORMED BY:: ABNORMAL
PERFORMED BY:: ABNORMAL
PLATELET # BLD AUTO: 222 K/UL (ref 150–400)
PMV BLD AUTO: 9.9 FL (ref 8.9–12.9)
POTASSIUM SERPL-SCNC: 4.4 MMOL/L (ref 3.5–5.1)
PROT SERPL-MCNC: 7.9 G/DL (ref 6.4–8.2)
PROTHROMBIN TIME: 11.7 SEC (ref 9–11.1)
RBC # BLD AUTO: 3.96 M/UL (ref 4.1–5.7)
SODIUM SERPL-SCNC: 140 MMOL/L (ref 136–145)
TROPONIN I SERPL HS-MCNC: 10 NG/L (ref 0–76)
TROPONIN I SERPL HS-MCNC: 10 NG/L (ref 0–76)
TROPONIN I SERPL HS-MCNC: 9 NG/L (ref 0–76)
WBC # BLD AUTO: 7.4 K/UL (ref 4.1–11.1)

## 2023-11-02 PROCEDURE — 6360000002 HC RX W HCPCS: Performed by: INTERNAL MEDICINE

## 2023-11-02 PROCEDURE — 6370000000 HC RX 637 (ALT 250 FOR IP): Performed by: INTERNAL MEDICINE

## 2023-11-02 PROCEDURE — 85610 PROTHROMBIN TIME: CPT

## 2023-11-02 PROCEDURE — 70450 CT HEAD/BRAIN W/O DYE: CPT

## 2023-11-02 PROCEDURE — 93005 ELECTROCARDIOGRAM TRACING: CPT

## 2023-11-02 PROCEDURE — 94640 AIRWAY INHALATION TREATMENT: CPT

## 2023-11-02 PROCEDURE — 70496 CT ANGIOGRAPHY HEAD: CPT

## 2023-11-02 PROCEDURE — 6360000004 HC RX CONTRAST MEDICATION: Performed by: STUDENT IN AN ORGANIZED HEALTH CARE EDUCATION/TRAINING PROGRAM

## 2023-11-02 PROCEDURE — 6370000000 HC RX 637 (ALT 250 FOR IP): Performed by: STUDENT IN AN ORGANIZED HEALTH CARE EDUCATION/TRAINING PROGRAM

## 2023-11-02 PROCEDURE — 36415 COLL VENOUS BLD VENIPUNCTURE: CPT

## 2023-11-02 PROCEDURE — 99285 EMERGENCY DEPT VISIT HI MDM: CPT

## 2023-11-02 PROCEDURE — 84484 ASSAY OF TROPONIN QUANT: CPT

## 2023-11-02 PROCEDURE — 80053 COMPREHEN METABOLIC PANEL: CPT

## 2023-11-02 PROCEDURE — 82962 GLUCOSE BLOOD TEST: CPT

## 2023-11-02 PROCEDURE — 6360000002 HC RX W HCPCS: Performed by: STUDENT IN AN ORGANIZED HEALTH CARE EDUCATION/TRAINING PROGRAM

## 2023-11-02 PROCEDURE — 1100000000 HC RM PRIVATE

## 2023-11-02 PROCEDURE — 85025 COMPLETE CBC W/AUTO DIFF WBC: CPT

## 2023-11-02 PROCEDURE — 2580000003 HC RX 258: Performed by: INTERNAL MEDICINE

## 2023-11-02 PROCEDURE — 71045 X-RAY EXAM CHEST 1 VIEW: CPT

## 2023-11-02 RX ORDER — ONDANSETRON 2 MG/ML
4 INJECTION INTRAMUSCULAR; INTRAVENOUS EVERY 6 HOURS PRN
Status: DISCONTINUED | OUTPATIENT
Start: 2023-11-02 | End: 2023-11-09 | Stop reason: HOSPADM

## 2023-11-02 RX ORDER — ATORVASTATIN CALCIUM 20 MG/1
20 TABLET, FILM COATED ORAL DAILY
Status: DISCONTINUED | OUTPATIENT
Start: 2023-11-03 | End: 2023-11-09 | Stop reason: HOSPADM

## 2023-11-02 RX ORDER — TRAZODONE HYDROCHLORIDE 50 MG/1
100 TABLET ORAL NIGHTLY
Status: DISCONTINUED | OUTPATIENT
Start: 2023-11-02 | End: 2023-11-09 | Stop reason: HOSPADM

## 2023-11-02 RX ORDER — IPRATROPIUM BROMIDE AND ALBUTEROL SULFATE 2.5; .5 MG/3ML; MG/3ML
1 SOLUTION RESPIRATORY (INHALATION) EVERY 6 HOURS PRN
Status: DISCONTINUED | OUTPATIENT
Start: 2023-11-02 | End: 2023-11-03

## 2023-11-02 RX ORDER — ATORVASTATIN CALCIUM 40 MG/1
20 TABLET, FILM COATED ORAL DAILY
Status: DISCONTINUED | OUTPATIENT
Start: 2023-11-02 | End: 2023-11-02

## 2023-11-02 RX ORDER — FUROSEMIDE 40 MG/1
40 TABLET ORAL DAILY
Status: DISCONTINUED | OUTPATIENT
Start: 2023-11-02 | End: 2023-11-02

## 2023-11-02 RX ORDER — SODIUM CHLORIDE 9 MG/ML
INJECTION, SOLUTION INTRAVENOUS PRN
Status: DISCONTINUED | OUTPATIENT
Start: 2023-11-02 | End: 2023-11-09 | Stop reason: HOSPADM

## 2023-11-02 RX ORDER — DILTIAZEM HYDROCHLORIDE 60 MG/1
60 CAPSULE, EXTENDED RELEASE ORAL 2 TIMES DAILY
Status: DISCONTINUED | OUTPATIENT
Start: 2023-11-02 | End: 2023-11-09 | Stop reason: HOSPADM

## 2023-11-02 RX ORDER — METOPROLOL SUCCINATE 25 MG/1
25 TABLET, EXTENDED RELEASE ORAL DAILY
Status: DISCONTINUED | OUTPATIENT
Start: 2023-11-02 | End: 2023-11-09 | Stop reason: HOSPADM

## 2023-11-02 RX ORDER — MORPHINE SULFATE 2 MG/ML
1 INJECTION, SOLUTION INTRAMUSCULAR; INTRAVENOUS EVERY 4 HOURS PRN
Status: DISPENSED | OUTPATIENT
Start: 2023-11-02 | End: 2023-11-04

## 2023-11-02 RX ORDER — INSULIN GLARGINE 100 [IU]/ML
20 INJECTION, SOLUTION SUBCUTANEOUS NIGHTLY
Status: DISCONTINUED | OUTPATIENT
Start: 2023-11-02 | End: 2023-11-09 | Stop reason: HOSPADM

## 2023-11-02 RX ORDER — BUDESONIDE 0.5 MG/2ML
250 INHALANT ORAL 2 TIMES DAILY
Status: DISCONTINUED | OUTPATIENT
Start: 2023-11-02 | End: 2023-11-03

## 2023-11-02 RX ORDER — MORPHINE SULFATE 4 MG/ML
4 INJECTION, SOLUTION INTRAMUSCULAR; INTRAVENOUS
Status: DISCONTINUED | OUTPATIENT
Start: 2023-11-02 | End: 2023-11-02

## 2023-11-02 RX ORDER — SODIUM CHLORIDE 0.9 % (FLUSH) 0.9 %
5-40 SYRINGE (ML) INJECTION EVERY 12 HOURS SCHEDULED
Status: DISCONTINUED | OUTPATIENT
Start: 2023-11-02 | End: 2023-11-09 | Stop reason: HOSPADM

## 2023-11-02 RX ORDER — ONDANSETRON 4 MG/1
4 TABLET, ORALLY DISINTEGRATING ORAL EVERY 8 HOURS PRN
Status: DISCONTINUED | OUTPATIENT
Start: 2023-11-02 | End: 2023-11-09 | Stop reason: HOSPADM

## 2023-11-02 RX ORDER — LEVOTHYROXINE SODIUM 0.1 MG/1
200 TABLET ORAL DAILY
Status: DISCONTINUED | OUTPATIENT
Start: 2023-11-02 | End: 2023-11-03

## 2023-11-02 RX ORDER — MORPHINE SULFATE 4 MG/ML
4 INJECTION, SOLUTION INTRAMUSCULAR; INTRAVENOUS
Status: COMPLETED | OUTPATIENT
Start: 2023-11-02 | End: 2023-11-02

## 2023-11-02 RX ORDER — DILTIAZEM HYDROCHLORIDE 60 MG/1
60 CAPSULE, EXTENDED RELEASE ORAL 2 TIMES DAILY
Status: DISCONTINUED | OUTPATIENT
Start: 2023-11-02 | End: 2023-11-02

## 2023-11-02 RX ORDER — SODIUM CHLORIDE 0.9 % (FLUSH) 0.9 %
5-40 SYRINGE (ML) INJECTION PRN
Status: DISCONTINUED | OUTPATIENT
Start: 2023-11-02 | End: 2023-11-09 | Stop reason: HOSPADM

## 2023-11-02 RX ORDER — POLYETHYLENE GLYCOL 3350 17 G/17G
17 POWDER, FOR SOLUTION ORAL DAILY PRN
Status: DISCONTINUED | OUTPATIENT
Start: 2023-11-02 | End: 2023-11-09 | Stop reason: HOSPADM

## 2023-11-02 RX ORDER — ASPIRIN 81 MG/1
81 TABLET, CHEWABLE ORAL DAILY
Status: DISCONTINUED | OUTPATIENT
Start: 2023-11-02 | End: 2023-11-09 | Stop reason: HOSPADM

## 2023-11-02 RX ORDER — FUROSEMIDE 40 MG/1
40 TABLET ORAL DAILY
Status: DISCONTINUED | OUTPATIENT
Start: 2023-11-03 | End: 2023-11-03

## 2023-11-02 RX ORDER — NITROGLYCERIN 0.4 MG/1
0.4 TABLET SUBLINGUAL
Status: COMPLETED | OUTPATIENT
Start: 2023-11-02 | End: 2023-11-02

## 2023-11-02 RX ADMIN — MORPHINE SULFATE 4 MG: 4 INJECTION, SOLUTION INTRAMUSCULAR; INTRAVENOUS at 17:06

## 2023-11-02 RX ADMIN — DILTIAZEM HYDROCHLORIDE 60 MG: 60 CAPSULE, EXTENDED RELEASE ORAL at 21:46

## 2023-11-02 RX ADMIN — INSULIN GLARGINE 20 UNITS: 100 INJECTION, SOLUTION SUBCUTANEOUS at 21:46

## 2023-11-02 RX ADMIN — MORPHINE SULFATE 1 MG: 2 INJECTION, SOLUTION INTRAMUSCULAR; INTRAVENOUS at 22:55

## 2023-11-02 RX ADMIN — BUDESONIDE INHALATION 250 MCG: 0.5 SUSPENSION RESPIRATORY (INHALATION) at 21:21

## 2023-11-02 RX ADMIN — NITROGLYCERIN 0.4 MG: 0.4 TABLET, ORALLY DISINTEGRATING SUBLINGUAL at 15:48

## 2023-11-02 RX ADMIN — TRAZODONE HYDROCHLORIDE 100 MG: 50 TABLET ORAL at 21:46

## 2023-11-02 RX ADMIN — IOPAMIDOL 100 ML: 755 INJECTION, SOLUTION INTRAVENOUS at 15:10

## 2023-11-02 RX ADMIN — SODIUM CHLORIDE, PRESERVATIVE FREE 10 ML: 5 INJECTION INTRAVENOUS at 21:49

## 2023-11-02 ASSESSMENT — PAIN DESCRIPTION - ORIENTATION
ORIENTATION: LEFT

## 2023-11-02 ASSESSMENT — PAIN - FUNCTIONAL ASSESSMENT
PAIN_FUNCTIONAL_ASSESSMENT: ACTIVITIES ARE NOT PREVENTED
PAIN_FUNCTIONAL_ASSESSMENT: 0-10
PAIN_FUNCTIONAL_ASSESSMENT: ACTIVITIES ARE NOT PREVENTED

## 2023-11-02 ASSESSMENT — PAIN DESCRIPTION - LOCATION
LOCATION: CHEST;ARM
LOCATION: CHEST;ARM
LOCATION: CHEST

## 2023-11-02 ASSESSMENT — PAIN SCALES - GENERAL
PAINLEVEL_OUTOF10: 9
PAINLEVEL_OUTOF10: 8
PAINLEVEL_OUTOF10: 9

## 2023-11-02 ASSESSMENT — PAIN DESCRIPTION - DESCRIPTORS
DESCRIPTORS: SHARP
DESCRIPTORS: SHARP

## 2023-11-02 NOTE — ED NOTES
TeleNeuro consult set up per MD request. [facility] at pt bedside.       Basilio Davies RN  11/02/23 1683

## 2023-11-02 NOTE — ED PROVIDER NOTES
[facility] EMERGENCY DEPT  EMERGENCY DEPARTMENT HISTORY AND PHYSICAL EXAM      Date: 11/2/2023  Patient Name: Kathy Varghese  MRN: [id number]  [address] [facility] 1975  Date of evaluation: 11/2/2023  Provider: Jules Granados MD   Note Started: 4:02 PM EDT 11/2/23    HISTORY OF PRESENT ILLNESS     Chief Complaint   Patient presents with    Stroke    Chest Pain       History Provided By: Patient    HPI: Kathy Varghese is a 49 y.o. male presents to the emergency department for evaluation of chest pain pressure, left-sided facial and arm numbness, left-sided arm weakness. Patient states that symptoms started approximately 9:30 AM today. However patient did not have his symptoms evaluated as patient was with daughter who is in the emergency department for OB issues. Patient denies any shortness of breath denies any trouble speaking no headaches no blurry vision double vision.   Patient does have a history of DVTs and is currently on Xarelto    PAST MEDICAL HISTORY   Past Medical History:  Past Medical History:   Diagnosis Date    Acute MI ([address])     COPD (chronic obstructive pulmonary disease) ([address])     Depression     DVT (deep vein thrombosis) in pregnancy     behind left knee    Hypertension     Ill-defined condition     Stroke [facility])        Past Surgical History:  Past Surgical History:   Procedure Laterality Date    HERNIA REPAIR      IR FNA WITH IMAGING      ORTHOPEDIC SURGERY      rotator cuff surgery    OTHER SURGICAL HISTORY      Patent Foramen Ovale Repair    THYROIDECTOMY         Family History:  Family History   Problem Relation Age of Onset    Diabetes Maternal Uncle     Hypertension Maternal Uncle     Hypertension Paternal Uncle     Diabetes Paternal Uncle     Cancer Other     No Known Problems Father     Cancer Mother        Social History:  Social History     Tobacco Use    Smoking status: Former    Smokeless tobacco: Never   Substance Use Topics    Alcohol use: Yes    Drug use: Not Currently

## 2023-11-02 NOTE — ED NOTES
0164- Jarad GLASS on [address] [place] [address], [address] [facility], RN  11/02/23 7941

## 2023-11-03 ENCOUNTER — APPOINTMENT (OUTPATIENT)
Facility: HOSPITAL | Age: 48
DRG: 861 | End: 2023-11-03
Attending: INTERNAL MEDICINE
Payer: MEDICAID

## 2023-11-03 LAB
CHOLEST SERPL-MCNC: 137 MG/DL
ECHO AO ASC DIAM: 3.7 CM
ECHO AO ASCENDING AORTA INDEX: 1.35 CM/M2
ECHO AO ROOT DIAM: 3.5 CM
ECHO AO ROOT INDEX: 1.28 CM/M2
ECHO AV AREA PEAK VELOCITY: 4.6 CM2
ECHO AV AREA VTI: 5 CM2
ECHO AV AREA/BSA PEAK VELOCITY: 1.7 CM2/M2
ECHO AV AREA/BSA VTI: 1.8 CM2/M2
ECHO AV CUSP MM: 2.6 CM
ECHO AV MEAN GRADIENT: 4 MMHG
ECHO AV MEAN VELOCITY: 0.9 M/S
ECHO AV PEAK GRADIENT: 7 MMHG
ECHO AV PEAK VELOCITY: 1.3 M/S
ECHO AV VELOCITY RATIO: 0.85
ECHO AV VTI: 23.4 CM
ECHO BSA: 2.83 M2
ECHO LA AREA 2C: 21 CM2
ECHO LA AREA 4C: 19.2 CM2
ECHO LA DIAMETER INDEX: 1.53 CM/M2
ECHO LA DIAMETER: 4.2 CM
ECHO LA MAJOR AXIS: 4.8 CM
ECHO LA MINOR AXIS: 5.5 CM
ECHO LA TO AORTIC ROOT RATIO: 1.2
ECHO LA VOL A-L A2C: 64 ML (ref 18–58)
ECHO LA VOL A-L A4C: 62 ML (ref 18–58)
ECHO LA VOL BP: 67 ML (ref 18–58)
ECHO LA VOL/BSA BIPLANE: 24 ML/M2 (ref 16–34)
ECHO LA VOLUME INDEX A-L A2C: 23 ML/M2 (ref 16–34)
ECHO LA VOLUME INDEX A-L A4C: 23 ML/M2 (ref 16–34)
ECHO LV E' LATERAL VELOCITY: 9 CM/S
ECHO LV E' SEPTAL VELOCITY: 8 CM/S
ECHO LV EDV A2C: 112 ML
ECHO LV EDV A4C: 127 ML
ECHO LV EDV INDEX A4C: 46 ML/M2
ECHO LV EDV NDEX A2C: 41 ML/M2
ECHO LV EJECTION FRACTION A2C: 64 %
ECHO LV EJECTION FRACTION A4C: 63 %
ECHO LV EJECTION FRACTION BIPLANE: 64 % (ref 55–100)
ECHO LV ESV A2C: 40 ML
ECHO LV ESV A4C: 47 ML
ECHO LV ESV INDEX A2C: 15 ML/M2
ECHO LV ESV INDEX A4C: 17 ML/M2
ECHO LV FRACTIONAL SHORTENING: 46 % (ref 28–44)
ECHO LV INTERNAL DIMENSION DIASTOLE INDEX: 2.15 CM/M2
ECHO LV INTERNAL DIMENSION DIASTOLIC MMODE: 4.9 CM (ref 4.2–5.9)
ECHO LV INTERNAL DIMENSION DIASTOLIC: 5.9 CM (ref 4.2–5.9)
ECHO LV INTERNAL DIMENSION SYSTOLIC INDEX: 1.17 CM/M2
ECHO LV INTERNAL DIMENSION SYSTOLIC MMODE: 3.3 CM
ECHO LV INTERNAL DIMENSION SYSTOLIC: 3.2 CM
ECHO LV IVSD MMODE: 1 CM (ref 0.6–1)
ECHO LV IVSD: 1.4 CM (ref 0.6–1)
ECHO LV MASS 2D: 340.7 G (ref 88–224)
ECHO LV MASS INDEX 2D: 124.3 G/M2 (ref 49–115)
ECHO LV POSTERIOR WALL DIASTOLIC MMODE: 1.1 CM (ref 0.6–1)
ECHO LV POSTERIOR WALL DIASTOLIC: 1.2 CM (ref 0.6–1)
ECHO LV RELATIVE WALL THICKNESS RATIO: 0.41
ECHO LVOT AREA: 5.3 CM2
ECHO LVOT AV VTI INDEX: 0.94
ECHO LVOT DIAM: 2.6 CM
ECHO LVOT MEAN GRADIENT: 3 MMHG
ECHO LVOT PEAK GRADIENT: 5 MMHG
ECHO LVOT PEAK VELOCITY: 1.1 M/S
ECHO LVOT STROKE VOLUME INDEX: 42.8 ML/M2
ECHO LVOT SV: 117.3 ML
ECHO LVOT VTI: 22.1 CM
ECHO MV A VELOCITY: 0.74 M/S
ECHO MV E DECELERATION TIME (DT): 222 MS
ECHO MV E VELOCITY: 0.78 M/S
ECHO MV E/A RATIO: 1.05
ECHO MV E/E' LATERAL: 8.67
ECHO MV E/E' RATIO (AVERAGED): 9.21
ECHO MV E/E' SEPTAL: 9.75
ECHO MV REGURGITANT PEAK GRADIENT: 23 MMHG
ECHO MV REGURGITANT PEAK VELOCITY: 2.4 M/S
ECHO PULMONARY ARTERY END DIASTOLIC PRESSURE: 8 MMHG
ECHO PV MAX VELOCITY: 1.4 M/S
ECHO PV MEAN GRADIENT: 5 MMHG
ECHO PV MEAN VELOCITY: 1 M/S
ECHO PV PEAK GRADIENT: 8 MMHG
ECHO PV REGURGITANT MAX VELOCITY: 1.4 M/S
ECHO PV VTI: 24.9 CM
ECHO RA AREA 4C: 15.1 CM2
ECHO RA END SYSTOLIC VOLUME APICAL 4 CHAMBER INDEX BSA: 13 ML/M2
ECHO RA VOLUME: 35 ML
ECHO RV BASAL DIMENSION: 4.4 CM
ECHO RV LONGITUDINAL DIMENSION: 9.8 CM
ECHO RV MID DIMENSION: 3.6 CM
ECHO RV TAPSE: 2.8 CM (ref 1.7–?)
ECHO RVOT MEAN GRADIENT: 3 MMHG
ECHO RVOT PEAK GRADIENT: 6 MMHG
ECHO RVOT PEAK VELOCITY: 1.2 M/S
ECHO RVOT VTI: 23.3 CM
ERYTHROCYTE [DISTWIDTH] IN BLOOD BY AUTOMATED COUNT: 13.7 % (ref 11.5–14.5)
EST. AVERAGE GLUCOSE BLD GHB EST-MCNC: 143 MG/DL
GLUCOSE BLD STRIP.AUTO-MCNC: 124 MG/DL (ref 65–100)
GLUCOSE BLD STRIP.AUTO-MCNC: 156 MG/DL (ref 65–100)
GLUCOSE BLD STRIP.AUTO-MCNC: 167 MG/DL (ref 65–100)
GLUCOSE BLD STRIP.AUTO-MCNC: 175 MG/DL (ref 65–100)
HBA1C MFR BLD: 6.6 % (ref 4–5.6)
HCT VFR BLD AUTO: 37.6 % (ref 36.6–50.3)
HDLC SERPL-MCNC: 33 MG/DL
HDLC SERPL: 4.2 (ref 0–5)
HGB BLD-MCNC: 12.3 G/DL (ref 12.1–17)
LDLC SERPL CALC-MCNC: 69 MG/DL (ref 0–100)
LIPID PANEL: ABNORMAL
MCH RBC QN AUTO: 31.9 PG (ref 26–34)
MCHC RBC AUTO-ENTMCNC: 32.7 G/DL (ref 30–36.5)
MCV RBC AUTO: 97.4 FL (ref 80–99)
NRBC # BLD: 0 K/UL (ref 0–0.01)
NRBC BLD-RTO: 0 PER 100 WBC
PERFORMED BY:: ABNORMAL
PLATELET # BLD AUTO: 173 K/UL (ref 150–400)
PMV BLD AUTO: 9.7 FL (ref 8.9–12.9)
RBC # BLD AUTO: 3.86 M/UL (ref 4.1–5.7)
TRIGL SERPL-MCNC: 175 MG/DL
TROPONIN I SERPL HS-MCNC: 9 NG/L (ref 0–76)
TROPONIN I SERPL HS-MCNC: 9 NG/L (ref 0–76)
TSH SERPL DL<=0.05 MIU/L-ACNC: 10.3 UIU/ML (ref 0.36–3.74)
VLDLC SERPL CALC-MCNC: 35 MG/DL
WBC # BLD AUTO: 6.2 K/UL (ref 4.1–11.1)

## 2023-11-03 PROCEDURE — 6360000002 HC RX W HCPCS: Performed by: INTERNAL MEDICINE

## 2023-11-03 PROCEDURE — 84443 ASSAY THYROID STIM HORMONE: CPT

## 2023-11-03 PROCEDURE — 83036 HEMOGLOBIN GLYCOSYLATED A1C: CPT

## 2023-11-03 PROCEDURE — 80061 LIPID PANEL: CPT

## 2023-11-03 PROCEDURE — 92610 EVALUATE SWALLOWING FUNCTION: CPT

## 2023-11-03 PROCEDURE — 6370000000 HC RX 637 (ALT 250 FOR IP): Performed by: INTERNAL MEDICINE

## 2023-11-03 PROCEDURE — 97162 PT EVAL MOD COMPLEX 30 MIN: CPT

## 2023-11-03 PROCEDURE — 93880 EXTRACRANIAL BILAT STUDY: CPT

## 2023-11-03 PROCEDURE — 1100000000 HC RM PRIVATE

## 2023-11-03 PROCEDURE — 82962 GLUCOSE BLOOD TEST: CPT

## 2023-11-03 PROCEDURE — 93880 EXTRACRANIAL BILAT STUDY: CPT | Performed by: SURGERY

## 2023-11-03 PROCEDURE — 97165 OT EVAL LOW COMPLEX 30 MIN: CPT

## 2023-11-03 PROCEDURE — C8929 TTE W OR WO FOL WCON,DOPPLER: HCPCS

## 2023-11-03 PROCEDURE — 97530 THERAPEUTIC ACTIVITIES: CPT

## 2023-11-03 PROCEDURE — 94640 AIRWAY INHALATION TREATMENT: CPT

## 2023-11-03 PROCEDURE — 36415 COLL VENOUS BLD VENIPUNCTURE: CPT

## 2023-11-03 PROCEDURE — 2580000003 HC RX 258: Performed by: INTERNAL MEDICINE

## 2023-11-03 PROCEDURE — 94761 N-INVAS EAR/PLS OXIMETRY MLT: CPT

## 2023-11-03 PROCEDURE — 85027 COMPLETE CBC AUTOMATED: CPT

## 2023-11-03 PROCEDURE — 84484 ASSAY OF TROPONIN QUANT: CPT

## 2023-11-03 PROCEDURE — 6360000004 HC RX CONTRAST MEDICATION

## 2023-11-03 RX ORDER — BUDESONIDE 0.5 MG/2ML
0.5 INHALANT ORAL
Status: DISCONTINUED | OUTPATIENT
Start: 2023-11-03 | End: 2023-11-09 | Stop reason: HOSPADM

## 2023-11-03 RX ORDER — DEXTROSE MONOHYDRATE 100 MG/ML
INJECTION, SOLUTION INTRAVENOUS CONTINUOUS PRN
Status: DISCONTINUED | OUTPATIENT
Start: 2023-11-03 | End: 2023-11-09 | Stop reason: HOSPADM

## 2023-11-03 RX ORDER — DOXYCYCLINE HYCLATE 100 MG/1
100 CAPSULE ORAL EVERY 12 HOURS SCHEDULED
Status: COMPLETED | OUTPATIENT
Start: 2023-11-03 | End: 2023-11-08

## 2023-11-03 RX ORDER — IPRATROPIUM BROMIDE AND ALBUTEROL SULFATE 2.5; .5 MG/3ML; MG/3ML
1 SOLUTION RESPIRATORY (INHALATION)
Status: DISCONTINUED | OUTPATIENT
Start: 2023-11-03 | End: 2023-11-09 | Stop reason: HOSPADM

## 2023-11-03 RX ORDER — CLOPIDOGREL BISULFATE 75 MG/1
75 TABLET ORAL DAILY
Status: CANCELLED | OUTPATIENT
Start: 2023-11-03

## 2023-11-03 RX ORDER — BUDESONIDE AND FORMOTEROL FUMARATE DIHYDRATE 160; 4.5 UG/1; UG/1
2 AEROSOL RESPIRATORY (INHALATION)
Status: DISCONTINUED | OUTPATIENT
Start: 2023-11-03 | End: 2023-11-03

## 2023-11-03 RX ORDER — LEVOTHYROXINE SODIUM 0.03 MG/1
25 TABLET ORAL DAILY
Status: DISCONTINUED | OUTPATIENT
Start: 2023-11-03 | End: 2023-11-09 | Stop reason: HOSPADM

## 2023-11-03 RX ORDER — IPRATROPIUM BROMIDE AND ALBUTEROL SULFATE 2.5; .5 MG/3ML; MG/3ML
1 SOLUTION RESPIRATORY (INHALATION) EVERY 4 HOURS PRN
Status: DISCONTINUED | OUTPATIENT
Start: 2023-11-03 | End: 2023-11-09 | Stop reason: HOSPADM

## 2023-11-03 RX ORDER — FUROSEMIDE 10 MG/ML
40 INJECTION INTRAMUSCULAR; INTRAVENOUS DAILY
Status: DISCONTINUED | OUTPATIENT
Start: 2023-11-03 | End: 2023-11-09 | Stop reason: HOSPADM

## 2023-11-03 RX ADMIN — FUROSEMIDE 40 MG: 10 INJECTION, SOLUTION INTRAMUSCULAR; INTRAVENOUS at 12:52

## 2023-11-03 RX ADMIN — METOPROLOL SUCCINATE 25 MG: 25 TABLET, EXTENDED RELEASE ORAL at 09:02

## 2023-11-03 RX ADMIN — INSULIN GLARGINE 20 UNITS: 100 INJECTION, SOLUTION SUBCUTANEOUS at 21:03

## 2023-11-03 RX ADMIN — ATORVASTATIN CALCIUM 20 MG: 20 TABLET, FILM COATED ORAL at 09:02

## 2023-11-03 RX ADMIN — METFORMIN HYDROCHLORIDE 500 MG: 500 TABLET ORAL at 16:26

## 2023-11-03 RX ADMIN — LEVOTHYROXINE SODIUM 25 MCG: 0.03 TABLET ORAL at 16:26

## 2023-11-03 RX ADMIN — MORPHINE SULFATE 1 MG: 2 INJECTION, SOLUTION INTRAMUSCULAR; INTRAVENOUS at 03:46

## 2023-11-03 RX ADMIN — PERFLUTREN 2 ML: 6.52 INJECTION, SUSPENSION INTRAVENOUS at 13:57

## 2023-11-03 RX ADMIN — MORPHINE SULFATE 1 MG: 2 INJECTION, SOLUTION INTRAMUSCULAR; INTRAVENOUS at 17:10

## 2023-11-03 RX ADMIN — DOXYCYCLINE HYCLATE 100 MG: 100 CAPSULE ORAL at 21:02

## 2023-11-03 RX ADMIN — IPRATROPIUM BROMIDE AND ALBUTEROL SULFATE 1 DOSE: 2.5; .5 SOLUTION RESPIRATORY (INHALATION) at 20:00

## 2023-11-03 RX ADMIN — METFORMIN HYDROCHLORIDE 500 MG: 500 TABLET ORAL at 09:02

## 2023-11-03 RX ADMIN — RIVAROXABAN 20 MG: 20 TABLET, FILM COATED ORAL at 09:02

## 2023-11-03 RX ADMIN — DILTIAZEM HYDROCHLORIDE 60 MG: 60 CAPSULE, EXTENDED RELEASE ORAL at 09:09

## 2023-11-03 RX ADMIN — TRAZODONE HYDROCHLORIDE 100 MG: 50 TABLET ORAL at 21:02

## 2023-11-03 RX ADMIN — MORPHINE SULFATE 1 MG: 2 INJECTION, SOLUTION INTRAMUSCULAR; INTRAVENOUS at 12:57

## 2023-11-03 RX ADMIN — DILTIAZEM HYDROCHLORIDE 60 MG: 60 CAPSULE, EXTENDED RELEASE ORAL at 21:02

## 2023-11-03 RX ADMIN — SODIUM CHLORIDE, PRESERVATIVE FREE 10 ML: 5 INJECTION INTRAVENOUS at 09:13

## 2023-11-03 RX ADMIN — MORPHINE SULFATE 1 MG: 2 INJECTION, SOLUTION INTRAMUSCULAR; INTRAVENOUS at 22:13

## 2023-11-03 RX ADMIN — BUDESONIDE INHALATION 250 MCG: 0.5 SUSPENSION RESPIRATORY (INHALATION) at 08:06

## 2023-11-03 RX ADMIN — SODIUM CHLORIDE, PRESERVATIVE FREE 10 ML: 5 INJECTION INTRAVENOUS at 21:03

## 2023-11-03 RX ADMIN — BUDESONIDE INHALATION 500 MCG: 0.5 SUSPENSION RESPIRATORY (INHALATION) at 20:00

## 2023-11-03 RX ADMIN — IPRATROPIUM BROMIDE AND ALBUTEROL SULFATE 1 DOSE: 2.5; .5 SOLUTION RESPIRATORY (INHALATION) at 08:06

## 2023-11-03 RX ADMIN — ASPIRIN 81 MG CHEWABLE TABLET 81 MG: 81 TABLET CHEWABLE at 09:02

## 2023-11-03 ASSESSMENT — PAIN SCALES - GENERAL
PAINLEVEL_OUTOF10: 10
PAINLEVEL_OUTOF10: 8
PAINLEVEL_OUTOF10: 8
PAINLEVEL_OUTOF10: 10
PAINLEVEL_OUTOF10: 9
PAINLEVEL_OUTOF10: 10
PAINLEVEL_OUTOF10: 9
PAINLEVEL_OUTOF10: 7

## 2023-11-03 ASSESSMENT — PAIN DESCRIPTION - LOCATION
LOCATION: ARM;LEG
LOCATION: CHEST;ARM
LOCATION: CHEST;ARM;LEG
LOCATION: ELBOW;CHEST
LOCATION: ELBOW;CHEST

## 2023-11-03 ASSESSMENT — PAIN DESCRIPTION - DESCRIPTORS
DESCRIPTORS: SHARP;OTHER (COMMENT)
DESCRIPTORS: SHARP
DESCRIPTORS: THROBBING
DESCRIPTORS: ACHING

## 2023-11-03 ASSESSMENT — PAIN DESCRIPTION - ORIENTATION
ORIENTATION: LEFT
ORIENTATION: LEFT
ORIENTATION: LEFT;RIGHT
ORIENTATION: LEFT

## 2023-11-03 ASSESSMENT — PAIN - FUNCTIONAL ASSESSMENT
PAIN_FUNCTIONAL_ASSESSMENT: ACTIVITIES ARE NOT PREVENTED
PAIN_FUNCTIONAL_ASSESSMENT: ACTIVITIES ARE NOT PREVENTED

## 2023-11-03 NOTE — PLAN OF CARE
2218R: Received patient from free standing ED via stretcher. Alert and oriented to person, place, time and situation. Patient is complaining of sharp pain in the chest with a pain scale of 8 out of 10. Troponin is within normal limits. Dr. Floyd Batres made aware of patients condition, obtained an order of PRN pain medicine and repeat troponin. Dual skin assessment completed with Bucky Luis RN. Skin integrity documented on Flow sheet. Stroke education provided. Call light within reach. Plan of care in progress.     Problem: Pain  Goal: Verbalizes/displays adequate comfort level or baseline comfort level  Outcome: Progressing     Problem: Safety - Adult  Goal: Free from fall injury  Outcome: Progressing     Problem: Chronic Conditions and Co-morbidities  Goal: Patient's chronic conditions and co-morbidity symptoms are monitored and maintained or improved  Outcome: Progressing  Flowsheets (Taken 11/2/2023 2015)  Care Plan - Patient's Chronic Conditions and Co-Morbidity Symptoms are Monitored and Maintained or Improved: Monitor and assess patient's chronic conditions and comorbid symptoms for stability, deterioration, or improvement

## 2023-11-03 NOTE — CARE COORDINATION
11/03/23 1608   Service Assessment   Patient Orientation Alert and Oriented   Cognition Alert   History Provided By Patient   Primary Caregiver Self   Accompanied By/Relationship self   Support Systems Family Members   Patient's Healthcare Decision Maker is: Legal Next [facility] [address] [facility]   PCP Verified by CM Yes   Prior Functional Level Independent in ADLs/IADLs   Current Functional Level Independent in ADLs/IADLs   Can patient return to prior living arrangement No  (Patient is homeless)   Family able to assist with home care needs: No   Would you like for me to discuss the discharge plan with any other family members/significant others, and if so, who? No   Financial Resources None   [employer] None   Social/Functional History   Lives With Alone   Type of Home Homeless     Cm met with pt at the bedside to complete discharge assessment. Prior to hospitalization, pt was living in a hotel, however he is unable to return. Pt reports he is homeless. Pt is unable to live with other family members or friends. Pt does not have funds for a hotel room. Pt indicated he already has reached out to resources in the community for assistance and no one can help him. Cm provided pt with homeless resources. Pt indicated he has a car. Pt reports at the time of discharge he will need a cab ride to the freestanding ER in [facility] so he can get his car.      Readmission Assessment  Number of Days since last admission?: 8-30 days  Previous Disposition: Home Alone  Who is being Interviewed: Patient  What was the patient's/caregiver's perception as to why they think they needed to return back to the hospital?: Other (Comment) (Patient was experiencing left sided weakness and chest pain.)  Did you visit your Primary Care Physician after you left the hospital, before you returned this time?: Yes  Did you see a specialist, such as Cardiac, Pulmonary, Orthopedic Physician, etc. after you left the hospital?: No  Who advised the patient

## 2023-11-04 ENCOUNTER — APPOINTMENT (OUTPATIENT)
Facility: HOSPITAL | Age: 48
DRG: 861 | End: 2023-11-04
Attending: INTERNAL MEDICINE
Payer: MEDICAID

## 2023-11-04 LAB
EST. AVERAGE GLUCOSE BLD GHB EST-MCNC: 151 MG/DL
GLUCOSE BLD STRIP.AUTO-MCNC: 116 MG/DL (ref 65–100)
GLUCOSE BLD STRIP.AUTO-MCNC: 164 MG/DL (ref 65–100)
GLUCOSE BLD STRIP.AUTO-MCNC: 174 MG/DL (ref 65–100)
GLUCOSE BLD STRIP.AUTO-MCNC: 190 MG/DL (ref 65–100)
HBA1C MFR BLD: 6.9 % (ref 4–5.6)
PERFORMED BY:: ABNORMAL

## 2023-11-04 PROCEDURE — 6360000002 HC RX W HCPCS: Performed by: INTERNAL MEDICINE

## 2023-11-04 PROCEDURE — 93971 EXTREMITY STUDY: CPT | Performed by: SURGERY

## 2023-11-04 PROCEDURE — 6370000000 HC RX 637 (ALT 250 FOR IP): Performed by: INTERNAL MEDICINE

## 2023-11-04 PROCEDURE — 94761 N-INVAS EAR/PLS OXIMETRY MLT: CPT

## 2023-11-04 PROCEDURE — 93971 EXTREMITY STUDY: CPT

## 2023-11-04 PROCEDURE — 1100000000 HC RM PRIVATE

## 2023-11-04 PROCEDURE — 94640 AIRWAY INHALATION TREATMENT: CPT

## 2023-11-04 PROCEDURE — 2580000003 HC RX 258: Performed by: INTERNAL MEDICINE

## 2023-11-04 PROCEDURE — 82962 GLUCOSE BLOOD TEST: CPT

## 2023-11-04 PROCEDURE — 6370000000 HC RX 637 (ALT 250 FOR IP): Performed by: FAMILY MEDICINE

## 2023-11-04 RX ORDER — METHYLPREDNISOLONE SODIUM SUCCINATE 40 MG/ML
40 INJECTION, POWDER, LYOPHILIZED, FOR SOLUTION INTRAMUSCULAR; INTRAVENOUS EVERY 8 HOURS
Status: DISCONTINUED | OUTPATIENT
Start: 2023-11-04 | End: 2023-11-09 | Stop reason: HOSPADM

## 2023-11-04 RX ORDER — LORAZEPAM 1 MG/1
1 TABLET ORAL ONCE
Status: COMPLETED | OUTPATIENT
Start: 2023-11-04 | End: 2023-11-06

## 2023-11-04 RX ORDER — TRAMADOL HYDROCHLORIDE 50 MG/1
50 TABLET ORAL EVERY 6 HOURS PRN
Status: DISCONTINUED | OUTPATIENT
Start: 2023-11-04 | End: 2023-11-09 | Stop reason: HOSPADM

## 2023-11-04 RX ADMIN — BUDESONIDE INHALATION 500 MCG: 0.5 SUSPENSION RESPIRATORY (INHALATION) at 19:18

## 2023-11-04 RX ADMIN — METFORMIN HYDROCHLORIDE 500 MG: 500 TABLET ORAL at 10:12

## 2023-11-04 RX ADMIN — DOXYCYCLINE HYCLATE 100 MG: 100 CAPSULE ORAL at 10:13

## 2023-11-04 RX ADMIN — METHYLPREDNISOLONE SODIUM SUCCINATE 40 MG: 40 INJECTION INTRAMUSCULAR; INTRAVENOUS at 17:47

## 2023-11-04 RX ADMIN — DILTIAZEM HYDROCHLORIDE 60 MG: 60 CAPSULE, EXTENDED RELEASE ORAL at 20:43

## 2023-11-04 RX ADMIN — METOPROLOL SUCCINATE 25 MG: 25 TABLET, EXTENDED RELEASE ORAL at 10:12

## 2023-11-04 RX ADMIN — INSULIN GLARGINE 20 UNITS: 100 INJECTION, SOLUTION SUBCUTANEOUS at 20:43

## 2023-11-04 RX ADMIN — IPRATROPIUM BROMIDE AND ALBUTEROL SULFATE 1 DOSE: 2.5; .5 SOLUTION RESPIRATORY (INHALATION) at 13:40

## 2023-11-04 RX ADMIN — ATORVASTATIN CALCIUM 20 MG: 20 TABLET, FILM COATED ORAL at 10:12

## 2023-11-04 RX ADMIN — DILTIAZEM HYDROCHLORIDE 60 MG: 60 CAPSULE, EXTENDED RELEASE ORAL at 10:12

## 2023-11-04 RX ADMIN — TRAMADOL HYDROCHLORIDE 50 MG: 50 TABLET ORAL at 16:15

## 2023-11-04 RX ADMIN — TRAZODONE HYDROCHLORIDE 100 MG: 50 TABLET ORAL at 20:43

## 2023-11-04 RX ADMIN — RIVAROXABAN 20 MG: 20 TABLET, FILM COATED ORAL at 10:12

## 2023-11-04 RX ADMIN — MORPHINE SULFATE 1 MG: 2 INJECTION, SOLUTION INTRAMUSCULAR; INTRAVENOUS at 07:25

## 2023-11-04 RX ADMIN — DOXYCYCLINE HYCLATE 100 MG: 100 CAPSULE ORAL at 20:43

## 2023-11-04 RX ADMIN — METFORMIN HYDROCHLORIDE 500 MG: 500 TABLET ORAL at 17:47

## 2023-11-04 RX ADMIN — LEVOTHYROXINE SODIUM 25 MCG: 0.03 TABLET ORAL at 07:32

## 2023-11-04 RX ADMIN — SODIUM CHLORIDE, PRESERVATIVE FREE 10 ML: 5 INJECTION INTRAVENOUS at 10:14

## 2023-11-04 RX ADMIN — FUROSEMIDE 40 MG: 10 INJECTION, SOLUTION INTRAMUSCULAR; INTRAVENOUS at 10:13

## 2023-11-04 RX ADMIN — ASPIRIN 81 MG CHEWABLE TABLET 81 MG: 81 TABLET CHEWABLE at 10:12

## 2023-11-04 RX ADMIN — IPRATROPIUM BROMIDE AND ALBUTEROL SULFATE 1 DOSE: 2.5; .5 SOLUTION RESPIRATORY (INHALATION) at 19:18

## 2023-11-04 RX ADMIN — SODIUM CHLORIDE, PRESERVATIVE FREE 10 ML: 5 INJECTION INTRAVENOUS at 20:44

## 2023-11-04 ASSESSMENT — PAIN - FUNCTIONAL ASSESSMENT
PAIN_FUNCTIONAL_ASSESSMENT: PREVENTS OR INTERFERES SOME ACTIVE ACTIVITIES AND ADLS
PAIN_FUNCTIONAL_ASSESSMENT: ACTIVITIES ARE NOT PREVENTED

## 2023-11-04 ASSESSMENT — PAIN DESCRIPTION - DESCRIPTORS
DESCRIPTORS: THROBBING
DESCRIPTORS: ACHING;THROBBING

## 2023-11-04 ASSESSMENT — PAIN DESCRIPTION - LOCATION
LOCATION: CHEST;LEG;ARM
LOCATION: BACK;CHEST

## 2023-11-04 ASSESSMENT — PAIN SCALES - GENERAL
PAINLEVEL_OUTOF10: 5
PAINLEVEL_OUTOF10: 6
PAINLEVEL_OUTOF10: 9
PAINLEVEL_OUTOF10: 8
PAINLEVEL_OUTOF10: 10

## 2023-11-04 ASSESSMENT — PAIN DESCRIPTION - ORIENTATION
ORIENTATION: LEFT;RIGHT
ORIENTATION: ANTERIOR;MID;LOWER

## 2023-11-04 NOTE — PLAN OF CARE
PT attempted to see patient for therapy session but patient asked PT to try back at a later time as he just finished breakfast and wanted to rest in bed since he didn't sleep much last night. Will continue to follow patient and try back at a later time and/or date as time allows.

## 2023-11-04 NOTE — PLAN OF CARE
PT attempted a second time to see patient for skilled therapy and patient off floor at echo, per nursing.  Will try back at a later time and/or date for PT.

## 2023-11-05 ENCOUNTER — APPOINTMENT (OUTPATIENT)
Facility: HOSPITAL | Age: 48
DRG: 861 | End: 2023-11-05
Payer: MEDICAID

## 2023-11-05 LAB
ALBUMIN SERPL-MCNC: 3.4 G/DL (ref 3.5–5)
ALBUMIN/GLOB SERPL: 0.7 (ref 1.1–2.2)
ALP SERPL-CCNC: 73 U/L (ref 45–117)
ALT SERPL-CCNC: 66 U/L (ref 12–78)
ANION GAP SERPL CALC-SCNC: 9 MMOL/L (ref 5–15)
AST SERPL W P-5'-P-CCNC: 17 U/L (ref 15–37)
BILIRUB SERPL-MCNC: 0.2 MG/DL (ref 0.2–1)
BNP SERPL-MCNC: 43 PG/ML
BUN SERPL-MCNC: 14 MG/DL (ref 6–20)
BUN/CREAT SERPL: 11 (ref 12–20)
CA-I BLD-MCNC: 9.8 MG/DL (ref 8.5–10.1)
CHLORIDE SERPL-SCNC: 100 MMOL/L (ref 97–108)
CO2 SERPL-SCNC: 26 MMOL/L (ref 21–32)
CREAT SERPL-MCNC: 1.22 MG/DL (ref 0.7–1.3)
ECHO BSA: 2.83 M2
ECHO BSA: 2.83 M2
ERYTHROCYTE [DISTWIDTH] IN BLOOD BY AUTOMATED COUNT: 13.6 % (ref 11.5–14.5)
GLOBULIN SER CALC-MCNC: 4.6 G/DL (ref 2–4)
GLUCOSE BLD STRIP.AUTO-MCNC: 195 MG/DL (ref 65–100)
GLUCOSE BLD STRIP.AUTO-MCNC: 226 MG/DL (ref 65–100)
GLUCOSE BLD STRIP.AUTO-MCNC: 244 MG/DL (ref 65–100)
GLUCOSE BLD STRIP.AUTO-MCNC: 255 MG/DL (ref 65–100)
GLUCOSE SERPL-MCNC: 218 MG/DL (ref 65–100)
HCT VFR BLD AUTO: 37.8 % (ref 36.6–50.3)
HGB BLD-MCNC: 12.5 G/DL (ref 12.1–17)
MAGNESIUM SERPL-MCNC: 1.7 MG/DL (ref 1.6–2.4)
MCH RBC QN AUTO: 32.2 PG (ref 26–34)
MCHC RBC AUTO-ENTMCNC: 33.1 G/DL (ref 30–36.5)
MCV RBC AUTO: 97.4 FL (ref 80–99)
NRBC # BLD: 0 K/UL (ref 0–0.01)
NRBC BLD-RTO: 0 PER 100 WBC
PERFORMED BY:: ABNORMAL
PHOSPHATE SERPL-MCNC: 2.6 MG/DL (ref 2.6–4.7)
PLATELET # BLD AUTO: 204 K/UL (ref 150–400)
PMV BLD AUTO: 10.3 FL (ref 8.9–12.9)
POTASSIUM SERPL-SCNC: 3.9 MMOL/L (ref 3.5–5.1)
PROT SERPL-MCNC: 8 G/DL (ref 6.4–8.2)
RBC # BLD AUTO: 3.88 M/UL (ref 4.1–5.7)
SODIUM SERPL-SCNC: 135 MMOL/L (ref 136–145)
VAS LEFT ARM BP: 132 MMHG
VAS LEFT CCA DIST EDV: 19 CM/S
VAS LEFT CCA DIST PSV: 85.3 CM/S
VAS LEFT CCA PROX EDV: 6.6 CM/S
VAS LEFT CCA PROX PSV: 97.6 CM/S
VAS LEFT ECA EDV: 14.8 CM/S
VAS LEFT ECA PSV: 141 CM/S
VAS LEFT ICA DIST EDV: 24.5 CM/S
VAS LEFT ICA DIST PSV: 87.4 CM/S
VAS LEFT ICA PROX EDV: 16.9 CM/S
VAS LEFT ICA PROX PSV: 88.9 CM/S
VAS LEFT ICA/CCA PSV: 1.04
VAS LEFT SUBCLAVIAN PROX EDV: 6.7 CM/S
VAS LEFT SUBCLAVIAN PROX PSV: 179 CM/S
VAS LEFT VERTEBRAL EDV: 9.6 CM/S
VAS LEFT VERTEBRAL PSV: 44.2 CM/S
VAS RIGHT ARM BP: 122 MMHG
VAS RIGHT CCA DIST EDV: 15.6 CM/S
VAS RIGHT CCA DIST PSV: 93.1 CM/S
VAS RIGHT CCA PROX EDV: 2.7 CM/S
VAS RIGHT CCA PROX PSV: 81.5 CM/S
VAS RIGHT ECA EDV: 11.4 CM/S
VAS RIGHT ECA PSV: 103 CM/S
VAS RIGHT ICA DIST EDV: 19.4 CM/S
VAS RIGHT ICA DIST PSV: 73.6 CM/S
VAS RIGHT ICA MID PSV: 0.8 CM/S
VAS RIGHT ICA PROX EDV: 22 CM/S
VAS RIGHT ICA PROX PSV: 70.4 CM/S
VAS RIGHT SUBCLAVIAN PROX EDV: 0 CM/S
VAS RIGHT SUBCLAVIAN PROX PSV: 108 CM/S
VAS RIGHT VERTEBRAL EDV: 11 CM/S
VAS RIGHT VERTEBRAL PSV: 54.3 CM/S
WBC # BLD AUTO: 7.8 K/UL (ref 4.1–11.1)

## 2023-11-05 PROCEDURE — 6360000002 HC RX W HCPCS: Performed by: INTERNAL MEDICINE

## 2023-11-05 PROCEDURE — 6370000000 HC RX 637 (ALT 250 FOR IP): Performed by: FAMILY MEDICINE

## 2023-11-05 PROCEDURE — 6370000000 HC RX 637 (ALT 250 FOR IP): Performed by: INTERNAL MEDICINE

## 2023-11-05 PROCEDURE — 70450 CT HEAD/BRAIN W/O DYE: CPT

## 2023-11-05 PROCEDURE — 86738 MYCOPLASMA ANTIBODY: CPT

## 2023-11-05 PROCEDURE — 83735 ASSAY OF MAGNESIUM: CPT

## 2023-11-05 PROCEDURE — 93880 EXTRACRANIAL BILAT STUDY: CPT | Performed by: SURGERY

## 2023-11-05 PROCEDURE — 85027 COMPLETE CBC AUTOMATED: CPT

## 2023-11-05 PROCEDURE — 82962 GLUCOSE BLOOD TEST: CPT

## 2023-11-05 PROCEDURE — 84100 ASSAY OF PHOSPHORUS: CPT

## 2023-11-05 PROCEDURE — 1100000000 HC RM PRIVATE

## 2023-11-05 PROCEDURE — 93971 EXTREMITY STUDY: CPT | Performed by: SURGERY

## 2023-11-05 PROCEDURE — 94761 N-INVAS EAR/PLS OXIMETRY MLT: CPT

## 2023-11-05 PROCEDURE — 94640 AIRWAY INHALATION TREATMENT: CPT

## 2023-11-05 PROCEDURE — 80053 COMPREHEN METABOLIC PANEL: CPT

## 2023-11-05 PROCEDURE — 2700000000 HC OXYGEN THERAPY PER DAY

## 2023-11-05 PROCEDURE — 36415 COLL VENOUS BLD VENIPUNCTURE: CPT

## 2023-11-05 PROCEDURE — 97530 THERAPEUTIC ACTIVITIES: CPT

## 2023-11-05 PROCEDURE — 71045 X-RAY EXAM CHEST 1 VIEW: CPT

## 2023-11-05 PROCEDURE — 2580000003 HC RX 258: Performed by: INTERNAL MEDICINE

## 2023-11-05 PROCEDURE — 83880 ASSAY OF NATRIURETIC PEPTIDE: CPT

## 2023-11-05 RX ORDER — BUTALBITAL, ACETAMINOPHEN AND CAFFEINE 50; 325; 40 MG/1; MG/1; MG/1
1 TABLET ORAL ONCE
Status: COMPLETED | OUTPATIENT
Start: 2023-11-05 | End: 2023-11-05

## 2023-11-05 RX ORDER — CHOLECALCIFEROL (VITAMIN D3) 125 MCG
5 CAPSULE ORAL NIGHTLY PRN
Status: DISCONTINUED | OUTPATIENT
Start: 2023-11-05 | End: 2023-11-09 | Stop reason: HOSPADM

## 2023-11-05 RX ORDER — MAGNESIUM SULFATE 1 G/100ML
1000 INJECTION INTRAVENOUS ONCE
Status: COMPLETED | OUTPATIENT
Start: 2023-11-05 | End: 2023-11-05

## 2023-11-05 RX ADMIN — METHYLPREDNISOLONE SODIUM SUCCINATE 40 MG: 40 INJECTION INTRAMUSCULAR; INTRAVENOUS at 00:28

## 2023-11-05 RX ADMIN — DOXYCYCLINE HYCLATE 100 MG: 100 CAPSULE ORAL at 09:33

## 2023-11-05 RX ADMIN — METHYLPREDNISOLONE SODIUM SUCCINATE 40 MG: 40 INJECTION INTRAMUSCULAR; INTRAVENOUS at 18:22

## 2023-11-05 RX ADMIN — METFORMIN HYDROCHLORIDE 500 MG: 500 TABLET ORAL at 17:44

## 2023-11-05 RX ADMIN — TRAMADOL HYDROCHLORIDE 50 MG: 50 TABLET ORAL at 17:44

## 2023-11-05 RX ADMIN — TRAZODONE HYDROCHLORIDE 100 MG: 50 TABLET ORAL at 21:25

## 2023-11-05 RX ADMIN — ATORVASTATIN CALCIUM 20 MG: 20 TABLET, FILM COATED ORAL at 09:33

## 2023-11-05 RX ADMIN — TRAMADOL HYDROCHLORIDE 50 MG: 50 TABLET ORAL at 06:52

## 2023-11-05 RX ADMIN — BUTALBITAL, ACETAMINOPHEN, AND CAFFEINE 1 TABLET: 50; 325; 40 TABLET ORAL at 13:02

## 2023-11-05 RX ADMIN — DOXYCYCLINE HYCLATE 100 MG: 100 CAPSULE ORAL at 21:25

## 2023-11-05 RX ADMIN — BUDESONIDE INHALATION 500 MCG: 0.5 SUSPENSION RESPIRATORY (INHALATION) at 08:57

## 2023-11-05 RX ADMIN — MAGNESIUM SULFATE HEPTAHYDRATE 1000 MG: 1 INJECTION, SOLUTION INTRAVENOUS at 16:51

## 2023-11-05 RX ADMIN — METFORMIN HYDROCHLORIDE 500 MG: 500 TABLET ORAL at 09:33

## 2023-11-05 RX ADMIN — TRAMADOL HYDROCHLORIDE 50 MG: 50 TABLET ORAL at 00:24

## 2023-11-05 RX ADMIN — INSULIN GLARGINE 20 UNITS: 100 INJECTION, SOLUTION SUBCUTANEOUS at 21:25

## 2023-11-05 RX ADMIN — TRAMADOL HYDROCHLORIDE 50 MG: 50 TABLET ORAL at 11:29

## 2023-11-05 RX ADMIN — DILTIAZEM HYDROCHLORIDE 60 MG: 60 CAPSULE, EXTENDED RELEASE ORAL at 21:25

## 2023-11-05 RX ADMIN — SODIUM CHLORIDE, PRESERVATIVE FREE 10 ML: 5 INJECTION INTRAVENOUS at 09:38

## 2023-11-05 RX ADMIN — RIVAROXABAN 20 MG: 20 TABLET, FILM COATED ORAL at 09:33

## 2023-11-05 RX ADMIN — DILTIAZEM HYDROCHLORIDE 60 MG: 60 CAPSULE, EXTENDED RELEASE ORAL at 09:38

## 2023-11-05 RX ADMIN — FUROSEMIDE 40 MG: 10 INJECTION, SOLUTION INTRAMUSCULAR; INTRAVENOUS at 09:33

## 2023-11-05 RX ADMIN — ASPIRIN 81 MG CHEWABLE TABLET 81 MG: 81 TABLET CHEWABLE at 09:33

## 2023-11-05 RX ADMIN — IPRATROPIUM BROMIDE AND ALBUTEROL SULFATE 1 DOSE: 2.5; .5 SOLUTION RESPIRATORY (INHALATION) at 14:22

## 2023-11-05 RX ADMIN — METHYLPREDNISOLONE SODIUM SUCCINATE 40 MG: 40 INJECTION INTRAMUSCULAR; INTRAVENOUS at 09:33

## 2023-11-05 RX ADMIN — IPRATROPIUM BROMIDE AND ALBUTEROL SULFATE 1 DOSE: 2.5; .5 SOLUTION RESPIRATORY (INHALATION) at 08:57

## 2023-11-05 RX ADMIN — METOPROLOL SUCCINATE 25 MG: 25 TABLET, EXTENDED RELEASE ORAL at 09:33

## 2023-11-05 RX ADMIN — Medication 5 MG: at 21:25

## 2023-11-05 RX ADMIN — LEVOTHYROXINE SODIUM 25 MCG: 0.03 TABLET ORAL at 06:52

## 2023-11-05 ASSESSMENT — PAIN SCALES - GENERAL
PAINLEVEL_OUTOF10: 7
PAINLEVEL_OUTOF10: 7
PAINLEVEL_OUTOF10: 9
PAINLEVEL_OUTOF10: 9
PAINLEVEL_OUTOF10: 8
PAINLEVEL_OUTOF10: 6
PAINLEVEL_OUTOF10: 9

## 2023-11-05 ASSESSMENT — PAIN - FUNCTIONAL ASSESSMENT
PAIN_FUNCTIONAL_ASSESSMENT: PREVENTS OR INTERFERES SOME ACTIVE ACTIVITIES AND ADLS
PAIN_FUNCTIONAL_ASSESSMENT: ACTIVITIES ARE NOT PREVENTED
PAIN_FUNCTIONAL_ASSESSMENT: PREVENTS OR INTERFERES SOME ACTIVE ACTIVITIES AND ADLS
PAIN_FUNCTIONAL_ASSESSMENT: ACTIVITIES ARE NOT PREVENTED

## 2023-11-05 ASSESSMENT — PAIN DESCRIPTION - ORIENTATION
ORIENTATION: RIGHT;ANTERIOR;MID
ORIENTATION: RIGHT;MID
ORIENTATION: LEFT

## 2023-11-05 ASSESSMENT — PAIN DESCRIPTION - DESCRIPTORS
DESCRIPTORS: SHARP
DESCRIPTORS: ACHING;THROBBING
DESCRIPTORS: ACHING;THROBBING;POUNDING
DESCRIPTORS: SHARP
DESCRIPTORS: ACHING;THROBBING
DESCRIPTORS: ACHING;THROBBING

## 2023-11-05 ASSESSMENT — PAIN DESCRIPTION - FREQUENCY: FREQUENCY: CONTINUOUS

## 2023-11-05 ASSESSMENT — PAIN SCALES - WONG BAKER: WONGBAKER_NUMERICALRESPONSE: 2

## 2023-11-05 ASSESSMENT — PAIN DESCRIPTION - LOCATION
LOCATION: CHEST;SHOULDER;HEAD
LOCATION: CHEST;SHOULDER
LOCATION: HEAD;CHEST;LEG
LOCATION: SHOULDER;CHEST
LOCATION: HEAD
LOCATION: SHOULDER;CHEST

## 2023-11-05 ASSESSMENT — PAIN DESCRIPTION - PAIN TYPE: TYPE: ACUTE PAIN

## 2023-11-05 NOTE — PLAN OF CARE
Problem: Physical Therapy - Adult  Goal: By Discharge: Performs mobility at highest level of function for planned discharge setting. See evaluation for individualized goals. Description: FUNCTIONAL STATUS PRIOR TO ADMISSION: Patient was independent and active without use of DME. and The patient  was independent for basic and instrumental ADLs. HOME SUPPORT PRIOR TO ADMISSION: Pt reports he is currently homeless    Physical Therapy Goals  Initiated 11/3/2023  Pt stated goal: to get stronger  Pt will be I with LE HEP in 7 days. Pt will perform bed mobility with Modified [place] in 7 days. Pt will perform transfers with Modified [place] in 7 days. Pt will amb  feet with LRAD safely with Modified [place] in 7 days. Outcome: Progressing            PHYSICAL THERAPY TREATMENT     Patient: Rosa Ash (74 y.o. male)  Date: 11/5/2023  Diagnosis: CVA (cerebrovascular accident due to intracerebral hemorrhage) ([address]) [I61.9]  Left hand weakness [R29.898]  Cerebrovascular accident (CVA), unspecified mechanism ([address]) [I63.9] CVA (cerebrovascular accident due to intracerebral hemorrhage) ([address])      Precautions:  Fall Risk                Recommendations for nursing mobility: Out of bed to chair for meals, Encourage HEP in prep for ADLs/mobility; see handout for details, AD and gt belt for bed to chair , Amb to bathroom with AD and gait belt, Amb in hallway, and Assist x1    In place during session: EKG/telemetry   Chart, physical therapy assessment, plan of care and goals were reviewed. ASSESSMENT  Patient continues with skilled PT services and is progressing towards goals. Pt seated EOB upon PT arrival, agreeable to session. Pt A&O x 4. (See below for objective details and assist levels). Overall pt tolerated session fair today with 2 gait trials performed in hallway for ~50 feet x 2 with CGA, no AD. No LOB or buckling noted during gait training but patient SOB post gait.  SaO2 92%, HR

## 2023-11-06 ENCOUNTER — APPOINTMENT (OUTPATIENT)
Facility: HOSPITAL | Age: 48
DRG: 861 | End: 2023-11-06
Payer: MEDICAID

## 2023-11-06 LAB
ALBUMIN SERPL-MCNC: 3.2 G/DL (ref 3.5–5)
ANION GAP SERPL CALC-SCNC: 9 MMOL/L (ref 5–15)
BUN SERPL-MCNC: 18 MG/DL (ref 6–20)
BUN/CREAT SERPL: 15 (ref 12–20)
CA-I BLD-MCNC: 8.7 MG/DL (ref 8.5–10.1)
CHLORIDE SERPL-SCNC: 104 MMOL/L (ref 97–108)
CO2 SERPL-SCNC: 25 MMOL/L (ref 21–32)
CREAT SERPL-MCNC: 1.17 MG/DL (ref 0.7–1.3)
EKG ATRIAL RATE: 99 BPM
EKG DIAGNOSIS: NORMAL
EKG P AXIS: 18 DEGREES
EKG P-R INTERVAL: 182 MS
EKG Q-T INTERVAL: 390 MS
EKG QRS DURATION: 100 MS
EKG QTC CALCULATION (BAZETT): 500 MS
EKG R AXIS: 3 DEGREES
EKG T AXIS: 15 DEGREES
EKG VENTRICULAR RATE: 99 BPM
ERYTHROCYTE [DISTWIDTH] IN BLOOD BY AUTOMATED COUNT: 13.8 % (ref 11.5–14.5)
GLUCOSE BLD STRIP.AUTO-MCNC: 234 MG/DL (ref 65–100)
GLUCOSE BLD STRIP.AUTO-MCNC: 294 MG/DL (ref 65–100)
GLUCOSE SERPL-MCNC: 290 MG/DL (ref 65–100)
HCT VFR BLD AUTO: 36.5 % (ref 36.6–50.3)
HGB BLD-MCNC: 11.9 G/DL (ref 12.1–17)
M PNEUMO IGM SER IA-ACNC: NONREACTIVE
MAGNESIUM SERPL-MCNC: 2 MG/DL (ref 1.6–2.4)
MCH RBC QN AUTO: 32 PG (ref 26–34)
MCHC RBC AUTO-ENTMCNC: 32.6 G/DL (ref 30–36.5)
MCV RBC AUTO: 98.1 FL (ref 80–99)
NRBC # BLD: 0 K/UL (ref 0–0.01)
NRBC BLD-RTO: 0 PER 100 WBC
PERFORMED BY:: ABNORMAL
PERFORMED BY:: ABNORMAL
PHOSPHATE SERPL-MCNC: 3.6 MG/DL (ref 2.6–4.7)
PLATELET # BLD AUTO: 191 K/UL (ref 150–400)
PMV BLD AUTO: 10.2 FL (ref 8.9–12.9)
POTASSIUM SERPL-SCNC: 4.1 MMOL/L (ref 3.5–5.1)
RBC # BLD AUTO: 3.72 M/UL (ref 4.1–5.7)
SODIUM SERPL-SCNC: 138 MMOL/L (ref 136–145)
WBC # BLD AUTO: 10.9 K/UL (ref 4.1–11.1)

## 2023-11-06 PROCEDURE — 97530 THERAPEUTIC ACTIVITIES: CPT

## 2023-11-06 PROCEDURE — 1100000000 HC RM PRIVATE

## 2023-11-06 PROCEDURE — 83735 ASSAY OF MAGNESIUM: CPT

## 2023-11-06 PROCEDURE — 36415 COLL VENOUS BLD VENIPUNCTURE: CPT

## 2023-11-06 PROCEDURE — 6370000000 HC RX 637 (ALT 250 FOR IP): Performed by: FAMILY MEDICINE

## 2023-11-06 PROCEDURE — 2580000003 HC RX 258: Performed by: INTERNAL MEDICINE

## 2023-11-06 PROCEDURE — 6370000000 HC RX 637 (ALT 250 FOR IP): Performed by: INTERNAL MEDICINE

## 2023-11-06 PROCEDURE — 6360000002 HC RX W HCPCS: Performed by: INTERNAL MEDICINE

## 2023-11-06 PROCEDURE — 82962 GLUCOSE BLOOD TEST: CPT

## 2023-11-06 PROCEDURE — 6370000000 HC RX 637 (ALT 250 FOR IP): Performed by: HOSPITALIST

## 2023-11-06 PROCEDURE — 80069 RENAL FUNCTION PANEL: CPT

## 2023-11-06 PROCEDURE — 94761 N-INVAS EAR/PLS OXIMETRY MLT: CPT

## 2023-11-06 PROCEDURE — 85027 COMPLETE CBC AUTOMATED: CPT

## 2023-11-06 PROCEDURE — 94640 AIRWAY INHALATION TREATMENT: CPT

## 2023-11-06 RX ORDER — ACETAMINOPHEN 325 MG/1
650 TABLET ORAL EVERY 4 HOURS PRN
Status: DISCONTINUED | OUTPATIENT
Start: 2023-11-06 | End: 2023-11-09 | Stop reason: HOSPADM

## 2023-11-06 RX ADMIN — SODIUM CHLORIDE, PRESERVATIVE FREE 10 ML: 5 INJECTION INTRAVENOUS at 21:43

## 2023-11-06 RX ADMIN — METHYLPREDNISOLONE SODIUM SUCCINATE 40 MG: 40 INJECTION INTRAMUSCULAR; INTRAVENOUS at 17:09

## 2023-11-06 RX ADMIN — INSULIN GLARGINE 20 UNITS: 100 INJECTION, SOLUTION SUBCUTANEOUS at 21:41

## 2023-11-06 RX ADMIN — TRAZODONE HYDROCHLORIDE 100 MG: 50 TABLET ORAL at 21:41

## 2023-11-06 RX ADMIN — DILTIAZEM HYDROCHLORIDE 60 MG: 60 CAPSULE, EXTENDED RELEASE ORAL at 09:19

## 2023-11-06 RX ADMIN — TRAMADOL HYDROCHLORIDE 50 MG: 50 TABLET ORAL at 17:08

## 2023-11-06 RX ADMIN — IPRATROPIUM BROMIDE AND ALBUTEROL SULFATE 1 DOSE: 2.5; .5 SOLUTION RESPIRATORY (INHALATION) at 07:47

## 2023-11-06 RX ADMIN — BUDESONIDE INHALATION 500 MCG: 0.5 SUSPENSION RESPIRATORY (INHALATION) at 21:24

## 2023-11-06 RX ADMIN — DILTIAZEM HYDROCHLORIDE 60 MG: 60 CAPSULE, EXTENDED RELEASE ORAL at 21:42

## 2023-11-06 RX ADMIN — METFORMIN HYDROCHLORIDE 500 MG: 500 TABLET ORAL at 09:20

## 2023-11-06 RX ADMIN — FUROSEMIDE 40 MG: 10 INJECTION, SOLUTION INTRAMUSCULAR; INTRAVENOUS at 09:28

## 2023-11-06 RX ADMIN — METHYLPREDNISOLONE SODIUM SUCCINATE 40 MG: 40 INJECTION INTRAMUSCULAR; INTRAVENOUS at 09:29

## 2023-11-06 RX ADMIN — DOXYCYCLINE HYCLATE 100 MG: 100 CAPSULE ORAL at 21:46

## 2023-11-06 RX ADMIN — LORAZEPAM 1 MG: 1 TABLET ORAL at 08:16

## 2023-11-06 RX ADMIN — METHYLPREDNISOLONE SODIUM SUCCINATE 40 MG: 40 INJECTION INTRAMUSCULAR; INTRAVENOUS at 01:16

## 2023-11-06 RX ADMIN — ACETAMINOPHEN 650 MG: 325 TABLET ORAL at 23:18

## 2023-11-06 RX ADMIN — ATORVASTATIN CALCIUM 20 MG: 20 TABLET, FILM COATED ORAL at 09:20

## 2023-11-06 RX ADMIN — IPRATROPIUM BROMIDE AND ALBUTEROL SULFATE 1 DOSE: 2.5; .5 SOLUTION RESPIRATORY (INHALATION) at 21:24

## 2023-11-06 RX ADMIN — BUDESONIDE INHALATION 500 MCG: 0.5 SUSPENSION RESPIRATORY (INHALATION) at 07:47

## 2023-11-06 RX ADMIN — ASPIRIN 81 MG CHEWABLE TABLET 81 MG: 81 TABLET CHEWABLE at 09:19

## 2023-11-06 RX ADMIN — METOPROLOL SUCCINATE 25 MG: 25 TABLET, EXTENDED RELEASE ORAL at 09:20

## 2023-11-06 RX ADMIN — SODIUM CHLORIDE, PRESERVATIVE FREE 10 ML: 5 INJECTION INTRAVENOUS at 09:34

## 2023-11-06 RX ADMIN — DOXYCYCLINE HYCLATE 100 MG: 100 CAPSULE ORAL at 09:20

## 2023-11-06 RX ADMIN — METFORMIN HYDROCHLORIDE 500 MG: 500 TABLET ORAL at 17:09

## 2023-11-06 RX ADMIN — LEVOTHYROXINE SODIUM 25 MCG: 0.03 TABLET ORAL at 06:12

## 2023-11-06 RX ADMIN — RIVAROXABAN 20 MG: 20 TABLET, FILM COATED ORAL at 09:20

## 2023-11-06 ASSESSMENT — PAIN DESCRIPTION - LOCATION
LOCATION: ARM;CHEST
LOCATION: HEAD

## 2023-11-06 ASSESSMENT — PAIN SCALES - GENERAL
PAINLEVEL_OUTOF10: 3
PAINLEVEL_OUTOF10: 2
PAINLEVEL_OUTOF10: 1
PAINLEVEL_OUTOF10: 9

## 2023-11-06 ASSESSMENT — PAIN DESCRIPTION - DESCRIPTORS: DESCRIPTORS: ACHING

## 2023-11-06 NOTE — PLAN OF CARE
OCCUPATIONAL THERAPY TREATMENT  Patient: Author Mera (10 y.o. male)  Date: 11/6/2023  Primary Diagnosis: CVA (cerebrovascular accident due to intracerebral hemorrhage) ([address]) [I61.9]  Left hand weakness [R29.898]  Cerebrovascular accident (CVA), unspecified mechanism ([address]) [I63.9]       Precautions: Fall Risk                Recommendations for nursing mobility: Out of bed to chair for meals and Encourage HEP in prep for ADLs/mobility; see handout for details    In place during session: Peripheral IV  Chart, occupational therapy assessment, plan of care, and goals were reviewed. ASSESSMENT  Patient continues with skilled OT services and is progressing towards goals. Pt sitting eob upon CONNER arrival, agreeable to session. Pt A&O x 4. Pt very talkative during the session however declined all oob activities. Pt agreeable to wash face and completing UE therex eob. See grid below for details of UE therex, completed to maintain/ increase strength and endurance to aid in adl performance. Education provided on energy conservation/ safety awareness and HEP w/ pt verbalizing fair understanding. Pt would benefit from further education/ practice. Pt encouraged to complete HEP 2-3 times / day to aid in adl performance. (See below for objective details and assist levels). Overall pt tolerated session fair today with rest breaks. Potential barriers for safe discharge: pt is a high fall risk, pt is not safe to be alone, and concern for pt safely navigating or managing the home environment. Current OT recommendations for discharge Home Self Care. Will continue to benefit from skilled OT services, and will continue to progress as tolerated. Start of Session End of Session   SPO2 (%) 93 93   Heart Rate (BPM) 105 99     GOALS:    Problem: Occupational Therapy - Adult  Goal: By Discharge: Performs self-care activities at highest level of function for planned discharge setting.   See evaluation for individualized

## 2023-11-06 NOTE — PLAN OF CARE
PHYSICAL THERAPY TREATMENT     Patient: Clare Guerrero (47 y.o. male)  Date: 11/6/2023  Diagnosis: CVA (cerebrovascular accident due to intracerebral hemorrhage) ([address]) [I61.9]  Left hand weakness [R29.898]  Cerebrovascular accident (CVA), unspecified mechanism ([address]) [I63.9] CVA (cerebrovascular accident due to intracerebral hemorrhage) ([address])      Precautions: Fall Risk                Recommendations for nursing mobility: Out of bed to chair for meals, Encourage HEP in prep for ADLs/mobility; see handout for details, Frequent repositioning to prevent skin breakdown, and LE elevation for management of edema    In place during session: EKG/telemetry   Chart, physical therapy assessment, plan of care and goals were reviewed. ASSESSMENT  Patient continues with skilled PT services and is slowly progressing towards goals. Pt semi supine upon PTA arrival, agreeable to session. Pt A&O x 4. (See below for objective details and assist levels). Overall pt tolerated session fair/poor today with bed mobility and sitting tolerance, limited by activity tolerance and endurance. With min encouragement pt agreeable to transfer to EOB only, pt continues to perform bed mobility independently. Participated in seated therex however pt with complaints of worsening dizziness requiring transfer back to supine. Pt noted with increased edema in LLE, elevated on pillow and pt educated on proper positioning and therex to promote cardiovascular function. Will continue to benefit from skilled PT services, and will continue to progress as tolerated. Potential barriers for safe discharge: pt has poor safety awareness and concern for pt safely navigating or managing the home environment. Current PT DC recommendation Home with Home Health Therapy once medically appropriate.     BP supine 112/61, sitting /84 and post transfer to supine 137/76     Start of Session End of Session   SPO2 (%) 95    Heart Rate (BPM) 94

## 2023-11-06 NOTE — PLAN OF CARE
Problem: Discharge Planning  Goal: Discharge to home or other facility with appropriate resources  Outcome: Progressing     Problem: Pain  Goal: Verbalizes/displays adequate comfort level or baseline comfort level  Outcome: Progressing     Problem: Chronic Conditions and Co-morbidities  Goal: Patient's chronic conditions and co-morbidity symptoms are monitored and maintained or improved  Outcome: Progressing     Problem: Safety - Adult  Goal: Free from fall injury  Outcome: Progressing     Problem: Physical Therapy - Adult  Goal: By Discharge: Performs mobility at highest level of function for planned discharge setting. See evaluation for individualized goals. Description: FUNCTIONAL STATUS PRIOR TO ADMISSION: Patient was independent and active without use of DME. and The patient  was independent for basic and instrumental ADLs. HOME SUPPORT PRIOR TO ADMISSION: Pt reports he is currently homeless    Physical Therapy Goals  Initiated 11/3/2023  Pt stated goal: to get stronger  Pt will be I with LE HEP in 7 days. Pt will perform bed mobility with Modified [place] in 7 days. Pt will perform transfers with Modified [place] in 7 days. Pt will amb  feet with LRAD safely with Modified [place] in 7 days.   11/5/2023 1435 by Nirmala Solomon PTA  Outcome: Progressing

## 2023-11-07 ENCOUNTER — APPOINTMENT (OUTPATIENT)
Facility: HOSPITAL | Age: 48
DRG: 861 | End: 2023-11-07
Payer: MEDICAID

## 2023-11-07 LAB
ALBUMIN SERPL-MCNC: 3.2 G/DL (ref 3.5–5)
ANION GAP SERPL CALC-SCNC: 6 MMOL/L (ref 5–15)
BUN SERPL-MCNC: 24 MG/DL (ref 6–20)
BUN/CREAT SERPL: 20 (ref 12–20)
CA-I BLD-MCNC: 9.1 MG/DL (ref 8.5–10.1)
CHLORIDE SERPL-SCNC: 103 MMOL/L (ref 97–108)
CO2 SERPL-SCNC: 29 MMOL/L (ref 21–32)
CREAT SERPL-MCNC: 1.2 MG/DL (ref 0.7–1.3)
ERYTHROCYTE [DISTWIDTH] IN BLOOD BY AUTOMATED COUNT: 14 % (ref 11.5–14.5)
GLUCOSE BLD STRIP.AUTO-MCNC: 167 MG/DL (ref 65–100)
GLUCOSE BLD STRIP.AUTO-MCNC: 178 MG/DL (ref 65–100)
GLUCOSE BLD STRIP.AUTO-MCNC: 212 MG/DL (ref 65–100)
GLUCOSE BLD STRIP.AUTO-MCNC: 222 MG/DL (ref 65–100)
GLUCOSE SERPL-MCNC: 178 MG/DL (ref 65–100)
HCT VFR BLD AUTO: 37.5 % (ref 36.6–50.3)
HGB BLD-MCNC: 12.1 G/DL (ref 12.1–17)
MAGNESIUM SERPL-MCNC: 2.2 MG/DL (ref 1.6–2.4)
MCH RBC QN AUTO: 31.9 PG (ref 26–34)
MCHC RBC AUTO-ENTMCNC: 32.3 G/DL (ref 30–36.5)
MCV RBC AUTO: 98.9 FL (ref 80–99)
NRBC # BLD: 0 K/UL (ref 0–0.01)
NRBC BLD-RTO: 0 PER 100 WBC
PERFORMED BY:: ABNORMAL
PHOSPHATE SERPL-MCNC: 4.9 MG/DL (ref 2.6–4.7)
PLATELET # BLD AUTO: 194 K/UL (ref 150–400)
PMV BLD AUTO: 10.1 FL (ref 8.9–12.9)
POTASSIUM SERPL-SCNC: 4.2 MMOL/L (ref 3.5–5.1)
RBC # BLD AUTO: 3.79 M/UL (ref 4.1–5.7)
SODIUM SERPL-SCNC: 138 MMOL/L (ref 136–145)
WBC # BLD AUTO: 12.6 K/UL (ref 4.1–11.1)

## 2023-11-07 PROCEDURE — 94640 AIRWAY INHALATION TREATMENT: CPT

## 2023-11-07 PROCEDURE — 6360000002 HC RX W HCPCS: Performed by: INTERNAL MEDICINE

## 2023-11-07 PROCEDURE — 80069 RENAL FUNCTION PANEL: CPT

## 2023-11-07 PROCEDURE — 36415 COLL VENOUS BLD VENIPUNCTURE: CPT

## 2023-11-07 PROCEDURE — 6370000000 HC RX 637 (ALT 250 FOR IP): Performed by: INTERNAL MEDICINE

## 2023-11-07 PROCEDURE — 1100000000 HC RM PRIVATE

## 2023-11-07 PROCEDURE — 2580000003 HC RX 258: Performed by: INTERNAL MEDICINE

## 2023-11-07 PROCEDURE — 85027 COMPLETE CBC AUTOMATED: CPT

## 2023-11-07 PROCEDURE — 82962 GLUCOSE BLOOD TEST: CPT

## 2023-11-07 PROCEDURE — 94761 N-INVAS EAR/PLS OXIMETRY MLT: CPT

## 2023-11-07 PROCEDURE — 83735 ASSAY OF MAGNESIUM: CPT

## 2023-11-07 PROCEDURE — 70450 CT HEAD/BRAIN W/O DYE: CPT

## 2023-11-07 PROCEDURE — 6370000000 HC RX 637 (ALT 250 FOR IP): Performed by: FAMILY MEDICINE

## 2023-11-07 RX ORDER — BUTALBITAL, ACETAMINOPHEN AND CAFFEINE 50; 325; 40 MG/1; MG/1; MG/1
1 TABLET ORAL 3 TIMES DAILY PRN
Status: DISCONTINUED | OUTPATIENT
Start: 2023-11-07 | End: 2023-11-09 | Stop reason: HOSPADM

## 2023-11-07 RX ORDER — INSULIN LISPRO 100 [IU]/ML
0-4 INJECTION, SOLUTION INTRAVENOUS; SUBCUTANEOUS NIGHTLY
Status: DISCONTINUED | OUTPATIENT
Start: 2023-11-07 | End: 2023-11-09 | Stop reason: HOSPADM

## 2023-11-07 RX ORDER — INSULIN LISPRO 100 [IU]/ML
0-8 INJECTION, SOLUTION INTRAVENOUS; SUBCUTANEOUS
Status: DISCONTINUED | OUTPATIENT
Start: 2023-11-07 | End: 2023-11-09 | Stop reason: HOSPADM

## 2023-11-07 RX ORDER — BUTALBITAL, ASPIRIN, AND CAFFEINE 325; 50; 40 MG/1; MG/1; MG/1
1 CAPSULE ORAL 3 TIMES DAILY PRN
Status: DISCONTINUED | OUTPATIENT
Start: 2023-11-07 | End: 2023-11-07

## 2023-11-07 RX ADMIN — DILTIAZEM HYDROCHLORIDE 60 MG: 60 CAPSULE, EXTENDED RELEASE ORAL at 09:05

## 2023-11-07 RX ADMIN — METOPROLOL SUCCINATE 25 MG: 25 TABLET, EXTENDED RELEASE ORAL at 09:03

## 2023-11-07 RX ADMIN — SODIUM CHLORIDE, PRESERVATIVE FREE 10 ML: 5 INJECTION INTRAVENOUS at 09:06

## 2023-11-07 RX ADMIN — TRAMADOL HYDROCHLORIDE 50 MG: 50 TABLET ORAL at 22:17

## 2023-11-07 RX ADMIN — SODIUM CHLORIDE, PRESERVATIVE FREE 10 ML: 5 INJECTION INTRAVENOUS at 22:20

## 2023-11-07 RX ADMIN — METHYLPREDNISOLONE SODIUM SUCCINATE 40 MG: 40 INJECTION INTRAMUSCULAR; INTRAVENOUS at 09:03

## 2023-11-07 RX ADMIN — METFORMIN HYDROCHLORIDE 500 MG: 500 TABLET ORAL at 08:50

## 2023-11-07 RX ADMIN — ATORVASTATIN CALCIUM 20 MG: 20 TABLET, FILM COATED ORAL at 09:03

## 2023-11-07 RX ADMIN — TRAZODONE HYDROCHLORIDE 100 MG: 50 TABLET ORAL at 22:18

## 2023-11-07 RX ADMIN — TRAMADOL HYDROCHLORIDE 50 MG: 50 TABLET ORAL at 12:11

## 2023-11-07 RX ADMIN — DOXYCYCLINE HYCLATE 100 MG: 100 CAPSULE ORAL at 22:18

## 2023-11-07 RX ADMIN — INSULIN GLARGINE 20 UNITS: 100 INJECTION, SOLUTION SUBCUTANEOUS at 22:17

## 2023-11-07 RX ADMIN — DILTIAZEM HYDROCHLORIDE 60 MG: 60 CAPSULE, EXTENDED RELEASE ORAL at 22:17

## 2023-11-07 RX ADMIN — RIVAROXABAN 20 MG: 20 TABLET, FILM COATED ORAL at 09:03

## 2023-11-07 RX ADMIN — IPRATROPIUM BROMIDE AND ALBUTEROL SULFATE 1 DOSE: 2.5; .5 SOLUTION RESPIRATORY (INHALATION) at 13:44

## 2023-11-07 RX ADMIN — METHYLPREDNISOLONE SODIUM SUCCINATE 40 MG: 40 INJECTION INTRAMUSCULAR; INTRAVENOUS at 01:20

## 2023-11-07 RX ADMIN — METHYLPREDNISOLONE SODIUM SUCCINATE 40 MG: 40 INJECTION INTRAMUSCULAR; INTRAVENOUS at 17:50

## 2023-11-07 RX ADMIN — INSULIN LISPRO 2 UNITS: 100 INJECTION, SOLUTION INTRAVENOUS; SUBCUTANEOUS at 17:50

## 2023-11-07 RX ADMIN — BUTALBITAL, ACETAMINOPHEN, AND CAFFEINE 1 TABLET: 50; 325; 40 TABLET ORAL at 22:18

## 2023-11-07 RX ADMIN — LEVOTHYROXINE SODIUM 25 MCG: 0.03 TABLET ORAL at 05:53

## 2023-11-07 RX ADMIN — FUROSEMIDE 40 MG: 10 INJECTION, SOLUTION INTRAMUSCULAR; INTRAVENOUS at 09:04

## 2023-11-07 RX ADMIN — DOXYCYCLINE HYCLATE 100 MG: 100 CAPSULE ORAL at 09:03

## 2023-11-07 RX ADMIN — ASPIRIN 81 MG CHEWABLE TABLET 81 MG: 81 TABLET CHEWABLE at 09:04

## 2023-11-07 ASSESSMENT — PAIN DESCRIPTION - DESCRIPTORS
DESCRIPTORS: ACHING
DESCRIPTORS: ACHING

## 2023-11-07 ASSESSMENT — PAIN SCALES - GENERAL
PAINLEVEL_OUTOF10: 6
PAINLEVEL_OUTOF10: 9
PAINLEVEL_OUTOF10: 10
PAINLEVEL_OUTOF10: 0
PAINLEVEL_OUTOF10: 4
PAINLEVEL_OUTOF10: 4

## 2023-11-07 ASSESSMENT — PAIN DESCRIPTION - LOCATION
LOCATION: ARM;HEAD
LOCATION: CHEST;SHOULDER

## 2023-11-07 ASSESSMENT — PAIN DESCRIPTION - ORIENTATION
ORIENTATION: LEFT
ORIENTATION: ANTERIOR

## 2023-11-07 NOTE — PLAN OF CARE
Problem: Occupational Therapy - Adult  Goal: By Discharge: Performs self-care activities at highest level of function for planned discharge setting. See evaluation for individualized goals. Description: FUNCTIONAL STATUS PRIOR TO ADMISSION:  Pt lived alone and completed all ADLs independently; ambulated w/o AD. HOME SUPPORT: Pt lived alone and didn't req assistance. Occupational Therapy Goals:  Initiated 11/3/2023  Patient/Family stated goal: I want to feel better. 1.  Patient will perform upper body dressing with [place] within 7 day(s). 2.  Patient will perform lower body dressing with [place] within 7 day(s). 3.  Patient will perform grooming with [place] within 7 day(s). 4.  Patient will perform toilet transfers with [place]  within 7 day(s). 5.  Patient will perform all aspects of toileting with [place] within 7 day(s). 6.  Patient will participate in upper extremity therapeutic exercise/activities with [place] within 7 day(s). 11/6/2023 1536 by YAZAN Amaya  Outcome: Progressing     Problem: Physical Therapy - Adult  Goal: By Discharge: Performs mobility at highest level of function for planned discharge setting. See evaluation for individualized goals. Description: FUNCTIONAL STATUS PRIOR TO ADMISSION: Patient was independent and active without use of DME. and The patient  was independent for basic and instrumental ADLs. HOME SUPPORT PRIOR TO ADMISSION: Pt reports he is currently homeless    Physical Therapy Goals  Initiated 11/3/2023  Pt stated goal: to get stronger  Pt will be I with LE HEP in 7 days. Pt will perform bed mobility with Modified [place] in 7 days. Pt will perform transfers with Modified [place] in 7 days. Pt will amb  feet with LRAD safely with Modified [place] in 7 days.   11/6/2023 1427 by Luis Burns PTA  Outcome: Progressing

## 2023-11-08 ENCOUNTER — APPOINTMENT (OUTPATIENT)
Facility: HOSPITAL | Age: 48
DRG: 861 | End: 2023-11-08
Attending: INTERNAL MEDICINE
Payer: MEDICAID

## 2023-11-08 LAB
ALBUMIN SERPL-MCNC: 3.1 G/DL (ref 3.5–5)
ANION GAP SERPL CALC-SCNC: 8 MMOL/L (ref 5–15)
BUN SERPL-MCNC: 23 MG/DL (ref 6–20)
BUN/CREAT SERPL: 20 (ref 12–20)
CA-I BLD-MCNC: 8.5 MG/DL (ref 8.5–10.1)
CHLORIDE SERPL-SCNC: 100 MMOL/L (ref 97–108)
CO2 SERPL-SCNC: 27 MMOL/L (ref 21–32)
CREAT SERPL-MCNC: 1.13 MG/DL (ref 0.7–1.3)
ERYTHROCYTE [DISTWIDTH] IN BLOOD BY AUTOMATED COUNT: 13.5 % (ref 11.5–14.5)
GLUCOSE BLD STRIP.AUTO-MCNC: 182 MG/DL (ref 65–100)
GLUCOSE BLD STRIP.AUTO-MCNC: 233 MG/DL (ref 65–100)
GLUCOSE BLD STRIP.AUTO-MCNC: 263 MG/DL (ref 65–100)
GLUCOSE BLD STRIP.AUTO-MCNC: 277 MG/DL (ref 65–100)
GLUCOSE SERPL-MCNC: 299 MG/DL (ref 65–100)
HCT VFR BLD AUTO: 36.6 % (ref 36.6–50.3)
HGB BLD-MCNC: 12.3 G/DL (ref 12.1–17)
MAGNESIUM SERPL-MCNC: 2.2 MG/DL (ref 1.6–2.4)
MCH RBC QN AUTO: 32.9 PG (ref 26–34)
MCHC RBC AUTO-ENTMCNC: 33.6 G/DL (ref 30–36.5)
MCV RBC AUTO: 97.9 FL (ref 80–99)
NRBC # BLD: 0 K/UL (ref 0–0.01)
NRBC BLD-RTO: 0 PER 100 WBC
PERFORMED BY:: ABNORMAL
PHOSPHATE SERPL-MCNC: 4.1 MG/DL (ref 2.6–4.7)
PLATELET # BLD AUTO: 194 K/UL (ref 150–400)
PMV BLD AUTO: 10.2 FL (ref 8.9–12.9)
POTASSIUM SERPL-SCNC: 4.1 MMOL/L (ref 3.5–5.1)
RBC # BLD AUTO: 3.74 M/UL (ref 4.1–5.7)
SODIUM SERPL-SCNC: 135 MMOL/L (ref 136–145)
WBC # BLD AUTO: 9.8 K/UL (ref 4.1–11.1)

## 2023-11-08 PROCEDURE — 6370000000 HC RX 637 (ALT 250 FOR IP): Performed by: INTERNAL MEDICINE

## 2023-11-08 PROCEDURE — 84443 ASSAY THYROID STIM HORMONE: CPT

## 2023-11-08 PROCEDURE — 94761 N-INVAS EAR/PLS OXIMETRY MLT: CPT

## 2023-11-08 PROCEDURE — 84439 ASSAY OF FREE THYROXINE: CPT

## 2023-11-08 PROCEDURE — 6360000002 HC RX W HCPCS: Performed by: INTERNAL MEDICINE

## 2023-11-08 PROCEDURE — 83735 ASSAY OF MAGNESIUM: CPT

## 2023-11-08 PROCEDURE — 1100000000 HC RM PRIVATE

## 2023-11-08 PROCEDURE — 2580000003 HC RX 258: Performed by: INTERNAL MEDICINE

## 2023-11-08 PROCEDURE — 93308 TTE F-UP OR LMTD: CPT

## 2023-11-08 PROCEDURE — 80069 RENAL FUNCTION PANEL: CPT

## 2023-11-08 PROCEDURE — 82962 GLUCOSE BLOOD TEST: CPT

## 2023-11-08 PROCEDURE — 6370000000 HC RX 637 (ALT 250 FOR IP): Performed by: FAMILY MEDICINE

## 2023-11-08 PROCEDURE — 36415 COLL VENOUS BLD VENIPUNCTURE: CPT

## 2023-11-08 PROCEDURE — 94640 AIRWAY INHALATION TREATMENT: CPT

## 2023-11-08 PROCEDURE — 85027 COMPLETE CBC AUTOMATED: CPT

## 2023-11-08 RX ORDER — INSULIN LISPRO 100 [IU]/ML
0-8 INJECTION, SOLUTION INTRAVENOUS; SUBCUTANEOUS
Qty: 10 ML | Refills: 0 | Status: SHIPPED | OUTPATIENT
Start: 2023-11-08

## 2023-11-08 RX ORDER — DILTIAZEM HYDROCHLORIDE 60 MG/1
60 CAPSULE, EXTENDED RELEASE ORAL 2 TIMES DAILY
Qty: 60 CAPSULE | Refills: 3 | Status: SHIPPED | OUTPATIENT
Start: 2023-11-08

## 2023-11-08 RX ORDER — INSULIN GLARGINE 100 [IU]/ML
20 INJECTION, SOLUTION SUBCUTANEOUS NIGHTLY
Qty: 10 ML | Refills: 3 | Status: SHIPPED | OUTPATIENT
Start: 2023-11-08

## 2023-11-08 RX ORDER — LEVOTHYROXINE SODIUM 0.03 MG/1
25 TABLET ORAL DAILY
Qty: 30 TABLET | Refills: 3 | Status: SHIPPED | OUTPATIENT
Start: 2023-11-09

## 2023-11-08 RX ORDER — BUTALBITAL, ACETAMINOPHEN AND CAFFEINE 50; 325; 40 MG/1; MG/1; MG/1
1 TABLET ORAL 3 TIMES DAILY PRN
Qty: 180 TABLET | Refills: 3 | Status: SHIPPED | OUTPATIENT
Start: 2023-11-08

## 2023-11-08 RX ORDER — POLYETHYLENE GLYCOL 3350 17 G/17G
17 POWDER, FOR SOLUTION ORAL DAILY PRN
Qty: 30 PACKET | Refills: 0 | Status: SHIPPED | OUTPATIENT
Start: 2023-11-08 | End: 2023-12-08

## 2023-11-08 RX ORDER — FUROSEMIDE 20 MG/1
40 TABLET ORAL DAILY
Qty: 30 TABLET | Refills: 0 | Status: SHIPPED | OUTPATIENT
Start: 2023-11-08 | End: 2023-11-23

## 2023-11-08 RX ORDER — CHOLECALCIFEROL (VITAMIN D3) 125 MCG
5 CAPSULE ORAL NIGHTLY PRN
Qty: 30 TABLET | Refills: 0 | Status: SHIPPED | OUTPATIENT
Start: 2023-11-08

## 2023-11-08 RX ADMIN — ASPIRIN 81 MG CHEWABLE TABLET 81 MG: 81 TABLET CHEWABLE at 09:34

## 2023-11-08 RX ADMIN — DILTIAZEM HYDROCHLORIDE 60 MG: 60 CAPSULE, EXTENDED RELEASE ORAL at 09:34

## 2023-11-08 RX ADMIN — SODIUM CHLORIDE, PRESERVATIVE FREE 10 ML: 5 INJECTION INTRAVENOUS at 22:11

## 2023-11-08 RX ADMIN — METOPROLOL SUCCINATE 25 MG: 25 TABLET, EXTENDED RELEASE ORAL at 09:34

## 2023-11-08 RX ADMIN — LEVOTHYROXINE SODIUM 25 MCG: 0.03 TABLET ORAL at 05:55

## 2023-11-08 RX ADMIN — RIVAROXABAN 20 MG: 20 TABLET, FILM COATED ORAL at 09:34

## 2023-11-08 RX ADMIN — TRAZODONE HYDROCHLORIDE 100 MG: 50 TABLET ORAL at 20:52

## 2023-11-08 RX ADMIN — INSULIN GLARGINE 20 UNITS: 100 INJECTION, SOLUTION SUBCUTANEOUS at 20:52

## 2023-11-08 RX ADMIN — DOXYCYCLINE HYCLATE 100 MG: 100 CAPSULE ORAL at 09:32

## 2023-11-08 RX ADMIN — TRAMADOL HYDROCHLORIDE 50 MG: 50 TABLET ORAL at 17:59

## 2023-11-08 RX ADMIN — INSULIN LISPRO 4 UNITS: 100 INJECTION, SOLUTION INTRAVENOUS; SUBCUTANEOUS at 12:24

## 2023-11-08 RX ADMIN — ATORVASTATIN CALCIUM 20 MG: 20 TABLET, FILM COATED ORAL at 09:34

## 2023-11-08 RX ADMIN — METFORMIN HYDROCHLORIDE 500 MG: 500 TABLET ORAL at 09:34

## 2023-11-08 RX ADMIN — SODIUM CHLORIDE, PRESERVATIVE FREE 10 ML: 5 INJECTION INTRAVENOUS at 09:36

## 2023-11-08 RX ADMIN — Medication 5 MG: at 20:52

## 2023-11-08 RX ADMIN — METFORMIN HYDROCHLORIDE 500 MG: 500 TABLET ORAL at 17:59

## 2023-11-08 RX ADMIN — METHYLPREDNISOLONE SODIUM SUCCINATE 40 MG: 40 INJECTION INTRAMUSCULAR; INTRAVENOUS at 09:35

## 2023-11-08 RX ADMIN — TRAMADOL HYDROCHLORIDE 50 MG: 50 TABLET ORAL at 09:34

## 2023-11-08 RX ADMIN — METHYLPREDNISOLONE SODIUM SUCCINATE 40 MG: 40 INJECTION INTRAMUSCULAR; INTRAVENOUS at 17:59

## 2023-11-08 RX ADMIN — BUTALBITAL, ACETAMINOPHEN, AND CAFFEINE 1 TABLET: 50; 325; 40 TABLET ORAL at 09:34

## 2023-11-08 RX ADMIN — BUDESONIDE INHALATION 500 MCG: 0.5 SUSPENSION RESPIRATORY (INHALATION) at 10:10

## 2023-11-08 RX ADMIN — DILTIAZEM HYDROCHLORIDE 60 MG: 60 CAPSULE, EXTENDED RELEASE ORAL at 20:52

## 2023-11-08 RX ADMIN — METHYLPREDNISOLONE SODIUM SUCCINATE 40 MG: 40 INJECTION INTRAMUSCULAR; INTRAVENOUS at 00:57

## 2023-11-08 RX ADMIN — INSULIN LISPRO 4 UNITS: 100 INJECTION, SOLUTION INTRAVENOUS; SUBCUTANEOUS at 18:00

## 2023-11-08 RX ADMIN — FUROSEMIDE 40 MG: 10 INJECTION, SOLUTION INTRAMUSCULAR; INTRAVENOUS at 09:34

## 2023-11-08 RX ADMIN — IPRATROPIUM BROMIDE AND ALBUTEROL SULFATE 1 DOSE: 2.5; .5 SOLUTION RESPIRATORY (INHALATION) at 10:09

## 2023-11-08 RX ADMIN — INSULIN LISPRO 4 UNITS: 100 INJECTION, SOLUTION INTRAVENOUS; SUBCUTANEOUS at 09:34

## 2023-11-08 ASSESSMENT — PAIN SCALES - GENERAL
PAINLEVEL_OUTOF10: 5
PAINLEVEL_OUTOF10: 8
PAINLEVEL_OUTOF10: 7
PAINLEVEL_OUTOF10: 10
PAINLEVEL_OUTOF10: 0
PAINLEVEL_OUTOF10: 9

## 2023-11-08 ASSESSMENT — PAIN DESCRIPTION - ORIENTATION: ORIENTATION: RIGHT;LEFT;MID;ANTERIOR;POSTERIOR

## 2023-11-08 ASSESSMENT — PAIN - FUNCTIONAL ASSESSMENT: PAIN_FUNCTIONAL_ASSESSMENT: ACTIVITIES ARE NOT PREVENTED

## 2023-11-08 ASSESSMENT — PAIN DESCRIPTION - DESCRIPTORS
DESCRIPTORS: DISCOMFORT;POUNDING
DESCRIPTORS: POUNDING

## 2023-11-08 ASSESSMENT — PAIN DESCRIPTION - LOCATION
LOCATION: HEAD
LOCATION: HEAD

## 2023-11-08 NOTE — PLAN OF CARE
Problem: Discharge Planning  Goal: Discharge to home or other facility with appropriate resources  Outcome: Progressing  Flowsheets (Taken 11/8/2023 0036)  Discharge to home or other facility with appropriate resources:   Identify barriers to discharge with patient and caregiver   Arrange for needed discharge resources and transportation as appropriate     Problem: Pain  Goal: Verbalizes/displays adequate comfort level or baseline comfort level  Outcome: Progressing  Flowsheets (Taken 11/8/2023 0036)  Verbalizes/displays adequate comfort level or baseline comfort level:   Encourage patient to monitor pain and request assistance   Assess pain using appropriate pain scale     Problem: Chronic Conditions and Co-morbidities  Goal: Patient's chronic conditions and co-morbidity symptoms are monitored and maintained or improved  Outcome: Progressing  Flowsheets (Taken 11/8/2023 0036)  Care Plan - Patient's Chronic Conditions and Co-Morbidity Symptoms are Monitored and Maintained or Improved:   Monitor and assess patient's chronic conditions and comorbid symptoms for stability, deterioration, or improvement   Collaborate with multidisciplinary team to address chronic and comorbid conditions and prevent exacerbation or deterioration     Problem: Safety - Adult  Goal: Free from fall injury  Outcome: Progressing  Flowsheets (Taken 11/8/2023 0036)  Free From Fall Injury: Instruct family/caregiver on patient safety     Problem: Skin/Tissue Integrity  Goal: Absence of new skin breakdown  Description: 1. Monitor for areas of redness and/or skin breakdown  2. Assess vascular access sites hourly  3. Every 4-6 hours minimum:  Change oxygen saturation probe site  4. Every 4-6 hours:  If on nasal continuous positive airway pressure, respiratory therapy assess nares and determine need for appliance change or resting period. Outcome: Progressing  Note: Patient will be free from any skin breakdown during this admission.

## 2023-11-09 VITALS
DIASTOLIC BLOOD PRESSURE: 74 MMHG | HEART RATE: 87 BPM | TEMPERATURE: 97.2 F | HEIGHT: 76 IN | RESPIRATION RATE: 18 BRPM | SYSTOLIC BLOOD PRESSURE: 127 MMHG | OXYGEN SATURATION: 95 % | BODY MASS INDEX: 38.36 KG/M2 | WEIGHT: 315 LBS

## 2023-11-09 PROBLEM — R09.89 SUSPECTED DEEP VEIN THROMBOSIS (DVT): Status: ACTIVE | Noted: 2023-11-09

## 2023-11-09 PROBLEM — R29.898 LEFT HAND WEAKNESS: Status: ACTIVE | Noted: 2023-11-09

## 2023-11-09 LAB
ECHO BSA: 2.83 M2
GLUCOSE BLD STRIP.AUTO-MCNC: 137 MG/DL (ref 65–100)
GLUCOSE BLD STRIP.AUTO-MCNC: 165 MG/DL (ref 65–100)
PERFORMED BY:: ABNORMAL
PERFORMED BY:: ABNORMAL
T4 FREE SERPL-MCNC: 0.7 NG/DL (ref 0.8–1.5)
TSH SERPL DL<=0.05 MIU/L-ACNC: 3.29 UIU/ML (ref 0.36–3.74)

## 2023-11-09 PROCEDURE — 6370000000 HC RX 637 (ALT 250 FOR IP): Performed by: INTERNAL MEDICINE

## 2023-11-09 PROCEDURE — 94761 N-INVAS EAR/PLS OXIMETRY MLT: CPT

## 2023-11-09 PROCEDURE — 2580000003 HC RX 258: Performed by: INTERNAL MEDICINE

## 2023-11-09 PROCEDURE — 82962 GLUCOSE BLOOD TEST: CPT

## 2023-11-09 PROCEDURE — 6360000002 HC RX W HCPCS: Performed by: INTERNAL MEDICINE

## 2023-11-09 RX ADMIN — FUROSEMIDE 40 MG: 10 INJECTION, SOLUTION INTRAMUSCULAR; INTRAVENOUS at 08:59

## 2023-11-09 RX ADMIN — METHYLPREDNISOLONE SODIUM SUCCINATE 40 MG: 40 INJECTION INTRAMUSCULAR; INTRAVENOUS at 08:59

## 2023-11-09 RX ADMIN — METFORMIN HYDROCHLORIDE 500 MG: 500 TABLET ORAL at 08:59

## 2023-11-09 RX ADMIN — METOPROLOL SUCCINATE 25 MG: 25 TABLET, EXTENDED RELEASE ORAL at 08:59

## 2023-11-09 RX ADMIN — SODIUM CHLORIDE, PRESERVATIVE FREE 10 ML: 5 INJECTION INTRAVENOUS at 08:59

## 2023-11-09 RX ADMIN — METHYLPREDNISOLONE SODIUM SUCCINATE 40 MG: 40 INJECTION INTRAMUSCULAR; INTRAVENOUS at 01:20

## 2023-11-09 RX ADMIN — ASPIRIN 81 MG CHEWABLE TABLET 81 MG: 81 TABLET CHEWABLE at 08:59

## 2023-11-09 RX ADMIN — DILTIAZEM HYDROCHLORIDE 60 MG: 60 CAPSULE, EXTENDED RELEASE ORAL at 08:59

## 2023-11-09 RX ADMIN — LEVOTHYROXINE SODIUM 25 MCG: 0.03 TABLET ORAL at 07:38

## 2023-11-09 RX ADMIN — ATORVASTATIN CALCIUM 20 MG: 20 TABLET, FILM COATED ORAL at 08:59

## 2023-11-09 RX ADMIN — RIVAROXABAN 20 MG: 20 TABLET, FILM COATED ORAL at 08:59

## 2023-11-09 ASSESSMENT — PAIN SCALES - GENERAL
PAINLEVEL_OUTOF10: 0
PAINLEVEL_OUTOF10: 0

## 2023-11-09 NOTE — PLAN OF CARE
Problem: Discharge Planning  Goal: Discharge to home or other facility with appropriate resources  11/8/2023 2249 by Papo Hansen RN  Outcome: Progressing  Flowsheets (Taken 11/8/2023 2249)  Discharge to home or other facility with appropriate resources:   Identify barriers to discharge with patient and caregiver   Arrange for needed discharge resources and transportation as appropriate   Identify discharge learning needs (meds, wound care, etc)   Arrange for interpreters to assist at discharge as needed   Refer to discharge planning if patient needs post-hospital services based on physician order or complex needs related to functional status, cognitive ability or social support system  11/8/2023 1147 by Doreen Mcallister RN  Outcome: Progressing  Flowsheets (Taken 11/8/2023 0820)  Discharge to home or other facility with appropriate resources:   Identify barriers to discharge with patient and caregiver   Arrange for needed discharge resources and transportation as appropriate   Identify discharge learning needs (meds, wound care, etc)     Problem: Pain  Goal: Verbalizes/displays adequate comfort level or baseline comfort level  11/8/2023 2249 by Papo Hansen RN  Outcome: Progressing  Flowsheets (Taken 11/8/2023 2249)  Verbalizes/displays adequate comfort level or baseline comfort level:   Encourage patient to monitor pain and request assistance   Assess pain using appropriate pain scale   Administer analgesics based on type and severity of pain and evaluate response  11/8/2023 1147 by Doreen Mcallister RN  Outcome: Progressing     Problem: Chronic Conditions and Co-morbidities  Goal: Patient's chronic conditions and co-morbidity symptoms are monitored and maintained or improved  11/8/2023 2249 by Papo Hansen RN  Outcome: Progressing  Flowsheets (Taken 11/8/2023 2249)  Care Plan - Patient's Chronic Conditions and Co-Morbidity Symptoms are Monitored and Maintained or Improved:   Monitor

## 2023-11-09 NOTE — CARE COORDINATION
1310: CM met with pt at bedside and gave him number for Ms. Anamaria Stafford with crisis stabilization. CM asked if he had reached out to his local social service and he stated, \"they're a joke. \" Pt showed CM list of other crisis stabilization numbers that he has to call.

## 2023-11-09 NOTE — CARE COORDINATION
DCP: home self care    DC Transport: will need luz cab to [facility] in [facility]          Transition of Care Plan:    RUR: 21%  Prior Level of Functioning: home self care  Disposition: home self care  Follow up appointments: unit secretary to setup as needed  DME needed: none  Transportation at discharge: luz transport to be arranged  IM/IMM Medicare/ letter given: n/a  Is patient a [place] and connected with [facility]? no   If yes, was [place] transfer form completed and [facility] notified? N/a  Caregiver Contact: n/a  Discharge Caregiver contacted prior to discharge?  N/a  Care Conference needed? no  Barriers to discharge: none

## 2023-11-11 NOTE — DISCHARGE SUMMARY
Discharge Summary    Date: 11/11/2023  Patient Name: Komal Schumacher    YOB: 1975     Age: 49 y.o. Admit Date: 11/2/2023  Discharge Date:  Discharge Condition: Good    Admission Diagnosis  CVA (cerebrovascular accident due to intracerebral hemorrhage) ([address]) [I61.9]; Left hand weakness [R29.898]; Cerebrovascular accident (CVA), unspecified mechanism ([address]) [I63.9]      Discharge Diagnosis  Principal Problem:    CVA (cerebrovascular accident due to intracerebral hemorrhage) ([address])  Active Problems:    Left hand weakness    Suspected deep vein thrombosis (DVT)  Resolved Problems:    * No resolved hospital problems. [facility] Stay  Narrative of Hospital Course:  49years old female with a complaint of weakness on one side of the body patient has a history of closed PFO, history of morbid obesity history of obstructive sleep apnea history of COPD history of pericardial cyst was admitted and had CT of the head which was negative patient was seen by oncology for history of DVT. Was seen by neurology and also cardiology. Patient has a history of noncompliance with medications    Consultants:  [address] [place], [place] [address]y 6 TO CARDIOLOGY  IP CONSULT TO PULMONOLOGY  IP CONSULT TO ONCOLOGY  IP CONSULT TO PICC TEAM    Surgeries/procedures Performed:      Treatments:    Anticoagulation    Other    Discharge Plan/Disposition:  Home    Hospital/Incidental Findings Requiring Follow Up:    Patient Instructions:    Diet:    Activity:Activity as Tolerated and Ambulate in House  For number of days (if applicable): Other Instructions:    Provider Follow-Up:   No follow-ups on file. Significant Diagnostic Studies:    Recent Labs:  Admission on 11/02/2023, Discharged on 11/09/2023  No results displayed because visit has over 200 results. ------------    Radiology last 7 days:  CT HEAD WO CONTRAST    Result Date: 11/7/2023  No acute intracranial abnormalities.      CT HEAD WO CONTRAST    Result

## 2023-11-26 ENCOUNTER — HOSPITAL ENCOUNTER (EMERGENCY)
Facility: HOSPITAL | Age: 48
Discharge: HOME | End: 2023-11-26
Attending: EMERGENCY MEDICINE
Payer: MEDICAID

## 2023-11-26 VITALS
TEMPERATURE: 98.7 F | WEIGHT: 315 LBS | HEIGHT: 76 IN | HEART RATE: 109 BPM | BODY MASS INDEX: 38.36 KG/M2 | SYSTOLIC BLOOD PRESSURE: 133 MMHG | RESPIRATION RATE: 20 BRPM | DIASTOLIC BLOOD PRESSURE: 74 MMHG | OXYGEN SATURATION: 95 %

## 2023-11-26 DIAGNOSIS — M25.562 LEFT KNEE PAIN, UNSPECIFIED CHRONICITY: Primary | ICD-10-CM

## 2023-11-26 DIAGNOSIS — E13.42 DIABETIC POLYNEUROPATHY ASSOCIATED WITH OTHER SPECIFIED DIABETES MELLITUS (HCC): ICD-10-CM

## 2023-11-26 PROCEDURE — 6360000002 HC RX W HCPCS: Performed by: EMERGENCY MEDICINE

## 2023-11-26 PROCEDURE — 6370000000 HC RX 637 (ALT 250 FOR IP): Performed by: EMERGENCY MEDICINE

## 2023-11-26 PROCEDURE — 99284 EMERGENCY DEPT VISIT MOD MDM: CPT

## 2023-11-26 PROCEDURE — 96372 THER/PROPH/DIAG INJ SC/IM: CPT

## 2023-11-26 RX ORDER — ACETAMINOPHEN 500 MG
1000 TABLET ORAL
Status: COMPLETED | OUTPATIENT
Start: 2023-11-26 | End: 2023-11-26

## 2023-11-26 RX ORDER — LIDOCAINE 4 G/G
1 PATCH TOPICAL DAILY
Qty: 30 PATCH | Refills: 0 | Status: SHIPPED | OUTPATIENT
Start: 2023-11-26 | End: 2023-12-26

## 2023-11-26 RX ORDER — KETOROLAC TROMETHAMINE 30 MG/ML
30 INJECTION, SOLUTION INTRAMUSCULAR; INTRAVENOUS
Status: COMPLETED | OUTPATIENT
Start: 2023-11-26 | End: 2023-11-26

## 2023-11-26 RX ADMIN — KETOROLAC TROMETHAMINE 30 MG: 30 INJECTION, SOLUTION INTRAMUSCULAR at 04:19

## 2023-11-26 RX ADMIN — ACETAMINOPHEN 1000 MG: 500 TABLET ORAL at 04:18

## 2023-11-26 ASSESSMENT — PAIN SCALES - GENERAL: PAINLEVEL_OUTOF10: 6

## 2023-11-26 NOTE — ED PROVIDER NOTES
EMERGENCY DEPARTMENT HISTORY AND PHYSICAL EXAM    Date: 11/26/2023  Patient Name: Leopold Gimenez    History of Presenting Illness     Chief Complaint   Patient presents with    Leg Swelling    Leg Pain       History Provided By: Patient    HPI: Leopold Gimenez, 51 y.o. male   presents to the ED with cc of knee pain. Patient with a history of diabetic neuropathy and DVT presents with complaining of left knee pain for 1 week. Patient denies injury. Pain has been constant in mild to moderate degree with ambulation aggravating the pain. Patient also complains of bilateral leg swelling. Patient is currently on Lasix. No chest pain or shortness of breath. No fever or chills. Patient takes gabapentin for his diabetic neuropathy with temporary improvement. PCP: Eliezer Salomon MD    No current facility-administered medications on file prior to encounter.      Current Outpatient Medications on File Prior to Encounter   Medication Sig Dispense Refill    insulin glargine (LANTUS) 100 UNIT/ML injection vial Inject 20 Units into the skin nightly 10 mL 3    insulin lispro (HUMALOG) 100 UNIT/ML SOLN injection vial Inject 0-8 Units into the skin 3 times daily (with meals) 10 mL 0    dilTIAZem (CARDIZEM 12 HR) 60 MG extended release capsule Take 1 capsule by mouth 2 times daily 60 capsule 3    furosemide (LASIX) 20 MG tablet Take 2 tablets by mouth daily for 15 days 30 tablet 0    polyethylene glycol (GLYCOLAX) 17 g packet Take 1 packet by mouth daily as needed for Constipation 30 packet 0    melatonin 5 MG TABS tablet Take 1 tablet by mouth nightly as needed (insomnia) 30 tablet 0    butalbital-acetaminophen-caffeine (FIORICET, ESGIC) -40 MG per tablet Take 1 tablet by mouth 3 times daily as needed for Headaches 180 tablet 3    glucose 4 g chewable tablet Take 4 tablets by mouth as needed for Low blood sugar 60 tablet 3    levothyroxine (SYNTHROID) 25 MCG tablet Take 1 tablet by mouth Daily 30 tablet 3

## 2023-12-01 ENCOUNTER — APPOINTMENT (OUTPATIENT)
Facility: HOSPITAL | Age: 48
End: 2023-12-01
Payer: MEDICAID

## 2023-12-01 ENCOUNTER — HOSPITAL ENCOUNTER (EMERGENCY)
Facility: HOSPITAL | Age: 48
Discharge: HOME | End: 2023-12-01
Attending: EMERGENCY MEDICINE
Payer: MEDICAID

## 2023-12-01 ENCOUNTER — APPOINTMENT (OUTPATIENT)
Facility: HOSPITAL | Age: 48
End: 2023-12-01
Attending: EMERGENCY MEDICINE
Payer: MEDICAID

## 2023-12-01 VITALS
WEIGHT: 315 LBS | DIASTOLIC BLOOD PRESSURE: 84 MMHG | HEIGHT: 76 IN | HEART RATE: 89 BPM | TEMPERATURE: 98.1 F | BODY MASS INDEX: 38.36 KG/M2 | RESPIRATION RATE: 16 BRPM | OXYGEN SATURATION: 99 % | SYSTOLIC BLOOD PRESSURE: 144 MMHG

## 2023-12-01 DIAGNOSIS — L03.116 CELLULITIS OF LEFT LOWER EXTREMITY: Primary | ICD-10-CM

## 2023-12-01 LAB
ALBUMIN SERPL-MCNC: 3.4 G/DL (ref 3.5–5)
ALBUMIN/GLOB SERPL: 0.9 (ref 1.1–2.2)
ALP SERPL-CCNC: 82 U/L (ref 45–117)
ALT SERPL-CCNC: 50 U/L (ref 12–78)
ANION GAP SERPL CALC-SCNC: 6 MMOL/L (ref 5–15)
AST SERPL W P-5'-P-CCNC: 19 U/L (ref 15–37)
BASOPHILS # BLD: 0 K/UL (ref 0–0.1)
BASOPHILS NFR BLD: 1 % (ref 0–1)
BILIRUB SERPL-MCNC: 0.2 MG/DL (ref 0.2–1)
BNP SERPL-MCNC: 30 PG/ML
BUN SERPL-MCNC: 13 MG/DL (ref 6–20)
BUN/CREAT SERPL: 11 (ref 12–20)
CA-I BLD-MCNC: 8.9 MG/DL (ref 8.5–10.1)
CHLORIDE SERPL-SCNC: 103 MMOL/L (ref 97–108)
CO2 SERPL-SCNC: 30 MMOL/L (ref 21–32)
CREAT SERPL-MCNC: 1.14 MG/DL (ref 0.7–1.3)
DIFFERENTIAL METHOD BLD: ABNORMAL
EOSINOPHIL # BLD: 0.3 K/UL (ref 0–0.4)
EOSINOPHIL NFR BLD: 5 % (ref 0–7)
ERYTHROCYTE [DISTWIDTH] IN BLOOD BY AUTOMATED COUNT: 13.8 % (ref 11.5–14.5)
GLOBULIN SER CALC-MCNC: 3.8 G/DL (ref 2–4)
GLUCOSE SERPL-MCNC: 171 MG/DL (ref 65–100)
HCT VFR BLD AUTO: 35 % (ref 36.6–50.3)
HGB BLD-MCNC: 11.5 G/DL (ref 12.1–17)
IMM GRANULOCYTES # BLD AUTO: 0 K/UL (ref 0–0.04)
IMM GRANULOCYTES NFR BLD AUTO: 0 % (ref 0–0.5)
LYMPHOCYTES # BLD: 1.2 K/UL (ref 0.8–3.5)
LYMPHOCYTES NFR BLD: 23 % (ref 12–49)
MCH RBC QN AUTO: 32.4 PG (ref 26–34)
MCHC RBC AUTO-ENTMCNC: 32.9 G/DL (ref 30–36.5)
MCV RBC AUTO: 98.6 FL (ref 80–99)
MONOCYTES # BLD: 0.4 K/UL (ref 0–1)
MONOCYTES NFR BLD: 8 % (ref 5–13)
NEUTS SEG # BLD: 3.2 K/UL (ref 1.8–8)
NEUTS SEG NFR BLD: 63 % (ref 32–75)
NRBC # BLD: 0 K/UL (ref 0–0.01)
NRBC BLD-RTO: 0 PER 100 WBC
PLATELET # BLD AUTO: 228 K/UL (ref 150–400)
PMV BLD AUTO: 9.7 FL (ref 8.9–12.9)
POTASSIUM SERPL-SCNC: 3.7 MMOL/L (ref 3.5–5.1)
PROT SERPL-MCNC: 7.2 G/DL (ref 6.4–8.2)
RBC # BLD AUTO: 3.55 M/UL (ref 4.1–5.7)
SODIUM SERPL-SCNC: 139 MMOL/L (ref 136–145)
TROPONIN I SERPL HS-MCNC: 11 NG/L (ref 0–76)
WBC # BLD AUTO: 5.1 K/UL (ref 4.1–11.1)

## 2023-12-01 PROCEDURE — 93922 UPR/L XTREMITY ART 2 LEVELS: CPT

## 2023-12-01 PROCEDURE — 99285 EMERGENCY DEPT VISIT HI MDM: CPT

## 2023-12-01 PROCEDURE — 36415 COLL VENOUS BLD VENIPUNCTURE: CPT

## 2023-12-01 PROCEDURE — 96374 THER/PROPH/DIAG INJ IV PUSH: CPT

## 2023-12-01 PROCEDURE — 71045 X-RAY EXAM CHEST 1 VIEW: CPT

## 2023-12-01 PROCEDURE — 6360000002 HC RX W HCPCS: Performed by: EMERGENCY MEDICINE

## 2023-12-01 PROCEDURE — 93970 EXTREMITY STUDY: CPT

## 2023-12-01 PROCEDURE — 6370000000 HC RX 637 (ALT 250 FOR IP): Performed by: EMERGENCY MEDICINE

## 2023-12-01 PROCEDURE — 80053 COMPREHEN METABOLIC PANEL: CPT

## 2023-12-01 PROCEDURE — 96375 TX/PRO/DX INJ NEW DRUG ADDON: CPT

## 2023-12-01 PROCEDURE — 84484 ASSAY OF TROPONIN QUANT: CPT

## 2023-12-01 PROCEDURE — 85025 COMPLETE CBC W/AUTO DIFF WBC: CPT

## 2023-12-01 PROCEDURE — 71275 CT ANGIOGRAPHY CHEST: CPT

## 2023-12-01 PROCEDURE — 6360000004 HC RX CONTRAST MEDICATION: Performed by: EMERGENCY MEDICINE

## 2023-12-01 PROCEDURE — 83880 ASSAY OF NATRIURETIC PEPTIDE: CPT

## 2023-12-01 RX ORDER — FUROSEMIDE 10 MG/ML
40 INJECTION INTRAMUSCULAR; INTRAVENOUS
Status: COMPLETED | OUTPATIENT
Start: 2023-12-01 | End: 2023-12-01

## 2023-12-01 RX ORDER — HYDROCODONE BITARTRATE AND ACETAMINOPHEN 5; 325 MG/1; MG/1
1 TABLET ORAL
Status: COMPLETED | OUTPATIENT
Start: 2023-12-01 | End: 2023-12-01

## 2023-12-01 RX ORDER — KETOROLAC TROMETHAMINE 30 MG/ML
30 INJECTION, SOLUTION INTRAMUSCULAR; INTRAVENOUS
Status: COMPLETED | OUTPATIENT
Start: 2023-12-01 | End: 2023-12-01

## 2023-12-01 RX ORDER — CEPHALEXIN 500 MG/1
500 CAPSULE ORAL 2 TIMES DAILY
Qty: 14 CAPSULE | Refills: 0 | Status: SHIPPED | OUTPATIENT
Start: 2023-12-01 | End: 2023-12-08

## 2023-12-01 RX ORDER — FUROSEMIDE 20 MG/1
40 TABLET ORAL DAILY
Qty: 30 TABLET | Refills: 0 | Status: SHIPPED | OUTPATIENT
Start: 2023-12-01 | End: 2023-12-16

## 2023-12-01 RX ADMIN — KETOROLAC TROMETHAMINE 30 MG: 30 INJECTION, SOLUTION INTRAMUSCULAR; INTRAVENOUS at 16:08

## 2023-12-01 RX ADMIN — FUROSEMIDE 40 MG: 10 INJECTION, SOLUTION INTRAMUSCULAR; INTRAVENOUS at 16:09

## 2023-12-01 RX ADMIN — HYDROCODONE BITARTRATE AND ACETAMINOPHEN 1 TABLET: 5; 325 TABLET ORAL at 19:09

## 2023-12-01 RX ADMIN — IOPAMIDOL 100 ML: 755 INJECTION, SOLUTION INTRAVENOUS at 17:12

## 2023-12-01 ASSESSMENT — PAIN SCALES - GENERAL: PAINLEVEL_OUTOF10: 10

## 2023-12-01 ASSESSMENT — PAIN - FUNCTIONAL ASSESSMENT: PAIN_FUNCTIONAL_ASSESSMENT: 0-10

## 2023-12-01 NOTE — ED TRIAGE NOTES
Pt arrives to ED with family. Pt reports swelling and pain in both legs. Pt reports it is affecting his ability to ambulate.

## 2023-12-02 PROBLEM — L03.116 CELLULITIS OF LEFT LOWER EXTREMITY: Status: ACTIVE | Noted: 2023-12-02

## 2023-12-02 LAB
ECHO BSA: 2.83 M2
ECHO BSA: 2.83 M2
VAS LEFT ARM BP: 160 MMHG
VAS LEFT TBI: 0.74
VAS LEFT TOE PRESSURE: 121 MMHG
VAS RIGHT ABI: 0.9
VAS RIGHT ARM BP: 164 MMHG
VAS RIGHT DORSALIS PEDIS BP: 147 MMHG
VAS RIGHT PTA BP: 142 MMHG
VAS RIGHT TBI: 0.75
VAS RIGHT TOE PRESSURE: 123 MMHG

## 2023-12-06 ENCOUNTER — HOSPITAL ENCOUNTER (EMERGENCY)
Facility: HOSPITAL | Age: 48
Discharge: HOME | End: 2023-12-07
Attending: EMERGENCY MEDICINE
Payer: MEDICAID

## 2023-12-06 ENCOUNTER — APPOINTMENT (OUTPATIENT)
Facility: HOSPITAL | Age: 48
End: 2023-12-06
Payer: MEDICAID

## 2023-12-06 DIAGNOSIS — L03.116 CELLULITIS OF LEFT LOWER EXTREMITY: Primary | ICD-10-CM

## 2023-12-06 DIAGNOSIS — R60.0 BILATERAL LEG EDEMA: ICD-10-CM

## 2023-12-06 PROCEDURE — 87040 BLOOD CULTURE FOR BACTERIA: CPT

## 2023-12-06 PROCEDURE — 36415 COLL VENOUS BLD VENIPUNCTURE: CPT

## 2023-12-06 PROCEDURE — 96375 TX/PRO/DX INJ NEW DRUG ADDON: CPT

## 2023-12-06 PROCEDURE — 85025 COMPLETE CBC W/AUTO DIFF WBC: CPT

## 2023-12-06 PROCEDURE — 71046 X-RAY EXAM CHEST 2 VIEWS: CPT

## 2023-12-06 PROCEDURE — 93005 ELECTROCARDIOGRAM TRACING: CPT | Performed by: EMERGENCY MEDICINE

## 2023-12-06 PROCEDURE — 84484 ASSAY OF TROPONIN QUANT: CPT

## 2023-12-06 PROCEDURE — 99285 EMERGENCY DEPT VISIT HI MDM: CPT

## 2023-12-06 PROCEDURE — 96365 THER/PROPH/DIAG IV INF INIT: CPT

## 2023-12-06 PROCEDURE — 83605 ASSAY OF LACTIC ACID: CPT

## 2023-12-06 PROCEDURE — 80053 COMPREHEN METABOLIC PANEL: CPT

## 2023-12-06 PROCEDURE — 83880 ASSAY OF NATRIURETIC PEPTIDE: CPT

## 2023-12-06 RX ORDER — CLINDAMYCIN PHOSPHATE 900 MG/50ML
900 INJECTION INTRAVENOUS
Status: COMPLETED | OUTPATIENT
Start: 2023-12-06 | End: 2023-12-07

## 2023-12-06 ASSESSMENT — PAIN DESCRIPTION - DESCRIPTORS: DESCRIPTORS: ACHING

## 2023-12-06 ASSESSMENT — PAIN DESCRIPTION - ORIENTATION: ORIENTATION: RIGHT;LEFT

## 2023-12-06 ASSESSMENT — PAIN - FUNCTIONAL ASSESSMENT: PAIN_FUNCTIONAL_ASSESSMENT: 0-10

## 2023-12-06 ASSESSMENT — PAIN DESCRIPTION - LOCATION: LOCATION: LEG

## 2023-12-06 ASSESSMENT — PAIN SCALES - GENERAL: PAINLEVEL_OUTOF10: 10

## 2023-12-07 VITALS
BODY MASS INDEX: 38.36 KG/M2 | HEART RATE: 90 BPM | HEIGHT: 76 IN | RESPIRATION RATE: 16 BRPM | SYSTOLIC BLOOD PRESSURE: 144 MMHG | TEMPERATURE: 99.1 F | WEIGHT: 315 LBS | DIASTOLIC BLOOD PRESSURE: 65 MMHG | OXYGEN SATURATION: 93 %

## 2023-12-07 LAB
ALBUMIN SERPL-MCNC: 3.2 G/DL (ref 3.5–5)
ALBUMIN/GLOB SERPL: 0.8 (ref 1.1–2.2)
ALP SERPL-CCNC: 69 U/L (ref 45–117)
ALT SERPL-CCNC: 44 U/L (ref 12–78)
ANION GAP SERPL CALC-SCNC: 6 MMOL/L (ref 5–15)
AST SERPL W P-5'-P-CCNC: 20 U/L (ref 15–37)
BASOPHILS # BLD: 0 K/UL (ref 0–0.1)
BASOPHILS NFR BLD: 0 % (ref 0–1)
BILIRUB SERPL-MCNC: 0.3 MG/DL (ref 0.2–1)
BNP SERPL-MCNC: 62 PG/ML
BUN SERPL-MCNC: 15 MG/DL (ref 6–20)
BUN/CREAT SERPL: 11 (ref 12–20)
CA-I BLD-MCNC: 8.8 MG/DL (ref 8.5–10.1)
CHLORIDE SERPL-SCNC: 100 MMOL/L (ref 97–108)
CO2 SERPL-SCNC: 34 MMOL/L (ref 21–32)
CREAT SERPL-MCNC: 1.34 MG/DL (ref 0.7–1.3)
DIFFERENTIAL METHOD BLD: ABNORMAL
EKG ATRIAL RATE: 111 BPM
EKG ATRIAL RATE: 98 BPM
EKG DIAGNOSIS: NORMAL
EKG DIAGNOSIS: NORMAL
EKG P AXIS: 30 DEGREES
EKG P AXIS: 32 DEGREES
EKG P-R INTERVAL: 184 MS
EKG P-R INTERVAL: 190 MS
EKG Q-T INTERVAL: 344 MS
EKG Q-T INTERVAL: 382 MS
EKG QRS DURATION: 96 MS
EKG QRS DURATION: 98 MS
EKG QTC CALCULATION (BAZETT): 467 MS
EKG QTC CALCULATION (BAZETT): 487 MS
EKG R AXIS: 11 DEGREES
EKG R AXIS: 4 DEGREES
EKG T AXIS: 24 DEGREES
EKG T AXIS: 42 DEGREES
EKG VENTRICULAR RATE: 111 BPM
EKG VENTRICULAR RATE: 98 BPM
EOSINOPHIL # BLD: 0.2 K/UL (ref 0–0.4)
EOSINOPHIL NFR BLD: 2 % (ref 0–7)
ERYTHROCYTE [DISTWIDTH] IN BLOOD BY AUTOMATED COUNT: 14.2 % (ref 11.5–14.5)
GLOBULIN SER CALC-MCNC: 4 G/DL (ref 2–4)
GLUCOSE SERPL-MCNC: 133 MG/DL (ref 65–100)
HCT VFR BLD AUTO: 34 % (ref 36.6–50.3)
HGB BLD-MCNC: 10.9 G/DL (ref 12.1–17)
LACTATE SERPL-SCNC: 1.4 MMOL/L (ref 0.4–2)
LYMPHOCYTES NFR BLD: 25 % (ref 12–49)
MCH RBC QN AUTO: 33.1 PG (ref 26–34)
MCHC RBC AUTO-ENTMCNC: 32.1 G/DL (ref 30–36.5)
MCV RBC AUTO: 103.3 FL (ref 80–99)
MONOCYTES NFR BLD: 9 % (ref 5–13)
NEUTS SEG NFR BLD: 64 % (ref 32–75)
PLATELET # BLD AUTO: 193 K/UL (ref 150–400)
PMV BLD AUTO: 9.5 FL (ref 8.9–12.9)
POTASSIUM SERPL-SCNC: 3.8 MMOL/L (ref 3.5–5.1)
PROT SERPL-MCNC: 7.2 G/DL (ref 6.4–8.2)
RBC # BLD AUTO: 3.29 M/UL (ref 4.1–5.7)
SODIUM SERPL-SCNC: 140 MMOL/L (ref 136–145)
TROPONIN I SERPL HS-MCNC: 13 NG/L (ref 0–76)
WBC # BLD AUTO: 5.5 K/UL (ref 4.1–11.1)

## 2023-12-07 PROCEDURE — 6360000002 HC RX W HCPCS: Performed by: EMERGENCY MEDICINE

## 2023-12-07 RX ORDER — CLINDAMYCIN HYDROCHLORIDE 150 MG/1
450 CAPSULE ORAL 3 TIMES DAILY
Qty: 90 CAPSULE | Refills: 0 | Status: SHIPPED | OUTPATIENT
Start: 2023-12-07 | End: 2023-12-17

## 2023-12-07 RX ORDER — FUROSEMIDE 20 MG/1
20 TABLET ORAL 2 TIMES DAILY
Qty: 60 TABLET | Refills: 3 | Status: SHIPPED | OUTPATIENT
Start: 2023-12-07

## 2023-12-07 RX ORDER — MORPHINE SULFATE 4 MG/ML
4 INJECTION, SOLUTION INTRAMUSCULAR; INTRAVENOUS
Status: COMPLETED | OUTPATIENT
Start: 2023-12-07 | End: 2023-12-07

## 2023-12-07 RX ORDER — FUROSEMIDE 10 MG/ML
40 INJECTION INTRAMUSCULAR; INTRAVENOUS
Status: COMPLETED | OUTPATIENT
Start: 2023-12-07 | End: 2023-12-07

## 2023-12-07 RX ORDER — OXYCODONE HYDROCHLORIDE AND ACETAMINOPHEN 5; 325 MG/1; MG/1
1 TABLET ORAL EVERY 6 HOURS PRN
Qty: 12 TABLET | Refills: 0 | Status: SHIPPED | OUTPATIENT
Start: 2023-12-07 | End: 2023-12-10

## 2023-12-07 RX ADMIN — MORPHINE SULFATE 4 MG: 4 INJECTION, SOLUTION INTRAMUSCULAR; INTRAVENOUS at 01:04

## 2023-12-07 RX ADMIN — CLINDAMYCIN PHOSPHATE 900 MG: 900 INJECTION, SOLUTION INTRAVENOUS at 00:13

## 2023-12-07 RX ADMIN — FUROSEMIDE 40 MG: 10 INJECTION, SOLUTION INTRAMUSCULAR; INTRAVENOUS at 01:04

## 2023-12-07 NOTE — ED PROVIDER NOTES
20 MG TABS tablet Take 1 tablet by mouth daily 30 tablet 0    metoprolol succinate (TOPROL XL) 25 MG extended release tablet Take 1 tablet by mouth daily 30 tablet 3    aspirin 81 MG chewable tablet Take 1 tablet by mouth daily      atorvastatin (LIPITOR) 40 MG tablet Take 1 tablet by mouth daily      ipratropium-albuterol (DUONEB) 0.5-2.5 (3) MG/3ML SOLN nebulizer solution Inhale 3 mLs into the lungs every 6 hours as needed      traZODone (DESYREL) 100 MG tablet Take 1 tablet by mouth nightly       Social Determinants of Health:   Social Determinants of Health     Tobacco Use: Medium Risk (11/26/2023)    Patient History     Smoking Tobacco Use: Former     Smokeless Tobacco Use: Never     Passive Exposure: Not on file   Alcohol Use: Not At Risk (12/1/2023)    AUDIT-C     Frequency of Alcohol Consumption: Never     Average Number of Drinks: Patient does not drink     Frequency of Binge Drinking: Never   Financial Resource Strain: Not on file   Food Insecurity: Not on file (11/2/2023)   Recent Concern: Food Insecurity - Food Insecurity Present (11/2/2023)    Hunger Vital Sign     Worried About Running Out of Food in the Last Year: Often true     Ran Out of Food in the Last Year: Often true   Transportation Needs: No Transportation Needs (11/17/2023)    [facility]     In the past 12 months, has lack of reliable transportation kept you from medical appointments, meetings, work or from getting things needed for daily living?: Not on file   Physical Activity: Not on file   Stress: Not on file   Social Connections: Not on file   Intimate Partner Violence: Not on file   Depression: Not at risk (7/17/2022)    PHQ-2     PHQ-2 Score: 0   Housing Stability: High Risk (11/2/2023)    Housing Stability Vital Sign     Unable to Pay for Housing in the Last Year: No     Number of Places Lived in the Last Year: 1     Unstable Housing in the Last Year: Yes   Interpersonal Safety: Not At Risk (11/2/2023)

## 2023-12-07 NOTE — DISCHARGE INSTRUCTIONS
12/06/23 11:45 PM   Result Value Ref Range    Troponin, High Sensitivity 13 0 - 76 ng/L   Brain Natriuretic Peptide    Collection Time: 12/06/23 11:45 PM   Result Value Ref Range    NT Pro-BNP 62 <125 pg/mL   Lactic Acid    Collection Time: 12/06/23 11:45 PM   Result Value Ref Range    Lactic Acid, Plasma 1.4 0.4 - 2.0 mmol/L       Radiologic Studies  XR CHEST (2 VW)   Final Result      Cardiomegaly with minimal pulmonary edema. ------------------------------------------------------------------------------------------------------------  The exam and treatment you received in the Emergency Department were for an urgent problem and are not intended as complete care. It is important that you follow-up with a doctor, nurse practitioner, or physician assistant to:  (1) confirm your diagnosis,  (2) re-evaluation of changes in your illness and treatment, and  (3) for ongoing care. Please take your discharge instructions with you when you go to your follow-up appointment. If you have any problem arranging a follow-up appointment, contact the Emergency Department. If your symptoms become worse or you do not improve as expected and you are unable to reach your health care provider, please return to the Emergency Department. We are available 24 hours a day. If a prescription has been provided, please have it filled as soon as possible to prevent a delay in treatment. If you have any questions or reservations about taking the medication due to side effects or interactions with other medications, please call your primary care provider or contact the ER.

## 2023-12-07 NOTE — ED TRIAGE NOTES
Hx CHF. Reports bilateral leg swelling worse than normal x1.5 weeks. Significant SOB with short ambulation. Also reports redness and oozing from L leg.

## 2023-12-10 LAB
BACTERIA SPEC CULT: NORMAL
BACTERIA SPEC CULT: NORMAL
Lab: NORMAL
Lab: NORMAL

## 2023-12-13 LAB
BACTERIA SPEC CULT: NORMAL
BACTERIA SPEC CULT: NORMAL
Lab: NORMAL
Lab: NORMAL

## 2023-12-26 ENCOUNTER — APPOINTMENT (OUTPATIENT)
Facility: HOSPITAL | Age: 48
End: 2023-12-26
Payer: MEDICAID

## 2023-12-26 ENCOUNTER — HOSPITAL ENCOUNTER (EMERGENCY)
Facility: HOSPITAL | Age: 48
Discharge: HOME | End: 2023-12-27
Attending: EMERGENCY MEDICINE
Payer: MEDICAID

## 2023-12-26 DIAGNOSIS — I87.2 VENOUS STASIS DERMATITIS OF LEFT LOWER EXTREMITY: ICD-10-CM

## 2023-12-26 DIAGNOSIS — Z86.718 HISTORY OF DVT (DEEP VEIN THROMBOSIS): Primary | ICD-10-CM

## 2023-12-26 DIAGNOSIS — L03.116 CELLULITIS OF LEFT LOWER EXTREMITY: ICD-10-CM

## 2023-12-26 LAB
ALBUMIN SERPL-MCNC: 3.4 G/DL (ref 3.5–5)
ALBUMIN/GLOB SERPL: 0.8 (ref 1.1–2.2)
ALP SERPL-CCNC: 80 U/L (ref 45–117)
ALT SERPL-CCNC: 40 U/L (ref 12–78)
ANION GAP SERPL CALC-SCNC: 5 MMOL/L (ref 5–15)
AST SERPL W P-5'-P-CCNC: 60 U/L (ref 15–37)
BASOPHILS # BLD: 0 K/UL (ref 0–0.1)
BASOPHILS NFR BLD: 0 % (ref 0–1)
BILIRUB SERPL-MCNC: 0.4 MG/DL (ref 0.2–1)
BNP SERPL-MCNC: 53 PG/ML
BUN SERPL-MCNC: 9 MG/DL (ref 6–20)
BUN/CREAT SERPL: 9 (ref 12–20)
CA-I BLD-MCNC: 8.9 MG/DL (ref 8.5–10.1)
CHLORIDE SERPL-SCNC: 103 MMOL/L (ref 97–108)
CO2 SERPL-SCNC: 32 MMOL/L (ref 21–32)
CREAT SERPL-MCNC: 1.05 MG/DL (ref 0.7–1.3)
DIFFERENTIAL METHOD BLD: ABNORMAL
EKG ATRIAL RATE: 100 BPM
EKG DIAGNOSIS: NORMAL
EKG P AXIS: 34 DEGREES
EKG P-R INTERVAL: 186 MS
EKG Q-T INTERVAL: 380 MS
EKG QRS DURATION: 100 MS
EKG QTC CALCULATION (BAZETT): 490 MS
EKG R AXIS: 12 DEGREES
EKG T AXIS: 38 DEGREES
EKG VENTRICULAR RATE: 100 BPM
EOSINOPHIL # BLD: 0.3 K/UL (ref 0–0.4)
EOSINOPHIL NFR BLD: 4 % (ref 0–7)
ERYTHROCYTE [DISTWIDTH] IN BLOOD BY AUTOMATED COUNT: 14.1 % (ref 11.5–14.5)
GLOBULIN SER CALC-MCNC: 4.2 G/DL (ref 2–4)
GLUCOSE SERPL-MCNC: 101 MG/DL (ref 65–100)
HCT VFR BLD AUTO: 35.8 % (ref 36.6–50.3)
HGB BLD-MCNC: 11.8 G/DL (ref 12.1–17)
IMM GRANULOCYTES # BLD AUTO: 0 K/UL (ref 0–0.04)
IMM GRANULOCYTES NFR BLD AUTO: 1 % (ref 0–0.5)
LYMPHOCYTES # BLD: 1.4 K/UL (ref 0.8–3.5)
LYMPHOCYTES NFR BLD: 24 % (ref 12–49)
MAGNESIUM SERPL-MCNC: 2.1 MG/DL (ref 1.6–2.4)
MCH RBC QN AUTO: 32.9 PG (ref 26–34)
MCHC RBC AUTO-ENTMCNC: 33 G/DL (ref 30–36.5)
MCV RBC AUTO: 99.7 FL (ref 80–99)
MONOCYTES # BLD: 0.4 K/UL (ref 0–1)
MONOCYTES NFR BLD: 7 % (ref 5–13)
NEUTS SEG # BLD: 3.7 K/UL (ref 1.8–8)
NEUTS SEG NFR BLD: 64 % (ref 32–75)
NRBC # BLD: 0 K/UL (ref 0–0.01)
NRBC BLD-RTO: 0 PER 100 WBC
PLATELET # BLD AUTO: 222 K/UL (ref 150–400)
PMV BLD AUTO: 11 FL (ref 8.9–12.9)
POTASSIUM SERPL-SCNC: 4.9 MMOL/L (ref 3.5–5.1)
PROT SERPL-MCNC: 7.6 G/DL (ref 6.4–8.2)
RBC # BLD AUTO: 3.59 M/UL (ref 4.1–5.7)
SODIUM SERPL-SCNC: 140 MMOL/L (ref 136–145)
TROPONIN I SERPL HS-MCNC: 10 NG/L (ref 0–76)
WBC # BLD AUTO: 5.8 K/UL (ref 4.1–11.1)

## 2023-12-26 PROCEDURE — 6370000000 HC RX 637 (ALT 250 FOR IP): Performed by: EMERGENCY MEDICINE

## 2023-12-26 PROCEDURE — 87040 BLOOD CULTURE FOR BACTERIA: CPT

## 2023-12-26 PROCEDURE — 94640 AIRWAY INHALATION TREATMENT: CPT

## 2023-12-26 PROCEDURE — 84484 ASSAY OF TROPONIN QUANT: CPT

## 2023-12-26 PROCEDURE — 99285 EMERGENCY DEPT VISIT HI MDM: CPT

## 2023-12-26 PROCEDURE — 83880 ASSAY OF NATRIURETIC PEPTIDE: CPT

## 2023-12-26 PROCEDURE — 36415 COLL VENOUS BLD VENIPUNCTURE: CPT

## 2023-12-26 PROCEDURE — 85025 COMPLETE CBC W/AUTO DIFF WBC: CPT

## 2023-12-26 PROCEDURE — 83735 ASSAY OF MAGNESIUM: CPT

## 2023-12-26 PROCEDURE — 71045 X-RAY EXAM CHEST 1 VIEW: CPT

## 2023-12-26 PROCEDURE — 80053 COMPREHEN METABOLIC PANEL: CPT

## 2023-12-26 PROCEDURE — 93005 ELECTROCARDIOGRAM TRACING: CPT | Performed by: EMERGENCY MEDICINE

## 2023-12-26 RX ORDER — FUROSEMIDE 40 MG/1
40 TABLET ORAL
Status: COMPLETED | OUTPATIENT
Start: 2023-12-26 | End: 2023-12-27

## 2023-12-26 RX ORDER — HYDROCODONE BITARTRATE AND ACETAMINOPHEN 7.5; 325 MG/1; MG/1
1 TABLET ORAL ONCE
Status: COMPLETED | OUTPATIENT
Start: 2023-12-26 | End: 2023-12-26

## 2023-12-26 RX ORDER — IPRATROPIUM BROMIDE AND ALBUTEROL SULFATE 2.5; .5 MG/3ML; MG/3ML
1 SOLUTION RESPIRATORY (INHALATION)
Status: COMPLETED | OUTPATIENT
Start: 2023-12-26 | End: 2023-12-26

## 2023-12-26 RX ORDER — FUROSEMIDE 40 MG/1
40 TABLET ORAL DAILY
Qty: 15 TABLET | Refills: 0 | Status: SHIPPED | OUTPATIENT
Start: 2023-12-26 | End: 2024-01-10

## 2023-12-26 RX ORDER — CEPHALEXIN 500 MG/1
500 CAPSULE ORAL 4 TIMES DAILY
Qty: 28 CAPSULE | Refills: 0 | Status: SHIPPED | OUTPATIENT
Start: 2023-12-26 | End: 2024-01-02

## 2023-12-26 RX ORDER — OXYCODONE HYDROCHLORIDE AND ACETAMINOPHEN 5; 325 MG/1; MG/1
1 TABLET ORAL EVERY 6 HOURS PRN
Qty: 15 TABLET | Refills: 0 | Status: SHIPPED | OUTPATIENT
Start: 2023-12-26 | End: 2023-12-30

## 2023-12-26 RX ORDER — HYDROMORPHONE HYDROCHLORIDE 1 MG/ML
0.5 INJECTION, SOLUTION INTRAMUSCULAR; INTRAVENOUS; SUBCUTANEOUS
Status: COMPLETED | OUTPATIENT
Start: 2023-12-26 | End: 2023-12-27

## 2023-12-26 RX ORDER — DOXYCYCLINE HYCLATE 100 MG
100 TABLET ORAL 2 TIMES DAILY
Qty: 20 TABLET | Refills: 0 | Status: SHIPPED | OUTPATIENT
Start: 2023-12-26 | End: 2024-01-05

## 2023-12-26 RX ORDER — DOXYCYCLINE HYCLATE 100 MG/1
100 CAPSULE ORAL ONCE
Status: COMPLETED | OUTPATIENT
Start: 2023-12-26 | End: 2023-12-27

## 2023-12-26 RX ADMIN — IPRATROPIUM BROMIDE AND ALBUTEROL SULFATE 1 DOSE: 2.5; .5 SOLUTION RESPIRATORY (INHALATION) at 22:36

## 2023-12-26 RX ADMIN — HYDROCODONE BITARTRATE AND ACETAMINOPHEN 1 TABLET: 7.5; 325 TABLET ORAL at 19:57

## 2023-12-26 NOTE — ED NOTES
Pt states he has ran out of his fluid pills and he has doubled up on them, pt also states its causing SOB, pt fell last night when his legs gave out right leg draing wounds on right leg pt has hx of cellulitis and CHF

## 2023-12-27 VITALS
OXYGEN SATURATION: 94 % | HEART RATE: 99 BPM | TEMPERATURE: 98.2 F | BODY MASS INDEX: 38.36 KG/M2 | DIASTOLIC BLOOD PRESSURE: 86 MMHG | WEIGHT: 315 LBS | HEIGHT: 76 IN | SYSTOLIC BLOOD PRESSURE: 147 MMHG | RESPIRATION RATE: 14 BRPM

## 2023-12-27 PROCEDURE — 6370000000 HC RX 637 (ALT 250 FOR IP): Performed by: EMERGENCY MEDICINE

## 2023-12-27 PROCEDURE — 96372 THER/PROPH/DIAG INJ SC/IM: CPT

## 2023-12-27 PROCEDURE — 2500000003 HC RX 250 WO HCPCS: Performed by: EMERGENCY MEDICINE

## 2023-12-27 RX ADMIN — DOXYCYCLINE HYCLATE 100 MG: 100 CAPSULE ORAL at 00:46

## 2023-12-27 RX ADMIN — HYDROMORPHONE HYDROCHLORIDE 0.5 MG: 1 INJECTION, SOLUTION INTRAMUSCULAR; INTRAVENOUS; SUBCUTANEOUS at 00:46

## 2023-12-27 RX ADMIN — RIVAROXABAN 20 MG: 20 TABLET, FILM COATED ORAL at 00:47

## 2023-12-27 RX ADMIN — FUROSEMIDE 40 MG: 40 TABLET ORAL at 00:46

## 2023-12-27 NOTE — DISCHARGE INSTRUCTIONS
1.  Follow-up with your primary care doctor tomorrow and your vascular specialist.  I suspect this redness is likely due to venous stasis as opposed to cellulitis. Your blood testing and chest x-ray showed no indication of any heart failure. Your previous echo showed no indication of heart failure. I prescribed you Lasix to help you mobilize some of the fluid. I gave you antibiotics to treat developing cellulitis. 2.  Take the pain medicine sparingly as needed for pain. It contains a little bit of Tylenol so use caution and do not take any additional Tylenol while taking the medicine. 3.  Return for chest pain, worsening shortness of breath, passing out, worsening swelling or any other symptoms that concern you. I have written a prescription to have a outpatient ultrasound done tomorrow to evaluate you for recurrent blood clot in your leg. Thank you! Thank you for allowing me to care for you in the emergency department. It is my goal to provide you with excellent care. If you have not received excellent quality care, please ask to speak to the nurse manager. Please fill out the survey that will come to you by mail or email since we listen to your feedback! Below you will find a list of your tests from today's visit. Should you have any questions, please do not hesitate to call the emergency department.     Labs  Recent Results (from the past 12 hour(s))   EKG 12 Lead    Collection Time: 12/26/23  6:07 PM   Result Value Ref Range    Ventricular Rate 100 BPM    Atrial Rate 100 BPM    P-R Interval 186 ms    QRS Duration 100 ms    Q-T Interval 380 ms    QTc Calculation (Bazett) 490 ms    P Axis 34 degrees    R Axis 12 degrees    T Axis 38 degrees    Diagnosis       Normal sinus rhythm  Prolonged QT  Abnormal ECG  When compared with ECG of 06-DEC-2023 23:21,  No significant change was found  Confirmed by Tara Epps ([id number]) on 12/26/2023 9:53:22 PM     Magnesium    Collection Time: 12/26/23  8:01 PM Discharge Planning Case Management Assessment:     Patient admitted on 7-30-23  Chart reviewed today  Care plan discussed with ER treatment team,    attending Dr Platt     DME at home: cane  Current dispo: home/shelter today  Resources provided to Mr Guerrero  Transportation: public/RTA  Consults following: case mgt  Case management  to follow

## 2023-12-27 NOTE — ED PROVIDER NOTES
advised that while the likelihood of serious underlying condition is low at this point given the evaluation performed today, we cannot fully rule it out. They are advised to immediately return with any new symptoms or worsening of current condition. Any Incidental findings were noted on the patient's discharge paperwork as well as verbally directly to the patient, and the appropriate follow-up was given to the patient as far as instructions on testing needed as well as the timeframe. All questions have been answered. Patient is given educational material regarding their diagnoses, including danger symptoms and when to return to the ED. Diagnosis     Clinical Impression:   1. History of DVT (deep vein thrombosis)    2. Venous stasis dermatitis of left lower extremity    3. Cellulitis of left lower extremity        PATIENT REFERRED TO:  Chas Gomez MD  [address] [facility] [id number] [phone]    Call today      [facility] EMERGENCY DEPT  [address]  [phone]    As needed, If symptoms worsen    Alver Bosworth, [address]  [phone]    In 1 day        DISCHARGE MEDICATIONS:  Discharge Medication List as of 12/26/2023 11:59 PM        START taking these medications    Details   oxyCODONE-acetaminophen (PERCOCET) 5-325 MG per tablet Take 1 tablet by mouth every 6 hours as needed for Pain for up to 4 days. Intended supply: 3 days.  Take lowest dose possible to manage pain Max Daily Amount: 4 tablets, Disp-15 tablet, R-0Normal      cephALEXin (KEFLEX) 500 MG capsule Take 1 capsule by mouth 4 times daily for 7 days, Disp-28 capsule, R-0Normal      doxycycline hyclate (VIBRA-TABS) 100 MG tablet Take 1 tablet by mouth 2 times daily for 10 days, Disp-20 tablet, R-0Normal      !! rivaroxaban (XARELTO) 20 MG TABS tablet Take 1 tablet by mouth daily (with breakfast) for 14 days, Disp-14 tablet, R-0Normal       !! - Potential

## 2023-12-31 LAB
BACTERIA SPEC CULT: NORMAL
BACTERIA SPEC CULT: NORMAL
Lab: NORMAL
Lab: NORMAL

## 2024-01-02 LAB
BACTERIA SPEC CULT: NORMAL
BACTERIA SPEC CULT: NORMAL
Lab: NORMAL
Lab: NORMAL

## 2024-01-05 NOTE — CARE COORDINATION
10/19/23 1342   Service Assessment   Patient Orientation Alert and Oriented   Cognition Alert   History Provided By Patient   Primary Caregiver Self   Accompanied By/Relationship Patient alone in room. Support Systems Friends/Neighbors   Patient's Healthcare Decision Maker is: Patient Declined (Legal Next of Kin Remains as Decision Maker)   PCP Verified by CM Yes   Last Visit to PCP Within last 3 months  (Seen by Dr. Micaela Haines on 10/16/23.)   Prior Functional Level Independent in ADLs/IADLs   Current Functional Level Independent in ADLs/IADLs   Can patient return to prior living arrangement Yes   Ability to make needs known: Good   Family able to assist with home care needs: No   Would you like for me to discuss the discharge plan with any other family members/significant others, and if so, who?  No   Financial Resources [employer] None   Social/Functional History   Lives With Alone   Type of Home Apartment  (Patient lives in a motel.)   Home Layout One level   Home Access Level entry;Elevator   Active  Yes   Mode of Transportation Car   Discharge [place] Discharge   [address] Discharge None   Confirm Follow Up Transport Cab       Readmission Assessment  Number of Days since last admission?: 8-30 days  Previous Disposition: Home Alone  Who is being Interviewed: Patient  What was the patient's/caregiver's perception as to why they think they needed to return back to the hospital?: Did not realize care needs would be so extensive  Did you visit your Primary Care Physician after you left the hospital, before you returned this time?: Yes  Did you see a specialist, such as Cardiac, Pulmonary, Orthopedic Physician, etc. after you left the hospital?: No  Who advised the patient to return to the hospital?: Self-referral  Does the patient report anything that got in the way of taking their medications?: No  In our efforts to provide the best
CM acknowledged CM consult for assistance with discharge planning- patient lives in [facility] and only has enough money for 2 more days. BETH spoke with patient about this matter, and he stated that he was \"working on a few things, don't worry about me. \" CM will continue to follow.
Transition of Care Plan:    RUR: 21%  Prior Level of Functioning: Independent  Disposition: home self care ([facility] declined by patient)  If SNF or IPR: Date FOC offered: N/A  Date FOC received: N/A  Accepting facility: N/A  Date authorization started with reference number: N/A  Date authorization received and expires: N/A  Follow up appointments: Per discharge summary   DME needed: N/A  Transportation at discharge: Medicaid cab to free standing ER to  his car  IM/IMM Medicare/ letter given: N/A  Is patient a [place] and connected with [place]? If yes, was [employer] transfer form completed and [facility] notified? Caregiver Contact: N/A  Discharge Caregiver contacted prior to discharge? N/A  Care Conference needed?  N/A  Barriers to discharge: N/A
never

## 2024-01-08 ENCOUNTER — HOSPITAL ENCOUNTER (INPATIENT)
Facility: HOSPITAL | Age: 49
LOS: 3 days | Discharge: HOME | End: 2024-01-11
Attending: EMERGENCY MEDICINE | Admitting: INTERNAL MEDICINE
Payer: MEDICAID

## 2024-01-08 ENCOUNTER — APPOINTMENT (OUTPATIENT)
Facility: HOSPITAL | Age: 49
End: 2024-01-08
Payer: MEDICAID

## 2024-01-08 DIAGNOSIS — E87.70 HYPERVOLEMIA, UNSPECIFIED HYPERVOLEMIA TYPE: ICD-10-CM

## 2024-01-08 DIAGNOSIS — I50.9 ACUTE CONGESTIVE HEART FAILURE, UNSPECIFIED HEART FAILURE TYPE (HCC): Primary | ICD-10-CM

## 2024-01-08 PROBLEM — O22.30 DVT (DEEP VEIN THROMBOSIS) IN PREGNANCY: Status: ACTIVE | Noted: 2024-01-08

## 2024-01-08 PROBLEM — I50.43 CHF (CONGESTIVE HEART FAILURE), NYHA CLASS I, ACUTE ON CHRONIC, COMBINED (HCC): Status: ACTIVE | Noted: 2024-01-08

## 2024-01-08 LAB
ALBUMIN SERPL-MCNC: 3.2 G/DL (ref 3.5–5)
ALBUMIN/GLOB SERPL: 0.7 (ref 1.1–2.2)
ALP SERPL-CCNC: 79 U/L (ref 45–117)
ALT SERPL-CCNC: ABNORMAL U/L (ref 12–78)
ANION GAP SERPL CALC-SCNC: 2 MMOL/L (ref 5–15)
AST SERPL W P-5'-P-CCNC: ABNORMAL U/L (ref 15–37)
BASOPHILS # BLD: 0 K/UL (ref 0–0.1)
BASOPHILS NFR BLD: 0 % (ref 0–1)
BILIRUB SERPL-MCNC: 0.5 MG/DL (ref 0.2–1)
BNP SERPL-MCNC: 82 PG/ML
BUN SERPL-MCNC: 10 MG/DL (ref 6–20)
BUN/CREAT SERPL: 9 (ref 12–20)
CA-I BLD-MCNC: 8.6 MG/DL (ref 8.5–10.1)
CHLORIDE SERPL-SCNC: 100 MMOL/L (ref 97–108)
CO2 SERPL-SCNC: 33 MMOL/L (ref 21–32)
CREAT SERPL-MCNC: 1.11 MG/DL (ref 0.7–1.3)
DIFFERENTIAL METHOD BLD: ABNORMAL
EOSINOPHIL # BLD: 0.2 K/UL (ref 0–0.4)
EOSINOPHIL NFR BLD: 3 % (ref 0–7)
ERYTHROCYTE [DISTWIDTH] IN BLOOD BY AUTOMATED COUNT: 14.9 % (ref 11.5–14.5)
GLOBULIN SER CALC-MCNC: 4.9 G/DL (ref 2–4)
GLUCOSE SERPL-MCNC: 101 MG/DL (ref 65–100)
HCT VFR BLD AUTO: 35.8 % (ref 36.6–50.3)
HGB BLD-MCNC: 11.7 G/DL (ref 12.1–17)
IMM GRANULOCYTES # BLD AUTO: 0 K/UL (ref 0–0.04)
IMM GRANULOCYTES NFR BLD AUTO: 0 % (ref 0–0.5)
LYMPHOCYTES # BLD: 1.6 K/UL (ref 0.8–3.5)
LYMPHOCYTES NFR BLD: 22 % (ref 12–49)
MCH RBC QN AUTO: 33.1 PG (ref 26–34)
MCHC RBC AUTO-ENTMCNC: 32.7 G/DL (ref 30–36.5)
MCV RBC AUTO: 101.1 FL (ref 80–99)
MONOCYTES # BLD: 0.5 K/UL (ref 0–1)
MONOCYTES NFR BLD: 7 % (ref 5–13)
NEUTS SEG # BLD: 4.7 K/UL (ref 1.8–8)
NEUTS SEG NFR BLD: 68 % (ref 32–75)
NRBC # BLD: 0 K/UL (ref 0–0.01)
NRBC BLD-RTO: 0 PER 100 WBC
PLATELET # BLD AUTO: 273 K/UL (ref 150–400)
PMV BLD AUTO: 10.1 FL (ref 8.9–12.9)
POTASSIUM SERPL-SCNC: 3.2 MMOL/L (ref 3.5–5.1)
POTASSIUM SERPL-SCNC: ABNORMAL MMOL/L (ref 3.5–5.1)
PROT SERPL-MCNC: 8.1 G/DL (ref 6.4–8.2)
RBC # BLD AUTO: 3.54 M/UL (ref 4.1–5.7)
SODIUM SERPL-SCNC: 135 MMOL/L (ref 136–145)
TROPONIN I SERPL HS-MCNC: 9 NG/L (ref 0–76)
WBC # BLD AUTO: 7 K/UL (ref 4.1–11.1)

## 2024-01-08 PROCEDURE — 83880 ASSAY OF NATRIURETIC PEPTIDE: CPT

## 2024-01-08 PROCEDURE — 99285 EMERGENCY DEPT VISIT HI MDM: CPT

## 2024-01-08 PROCEDURE — 6370000000 HC RX 637 (ALT 250 FOR IP): Performed by: INTERNAL MEDICINE

## 2024-01-08 PROCEDURE — 80053 COMPREHEN METABOLIC PANEL: CPT

## 2024-01-08 PROCEDURE — 6370000000 HC RX 637 (ALT 250 FOR IP): Performed by: EMERGENCY MEDICINE

## 2024-01-08 PROCEDURE — 71045 X-RAY EXAM CHEST 1 VIEW: CPT

## 2024-01-08 PROCEDURE — 84132 ASSAY OF SERUM POTASSIUM: CPT

## 2024-01-08 PROCEDURE — 36415 COLL VENOUS BLD VENIPUNCTURE: CPT

## 2024-01-08 PROCEDURE — 94640 AIRWAY INHALATION TREATMENT: CPT

## 2024-01-08 PROCEDURE — 85025 COMPLETE CBC W/AUTO DIFF WBC: CPT

## 2024-01-08 PROCEDURE — 93005 ELECTROCARDIOGRAM TRACING: CPT | Performed by: EMERGENCY MEDICINE

## 2024-01-08 PROCEDURE — 84484 ASSAY OF TROPONIN QUANT: CPT

## 2024-01-08 PROCEDURE — 1100000000 HC RM PRIVATE

## 2024-01-08 PROCEDURE — 6360000002 HC RX W HCPCS: Performed by: EMERGENCY MEDICINE

## 2024-01-08 RX ORDER — HYDROCODONE BITARTRATE AND ACETAMINOPHEN 5; 325 MG/1; MG/1
1 TABLET ORAL EVERY 6 HOURS PRN
Status: DISCONTINUED | OUTPATIENT
Start: 2024-01-08 | End: 2024-01-11 | Stop reason: HOSPADM

## 2024-01-08 RX ORDER — FUROSEMIDE 10 MG/ML
60 INJECTION INTRAMUSCULAR; INTRAVENOUS
Status: COMPLETED | OUTPATIENT
Start: 2024-01-08 | End: 2024-01-08

## 2024-01-08 RX ORDER — IPRATROPIUM BROMIDE AND ALBUTEROL SULFATE 2.5; .5 MG/3ML; MG/3ML
1 SOLUTION RESPIRATORY (INHALATION)
Status: COMPLETED | OUTPATIENT
Start: 2024-01-08 | End: 2024-01-08

## 2024-01-08 RX ADMIN — HYDROCODONE BITARTRATE AND ACETAMINOPHEN 1 TABLET: 5; 325 TABLET ORAL at 23:01

## 2024-01-08 RX ADMIN — IPRATROPIUM BROMIDE AND ALBUTEROL SULFATE 1 DOSE: 2.5; .5 SOLUTION RESPIRATORY (INHALATION) at 17:50

## 2024-01-08 RX ADMIN — NITROGLYCERIN 1 INCH: 20 OINTMENT TOPICAL at 19:34

## 2024-01-08 RX ADMIN — FUROSEMIDE 60 MG: 10 INJECTION, SOLUTION INTRAMUSCULAR; INTRAVENOUS at 19:32

## 2024-01-08 ASSESSMENT — PAIN DESCRIPTION - LOCATION
LOCATION: LEG
LOCATION: CHEST

## 2024-01-08 ASSESSMENT — PAIN SCALES - GENERAL
PAINLEVEL_OUTOF10: 10
PAINLEVEL_OUTOF10: 10
PAINLEVEL_OUTOF10: 4

## 2024-01-08 ASSESSMENT — PAIN DESCRIPTION - ORIENTATION: ORIENTATION: LEFT;RIGHT

## 2024-01-08 NOTE — ED PROVIDER NOTES
[facility] EMERGENCY DEPT  EMERGENCY DEPARTMENT HISTORY AND PHYSICAL EXAM      Date: 1/8/2024  Patient Name: Ye Joseph  MRN: [id number]  YOB: 1975  Date of evaluation: 1/8/2024  Provider: Mane Zuniga MD   Note Started: 4:48 PM EST 1/8/24    HISTORY OF PRESENT ILLNESS     Chief Complaint   Patient presents with    Chest Pain       History Provided By: Patient    HPI: Ye Joseph is a 48 y.o. male with history of COPD, CAD, DVT on AC, CHF, stroke, morbid obesity sent in by his cardiologist, Dr. Coleman.  Patient had a follow-up appointment today and his doctor encouraged him to come to the ER.  He has been having progressively worsening shortness of breath and leg swelling as well as some intermittent chest pain.  His cardiologist is concerned about the amount of fluid he is retaining.  Patient states he is not compliant with his Bumex as well as other medications.    PAST MEDICAL HISTORY   Past Medical History:  Past Medical History:   Diagnosis Date    Acute deep vein thrombosis (DVT) (HCC)     behind left knee    Acute MI (HCC)     COPD (chronic obstructive pulmonary disease) (HCC)     Depression     Hypertension     Ill-defined condition     Stroke (HCC)        Past Surgical History:  Past Surgical History:   Procedure Laterality Date    HERNIA REPAIR      IR FNA WITH IMAGING      ORTHOPEDIC SURGERY      rotator cuff surgery    OTHER SURGICAL HISTORY      Patent Foramen Ovale Repair    THYROIDECTOMY         Family History:  Family History   Problem Relation Age of Onset    Diabetes Maternal Uncle     Hypertension Maternal Uncle     Hypertension Paternal Uncle     Diabetes Paternal Uncle     Cancer Other     No Known Problems Father     Cancer Mother        Social History:  Social History     Tobacco Use    Smoking status: Former    Smokeless tobacco: Never   Substance Use Topics    Alcohol use: Yes    Drug use: Not Currently       Allergies:  Allergies   Allergen Reactions     available at the time of this note:  XR CHEST PORTABLE   Final Result   No acute process.           ED COURSE and DIFFERENTIAL DIAGNOSIS/MDM   CC/HPI Summary, DDx, ED Course, and Reassessment: 48 y.o. male with history of COPD, CAD, DVT on AC, CHF, stroke, morbid obesity here with sob worsenign over the past several weeks. Sent in by his cardiologist after clinic visit today. Patient is morbidly obese, with BLE edema, diffuse faint wheezing on exam. Ddx includes chf exacerbaiton, copd exacerbation, acs, PE, pneumonia, pneumothorax, dissection. Will obtain labs, cxr ecg.  Order IV lasix and nitropaste to treat for chf exacerbation. Duoneb ordered for wheezing    Clinical Management Tools:  Not Applicable    Records Reviewed (source and summary of external notes): Prior medical records and Nursing notes    Vitals:    Vitals:    01/08/24 1630 01/08/24 1830 01/08/24 1845   BP: (!) 154/95 (!) 163/100 (!) 165/89   Pulse: (!) 103 96 97   Resp: 20 23 14   Temp: 98.8 °F (37.1 °C)     TempSrc: Oral     SpO2: 96% 95%    Weight: (!) 172.4 kg (380 lb)     Height: 1.93 m (6' 4\")          ED COURSE  ED Course as of 01/08/24 1919 Mon Jan 08, 2024 1912 Spoke with Dr. Coleman -he saw patient in the office today.  States he has been gaining a ton of weight recently.  He states the patient went somewhere recently and was put on Bumex and Lasix and despite being on both of these medication continues to gain weight.  He recommends admission for diuresis [KK]   1918 Discussed with Dr Mcknight, hospitalist for admission [KK]      ED Course User Index  [KK] Mane Zuniga MD       Sepsis Reassessment: Sepsis reassessment not applicable    Disposition Considerations (Tests not done, Shared Decision Making, Pt Expectation of Test or Treatment.): Not Applicable    Patient was given the following medications:  Medications   furosemide (LASIX) injection 60 mg (has no administration in time range)   nitroglycerin (NITRO-BID) 2 % ointment 1

## 2024-01-08 NOTE — ED TRIAGE NOTES
[facility] was sent here by Dr Coleman and was told to come to ED for shortness of breath, chest pain, fluid overload.  has been taking fluid pills as prescribed

## 2024-01-09 PROBLEM — I50.9 HEART FAILURE EXACERBATED BY SOTALOL (HCC): Status: ACTIVE | Noted: 2024-01-09

## 2024-01-09 LAB
EKG ATRIAL RATE: 101 BPM
EKG DIAGNOSIS: NORMAL
EKG P AXIS: 25 DEGREES
EKG P-R INTERVAL: 174 MS
EKG Q-T INTERVAL: 396 MS
EKG QRS DURATION: 100 MS
EKG QTC CALCULATION (BAZETT): 513 MS
EKG R AXIS: 10 DEGREES
EKG T AXIS: 27 DEGREES
EKG VENTRICULAR RATE: 101 BPM
TROPONIN I SERPL HS-MCNC: 8 NG/L (ref 0–76)
TROPONIN I SERPL HS-MCNC: 9 NG/L (ref 0–76)
TSH SERPL DL<=0.05 MIU/L-ACNC: 47.5 UIU/ML (ref 0.36–3.74)

## 2024-01-09 PROCEDURE — 6360000002 HC RX W HCPCS: Performed by: INTERNAL MEDICINE

## 2024-01-09 PROCEDURE — 2580000003 HC RX 258: Performed by: INTERNAL MEDICINE

## 2024-01-09 PROCEDURE — 87070 CULTURE OTHR SPECIMN AEROBIC: CPT

## 2024-01-09 PROCEDURE — 1100000000 HC RM PRIVATE

## 2024-01-09 PROCEDURE — 87205 SMEAR GRAM STAIN: CPT

## 2024-01-09 PROCEDURE — 84484 ASSAY OF TROPONIN QUANT: CPT

## 2024-01-09 PROCEDURE — 84443 ASSAY THYROID STIM HORMONE: CPT

## 2024-01-09 PROCEDURE — 6370000000 HC RX 637 (ALT 250 FOR IP): Performed by: INTERNAL MEDICINE

## 2024-01-09 PROCEDURE — 94640 AIRWAY INHALATION TREATMENT: CPT

## 2024-01-09 RX ORDER — SODIUM CHLORIDE 9 MG/ML
INJECTION, SOLUTION INTRAVENOUS PRN
Status: DISCONTINUED | OUTPATIENT
Start: 2024-01-09 | End: 2024-01-11 | Stop reason: HOSPADM

## 2024-01-09 RX ORDER — ONDANSETRON 4 MG/1
4 TABLET, ORALLY DISINTEGRATING ORAL EVERY 8 HOURS PRN
Status: DISCONTINUED | OUTPATIENT
Start: 2024-01-09 | End: 2024-01-09

## 2024-01-09 RX ORDER — SODIUM CHLORIDE 0.9 % (FLUSH) 0.9 %
5-40 SYRINGE (ML) INJECTION EVERY 12 HOURS SCHEDULED
Status: DISCONTINUED | OUTPATIENT
Start: 2024-01-09 | End: 2024-01-11 | Stop reason: HOSPADM

## 2024-01-09 RX ORDER — METOPROLOL SUCCINATE 25 MG/1
25 TABLET, EXTENDED RELEASE ORAL DAILY
Status: DISCONTINUED | OUTPATIENT
Start: 2024-01-09 | End: 2024-01-11 | Stop reason: HOSPADM

## 2024-01-09 RX ORDER — ACETAMINOPHEN 650 MG/1
650 SUPPOSITORY RECTAL EVERY 6 HOURS PRN
Status: DISCONTINUED | OUTPATIENT
Start: 2024-01-09 | End: 2024-01-11 | Stop reason: HOSPADM

## 2024-01-09 RX ORDER — POTASSIUM CHLORIDE 20 MEQ/1
40 TABLET, EXTENDED RELEASE ORAL PRN
Status: DISCONTINUED | OUTPATIENT
Start: 2024-01-09 | End: 2024-01-11 | Stop reason: HOSPADM

## 2024-01-09 RX ORDER — ASPIRIN 81 MG/1
81 TABLET, CHEWABLE ORAL DAILY
Status: DISCONTINUED | OUTPATIENT
Start: 2024-01-09 | End: 2024-01-11 | Stop reason: HOSPADM

## 2024-01-09 RX ORDER — ATORVASTATIN CALCIUM 40 MG/1
40 TABLET, FILM COATED ORAL DAILY
Status: DISCONTINUED | OUTPATIENT
Start: 2024-01-09 | End: 2024-01-11 | Stop reason: HOSPADM

## 2024-01-09 RX ORDER — FUROSEMIDE 10 MG/ML
40 INJECTION INTRAMUSCULAR; INTRAVENOUS 2 TIMES DAILY
Status: DISCONTINUED | OUTPATIENT
Start: 2024-01-09 | End: 2024-01-11 | Stop reason: HOSPADM

## 2024-01-09 RX ORDER — IPRATROPIUM BROMIDE AND ALBUTEROL SULFATE 2.5; .5 MG/3ML; MG/3ML
1 SOLUTION RESPIRATORY (INHALATION)
Status: DISCONTINUED | OUTPATIENT
Start: 2024-01-09 | End: 2024-01-09

## 2024-01-09 RX ORDER — MAGNESIUM SULFATE IN WATER 40 MG/ML
2000 INJECTION, SOLUTION INTRAVENOUS PRN
Status: DISCONTINUED | OUTPATIENT
Start: 2024-01-09 | End: 2024-01-11 | Stop reason: HOSPADM

## 2024-01-09 RX ORDER — CARVEDILOL 3.12 MG/1
3.12 TABLET ORAL 2 TIMES DAILY WITH MEALS
Status: DISCONTINUED | OUTPATIENT
Start: 2024-01-09 | End: 2024-01-09

## 2024-01-09 RX ORDER — ENOXAPARIN SODIUM 100 MG/ML
40 INJECTION SUBCUTANEOUS 2 TIMES DAILY
Status: DISCONTINUED | OUTPATIENT
Start: 2024-01-09 | End: 2024-01-09

## 2024-01-09 RX ORDER — GABAPENTIN 300 MG/1
600 CAPSULE ORAL ONCE
Status: COMPLETED | OUTPATIENT
Start: 2024-01-09 | End: 2024-01-10

## 2024-01-09 RX ORDER — BUDESONIDE 0.5 MG/2ML
250 INHALANT ORAL
Status: DISCONTINUED | OUTPATIENT
Start: 2024-01-09 | End: 2024-01-11 | Stop reason: HOSPADM

## 2024-01-09 RX ORDER — POLYETHYLENE GLYCOL 3350 17 G/17G
17 POWDER, FOR SOLUTION ORAL DAILY PRN
Status: DISCONTINUED | OUTPATIENT
Start: 2024-01-09 | End: 2024-01-11 | Stop reason: HOSPADM

## 2024-01-09 RX ORDER — PROCHLORPERAZINE MALEATE 5 MG/1
5 TABLET ORAL EVERY 6 HOURS PRN
Status: DISCONTINUED | OUTPATIENT
Start: 2024-01-09 | End: 2024-01-11 | Stop reason: HOSPADM

## 2024-01-09 RX ORDER — LEVOTHYROXINE SODIUM 0.03 MG/1
25 TABLET ORAL
Status: DISCONTINUED | OUTPATIENT
Start: 2024-01-09 | End: 2024-01-10

## 2024-01-09 RX ORDER — IPRATROPIUM BROMIDE AND ALBUTEROL SULFATE 2.5; .5 MG/3ML; MG/3ML
1 SOLUTION RESPIRATORY (INHALATION)
Status: DISCONTINUED | OUTPATIENT
Start: 2024-01-09 | End: 2024-01-11 | Stop reason: HOSPADM

## 2024-01-09 RX ORDER — DILTIAZEM HYDROCHLORIDE 60 MG/1
60 CAPSULE, EXTENDED RELEASE ORAL 2 TIMES DAILY
Status: DISCONTINUED | OUTPATIENT
Start: 2024-01-09 | End: 2024-01-11 | Stop reason: HOSPADM

## 2024-01-09 RX ORDER — ACETAMINOPHEN 325 MG/1
650 TABLET ORAL EVERY 6 HOURS PRN
Status: DISCONTINUED | OUTPATIENT
Start: 2024-01-09 | End: 2024-01-11 | Stop reason: HOSPADM

## 2024-01-09 RX ORDER — POTASSIUM CHLORIDE 7.45 MG/ML
10 INJECTION INTRAVENOUS PRN
Status: DISCONTINUED | OUTPATIENT
Start: 2024-01-09 | End: 2024-01-11 | Stop reason: HOSPADM

## 2024-01-09 RX ORDER — TRAZODONE HYDROCHLORIDE 50 MG/1
100 TABLET ORAL NIGHTLY
Status: DISCONTINUED | OUTPATIENT
Start: 2024-01-09 | End: 2024-01-11 | Stop reason: HOSPADM

## 2024-01-09 RX ORDER — SODIUM CHLORIDE 0.9 % (FLUSH) 0.9 %
5-40 SYRINGE (ML) INJECTION PRN
Status: DISCONTINUED | OUTPATIENT
Start: 2024-01-09 | End: 2024-01-11 | Stop reason: HOSPADM

## 2024-01-09 RX ORDER — ONDANSETRON 2 MG/ML
4 INJECTION INTRAMUSCULAR; INTRAVENOUS EVERY 6 HOURS PRN
Status: DISCONTINUED | OUTPATIENT
Start: 2024-01-09 | End: 2024-01-09

## 2024-01-09 RX ADMIN — IPRATROPIUM BROMIDE AND ALBUTEROL SULFATE 1 DOSE: 2.5; .5 SOLUTION RESPIRATORY (INHALATION) at 13:33

## 2024-01-09 RX ADMIN — LEVOTHYROXINE SODIUM 25 MCG: 0.03 TABLET ORAL at 09:01

## 2024-01-09 RX ADMIN — SACUBITRIL AND VALSARTAN 1 TABLET: 24; 26 TABLET, FILM COATED ORAL at 09:01

## 2024-01-09 RX ADMIN — POTASSIUM BICARBONATE 40 MEQ: 782 TABLET, EFFERVESCENT ORAL at 12:10

## 2024-01-09 RX ADMIN — FUROSEMIDE 40 MG: 10 INJECTION, SOLUTION INTRAMUSCULAR; INTRAVENOUS at 09:01

## 2024-01-09 RX ADMIN — HYDROCODONE BITARTRATE AND ACETAMINOPHEN 1 TABLET: 5; 325 TABLET ORAL at 07:41

## 2024-01-09 RX ADMIN — RIVAROXABAN 20 MG: 20 TABLET, FILM COATED ORAL at 16:54

## 2024-01-09 RX ADMIN — HYDROCODONE BITARTRATE AND ACETAMINOPHEN 1 TABLET: 5; 325 TABLET ORAL at 20:04

## 2024-01-09 RX ADMIN — DILTIAZEM HYDROCHLORIDE 60 MG: 60 CAPSULE, EXTENDED RELEASE ORAL at 09:01

## 2024-01-09 RX ADMIN — FUROSEMIDE 40 MG: 10 INJECTION, SOLUTION INTRAMUSCULAR; INTRAVENOUS at 16:54

## 2024-01-09 RX ADMIN — METFORMIN HYDROCHLORIDE 500 MG: 500 TABLET ORAL at 16:54

## 2024-01-09 RX ADMIN — SODIUM CHLORIDE, PRESERVATIVE FREE 10 ML: 5 INJECTION INTRAVENOUS at 20:05

## 2024-01-09 RX ADMIN — DILTIAZEM HYDROCHLORIDE 60 MG: 60 CAPSULE, EXTENDED RELEASE ORAL at 20:04

## 2024-01-09 RX ADMIN — BUDESONIDE INHALATION 250 MCG: 0.5 SUSPENSION RESPIRATORY (INHALATION) at 19:37

## 2024-01-09 RX ADMIN — ASPIRIN 81 MG: 81 TABLET, CHEWABLE ORAL at 09:01

## 2024-01-09 RX ADMIN — METFORMIN HYDROCHLORIDE 500 MG: 500 TABLET ORAL at 09:01

## 2024-01-09 RX ADMIN — TRAZODONE HYDROCHLORIDE 100 MG: 50 TABLET ORAL at 20:04

## 2024-01-09 RX ADMIN — BUDESONIDE INHALATION 250 MCG: 0.5 SUSPENSION RESPIRATORY (INHALATION) at 09:36

## 2024-01-09 RX ADMIN — METOPROLOL SUCCINATE 25 MG: 25 TABLET, EXTENDED RELEASE ORAL at 09:01

## 2024-01-09 RX ADMIN — SACUBITRIL AND VALSARTAN 1 TABLET: 24; 26 TABLET, FILM COATED ORAL at 20:04

## 2024-01-09 RX ADMIN — IPRATROPIUM BROMIDE AND ALBUTEROL SULFATE 1 DOSE: 2.5; .5 SOLUTION RESPIRATORY (INHALATION) at 19:37

## 2024-01-09 RX ADMIN — IPRATROPIUM BROMIDE AND ALBUTEROL SULFATE 1 DOSE: 2.5; .5 SOLUTION RESPIRATORY (INHALATION) at 09:36

## 2024-01-09 RX ADMIN — SODIUM CHLORIDE, PRESERVATIVE FREE 10 ML: 5 INJECTION INTRAVENOUS at 09:02

## 2024-01-09 RX ADMIN — ATORVASTATIN CALCIUM 40 MG: 40 TABLET, FILM COATED ORAL at 09:01

## 2024-01-09 RX ADMIN — POTASSIUM BICARBONATE 40 MEQ: 782 TABLET, EFFERVESCENT ORAL at 09:03

## 2024-01-09 ASSESSMENT — PAIN SCALES - GENERAL
PAINLEVEL_OUTOF10: 8
PAINLEVEL_OUTOF10: 9
PAINLEVEL_OUTOF10: 5
PAINLEVEL_OUTOF10: 8
PAINLEVEL_OUTOF10: 0
PAINLEVEL_OUTOF10: 5

## 2024-01-09 ASSESSMENT — PAIN DESCRIPTION - ORIENTATION: ORIENTATION: RIGHT;LEFT

## 2024-01-09 ASSESSMENT — PAIN DESCRIPTION - DESCRIPTORS: DESCRIPTORS: ACHING;DISCOMFORT

## 2024-01-09 ASSESSMENT — PAIN DESCRIPTION - LOCATION: LOCATION: FOOT;LEG

## 2024-01-09 ASSESSMENT — PAIN - FUNCTIONAL ASSESSMENT: PAIN_FUNCTIONAL_ASSESSMENT: ACTIVITIES ARE NOT PREVENTED

## 2024-01-09 NOTE — PROGRESS NOTES
Patient came from ED as a new admission, transported via wheelchair accompanied by SAIMA Borges. Admission assessment and documentation completed. Patient declined CHG bath, and complete skin assessment. Right lower extremity wound care completed, wound C/S obtained, and wound care consult ordered per unit protocol. See documentation for detailed assessment findings.

## 2024-01-09 NOTE — PROGRESS NOTES
4 Eyes Skin Assessment     NAME:  Ye Joseph  YOB: 1975  MEDICAL RECORD NUMBER:  [id number]    The patient is being assessed for  Admission    I agree that at least one RN has performed a thorough Head to Toe Skin Assessment on the patient. ALL assessment sites listed below have been assessed.      Areas assessed by both nurses:    Head, Face, Ears, Shoulders, Back, Chest, Arms, Elbows, Hands, Sacrum. Buttock, Coccyx, Ischium, Legs. Feet and Heels, and Under Medical Devices         Does the Patient have a Wound? Yes wound(s) were present on assessment. LDA wound assessment was Initiated and completed by RN       Yoan Prevention initiated by RN: Yes  Wound Care Orders initiated by RN: Yes    Pressure Injury (Stage 3,4, Unstageable, DTI, NWPT, and Complex wounds) if present, place Wound referral order by RN under : No    New Ostomies, if present place, Ostomy referral order under : No     Nurse 1 eSignature: Electronically signed by Cierra Ibrahim RN on 1/9/24 at 3:30 AM EST    **SHARE this note so that the co-signing nurse can place an eSignature**    Nurse 2 eSignature: Electronically signed by Vu Reinoso RN on 1/9/24 at 8:23 AM EST

## 2024-01-09 NOTE — PROGRESS NOTES
4 Eyes Skin Assessment     NAME:  Ye Joseph  YOB: 1975  MEDICAL RECORD NUMBER:  [id number]    The patient is being assessed for  Admission    I agree that at least one RN has performed a thorough Head to Toe Skin Assessment on the patient. ALL assessment sites listed below have been assessed.      Areas assessed by both nurses:    Head, Face, Ears, Shoulders, Back, Chest, Arms, Elbows, Hands, Sacrum. Buttock, Coccyx, Ischium, Legs. Feet and Heels, and Under Medical Devices         Does the Patient have a Wound? Yes wound(s) were present on assessment. LDA wound assessment was Initiated and completed by RN       Yoan Prevention initiated by RN: Yes  Wound Care Orders initiated by RN: Yes    Pressure Injury (Stage 3,4, Unstageable, DTI, NWPT, and Complex wounds) if present, place Wound referral order by RN under : No    New Ostomies, if present place, Ostomy referral order under : No     Nurse 1 eSignature: Electronically signed by Vu Reinoso RN on 1/9/24 at 1:39 AM EST    **SHARE this note so that the co-signing nurse can place an eSignature**    Nurse 2 eSignature: {Esignature:[id number]}

## 2024-01-09 NOTE — DISCHARGE INSTRUCTIONS
Heart Failure Follow-up protocol:  -Please schedule an appointment with your cardiologist(heart doctor) to be seen within 3-5 days of discharge.    -Please remember to call your cardiologist with any new, returning, or worsening symptoms. Please refer to your symptom chart. **Weigh yourself Daily in the morning, after using the bathroom, but before eating or drinking anything.    -Please follow your sodium restrictions to reduce the intake of salt/sodium as discussed. Remember when we take in too much salt/sodium, we hold on to fluid.  ______________________________________________

## 2024-01-09 NOTE — CARE COORDINATION
01/09/24 1000   Service Assessment   Patient Orientation Alert and Oriented   Cognition Alert   History Provided By Patient   Primary Caregiver Self   Accompanied By/Relationship Patient alone in room.   Support Systems Family Members   Patient's Healthcare Decision Maker is: Patient Declined (Legal Next of Kin Remains as Decision Maker)   PCP Verified by CM Yes   Last Visit to PCP Within last 3 months  (Seen by Dr. Ramos within the last two weeks.)   Prior Functional Level Independent in ADLs/IADLs   Current Functional Level Independent in ADLs/IADLs   Can patient return to prior living arrangement Yes   Ability to make needs known: Good   Family able to assist with home care needs: Yes   Would you like for me to discuss the discharge plan with any other family members/significant others, and if so, who? No   Financial Resources Medicaid   Community Resources None   Social/Functional History   Lives With Alone   Type of Home   (Patient lives at [facility].)   Home Layout One level   Home Access Elevator;Level entry   Active  Yes   Discharge Planning   Type of Residence Other (Comment)  ([facility])   Living Arrangements Alone   Services At/After Discharge   Confirm Follow Up Transport Self       Patient confirmed demographics on file. Patient lives alone at the [facility]. He ambulates independently and completes ADLs without assistance. He is an active . Home health services were declined at this time. Patient plans to return to the [facility] at discharge.    Advance Care Planning     General Advance Care Planning (ACP) Conversation    Date of Conversation: 1/8/2024  Conducted with: Patient with Decision Making Capacity    Healthcare Decision Maker:    Primary Decision Maker: Chilango Joseph - Enmanuel - [phone]  Click here to complete Healthcare Decision Makers including selection of the Healthcare Decision Maker Relationship (ie \"Primary\").   Today we documented Decision  Maker(s) consistent with Legal Next of Kin hierarchy.    Content/Action Overview:  Has ACP document(s) on file - reflects the patient's care preferences  Reviewed DNR/DNI and patient elects Full Code (Attempt Resuscitation)        Length of Voluntary ACP Conversation in minutes:  <16 minutes (Non-Billable)    TOBY Dsouza

## 2024-01-09 NOTE — NURSE NAVIGATOR
Heart Failure Nurse Navigator: Heart Failure Education    RN performs hand hygiene. RN Introduces herself to the patient and informs the patient of the reasoning for the visit.    Patient Verbalizes Understanding for visit, is accepting of discussion    Persons present for Education: Patient    Time Spent: At least 45 minutes    Method of teaching:  Teach-back, demonstration, verbal, visual    Education Packets Given: Heart Failure symptom management calendar/tracker, AHA HF education booklet    -Denies owning a scale.    RN-NN provided education on Daily weights to include same time daily, on same scale, and documenting weights on calendar. RN-NN provided education trigger management/ signs and symptoms of Heart Failure. Patient is advised on “Yellow Days” to call MD office and on “Red Days” to come to the ER or call 911.   RN provides Nutritional education that includes how to calculate and restrict sodium and fluid intake. Encouraged fresh vegetable choice over canned/processed goods. Demonstrated how to log meals/intake. RN discusses lifestyle changes including cessation of smoking and increasing activity level.   In addition, RN discusses the importance of keeping/scheduling appointments and adherence to guideline directed medical therapies.      Patient was able to teach back to the RN-NN the above information.  Patient will need reinforcement of education provided.  Patient advised about the HF helper Yakelin.    **PT had subway at bedside and boars head box at bedside. Pt denies adding salt to food and states he does not call in symptoms. RN-NN advises pt to purchase a scale and call in symptoms as instructed in above education.    PT verbalizes understanding.

## 2024-01-09 NOTE — CONSULTS
systems reviewed as follows:    Constitutional: negative fever, negative chills, negative weight loss  Eyes:   negative visual changes  ENT:   negative sore throat, tongue or lip swelling  Respiratory:  negative cough, negative dyspnea  Cards:  negative for chest pain, palpitations, lower extremity edema  GI:   negative for nausea, vomiting, diarrhea, and abdominal pain  Genitourinary: negative for frequency, dysuria  Integument:  negative for rash   Hematologic:  negative for easy bruising and gum/nose bleeding  Musculoskel: negative for myalgias,  back pain  Neurological:  negative for headaches, dizziness, vertigo, weakness  Behavl/Psych: negative for feelings of anxiety, depression     Pertinent Positives include :    Objective:      Physical Exam:    Last 24hrs VS reviewed since prior progress note. Most recent are:    BP (!) 161/80   Pulse 100   Temp 98.1 °F (36.7 °C) (Oral)   Resp 18   Ht 1.93 m (6' 3.98\")   Wt (!) 172.4 kg (380 lb 1.2 oz)   SpO2 95%   BMI 46.28 kg/m²   No intake or output data in the 24 hours ending 01/09/24 1006     General Appearance: Well developed, alert, no acute distress.  Ears/Nose/Mouth/Throat: Moist mucosa  Neck: Supple.  JVP within normal limits. Carotids good upstrokes  Chest: Lungs clear to auscultation bilaterally.  Cardiovascular: Regular rate and rhythm, S1S2 normal, soft systolic murmur  Abdomen: Soft, non-tender, bowel sounds are active.   Extremities: No edema bilaterally. distal pulses +1.  Skin: Warm and dry.  Neuro: Alert and oriented x3, moving all 4 extremities.  Detailed examination deferred.    []         Post-cath site without hematoma, bruit, tenderness, or thrill.  Distal pulses intact.    Data:      Telemetry: Sinus rhythm    EKG: No acute EKG changes consistent with significant ischemia or injury pattern.      No valid procedures specified.    Prior to Admission medications    Medication Sig Start Date End Date Taking? Authorizing Provider   furosemide  0.4 K/UL    Basophils Absolute 0.0 0.0 - 0.1 K/UL    Absolute Immature Granulocyte 0.0 0.00 - 0.04 K/UL    Differential Type AUTOMATED     CMP    Collection Time: 01/08/24  6:18 PM   Result Value Ref Range    Sodium 135 (L) 136 - 145 mmol/L    Potassium Hemolyzed, Recollection Recommended 3.5 - 5.1 mmol/L    Chloride 100 97 - 108 mmol/L    CO2 33 (H) 21 - 32 mmol/L    Anion Gap 2 (L) 5 - 15 mmol/L    Glucose 101 (H) 65 - 100 mg/dL    BUN 10 6 - 20 mg/dL    Creatinine 1.11 0.70 - 1.30 mg/dL    Bun/Cre Ratio 9 (L) 12 - 20      Est, Glom Filt Rate >60 >60 ml/min/1.73m2    Calcium 8.6 8.5 - 10.1 mg/dL    Total Bilirubin 0.5 0.2 - 1.0 mg/dL    AST Hemolyzed, Recollection Recommended 15 - 37 U/L    ALT Hemolyzed, Recollection Recommended 12 - 78 U/L    Alk Phosphatase 79 45 - 117 U/L    Total Protein 8.1 6.4 - 8.2 g/dL    Albumin 3.2 (L) 3.5 - 5.0 g/dL    Globulin 4.9 (H) 2.0 - 4.0 g/dL    Albumin/Globulin Ratio 0.7 (L) 1.1 - 2.2     Troponin    Collection Time: 01/08/24  6:18 PM   Result Value Ref Range    Troponin, High Sensitivity 9 0 - 76 ng/L   Brain Natriuretic Peptide    Collection Time: 01/08/24  6:18 PM   Result Value Ref Range    NT Pro-BNP 82 <125 pg/mL   Potassium    Collection Time: 01/08/24  7:22 PM   Result Value Ref Range    Potassium 3.2 (L) 3.5 - 5.1 mmol/L   TSH without Reflex    Collection Time: 01/09/24  8:54 AM   Result Value Ref Range    TSH, 3RD GENERATION 47.50 (H) 0.36 - 3.74 uIU/mL        JA RIOS MD

## 2024-01-09 NOTE — ED NOTES
Patient is alert and oriented upon assessment. Asking for pain meds for his legs. PS 4/10. Otherwise not in distress. Meds given as  ordered

## 2024-01-09 NOTE — H&P
Department of Internal Medicine  General Internal Medicine  Attending History and Physical      CHIEF COMPLAINT: Fluid overload swollen legs    Reason for Admission: Decompensated acute diastolic heart failure  Obstructive sleep apnea  Morbid obesity    History Obtained From:  patient, electronic medical record    HISTORY OF PRESENT ILLNESS:      The patient is a 48 y.o. male with significant past medical history of COPD, depression, hypertension, obstructive sleep apnea, history of patent duque ovale history of pericardial  who presents with shortness of breath and not feeling well with swelling of the lower extremities patient was at the cardiologist office and was directed to the hospital for admission    Past Medical History:        Diagnosis Date    Acute deep vein thrombosis (DVT) (HCC)     behind left knee    Acute MI (HCC)     COPD (chronic obstructive pulmonary disease) (HCC)     Depression     Hypertension     Ill-defined condition     Stroke (HCC)      Past Surgical History:        Procedure Laterality Date    HERNIA REPAIR      IR FNA WITH IMAGING      ORTHOPEDIC SURGERY      rotator cuff surgery    OTHER SURGICAL HISTORY      Patent Foramen Ovale Repair    THYROIDECTOMY       Immunizations:                Influenza:  Indicated for current flu vaccination season Oct. to Feb.            Pneumococcal Polysaccharide:  Indicated for current flu vaccination season Oct. to Feb.    Medications Prior to Admission:    Medications Prior to Admission: furosemide (LASIX) 40 MG tablet, Take 1 tablet by mouth daily for 15 days  rivaroxaban (XARELTO) 20 MG TABS tablet, Take 1 tablet by mouth daily (with breakfast) for 14 days (Patient not taking: Reported on 1/9/2024)  furosemide (LASIX) 20 MG tablet, Take 1 tablet by mouth 2 times daily  furosemide (LASIX) 20 MG tablet, Take 2 tablets by mouth daily for 15 days  diclofenac sodium (VOLTAREN) 1 % GEL, Apply 4 g topically 3 times daily for 10 days  insulin glargine  32.7 01/08/2024 06:18 PM    RDW 14.9 01/08/2024 06:18 PM     01/08/2024 06:18 PM    MPV 10.1 01/08/2024 06:18 PM     BMP:    Lab Results   Component Value Date/Time     01/08/2024 06:18 PM    K 3.2 01/08/2024 07:22 PM     01/08/2024 06:18 PM    CO2 33 01/08/2024 06:18 PM    BUN 10 01/08/2024 06:18 PM    LABALBU 3.2 01/08/2024 06:18 PM    CREATININE 1.11 01/08/2024 06:18 PM    CALCIUM 8.6 01/08/2024 06:18 PM    GFRAA >60 07/18/2022 06:23 AM    LABGLOM >60 01/08/2024 06:18 PM    GLUCOSE 101 01/08/2024 06:18 PM   Echo    Left Ventricle: Normal left ventricular systolic function with a visually estimated EF of 60 - 65%. EF by 2D Simpsons Biplane is 64%. Left ventricle size is normal. LVIDd index is 2.15 cm/m2. Moderately increased wall thickness. Mild septal thickening. Mild posterior thickening. Moderately increased ventricular mass. Mass index 2D is 124.3 g/m2. Normal wall motion. Normal diastolic function.    Aortic Valve: Mild sclerosis of the aortic valve cusp.    Mitral Valve: Mildly thickened leaflet. Mild regurgitation with a centrally directed jet.     ASSESSMENT AND PLAN:      Principal Problem:    CHF (congestive heart failure), NYHA class I, acute on chronic, combined (HCC)  Hypertrophic cardiomyopathy  Obstructive sleep apnea  Morbid obesity  COPD with acute exacerbation  Diabetes  Hypokalemia  Plan: Diuretics, replenishment of potassium, is on DuoNebs, continue with the CPAP continue with home medications  Active Problems:    DVT (deep vein thrombosis) in pregnancy  Plan:     Heart failure exacerbated by sotalol ([facility])  Plan:

## 2024-01-09 NOTE — ED NOTES
ED TO INPATIENT SBAR HANDOFF    Patient Name: Ye Joseph   Preferred Name: Ye  : 1975  48 y.o.   Family/Caregiver Present: no   Code Status Order: Prior  PO Status:   Telemetry Order:   C-SSRS: Risk of Suicide: No Risk  Sitter no   Restraints:     Sepsis Risk Score Sepsis Risk Score: 2.61    Situation  Chief Complaint   Patient presents with    Chest Pain     Brief Description of Patient's Condition: came to ED for chest pain fluid overload, SOB spO2 98% on Room air. Also with bilateral leg pain  Mental Status: oriented  Arrived from:Home  Imaging:   XR CHEST PORTABLE   Final Result   No acute process.        Abnormal labs:   Abnormal Labs Reviewed   CBC WITH AUTO DIFFERENTIAL - Abnormal; Notable for the following components:       Result Value    RBC 3.54 (*)     Hemoglobin 11.7 (*)     Hematocrit 35.8 (*)     .1 (*)     RDW 14.9 (*)     All other components within normal limits   COMPREHENSIVE METABOLIC PANEL - Abnormal; Notable for the following components:    Sodium 135 (*)     CO2 33 (*)     Anion Gap 2 (*)     Glucose 101 (*)     Bun/Cre Ratio 9 (*)     Albumin 3.2 (*)     Globulin 4.9 (*)     Albumin/Globulin Ratio 0.7 (*)     All other components within normal limits   POTASSIUM - Abnormal; Notable for the following components:    Potassium 3.2 (*)     All other components within normal limits       Background  Allergies:   Allergies   Allergen Reactions    Vancomycin Hives     Gabrielle syndrome     History:   Past Medical History:   Diagnosis Date    Acute deep vein thrombosis (DVT) (HCC)     behind left knee    Acute MI (HCC)     COPD (chronic obstructive pulmonary disease) (HCC)     Depression     Hypertension     Ill-defined condition     Stroke (HCC)        Assessment  Vitals:        Vitals:    24 2200 24 2300 24 0000 24 0040   BP:    (!) 139/90   Pulse: 92 98 89 97   Resp: 15 18 12 17   Temp:       TempSrc:       SpO2:    93%   Weight:

## 2024-01-09 NOTE — PLAN OF CARE
Problem: Discharge Planning  Goal: Discharge to home or other facility with appropriate resources  Outcome: Progressing  Flowsheets (Taken 1/9/2024 0151)  Discharge to home or other facility with appropriate resources: Identify barriers to discharge with patient and caregiver     Problem: Pain  Goal: Verbalizes/displays adequate comfort level or baseline comfort level  Outcome: Progressing     Problem: Safety - Adult  Goal: Free from fall injury  Outcome: Progressing     Problem: ABCDS Injury Assessment  Goal: Absence of physical injury  Outcome: Progressing

## 2024-01-09 NOTE — CONSULTS
Nutrition Education    Educated on Heart Failure Diet  Learners: Patient  Readiness: Acceptance  Method: Explanation, Handout, and Teachback  Response: Verbalizes Understanding and shared examples of how they have already implemented heart failure diet recommendations by using unsaturated fats and baking foods.   Contact name and number provided.    Christofer Montelongo, Dietetic Intern  Contact Number: [phone]

## 2024-01-10 LAB
ANION GAP SERPL CALC-SCNC: 6 MMOL/L (ref 5–15)
BACTERIA SPEC CULT: NORMAL
BASOPHILS # BLD: 0 K/UL (ref 0–0.1)
BASOPHILS NFR BLD: 1 % (ref 0–1)
BUN SERPL-MCNC: 11 MG/DL (ref 6–20)
BUN/CREAT SERPL: 10 (ref 12–20)
CA-I BLD-MCNC: 9.1 MG/DL (ref 8.5–10.1)
CHLORIDE SERPL-SCNC: 98 MMOL/L (ref 97–108)
CHOLEST SERPL-MCNC: 134 MG/DL
CO2 SERPL-SCNC: 35 MMOL/L (ref 21–32)
CREAT SERPL-MCNC: 1.09 MG/DL (ref 0.7–1.3)
DIFFERENTIAL METHOD BLD: ABNORMAL
EOSINOPHIL # BLD: 0.2 K/UL (ref 0–0.4)
EOSINOPHIL NFR BLD: 3 % (ref 0–7)
ERYTHROCYTE [DISTWIDTH] IN BLOOD BY AUTOMATED COUNT: 14.8 % (ref 11.5–14.5)
GLUCOSE SERPL-MCNC: 140 MG/DL (ref 65–100)
GRAM STN SPEC: NORMAL
HCT VFR BLD AUTO: 38.1 % (ref 36.6–50.3)
HDLC SERPL-MCNC: 37 MG/DL
HDLC SERPL: 3.6 (ref 0–5)
HGB BLD-MCNC: 12.2 G/DL (ref 12.1–17)
IMM GRANULOCYTES # BLD AUTO: 0 K/UL (ref 0–0.04)
IMM GRANULOCYTES NFR BLD AUTO: 1 % (ref 0–0.5)
LDLC SERPL CALC-MCNC: 76.6 MG/DL (ref 0–100)
LIPID PANEL: NORMAL
LYMPHOCYTES # BLD: 1.1 K/UL (ref 0.8–3.5)
LYMPHOCYTES NFR BLD: 19 % (ref 12–49)
Lab: NORMAL
MAGNESIUM SERPL-MCNC: 2.2 MG/DL (ref 1.6–2.4)
MCH RBC QN AUTO: 33.1 PG (ref 26–34)
MCHC RBC AUTO-ENTMCNC: 32 G/DL (ref 30–36.5)
MCV RBC AUTO: 103.3 FL (ref 80–99)
MONOCYTES # BLD: 0.4 K/UL (ref 0–1)
MONOCYTES NFR BLD: 7 % (ref 5–13)
NEUTS SEG # BLD: 4.2 K/UL (ref 1.8–8)
NEUTS SEG NFR BLD: 69 % (ref 32–75)
NRBC # BLD: 0 K/UL (ref 0–0.01)
NRBC BLD-RTO: 0 PER 100 WBC
PLATELET # BLD AUTO: 252 K/UL (ref 150–400)
PMV BLD AUTO: 9.5 FL (ref 8.9–12.9)
POTASSIUM SERPL-SCNC: 3.2 MMOL/L (ref 3.5–5.1)
RBC # BLD AUTO: 3.69 M/UL (ref 4.1–5.7)
SODIUM SERPL-SCNC: 139 MMOL/L (ref 136–145)
TRIGL SERPL-MCNC: 102 MG/DL
VLDLC SERPL CALC-MCNC: 20.4 MG/DL
WBC # BLD AUTO: 6 K/UL (ref 4.1–11.1)

## 2024-01-10 PROCEDURE — 1100000000 HC RM PRIVATE

## 2024-01-10 PROCEDURE — 2580000003 HC RX 258: Performed by: INTERNAL MEDICINE

## 2024-01-10 PROCEDURE — 6370000000 HC RX 637 (ALT 250 FOR IP): Performed by: INTERNAL MEDICINE

## 2024-01-10 PROCEDURE — 83735 ASSAY OF MAGNESIUM: CPT

## 2024-01-10 PROCEDURE — 94761 N-INVAS EAR/PLS OXIMETRY MLT: CPT

## 2024-01-10 PROCEDURE — 80048 BASIC METABOLIC PNL TOTAL CA: CPT

## 2024-01-10 PROCEDURE — 6360000002 HC RX W HCPCS: Performed by: INTERNAL MEDICINE

## 2024-01-10 PROCEDURE — 36415 COLL VENOUS BLD VENIPUNCTURE: CPT

## 2024-01-10 PROCEDURE — 80061 LIPID PANEL: CPT

## 2024-01-10 PROCEDURE — 94640 AIRWAY INHALATION TREATMENT: CPT

## 2024-01-10 PROCEDURE — 85025 COMPLETE CBC W/AUTO DIFF WBC: CPT

## 2024-01-10 RX ORDER — LEVOTHYROXINE SODIUM 0.03 MG/1
50 TABLET ORAL
Status: DISCONTINUED | OUTPATIENT
Start: 2024-01-11 | End: 2024-01-11 | Stop reason: HOSPADM

## 2024-01-10 RX ORDER — GABAPENTIN 600 MG/1
600 TABLET ORAL NIGHTLY
COMMUNITY
Start: 2022-03-08

## 2024-01-10 RX ORDER — POTASSIUM CHLORIDE 20 MEQ/1
40 TABLET, EXTENDED RELEASE ORAL EVERY 4 HOURS
Status: COMPLETED | OUTPATIENT
Start: 2024-01-10 | End: 2024-01-10

## 2024-01-10 RX ORDER — POTASSIUM CHLORIDE 20 MEQ/1
40 TABLET, EXTENDED RELEASE ORAL ONCE
Status: COMPLETED | OUTPATIENT
Start: 2024-01-10 | End: 2024-01-10

## 2024-01-10 RX ORDER — GABAPENTIN 300 MG/1
600 CAPSULE ORAL NIGHTLY
Status: DISCONTINUED | OUTPATIENT
Start: 2024-01-10 | End: 2024-01-11 | Stop reason: HOSPADM

## 2024-01-10 RX ADMIN — GABAPENTIN 600 MG: 300 CAPSULE ORAL at 21:30

## 2024-01-10 RX ADMIN — ATORVASTATIN CALCIUM 40 MG: 40 TABLET, FILM COATED ORAL at 08:34

## 2024-01-10 RX ADMIN — IPRATROPIUM BROMIDE AND ALBUTEROL SULFATE 1 DOSE: 2.5; .5 SOLUTION RESPIRATORY (INHALATION) at 13:29

## 2024-01-10 RX ADMIN — POTASSIUM CHLORIDE 40 MEQ: 1500 TABLET, EXTENDED RELEASE ORAL at 08:33

## 2024-01-10 RX ADMIN — IPRATROPIUM BROMIDE AND ALBUTEROL SULFATE 1 DOSE: 2.5; .5 SOLUTION RESPIRATORY (INHALATION) at 20:26

## 2024-01-10 RX ADMIN — GABAPENTIN 600 MG: 300 CAPSULE ORAL at 00:08

## 2024-01-10 RX ADMIN — BUDESONIDE INHALATION 250 MCG: 0.5 SUSPENSION RESPIRATORY (INHALATION) at 07:49

## 2024-01-10 RX ADMIN — SODIUM CHLORIDE, PRESERVATIVE FREE 10 ML: 5 INJECTION INTRAVENOUS at 08:34

## 2024-01-10 RX ADMIN — LEVOTHYROXINE SODIUM 25 MCG: 0.03 TABLET ORAL at 05:58

## 2024-01-10 RX ADMIN — POTASSIUM BICARBONATE 20 MEQ: 782 TABLET, EFFERVESCENT ORAL at 08:33

## 2024-01-10 RX ADMIN — BUDESONIDE INHALATION 250 MCG: 0.5 SUSPENSION RESPIRATORY (INHALATION) at 20:26

## 2024-01-10 RX ADMIN — SACUBITRIL AND VALSARTAN 1 TABLET: 24; 26 TABLET, FILM COATED ORAL at 08:33

## 2024-01-10 RX ADMIN — METFORMIN HYDROCHLORIDE 500 MG: 500 TABLET ORAL at 17:05

## 2024-01-10 RX ADMIN — HYDROCODONE BITARTRATE AND ACETAMINOPHEN 1 TABLET: 5; 325 TABLET ORAL at 23:33

## 2024-01-10 RX ADMIN — FUROSEMIDE 40 MG: 10 INJECTION, SOLUTION INTRAMUSCULAR; INTRAVENOUS at 08:34

## 2024-01-10 RX ADMIN — SACUBITRIL AND VALSARTAN 1 TABLET: 24; 26 TABLET, FILM COATED ORAL at 21:33

## 2024-01-10 RX ADMIN — TRAZODONE HYDROCHLORIDE 100 MG: 50 TABLET ORAL at 21:33

## 2024-01-10 RX ADMIN — RIVAROXABAN 20 MG: 20 TABLET, FILM COATED ORAL at 17:05

## 2024-01-10 RX ADMIN — POTASSIUM CHLORIDE 40 MEQ: 1500 TABLET, EXTENDED RELEASE ORAL at 18:25

## 2024-01-10 RX ADMIN — FUROSEMIDE 40 MG: 10 INJECTION, SOLUTION INTRAMUSCULAR; INTRAVENOUS at 17:05

## 2024-01-10 RX ADMIN — IPRATROPIUM BROMIDE AND ALBUTEROL SULFATE 1 DOSE: 2.5; .5 SOLUTION RESPIRATORY (INHALATION) at 07:49

## 2024-01-10 RX ADMIN — DILTIAZEM HYDROCHLORIDE 60 MG: 60 CAPSULE, EXTENDED RELEASE ORAL at 08:33

## 2024-01-10 RX ADMIN — METFORMIN HYDROCHLORIDE 500 MG: 500 TABLET ORAL at 08:33

## 2024-01-10 RX ADMIN — METOPROLOL SUCCINATE 25 MG: 25 TABLET, EXTENDED RELEASE ORAL at 08:33

## 2024-01-10 RX ADMIN — ASPIRIN 81 MG: 81 TABLET, CHEWABLE ORAL at 08:34

## 2024-01-10 RX ADMIN — POTASSIUM CHLORIDE 40 MEQ: 1500 TABLET, EXTENDED RELEASE ORAL at 12:42

## 2024-01-10 RX ADMIN — DILTIAZEM HYDROCHLORIDE 60 MG: 60 CAPSULE, EXTENDED RELEASE ORAL at 21:33

## 2024-01-10 RX ADMIN — SODIUM CHLORIDE, PRESERVATIVE FREE 10 ML: 5 INJECTION INTRAVENOUS at 21:35

## 2024-01-10 ASSESSMENT — PAIN DESCRIPTION - DESCRIPTORS: DESCRIPTORS: ACHING;DISCOMFORT

## 2024-01-10 ASSESSMENT — PAIN DESCRIPTION - LOCATION
LOCATION: HEAD
LOCATION: LEG

## 2024-01-10 ASSESSMENT — PAIN SCALES - GENERAL
PAINLEVEL_OUTOF10: 7

## 2024-01-10 ASSESSMENT — PAIN SCALES - WONG BAKER: WONGBAKER_NUMERICALRESPONSE: 4

## 2024-01-10 ASSESSMENT — PAIN DESCRIPTION - ORIENTATION: ORIENTATION: RIGHT;LEFT

## 2024-01-10 ASSESSMENT — PAIN - FUNCTIONAL ASSESSMENT: PAIN_FUNCTIONAL_ASSESSMENT: ACTIVITIES ARE NOT PREVENTED

## 2024-01-10 NOTE — WOUND CARE
IP WOUND CONSULT    Ye Joseph  MEDICAL RECORD NUMBER:  [id number]  AGE: 48 y.o.   GENDER: male  : 1975  TODAY'S DATE:  1/10/2024  Places; hospital units: 210    GENERAL     [] Follow-up   [x] New Consult          PAST MEDICAL HISTORY    Past Medical History:   Diagnosis Date    Acute deep vein thrombosis (DVT) (HCC)     behind left knee    Acute MI (HCC)     COPD (chronic obstructive pulmonary disease) (HCC)     Depression     Hypertension     Ill-defined condition     Stroke (HCC)         ALLERGIES    Allergies   Allergen Reactions    Vancomycin Hives     Gabrielle syndrome          Orientation: AxO x4    Contributing Factors: edema and obesity    Assessment     Patient resting in bed, states that he started having swelling to his legs and then a wound appeared to his right leg that has been weeping often. Patient denies any pain at this time.     Wound to right lower leg due to swelling, wound bed is pink and denuded and narcisa-wound is edematous and erythematous. Wound noted to be weeping serous fluid with a green tint to it. Wound and narcisa-wound cleansed with NS and gauze and doubled over xeroform applied and covered with foam dressing. Dressing to be changed daily and PRN for soiling.     Wound Care Documentation:  Wound 01/10/24 Pretibial Right (Active)   Wound Etiology Other     Media Information         01/10/24 1341   Dressing Status New dressing applied 01/10/24 1341   Wound Cleansed Cleansed with saline 01/10/24 1341   Dressing/Treatment Xeroform;Foam 01/10/24 1341   Wound Assessment Pink/red;Slough 01/10/24 1341   Drainage Amount Scant (moist but unmeasurable) 01/10/24 1341   Drainage Description Serosanguinous 01/10/24 1341   Odor None 01/10/24 1341   Narcisa-wound Assessment Edematous;Blanchable erythema 01/10/24 1341   Margins Flat/open edges 01/10/24 1341   Number of days: 0        Please re-consult [facility] for any changes in skin condition.    Rossy Martinez, RN, BSN  [facility]

## 2024-01-10 NOTE — PROGRESS NOTES
Hospitalist Progress Note  [facility]    Subjective:   Daily Progress Note: 1/10/2024 8:56 AM    Patient doing very well today and is much improved from admission. He denies any SOB, cough, wheeze, chest pain, or palpitations. He still has BLE edema but this is improved. He continues on IV Lasix and Duoneb treatments and is tolerating well. No new complaints at this time.     Current Facility-Administered Medications   Medication Dose Route Frequency    [START ON 1/11/2024] levothyroxine (SYNTHROID) tablet 50 mcg  50 mcg Oral QAM AC    potassium chloride (KLOR-CON M) extended release tablet 40 mEq  40 mEq Oral Q4H    sodium chloride flush 0.9 % injection 5-40 mL  5-40 mL IntraVENous 2 times per day    sodium chloride flush 0.9 % injection 5-40 mL  5-40 mL IntraVENous PRN    0.9 % sodium chloride infusion   IntraVENous PRN    polyethylene glycol (GLYCOLAX) packet 17 g  17 g Oral Daily PRN    acetaminophen (TYLENOL) tablet 650 mg  650 mg Oral Q6H PRN    Or    acetaminophen (TYLENOL) suppository 650 mg  650 mg Rectal Q6H PRN    potassium chloride (KLOR-CON M) extended release tablet 40 mEq  40 mEq Oral PRN    Or    potassium bicarb-citric acid (EFFER-K) effervescent tablet 40 mEq  40 mEq Oral PRN    Or    potassium chloride 10 mEq/100 mL IVPB (Peripheral Line)  10 mEq IntraVENous PRN    magnesium sulfate 2000 mg in 50 mL IVPB premix  2,000 mg IntraVENous PRN    sacubitril-valsartan (ENTRESTO) 24-26 MG per tablet 1 tablet  1 tablet Oral BID    furosemide (LASIX) injection 40 mg  40 mg IntraVENous BID    aspirin chewable tablet 81 mg  81 mg Oral Daily    atorvastatin (LIPITOR) tablet 40 mg  40 mg Oral Daily    budesonide (PULMICORT) nebulizer suspension 250 mcg  250 mcg Nebulization BID RT    diclofenac sodium (VOLTAREN) 1 % gel 4 g  4 g Topical TID    dilTIAZem (CARDIZEM 12 HR) extended release capsule 60 mg  60 mg Oral BID    metFORMIN (GLUCOPHAGE) tablet 500 mg  500 mg Oral BID WC    metoprolol

## 2024-01-10 NOTE — CARE COORDINATION
0755: Chart reviewed.    Per notes; patient on IV Lasix followed via cardiology and dietitian.    Current Dispo: [facility].    CM will continue to follow patient and recs of medical team.

## 2024-01-10 NOTE — PROGRESS NOTES
Progress Note    Date:1/10/2024       Room:[facility]  Patient Name:Ye Joseph     YOB: 1975     Age:48 y.o.    Assessment        Hospital Problems             Last Modified POA    * (Principal) CHF (congestive heart failure), NYHA class I, acute on chronic, combined ([facility]) 1/8/2024 Yes    DVT (deep vein thrombosis) in pregnancy 1/8/2024 Yes    Heart failure exacerbated by sotalol ([facility]) 1/9/2024 Yes     SAM  Morbid obesity    Plan:      1.  IV diuretics  CPAP at nights  Cardiology notes reviewed    Subjective   Interval History Status: improved.     Improved symptoms    Review of Systems   Negative except     Medications   Scheduled Meds:    [START ON 1/11/2024] levothyroxine  50 mcg Oral QAM AC    sodium chloride flush  5-40 mL IntraVENous 2 times per day    sacubitril-valsartan  1 tablet Oral BID    furosemide  40 mg IntraVENous BID    aspirin  81 mg Oral Daily    atorvastatin  40 mg Oral Daily    budesonide  250 mcg Nebulization BID RT    diclofenac sodium  4 g Topical TID    dilTIAZem  60 mg Oral BID    metFORMIN  500 mg Oral BID WC    metoprolol succinate  25 mg Oral Daily    rivaroxaban  20 mg Oral Dinner    traZODone  100 mg Oral Nightly    potassium alternative oral replacement  20 mEq Oral Daily    ipratropium 0.5 mg-albuterol 2.5 mg  1 Dose Inhalation Q6H WA RT     Continuous Infusions:    sodium chloride       PRN Meds: sodium chloride flush, sodium chloride, polyethylene glycol, acetaminophen **OR** acetaminophen, potassium chloride **OR** potassium alternative oral replacement **OR** potassium chloride, magnesium sulfate, prochlorperazine, HYDROcodone-acetaminophen    Past History    Past Medical History:   has a past medical history of Acute deep vein thrombosis (DVT) ([facility]), Acute MI ([facility]), COPD (chronic obstructive pulmonary disease) ([facility]), Depression, Hypertension, Ill-defined condition, and Stroke ([facility]).    Social History:   reports that he has quit smoking. He has never used

## 2024-01-11 VITALS
RESPIRATION RATE: 15 BRPM | WEIGHT: 315 LBS | BODY MASS INDEX: 38.36 KG/M2 | HEIGHT: 76 IN | OXYGEN SATURATION: 97 % | TEMPERATURE: 97.3 F | HEART RATE: 101 BPM | SYSTOLIC BLOOD PRESSURE: 145 MMHG | DIASTOLIC BLOOD PRESSURE: 76 MMHG

## 2024-01-11 LAB
ANION GAP SERPL CALC-SCNC: 4 MMOL/L (ref 5–15)
BUN SERPL-MCNC: 15 MG/DL (ref 6–20)
BUN/CREAT SERPL: 14 (ref 12–20)
CA-I BLD-MCNC: 9.5 MG/DL (ref 8.5–10.1)
CHLORIDE SERPL-SCNC: 104 MMOL/L (ref 97–108)
CO2 SERPL-SCNC: 32 MMOL/L (ref 21–32)
CREAT SERPL-MCNC: 1.1 MG/DL (ref 0.7–1.3)
GLUCOSE SERPL-MCNC: 170 MG/DL (ref 65–100)
MAGNESIUM SERPL-MCNC: 2.2 MG/DL (ref 1.6–2.4)
POTASSIUM SERPL-SCNC: 3.7 MMOL/L (ref 3.5–5.1)
SODIUM SERPL-SCNC: 140 MMOL/L (ref 136–145)

## 2024-01-11 PROCEDURE — 94640 AIRWAY INHALATION TREATMENT: CPT

## 2024-01-11 PROCEDURE — 6370000000 HC RX 637 (ALT 250 FOR IP): Performed by: INTERNAL MEDICINE

## 2024-01-11 PROCEDURE — 6360000002 HC RX W HCPCS: Performed by: INTERNAL MEDICINE

## 2024-01-11 PROCEDURE — 2580000003 HC RX 258: Performed by: INTERNAL MEDICINE

## 2024-01-11 PROCEDURE — 36415 COLL VENOUS BLD VENIPUNCTURE: CPT

## 2024-01-11 PROCEDURE — 80048 BASIC METABOLIC PNL TOTAL CA: CPT

## 2024-01-11 PROCEDURE — 83735 ASSAY OF MAGNESIUM: CPT

## 2024-01-11 RX ORDER — FUROSEMIDE 20 MG/1
40 TABLET ORAL 2 TIMES DAILY
Qty: 30 TABLET | Refills: 0 | Status: SHIPPED | OUTPATIENT
Start: 2024-01-11 | End: 2024-01-26

## 2024-01-11 RX ORDER — FUROSEMIDE 40 MG/1
40 TABLET ORAL DAILY
Qty: 60 TABLET | Refills: 3 | Status: SHIPPED | OUTPATIENT
Start: 2024-01-11

## 2024-01-11 RX ORDER — LEVOTHYROXINE SODIUM 0.05 MG/1
50 TABLET ORAL
Qty: 30 TABLET | Refills: 3 | Status: SHIPPED | OUTPATIENT
Start: 2024-01-12

## 2024-01-11 RX ADMIN — ASPIRIN 81 MG: 81 TABLET, CHEWABLE ORAL at 08:22

## 2024-01-11 RX ADMIN — DILTIAZEM HYDROCHLORIDE 60 MG: 60 CAPSULE, EXTENDED RELEASE ORAL at 08:22

## 2024-01-11 RX ADMIN — FUROSEMIDE 40 MG: 10 INJECTION, SOLUTION INTRAMUSCULAR; INTRAVENOUS at 08:22

## 2024-01-11 RX ADMIN — METOPROLOL SUCCINATE 25 MG: 25 TABLET, EXTENDED RELEASE ORAL at 08:22

## 2024-01-11 RX ADMIN — POTASSIUM BICARBONATE 20 MEQ: 782 TABLET, EFFERVESCENT ORAL at 08:22

## 2024-01-11 RX ADMIN — BUDESONIDE INHALATION 250 MCG: 0.5 SUSPENSION RESPIRATORY (INHALATION) at 09:12

## 2024-01-11 RX ADMIN — METFORMIN HYDROCHLORIDE 500 MG: 500 TABLET ORAL at 08:22

## 2024-01-11 RX ADMIN — EMPAGLIFLOZIN 10 MG: 10 TABLET, FILM COATED ORAL at 11:16

## 2024-01-11 RX ADMIN — LEVOTHYROXINE SODIUM 50 MCG: 0.03 TABLET ORAL at 06:01

## 2024-01-11 RX ADMIN — SODIUM CHLORIDE, PRESERVATIVE FREE 10 ML: 5 INJECTION INTRAVENOUS at 08:22

## 2024-01-11 RX ADMIN — DICLOFENAC SODIUM 4 G: 10 GEL TOPICAL at 08:23

## 2024-01-11 RX ADMIN — ATORVASTATIN CALCIUM 40 MG: 40 TABLET, FILM COATED ORAL at 08:22

## 2024-01-11 RX ADMIN — IPRATROPIUM BROMIDE AND ALBUTEROL SULFATE 1 DOSE: 2.5; .5 SOLUTION RESPIRATORY (INHALATION) at 09:11

## 2024-01-11 RX ADMIN — SACUBITRIL AND VALSARTAN 1 TABLET: 24; 26 TABLET, FILM COATED ORAL at 08:22

## 2024-01-11 RX ADMIN — IPRATROPIUM BROMIDE AND ALBUTEROL SULFATE 1 DOSE: 2.5; .5 SOLUTION RESPIRATORY (INHALATION) at 13:57

## 2024-01-11 RX ADMIN — DICLOFENAC SODIUM 4 G: 10 GEL TOPICAL at 13:44

## 2024-01-11 ASSESSMENT — PAIN SCALES - GENERAL: PAINLEVEL_OUTOF10: 0

## 2024-01-11 ASSESSMENT — PAIN SCALES - WONG BAKER: WONGBAKER_NUMERICALRESPONSE: 0

## 2024-01-11 NOTE — DISCHARGE SUMMARY
changed:   when to take this  Another medication with the same name was removed. Continue taking this medication, and follow the directions you see here.     levothyroxine 50 MCG tablet  Commonly known as: SYNTHROID  Take 1 tablet by mouth every morning (before breakfast)  Start taking on: January 12, 2024  What changed:   medication strength  how much to take  when to take this     rivaroxaban 20 MG Tabs tablet  Commonly known as: XARELTO  Take 1 tablet by mouth daily  What changed: Another medication with the same name was removed. Continue taking this medication, and follow the directions you see here.           * This list has 2 medication(s) that are the same as other medications prescribed for you. Read the directions carefully, and ask your doctor or other care provider to review them with you.                CONTINUE taking these medications      aspirin 81 MG chewable tablet     atorvastatin 40 MG tablet  Commonly known as: LIPITOR     budesonide 0.5 MG/2ML nebulizer suspension  Commonly known as: PULMICORT  Take 1 mL by nebulization 2 times daily     diclofenac sodium 1 % Gel  Commonly known as: VOLTAREN  Apply 4 g topically 3 times daily for 10 days     dilTIAZem 60 MG extended release capsule  Commonly known as: CARDIZEM 12 HR  Take 1 capsule by mouth 2 times daily     gabapentin 600 MG tablet  Commonly known as: NEURONTIN     ipratropium 0.5 mg-albuterol 2.5 mg 0.5-2.5 (3) MG/3ML Soln nebulizer solution  Commonly known as: DUONEB     metFORMIN 500 MG tablet  Commonly known as: GLUCOPHAGE     metoprolol succinate 25 MG extended release tablet  Commonly known as: TOPROL XL  Take 1 tablet by mouth daily     traZODone 100 MG tablet  Commonly known as: DESYREL            STOP taking these medications      butalbital-acetaminophen-caffeine -40 MG per tablet  Commonly known as: FIORICET, ESGIC     glucose 4 g chewable tablet     insulin glargine 100 UNIT/ML injection vial  Commonly known as: LANTUS

## 2024-01-11 NOTE — CONSULTS
Cardiology Consult    NAME: Ye Joseph   :  1975   MRN:  [id number]     Date/Time:  2024 10:08 AM    Patient PCP: Joel Ramos MD  ________________________________________________________________________    Problem List  -Admitted to the hospital with shortness of breath and leg swelling  -Status post Amplatz device placement for ASD in the past  -Recent hospitalization for heart failure  -History of COPD  -History of old myocardial infarction  -Hypertension  -History of stroke  -Obstructive sleep apnea  -Diabetes mellitus  -Electrolyte imbalance         Assessment and Plan:   Note dictated 2024  -Patient is sitting up in the bed.  He denies any chest pain shortness of breath or any other anginal equivalent.  He seems to be much comfortable then yesterday.  -No further cardiovascular testing at this time continue current conservative medical management including but not limited to diuresis.  -Continue medical management for acute on chronic diastolic heart failure.  -Add Jardiance 10 mg.  -Okay to discharge per my cardiovascular assessment to follow-up with me in 2 weeks or earlier as needed.  ==============================================================    Note dictated 2024  -48-year-old white male who is morbidly obese admitted to the hospital from our office after I found him in possible acute on chronic systolic heart failure  -Patient presented to my office being short of breath both legs were swollen consistent with cellulitis and edematous and he was having difficulty in breathing and walking so I decided to send him to the emergency department.  -Patient has history of and place device placement for ASD approximately 10 years ago.  -He has history of a stroke myocardial infarction COPD and hypertension.  -Main goal of hospitalization was to diurese him as he failed outpatient Bumex and Lasix for more than 1 week.  -Will monitor him on telemetry

## 2024-01-11 NOTE — CARE COORDINATION
0735: Chart reviewed.     Per notes; patient on IV Lasix followed via cardiology, dietitian and wound care nurse.     Current Dispo: [facility].     CM will continue to follow patient and recs of medical team.

## 2024-01-11 NOTE — PROGRESS NOTES
Pt has discharge orders home with self. Spoke with attending provider and consulting providers. All stated pt is able to discharge today. Pt is stable alert, oriented and does not show any signs of distress. Pt is discharging without an IV, tele or harrington.     Discharge plan of care/case management plan validated with provider discharge order.

## 2024-01-18 ENCOUNTER — FOLLOWUP TELEPHONE ENCOUNTER (OUTPATIENT)
Facility: HOSPITAL | Age: 49
End: 2024-01-18

## 2024-02-21 ENCOUNTER — APPOINTMENT (OUTPATIENT)
Facility: HOSPITAL | Age: 49
End: 2024-02-21
Attending: EMERGENCY MEDICINE
Payer: MEDICAID

## 2024-02-21 ENCOUNTER — APPOINTMENT (OUTPATIENT)
Facility: HOSPITAL | Age: 49
End: 2024-02-21
Payer: MEDICAID

## 2024-02-21 ENCOUNTER — HOSPITAL ENCOUNTER (OUTPATIENT)
Facility: HOSPITAL | Age: 49
Setting detail: OBSERVATION
LOS: 1 days | Discharge: HOME | End: 2024-02-25
Attending: EMERGENCY MEDICINE | Admitting: INTERNAL MEDICINE
Payer: MEDICAID

## 2024-02-21 DIAGNOSIS — I50.9 CONGESTIVE HEART FAILURE, UNSPECIFIED HF CHRONICITY, UNSPECIFIED HEART FAILURE TYPE (HCC): Primary | ICD-10-CM

## 2024-02-21 LAB
ALBUMIN SERPL-MCNC: 3.3 G/DL (ref 3.5–5)
ALBUMIN/GLOB SERPL: 0.7 (ref 1.1–2.2)
ALP SERPL-CCNC: 84 U/L (ref 45–117)
ALT SERPL-CCNC: 59 U/L (ref 12–78)
AMPHET UR QL SCN: NEGATIVE
ANION GAP SERPL CALC-SCNC: 1 MMOL/L (ref 5–15)
AST SERPL W P-5'-P-CCNC: 33 U/L (ref 15–37)
BARBITURATES UR QL SCN: NEGATIVE
BASOPHILS # BLD: 0 K/UL (ref 0–0.1)
BASOPHILS NFR BLD: 1 % (ref 0–1)
BENZODIAZ UR QL: NEGATIVE
BILIRUB SERPL-MCNC: 0.3 MG/DL (ref 0.2–1)
BNP SERPL-MCNC: 35 PG/ML
BUN SERPL-MCNC: 10 MG/DL (ref 6–20)
BUN/CREAT SERPL: 9 (ref 12–20)
CA-I BLD-MCNC: 8.8 MG/DL (ref 8.5–10.1)
CANNABINOIDS UR QL SCN: NEGATIVE
CHLORIDE SERPL-SCNC: 103 MMOL/L (ref 97–108)
CO2 SERPL-SCNC: 35 MMOL/L (ref 21–32)
COCAINE UR QL SCN: NEGATIVE
CREAT SERPL-MCNC: 1.17 MG/DL (ref 0.7–1.3)
DIFFERENTIAL METHOD BLD: ABNORMAL
ECHO BSA: 3.04 M2
EKG ATRIAL RATE: 100 BPM
EKG DIAGNOSIS: NORMAL
EKG P AXIS: 30 DEGREES
EKG P-R INTERVAL: 182 MS
EKG Q-T INTERVAL: 374 MS
EKG QRS DURATION: 98 MS
EKG QTC CALCULATION (BAZETT): 482 MS
EKG R AXIS: 16 DEGREES
EKG T AXIS: 33 DEGREES
EKG VENTRICULAR RATE: 100 BPM
EOSINOPHIL # BLD: 0.1 K/UL (ref 0–0.4)
EOSINOPHIL NFR BLD: 3 % (ref 0–7)
ERYTHROCYTE [DISTWIDTH] IN BLOOD BY AUTOMATED COUNT: 13.9 % (ref 11.5–14.5)
EST. AVERAGE GLUCOSE BLD GHB EST-MCNC: 154 MG/DL
GLOBULIN SER CALC-MCNC: 5 G/DL (ref 2–4)
GLUCOSE BLD STRIP.AUTO-MCNC: 164 MG/DL (ref 65–100)
GLUCOSE SERPL-MCNC: 119 MG/DL (ref 65–100)
HBA1C MFR BLD: 7 % (ref 4–5.6)
HCT VFR BLD AUTO: 36.4 % (ref 36.6–50.3)
HGB BLD-MCNC: 11.5 G/DL (ref 12.1–17)
IMM GRANULOCYTES # BLD AUTO: 0 K/UL (ref 0–0.04)
IMM GRANULOCYTES NFR BLD AUTO: 0 % (ref 0–0.5)
LYMPHOCYTES # BLD: 1.2 K/UL (ref 0.8–3.5)
LYMPHOCYTES NFR BLD: 22 % (ref 12–49)
Lab: ABNORMAL
MAGNESIUM SERPL-MCNC: 2.1 MG/DL (ref 1.6–2.4)
MCH RBC QN AUTO: 32.5 PG (ref 26–34)
MCHC RBC AUTO-ENTMCNC: 31.6 G/DL (ref 30–36.5)
MCV RBC AUTO: 102.8 FL (ref 80–99)
METHADONE UR QL: NEGATIVE
MONOCYTES # BLD: 0.4 K/UL (ref 0–1)
MONOCYTES NFR BLD: 7 % (ref 5–13)
NEUTS SEG # BLD: 3.9 K/UL (ref 1.8–8)
NEUTS SEG NFR BLD: 67 % (ref 32–75)
NRBC # BLD: 0 K/UL (ref 0–0.01)
NRBC BLD-RTO: 0 PER 100 WBC
OPIATES UR QL: POSITIVE
PCP UR QL: NEGATIVE
PERFORMED BY:: ABNORMAL
PLATELET # BLD AUTO: 196 K/UL (ref 150–400)
PMV BLD AUTO: 10 FL (ref 8.9–12.9)
POTASSIUM SERPL-SCNC: 3.5 MMOL/L (ref 3.5–5.1)
PROT SERPL-MCNC: 8.3 G/DL (ref 6.4–8.2)
RBC # BLD AUTO: 3.54 M/UL (ref 4.1–5.7)
SODIUM SERPL-SCNC: 139 MMOL/L (ref 136–145)
TROPONIN I SERPL HS-MCNC: 6 NG/L (ref 0–76)
TROPONIN I SERPL HS-MCNC: 8 NG/L (ref 0–76)
WBC # BLD AUTO: 5.7 K/UL (ref 4.1–11.1)

## 2024-02-21 PROCEDURE — 6360000002 HC RX W HCPCS: Performed by: EMERGENCY MEDICINE

## 2024-02-21 PROCEDURE — 99285 EMERGENCY DEPT VISIT HI MDM: CPT

## 2024-02-21 PROCEDURE — 96375 TX/PRO/DX INJ NEW DRUG ADDON: CPT

## 2024-02-21 PROCEDURE — 83880 ASSAY OF NATRIURETIC PEPTIDE: CPT

## 2024-02-21 PROCEDURE — 93970 EXTREMITY STUDY: CPT

## 2024-02-21 PROCEDURE — 83735 ASSAY OF MAGNESIUM: CPT

## 2024-02-21 PROCEDURE — 2060000000 HC ICU INTERMEDIATE R&B

## 2024-02-21 PROCEDURE — 80307 DRUG TEST PRSMV CHEM ANLYZR: CPT

## 2024-02-21 PROCEDURE — 93005 ELECTROCARDIOGRAM TRACING: CPT | Performed by: INTERNAL MEDICINE

## 2024-02-21 PROCEDURE — 2580000003 HC RX 258: Performed by: INTERNAL MEDICINE

## 2024-02-21 PROCEDURE — 85025 COMPLETE CBC W/AUTO DIFF WBC: CPT

## 2024-02-21 PROCEDURE — 96374 THER/PROPH/DIAG INJ IV PUSH: CPT

## 2024-02-21 PROCEDURE — 6370000000 HC RX 637 (ALT 250 FOR IP): Performed by: INTERNAL MEDICINE

## 2024-02-21 PROCEDURE — 6370000000 HC RX 637 (ALT 250 FOR IP): Performed by: EMERGENCY MEDICINE

## 2024-02-21 PROCEDURE — 82962 GLUCOSE BLOOD TEST: CPT

## 2024-02-21 PROCEDURE — 93005 ELECTROCARDIOGRAM TRACING: CPT | Performed by: EMERGENCY MEDICINE

## 2024-02-21 PROCEDURE — 80053 COMPREHEN METABOLIC PANEL: CPT

## 2024-02-21 PROCEDURE — 84484 ASSAY OF TROPONIN QUANT: CPT

## 2024-02-21 PROCEDURE — 94761 N-INVAS EAR/PLS OXIMETRY MLT: CPT

## 2024-02-21 PROCEDURE — 83036 HEMOGLOBIN GLYCOSYLATED A1C: CPT

## 2024-02-21 PROCEDURE — 36415 COLL VENOUS BLD VENIPUNCTURE: CPT

## 2024-02-21 PROCEDURE — 2700000000 HC OXYGEN THERAPY PER DAY

## 2024-02-21 PROCEDURE — 94640 AIRWAY INHALATION TREATMENT: CPT

## 2024-02-21 PROCEDURE — 71045 X-RAY EXAM CHEST 1 VIEW: CPT

## 2024-02-21 RX ORDER — SODIUM CHLORIDE 0.9 % (FLUSH) 0.9 %
5-40 SYRINGE (ML) INJECTION EVERY 12 HOURS SCHEDULED
Status: DISCONTINUED | OUTPATIENT
Start: 2024-02-21 | End: 2024-02-25 | Stop reason: HOSPADM

## 2024-02-21 RX ORDER — IPRATROPIUM BROMIDE AND ALBUTEROL SULFATE 2.5; .5 MG/3ML; MG/3ML
1 SOLUTION RESPIRATORY (INHALATION)
Status: DISCONTINUED | OUTPATIENT
Start: 2024-02-21 | End: 2024-02-22

## 2024-02-21 RX ORDER — POTASSIUM CHLORIDE 7.45 MG/ML
10 INJECTION INTRAVENOUS PRN
Status: DISCONTINUED | OUTPATIENT
Start: 2024-02-21 | End: 2024-02-25 | Stop reason: HOSPADM

## 2024-02-21 RX ORDER — POTASSIUM CHLORIDE 20 MEQ/1
40 TABLET, EXTENDED RELEASE ORAL PRN
Status: DISCONTINUED | OUTPATIENT
Start: 2024-02-21 | End: 2024-02-25 | Stop reason: HOSPADM

## 2024-02-21 RX ORDER — SODIUM CHLORIDE 9 MG/ML
INJECTION, SOLUTION INTRAVENOUS PRN
Status: DISCONTINUED | OUTPATIENT
Start: 2024-02-21 | End: 2024-02-25 | Stop reason: HOSPADM

## 2024-02-21 RX ORDER — CELECOXIB 200 MG/1
200 CAPSULE ORAL 2 TIMES DAILY
Status: DISCONTINUED | OUTPATIENT
Start: 2024-02-21 | End: 2024-02-25 | Stop reason: HOSPADM

## 2024-02-21 RX ORDER — METOPROLOL SUCCINATE 25 MG/1
25 TABLET, EXTENDED RELEASE ORAL DAILY
Status: DISCONTINUED | OUTPATIENT
Start: 2024-02-21 | End: 2024-02-25 | Stop reason: HOSPADM

## 2024-02-21 RX ORDER — ATORVASTATIN CALCIUM 40 MG/1
40 TABLET, FILM COATED ORAL DAILY
Status: DISCONTINUED | OUTPATIENT
Start: 2024-02-21 | End: 2024-02-21

## 2024-02-21 RX ORDER — ONDANSETRON 2 MG/ML
4 INJECTION INTRAMUSCULAR; INTRAVENOUS ONCE
Status: COMPLETED | OUTPATIENT
Start: 2024-02-21 | End: 2024-02-21

## 2024-02-21 RX ORDER — FUROSEMIDE 10 MG/ML
40 INJECTION INTRAMUSCULAR; INTRAVENOUS
Status: COMPLETED | OUTPATIENT
Start: 2024-02-21 | End: 2024-02-21

## 2024-02-21 RX ORDER — IPRATROPIUM BROMIDE AND ALBUTEROL SULFATE 2.5; .5 MG/3ML; MG/3ML
1 SOLUTION RESPIRATORY (INHALATION)
Status: COMPLETED | OUTPATIENT
Start: 2024-02-21 | End: 2024-02-21

## 2024-02-21 RX ORDER — ASPIRIN 81 MG/1
81 TABLET, CHEWABLE ORAL DAILY
Status: DISCONTINUED | OUTPATIENT
Start: 2024-02-22 | End: 2024-02-25 | Stop reason: HOSPADM

## 2024-02-21 RX ORDER — ONDANSETRON 2 MG/ML
4 INJECTION INTRAMUSCULAR; INTRAVENOUS EVERY 6 HOURS PRN
Status: DISCONTINUED | OUTPATIENT
Start: 2024-02-21 | End: 2024-02-25 | Stop reason: HOSPADM

## 2024-02-21 RX ORDER — GABAPENTIN 400 MG/1
1200 CAPSULE ORAL NIGHTLY
Status: DISCONTINUED | OUTPATIENT
Start: 2024-02-21 | End: 2024-02-25 | Stop reason: HOSPADM

## 2024-02-21 RX ORDER — MORPHINE SULFATE 4 MG/ML
4 INJECTION, SOLUTION INTRAMUSCULAR; INTRAVENOUS
Status: COMPLETED | OUTPATIENT
Start: 2024-02-21 | End: 2024-02-21

## 2024-02-21 RX ORDER — ASPIRIN 81 MG/1
81 TABLET, CHEWABLE ORAL DAILY
Status: DISCONTINUED | OUTPATIENT
Start: 2024-02-22 | End: 2024-02-21

## 2024-02-21 RX ORDER — ATORVASTATIN CALCIUM 40 MG/1
40 TABLET, FILM COATED ORAL NIGHTLY
Status: DISCONTINUED | OUTPATIENT
Start: 2024-02-21 | End: 2024-02-25 | Stop reason: HOSPADM

## 2024-02-21 RX ORDER — ACETAMINOPHEN 650 MG/1
650 SUPPOSITORY RECTAL EVERY 6 HOURS PRN
Status: DISCONTINUED | OUTPATIENT
Start: 2024-02-21 | End: 2024-02-25 | Stop reason: HOSPADM

## 2024-02-21 RX ORDER — POLYETHYLENE GLYCOL 3350 17 G/17G
17 POWDER, FOR SOLUTION ORAL DAILY PRN
Status: DISCONTINUED | OUTPATIENT
Start: 2024-02-21 | End: 2024-02-25 | Stop reason: HOSPADM

## 2024-02-21 RX ORDER — ACETAMINOPHEN 325 MG/1
650 TABLET ORAL EVERY 6 HOURS PRN
Status: DISCONTINUED | OUTPATIENT
Start: 2024-02-21 | End: 2024-02-25 | Stop reason: HOSPADM

## 2024-02-21 RX ORDER — DEXTROSE MONOHYDRATE 100 MG/ML
INJECTION, SOLUTION INTRAVENOUS CONTINUOUS PRN
Status: DISCONTINUED | OUTPATIENT
Start: 2024-02-21 | End: 2024-02-25 | Stop reason: HOSPADM

## 2024-02-21 RX ORDER — MAGNESIUM SULFATE IN WATER 40 MG/ML
2000 INJECTION, SOLUTION INTRAVENOUS PRN
Status: DISCONTINUED | OUTPATIENT
Start: 2024-02-21 | End: 2024-02-25 | Stop reason: HOSPADM

## 2024-02-21 RX ORDER — PREDNISONE 20 MG/1
20 TABLET ORAL DAILY
Status: DISCONTINUED | OUTPATIENT
Start: 2024-02-21 | End: 2024-02-22

## 2024-02-21 RX ORDER — FUROSEMIDE 40 MG/1
40 TABLET ORAL DAILY
Status: DISCONTINUED | OUTPATIENT
Start: 2024-02-21 | End: 2024-02-25 | Stop reason: HOSPADM

## 2024-02-21 RX ORDER — LEVOTHYROXINE SODIUM 0.03 MG/1
50 TABLET ORAL
Status: DISCONTINUED | OUTPATIENT
Start: 2024-02-22 | End: 2024-02-25 | Stop reason: HOSPADM

## 2024-02-21 RX ORDER — ONDANSETRON 4 MG/1
4 TABLET, ORALLY DISINTEGRATING ORAL EVERY 8 HOURS PRN
Status: DISCONTINUED | OUTPATIENT
Start: 2024-02-21 | End: 2024-02-25 | Stop reason: HOSPADM

## 2024-02-21 RX ORDER — CELECOXIB 100 MG/1
100 CAPSULE ORAL 2 TIMES DAILY
Status: DISCONTINUED | OUTPATIENT
Start: 2024-02-21 | End: 2024-02-21

## 2024-02-21 RX ORDER — MAGNESIUM HYDROXIDE/ALUMINUM HYDROXICE/SIMETHICONE 120; 1200; 1200 MG/30ML; MG/30ML; MG/30ML
30 SUSPENSION ORAL 3 TIMES DAILY PRN
Status: DISCONTINUED | OUTPATIENT
Start: 2024-02-21 | End: 2024-02-25 | Stop reason: HOSPADM

## 2024-02-21 RX ORDER — CELECOXIB 200 MG/1
200 CAPSULE ORAL 2 TIMES DAILY
Status: DISCONTINUED | OUTPATIENT
Start: 2024-02-22 | End: 2024-02-21

## 2024-02-21 RX ORDER — SODIUM CHLORIDE 0.9 % (FLUSH) 0.9 %
5-40 SYRINGE (ML) INJECTION PRN
Status: DISCONTINUED | OUTPATIENT
Start: 2024-02-21 | End: 2024-02-25 | Stop reason: HOSPADM

## 2024-02-21 RX ADMIN — FUROSEMIDE 40 MG: 10 INJECTION, SOLUTION INTRAMUSCULAR; INTRAVENOUS at 14:05

## 2024-02-21 RX ADMIN — IPRATROPIUM BROMIDE AND ALBUTEROL SULFATE 1 DOSE: 2.5; .5 SOLUTION RESPIRATORY (INHALATION) at 20:58

## 2024-02-21 RX ADMIN — MORPHINE SULFATE 4 MG: 4 INJECTION, SOLUTION INTRAMUSCULAR; INTRAVENOUS at 14:35

## 2024-02-21 RX ADMIN — FUROSEMIDE 40 MG: 40 TABLET ORAL at 19:32

## 2024-02-21 RX ADMIN — TRAZODONE HYDROCHLORIDE 200 MG: 150 TABLET ORAL at 22:18

## 2024-02-21 RX ADMIN — CELECOXIB 200 MG: 200 CAPSULE ORAL at 22:41

## 2024-02-21 RX ADMIN — ATORVASTATIN CALCIUM 40 MG: 40 TABLET, FILM COATED ORAL at 22:18

## 2024-02-21 RX ADMIN — GABAPENTIN 1200 MG: 400 CAPSULE ORAL at 22:18

## 2024-02-21 RX ADMIN — IPRATROPIUM BROMIDE AND ALBUTEROL SULFATE 1 DOSE: 2.5; .5 SOLUTION RESPIRATORY (INHALATION) at 14:19

## 2024-02-21 RX ADMIN — ONDANSETRON 4 MG: 2 INJECTION INTRAMUSCULAR; INTRAVENOUS at 14:35

## 2024-02-21 RX ADMIN — PREDNISONE 20 MG: 20 TABLET ORAL at 19:32

## 2024-02-21 RX ADMIN — ACETAMINOPHEN 650 MG: 325 TABLET ORAL at 20:16

## 2024-02-21 RX ADMIN — SODIUM CHLORIDE, PRESERVATIVE FREE 10 ML: 5 INJECTION INTRAVENOUS at 22:21

## 2024-02-21 RX ADMIN — METFORMIN HYDROCHLORIDE 500 MG: 500 TABLET ORAL at 18:20

## 2024-02-21 RX ADMIN — METOPROLOL SUCCINATE 25 MG: 25 TABLET, EXTENDED RELEASE ORAL at 18:20

## 2024-02-21 ASSESSMENT — PAIN SCALES - GENERAL
PAINLEVEL_OUTOF10: 9

## 2024-02-21 ASSESSMENT — PAIN DESCRIPTION - LOCATION
LOCATION: CHEST
LOCATION: CHEST;ARM
LOCATION: CHEST
LOCATION: CHEST

## 2024-02-21 ASSESSMENT — PAIN - FUNCTIONAL ASSESSMENT: PAIN_FUNCTIONAL_ASSESSMENT: ACTIVITIES ARE NOT PREVENTED

## 2024-02-21 NOTE — ED PROVIDER NOTES
[facility] EMERGENCY DEPT  EMERGENCY DEPARTMENT HISTORY AND PHYSICAL EXAM      Date: 2/21/2024  Patient Name: Ye Joseph  MRN: [id number]  Birthdate 1975  Date of evaluation: 2/21/2024  Provider: Kendal Pickard MD   Note Started: 2:39 PM EST 2/21/24    HISTORY OF PRESENT ILLNESS     Chief Complaint   Patient presents with    Chest Pain    Shortness of Breath       History Provided By: Patient    HPI: Ye Joseph is a 48 y.o. male patient with a history of COPD started feeling ill on Monday.  Shortness of breath some chest discomfort and increasing swelling in his leg despite being compliant on his medications.    PAST MEDICAL HISTORY   Past Medical History:  Past Medical History:   Diagnosis Date    Acute deep vein thrombosis (DVT) (HCC)     behind left knee    Acute MI (HCC)     COPD (chronic obstructive pulmonary disease) (HCC)     Depression     Hypertension     Ill-defined condition     Stroke (HCC)        Past Surgical History:  Past Surgical History:   Procedure Laterality Date    HERNIA REPAIR      IR FNA WITH IMAGING      ORTHOPEDIC SURGERY      rotator cuff surgery    OTHER SURGICAL HISTORY      Patent Foramen Ovale Repair    THYROIDECTOMY         Family History:  Family History   Problem Relation Age of Onset    Diabetes Maternal Uncle     Hypertension Maternal Uncle     Hypertension Paternal Uncle     Diabetes Paternal Uncle     Cancer Other     No Known Problems Father     Cancer Mother        Social History:  Social History     Tobacco Use    Smoking status: Former    Smokeless tobacco: Never   Substance Use Topics    Alcohol use: Yes    Drug use: Not Currently       Allergies:  Allergies   Allergen Reactions    Vancomycin Hives     Gabrielle syndrome       PCP: Joel Ramos MD    Current Meds:   No current facility-administered medications for this encounter.     Current Outpatient Medications   Medication Sig Dispense Refill    furosemide (LASIX) 40 MG tablet Take 1 tablet by

## 2024-02-21 NOTE — CONSULTS
Cardiology Consult    NAME: Ye Joseph   :  1975   MRN:  [id number]     Date/Time:  2024 4:28 PM    Patient PCP: Joel Ramos MD  ________________________________________________________________________     Assessment:     Patient is a 47-year-old white male with:  1.  ASD/PFO status post occluder  2.  Left lower extremity superficial vein thrombosis  3.  COPD  4.  Left-sided weakness  5.  Obstructive sleep apnea on CPAP machine  6.  Morbid obesity  7.  Thyroidectomy  8.  Ex-smoker  9.  Chest pain  10.  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  Enlarged bilateral.  Bilateral asymmetric enlargement of the right tonsil of the left  12.  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  Albumin 3.3, globulin 5.0.  Hemoglobin 11.5.  Echocardiogram in November showed EF of 60 to 65% with LVH, mild aortic sclerosis without stenosis, mild MR.  He had a stress test that was negative for ischemia.  EF was 51%.  EKG showed sinus rhythm without acute ischemic changes.  His BNP is normal.    I agree to admit in telemetry.  We will continue IV Lasix with close monitor of kidney function, daily weight and ins and outs.  His venous duplex is reportedly negative for DVT.  He claims being compliant with fluid restriction.        []        High complexity decision making was performed        Subjective:   CHIEF COMPLAINT:     Shortness of breath, congestive heart failure symptoms, lower extremity swelling      REASON FOR CONSULT: Congestive heart failure      HISTORY OF PRESENT ILLNESS:     Ye Joseph is a 48 y.o. White (non-) male who has a history of DVT, MI, COPD, depression, hypertension, stroke, status post PFO occluder done by Dr. Diaz.  He had multiple admissions in the last 2 years for congestive heart failure with decompensation.  He was last here in January.      Past Medical

## 2024-02-21 NOTE — ED TRIAGE NOTES
Pt c/o chest pain and SOB that started last night. Has taken Tylenol and nitro at home w/o relief. Also notes some bilateral leg pain and states Dr. Coleman requested he be seen if the leg pain returned. Hx CHF, COPD.

## 2024-02-21 NOTE — CARE COORDINATION
02/21/24 1506   Service Assessment   Patient Orientation Alert and Oriented   Cognition Alert   History Provided By Patient   Primary Caregiver Self   Accompanied By/Relationship Patient alone in room.   Support Systems Family Members   Patient's Healthcare Decision Maker is: Patient Declined (Legal Next of Kin Remains as Decision Maker)   PCP Verified by CM Yes   Last Visit to PCP Within last 3 months   Prior Functional Level Independent in ADLs/IADLs   Current Functional Level Independent in ADLs/IADLs   Can patient return to prior living arrangement Yes   Ability to make needs known: Good   Family able to assist with home care needs: No   Would you like for me to discuss the discharge plan with any other family members/significant others, and if so, who? No   Financial Resources Medicaid   Community Resources Lodging   Social/Functional History   Lives With Alone   Type of Home Homeless  ([facility])   Home Layout Multi-level   Home Access Level entry   Discharge Planning   Type of Residence Other (Comment)  ([facility])   Living Arrangements Alone   Services At/After Discharge   Mode of Transport at Discharge Self   Confirm Follow Up Transport Self       Patient lives alone at the [facility]. He ambulates independently and is an active . No prior HH/IRF/SNF. Patient plans to return to the [facility] at discharge. Meds to beds program offered, but patient declined.     Advance Care Planning     General Advance Care Planning (ACP) Conversation    Date of Conversation: 2/21/2024  Conducted with: Patient with Decision Making Capacity    Healthcare Decision Maker:    Primary Decision Maker: Chilango Joseph - [facility] - [phone]  Click here to complete Healthcare Decision Makers including selection of the Healthcare Decision Maker Relationship (ie \"Primary\").   Today we documented Decision Maker(s) consistent with Legal Next of Kin hierarchy.    Content/Action Overview:  Has ACP document(s) on

## 2024-02-22 LAB
ERYTHROCYTE [DISTWIDTH] IN BLOOD BY AUTOMATED COUNT: 14 % (ref 11.5–14.5)
GLUCOSE BLD STRIP.AUTO-MCNC: 148 MG/DL (ref 65–100)
GLUCOSE BLD STRIP.AUTO-MCNC: 149 MG/DL (ref 65–100)
GLUCOSE BLD STRIP.AUTO-MCNC: 157 MG/DL (ref 65–100)
GLUCOSE BLD STRIP.AUTO-MCNC: 161 MG/DL (ref 65–100)
HCT VFR BLD AUTO: 38 % (ref 36.6–50.3)
HGB BLD-MCNC: 11.7 G/DL (ref 12.1–17)
MCH RBC QN AUTO: 32.3 PG (ref 26–34)
MCHC RBC AUTO-ENTMCNC: 30.8 G/DL (ref 30–36.5)
MCV RBC AUTO: 105 FL (ref 80–99)
NRBC # BLD: 0 K/UL (ref 0–0.01)
NRBC BLD-RTO: 0 PER 100 WBC
PERFORMED BY:: ABNORMAL
PLATELET # BLD AUTO: 191 K/UL (ref 150–400)
PMV BLD AUTO: 10.2 FL (ref 8.9–12.9)
RBC # BLD AUTO: 3.62 M/UL (ref 4.1–5.7)
TROPONIN I SERPL HS-MCNC: 7 NG/L (ref 0–76)
WBC # BLD AUTO: 7 K/UL (ref 4.1–11.1)

## 2024-02-22 PROCEDURE — 6360000002 HC RX W HCPCS: Performed by: INTERNAL MEDICINE

## 2024-02-22 PROCEDURE — 96365 THER/PROPH/DIAG IV INF INIT: CPT

## 2024-02-22 PROCEDURE — G0378 HOSPITAL OBSERVATION PER HR: HCPCS

## 2024-02-22 PROCEDURE — 84484 ASSAY OF TROPONIN QUANT: CPT

## 2024-02-22 PROCEDURE — 85027 COMPLETE CBC AUTOMATED: CPT

## 2024-02-22 PROCEDURE — 82962 GLUCOSE BLOOD TEST: CPT

## 2024-02-22 PROCEDURE — 6370000000 HC RX 637 (ALT 250 FOR IP): Performed by: INTERNAL MEDICINE

## 2024-02-22 PROCEDURE — 36415 COLL VENOUS BLD VENIPUNCTURE: CPT

## 2024-02-22 PROCEDURE — 2580000003 HC RX 258: Performed by: INTERNAL MEDICINE

## 2024-02-22 PROCEDURE — 94761 N-INVAS EAR/PLS OXIMETRY MLT: CPT

## 2024-02-22 PROCEDURE — 94640 AIRWAY INHALATION TREATMENT: CPT

## 2024-02-22 RX ORDER — IPRATROPIUM BROMIDE AND ALBUTEROL SULFATE 2.5; .5 MG/3ML; MG/3ML
1 SOLUTION RESPIRATORY (INHALATION)
Status: DISCONTINUED | OUTPATIENT
Start: 2024-02-22 | End: 2024-02-22

## 2024-02-22 RX ORDER — HYDROCODONE BITARTRATE AND ACETAMINOPHEN 5; 325 MG/1; MG/1
1 TABLET ORAL EVERY 6 HOURS PRN
Status: DISCONTINUED | OUTPATIENT
Start: 2024-02-22 | End: 2024-02-25 | Stop reason: HOSPADM

## 2024-02-22 RX ORDER — IPRATROPIUM BROMIDE AND ALBUTEROL SULFATE 2.5; .5 MG/3ML; MG/3ML
1 SOLUTION RESPIRATORY (INHALATION) EVERY 4 HOURS PRN
Status: DISCONTINUED | OUTPATIENT
Start: 2024-02-22 | End: 2024-02-22 | Stop reason: SDUPTHER

## 2024-02-22 RX ORDER — PREDNISONE 20 MG/1
20 TABLET ORAL 2 TIMES DAILY
Status: DISCONTINUED | OUTPATIENT
Start: 2024-02-22 | End: 2024-02-25 | Stop reason: HOSPADM

## 2024-02-22 RX ORDER — PANTOPRAZOLE SODIUM 40 MG/1
40 TABLET, DELAYED RELEASE ORAL
Status: DISCONTINUED | OUTPATIENT
Start: 2024-02-23 | End: 2024-02-25 | Stop reason: HOSPADM

## 2024-02-22 RX ORDER — FUROSEMIDE 10 MG/ML
40 SOLUTION ORAL 2 TIMES DAILY
Status: DISCONTINUED | OUTPATIENT
Start: 2024-02-22 | End: 2024-02-25 | Stop reason: HOSPADM

## 2024-02-22 RX ORDER — IPRATROPIUM BROMIDE AND ALBUTEROL SULFATE 2.5; .5 MG/3ML; MG/3ML
1 SOLUTION RESPIRATORY (INHALATION) EVERY 4 HOURS PRN
Status: DISCONTINUED | OUTPATIENT
Start: 2024-02-22 | End: 2024-02-25 | Stop reason: HOSPADM

## 2024-02-22 RX ORDER — BUDESONIDE AND FORMOTEROL FUMARATE DIHYDRATE 160; 4.5 UG/1; UG/1
2 AEROSOL RESPIRATORY (INHALATION)
Status: DISCONTINUED | OUTPATIENT
Start: 2024-02-22 | End: 2024-02-25 | Stop reason: HOSPADM

## 2024-02-22 RX ORDER — GUAIFENESIN 600 MG/1
600 TABLET, EXTENDED RELEASE ORAL 2 TIMES DAILY
Status: DISCONTINUED | OUTPATIENT
Start: 2024-02-22 | End: 2024-02-25 | Stop reason: HOSPADM

## 2024-02-22 RX ADMIN — GUAIFENESIN 600 MG: 600 TABLET, EXTENDED RELEASE ORAL at 22:03

## 2024-02-22 RX ADMIN — ATORVASTATIN CALCIUM 40 MG: 40 TABLET, FILM COATED ORAL at 22:03

## 2024-02-22 RX ADMIN — IPRATROPIUM BROMIDE AND ALBUTEROL SULFATE 1 DOSE: .5; 3 SOLUTION RESPIRATORY (INHALATION) at 14:19

## 2024-02-22 RX ADMIN — FUROSEMIDE 40 MG: 40 TABLET ORAL at 09:38

## 2024-02-22 RX ADMIN — FUROSEMIDE 40 MG: 40 SOLUTION ORAL at 22:03

## 2024-02-22 RX ADMIN — CELECOXIB 200 MG: 200 CAPSULE ORAL at 09:38

## 2024-02-22 RX ADMIN — AZITHROMYCIN MONOHYDRATE 500 MG: 500 INJECTION, POWDER, LYOPHILIZED, FOR SOLUTION INTRAVENOUS at 09:26

## 2024-02-22 RX ADMIN — GUAIFENESIN 600 MG: 600 TABLET, EXTENDED RELEASE ORAL at 09:37

## 2024-02-22 RX ADMIN — CELECOXIB 200 MG: 200 CAPSULE ORAL at 22:03

## 2024-02-22 RX ADMIN — BUDESONIDE AND FORMOTEROL FUMARATE DIHYDRATE 2 PUFF: 160; 4.5 AEROSOL RESPIRATORY (INHALATION) at 20:56

## 2024-02-22 RX ADMIN — HYDROCODONE BITARTRATE AND ACETAMINOPHEN 1 TABLET: 5; 325 TABLET ORAL at 15:45

## 2024-02-22 RX ADMIN — POTASSIUM BICARBONATE 20 MEQ: 782 TABLET, EFFERVESCENT ORAL at 09:37

## 2024-02-22 RX ADMIN — PREDNISONE 20 MG: 20 TABLET ORAL at 22:03

## 2024-02-22 RX ADMIN — SODIUM CHLORIDE, PRESERVATIVE FREE 10 ML: 5 INJECTION INTRAVENOUS at 22:14

## 2024-02-22 RX ADMIN — IPRATROPIUM BROMIDE AND ALBUTEROL SULFATE 1 DOSE: 2.5; .5 SOLUTION RESPIRATORY (INHALATION) at 09:24

## 2024-02-22 RX ADMIN — LEVOTHYROXINE SODIUM 50 MCG: 0.03 TABLET ORAL at 06:26

## 2024-02-22 RX ADMIN — RIVAROXABAN 10 MG: 10 TABLET, FILM COATED ORAL at 17:04

## 2024-02-22 RX ADMIN — GABAPENTIN 1200 MG: 400 CAPSULE ORAL at 22:02

## 2024-02-22 RX ADMIN — PREDNISONE 20 MG: 20 TABLET ORAL at 09:38

## 2024-02-22 RX ADMIN — METFORMIN HYDROCHLORIDE 500 MG: 500 TABLET ORAL at 17:04

## 2024-02-22 RX ADMIN — HYDROCODONE BITARTRATE AND ACETAMINOPHEN 1 TABLET: 5; 325 TABLET ORAL at 22:03

## 2024-02-22 RX ADMIN — SODIUM CHLORIDE, PRESERVATIVE FREE 10 ML: 5 INJECTION INTRAVENOUS at 09:39

## 2024-02-22 RX ADMIN — BUDESONIDE AND FORMOTEROL FUMARATE DIHYDRATE 2 PUFF: 160; 4.5 AEROSOL RESPIRATORY (INHALATION) at 09:23

## 2024-02-22 RX ADMIN — EMPAGLIFLOZIN 10 MG: 10 TABLET, FILM COATED ORAL at 09:37

## 2024-02-22 RX ADMIN — TRAZODONE HYDROCHLORIDE 200 MG: 150 TABLET ORAL at 22:03

## 2024-02-22 RX ADMIN — ASPIRIN 81 MG 81 MG: 81 TABLET ORAL at 09:38

## 2024-02-22 RX ADMIN — METFORMIN HYDROCHLORIDE 500 MG: 500 TABLET ORAL at 09:38

## 2024-02-22 RX ADMIN — METOPROLOL SUCCINATE 25 MG: 25 TABLET, EXTENDED RELEASE ORAL at 09:39

## 2024-02-22 ASSESSMENT — PAIN SCALES - GENERAL
PAINLEVEL_OUTOF10: 9
PAINLEVEL_OUTOF10: 9
PAINLEVEL_OUTOF10: 0

## 2024-02-22 ASSESSMENT — PAIN DESCRIPTION - DESCRIPTORS: DESCRIPTORS: ACHING

## 2024-02-22 ASSESSMENT — PAIN DESCRIPTION - LOCATION: LOCATION: LEG;CHEST

## 2024-02-22 ASSESSMENT — PAIN DESCRIPTION - ORIENTATION: ORIENTATION: RIGHT;LEFT

## 2024-02-22 NOTE — CARE COORDINATION
CM reviewed chart. CM met with patient at bedside about home health. CM obtained choice letter for home health and sent referrals. [facility] CM will continue to follow.

## 2024-02-22 NOTE — ED NOTES
([phone]   Resp:       Temp:       SpO2: 94% 92% 99%    Weight:       Height:         Deterioration Index (DI): Deterioration Index: 20.83  Deterioration Index (DI) Interventions Performed:    O2 Flow Rate:    O2 Device: O2 Device: None (Room air)  Cardiac Rhythm:    Critical Lab Results: [unfilled]  Cultures: Cultures:Blood  NIH Score: NIH     Active LDA's:   Peripheral IV 02/21/24 Left;Proximal Forearm (Active)     Active Central Lines:                          Active Wounds:    Active Rosario's:    Active Feeding Tubes:      Administered Medications:   Medications   aspirin chewable tablet 81 mg (has no administration in time range)   diclofenac sodium (VOLTAREN) 1 % gel 4 g (has no administration in time range)   empagliflozin (JARDIANCE) tablet 10 mg (has no administration in time range)   furosemide (LASIX) tablet 40 mg (has no administration in time range)   gabapentin (NEURONTIN) capsule 1,200 mg (has no administration in time range)   levothyroxine (SYNTHROID) tablet 50 mcg (has no administration in time range)   metFORMIN (GLUCOPHAGE) tablet 500 mg (500 mg Oral Given 2/21/24 1820)   metoprolol succinate (TOPROL XL) extended release tablet 25 mg (25 mg Oral Given 2/21/24 1820)   potassium bicarb-citric acid (EFFER-K) effervescent tablet 20 mEq (has no administration in time range)   traZODone (DESYREL) tablet 200 mg (has no administration in time range)   ipratropium 0.5 mg-albuterol 2.5 mg (DUONEB) nebulizer solution 1 Dose (has no administration in time range)   predniSONE (DELTASONE) tablet 20 mg (has no administration in time range)   sodium chloride flush 0.9 % injection 5-40 mL (has no administration in time range)   sodium chloride flush 0.9 % injection 5-40 mL (has no administration in time range)   0.9 % sodium chloride infusion (has no administration in time range)   potassium chloride (KLOR-CON M) extended release tablet 40 mEq (has no administration in time range)     Or   potassium bicarb-citric

## 2024-02-22 NOTE — H&P
Differential:    Lab Results   Component Value Date/Time    WBC 5.7 02/21/2024 02:05 PM    RBC 3.54 02/21/2024 02:05 PM    HGB 11.5 02/21/2024 02:05 PM    HCT 36.4 02/21/2024 02:05 PM     02/21/2024 02:05 PM    .8 02/21/2024 02:05 PM    MCH 32.5 02/21/2024 02:05 PM    MCHC 31.6 02/21/2024 02:05 PM    RDW 13.9 02/21/2024 02:05 PM    NRBC 0.0 02/21/2024 02:05 PM    NRBC 0.00 02/21/2024 02:05 PM    LYMPHOPCT 22 02/21/2024 02:05 PM    MONOPCT 7 02/21/2024 02:05 PM    BASOPCT 1 02/21/2024 02:05 PM    MONOSABS 0.4 02/21/2024 02:05 PM    LYMPHSABS 1.2 02/21/2024 02:05 PM    EOSABS 0.1 02/21/2024 02:05 PM    BASOSABS 0.0 02/21/2024 02:05 PM    DIFFTYPE AUTOMATED 02/21/2024 02:05 PM     BMP:    Lab Results   Component Value Date/Time     02/21/2024 02:05 PM    K 3.5 02/21/2024 02:05 PM     02/21/2024 02:05 PM    CO2 35 02/21/2024 02:05 PM    BUN 10 02/21/2024 02:05 PM    LABALBU 3.3 02/21/2024 02:05 PM    CREATININE 1.17 02/21/2024 02:05 PM    CALCIUM 8.8 02/21/2024 02:05 PM    GFRAA >60 07/18/2022 06:23 AM    LABGLOM >60 02/21/2024 02:05 PM    GLUCOSE 119 02/21/2024 02:05 PM     Troponin:    Lab Results   Component Value Date/Time    TROPONINI <0.05 09/15/2021 12:13 AM     ASSESSMENT AND PLAN:    Chest pain atypical has a history of pericardial cyst  Serial cardiac enzymes placed on NSAIDs  Obstructive sleep apnea  Acute on chronic respiratory failure  Principal Problem:      Plan: Obtain cardiology on pulmonary consultation  Active Problems:    Chest pain  Plan: Possible discharge in 1 to 2 days        
e.g.,employment testing, legal testing). Due to its inherent nature, false positive (FP) and false negative (FN) results may be obtained. Therefore, if necessary for medical care, recommend confirmation of positive findings by GC/MS.     Hemoglobin A1c    Collection Time: 02/21/24  5:36 PM   Result Value Ref Range    Hemoglobin A1C 7.0 (H) 4.0 - 5.6 %    Estimated Avg Glucose 154 mg/dL   POCT Glucose    Collection Time: 02/21/24  9:49 PM   Result Value Ref Range    POC Glucose 164 (H) 65 - 100 mg/dL    Performed by: Jyothi Oliva    Troponin    Collection Time: 02/22/24  6:27 AM   Result Value Ref Range    Troponin, High Sensitivity 7 0 - 76 ng/L   CBC    Collection Time: 02/22/24  6:27 AM   Result Value Ref Range    WBC 7.0 4.1 - 11.1 K/uL    RBC 3.62 (L) 4.10 - 5.70 M/uL    Hemoglobin 11.7 (L) 12.1 - 17.0 g/dL    Hematocrit 38.0 36.6 - 50.3 %    .0 (H) 80.0 - 99.0 FL    MCH 32.3 26.0 - 34.0 PG    MCHC 30.8 30.0 - 36.5 g/dL    RDW 14.0 11.5 - 14.5 %    Platelets 191 150 - 400 K/uL    MPV 10.2 8.9 - 12.9 FL    Nucleated RBCs 0.0 0.0  WBC    nRBC 0.00 0.00 - 0.01 K/uL             Assessment:     Mr colmenares is a 48 year old male with a past medical hx significant for SAM, CHF, COPD w/ emphysema, HTN, HLD, DM, and PFO closure, presented to hospital with a chief complaint of shortness of breath and chest pressure. Upon arrival to ED pt was found to by hypoxic requiring 3L nasal cannula, WBC was within normal range at 5.7, BNP was within normal range at 35 and troponin was within normal range at 8 and repeat troponin showed no elevation at 6. Pt was found to be hypercapnic with a CO2 of 35. Dx is COPD exacerbation    Plan:     1.) acute respiratory failure with hypoxia  - this is likely secondary to COPD exacerbation. Pt was found to be hypercapnic with a CO2 of 35, chest x-ray showed no acute process, DVT scan, BNP, and high sensitivity troponin were all negative. WBC is normal at 5.7. diffuse wheezing

## 2024-02-22 NOTE — CONSULTS
Pulmonary/ CC Consult    Subjective:   Date of Consultation:  February 22, 2024  Referring Physician: Jim Mcknight MD     Thank you for this consultation   Patient known to us from previous hospitalization.  He follows with Dr. Pires in our office.   Mr Ye Joseph is a 48 year old male morbidly obese male with past medical history significant for SAM, CHF, COPD w/ emphysema, HTN, HLD, DM, and PFO closure, presents to the hospital with a chief complaint of shortness of breath, and chest pressure. Pt states that his shortness of breath and chest pressure began about 2 days ago and has continually worsened prompting him to go to the ED. Pt was found to be hypoxic, requiring 3 L nasal cannula. Pt is currently on RA with a O2 saturation of 90%. Pt states that at one time he was previously on O2 at home but does not have access anymore. Pt has a 14 pack year smoking hx  and quite smoking about 20 years ago. Pt denies n/v/d, fever or chills.   He further had problems off peripheral venous disease and getting treated with venous procedures done with Dr. De Santiago.  He additionally history of obstructive sleep apnea but lost his CPAP machine as he was not compliant.      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

## 2024-02-23 LAB
ANION GAP SERPL CALC-SCNC: 3 MMOL/L (ref 5–15)
BUN SERPL-MCNC: 17 MG/DL (ref 6–20)
BUN/CREAT SERPL: 14 (ref 12–20)
CA-I BLD-MCNC: 8.8 MG/DL (ref 8.5–10.1)
CHLORIDE SERPL-SCNC: 102 MMOL/L (ref 97–108)
CO2 SERPL-SCNC: 33 MMOL/L (ref 21–32)
CREAT SERPL-MCNC: 1.23 MG/DL (ref 0.7–1.3)
EKG ATRIAL RATE: 95 BPM
EKG DIAGNOSIS: NORMAL
EKG P AXIS: 33 DEGREES
EKG P-R INTERVAL: 202 MS
EKG Q-T INTERVAL: 392 MS
EKG QRS DURATION: 106 MS
EKG QTC CALCULATION (BAZETT): 492 MS
EKG R AXIS: 20 DEGREES
EKG T AXIS: 17 DEGREES
EKG VENTRICULAR RATE: 95 BPM
GLUCOSE BLD STRIP.AUTO-MCNC: 139 MG/DL (ref 65–100)
GLUCOSE BLD STRIP.AUTO-MCNC: 158 MG/DL (ref 65–100)
GLUCOSE BLD STRIP.AUTO-MCNC: 167 MG/DL (ref 65–100)
GLUCOSE BLD STRIP.AUTO-MCNC: 182 MG/DL (ref 65–100)
GLUCOSE SERPL-MCNC: 152 MG/DL (ref 65–100)
PERFORMED BY:: ABNORMAL
POTASSIUM SERPL-SCNC: 3.9 MMOL/L (ref 3.5–5.1)
SODIUM SERPL-SCNC: 138 MMOL/L (ref 136–145)

## 2024-02-23 PROCEDURE — 80048 BASIC METABOLIC PNL TOTAL CA: CPT

## 2024-02-23 PROCEDURE — G0378 HOSPITAL OBSERVATION PER HR: HCPCS

## 2024-02-23 PROCEDURE — 94640 AIRWAY INHALATION TREATMENT: CPT

## 2024-02-23 PROCEDURE — 96366 THER/PROPH/DIAG IV INF ADDON: CPT

## 2024-02-23 PROCEDURE — 6370000000 HC RX 637 (ALT 250 FOR IP): Performed by: INTERNAL MEDICINE

## 2024-02-23 PROCEDURE — 2580000003 HC RX 258: Performed by: INTERNAL MEDICINE

## 2024-02-23 PROCEDURE — 36415 COLL VENOUS BLD VENIPUNCTURE: CPT

## 2024-02-23 PROCEDURE — 96365 THER/PROPH/DIAG IV INF INIT: CPT

## 2024-02-23 PROCEDURE — 6360000002 HC RX W HCPCS: Performed by: INTERNAL MEDICINE

## 2024-02-23 PROCEDURE — 94761 N-INVAS EAR/PLS OXIMETRY MLT: CPT

## 2024-02-23 PROCEDURE — 82962 GLUCOSE BLOOD TEST: CPT

## 2024-02-23 RX ADMIN — PANTOPRAZOLE SODIUM 40 MG: 40 TABLET, DELAYED RELEASE ORAL at 06:14

## 2024-02-23 RX ADMIN — GABAPENTIN 1200 MG: 400 CAPSULE ORAL at 21:37

## 2024-02-23 RX ADMIN — POTASSIUM BICARBONATE 20 MEQ: 782 TABLET, EFFERVESCENT ORAL at 09:55

## 2024-02-23 RX ADMIN — METOPROLOL SUCCINATE 25 MG: 25 TABLET, EXTENDED RELEASE ORAL at 09:55

## 2024-02-23 RX ADMIN — GUAIFENESIN 600 MG: 600 TABLET, EXTENDED RELEASE ORAL at 09:55

## 2024-02-23 RX ADMIN — RIVAROXABAN 10 MG: 10 TABLET, FILM COATED ORAL at 18:38

## 2024-02-23 RX ADMIN — PREDNISONE 20 MG: 20 TABLET ORAL at 09:55

## 2024-02-23 RX ADMIN — BUDESONIDE AND FORMOTEROL FUMARATE DIHYDRATE 2 PUFF: 160; 4.5 AEROSOL RESPIRATORY (INHALATION) at 07:45

## 2024-02-23 RX ADMIN — SODIUM CHLORIDE, PRESERVATIVE FREE 10 ML: 5 INJECTION INTRAVENOUS at 21:37

## 2024-02-23 RX ADMIN — CELECOXIB 200 MG: 200 CAPSULE ORAL at 21:37

## 2024-02-23 RX ADMIN — FUROSEMIDE 40 MG: 40 SOLUTION ORAL at 09:55

## 2024-02-23 RX ADMIN — GUAIFENESIN 600 MG: 600 TABLET, EXTENDED RELEASE ORAL at 21:37

## 2024-02-23 RX ADMIN — ASPIRIN 81 MG 81 MG: 81 TABLET ORAL at 09:55

## 2024-02-23 RX ADMIN — ATORVASTATIN CALCIUM 40 MG: 40 TABLET, FILM COATED ORAL at 21:37

## 2024-02-23 RX ADMIN — BUDESONIDE AND FORMOTEROL FUMARATE DIHYDRATE 2 PUFF: 160; 4.5 AEROSOL RESPIRATORY (INHALATION) at 20:43

## 2024-02-23 RX ADMIN — EMPAGLIFLOZIN 10 MG: 10 TABLET, FILM COATED ORAL at 09:55

## 2024-02-23 RX ADMIN — AZITHROMYCIN MONOHYDRATE 500 MG: 500 INJECTION, POWDER, LYOPHILIZED, FOR SOLUTION INTRAVENOUS at 09:54

## 2024-02-23 RX ADMIN — CELECOXIB 200 MG: 200 CAPSULE ORAL at 09:55

## 2024-02-23 RX ADMIN — LEVOTHYROXINE SODIUM 50 MCG: 0.03 TABLET ORAL at 06:14

## 2024-02-23 RX ADMIN — PREDNISONE 20 MG: 20 TABLET ORAL at 21:37

## 2024-02-23 RX ADMIN — TRAZODONE HYDROCHLORIDE 200 MG: 150 TABLET ORAL at 21:36

## 2024-02-23 RX ADMIN — METFORMIN HYDROCHLORIDE 500 MG: 500 TABLET ORAL at 16:02

## 2024-02-23 RX ADMIN — SODIUM CHLORIDE, PRESERVATIVE FREE 10 ML: 5 INJECTION INTRAVENOUS at 09:55

## 2024-02-23 RX ADMIN — HYDROCODONE BITARTRATE AND ACETAMINOPHEN 1 TABLET: 5; 325 TABLET ORAL at 22:06

## 2024-02-23 RX ADMIN — HYDROCODONE BITARTRATE AND ACETAMINOPHEN 1 TABLET: 5; 325 TABLET ORAL at 06:14

## 2024-02-23 RX ADMIN — METFORMIN HYDROCHLORIDE 500 MG: 500 TABLET ORAL at 09:55

## 2024-02-23 RX ADMIN — FUROSEMIDE 40 MG: 40 SOLUTION ORAL at 21:36

## 2024-02-23 RX ADMIN — HYDROCODONE BITARTRATE AND ACETAMINOPHEN 1 TABLET: 5; 325 TABLET ORAL at 16:02

## 2024-02-23 ASSESSMENT — PAIN SCALES - GENERAL
PAINLEVEL_OUTOF10: 7
PAINLEVEL_OUTOF10: 6
PAINLEVEL_OUTOF10: 9
PAINLEVEL_OUTOF10: 0

## 2024-02-23 ASSESSMENT — PAIN DESCRIPTION - ORIENTATION
ORIENTATION: RIGHT;LEFT
ORIENTATION: LEFT;RIGHT

## 2024-02-23 ASSESSMENT — PAIN DESCRIPTION - LOCATION
LOCATION: LEG
LOCATION: LEG;CHEST

## 2024-02-23 NOTE — CARE COORDINATION
CM reviewed chart. CM met with patient at bedside to inform no home health agency accepted patient, and to provide patient with meals of wheels application per heart failure navigator recommendation. CM spoke with provider about patient class to be changed to inpatient. DCP home self care. CM will continue to follow.

## 2024-02-23 NOTE — NURSE NAVIGATOR
Heart Failure Nurse Navigator: Heart Failure Education    RN performs hand hygiene. RN Introduces herself to the patient and informs the patient of the reasoning for the visit.    Patient Verbalizes Understanding for visit, is accepting of discussion    Persons present for Education: Patient    Time Spent: At least 60minutes    Method of teaching:  Teach-back, demonstration, verbal, visual    Education Packets Given: Heart Failure symptom management calendar/tracker, AHA HF education booklet, HF magnet    -Denied owning a scale. Patient is non compliant and lives in a motel with poor income. Patient agrees to use a scale if given to him. Patient can read and write numbers. Patient states he is interested in meals on wheels.    RN-NN provided education on Daily weights to include same time daily, on same scale, and documenting weights on calendar. RN-NN provided education trigger management/ signs and symptoms of Heart Failure. Patient is advised on “Yellow Days” to call MD office and on “Red Days” to come to the ER or call 911.   RN provides Nutritional education that includes how to calculate and restrict sodium and fluid intake. Encouraged fresh vegetable choice over canned/processed goods. Demonstrated how to log meals/intake. RN discusses lifestyle changes including cessation of smoking and increasing activity level.   In addition, RN discusses the importance of keeping/scheduling appointments and adherence to guideline directed medical therapies.      Patient was able to teach back to the RN-NN the above information.  Patient will need reinforcement of education provided.  Patient advised about the HF helper Yakelin.      Patient given a weight scale. LOT:[id number], PO: [phone]  Patient refused pill organizer due to having one already.

## 2024-02-24 LAB
ANION GAP SERPL CALC-SCNC: 1 MMOL/L (ref 5–15)
BUN SERPL-MCNC: 17 MG/DL (ref 6–20)
BUN/CREAT SERPL: 15 (ref 12–20)
CA-I BLD-MCNC: 8.8 MG/DL (ref 8.5–10.1)
CHLORIDE SERPL-SCNC: 103 MMOL/L (ref 97–108)
CO2 SERPL-SCNC: 33 MMOL/L (ref 21–32)
CREAT SERPL-MCNC: 1.14 MG/DL (ref 0.7–1.3)
GLUCOSE BLD STRIP.AUTO-MCNC: 146 MG/DL (ref 65–100)
GLUCOSE BLD STRIP.AUTO-MCNC: 149 MG/DL (ref 65–100)
GLUCOSE BLD STRIP.AUTO-MCNC: 158 MG/DL (ref 65–100)
GLUCOSE BLD STRIP.AUTO-MCNC: 211 MG/DL (ref 65–100)
GLUCOSE SERPL-MCNC: 168 MG/DL (ref 65–100)
PERFORMED BY:: ABNORMAL
POTASSIUM SERPL-SCNC: 3.8 MMOL/L (ref 3.5–5.1)
SODIUM SERPL-SCNC: 137 MMOL/L (ref 136–145)

## 2024-02-24 PROCEDURE — 6360000002 HC RX W HCPCS: Performed by: INTERNAL MEDICINE

## 2024-02-24 PROCEDURE — 94761 N-INVAS EAR/PLS OXIMETRY MLT: CPT

## 2024-02-24 PROCEDURE — 6370000000 HC RX 637 (ALT 250 FOR IP): Performed by: INTERNAL MEDICINE

## 2024-02-24 PROCEDURE — G0378 HOSPITAL OBSERVATION PER HR: HCPCS

## 2024-02-24 PROCEDURE — 2580000003 HC RX 258: Performed by: INTERNAL MEDICINE

## 2024-02-24 PROCEDURE — 36415 COLL VENOUS BLD VENIPUNCTURE: CPT

## 2024-02-24 PROCEDURE — 96366 THER/PROPH/DIAG IV INF ADDON: CPT

## 2024-02-24 PROCEDURE — 94640 AIRWAY INHALATION TREATMENT: CPT

## 2024-02-24 PROCEDURE — 82962 GLUCOSE BLOOD TEST: CPT

## 2024-02-24 PROCEDURE — 80048 BASIC METABOLIC PNL TOTAL CA: CPT

## 2024-02-24 RX ADMIN — FUROSEMIDE 40 MG: 40 SOLUTION ORAL at 09:13

## 2024-02-24 RX ADMIN — CELECOXIB 200 MG: 200 CAPSULE ORAL at 21:16

## 2024-02-24 RX ADMIN — HYDROCODONE BITARTRATE AND ACETAMINOPHEN 1 TABLET: 5; 325 TABLET ORAL at 21:15

## 2024-02-24 RX ADMIN — METOPROLOL SUCCINATE 25 MG: 25 TABLET, EXTENDED RELEASE ORAL at 09:14

## 2024-02-24 RX ADMIN — CELECOXIB 200 MG: 200 CAPSULE ORAL at 09:14

## 2024-02-24 RX ADMIN — BUDESONIDE AND FORMOTEROL FUMARATE DIHYDRATE 2 PUFF: 160; 4.5 AEROSOL RESPIRATORY (INHALATION) at 20:28

## 2024-02-24 RX ADMIN — METFORMIN HYDROCHLORIDE 500 MG: 500 TABLET ORAL at 09:14

## 2024-02-24 RX ADMIN — POTASSIUM BICARBONATE 20 MEQ: 782 TABLET, EFFERVESCENT ORAL at 09:13

## 2024-02-24 RX ADMIN — GUAIFENESIN 600 MG: 600 TABLET, EXTENDED RELEASE ORAL at 21:15

## 2024-02-24 RX ADMIN — HYDROCODONE BITARTRATE AND ACETAMINOPHEN 1 TABLET: 5; 325 TABLET ORAL at 05:53

## 2024-02-24 RX ADMIN — AZITHROMYCIN MONOHYDRATE 500 MG: 500 INJECTION, POWDER, LYOPHILIZED, FOR SOLUTION INTRAVENOUS at 09:13

## 2024-02-24 RX ADMIN — TRAZODONE HYDROCHLORIDE 200 MG: 150 TABLET ORAL at 21:16

## 2024-02-24 RX ADMIN — ASPIRIN 81 MG 81 MG: 81 TABLET ORAL at 09:14

## 2024-02-24 RX ADMIN — METFORMIN HYDROCHLORIDE 500 MG: 500 TABLET ORAL at 17:29

## 2024-02-24 RX ADMIN — ATORVASTATIN CALCIUM 40 MG: 40 TABLET, FILM COATED ORAL at 21:16

## 2024-02-24 RX ADMIN — LEVOTHYROXINE SODIUM 50 MCG: 0.03 TABLET ORAL at 05:52

## 2024-02-24 RX ADMIN — GABAPENTIN 1200 MG: 400 CAPSULE ORAL at 21:15

## 2024-02-24 RX ADMIN — BUDESONIDE AND FORMOTEROL FUMARATE DIHYDRATE 2 PUFF: 160; 4.5 AEROSOL RESPIRATORY (INHALATION) at 08:56

## 2024-02-24 RX ADMIN — HYDROCODONE BITARTRATE AND ACETAMINOPHEN 1 TABLET: 5; 325 TABLET ORAL at 14:46

## 2024-02-24 RX ADMIN — PREDNISONE 20 MG: 20 TABLET ORAL at 21:15

## 2024-02-24 RX ADMIN — GUAIFENESIN 600 MG: 600 TABLET, EXTENDED RELEASE ORAL at 09:14

## 2024-02-24 RX ADMIN — EMPAGLIFLOZIN 10 MG: 10 TABLET, FILM COATED ORAL at 09:14

## 2024-02-24 RX ADMIN — PREDNISONE 20 MG: 20 TABLET ORAL at 09:14

## 2024-02-24 RX ADMIN — RIVAROXABAN 10 MG: 10 TABLET, FILM COATED ORAL at 17:29

## 2024-02-24 RX ADMIN — SODIUM CHLORIDE, PRESERVATIVE FREE 10 ML: 5 INJECTION INTRAVENOUS at 21:00

## 2024-02-24 RX ADMIN — PANTOPRAZOLE SODIUM 40 MG: 40 TABLET, DELAYED RELEASE ORAL at 05:53

## 2024-02-24 RX ADMIN — SODIUM CHLORIDE, PRESERVATIVE FREE 10 ML: 5 INJECTION INTRAVENOUS at 09:15

## 2024-02-24 ASSESSMENT — PAIN DESCRIPTION - LOCATION
LOCATION: GROIN;LEG
LOCATION: GROIN

## 2024-02-24 ASSESSMENT — PAIN SCALES - GENERAL
PAINLEVEL_OUTOF10: 7
PAINLEVEL_OUTOF10: 6
PAINLEVEL_OUTOF10: 9
PAINLEVEL_OUTOF10: 4
PAINLEVEL_OUTOF10: 8
PAINLEVEL_OUTOF10: 9

## 2024-02-24 ASSESSMENT — PAIN DESCRIPTION - ORIENTATION
ORIENTATION: LEFT;RIGHT
ORIENTATION: LEFT;LOWER

## 2024-02-24 ASSESSMENT — PAIN DESCRIPTION - DESCRIPTORS: DESCRIPTORS: DISCOMFORT

## 2024-02-25 VITALS
RESPIRATION RATE: 18 BRPM | WEIGHT: 315 LBS | TEMPERATURE: 98.1 F | SYSTOLIC BLOOD PRESSURE: 137 MMHG | DIASTOLIC BLOOD PRESSURE: 61 MMHG | HEIGHT: 76 IN | OXYGEN SATURATION: 95 % | HEART RATE: 73 BPM | BODY MASS INDEX: 38.36 KG/M2

## 2024-02-25 LAB
ALBUMIN SERPL-MCNC: 3.4 G/DL (ref 3.5–5)
ALBUMIN/GLOB SERPL: 0.7 (ref 1.1–2.2)
ALP SERPL-CCNC: 72 U/L (ref 45–117)
ALT SERPL-CCNC: 54 U/L (ref 12–78)
ANION GAP SERPL CALC-SCNC: 0 MMOL/L (ref 5–15)
AST SERPL W P-5'-P-CCNC: 28 U/L (ref 15–37)
BILIRUB SERPL-MCNC: 0.2 MG/DL (ref 0.2–1)
BNP SERPL-MCNC: 154 PG/ML
BUN SERPL-MCNC: 19 MG/DL (ref 6–20)
BUN/CREAT SERPL: 16 (ref 12–20)
CA-I BLD-MCNC: 8.7 MG/DL (ref 8.5–10.1)
CHLORIDE SERPL-SCNC: 103 MMOL/L (ref 97–108)
CO2 SERPL-SCNC: 34 MMOL/L (ref 21–32)
CREAT SERPL-MCNC: 1.18 MG/DL (ref 0.7–1.3)
ERYTHROCYTE [DISTWIDTH] IN BLOOD BY AUTOMATED COUNT: 14.4 % (ref 11.5–14.5)
GLOBULIN SER CALC-MCNC: 4.9 G/DL (ref 2–4)
GLUCOSE BLD STRIP.AUTO-MCNC: 147 MG/DL (ref 65–100)
GLUCOSE SERPL-MCNC: 128 MG/DL (ref 65–100)
HCT VFR BLD AUTO: 35.9 % (ref 36.6–50.3)
HGB BLD-MCNC: 11.4 G/DL (ref 12.1–17)
MCH RBC QN AUTO: 32.2 PG (ref 26–34)
MCHC RBC AUTO-ENTMCNC: 31.8 G/DL (ref 30–36.5)
MCV RBC AUTO: 101.4 FL (ref 80–99)
NRBC # BLD: 0 K/UL (ref 0–0.01)
NRBC BLD-RTO: 0 PER 100 WBC
PERFORMED BY:: ABNORMAL
PLATELET # BLD AUTO: 200 K/UL (ref 150–400)
PMV BLD AUTO: 10.1 FL (ref 8.9–12.9)
POTASSIUM SERPL-SCNC: 3.8 MMOL/L (ref 3.5–5.1)
PROT SERPL-MCNC: 8.3 G/DL (ref 6.4–8.2)
RBC # BLD AUTO: 3.54 M/UL (ref 4.1–5.7)
SODIUM SERPL-SCNC: 137 MMOL/L (ref 136–145)
WBC # BLD AUTO: 8.5 K/UL (ref 4.1–11.1)

## 2024-02-25 PROCEDURE — 82962 GLUCOSE BLOOD TEST: CPT

## 2024-02-25 PROCEDURE — 6370000000 HC RX 637 (ALT 250 FOR IP): Performed by: INTERNAL MEDICINE

## 2024-02-25 PROCEDURE — 6370000000 HC RX 637 (ALT 250 FOR IP): Performed by: FAMILY MEDICINE

## 2024-02-25 PROCEDURE — 83880 ASSAY OF NATRIURETIC PEPTIDE: CPT

## 2024-02-25 PROCEDURE — 36415 COLL VENOUS BLD VENIPUNCTURE: CPT

## 2024-02-25 PROCEDURE — 80053 COMPREHEN METABOLIC PANEL: CPT

## 2024-02-25 PROCEDURE — 94640 AIRWAY INHALATION TREATMENT: CPT

## 2024-02-25 PROCEDURE — G0378 HOSPITAL OBSERVATION PER HR: HCPCS

## 2024-02-25 PROCEDURE — 85027 COMPLETE CBC AUTOMATED: CPT

## 2024-02-25 RX ORDER — BUDESONIDE AND FORMOTEROL FUMARATE DIHYDRATE 160; 4.5 UG/1; UG/1
2 AEROSOL RESPIRATORY (INHALATION)
Qty: 10.2 G | Refills: 3 | Status: SHIPPED | OUTPATIENT
Start: 2024-02-25

## 2024-02-25 RX ORDER — TRAZODONE HYDROCHLORIDE 100 MG/1
200 TABLET ORAL NIGHTLY
Qty: 30 TABLET | Refills: 0 | Status: SHIPPED | OUTPATIENT
Start: 2024-02-25

## 2024-02-25 RX ORDER — AZITHROMYCIN 500 MG/1
250 TABLET, FILM COATED ORAL ONCE
Status: DISCONTINUED | OUTPATIENT
Start: 2024-02-25 | End: 2024-02-25

## 2024-02-25 RX ORDER — AZITHROMYCIN 500 MG/1
500 TABLET, FILM COATED ORAL ONCE
Status: COMPLETED | OUTPATIENT
Start: 2024-02-25 | End: 2024-02-25

## 2024-02-25 RX ORDER — CELECOXIB 200 MG/1
200 CAPSULE ORAL 2 TIMES DAILY
Qty: 60 CAPSULE | Refills: 3 | Status: SHIPPED | OUTPATIENT
Start: 2024-02-25

## 2024-02-25 RX ORDER — GUAIFENESIN 600 MG/1
600 TABLET, EXTENDED RELEASE ORAL 2 TIMES DAILY
Qty: 30 TABLET | Refills: 0 | Status: SHIPPED | OUTPATIENT
Start: 2024-02-25

## 2024-02-25 RX ORDER — PREDNISONE 20 MG/1
20 TABLET ORAL 2 TIMES DAILY
Qty: 20 TABLET | Refills: 0 | Status: SHIPPED | OUTPATIENT
Start: 2024-02-25 | End: 2024-03-06

## 2024-02-25 RX ORDER — HYDROCODONE BITARTRATE AND ACETAMINOPHEN 5; 325 MG/1; MG/1
1 TABLET ORAL EVERY 6 HOURS PRN
Qty: 10 TABLET | Refills: 0 | Status: SHIPPED | OUTPATIENT
Start: 2024-02-25 | End: 2024-02-28

## 2024-02-25 RX ORDER — AZITHROMYCIN 500 MG/1
500 TABLET, FILM COATED ORAL DAILY
Qty: 1 PACKET | Refills: 0 | Status: SHIPPED | OUTPATIENT
Start: 2024-02-25 | End: 2024-02-28

## 2024-02-25 RX ORDER — IPRATROPIUM BROMIDE AND ALBUTEROL SULFATE 2.5; .5 MG/3ML; MG/3ML
3 SOLUTION RESPIRATORY (INHALATION) EVERY 4 HOURS PRN
Qty: 360 ML | Refills: 1 | Status: SHIPPED | OUTPATIENT
Start: 2024-02-25

## 2024-02-25 RX ORDER — PANTOPRAZOLE SODIUM 40 MG/1
40 TABLET, DELAYED RELEASE ORAL
Qty: 30 TABLET | Refills: 3 | Status: SHIPPED | OUTPATIENT
Start: 2024-02-26

## 2024-02-25 RX ORDER — FUROSEMIDE 40 MG/1
40 TABLET ORAL ONCE
Status: COMPLETED | OUTPATIENT
Start: 2024-02-25 | End: 2024-02-25

## 2024-02-25 RX ADMIN — AZITHROMYCIN DIHYDRATE 500 MG: 500 TABLET ORAL at 10:20

## 2024-02-25 RX ADMIN — PANTOPRAZOLE SODIUM 40 MG: 40 TABLET, DELAYED RELEASE ORAL at 05:41

## 2024-02-25 RX ADMIN — BUDESONIDE AND FORMOTEROL FUMARATE DIHYDRATE 2 PUFF: 160; 4.5 AEROSOL RESPIRATORY (INHALATION) at 06:37

## 2024-02-25 RX ADMIN — POTASSIUM BICARBONATE 20 MEQ: 782 TABLET, EFFERVESCENT ORAL at 08:58

## 2024-02-25 RX ADMIN — HYDROCODONE BITARTRATE AND ACETAMINOPHEN 1 TABLET: 5; 325 TABLET ORAL at 08:58

## 2024-02-25 RX ADMIN — GUAIFENESIN 600 MG: 600 TABLET, EXTENDED RELEASE ORAL at 08:58

## 2024-02-25 RX ADMIN — PREDNISONE 20 MG: 20 TABLET ORAL at 08:58

## 2024-02-25 RX ADMIN — METOPROLOL SUCCINATE 25 MG: 25 TABLET, EXTENDED RELEASE ORAL at 08:58

## 2024-02-25 RX ADMIN — FUROSEMIDE 40 MG: 40 TABLET ORAL at 10:20

## 2024-02-25 RX ADMIN — LEVOTHYROXINE SODIUM 50 MCG: 0.03 TABLET ORAL at 05:41

## 2024-02-25 RX ADMIN — CELECOXIB 200 MG: 200 CAPSULE ORAL at 08:57

## 2024-02-25 RX ADMIN — ASPIRIN 81 MG 81 MG: 81 TABLET ORAL at 08:58

## 2024-02-25 RX ADMIN — EMPAGLIFLOZIN 10 MG: 10 TABLET, FILM COATED ORAL at 08:58

## 2024-02-25 RX ADMIN — METFORMIN HYDROCHLORIDE 500 MG: 500 TABLET ORAL at 08:58

## 2024-02-25 ASSESSMENT — PAIN DESCRIPTION - DESCRIPTORS: DESCRIPTORS: DISCOMFORT

## 2024-02-25 ASSESSMENT — PAIN SCALES - GENERAL
PAINLEVEL_OUTOF10: 4
PAINLEVEL_OUTOF10: 0
PAINLEVEL_OUTOF10: 7

## 2024-02-25 ASSESSMENT — PAIN DESCRIPTION - ORIENTATION: ORIENTATION: LOWER

## 2024-02-25 ASSESSMENT — PAIN DESCRIPTION - LOCATION: LOCATION: GROIN

## 2024-02-25 NOTE — DISCHARGE SUMMARY
Discharge Summary       PATIENT ID: eY Joseph  MRN: [id number]   YOB: 1975    DATE OF ADMISSION: 2/21/2024   DATE OF DISCHARGE:   PRIMARY CARE PROVIDER: @NIK@      ATTENDING PHYSICIAN: Jorden Mcqueen  DISCHARGING PROVIDER: Jorden Mcqueen      CONSULTATIONS: IP CONSULT TO CARDIOLOGY  IP CONSULT TO CARDIOLOGY  IP CONSULT TO PULMONOLOGY  IP CONSULT TO PULMONOLOGY    PROCEDURES/SURGERIES: * No surgery found *    ADMITTING DIAGNOSES:    Patient Active Problem List    Diagnosis Date Noted    Heart failure exacerbated by sotalol ([facility]) 01/09/2024    DVT (deep vein thrombosis) in pregnancy 01/08/2024    CHF (congestive heart failure), NYHA class I, acute on chronic, combined ([facility]) 01/08/2024    Cellulitis of left lower extremity 12/02/2023    Left hand weakness 11/09/2023    Suspected deep vein thrombosis (DVT) 11/09/2023    CVA (cerebrovascular accident due to intracerebral hemorrhage) ([facility]) 11/02/2023    Acute chest pain 10/18/2023    Pericardial cyst 10/18/2023    Stroke-like symptoms 07/16/2022    COPD exacerbation ([facility]) 08/18/2021    Hypoxemia 08/18/2021    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 (CVA) ([facility]) 08/03/2021    Syncope 08/03/2021    Hemoptysis 07/13/2021    ACS (acute coronary syndrome) ([facility]) 07/13/2021    Chest pain 06/26/2021    CVA (cerebral vascular accident) ([facility]) 05/06/2021    Postoperative hypothyroidism 05/06/2021    Pharyngoesophageal dysphagia 02/22/2021    Multinodular goiter 12/30/2020    Pulmonary nodules 12/17/2020    Type 2 diabetes mellitus with hyperglycemia, without long-term current use of insulin ([facility]) 12/17/2020    Cardiomyopathy ([facility]) 12/17/2020    COPD (chronic obstructive pulmonary disease) ([facility]) 12/17/2020    Essential hypertension 12/17/2020    Coronary artery disease involving native coronary artery of native heart with angina pectoris with documented spasm ([facility]) 12/17/2020    Obstructive sleep apnea 12/17/2020    Carotid artery

## 2024-02-25 NOTE — DISCHARGE INSTRUCTIONS
Discharge Instructions       PATIENT ID: Ye Joseph  MRN: [id number]   YOB: 1975    DATE OF ADMISSION: 2/21/2024  DATE OF DISCHARGE: 2/25/2024    PRIMARY CARE PROVIDER: [unfilled]     ATTENDING PHYSICIAN: Jim Mcknight MD   DISCHARGING PROVIDER: Jorden Mcqueen MD    To contact this individual call [phone] and ask the  to page.   If unavailable, ask to be transferred the Adult Hospitalist Department.    DISCHARGE DIAGNOSES respiratory failure    CONSULTATIONS: [unfilled]      PROCEDURES/SURGERIES: * No surgery found *    PENDING TEST RESULTS:   At the time of discharge the following test results are still pending: Follow-up with the cardiology and pulmonologist    FOLLOW UP APPOINTMENTS:   Dustin Coleman MD  [address] [facility] [address]  [phone]    Follow up in 1 week(s)      Val Smart MD  [address] [facility] [address] [phone]    Schedule an appointment as soon as possible for a visit in 1 week(s)      Vu Jain MD  [address] [address] [phone]    Schedule an appointment as soon as possible for a visit in 1 week(s)         ADDITIONAL CARE RECOMMENDATIONS: Follow-up with the PCP and cardiology    DIET: Resume previous diet      ACTIVITY: Activity as tolerated    Wound care: {Discharge Wound Care Instructions:39749}    EQUIPMENT needed: ***      DISCHARGE MEDICATIONS:   See Medication Reconciliation Form    It is important that you take the medication exactly as they are prescribed.   Keep your medication in the bottles provided by the pharmacist and keep a list of the medication names, dosages, and times to be taken in your wallet.   Do not take other medications without consulting your doctor.       NOTIFY YOUR PHYSICIAN FOR ANY OF THE FOLLOWING:   Fever over 101 degrees for 24 hours.   Chest pain, shortness of breath, fever, chills, nausea, vomiting, diarrhea, change in

## 2024-02-25 NOTE — PROGRESS NOTES
Progress Note      2/22/2024 10:30 AM  NAME: Ye Joseph   MRN:  [id number]   Admit Diagnosis: Chest pain [R07.9]  CHF (congestive heart failure), NYHA class I, acute on chronic, combined (HCC) [I50.43]  Congestive heart failure, unspecified HF chronicity, unspecified heart failure type (HCC) [I50.9]      Problem List:   Patient is a 47-year-old white male with:  1.  ASD/PFO status post occluder  2.  Left lower extremity superficial vein thrombosis  3.  COPD  4.  Left-sided weakness  5.  Obstructive sleep apnea on CPAP machine  6.  Morbid obesity  7.  Thyroidectomy  8.  Ex-smoker  9.  Chest pain  10.  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  Enlarged bilateral.  Bilateral asymmetric enlargement of the right tonsil of the left  12.  Venous anomaly of the deep right frontal lobe brain         Assessment/Plan:   Patient continues to have significant wheezing bilaterally.  Patient with significant bilateral lower limb swelling.  Continue pulmonary toilet as per primary team.  We will consult pulmonary.  Continue IV diuresis with close monitor of kidney function.  Remains in normal sinus rhythm.  Blood pressure is normal.    Currently on aspirin, atorvastatin and metoprolol             []       High complexity decision making was performed in this patient at high risk for decompensation with multiple organ involvement.    Subjective:     Ye Joseph denies chest pain, dyspnea.  Discussed with RN events overnight.     Review of Systems:   Negative except for as noted above.    Objective:      Physical Exam:    Last 24hrs VS reviewed since prior progress note. Most recent are:    /72   Pulse 89   Temp 97.8 °F (36.6 °C) (Oral)   Resp 18   Ht 1.93 m (6' 4\")   Wt (!) 172.4 kg (380 lb)   SpO2 90%   BMI 46.26 kg/m²   No intake or output data in the 24 hours ending 02/22/24 1030     General Appearance: Alert; no acute distress.  Ears/Nose/Mouth/Throat: moist mucous 
        Progress Note      2/23/2024 12:19 PM  NAME: Ye Joseph   MRN:  [id number]   Admit Diagnosis: Chest pain [R07.9]  CHF (congestive heart failure), NYHA class I, acute on chronic, combined (HCC) [I50.43]  Congestive heart failure, unspecified HF chronicity, unspecified heart failure type (HCC) [I50.9]      Problem List:   Patient is a 47-year-old white male with:  1.  ASD/PFO status post occluder  2.  Left lower extremity superficial vein thrombosis  3.  COPD  4.  Left-sided weakness  5.  Obstructive sleep apnea on CPAP machine  6.  Morbid obesity  7.  Thyroidectomy  8.  Ex-smoker  9.  Chest pain  10.  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  Enlarged bilateral.  Bilateral asymmetric enlargement of the right tonsil of the left  12.  Venous anomaly of the deep right frontal lobe brain         Assessment/Plan:     No acute events overnight.  Blood pressure is normal.  There is no accurate ins and outs.  Kidney function is stable.  Creatinine 1.23, BUN 17, potassium 3.9.  Most recent TSH was 47.5.  Currently on aspirin, atorvastatin, IV Lasix, levothyroxine, metoprolol succinate, pantoprazole and rivaroxaban.             []       High complexity decision making was performed in this patient at high risk for decompensation with multiple organ involvement.    Subjective:     Ye Joseph denies chest pain, dyspnea.  Discussed with RN events overnight.     Review of Systems:   Negative except for as noted above.    Objective:      Physical Exam:    Last 24hrs VS reviewed since prior progress note. Most recent are:    /71   Pulse 90   Temp 97.7 °F (36.5 °C)   Resp 21   Ht 1.93 m (6' 4\")   Wt (!) 182.1 kg (401 lb 7.3 oz)   SpO2 92%   BMI 48.87 kg/m²     Intake/Output Summary (Last 24 hours) at 2/23/2024 1219  Last data filed at 2/23/2024 0924  Gross per 24 hour   Intake 444 ml   Output --   Net 444 ml        General Appearance: Alert; no acute 
   02/22/24 0900   RT Protocol   History Pulmonary Disease 1   Respiratory pattern 0   Breath sounds 6   Indications for Bronchodilator Therapy On home bronchodilators     RT Inhaler-Nebulizer Bronchodilator Protocol Note    There is a bronchodilator order in the chart from a provider indicating to follow the RT Bronchodilator Protocol and there is an “Initiate RT Inhaler-Nebulizer Bronchodilator Protocol” order as well (see protocol at bottom of note).    CXR Findings:  XR CHEST PORTABLE    Result Date: 2/21/2024  No acute process on portable chest.       The findings from the last RT Protocol Assessment were as follows:   History Pulmonary Disease: (P) Smoker 15 pack years or more  Respiratory Pattern: (P) Regular pattern and RR 12-20 bpm  Breath Sounds: (P) Inspiratory and expiratory or bilateral wheezing and/or rhonchi  Cough:    Indication for Bronchodilator Therapy: (P) On home bronchodilators  Bronchodilator Assessment Score:      Aerosolized bronchodilator medication orders have been revised according to the RT Inhaler-Nebulizer Bronchodilator Protocol below.    Respiratory Therapist to perform RT Therapy Protocol Assessment initially then follow the protocol.  Repeat RT Therapy Protocol Assessment PRN for score 0-3 or on second treatment, BID, and PRN for scores above 3.    No Indications - adjust the frequency to every 6 hours PRN wheezing or bronchospasm, if no treatments needed after 48 hours then discontinue using Per Protocol order mode.     If indication present, adjust the RT bronchodilator orders based on the Bronchodilator Assessment Score as indicated below.  Use Inhaler orders unless patient has one or more of the following: on home nebulizer, not able to hold breath for 10 seconds, is not alert and oriented, cannot activate and use MDI correctly, or respiratory rate 25 breaths per minute or more, then use the equivalent nebulizer order(s) with same Frequency and PRN reasons based on the score.  
2/25/2024        RE: Ye Joseph         [address] [place] [id number]          To Whom It May Concern,      Due to medical reasons, Ye Joseph may  may return to full duty immediately with no restrictions. Mr. Joseph was under medical care from 2- unitl 2-        Sincerely,          Rachael Diaz RN   
4 Eyes Skin Assessment     NAME:  Ye Joseph  YOB: 1975  MEDICAL RECORD NUMBER:  [id number]    The patient is being assessed for  Transfer to New Unit    I agree that at least one RN has performed a thorough Head to Toe Skin Assessment on the patient. ALL assessment sites listed below have been assessed.      Areas assessed by both nurses:    Head, Face, Ears, Shoulders, Back, Chest, Arms, Elbows, Hands, Sacrum. Buttock, Coccyx, Ischium, and Legs. Feet and Heels        Does the Patient have a Wound? No noted wound(s)       Yoan Prevention initiated by RN: Yes  Wound Care Orders initiated by RN: No    Pressure Injury (Stage 3,4, Unstageable, DTI, NWPT, and Complex wounds) if present, place Wound referral order by RN under : No    New Ostomies, if present place, Ostomy referral order under : No     Nurse 1 eSignature: Electronically signed by Bertha Navarrete RN on 2/23/24 at 6:54 PM EST    **SHARE this note so that the co-signing nurse can place an eSignature**    Nurse 2 eSignature: {Esignature:[id number]}   
4 Eyes Skin Assessment     NAME:  Ye Joseph  YOB: 1975  MEDICAL RECORD NUMBER:  [id number]    The patient is being assessed for  Admission    I agree that at least one RN has performed a thorough Head to Toe Skin Assessment on the patient. ALL assessment sites listed below have been assessed.      Areas assessed by both nurses:    Head, Face, Ears, Shoulders, Back, Chest, Arms, Elbows, Hands, Sacrum. Buttock, Coccyx, Ischium, Legs. Feet and Heels, and Under Medical Devices         Does the Patient have a Wound? No noted wound(s) redness and taught skin visible on BLE       Oyan Prevention initiated by RN: Yes  Wound Care Orders initiated by RN: No    Pressure Injury (Stage 3,4, Unstageable, DTI, NWPT, and Complex wounds) if present, place Wound referral order by RN under : No    New Ostomies, if present place, Ostomy referral order under : No     Nurse 1 eSignature: Electronically signed by Haley Parry RN on 2/22/24 at 6:31 AM EST    **SHARE this note so that the co-signing nurse can place an eSignature**    Nurse 2 eSignature: Electronically signed by Deangelo Ross RN on 2/22/24 at 6:52 AM EST   
Department of Internal Medicine  General Internal Medicine  Attending Progress Note      SUBJECTIVE: Patient complains of tightness in the chest still has a cough oxygen is down at 92%      OBJECTIVE      Medications    Current Facility-Administered Medications: ipratropium 0.5 mg-albuterol 2.5 mg (DUONEB) nebulizer solution 1 Dose, 1 Dose, Inhalation, Q4H PRN  budesonide-formoterol (SYMBICORT) 160-4.5 MCG/ACT inhaler 2 puff, 2 puff, Inhalation, BID RT  predniSONE (DELTASONE) tablet 20 mg, 20 mg, Oral, BID  [START ON 2/23/2024] pantoprazole (PROTONIX) tablet 40 mg, 40 mg, Oral, QAM AC  guaiFENesin (MUCINEX) extended release tablet 600 mg, 600 mg, Oral, BID  azithromycin (ZITHROMAX) 500 mg in sodium chloride 0.9 % 250 mL IVPB ([id number]), 500 mg, IntraVENous, Q24H  aspirin chewable tablet 81 mg, 81 mg, Oral, Daily  diclofenac sodium (VOLTAREN) 1 % gel 4 g, 4 g, Topical, TID  empagliflozin (JARDIANCE) tablet 10 mg, 10 mg, Oral, Daily  furosemide (LASIX) tablet 40 mg, 40 mg, Oral, Daily  gabapentin (NEURONTIN) capsule 1,200 mg, 1,200 mg, Oral, Nightly  levothyroxine (SYNTHROID) tablet 50 mcg, 50 mcg, Oral, QAM AC  metFORMIN (GLUCOPHAGE) tablet 500 mg, 500 mg, Oral, BID WC  metoprolol succinate (TOPROL XL) extended release tablet 25 mg, 25 mg, Oral, Daily  potassium bicarb-citric acid (EFFER-K) effervescent tablet 20 mEq, 20 mEq, Oral, Daily  traZODone (DESYREL) tablet 200 mg, 200 mg, Oral, Nightly  ipratropium 0.5 mg-albuterol 2.5 mg (DUONEB) nebulizer solution 1 Dose, 1 Dose, Inhalation, Q4H WA RT  sodium chloride flush 0.9 % injection 5-40 mL, 5-40 mL, IntraVENous, 2 times per day  sodium chloride flush 0.9 % injection 5-40 mL, 5-40 mL, IntraVENous, PRN  0.9 % sodium chloride infusion, , IntraVENous, PRN  potassium chloride (KLOR-CON M) extended release tablet 40 mEq, 40 mEq, Oral, PRN **OR** potassium bicarb-citric acid (EFFER-K) effervescent tablet 40 mEq, 40 mEq, Oral, PRN **OR** potassium chloride 10 mEq/100 mL 
General Daily Progress Note      Patient Name:   Ye Joseph       YOB: 1975       Age:  48 y.o.      Admit Date: 2/21/2024      Subjective:       Patient doing well no new complaints no acute event overnight           Objective:     Vitals:    02/24/24 0805   BP: (!) 144/88   Pulse: 80   Resp: 16   Temp: 98.2 °F (36.8 °C)   SpO2: 92%        Recent Results (from the past 24 hour(s))   POCT Glucose    Collection Time: 02/23/24  4:37 PM   Result Value Ref Range    POC Glucose 167 (H) 65 - 100 mg/dL    Performed by: Jodi Chow    POCT Glucose    Collection Time: 02/23/24  8:34 PM   Result Value Ref Range    POC Glucose 158 (H) 65 - 100 mg/dL    Performed by: VALERIA NEWMAN    POCT Glucose    Collection Time: 02/24/24  8:06 AM   Result Value Ref Range    POC Glucose 211 (H) 65 - 100 mg/dL    Performed by: HERNANDO COHEN    Basic Metabolic Panel    Collection Time: 02/24/24  8:30 AM   Result Value Ref Range    Sodium 137 136 - 145 mmol/L    Potassium 3.8 3.5 - 5.1 mmol/L    Chloride 103 97 - 108 mmol/L    CO2 33 (H) 21 - 32 mmol/L    Anion Gap 1 (L) 5 - 15 mmol/L    Glucose 168 (H) 65 - 100 mg/dL    BUN 17 6 - 20 mg/dL    Creatinine 1.14 0.70 - 1.30 mg/dL    Bun/Cre Ratio 15 12 - 20      Est, Glom Filt Rate >60 >60 ml/min/1.73m2    Calcium 8.8 8.5 - 10.1 mg/dL   POCT Glucose    Collection Time: 02/24/24 12:02 PM   Result Value Ref Range    POC Glucose 158 (H) 65 - 100 mg/dL    Performed by: HERNANDO COHEN      [unfilled]        Review of Systems    Constitutional: Negative for chills and fever.   HENT: Negative.    Eyes: Negative.    Respiratory: Negative.    Cardiovascular: Negative.    Gastrointestinal: Negative for abdominal pain and nausea.   Skin: Negative.    Neurological: Negative.        Physical Exam:      Constitutional: pt is oriented to person, place, and time.   HENT:   Head: Normocephalic and atraumatic.   Eyes: Pupils are equal, round, and reactive to light. EOM are normal. 
Venous duplex BLE completed at bedside in ED.   
**OR** potassium bicarb-citric acid (EFFER-K) effervescent tablet 40 mEq, 40 mEq, Oral, PRN **OR** potassium chloride 10 mEq/100 mL IVPB (Peripheral Line), 10 mEq, IntraVENous, PRN  magnesium sulfate 2000 mg in 50 mL IVPB premix, 2,000 mg, IntraVENous, PRN  ondansetron (ZOFRAN-ODT) disintegrating tablet 4 mg, 4 mg, Oral, Q8H PRN **OR** ondansetron (ZOFRAN) injection 4 mg, 4 mg, IntraVENous, Q6H PRN  acetaminophen (TYLENOL) tablet 650 mg, 650 mg, Oral, Q6H PRN **OR** acetaminophen (TYLENOL) suppository 650 mg, 650 mg, Rectal, Q6H PRN  polyethylene glycol (GLYCOLAX) packet 17 g, 17 g, Oral, Daily PRN  atorvastatin (LIPITOR) tablet 40 mg, 40 mg, Oral, Nightly  glucose chewable tablet 16 g, 4 tablet, Oral, PRN  dextrose bolus 10% 125 mL, 125 mL, IntraVENous, PRN **OR** dextrose bolus 10% 250 mL, 250 mL, IntraVENous, PRN  glucagon injection 1 mg, 1 mg, SubCUTAneous, PRN  dextrose 10 % infusion, , IntraVENous, Continuous PRN  rivaroxaban (XARELTO) tablet 10 mg, 10 mg, Oral, Daily  aluminum & magnesium hydroxide-simethicone (MAALOX) 200-200-20 MG/5ML suspension 30 mL, 30 mL, Oral, TID PRN  celecoxib (CELEBREX) capsule 200 mg, 200 mg, Oral, BID  Physical    VITALS:  BP (!) 152/77   Pulse 90   Temp 97.3 °F (36.3 °C) (Oral)   Resp 16   Ht 1.93 m (6' 4\")   Wt (!) 182.1 kg (401 lb 7.3 oz)   SpO2 98%   BMI 48.87 kg/m²   RESPIRATIONS RANGE: Resp  Av.3  Min: 16  Max: 20  BLOOD PRESSURE RANGE:  Systolic (24hrs), Av , Min:105 , Max:152   ; Diastolic (24hrs), Av, Min:51, Max:79  CONSTITUTIONAL:  awake, alert, cooperative, no apparent distress, and appears stated age  EYES:  Lids and lashes normal, pupils equal, round and reactive to light, extra ocular muscles intact, sclera clear, conjunctiva normal  ENT:  Normocephalic, without obvious abnormality, atraumatic, sinuses nontender on palpation, external ears without lesions, oral pharynx with moist mucus membranes, tonsils without erythema or exudates, gums normal 
Pharyngoesophageal dysphagia    Carotid artery stenosis    Obstructive sleep apnea    Hypoxemia    Obesity, morbid (HCC)    Stroke-like symptoms    Acute chest pain    Pericardial cyst    CVA (cerebrovascular accident due to intracerebral hemorrhage) (HCC)    Left hand weakness    Suspected deep vein thrombosis (DVT)    Cellulitis of left lower extremity    DVT (deep vein thrombosis) in pregnancy    CHF (congestive heart failure), NYHA class I, acute on chronic, combined (HCC)    Heart failure exacerbated by sotalol (HCC)                    Allergies   Allergen Reactions    Vancomycin Hives     Gabrielle syndrome        Review of Systems:  A comprehensive review of systems was negative except for that written in the History of Present Illness.    Labs:    Recent Labs     24  0627   WBC 7.0   HGB 11.7*   HCT 38.0          Recent Labs     24  0800 24  0830    137   K 3.9 3.8    103   CO2 33* 33*   GLUCOSE 152* 168*   BUN 17 17   CREATININE 1.23 1.14   CALCIUM 8.8 8.8       No results for input(s): \"PHART\", \"XBC8IBC\", \"PO2ART\", \"PSL0USH\", \"BEART\", \"JOA7ADNL\", \"HGBART\", \"OC5IHOPZE\", \"FIO2A\", \"D4DUGOJW\", \"OXYHEM\", \"CARBOXHGBART\", \"METHGBART\", \"V3WUMFCUT\", \"PHCORART\", \"TEMP\" in the last 72 hours.    Invalid input(s): \"[id number]\"      Objective:   Blood pressure (!) 144/88, pulse 80, temperature 98.2 °F (36.8 °C), temperature source Oral, resp. rate 16, height 1.93 m (6' 4\"), weight (!) 182.1 kg (401 lb 7.3 oz), SpO2 92 %.  Temp (24hrs), Av.3 °F (36.8 °C), Min:98.1 °F (36.7 °C), Max:98.4 °F (36.9 °C)    Vascular duplex lower extremity venous bilateral   Final Result      XR CHEST PORTABLE   Final Result      No acute process on portable chest.            Data Review:   Current Facility-Administered Medications   Medication Dose Route Frequency    budesonide-formoterol (SYMBICORT) 160-4.5 MCG/ACT inhaler 2 puff  2 puff Inhalation BID RT    predniSONE (DELTASONE) tablet 20 mg  20 mg Oral 
 10 mg Oral Daily    [Held by provider] furosemide (LASIX) tablet 40 mg  40 mg Oral Daily    gabapentin (NEURONTIN) capsule 1,200 mg  1,200 mg Oral Nightly    levothyroxine (SYNTHROID) tablet 50 mcg  50 mcg Oral QAM AC    metFORMIN (GLUCOPHAGE) tablet 500 mg  500 mg Oral BID WC    metoprolol succinate (TOPROL XL) extended release tablet 25 mg  25 mg Oral Daily    potassium bicarb-citric acid (EFFER-K) effervescent tablet 20 mEq  20 mEq Oral Daily    traZODone (DESYREL) tablet 200 mg  200 mg Oral Nightly    sodium chloride flush 0.9 % injection 5-40 mL  5-40 mL IntraVENous 2 times per day    sodium chloride flush 0.9 % injection 5-40 mL  5-40 mL IntraVENous PRN    0.9 % sodium chloride infusion   IntraVENous PRN    potassium chloride (KLOR-CON M) extended release tablet 40 mEq  40 mEq Oral PRN    Or    potassium bicarb-citric acid (EFFER-K) effervescent tablet 40 mEq  40 mEq Oral PRN    Or    potassium chloride 10 mEq/100 mL IVPB (Peripheral Line)  10 mEq IntraVENous PRN    magnesium sulfate 2000 mg in 50 mL IVPB premix  2,000 mg IntraVENous PRN    ondansetron (ZOFRAN-ODT) disintegrating tablet 4 mg  4 mg Oral Q8H PRN    Or    ondansetron (ZOFRAN) injection 4 mg  4 mg IntraVENous Q6H PRN    acetaminophen (TYLENOL) tablet 650 mg  650 mg Oral Q6H PRN    Or    acetaminophen (TYLENOL) suppository 650 mg  650 mg Rectal Q6H PRN    polyethylene glycol (GLYCOLAX) packet 17 g  17 g Oral Daily PRN    atorvastatin (LIPITOR) tablet 40 mg  40 mg Oral Nightly    glucose chewable tablet 16 g  4 tablet Oral PRN    dextrose bolus 10% 125 mL  125 mL IntraVENous PRN    Or    dextrose bolus 10% 250 mL  250 mL IntraVENous PRN    glucagon injection 1 mg  1 mg SubCUTAneous PRN    dextrose 10 % infusion   IntraVENous Continuous PRN    rivaroxaban (XARELTO) tablet 10 mg  10 mg Oral Daily    aluminum & magnesium hydroxide-simethicone (MAALOX) 200-200-20 MG/5ML suspension 30 mL  30 mL Oral TID PRN    celecoxib (CELEBREX) capsule 200 mg  200 mg 
     Electronically signed by    Val Smart MD  February 25, 2024   12:40 PM    
troponin was within normal range at 8 and repeat troponin showed no elevation at 6. Pt was found to be hypercapnic with a CO2 of 35. Dx is COPD exacerbation       1.) acute respiratory failure with hypoxia  - this is likely secondary to COPD exacerbation. Pt was found to be hypercapnic with a CO2 of 35, chest x-ray showed no acute process, DVT scan, BNP, and high sensitivity troponin were all negative. WBC is normal at 5.7. diffuse wheezing was auscultated. Pt was started on azithromycin, duoneb, prednisone, albuterol inhaler,  and symbicort. Pt is currently on RA with a O2 saturation of 90%. Will continue to trend WBC, O2 requirements and clinical picture.     Saturating well on room air  Patient is cleared for discharge from a pulmonary standpoint  Being discharged home today, the pulmonary service will sign off    2.) CHF  - last ECHO performed in November of 2023 showed a normal EF of 60-65% and normal diastolic function. Pt BNP, troponin and chest x-ray are all normal. DVT scan was negative. Renal functions are normal with a BUN of 10 and a creatinine of 1.17. Pt is currently receiving lasix 40 mg daily. Cardiology is on case.     3.) DM  - pt is currently receiving home medications     4.) left lower extremity superficial vein thrombosis  - pt is on xarelto 10 mg daily  He underwent venous procedure done with Dr. Natarajan.    5.)  Obstructive sleep apnea:  He has known history of obstructive sleep apnea but lost his CPAP machine because of poor compliance.  He would require repeat diagnostic sleep study to get approval for new CPAP machine for him      This dictation was done by dragon, computer voice recognition software.  Often unanticipated grammatical, syntax, [place] phones and other interpretive errors are inadvertently transcribed.  Please excuse errors that have escaped final proofreading.    Sonu Arzola, DO  [facility] (PAT)          
that have escaped final proofreading.    PHIL Sawyer MD  [facility]

## 2024-03-20 ENCOUNTER — HOSPITAL ENCOUNTER (EMERGENCY)
Facility: HOSPITAL | Age: 49
Discharge: HOME | DRG: 198 | End: 2024-03-20
Attending: STUDENT IN AN ORGANIZED HEALTH CARE EDUCATION/TRAINING PROGRAM
Payer: MEDICAID

## 2024-03-20 VITALS
OXYGEN SATURATION: 94 % | DIASTOLIC BLOOD PRESSURE: 80 MMHG | TEMPERATURE: 98 F | HEIGHT: 76 IN | SYSTOLIC BLOOD PRESSURE: 135 MMHG | WEIGHT: 315 LBS | RESPIRATION RATE: 17 BRPM | HEART RATE: 86 BPM | BODY MASS INDEX: 38.36 KG/M2

## 2024-03-20 DIAGNOSIS — B34.9 VIRAL SYNDROME: Primary | ICD-10-CM

## 2024-03-20 LAB
ALBUMIN SERPL-MCNC: 3.4 G/DL (ref 3.5–5)
ALBUMIN/GLOB SERPL: 0.8 (ref 1.1–2.2)
ALP SERPL-CCNC: 83 U/L (ref 45–117)
ALT SERPL-CCNC: 60 U/L (ref 12–78)
ANION GAP SERPL CALC-SCNC: 3 MMOL/L (ref 5–15)
AST SERPL W P-5'-P-CCNC: ABNORMAL U/L (ref 15–37)
BASOPHILS # BLD: 0 K/UL (ref 0–0.1)
BASOPHILS NFR BLD: 0 % (ref 0–1)
BILIRUB SERPL-MCNC: 0.4 MG/DL (ref 0.2–1)
BUN SERPL-MCNC: 8 MG/DL (ref 6–20)
BUN/CREAT SERPL: 8 (ref 12–20)
CA-I BLD-MCNC: 8.8 MG/DL (ref 8.5–10.1)
CHLORIDE SERPL-SCNC: 106 MMOL/L (ref 97–108)
CO2 SERPL-SCNC: 30 MMOL/L (ref 21–32)
CREAT SERPL-MCNC: 1.03 MG/DL (ref 0.7–1.3)
DIFFERENTIAL METHOD BLD: ABNORMAL
EOSINOPHIL # BLD: 0.2 K/UL (ref 0–0.4)
EOSINOPHIL NFR BLD: 3 % (ref 0–7)
ERYTHROCYTE [DISTWIDTH] IN BLOOD BY AUTOMATED COUNT: 13.8 % (ref 11.5–14.5)
FLUAV AG NPH QL IA: NEGATIVE
FLUBV AG NOSE QL IA: NEGATIVE
GLOBULIN SER CALC-MCNC: 4.3 G/DL (ref 2–4)
GLUCOSE SERPL-MCNC: 104 MG/DL (ref 65–100)
HCT VFR BLD AUTO: 40.8 % (ref 36.6–50.3)
HGB BLD-MCNC: 12.9 G/DL (ref 12.1–17)
IMM GRANULOCYTES # BLD AUTO: 0 K/UL (ref 0–0.04)
IMM GRANULOCYTES NFR BLD AUTO: 0 % (ref 0–0.5)
LIPASE SERPL-CCNC: 26 U/L (ref 13–75)
LYMPHOCYTES # BLD: 1.3 K/UL (ref 0.8–3.5)
LYMPHOCYTES NFR BLD: 22 % (ref 12–49)
MCH RBC QN AUTO: 32.4 PG (ref 26–34)
MCHC RBC AUTO-ENTMCNC: 31.6 G/DL (ref 30–36.5)
MCV RBC AUTO: 102.5 FL (ref 80–99)
MONOCYTES # BLD: 0.4 K/UL (ref 0–1)
MONOCYTES NFR BLD: 7 % (ref 5–13)
NEUTS SEG # BLD: 3.9 K/UL (ref 1.8–8)
NEUTS SEG NFR BLD: 68 % (ref 32–75)
NRBC # BLD: 0 K/UL (ref 0–0.01)
NRBC BLD-RTO: 0 PER 100 WBC
PLATELET # BLD AUTO: 231 K/UL (ref 150–400)
PMV BLD AUTO: 11.9 FL (ref 8.9–12.9)
POTASSIUM SERPL-SCNC: ABNORMAL MMOL/L (ref 3.5–5.1)
PROT SERPL-MCNC: 7.7 G/DL (ref 6.4–8.2)
RBC # BLD AUTO: 3.98 M/UL (ref 4.1–5.7)
SARS-COV-2 RDRP RESP QL NAA+PROBE: NOT DETECTED
SODIUM SERPL-SCNC: 139 MMOL/L (ref 136–145)
WBC # BLD AUTO: 5.8 K/UL (ref 4.1–11.1)

## 2024-03-20 PROCEDURE — 87804 INFLUENZA ASSAY W/OPTIC: CPT

## 2024-03-20 PROCEDURE — 6370000000 HC RX 637 (ALT 250 FOR IP): Performed by: STUDENT IN AN ORGANIZED HEALTH CARE EDUCATION/TRAINING PROGRAM

## 2024-03-20 PROCEDURE — 80053 COMPREHEN METABOLIC PANEL: CPT

## 2024-03-20 PROCEDURE — 36415 COLL VENOUS BLD VENIPUNCTURE: CPT

## 2024-03-20 PROCEDURE — 83690 ASSAY OF LIPASE: CPT

## 2024-03-20 PROCEDURE — 96374 THER/PROPH/DIAG INJ IV PUSH: CPT

## 2024-03-20 PROCEDURE — 85025 COMPLETE CBC W/AUTO DIFF WBC: CPT

## 2024-03-20 PROCEDURE — 99284 EMERGENCY DEPT VISIT MOD MDM: CPT

## 2024-03-20 PROCEDURE — 96361 HYDRATE IV INFUSION ADD-ON: CPT

## 2024-03-20 PROCEDURE — 6360000002 HC RX W HCPCS: Performed by: STUDENT IN AN ORGANIZED HEALTH CARE EDUCATION/TRAINING PROGRAM

## 2024-03-20 PROCEDURE — 96375 TX/PRO/DX INJ NEW DRUG ADDON: CPT

## 2024-03-20 PROCEDURE — 2580000003 HC RX 258: Performed by: STUDENT IN AN ORGANIZED HEALTH CARE EDUCATION/TRAINING PROGRAM

## 2024-03-20 PROCEDURE — 87635 SARS-COV-2 COVID-19 AMP PRB: CPT

## 2024-03-20 RX ORDER — 0.9 % SODIUM CHLORIDE 0.9 %
250 INTRAVENOUS SOLUTION INTRAVENOUS ONCE
Status: COMPLETED | OUTPATIENT
Start: 2024-03-20 | End: 2024-03-20

## 2024-03-20 RX ORDER — ONDANSETRON 4 MG/1
4 TABLET, ORALLY DISINTEGRATING ORAL 3 TIMES DAILY PRN
Qty: 21 TABLET | Refills: 0 | Status: SHIPPED | OUTPATIENT
Start: 2024-03-20 | End: 2024-03-22

## 2024-03-20 RX ORDER — KETOROLAC TROMETHAMINE 15 MG/ML
15 INJECTION, SOLUTION INTRAMUSCULAR; INTRAVENOUS
Status: COMPLETED | OUTPATIENT
Start: 2024-03-20 | End: 2024-03-20

## 2024-03-20 RX ORDER — ONDANSETRON 2 MG/ML
4 INJECTION INTRAMUSCULAR; INTRAVENOUS ONCE
Status: COMPLETED | OUTPATIENT
Start: 2024-03-20 | End: 2024-03-20

## 2024-03-20 RX ORDER — ACETAMINOPHEN 500 MG
1000 TABLET ORAL
Status: COMPLETED | OUTPATIENT
Start: 2024-03-20 | End: 2024-03-20

## 2024-03-20 RX ADMIN — KETOROLAC TROMETHAMINE 15 MG: 15 INJECTION, SOLUTION INTRAMUSCULAR; INTRAVENOUS at 12:31

## 2024-03-20 RX ADMIN — SODIUM CHLORIDE 250 ML: 9 INJECTION, SOLUTION INTRAVENOUS at 12:32

## 2024-03-20 RX ADMIN — ACETAMINOPHEN 1000 MG: 500 TABLET ORAL at 12:37

## 2024-03-20 RX ADMIN — ONDANSETRON 4 MG: 2 INJECTION INTRAMUSCULAR; INTRAVENOUS at 12:31

## 2024-03-20 ASSESSMENT — PAIN SCALES - GENERAL
PAINLEVEL_OUTOF10: 8
PAINLEVEL_OUTOF10: 8
PAINLEVEL_OUTOF10: 4
PAINLEVEL_OUTOF10: 8
PAINLEVEL_OUTOF10: 8

## 2024-03-20 ASSESSMENT — LIFESTYLE VARIABLES
HOW MANY STANDARD DRINKS CONTAINING ALCOHOL DO YOU HAVE ON A TYPICAL DAY: 3 OR 4
HOW OFTEN DO YOU HAVE A DRINK CONTAINING ALCOHOL: 2-4 TIMES A MONTH

## 2024-03-20 ASSESSMENT — PAIN DESCRIPTION - LOCATION
LOCATION: ABDOMEN
LOCATION: ABDOMEN

## 2024-03-20 ASSESSMENT — PAIN - FUNCTIONAL ASSESSMENT
PAIN_FUNCTIONAL_ASSESSMENT: 0-10
PAIN_FUNCTIONAL_ASSESSMENT: 0-10
PAIN_FUNCTIONAL_ASSESSMENT: ACTIVITIES ARE NOT PREVENTED

## 2024-03-20 NOTE — ED PROVIDER NOTES
Discussed results, patient stable for discharge [BQ]      ED Course User Index  [BQ] Angel Gómez MD       Records Reviewed (source and summary of external notes): Prior medical records and Nursing notes    SEPSIS Reassessment: Sepsis reassessment not applicable    Clinical Management Tools:  Not Applicable    Vitals:    Vitals:    03/20/24 1100 03/20/24 1320 03/20/24 1424   BP: (!) 147/80 127/82 135/80   Pulse: 99 91 86   Resp: 18 18 17   Temp: 98.4 °F (36.9 °C) 98.4 °F (36.9 °C) 98 °F (36.7 °C)   TempSrc: Oral Oral Oral   SpO2: 95% 96% 94%   Weight: (!) 176.9 kg (390 lb)     Height: 1.93 m (6' 4\")          Patient was given the following medications:  Medications   sodium chloride 0.9 % bolus 250 mL (0 mLs IntraVENous Stopped 3/20/24 1320)   ondansetron (ZOFRAN) injection 4 mg (4 mg IntraVENous Given 3/20/24 1231)   ketorolac (TORADOL) injection 15 mg (15 mg IntraVENous Given 3/20/24 1231)   acetaminophen (TYLENOL) tablet 1,000 mg (1,000 mg Oral Given 3/20/24 1237)       CONSULTS: (Who and What was discussed)  None     Social Determinants affecting Dx or Tx: None    PROCEDURES   Unless otherwise noted above, none  Procedures      CRITICAL CARE TIME   Patient does not meet Critical Care Time, 0 minutes    ED FINAL IMPRESSION     1. Viral syndrome          DISPOSITION/PLAN   DISPOSITION Decision To Discharge 03/20/2024 02:16:30 PM    Discharge Note: The patient is stable for discharge home. The signs, symptoms, diagnosis, and discharge instructions have been discussed, understanding conveyed, and agreed upon. The patient is to follow up as recommended or return to ER should their symptoms worsen.      PATIENT REFERRED TO:  Joel Ramos MD  [address] [facility] [address] [phone]    In 2 days          DISCHARGE MEDICATIONS:     Medication List        START taking these medications      ondansetron 4 MG disintegrating tablet  Commonly known as: ZOFRAN-ODT  Take 1 tablet by mouth 3 times  daily as needed for Nausea or Vomiting            ASK your doctor about these medications      aspirin 81 MG chewable tablet     atorvastatin 40 MG tablet  Commonly known as: LIPITOR     budesonide-formoterol 160-4.5 MCG/ACT Aero  Commonly known as: SYMBICORT  Inhale 2 puffs into the lungs in the morning and 2 puffs in the evening.     celecoxib 200 MG capsule  Commonly known as: CELEBREX  Take 1 capsule by mouth 2 times daily     empagliflozin 10 MG tablet  Commonly known as: JARDIANCE  Take 1 tablet by mouth daily     furosemide 40 MG tablet  Commonly known as: Lasix  Take 1 tablet by mouth daily     gabapentin 600 MG tablet  Commonly known as: NEURONTIN     guaiFENesin 600 MG extended release tablet  Commonly known as: MUCINEX  Take 1 tablet by mouth 2 times daily     ipratropium 0.5 mg-albuterol 2.5 mg 0.5-2.5 (3) MG/3ML Soln nebulizer solution  Commonly known as: DUONEB  Inhale 3 mLs into the lungs every 4 hours as needed for Shortness of Breath     levothyroxine 50 MCG tablet  Commonly known as: SYNTHROID  Take 1 tablet by mouth every morning (before breakfast)     metFORMIN 500 MG tablet  Commonly known as: GLUCOPHAGE     metoprolol succinate 25 MG extended release tablet  Commonly known as: TOPROL XL  Take 1 tablet by mouth daily     pantoprazole 40 MG tablet  Commonly known as: PROTONIX  Take 1 tablet by mouth every morning (before breakfast)     potassium bicarb-citric acid 20 MEQ Tbef effervescent tablet  Commonly known as: EFFER-K  Take 1 tablet by mouth daily     rivaroxaban 10 MG Tabs tablet  Commonly known as: XARELTO  Take 1 tablet by mouth daily     traZODone 100 MG tablet  Commonly known as: DESYREL  Take 2 tablets by mouth nightly               Where to Get Your Medications        These medications were sent to [id number] - [facility] [address] [facility] - P [phone] - F [phone]  [address] [facility] [address]      Phone: [phone]

## 2024-03-22 ENCOUNTER — APPOINTMENT (OUTPATIENT)
Facility: HOSPITAL | Age: 49
DRG: 198 | End: 2024-03-22
Payer: MEDICAID

## 2024-03-22 ENCOUNTER — HOSPITAL ENCOUNTER (INPATIENT)
Facility: HOSPITAL | Age: 49
LOS: 1 days | Discharge: HOME | DRG: 198 | End: 2024-03-23
Attending: EMERGENCY MEDICINE | Admitting: HOSPITALIST
Payer: MEDICAID

## 2024-03-22 DIAGNOSIS — I24.9 ACS (ACUTE CORONARY SYNDROME) (HCC): Primary | ICD-10-CM

## 2024-03-22 LAB
ALBUMIN SERPL-MCNC: 3.7 G/DL (ref 3.5–5)
ALBUMIN/GLOB SERPL: 0.9 (ref 1.1–2.2)
ALP SERPL-CCNC: 86 U/L (ref 45–117)
ALT SERPL-CCNC: 61 U/L (ref 12–78)
ANION GAP SERPL CALC-SCNC: 5 MMOL/L (ref 5–15)
AST SERPL W P-5'-P-CCNC: 34 U/L (ref 15–37)
BASOPHILS # BLD: 0 K/UL (ref 0–0.1)
BASOPHILS NFR BLD: 1 % (ref 0–1)
BILIRUB SERPL-MCNC: 0.3 MG/DL (ref 0.2–1)
BNP SERPL-MCNC: 87 PG/ML
BUN SERPL-MCNC: 12 MG/DL (ref 6–20)
BUN/CREAT SERPL: 11 (ref 12–20)
CA-I BLD-MCNC: 8.7 MG/DL (ref 8.5–10.1)
CHLORIDE SERPL-SCNC: 103 MMOL/L (ref 97–108)
CO2 SERPL-SCNC: 31 MMOL/L (ref 21–32)
CREAT SERPL-MCNC: 1.09 MG/DL (ref 0.7–1.3)
DIFFERENTIAL METHOD BLD: ABNORMAL
EKG ATRIAL RATE: 90 BPM
EKG DIAGNOSIS: NORMAL
EKG P AXIS: 24 DEGREES
EKG P-R INTERVAL: 194 MS
EKG Q-T INTERVAL: 418 MS
EKG QRS DURATION: 102 MS
EKG QTC CALCULATION (BAZETT): 511 MS
EKG R AXIS: 15 DEGREES
EKG T AXIS: 35 DEGREES
EKG VENTRICULAR RATE: 90 BPM
EOSINOPHIL # BLD: 0.2 K/UL (ref 0–0.4)
EOSINOPHIL NFR BLD: 3 % (ref 0–7)
ERYTHROCYTE [DISTWIDTH] IN BLOOD BY AUTOMATED COUNT: 13.7 % (ref 11.5–14.5)
GLOBULIN SER CALC-MCNC: 4.3 G/DL (ref 2–4)
GLUCOSE BLD STRIP.AUTO-MCNC: 116 MG/DL (ref 65–100)
GLUCOSE BLD STRIP.AUTO-MCNC: 134 MG/DL (ref 65–100)
GLUCOSE SERPL-MCNC: 146 MG/DL (ref 65–100)
HCT VFR BLD AUTO: 40.7 % (ref 36.6–50.3)
HGB BLD-MCNC: 13 G/DL (ref 12.1–17)
IMM GRANULOCYTES # BLD AUTO: 0 K/UL (ref 0–0.04)
IMM GRANULOCYTES NFR BLD AUTO: 0 % (ref 0–0.5)
LYMPHOCYTES # BLD: 1.2 K/UL (ref 0.8–3.5)
LYMPHOCYTES NFR BLD: 20 % (ref 12–49)
MCH RBC QN AUTO: 31.9 PG (ref 26–34)
MCHC RBC AUTO-ENTMCNC: 31.9 G/DL (ref 30–36.5)
MCV RBC AUTO: 99.8 FL (ref 80–99)
MONOCYTES # BLD: 0.4 K/UL (ref 0–1)
MONOCYTES NFR BLD: 6 % (ref 5–13)
NEUTS SEG # BLD: 4.4 K/UL (ref 1.8–8)
NEUTS SEG NFR BLD: 70 % (ref 32–75)
NRBC # BLD: 0 K/UL (ref 0–0.01)
NRBC BLD-RTO: 0 PER 100 WBC
PERFORMED BY:: ABNORMAL
PERFORMED BY:: ABNORMAL
PLATELET # BLD AUTO: 193 K/UL (ref 150–400)
PMV BLD AUTO: 9.7 FL (ref 8.9–12.9)
POTASSIUM SERPL-SCNC: 3.8 MMOL/L (ref 3.5–5.1)
PROT SERPL-MCNC: 8 G/DL (ref 6.4–8.2)
RBC # BLD AUTO: 4.08 M/UL (ref 4.1–5.7)
SODIUM SERPL-SCNC: 139 MMOL/L (ref 136–145)
TROPONIN I SERPL HS-MCNC: 7 NG/L (ref 0–76)
TROPONIN I SERPL HS-MCNC: 8 NG/L (ref 0–76)
TROPONIN I SERPL HS-MCNC: 8 NG/L (ref 0–76)
WBC # BLD AUTO: 6.3 K/UL (ref 4.1–11.1)

## 2024-03-22 PROCEDURE — 6370000000 HC RX 637 (ALT 250 FOR IP): Performed by: HOSPITALIST

## 2024-03-22 PROCEDURE — 99285 EMERGENCY DEPT VISIT HI MDM: CPT

## 2024-03-22 PROCEDURE — 84439 ASSAY OF FREE THYROXINE: CPT

## 2024-03-22 PROCEDURE — 84443 ASSAY THYROID STIM HORMONE: CPT

## 2024-03-22 PROCEDURE — 2580000003 HC RX 258: Performed by: HOSPITALIST

## 2024-03-22 PROCEDURE — 6370000000 HC RX 637 (ALT 250 FOR IP): Performed by: EMERGENCY MEDICINE

## 2024-03-22 PROCEDURE — 84484 ASSAY OF TROPONIN QUANT: CPT

## 2024-03-22 PROCEDURE — 71045 X-RAY EXAM CHEST 1 VIEW: CPT

## 2024-03-22 PROCEDURE — 83880 ASSAY OF NATRIURETIC PEPTIDE: CPT

## 2024-03-22 PROCEDURE — 96374 THER/PROPH/DIAG INJ IV PUSH: CPT

## 2024-03-22 PROCEDURE — 85025 COMPLETE CBC W/AUTO DIFF WBC: CPT

## 2024-03-22 PROCEDURE — 36415 COLL VENOUS BLD VENIPUNCTURE: CPT

## 2024-03-22 PROCEDURE — 6360000002 HC RX W HCPCS: Performed by: HOSPITALIST

## 2024-03-22 PROCEDURE — 94761 N-INVAS EAR/PLS OXIMETRY MLT: CPT

## 2024-03-22 PROCEDURE — 6360000002 HC RX W HCPCS: Performed by: EMERGENCY MEDICINE

## 2024-03-22 PROCEDURE — 93005 ELECTROCARDIOGRAM TRACING: CPT | Performed by: EMERGENCY MEDICINE

## 2024-03-22 PROCEDURE — 82962 GLUCOSE BLOOD TEST: CPT

## 2024-03-22 PROCEDURE — 2060000000 HC ICU INTERMEDIATE R&B

## 2024-03-22 PROCEDURE — 80053 COMPREHEN METABOLIC PANEL: CPT

## 2024-03-22 PROCEDURE — 94640 AIRWAY INHALATION TREATMENT: CPT

## 2024-03-22 RX ORDER — MAGNESIUM SULFATE IN WATER 40 MG/ML
2000 INJECTION, SOLUTION INTRAVENOUS PRN
Status: DISCONTINUED | OUTPATIENT
Start: 2024-03-22 | End: 2024-03-23 | Stop reason: HOSPADM

## 2024-03-22 RX ORDER — IPRATROPIUM BROMIDE AND ALBUTEROL SULFATE 2.5; .5 MG/3ML; MG/3ML
1 SOLUTION RESPIRATORY (INHALATION) EVERY 4 HOURS PRN
Status: DISCONTINUED | OUTPATIENT
Start: 2024-03-22 | End: 2024-03-22

## 2024-03-22 RX ORDER — MAGNESIUM HYDROXIDE/ALUMINUM HYDROXICE/SIMETHICONE 120; 1200; 1200 MG/30ML; MG/30ML; MG/30ML
30 SUSPENSION ORAL EVERY 6 HOURS PRN
Status: DISCONTINUED | OUTPATIENT
Start: 2024-03-22 | End: 2024-03-23 | Stop reason: HOSPADM

## 2024-03-22 RX ORDER — ACETAMINOPHEN 325 MG/1
650 TABLET ORAL EVERY 6 HOURS PRN
Status: DISCONTINUED | OUTPATIENT
Start: 2024-03-22 | End: 2024-03-23 | Stop reason: HOSPADM

## 2024-03-22 RX ORDER — SODIUM CHLORIDE 0.9 % (FLUSH) 0.9 %
5-40 SYRINGE (ML) INJECTION EVERY 12 HOURS SCHEDULED
Status: DISCONTINUED | OUTPATIENT
Start: 2024-03-22 | End: 2024-03-23 | Stop reason: HOSPADM

## 2024-03-22 RX ORDER — MORPHINE SULFATE 2 MG/ML
1 INJECTION, SOLUTION INTRAMUSCULAR; INTRAVENOUS EVERY 4 HOURS PRN
Status: DISCONTINUED | OUTPATIENT
Start: 2024-03-22 | End: 2024-03-23

## 2024-03-22 RX ORDER — NITROGLYCERIN 0.4 MG/1
0.4 TABLET SUBLINGUAL EVERY 5 MIN PRN
Status: DISCONTINUED | OUTPATIENT
Start: 2024-03-22 | End: 2024-03-23 | Stop reason: HOSPADM

## 2024-03-22 RX ORDER — ASPIRIN 325 MG
325 TABLET ORAL ONCE
Status: COMPLETED | OUTPATIENT
Start: 2024-03-22 | End: 2024-03-22

## 2024-03-22 RX ORDER — ONDANSETRON 4 MG/1
4 TABLET, ORALLY DISINTEGRATING ORAL EVERY 8 HOURS PRN
Status: DISCONTINUED | OUTPATIENT
Start: 2024-03-22 | End: 2024-03-23 | Stop reason: HOSPADM

## 2024-03-22 RX ORDER — BUMETANIDE 2 MG/1
2 TABLET ORAL DAILY
Status: ON HOLD | COMMUNITY
Start: 2024-03-11

## 2024-03-22 RX ORDER — MORPHINE SULFATE 2 MG/ML
2 INJECTION, SOLUTION INTRAMUSCULAR; INTRAVENOUS
Status: COMPLETED | OUTPATIENT
Start: 2024-03-22 | End: 2024-03-22

## 2024-03-22 RX ORDER — INSULIN LISPRO 100 [IU]/ML
0-4 INJECTION, SOLUTION INTRAVENOUS; SUBCUTANEOUS NIGHTLY
Status: DISCONTINUED | OUTPATIENT
Start: 2024-03-22 | End: 2024-03-23 | Stop reason: HOSPADM

## 2024-03-22 RX ORDER — ACETAMINOPHEN 650 MG/1
650 SUPPOSITORY RECTAL EVERY 6 HOURS PRN
Status: DISCONTINUED | OUTPATIENT
Start: 2024-03-22 | End: 2024-03-23 | Stop reason: HOSPADM

## 2024-03-22 RX ORDER — GABAPENTIN 300 MG/1
1200 CAPSULE ORAL NIGHTLY
Status: DISCONTINUED | OUTPATIENT
Start: 2024-03-22 | End: 2024-03-23 | Stop reason: HOSPADM

## 2024-03-22 RX ORDER — ONDANSETRON 2 MG/ML
4 INJECTION INTRAMUSCULAR; INTRAVENOUS EVERY 6 HOURS PRN
Status: DISCONTINUED | OUTPATIENT
Start: 2024-03-22 | End: 2024-03-23 | Stop reason: HOSPADM

## 2024-03-22 RX ORDER — GLUCAGON 1 MG/ML
1 KIT INJECTION PRN
Status: DISCONTINUED | OUTPATIENT
Start: 2024-03-22 | End: 2024-03-23 | Stop reason: HOSPADM

## 2024-03-22 RX ORDER — POTASSIUM CHLORIDE 20 MEQ/1
40 TABLET, EXTENDED RELEASE ORAL PRN
Status: DISCONTINUED | OUTPATIENT
Start: 2024-03-22 | End: 2024-03-23 | Stop reason: HOSPADM

## 2024-03-22 RX ORDER — METOPROLOL SUCCINATE 25 MG/1
25 TABLET, EXTENDED RELEASE ORAL DAILY
Status: DISCONTINUED | OUTPATIENT
Start: 2024-03-22 | End: 2024-03-23 | Stop reason: HOSPADM

## 2024-03-22 RX ORDER — ASPIRIN 81 MG/1
81 TABLET, CHEWABLE ORAL DAILY
Status: DISCONTINUED | OUTPATIENT
Start: 2024-03-22 | End: 2024-03-23 | Stop reason: HOSPADM

## 2024-03-22 RX ORDER — POLYETHYLENE GLYCOL 3350 17 G/17G
17 POWDER, FOR SOLUTION ORAL DAILY PRN
Status: DISCONTINUED | OUTPATIENT
Start: 2024-03-22 | End: 2024-03-23 | Stop reason: HOSPADM

## 2024-03-22 RX ORDER — DEXTROSE MONOHYDRATE 100 MG/ML
INJECTION, SOLUTION INTRAVENOUS CONTINUOUS PRN
Status: DISCONTINUED | OUTPATIENT
Start: 2024-03-22 | End: 2024-03-23 | Stop reason: HOSPADM

## 2024-03-22 RX ORDER — ENOXAPARIN SODIUM 100 MG/ML
40 INJECTION SUBCUTANEOUS 2 TIMES DAILY
Status: DISCONTINUED | OUTPATIENT
Start: 2024-03-22 | End: 2024-03-22

## 2024-03-22 RX ORDER — LEVOTHYROXINE SODIUM 0.07 MG/1
75 TABLET ORAL
Status: DISCONTINUED | OUTPATIENT
Start: 2024-03-23 | End: 2024-03-23 | Stop reason: HOSPADM

## 2024-03-22 RX ORDER — POTASSIUM CHLORIDE 7.45 MG/ML
10 INJECTION INTRAVENOUS PRN
Status: DISCONTINUED | OUTPATIENT
Start: 2024-03-22 | End: 2024-03-23 | Stop reason: HOSPADM

## 2024-03-22 RX ORDER — CITALOPRAM 40 MG/1
40 TABLET ORAL DAILY
Status: ON HOLD | COMMUNITY

## 2024-03-22 RX ORDER — BUDESONIDE AND FORMOTEROL FUMARATE DIHYDRATE 160; 4.5 UG/1; UG/1
2 AEROSOL RESPIRATORY (INHALATION)
Status: DISCONTINUED | OUTPATIENT
Start: 2024-03-22 | End: 2024-03-23 | Stop reason: HOSPADM

## 2024-03-22 RX ORDER — ATORVASTATIN CALCIUM 40 MG/1
40 TABLET, FILM COATED ORAL DAILY
Status: DISCONTINUED | OUTPATIENT
Start: 2024-03-22 | End: 2024-03-23 | Stop reason: HOSPADM

## 2024-03-22 RX ORDER — ALBUTEROL SULFATE 90 UG/1
2 AEROSOL, METERED RESPIRATORY (INHALATION) EVERY 6 HOURS PRN
Status: ON HOLD | COMMUNITY

## 2024-03-22 RX ORDER — INSULIN LISPRO 100 [IU]/ML
0-8 INJECTION, SOLUTION INTRAVENOUS; SUBCUTANEOUS
Status: DISCONTINUED | OUTPATIENT
Start: 2024-03-22 | End: 2024-03-23 | Stop reason: HOSPADM

## 2024-03-22 RX ORDER — PANTOPRAZOLE SODIUM 40 MG/1
40 TABLET, DELAYED RELEASE ORAL
Status: DISCONTINUED | OUTPATIENT
Start: 2024-03-23 | End: 2024-03-23 | Stop reason: HOSPADM

## 2024-03-22 RX ORDER — FUROSEMIDE 40 MG/1
40 TABLET ORAL DAILY
Status: DISCONTINUED | OUTPATIENT
Start: 2024-03-22 | End: 2024-03-23 | Stop reason: HOSPADM

## 2024-03-22 RX ORDER — SODIUM CHLORIDE 9 MG/ML
INJECTION, SOLUTION INTRAVENOUS PRN
Status: DISCONTINUED | OUTPATIENT
Start: 2024-03-22 | End: 2024-03-23 | Stop reason: HOSPADM

## 2024-03-22 RX ORDER — SODIUM CHLORIDE 0.9 % (FLUSH) 0.9 %
5-40 SYRINGE (ML) INJECTION PRN
Status: DISCONTINUED | OUTPATIENT
Start: 2024-03-22 | End: 2024-03-23 | Stop reason: HOSPADM

## 2024-03-22 RX ADMIN — TRAZODONE HYDROCHLORIDE 200 MG: 150 TABLET ORAL at 21:46

## 2024-03-22 RX ADMIN — NITROGLYCERIN 1 INCH: 20 OINTMENT TOPICAL at 09:02

## 2024-03-22 RX ADMIN — Medication 2 PUFF: at 13:07

## 2024-03-22 RX ADMIN — ASPIRIN 325 MG: 325 TABLET ORAL at 09:03

## 2024-03-22 RX ADMIN — Medication 2 PUFF: at 20:36

## 2024-03-22 RX ADMIN — MORPHINE SULFATE 1 MG: 2 INJECTION, SOLUTION INTRAMUSCULAR; INTRAVENOUS at 21:46

## 2024-03-22 RX ADMIN — GABAPENTIN 1200 MG: 300 CAPSULE ORAL at 21:46

## 2024-03-22 RX ADMIN — SODIUM CHLORIDE, PRESERVATIVE FREE 10 ML: 5 INJECTION INTRAVENOUS at 21:51

## 2024-03-22 RX ADMIN — RIVAROXABAN 10 MG: 10 TABLET, FILM COATED ORAL at 21:46

## 2024-03-22 RX ADMIN — SODIUM CHLORIDE, PRESERVATIVE FREE 10 ML: 5 INJECTION INTRAVENOUS at 12:41

## 2024-03-22 RX ADMIN — MORPHINE SULFATE 2 MG: 2 INJECTION, SOLUTION INTRAMUSCULAR; INTRAVENOUS at 11:17

## 2024-03-22 ASSESSMENT — PAIN DESCRIPTION - LOCATION
LOCATION: CHEST
LOCATION: ABDOMEN;LEG;ARM
LOCATION: CHEST
LOCATION: CHEST

## 2024-03-22 ASSESSMENT — PAIN SCALES - GENERAL
PAINLEVEL_OUTOF10: 8
PAINLEVEL_OUTOF10: 7
PAINLEVEL_OUTOF10: 8

## 2024-03-22 ASSESSMENT — PAIN - FUNCTIONAL ASSESSMENT
PAIN_FUNCTIONAL_ASSESSMENT: ACTIVITIES ARE NOT PREVENTED
PAIN_FUNCTIONAL_ASSESSMENT: 0-10

## 2024-03-22 ASSESSMENT — HEART SCORE: ECG: NON-SPECIFC REPOLARIZATION DISTURBANCE/LBTB/PM

## 2024-03-22 ASSESSMENT — PAIN DESCRIPTION - DESCRIPTORS: DESCRIPTORS: OTHER (COMMENT)

## 2024-03-22 ASSESSMENT — LIFESTYLE VARIABLES
HOW OFTEN DO YOU HAVE A DRINK CONTAINING ALCOHOL: MONTHLY OR LESS
HOW MANY STANDARD DRINKS CONTAINING ALCOHOL DO YOU HAVE ON A TYPICAL DAY: 3 OR 4

## 2024-03-22 NOTE — H&P
diastolic heart failure-well compensated, continue Lasix, check BNP    Morbid obesity/SAM-patient did not want to be on CPAP machine, weight loss would be beneficial    Hypothyroidism-history of thyroidectomy, check TSH free T4 continue home levothyroxine    Suspected COPD/asthma-continue home inhalers not in exacerbation    Previous CVA-already on aspirin/statins and Xarelto    DMT2-holding metformin continue Jardiance and sliding scale monitor BG      Discussion/MDM:- Discussion/MDM:  I have reviewed patient's presenting subjective and objective findings, as well as all laboratory studies, imaging studies, and vital signs to date as well as treatment rendered and patient's response to those treatments. Pt needs IV fluids with additives and Drug therapy requiring intensive monitoring for toxicity. In addition, prior medical, surgical and relevant social and family histories were reviewed. I have discussed patient's presentation/findings and clinical course to date with ED provider/nursing staff and agree with admission plan at this time. Patient with multiple medical comorbidities, each with high likelihood for morbidity and mortality if left untreated.  Given the patient's current clinical presentation, I have a high level of concern for decompensation if discharged from the emergency department and that patient warrants admission to hospital.       AD FC  DVT Prx -Xarelto  NOK -he did not specify  Social Determinants of Health-none    Disposition-likely home 24-48 H         Current Facility-Administered Medications   Medication Dose Route Frequency    sodium chloride flush 0.9 % injection 5-40 mL  5-40 mL IntraVENous 2 times per day    sodium chloride flush 0.9 % injection 5-40 mL  5-40 mL IntraVENous PRN    0.9 % sodium chloride infusion   IntraVENous PRN    potassium chloride (KLOR-CON M) extended release tablet 40 mEq  40 mEq Oral PRN    Or    potassium bicarb-citric acid (EFFER-K) effervescent tablet 40 mEq   40 mEq Oral PRN    Or    potassium chloride 10 mEq/100 mL IVPB (Peripheral Line)  10 mEq IntraVENous PRN    magnesium sulfate 2000 mg in 50 mL IVPB premix  2,000 mg IntraVENous PRN    ondansetron (ZOFRAN-ODT) disintegrating tablet 4 mg  4 mg Oral Q8H PRN    Or    ondansetron (ZOFRAN) injection 4 mg  4 mg IntraVENous Q6H PRN    polyethylene glycol (GLYCOLAX) packet 17 g  17 g Oral Daily PRN    acetaminophen (TYLENOL) tablet 650 mg  650 mg Oral Q6H PRN    Or    acetaminophen (TYLENOL) suppository 650 mg  650 mg Rectal Q6H PRN    aluminum & magnesium hydroxide-simethicone (MAALOX) 200-200-20 MG/5ML suspension 30 mL  30 mL Oral Q6H PRN    budesonide-formoterol (SYMBICORT) 160-4.5 MCG/ACT inhaler 2 puff  2 puff Inhalation BID RT    empagliflozin (JARDIANCE) tablet 10 mg  10 mg Oral Daily    furosemide (LASIX) tablet 40 mg  40 mg Oral Daily    [START ON 3/23/2024] levothyroxine (SYNTHROID) tablet 75 mcg  75 mcg Oral QAM AC    metoprolol succinate (TOPROL XL) extended release tablet 25 mg  25 mg Oral Daily    [START ON 3/23/2024] pantoprazole (PROTONIX) tablet 40 mg  40 mg Oral QAM AC    rivaroxaban (XARELTO) tablet 10 mg  10 mg Oral Daily    traZODone (DESYREL) tablet 200 mg  200 mg Oral Nightly    aspirin chewable tablet 81 mg  81 mg Oral Daily    atorvastatin (LIPITOR) tablet 40 mg  40 mg Oral Daily    gabapentin (NEURONTIN) capsule 1,200 mg  1,200 mg Oral Nightly    glucose chewable tablet 16 g  4 tablet Oral PRN    dextrose bolus 10% 125 mL  125 mL IntraVENous PRN    Or    dextrose bolus 10% 250 mL  250 mL IntraVENous PRN    glucagon injection 1 mg  1 mg SubCUTAneous PRN    dextrose 10 % infusion   IntraVENous Continuous PRN    insulin lispro (HUMALOG) injection vial 0-8 Units  0-8 Units SubCUTAneous TID WC    insulin lispro (HUMALOG) injection vial 0-4 Units  0-4 Units SubCUTAneous Nightly    nitroGLYCERIN (NITROSTAT) SL tablet 0.4 mg  0.4 mg SubLINGual Q5 Min PRN    morphine (PF) injection 1 mg  1 mg IntraVENous Q4H

## 2024-03-22 NOTE — ED PROVIDER NOTES
[facility] EMERGENCY DEPT  EMERGENCY DEPARTMENT HISTORY AND PHYSICAL EXAM      Date: 3/22/2024  Patient Name: Ye Joseph  MRN: [id number]  Birthdate 1975  Date of evaluation: 3/22/2024  Provider: Chivo Quintanilla MD   Note Started: 8:39 AM EDT 3/22/24    HISTORY OF PRESENT ILLNESS     Chief Complaint   Patient presents with    Chest Pain    Shortness of Breath       History Provided By: Patient    HPI: Ye Joseph is a 48 y.o. male with known past medical history significant for cardiac disease presents with left-sided chest pain when he woke up approximate hour prior to arrival.  No exacerbating or relieving factors he has not treated with anything and it has been constant.  Says it feels a little bit like his last heart attack.  Denies any fever, chills, rash, diarrhea, headache, night sweats.  Symptoms are mild to moderate constant at this time    PAST MEDICAL HISTORY   Past Medical History:  Past Medical History:   Diagnosis Date    Acute deep vein thrombosis (DVT) (HCC)     behind left knee    Acute MI (HCC)     COPD (chronic obstructive pulmonary disease) (HCC)     Depression     Hypertension     Ill-defined condition     Stroke (HCC)        Past Surgical History:  Past Surgical History:   Procedure Laterality Date    HERNIA REPAIR      IR FNA WITH IMAGING      ORTHOPEDIC SURGERY      rotator cuff surgery    OTHER SURGICAL HISTORY      Patent Foramen Ovale Repair    THYROIDECTOMY         Family History:  Family History   Problem Relation Age of Onset    Diabetes Maternal Uncle     Hypertension Maternal Uncle     Hypertension Paternal Uncle     Diabetes Paternal Uncle     Cancer Other     No Known Problems Father     Cancer Mother        Social History:  Social History     Tobacco Use    Smoking status: Former    Smokeless tobacco: Never   Substance Use Topics    Alcohol use: Yes    Drug use: Not Currently       Allergies:  Allergies   Allergen Reactions    Vancomycin Hives     Gabrielle  interpretation, imaging interpretation and separately billed procedures.  During this entire length of time I was immediately available to the patient.  Chivo Quintanilla MD      ED IMPRESSION     1. ACS (acute coronary syndrome) (HCC)          DISPOSITION/PLAN   DISPOSITION Admitted 03/22/2024 11:55:18 AM    Admit Note: Pt is being admitted by the hospitalist team. The results of their tests and reason(s) for their admission have been discussed with pt and/or available family. They convey agreement and understanding for the need to be admitted and for the admission diagnosis.     PATIENT REFERRED TO:  Joel Ramos MD  [address] [facility] [address] [phone]    Follow up in 1 week(s)  Posthospital discharge follow-up check TSH and free T4 along with CBC and BMP    Dustin Coleman MD  [address] [facility] [address] [phone]    Follow up in 2 week(s)  Posthospital discharge follow-up care        DISCHARGE MEDICATIONS:     Medication List        CONTINUE taking these medications      aspirin 81 MG chewable tablet     atorvastatin 40 MG tablet  Commonly known as: LIPITOR     budesonide-formoterol 160-4.5 MCG/ACT Aero  Commonly known as: SYMBICORT  Inhale 2 puffs into the lungs in the morning and 2 puffs in the evening.     bumetanide 2 MG tablet  Commonly known as: BUMEX     citalopram 40 MG tablet  Commonly known as: CELEXA     diclofenac sodium 1 % Gel  Commonly known as: VOLTAREN     empagliflozin 10 MG tablet  Commonly known as: JARDIANCE  Take 1 tablet by mouth daily     gabapentin 600 MG tablet  Commonly known as: NEURONTIN     levothyroxine 50 MCG tablet  Commonly known as: SYNTHROID  Take 1 tablet by mouth every morning (before breakfast)     metFORMIN 500 MG tablet  Commonly known as: GLUCOPHAGE     metoprolol succinate 25 MG extended release tablet  Commonly known as: TOPROL XL  Take 1 tablet by mouth daily     potassium bicarb-citric acid 20 MEQ Tbef effervescent

## 2024-03-22 NOTE — ED NOTES
ED TO INPATIENT SBAR HANDOFF    Patient Name: Ye Joseph   Preferred Name: Ye  : 1975  48 y.o.   Family/Caregiver Present: no   Code Status Order: Full Code  PO Status: Regular, cardiac  Telemetry Order: Yes  C-SSRS: Risk of Suicide: No Risk  Sitter no   Restraints:   no  Sepsis Risk Score Sepsis Risk Score: 1.67    Situation  Chief Complaint   Patient presents with    Chest Pain    Shortness of Breath     Brief Description of Patient's Condition: Presents with chest pain and shortness of breath that started an hour prior to arrival to ER  Mental Status: oriented and alert  Arrived from:Home  Imaging:   XR CHEST PORTABLE   Final Result   Stable cardiomegaly. Clear lungs           Abnormal labs:   Abnormal Labs Reviewed   CBC WITH AUTO DIFFERENTIAL - Abnormal; Notable for the following components:       Result Value    RBC 4.08 (*)     MCV 99.8 (*)     All other components within normal limits   COMPREHENSIVE METABOLIC PANEL - Abnormal; Notable for the following components:    Glucose 146 (*)     Bun/Cre Ratio 11 (*)     Globulin 4.3 (*)     Albumin/Globulin Ratio 0.9 (*)     All other components within normal limits   POCT GLUCOSE - Abnormal; Notable for the following components:    POC Glucose 116 (*)     All other components within normal limits       Background  Allergies:   Allergies   Allergen Reactions    Vancomycin Hives     Gabrielle syndrome     History:   Past Medical History:   Diagnosis Date    Acute deep vein thrombosis (DVT) (HCC)     behind left knee    Acute MI (HCC)     COPD (chronic obstructive pulmonary disease) (HCC)     Depression     Hypertension     Ill-defined condition     Stroke (HCC)        Assessment  Vitals: MEWS Score: 0  Level of Consciousness: Alert (0)   Vitals:    24 1300 24 1330 24 1400 24 1430   BP: 127/79 116/66 126/75 135/80   Pulse: 77 91 87 87   Resp: 16 15 16 18   Temp: 98 °F (36.7 °C)   98.7 °F (37.1 °C)   TempSrc: Oral

## 2024-03-22 NOTE — ED TRIAGE NOTES
C/o chest pain and shortness of breath that began about an hour ago. Hx CHF, HTN, closure device in place due to having a hole in heart prior

## 2024-03-23 VITALS
DIASTOLIC BLOOD PRESSURE: 88 MMHG | WEIGHT: 315 LBS | HEART RATE: 102 BPM | RESPIRATION RATE: 18 BRPM | TEMPERATURE: 97.9 F | BODY MASS INDEX: 38.36 KG/M2 | SYSTOLIC BLOOD PRESSURE: 154 MMHG | OXYGEN SATURATION: 100 % | HEIGHT: 76 IN

## 2024-03-23 LAB
ANION GAP SERPL CALC-SCNC: 5 MMOL/L (ref 5–15)
BASOPHILS # BLD: 0 K/UL (ref 0–0.1)
BASOPHILS NFR BLD: 1 % (ref 0–1)
BUN SERPL-MCNC: 12 MG/DL (ref 6–20)
BUN/CREAT SERPL: 12 (ref 12–20)
CA-I BLD-MCNC: 8.7 MG/DL (ref 8.5–10.1)
CHLORIDE SERPL-SCNC: 102 MMOL/L (ref 97–108)
CO2 SERPL-SCNC: 30 MMOL/L (ref 21–32)
CREAT SERPL-MCNC: 1.03 MG/DL (ref 0.7–1.3)
DIFFERENTIAL METHOD BLD: ABNORMAL
EOSINOPHIL # BLD: 0.1 K/UL (ref 0–0.4)
EOSINOPHIL NFR BLD: 2 % (ref 0–7)
ERYTHROCYTE [DISTWIDTH] IN BLOOD BY AUTOMATED COUNT: 13.9 % (ref 11.5–14.5)
GLUCOSE BLD STRIP.AUTO-MCNC: 121 MG/DL (ref 65–100)
GLUCOSE BLD STRIP.AUTO-MCNC: 172 MG/DL (ref 65–100)
GLUCOSE SERPL-MCNC: 146 MG/DL (ref 65–100)
HCT VFR BLD AUTO: 39.8 % (ref 36.6–50.3)
HGB BLD-MCNC: 12.9 G/DL (ref 12.1–17)
IMM GRANULOCYTES # BLD AUTO: 0 K/UL (ref 0–0.04)
IMM GRANULOCYTES NFR BLD AUTO: 0 % (ref 0–0.5)
LYMPHOCYTES # BLD: 1.3 K/UL (ref 0.8–3.5)
LYMPHOCYTES NFR BLD: 22 % (ref 12–49)
MCH RBC QN AUTO: 32.4 PG (ref 26–34)
MCHC RBC AUTO-ENTMCNC: 32.4 G/DL (ref 30–36.5)
MCV RBC AUTO: 100 FL (ref 80–99)
MONOCYTES # BLD: 0.4 K/UL (ref 0–1)
MONOCYTES NFR BLD: 7 % (ref 5–13)
NEUTS SEG # BLD: 4.1 K/UL (ref 1.8–8)
NEUTS SEG NFR BLD: 68 % (ref 32–75)
NRBC # BLD: 0 K/UL (ref 0–0.01)
NRBC BLD-RTO: 0 PER 100 WBC
PERFORMED BY:: ABNORMAL
PERFORMED BY:: ABNORMAL
PLATELET # BLD AUTO: 184 K/UL (ref 150–400)
PMV BLD AUTO: 9.9 FL (ref 8.9–12.9)
POTASSIUM SERPL-SCNC: 3.5 MMOL/L (ref 3.5–5.1)
RBC # BLD AUTO: 3.98 M/UL (ref 4.1–5.7)
SODIUM SERPL-SCNC: 137 MMOL/L (ref 136–145)
TROPONIN I SERPL HS-MCNC: 9 NG/L (ref 0–76)
WBC # BLD AUTO: 6 K/UL (ref 4.1–11.1)

## 2024-03-23 PROCEDURE — 80048 BASIC METABOLIC PNL TOTAL CA: CPT

## 2024-03-23 PROCEDURE — 6370000000 HC RX 637 (ALT 250 FOR IP): Performed by: HOSPITALIST

## 2024-03-23 PROCEDURE — 36415 COLL VENOUS BLD VENIPUNCTURE: CPT

## 2024-03-23 PROCEDURE — 94640 AIRWAY INHALATION TREATMENT: CPT

## 2024-03-23 PROCEDURE — 2580000003 HC RX 258: Performed by: HOSPITALIST

## 2024-03-23 PROCEDURE — 85025 COMPLETE CBC W/AUTO DIFF WBC: CPT

## 2024-03-23 PROCEDURE — 82962 GLUCOSE BLOOD TEST: CPT

## 2024-03-23 PROCEDURE — 84484 ASSAY OF TROPONIN QUANT: CPT

## 2024-03-23 RX ADMIN — LEVOTHYROXINE SODIUM 75 MCG: 75 TABLET ORAL at 06:12

## 2024-03-23 RX ADMIN — ATORVASTATIN CALCIUM 40 MG: 40 TABLET, FILM COATED ORAL at 08:55

## 2024-03-23 RX ADMIN — POTASSIUM CHLORIDE 40 MEQ: 1500 TABLET, EXTENDED RELEASE ORAL at 09:07

## 2024-03-23 RX ADMIN — FUROSEMIDE 40 MG: 40 TABLET ORAL at 08:55

## 2024-03-23 RX ADMIN — PANTOPRAZOLE SODIUM 40 MG: 40 TABLET, DELAYED RELEASE ORAL at 06:12

## 2024-03-23 RX ADMIN — SODIUM CHLORIDE, PRESERVATIVE FREE 10 ML: 5 INJECTION INTRAVENOUS at 08:55

## 2024-03-23 RX ADMIN — Medication 2 PUFF: at 08:29

## 2024-03-23 RX ADMIN — ASPIRIN 81 MG: 81 TABLET, CHEWABLE ORAL at 08:54

## 2024-03-23 RX ADMIN — EMPAGLIFLOZIN 10 MG: 10 TABLET, FILM COATED ORAL at 08:55

## 2024-03-23 RX ADMIN — METOPROLOL SUCCINATE 25 MG: 25 TABLET, EXTENDED RELEASE ORAL at 08:55

## 2024-03-23 RX ADMIN — ACETAMINOPHEN 650 MG: 325 TABLET ORAL at 08:57

## 2024-03-23 ASSESSMENT — PAIN - FUNCTIONAL ASSESSMENT: PAIN_FUNCTIONAL_ASSESSMENT: ACTIVITIES ARE NOT PREVENTED

## 2024-03-23 ASSESSMENT — PAIN DESCRIPTION - LOCATION: LOCATION: CHEST;ARM

## 2024-03-23 ASSESSMENT — PAIN DESCRIPTION - ORIENTATION: ORIENTATION: LEFT

## 2024-03-23 ASSESSMENT — PAIN DESCRIPTION - DESCRIPTORS: DESCRIPTORS: PRESSURE

## 2024-03-23 ASSESSMENT — PAIN SCALES - GENERAL: PAINLEVEL_OUTOF10: 8

## 2024-03-23 NOTE — PLAN OF CARE
Problem: Discharge Planning  Goal: Discharge to home or other facility with appropriate resources  Outcome: Completed  Flowsheets (Taken 3/23/2024 6274)  Discharge to home or other facility with appropriate resources:   Identify barriers to discharge with patient and caregiver   Arrange for needed discharge resources and transportation as appropriate   Identify discharge learning needs (meds, wound care, etc)     Problem: Pain  Goal: Verbalizes/displays adequate comfort level or baseline comfort level  Outcome: Completed  Flowsheets (Taken 3/23/2024 5290)  Verbalizes/displays adequate comfort level or baseline comfort level:   Encourage patient to monitor pain and request assistance   Assess pain using appropriate pain scale      68

## 2024-03-23 NOTE — DISCHARGE INSTRUCTIONS
Diet-cardiac/ADA  Activity-slowly increase to levels before  Check TSH and free T4 at PCPs office  Accu-Cheks before every meal and at bedtime call PCP if less than 70 or more than 299  Return to emergency call ambulance if symptoms recur or get worse  Compliance is necessary to take medications as prescribed previously.

## 2024-03-23 NOTE — CARE COORDINATION
Transition of Care Plan:    RUR: 25%  Prior Level of Functioning: independent  Disposition: home  If SNF or IPR: Date FOC offered: N/A  Date FOC received: N/A  Accepting facility: N/A  Date authorization started with reference number: N/A  Date authorization received and expires: N/A  Follow up appointments: Yes  DME needed: None  Transportation at discharge: cab  IM/IMM Medicare/ letter given: N/A  Is patient a [place] and connected with [facility]?    If yes, was  transfer form completed and VA notified? N/A  Caregiver Contact: N/A  Discharge Caregiver contacted prior to discharge? N/A  Care Conference needed? No  Barriers to discharge: None

## 2024-03-23 NOTE — PROGRESS NOTES
3/23/2024        RE: Ye Joseph         [address] [place] [id number]          To Whom It May Concern,      Due to medical reasons, Ye Joseph may  may return to work on 3/25/24 .    Please excuse his absence from work from 3/22-3/24, as he was admitted to [facility].      Sincerely,          Akash Melgar RN

## 2024-03-23 NOTE — CONSULTS
Cardiology Consult    NAME: Ye Joseph   :  1975   MRN:  [id number]     Date/Time:  3/23/2024 1:13 PM    Patient PCP: Joel Ramos MD  ________________________________________________________________________    Problem List  -Admitted to the hospital with chest pain  -History of ASD closure per Amplatz device per Dr. Joel Diaz in [place]  -No known established coronary artery disease  -COPD  -Sleep apnea  -Former smoker  -Varicose veins           Assessment and Plan:   Note dictated 2024  -48-year-old white male well-known to me as he has established relationship with me in the office as a patient admitted to the hospital with chest pain  -He has Amplatz device secondary to ASD closure several years ago with no recent visit to the implanting physician.  -When I saw the patient he was sitting up in the bed and denies any symptoms of chest pain shortness of breath dizziness palpitation or any other anginal equivalent.  -Patient is wheezing with decreased breath sounds bilaterally all over the lung field.-Cardiac enzyme has been unremarkable and EKG shows no acute changes.  Telemetry reveals sinus rhythm and no acute ST-T wave changes consistent with significant ischemia or injury pattern or any cardiac arrhythmias.  -Based on my current clinical assessment I will take the liberty to offer him to go back to Dr. Joel Diaz for possible evaluation of ASD Amplatz device evaluation.  -Following discussion he request me to proceed with BREEZY for further evaluation of device.  -This can be done as an outpatient so patient can be discharged to follow-up with me in the office next week and then will schedule him for BREEZY to assess the status of Amplatz device across the interatrial septum.    Thank you for the consultation and letting me participate in the care of our mutual patient.      []        High complexity decision making was performed        Subjective:   CHIEF COMPLAINT:     Creatinine 1.03 0.70 - 1.30 mg/dL    Bun/Cre Ratio 12 12 - 20      Est, Glom Filt Rate >60 >60 ml/min/1.73m2    Calcium 8.7 8.5 - 10.1 mg/dL   CBC with Auto Differential    Collection Time: 03/23/24  7:10 AM   Result Value Ref Range    WBC 6.0 4.1 - 11.1 K/uL    RBC 3.98 (L) 4.10 - 5.70 M/uL    Hemoglobin 12.9 12.1 - 17.0 g/dL    Hematocrit 39.8 36.6 - 50.3 %    .0 (H) 80.0 - 99.0 FL    MCH 32.4 26.0 - 34.0 PG    MCHC 32.4 30.0 - 36.5 g/dL    RDW 13.9 11.5 - 14.5 %    Platelets 184 150 - 400 K/uL    MPV 9.9 8.9 - 12.9 FL    Nucleated RBCs 0.0 0.0  WBC    nRBC 0.00 0.00 - 0.01 K/uL    Neutrophils % 68 32 - 75 %    Lymphocytes % 22 12 - 49 %    Monocytes % 7 5 - 13 %    Eosinophils % 2 0 - 7 %    Basophils % 1 0 - 1 %    Immature Granulocytes 0 0 - 0.5 %    Neutrophils Absolute 4.1 1.8 - 8.0 K/UL    Lymphocytes Absolute 1.3 0.8 - 3.5 K/UL    Monocytes Absolute 0.4 0.0 - 1.0 K/UL    Eosinophils Absolute 0.1 0.0 - 0.4 K/UL    Basophils Absolute 0.0 0.0 - 0.1 K/UL    Absolute Immature Granulocyte 0.0 0.00 - 0.04 K/UL    Differential Type AUTOMATED     Troponin    Collection Time: 03/23/24  7:10 AM   Result Value Ref Range    Troponin, High Sensitivity 9 0 - 76 ng/L   POCT Glucose    Collection Time: 03/23/24  7:54 AM   Result Value Ref Range    POC Glucose 172 (H) 65 - 100 mg/dL    Performed by: VIVIAN CHAVEZ    POCT Glucose    Collection Time: 03/23/24 11:06 AM   Result Value Ref Range    POC Glucose 121 (H) 65 - 100 mg/dL    Performed by: VIVIAN RIOS MD

## 2024-03-23 NOTE — DISCHARGE SUMMARY
as: NEURONTIN     levothyroxine 50 MCG tablet  Commonly known as: SYNTHROID  Take 1 tablet by mouth every morning (before breakfast)     metFORMIN 500 MG tablet  Commonly known as: GLUCOPHAGE     metoprolol succinate 25 MG extended release tablet  Commonly known as: TOPROL XL  Take 1 tablet by mouth daily     potassium bicarb-citric acid 20 MEQ Tbef effervescent tablet  Commonly known as: EFFER-K  Take 1 tablet by mouth daily     rivaroxaban 10 MG Tabs tablet  Commonly known as: XARELTO  Take 1 tablet by mouth daily     traZODone 100 MG tablet  Commonly known as: DESYREL  Take 2 tablets by mouth nightly     Ventolin  (90 Base) MCG/ACT inhaler  Generic drug: albuterol sulfate HFA               Where to Get Your Medications        These medications were sent to [id number] - [facility] [address] [facility] - P [phone] - F [phone]  [address] [facility] [address]      Phone: [phone]   rivaroxaban 10 MG Tabs tablet           Patient Follow Up Instructions:   Diet-cardiac/ADA  Activity-slowly increase to levels before  Check TSH and free T4 at PCPs office  Accu-Cheks before every meal and at bedtime call PCP if less than 70 or more than 299  Return to emergency call ambulance if symptoms recur or get worse  Compliance is necessary to take medications as prescribed previously.    Joel Ramos MD  [address] [facility] [address] [phone]    Follow up in 1 week(s)  Posthospital discharge follow-up check TSH and free T4 along with CBC and BMP    Dustin Coleman MD  [address] [facility] [address] [phone]    Follow up in 2 week(s)  Posthospital discharge follow-up care     ________________________________________________________________      Condition at Discharge:  Stable  __________________________________________________________________    Disposition-Home if cleared by cardiology

## 2024-03-23 NOTE — CARE COORDINATION
03/23/24 1101   Service Assessment   Patient Orientation Alert and Oriented   Cognition Alert   History Provided By Patient   Primary Caregiver Self   Accompanied By/Relationship self   Support Systems Family Members   Patient's Healthcare Decision Maker is: Legal Next of Kin   PCP Verified by CM Yes   Last Visit to PCP Within last 3 months   Prior Functional Level Independent in ADLs/IADLs   Current Functional Level Independent in ADLs/IADLs   Can patient return to prior living arrangement Yes   Ability to make needs known: Good   Family able to assist with home care needs: No   Would you like for me to discuss the discharge plan with any other family members/significant others, and if so, who? Yes   Financial Resources None   Community Resources None   CM/SW Referral Other (see comment)   Social/Functional History   Lives With Alone     Pt resides at the [facility].     No DME or HH in place.     Pharmacy - [facility]    Advance Care Planning   Healthcare Decision Maker:    Ryan Lombardi (ex-wife)  Chilango Anaya (cousin) [phone]

## 2024-03-25 LAB
T4 FREE SERPL-MCNC: ABNORMAL NG/DL
TSH SERPL DL<=0.05 MIU/L-ACNC: 40 UIU/ML (ref 0.36–3.74)

## 2024-03-26 ENCOUNTER — TRANSCRIBE ORDERS (OUTPATIENT)
Facility: HOSPITAL | Age: 49
End: 2024-03-26

## 2024-03-26 DIAGNOSIS — R07.2 PRECORDIAL PAIN: Primary | ICD-10-CM

## 2024-03-29 ENCOUNTER — HOSPITAL ENCOUNTER (INPATIENT)
Facility: HOSPITAL | Age: 49
LOS: 6 days | Discharge: HOME | DRG: 045 | End: 2024-04-04
Attending: EMERGENCY MEDICINE | Admitting: INTERNAL MEDICINE
Payer: MEDICAID

## 2024-03-29 ENCOUNTER — APPOINTMENT (OUTPATIENT)
Facility: HOSPITAL | Age: 49
DRG: 045 | End: 2024-03-29
Payer: MEDICAID

## 2024-03-29 DIAGNOSIS — R52 PAIN: ICD-10-CM

## 2024-03-29 DIAGNOSIS — R07.9 ACUTE CHEST PAIN: ICD-10-CM

## 2024-03-29 DIAGNOSIS — I63.9 CEREBROVASCULAR ACCIDENT (CVA), UNSPECIFIED MECHANISM (HCC): Primary | ICD-10-CM

## 2024-03-29 LAB
ALBUMIN SERPL-MCNC: 3.8 G/DL (ref 3.5–5)
ALBUMIN/GLOB SERPL: 0.9 (ref 1.1–2.2)
ALP SERPL-CCNC: 87 U/L (ref 45–117)
ALT SERPL-CCNC: 77 U/L (ref 12–78)
ANION GAP SERPL CALC-SCNC: 3 MMOL/L (ref 5–15)
AST SERPL W P-5'-P-CCNC: 47 U/L (ref 15–37)
BASOPHILS # BLD: 0 K/UL (ref 0–0.1)
BASOPHILS NFR BLD: 1 % (ref 0–1)
BILIRUB SERPL-MCNC: 0.3 MG/DL (ref 0.2–1)
BUN SERPL-MCNC: 15 MG/DL (ref 6–20)
BUN/CREAT SERPL: 13 (ref 12–20)
CA-I BLD-MCNC: 9.2 MG/DL (ref 8.5–10.1)
CHLORIDE SERPL-SCNC: 101 MMOL/L (ref 97–108)
CO2 SERPL-SCNC: 34 MMOL/L (ref 21–32)
CREAT SERPL-MCNC: 1.2 MG/DL (ref 0.7–1.3)
DIFFERENTIAL METHOD BLD: NORMAL
EOSINOPHIL # BLD: 0.2 K/UL (ref 0–0.4)
EOSINOPHIL NFR BLD: 2 % (ref 0–7)
ERYTHROCYTE [DISTWIDTH] IN BLOOD BY AUTOMATED COUNT: 13.7 % (ref 11.5–14.5)
GLOBULIN SER CALC-MCNC: 4.3 G/DL (ref 2–4)
GLUCOSE BLD STRIP.AUTO-MCNC: 132 MG/DL (ref 65–100)
GLUCOSE SERPL-MCNC: 102 MG/DL (ref 65–100)
HCT VFR BLD AUTO: 41.7 % (ref 36.6–50.3)
HGB BLD-MCNC: 13.3 G/DL (ref 12.1–17)
IMM GRANULOCYTES # BLD AUTO: 0 K/UL (ref 0–0.04)
IMM GRANULOCYTES NFR BLD AUTO: 0 % (ref 0–0.5)
INR PPP: 0.9 (ref 0.9–1.1)
LYMPHOCYTES # BLD: 1.8 K/UL (ref 0.8–3.5)
LYMPHOCYTES NFR BLD: 23 % (ref 12–49)
MCH RBC QN AUTO: 31.6 PG (ref 26–34)
MCHC RBC AUTO-ENTMCNC: 31.9 G/DL (ref 30–36.5)
MCV RBC AUTO: 99 FL (ref 80–99)
MONOCYTES # BLD: 0.6 K/UL (ref 0–1)
MONOCYTES NFR BLD: 7 % (ref 5–13)
NEUTS SEG # BLD: 5.4 K/UL (ref 1.8–8)
NEUTS SEG NFR BLD: 67 % (ref 32–75)
NRBC # BLD: 0 K/UL (ref 0–0.01)
NRBC BLD-RTO: 0 PER 100 WBC
PERFORMED BY:: ABNORMAL
PLATELET # BLD AUTO: 220 K/UL (ref 150–400)
PMV BLD AUTO: 9.8 FL (ref 8.9–12.9)
POTASSIUM SERPL-SCNC: 3.8 MMOL/L (ref 3.5–5.1)
PROT SERPL-MCNC: 8.1 G/DL (ref 6.4–8.2)
PROTHROMBIN TIME: 12.7 SEC (ref 11.9–14.6)
RBC # BLD AUTO: 4.21 M/UL (ref 4.1–5.7)
SODIUM SERPL-SCNC: 138 MMOL/L (ref 136–145)
TROPONIN I SERPL HS-MCNC: 6 NG/L (ref 0–76)
WBC # BLD AUTO: 7.9 K/UL (ref 4.1–11.1)

## 2024-03-29 PROCEDURE — 2100000000 HC CCU R&B

## 2024-03-29 PROCEDURE — 84484 ASSAY OF TROPONIN QUANT: CPT

## 2024-03-29 PROCEDURE — 92977 HC THROMBOLYSIS/CORONARY: CPT

## 2024-03-29 PROCEDURE — 2000000000 HC ICU R&B

## 2024-03-29 PROCEDURE — 2580000003 HC RX 258: Performed by: EMERGENCY MEDICINE

## 2024-03-29 PROCEDURE — 85025 COMPLETE CBC W/AUTO DIFF WBC: CPT

## 2024-03-29 PROCEDURE — 70450 CT HEAD/BRAIN W/O DYE: CPT

## 2024-03-29 PROCEDURE — 36415 COLL VENOUS BLD VENIPUNCTURE: CPT

## 2024-03-29 PROCEDURE — 93005 ELECTROCARDIOGRAM TRACING: CPT | Performed by: INTERNAL MEDICINE

## 2024-03-29 PROCEDURE — 82962 GLUCOSE BLOOD TEST: CPT

## 2024-03-29 PROCEDURE — 6360000002 HC RX W HCPCS: Performed by: EMERGENCY MEDICINE

## 2024-03-29 PROCEDURE — 70498 CT ANGIOGRAPHY NECK: CPT

## 2024-03-29 PROCEDURE — 0042T CT BRAIN PERFUSION: CPT

## 2024-03-29 PROCEDURE — 80053 COMPREHEN METABOLIC PANEL: CPT

## 2024-03-29 PROCEDURE — 94761 N-INVAS EAR/PLS OXIMETRY MLT: CPT

## 2024-03-29 PROCEDURE — 6360000004 HC RX CONTRAST MEDICATION: Performed by: EMERGENCY MEDICINE

## 2024-03-29 PROCEDURE — 85610 PROTHROMBIN TIME: CPT

## 2024-03-29 PROCEDURE — 3E03317 INTRODUCTION OF OTHER THROMBOLYTIC INTO PERIPHERAL VEIN, PERCUTANEOUS APPROACH: ICD-10-PCS | Performed by: INTERNAL MEDICINE

## 2024-03-29 PROCEDURE — 99285 EMERGENCY DEPT VISIT HI MDM: CPT

## 2024-03-29 RX ORDER — SODIUM CHLORIDE 0.9 % (FLUSH) 0.9 %
10 SYRINGE (ML) INJECTION ONCE
Status: COMPLETED | OUTPATIENT
Start: 2024-03-29 | End: 2024-03-29

## 2024-03-29 RX ORDER — SODIUM CHLORIDE 9 MG/ML
INJECTION, SOLUTION INTRAVENOUS PRN
Status: DISCONTINUED | OUTPATIENT
Start: 2024-03-29 | End: 2024-04-04 | Stop reason: HOSPADM

## 2024-03-29 RX ORDER — BUDESONIDE AND FORMOTEROL FUMARATE DIHYDRATE 160; 4.5 UG/1; UG/1
2 AEROSOL RESPIRATORY (INHALATION)
Status: DISCONTINUED | OUTPATIENT
Start: 2024-03-29 | End: 2024-04-04 | Stop reason: HOSPADM

## 2024-03-29 RX ORDER — ALBUTEROL SULFATE 90 UG/1
2 AEROSOL, METERED RESPIRATORY (INHALATION) EVERY 6 HOURS PRN
Status: DISCONTINUED | OUTPATIENT
Start: 2024-03-29 | End: 2024-04-03

## 2024-03-29 RX ORDER — LEVOTHYROXINE SODIUM 0.03 MG/1
50 TABLET ORAL
Status: DISCONTINUED | OUTPATIENT
Start: 2024-03-30 | End: 2024-03-31

## 2024-03-29 RX ORDER — CITALOPRAM 20 MG/1
40 TABLET ORAL DAILY
Status: DISCONTINUED | OUTPATIENT
Start: 2024-03-30 | End: 2024-04-04 | Stop reason: HOSPADM

## 2024-03-29 RX ORDER — ATORVASTATIN CALCIUM 40 MG/1
40 TABLET, FILM COATED ORAL DAILY
Status: DISCONTINUED | OUTPATIENT
Start: 2024-03-30 | End: 2024-03-30

## 2024-03-29 RX ORDER — SODIUM CHLORIDE 0.9 % (FLUSH) 0.9 %
5-40 SYRINGE (ML) INJECTION EVERY 12 HOURS SCHEDULED
Status: DISCONTINUED | OUTPATIENT
Start: 2024-03-29 | End: 2024-04-04 | Stop reason: HOSPADM

## 2024-03-29 RX ORDER — SODIUM CHLORIDE 0.9 % (FLUSH) 0.9 %
5-40 SYRINGE (ML) INJECTION PRN
Status: DISCONTINUED | OUTPATIENT
Start: 2024-03-29 | End: 2024-04-04 | Stop reason: HOSPADM

## 2024-03-29 RX ADMIN — IOPAMIDOL 100 ML: 755 INJECTION, SOLUTION INTRAVENOUS at 18:26

## 2024-03-29 RX ADMIN — SODIUM CHLORIDE, PRESERVATIVE FREE 10 ML: 5 INJECTION INTRAVENOUS at 18:19

## 2024-03-29 RX ADMIN — SODIUM CHLORIDE, PRESERVATIVE FREE 10 ML: 5 INJECTION INTRAVENOUS at 18:21

## 2024-03-29 RX ADMIN — Medication 25 MG: at 18:20

## 2024-03-29 ASSESSMENT — PAIN DESCRIPTION - LOCATION: LOCATION: CHEST

## 2024-03-29 ASSESSMENT — PAIN SCALES - GENERAL: PAINLEVEL_OUTOF10: 9

## 2024-03-29 ASSESSMENT — PAIN - FUNCTIONAL ASSESSMENT: PAIN_FUNCTIONAL_ASSESSMENT: 0-10

## 2024-03-29 NOTE — ED TRIAGE NOTES
Presented for left sided face and left arm weakness. LKW [id number]. Seen by  in triage, level 1 initiated

## 2024-03-29 NOTE — ED PROVIDER NOTES
MPV 9.8 8.9 - 12.9 FL    Nucleated RBCs 0.0 0.0  WBC    nRBC 0.00 0.00 - 0.01 K/uL    Neutrophils % 67 32 - 75 %    Lymphocytes % 23 12 - 49 %    Monocytes % 7 5 - 13 %    Eosinophils % 2 0 - 7 %    Basophils % 1 0 - 1 %    Immature Granulocytes 0 0 - 0.5 %    Neutrophils Absolute 5.4 1.8 - 8.0 K/UL    Lymphocytes Absolute 1.8 0.8 - 3.5 K/UL    Monocytes Absolute 0.6 0.0 - 1.0 K/UL    Eosinophils Absolute 0.2 0.0 - 0.4 K/UL    Basophils Absolute 0.0 0.0 - 0.1 K/UL    Absolute Immature Granulocyte 0.0 0.00 - 0.04 K/UL    Differential Type AUTOMATED     Comprehensive Metabolic Panel    Collection Time: 03/29/24  6:13 PM   Result Value Ref Range    Sodium 138 136 - 145 mmol/L    Potassium 3.8 3.5 - 5.1 mmol/L    Chloride 101 97 - 108 mmol/L    CO2 34 (H) 21 - 32 mmol/L    Anion Gap 3 (L) 5 - 15 mmol/L    Glucose 102 (H) 65 - 100 mg/dL    BUN 15 6 - 20 mg/dL    Creatinine 1.20 0.70 - 1.30 mg/dL    Bun/Cre Ratio 13 12 - 20      Est, Glom Filt Rate 74 >60 ml/min/1.73m2    Calcium 9.2 8.5 - 10.1 mg/dL    Total Bilirubin 0.3 0.2 - 1.0 mg/dL    AST 47 (H) 15 - 37 U/L    ALT 77 12 - 78 U/L    Alk Phosphatase 87 45 - 117 U/L    Total Protein 8.1 6.4 - 8.2 g/dL    Albumin 3.8 3.5 - 5.0 g/dL    Globulin 4.3 (H) 2.0 - 4.0 g/dL    Albumin/Globulin Ratio 0.9 (L) 1.1 - 2.2     Protime-INR    Collection Time: 03/29/24  6:13 PM   Result Value Ref Range    Protime 12.7 11.9 - 14.6 sec    INR 0.9 0.9 - 1.1     Troponin    Collection Time: 03/29/24  6:13 PM   Result Value Ref Range    Troponin, High Sensitivity 6 0 - 76 ng/L       EKG:.See ED Course Below    Radiologic Studies:  Non-plain film images such as CT, Ultrasound and MRI are read by the radiologist. Plain radiographic images are visualized and preliminarily interpreted by the ED Provider with the following findings: Not Applicable.    Interpretation per the Radiologist below, if available at the time of this note:  CT HEAD WO CONTRAST   Final Result   1. No acute

## 2024-03-29 NOTE — H&P
Department of Internal Medicine  General Internal Medicine  Attending History and Physical      CHIEF COMPLAINT: Weakness on the left side hemiparesis    Reason for Admission: Acute CVA right MCA possible  Status post tPA  Morbid obesity  Shaun sleep apnea  Diabetes      History Obtained From:  patient, electronic medical record    HISTORY OF PRESENT ILLNESS:      The patient is a 48 y.o. male with significant past medical history of  who presents with weakness on the left side of the body that started this afternoon patient came to the emergency room had a CT of the head which did not show any abnormality patient got a tPA/TNK and consult was placed with teleneurology.  Denies any chest pain no shortness of breath no cough no chills no fever    Past Medical History:        Diagnosis Date    Acute deep vein thrombosis (DVT) (HCC)     behind left knee    Acute MI (HCC)     COPD (chronic obstructive pulmonary disease) (HCC)     Depression     Hypertension     Ill-defined condition     Stroke (HCC)      Past Surgical History:        Procedure Laterality Date    HERNIA REPAIR      IR FNA WITH IMAGING      ORTHOPEDIC SURGERY      rotator cuff surgery    OTHER SURGICAL HISTORY      Patent Foramen Ovale Repair    THYROIDECTOMY       Immunizations:                Influenza:  Indicated for current flu vaccination season Oct. to Feb.            Pneumococcal Polysaccharide:  Indicated for current flu vaccination season Oct. to Feb.    Medications Prior to Admission:    Not in a hospital admission.    Allergies:  Vancomycin    Social History:   TOBACCO:   reports that he has quit smoking. He has never used smokeless tobacco.  ETOH:   reports current alcohol use.    Family History:       Problem Relation Age of Onset    Diabetes Maternal Uncle     Hypertension Maternal Uncle     Hypertension Paternal Uncle     Diabetes Paternal Uncle     Cancer Other     No Known Problems Father     Cancer Mother      REVIEW OF

## 2024-03-30 ENCOUNTER — APPOINTMENT (OUTPATIENT)
Facility: HOSPITAL | Age: 49
DRG: 045 | End: 2024-03-30
Payer: MEDICAID

## 2024-03-30 LAB — MRSA DNA SPEC QL NAA+PROBE: NOT DETECTED

## 2024-03-30 PROCEDURE — 94761 N-INVAS EAR/PLS OXIMETRY MLT: CPT

## 2024-03-30 PROCEDURE — 97163 PT EVAL HIGH COMPLEX 45 MIN: CPT

## 2024-03-30 PROCEDURE — 94640 AIRWAY INHALATION TREATMENT: CPT

## 2024-03-30 PROCEDURE — 6370000000 HC RX 637 (ALT 250 FOR IP): Performed by: INTERNAL MEDICINE

## 2024-03-30 PROCEDURE — 97165 OT EVAL LOW COMPLEX 30 MIN: CPT

## 2024-03-30 PROCEDURE — 87641 MR-STAPH DNA AMP PROBE: CPT

## 2024-03-30 PROCEDURE — 99222 1ST HOSP IP/OBS MODERATE 55: CPT | Performed by: PSYCHIATRY & NEUROLOGY

## 2024-03-30 PROCEDURE — 2580000003 HC RX 258: Performed by: EMERGENCY MEDICINE

## 2024-03-30 PROCEDURE — 2000000000 HC ICU R&B

## 2024-03-30 PROCEDURE — 92610 EVALUATE SWALLOWING FUNCTION: CPT

## 2024-03-30 PROCEDURE — 6360000002 HC RX W HCPCS: Performed by: INTERNAL MEDICINE

## 2024-03-30 RX ORDER — CLOPIDOGREL BISULFATE 75 MG/1
75 TABLET ORAL DAILY
Status: DISCONTINUED | OUTPATIENT
Start: 2024-03-30 | End: 2024-04-04 | Stop reason: HOSPADM

## 2024-03-30 RX ORDER — OXYCODONE HYDROCHLORIDE 5 MG/1
5 TABLET ORAL EVERY 8 HOURS PRN
Status: DISCONTINUED | OUTPATIENT
Start: 2024-03-30 | End: 2024-04-04 | Stop reason: HOSPADM

## 2024-03-30 RX ORDER — LORAZEPAM 2 MG/ML
1 INJECTION INTRAMUSCULAR ONCE
Status: COMPLETED | OUTPATIENT
Start: 2024-03-30 | End: 2024-03-30

## 2024-03-30 RX ORDER — ATORVASTATIN CALCIUM 40 MG/1
80 TABLET, FILM COATED ORAL DAILY
Status: DISCONTINUED | OUTPATIENT
Start: 2024-03-31 | End: 2024-04-04 | Stop reason: HOSPADM

## 2024-03-30 RX ORDER — ATORVASTATIN CALCIUM 40 MG/1
40 TABLET, FILM COATED ORAL ONCE
Status: COMPLETED | OUTPATIENT
Start: 2024-03-30 | End: 2024-03-30

## 2024-03-30 RX ORDER — ASPIRIN 81 MG/1
81 TABLET, CHEWABLE ORAL DAILY
Status: DISCONTINUED | OUTPATIENT
Start: 2024-03-30 | End: 2024-04-04 | Stop reason: HOSPADM

## 2024-03-30 RX ADMIN — OXYCODONE 5 MG: 5 TABLET ORAL at 20:11

## 2024-03-30 RX ADMIN — ATORVASTATIN CALCIUM 40 MG: 40 TABLET, FILM COATED ORAL at 23:59

## 2024-03-30 RX ADMIN — Medication 2 PUFF: at 08:51

## 2024-03-30 RX ADMIN — SODIUM CHLORIDE, PRESERVATIVE FREE 10 ML: 5 INJECTION INTRAVENOUS at 10:03

## 2024-03-30 RX ADMIN — ASPIRIN 81 MG 81 MG: 81 TABLET ORAL at 23:59

## 2024-03-30 RX ADMIN — TRAZODONE HYDROCHLORIDE 200 MG: 150 TABLET ORAL at 20:11

## 2024-03-30 RX ADMIN — LEVOTHYROXINE SODIUM 50 MCG: 0.03 TABLET ORAL at 06:30

## 2024-03-30 RX ADMIN — LORAZEPAM 1 MG: 2 INJECTION INTRAMUSCULAR; INTRAVENOUS at 16:27

## 2024-03-30 RX ADMIN — SODIUM CHLORIDE, PRESERVATIVE FREE 10 ML: 5 INJECTION INTRAVENOUS at 20:21

## 2024-03-30 RX ADMIN — CITALOPRAM HYDROBROMIDE 40 MG: 20 TABLET ORAL at 10:03

## 2024-03-30 RX ADMIN — Medication 2 PUFF: at 20:04

## 2024-03-30 RX ADMIN — CLOPIDOGREL BISULFATE 75 MG: 75 TABLET ORAL at 23:58

## 2024-03-30 RX ADMIN — ATORVASTATIN CALCIUM 40 MG: 40 TABLET, FILM COATED ORAL at 10:03

## 2024-03-30 ASSESSMENT — PAIN SCALES - GENERAL
PAINLEVEL_OUTOF10: 0
PAINLEVEL_OUTOF10: 3
PAINLEVEL_OUTOF10: 4
PAINLEVEL_OUTOF10: 4
PAINLEVEL_OUTOF10: 0

## 2024-03-30 NOTE — CARE COORDINATION
03/30/24 1512   Readmission Assessment   Number of Days since last admission? 1-7 days   Previous Disposition Home Alone   Who is being Interviewed Patient   What was the patient's/caregiver's perception as to why they think they needed to return back to the hospital? Could not/did not make appointment with PCP;Did not understand their medication regimen   Did you visit your Primary Care Physician after you left the hospital, before you returned this time? No   Why weren't you able to visit your PCP? Did not have an appointment   Did you see a specialist, such as Cardiac, Pulmonary, Orthopedic Physician, etc. after you left the hospital? No   Who advised the patient to return to the hospital? Self-referral   Does the patient report anything that got in the way of taking their medications? Yes   What reasons did they give? No ability to  medications  (Cost of gas prevented patient from getting Rx)   In our efforts to provide the best possible care to you and others like you, can you think of anything that we could have done to help you after you left the hospital the first time, so that you might not have needed to return so soon? Arrange for more help when leaving the hospital;Teaching during hospitalization regarding your illness;Improved written discharge instructions;Additional Community resources available for illness support;Education on how to continue taking medications upon discharge;Identify patient's health literacy needs

## 2024-03-30 NOTE — CARE COORDINATION
03/30/24 1508   Service Assessment   Patient Orientation Alert and Oriented   Cognition Alert   History Provided By Patient   Primary Caregiver Self   Accompanied By/Relationship Alone   Support Systems None   Patient's Healthcare Decision Maker is: Legal Next of Kin   PCP Verified by CM Yes   Last Visit to PCP Within last 3 months   Prior Functional Level Independent in ADLs/IADLs   Current Functional Level Independent in ADLs/IADLs   Can patient return to prior living arrangement Unknown at present   Ability to make needs known: Good   Family able to assist with home care needs: No   Would you like for me to discuss the discharge plan with any other family members/significant others, and if so, who? No   Financial Resources Medicaid   Social/Functional History   Lives With Alone   ADL Assistance Independent   Ambulation Assistance Independent   Transfer Assistance Independent   Active  Yes   Mode of Transportation Car   Occupation Unemployed   Services At/After Discharge   Transition of Care Consult (CM Consult) Discharge Planning     CM met with patient at bedside. Patient reports he is living in his car. No HH/IRF/SNF. No DME. Considering Meds to Beds.

## 2024-03-31 ENCOUNTER — APPOINTMENT (OUTPATIENT)
Facility: HOSPITAL | Age: 49
DRG: 045 | End: 2024-03-31
Payer: MEDICAID

## 2024-03-31 LAB
GLUCOSE BLD STRIP.AUTO-MCNC: 121 MG/DL (ref 65–100)
GLUCOSE BLD STRIP.AUTO-MCNC: 88 MG/DL (ref 65–100)
PERFORMED BY:: ABNORMAL
PERFORMED BY:: NORMAL

## 2024-03-31 PROCEDURE — 94761 N-INVAS EAR/PLS OXIMETRY MLT: CPT

## 2024-03-31 PROCEDURE — 83036 HEMOGLOBIN GLYCOSYLATED A1C: CPT

## 2024-03-31 PROCEDURE — 70450 CT HEAD/BRAIN W/O DYE: CPT

## 2024-03-31 PROCEDURE — 1100000000 HC RM PRIVATE

## 2024-03-31 PROCEDURE — 99232 SBSQ HOSP IP/OBS MODERATE 35: CPT | Performed by: PSYCHIATRY & NEUROLOGY

## 2024-03-31 PROCEDURE — 36415 COLL VENOUS BLD VENIPUNCTURE: CPT

## 2024-03-31 PROCEDURE — 6370000000 HC RX 637 (ALT 250 FOR IP): Performed by: INTERNAL MEDICINE

## 2024-03-31 PROCEDURE — 94640 AIRWAY INHALATION TREATMENT: CPT

## 2024-03-31 PROCEDURE — 82962 GLUCOSE BLOOD TEST: CPT

## 2024-03-31 PROCEDURE — 2700000000 HC OXYGEN THERAPY PER DAY

## 2024-03-31 PROCEDURE — 6360000002 HC RX W HCPCS: Performed by: INTERNAL MEDICINE

## 2024-03-31 PROCEDURE — 2580000003 HC RX 258: Performed by: EMERGENCY MEDICINE

## 2024-03-31 RX ORDER — LEVOTHYROXINE SODIUM 0.07 MG/1
75 TABLET ORAL
Status: DISCONTINUED | OUTPATIENT
Start: 2024-04-01 | End: 2024-04-04 | Stop reason: HOSPADM

## 2024-03-31 RX ORDER — INSULIN GLARGINE 100 [IU]/ML
0.15 INJECTION, SOLUTION SUBCUTANEOUS NIGHTLY
Status: DISCONTINUED | OUTPATIENT
Start: 2024-03-31 | End: 2024-04-04 | Stop reason: HOSPADM

## 2024-03-31 RX ORDER — ACETAMINOPHEN 325 MG/1
650 TABLET ORAL EVERY 6 HOURS PRN
Status: DISCONTINUED | OUTPATIENT
Start: 2024-03-31 | End: 2024-04-04 | Stop reason: HOSPADM

## 2024-03-31 RX ORDER — DEXTROSE MONOHYDRATE 100 MG/ML
INJECTION, SOLUTION INTRAVENOUS CONTINUOUS PRN
Status: DISCONTINUED | OUTPATIENT
Start: 2024-03-31 | End: 2024-04-04 | Stop reason: HOSPADM

## 2024-03-31 RX ORDER — ONDANSETRON 2 MG/ML
4 INJECTION INTRAMUSCULAR; INTRAVENOUS EVERY 6 HOURS PRN
Status: DISCONTINUED | OUTPATIENT
Start: 2024-03-31 | End: 2024-04-04 | Stop reason: HOSPADM

## 2024-03-31 RX ORDER — GLUCAGON 1 MG/ML
1 KIT INJECTION PRN
Status: DISCONTINUED | OUTPATIENT
Start: 2024-03-31 | End: 2024-04-04 | Stop reason: HOSPADM

## 2024-03-31 RX ADMIN — SODIUM CHLORIDE, PRESERVATIVE FREE 10 ML: 5 INJECTION INTRAVENOUS at 21:18

## 2024-03-31 RX ADMIN — OXYCODONE 5 MG: 5 TABLET ORAL at 14:55

## 2024-03-31 RX ADMIN — ASPIRIN 81 MG 81 MG: 81 TABLET ORAL at 09:58

## 2024-03-31 RX ADMIN — ATORVASTATIN CALCIUM 80 MG: 40 TABLET, FILM COATED ORAL at 09:58

## 2024-03-31 RX ADMIN — INSULIN GLARGINE 26 UNITS: 100 INJECTION, SOLUTION SUBCUTANEOUS at 21:18

## 2024-03-31 RX ADMIN — CLOPIDOGREL BISULFATE 75 MG: 75 TABLET ORAL at 09:58

## 2024-03-31 RX ADMIN — SODIUM CHLORIDE, PRESERVATIVE FREE 10 ML: 5 INJECTION INTRAVENOUS at 09:59

## 2024-03-31 RX ADMIN — ACETAMINOPHEN 650 MG: 325 TABLET ORAL at 21:17

## 2024-03-31 RX ADMIN — Medication 2 PUFF: at 21:07

## 2024-03-31 RX ADMIN — CITALOPRAM HYDROBROMIDE 40 MG: 20 TABLET ORAL at 09:58

## 2024-03-31 RX ADMIN — ONDANSETRON 4 MG: 2 INJECTION INTRAMUSCULAR; INTRAVENOUS at 21:55

## 2024-03-31 RX ADMIN — TRAZODONE HYDROCHLORIDE 200 MG: 150 TABLET ORAL at 21:17

## 2024-03-31 RX ADMIN — Medication 2 PUFF: at 08:41

## 2024-03-31 RX ADMIN — LEVOTHYROXINE SODIUM 50 MCG: 0.03 TABLET ORAL at 06:16

## 2024-03-31 ASSESSMENT — PAIN DESCRIPTION - DESCRIPTORS
DESCRIPTORS: ACHING
DESCRIPTORS: THROBBING

## 2024-03-31 ASSESSMENT — PAIN DESCRIPTION - LOCATION
LOCATION: HEAD;CHEST
LOCATION: HEAD
LOCATION: HEAD

## 2024-03-31 ASSESSMENT — PAIN SCALES - GENERAL
PAINLEVEL_OUTOF10: 4
PAINLEVEL_OUTOF10: 8
PAINLEVEL_OUTOF10: 8

## 2024-03-31 ASSESSMENT — PAIN - FUNCTIONAL ASSESSMENT: PAIN_FUNCTIONAL_ASSESSMENT: ACTIVITIES ARE NOT PREVENTED

## 2024-03-31 ASSESSMENT — PAIN DESCRIPTION - ORIENTATION: ORIENTATION: LEFT

## 2024-04-01 LAB
EKG ATRIAL RATE: 85 BPM
EKG DIAGNOSIS: NORMAL
EKG P AXIS: 39 DEGREES
EKG P-R INTERVAL: 196 MS
EKG Q-T INTERVAL: 424 MS
EKG QRS DURATION: 106 MS
EKG QTC CALCULATION (BAZETT): 504 MS
EKG R AXIS: 18 DEGREES
EKG T AXIS: 33 DEGREES
EKG VENTRICULAR RATE: 85 BPM
EST. AVERAGE GLUCOSE BLD GHB EST-MCNC: 146 MG/DL
GLUCOSE BLD STRIP.AUTO-MCNC: 109 MG/DL (ref 65–100)
GLUCOSE BLD STRIP.AUTO-MCNC: 117 MG/DL (ref 65–100)
GLUCOSE BLD STRIP.AUTO-MCNC: 139 MG/DL (ref 65–100)
GLUCOSE BLD STRIP.AUTO-MCNC: 146 MG/DL (ref 65–100)
HBA1C MFR BLD: 6.7 % (ref 4–5.6)
PERFORMED BY:: ABNORMAL

## 2024-04-01 PROCEDURE — 82962 GLUCOSE BLOOD TEST: CPT

## 2024-04-01 PROCEDURE — 94010 BREATHING CAPACITY TEST: CPT

## 2024-04-01 PROCEDURE — 94761 N-INVAS EAR/PLS OXIMETRY MLT: CPT

## 2024-04-01 PROCEDURE — 2580000003 HC RX 258: Performed by: EMERGENCY MEDICINE

## 2024-04-01 PROCEDURE — 6370000000 HC RX 637 (ALT 250 FOR IP): Performed by: INTERNAL MEDICINE

## 2024-04-01 PROCEDURE — 94640 AIRWAY INHALATION TREATMENT: CPT

## 2024-04-01 PROCEDURE — 6360000002 HC RX W HCPCS: Performed by: INTERNAL MEDICINE

## 2024-04-01 PROCEDURE — 1100000000 HC RM PRIVATE

## 2024-04-01 PROCEDURE — G0408 INPT/TELE FOLLOW UP 35: HCPCS | Performed by: PSYCHIATRY & NEUROLOGY

## 2024-04-01 RX ORDER — PROMETHAZINE HYDROCHLORIDE 6.25 MG/5ML
12.5 SYRUP ORAL EVERY 6 HOURS PRN
Status: DISCONTINUED | OUTPATIENT
Start: 2024-04-01 | End: 2024-04-01 | Stop reason: CLARIF

## 2024-04-01 RX ORDER — LOPERAMIDE HYDROCHLORIDE 2 MG/1
2 CAPSULE ORAL 4 TIMES DAILY PRN
Status: DISCONTINUED | OUTPATIENT
Start: 2024-04-01 | End: 2024-04-04 | Stop reason: HOSPADM

## 2024-04-01 RX ORDER — PROMETHAZINE HYDROCHLORIDE 25 MG/1
12.5 TABLET ORAL EVERY 6 HOURS PRN
Status: DISCONTINUED | OUTPATIENT
Start: 2024-04-01 | End: 2024-04-04 | Stop reason: HOSPADM

## 2024-04-01 RX ADMIN — SODIUM CHLORIDE, PRESERVATIVE FREE 10 ML: 5 INJECTION INTRAVENOUS at 09:46

## 2024-04-01 RX ADMIN — ONDANSETRON 4 MG: 2 INJECTION INTRAMUSCULAR; INTRAVENOUS at 17:19

## 2024-04-01 RX ADMIN — SODIUM CHLORIDE, PRESERVATIVE FREE 10 ML: 5 INJECTION INTRAVENOUS at 21:37

## 2024-04-01 RX ADMIN — INSULIN GLARGINE 26 UNITS: 100 INJECTION, SOLUTION SUBCUTANEOUS at 21:36

## 2024-04-01 RX ADMIN — OXYCODONE 5 MG: 5 TABLET ORAL at 05:23

## 2024-04-01 RX ADMIN — OXYCODONE 5 MG: 5 TABLET ORAL at 13:47

## 2024-04-01 RX ADMIN — LEVOTHYROXINE SODIUM 75 MCG: 75 TABLET ORAL at 05:23

## 2024-04-01 RX ADMIN — CLOPIDOGREL BISULFATE 75 MG: 75 TABLET ORAL at 09:45

## 2024-04-01 RX ADMIN — Medication 2 PUFF: at 08:25

## 2024-04-01 RX ADMIN — ASPIRIN 81 MG 81 MG: 81 TABLET ORAL at 09:45

## 2024-04-01 RX ADMIN — CITALOPRAM HYDROBROMIDE 40 MG: 20 TABLET ORAL at 09:45

## 2024-04-01 RX ADMIN — Medication 12.5 MG: at 11:52

## 2024-04-01 RX ADMIN — Medication 2 PUFF: at 20:12

## 2024-04-01 RX ADMIN — TRAZODONE HYDROCHLORIDE 200 MG: 150 TABLET ORAL at 21:36

## 2024-04-01 RX ADMIN — ONDANSETRON 4 MG: 2 INJECTION INTRAMUSCULAR; INTRAVENOUS at 05:24

## 2024-04-01 RX ADMIN — OXYCODONE 5 MG: 5 TABLET ORAL at 21:36

## 2024-04-01 RX ADMIN — ATORVASTATIN CALCIUM 80 MG: 40 TABLET, FILM COATED ORAL at 09:45

## 2024-04-01 ASSESSMENT — PAIN DESCRIPTION - LOCATION
LOCATION: HEAD
LOCATION: ABDOMEN
LOCATION: HEAD

## 2024-04-01 ASSESSMENT — COPD QUESTIONNAIRES
CAT_TOTALSCORE: 28
QUESTION2_CHESTPHLEGM: 2
QUESTION1_COUGHFREQUENCY: 3
QUESTION6_LEAVINGHOUSE: 4
QUESTION4_WALKINCLINE: 4
TOTAL_EXACERBATIONS_PASTYEAR: 4
QUESTION5_HOMEACTIVITIES: 4
QUESTION7_SLEEPQUALITY: 4
QUESTION3_CHESTTIGHTNESS: 3
QUESTION8_ENERGYLEVEL: 4

## 2024-04-01 ASSESSMENT — PAIN SCALES - GENERAL
PAINLEVEL_OUTOF10: 7
PAINLEVEL_OUTOF10: 6
PAINLEVEL_OUTOF10: 8
PAINLEVEL_OUTOF10: 7

## 2024-04-01 ASSESSMENT — PAIN DESCRIPTION - ORIENTATION
ORIENTATION: UPPER
ORIENTATION: LOWER;LEFT

## 2024-04-01 ASSESSMENT — PAIN DESCRIPTION - DESCRIPTORS
DESCRIPTORS: ACHING

## 2024-04-02 ENCOUNTER — APPOINTMENT (OUTPATIENT)
Facility: HOSPITAL | Age: 49
DRG: 045 | End: 2024-04-02
Payer: MEDICAID

## 2024-04-02 LAB
GLUCOSE BLD STRIP.AUTO-MCNC: 107 MG/DL (ref 65–100)
GLUCOSE BLD STRIP.AUTO-MCNC: 117 MG/DL (ref 65–100)
GLUCOSE BLD STRIP.AUTO-MCNC: 124 MG/DL (ref 65–100)
GLUCOSE BLD STRIP.AUTO-MCNC: 143 MG/DL (ref 65–100)
PERFORMED BY:: ABNORMAL

## 2024-04-02 PROCEDURE — 6370000000 HC RX 637 (ALT 250 FOR IP): Performed by: INTERNAL MEDICINE

## 2024-04-02 PROCEDURE — 94640 AIRWAY INHALATION TREATMENT: CPT

## 2024-04-02 PROCEDURE — 1100000000 HC RM PRIVATE

## 2024-04-02 PROCEDURE — 94761 N-INVAS EAR/PLS OXIMETRY MLT: CPT

## 2024-04-02 PROCEDURE — 74176 CT ABD & PELVIS W/O CONTRAST: CPT

## 2024-04-02 PROCEDURE — 82962 GLUCOSE BLOOD TEST: CPT

## 2024-04-02 PROCEDURE — 2580000003 HC RX 258: Performed by: EMERGENCY MEDICINE

## 2024-04-02 PROCEDURE — 99222 1ST HOSP IP/OBS MODERATE 55: CPT | Performed by: OTOLARYNGOLOGY

## 2024-04-02 RX ADMIN — PROMETHAZINE HYDROCHLORIDE 12.5 MG: 25 TABLET ORAL at 03:36

## 2024-04-02 RX ADMIN — Medication 2 PUFF: at 08:24

## 2024-04-02 RX ADMIN — OXYCODONE 5 MG: 5 TABLET ORAL at 05:10

## 2024-04-02 RX ADMIN — LEVOTHYROXINE SODIUM 75 MCG: 75 TABLET ORAL at 05:10

## 2024-04-02 RX ADMIN — INSULIN GLARGINE 26 UNITS: 100 INJECTION, SOLUTION SUBCUTANEOUS at 20:48

## 2024-04-02 RX ADMIN — OXYCODONE 5 MG: 5 TABLET ORAL at 20:48

## 2024-04-02 RX ADMIN — SODIUM CHLORIDE, PRESERVATIVE FREE 10 ML: 5 INJECTION INTRAVENOUS at 20:48

## 2024-04-02 RX ADMIN — ATORVASTATIN CALCIUM 80 MG: 40 TABLET, FILM COATED ORAL at 08:20

## 2024-04-02 RX ADMIN — Medication 2 PUFF: at 20:30

## 2024-04-02 RX ADMIN — ASPIRIN 81 MG 81 MG: 81 TABLET ORAL at 08:20

## 2024-04-02 RX ADMIN — TRAZODONE HYDROCHLORIDE 200 MG: 150 TABLET ORAL at 20:48

## 2024-04-02 RX ADMIN — CLOPIDOGREL BISULFATE 75 MG: 75 TABLET ORAL at 08:20

## 2024-04-02 RX ADMIN — SODIUM CHLORIDE, PRESERVATIVE FREE 10 ML: 5 INJECTION INTRAVENOUS at 08:20

## 2024-04-02 RX ADMIN — CITALOPRAM HYDROBROMIDE 40 MG: 20 TABLET ORAL at 08:20

## 2024-04-02 ASSESSMENT — PAIN SCALES - GENERAL: PAINLEVEL_OUTOF10: 0

## 2024-04-02 NOTE — CARE COORDINATION
DC Plan: Home    Case discussed with CM Director. Med to beds was discussed. Hospital to cover for one month supply only for medications.     Cm met with pt at the bedside. Cm provided pt with contact# for [place] Dept of , information on [facility] and information on [facility] (assist with food, rent, utilities, medicine and financial assistance). Pt indicated he has already reached out to the local DSS and DSS in other areas. Pt reports they are not able to give him any assistance. Pt indicated he already got in touch with the program [facility] and they could not assist him. Pt reached out to [facility] in [place] and no one answered when he called so he called [facility] in [place] and when he explained to them that he could not get a hold of anyone at their [place] office they hung up the phone on him. Pt indicated he reached out to crisis stabilization and they don't take his insurance. Pt is trying to get disability. Pt indicated he has tried to get a job as well, but he has not been successful.   Cm asked pt if he is able to reside with any friends and family and he indicated he could not. Pt currently does not have funds to return to a hotel room.   Cm discussed obtaining his mediations. Pt reports he has no issues getting his medications.

## 2024-04-02 NOTE — CONSULTS
Otolaryngology-Head and Neck Surgery  Inpatient Consultation    Patient: Ye Joseph  YOB: 1975  MRN: [id number]  Date of Service:  4/2/2024    Reason for Consultation: Uvular mass   Requesting Physician: Roxanna     History of Present Illness: Ye Joseph is a 48 y.o. year old male who was admitted with acute CVA and left hemiparesis requiring TNK    He has a complicated medical history to include ASD, obesity, DM 2     In the last month he has noted a uvular mass which he feels is affecting his sleeping  He has intermittent right throat pain   Feels its enlarging    CTA neck done to evaluate for stroke       Past Medical History:  Past Medical History:   Diagnosis Date    Acute deep vein thrombosis (DVT) (HCC)     behind left knee    Acute MI (HCC)     COPD (chronic obstructive pulmonary disease) (HCC)     Depression     Hypertension     Ill-defined condition     Stroke (HCC)        Past Surgical History:   Past Surgical History:   Procedure Laterality Date    HERNIA REPAIR      IR FNA WITH IMAGING      ORTHOPEDIC SURGERY      rotator cuff surgery    OTHER SURGICAL HISTORY      Patent Foramen Ovale Repair    THYROIDECTOMY         Medications:   Current Outpatient Medications   Medication Instructions    albuterol sulfate HFA (VENTOLIN HFA) 108 (90 Base) MCG/ACT inhaler 2 puffs, Inhalation, EVERY 6 HOURS PRN    aspirin 81 mg, Oral, DAILY    atorvastatin (LIPITOR) 40 mg, Oral, DAILY    budesonide-formoterol (SYMBICORT) 160-4.5 MCG/ACT AERO 2 puffs, Inhalation, 2 TIMES DAILY RESP    bumetanide (BUMEX) 2 mg, Oral, DAILY    citalopram (CELEXA) 40 mg, Oral, DAILY    diclofenac sodium (VOLTAREN) 2 g, Topical, 4 TIMES DAILY PRN    empagliflozin (JARDIANCE) 10 mg, Oral, DAILY    gabapentin (NEURONTIN) 1,200 mg, Oral, Nightly    levothyroxine (SYNTHROID) 50 mcg, Oral, DAILY BEFORE BREAKFAST    metFORMIN (GLUCOPHAGE) 500 mg, Oral, 2 TIMES DAILY WITH MEALS    metoprolol succinate (TOPROL XL) 
3/30/2024 0510  Gross per 24 hour   Intake 1194 ml   Output 500 ml   Net 694 ml        General Appearance: Well developed, alert, no acute distress.  Ears/Nose/Mouth/Throat: Moist mucosa  Neck: Supple.  JVP within normal limits. Carotids good upstrokes  Chest: Lungs clear to auscultation bilaterally.  Cardiovascular: Regular rate and rhythm, S1S2 normal, soft systolic murmur  Abdomen: Soft, non-tender, bowel sounds are active.   Extremities: No edema bilaterally. distal pulses +1.  Skin: Warm and dry.  Neuro: Alert and oriented x3, normal speech; follows simple commands  Psychiatric: Cooperative; appropriate    []         Post-cath site without hematoma, bruit, tenderness, or thrill.  Distal pulses intact.    Data:      Telemetry: Sinus rhythm    EKG: Sinus rhythm      No valid procedures specified.    Prior to Admission medications    Medication Sig Start Date End Date Taking? Authorizing Provider   rivaroxaban (XARELTO) 10 MG TABS tablet Take 1 tablet by mouth daily  Patient not taking: Reported on 3/30/2024 3/23/24   Slick Thomas MD   bumetanide (BUMEX) 2 MG tablet Take 1 tablet by mouth daily 3/11/24   Addy Moser MD   citalopram (CELEXA) 40 MG tablet Take 1 tablet by mouth daily    Addy Moser MD   diclofenac sodium (VOLTAREN) 1 % GEL Apply 2 g topically 4 times daily as needed 3/30/22   Addy Moser MD   albuterol sulfate HFA (VENTOLIN HFA) 108 (90 Base) MCG/ACT inhaler Inhale 2 puffs into the lungs every 6 hours as needed for Wheezing    Addy Moser MD   budesonide-formoterol (SYMBICORT) 160-4.5 MCG/ACT AERO Inhale 2 puffs into the lungs in the morning and 2 puffs in the evening. 2/25/24   Jorden Mcqueen MD   traZODone (DESYREL) 100 MG tablet Take 2 tablets by mouth nightly 2/25/24   Jorden Mcqueen MD   potassium bicarb-citric acid (EFFER-K) 20 MEQ TBEF effervescent tablet Take 1 tablet by mouth daily 1/12/24   Jim Mcknight MD   levothyroxine (SYNTHROID) 50 
artery: Patent with no significant stenosis by NASCET criteria. Right vertebral artery: Patent Left vertebral artery: Patent CTA HEAD: Right cavernous internal carotid artery: Patent Left cavernous internal carotid artery: Patent Anterior cerebral arteries: Patent Anterior communicating artery: Patent Right middle cerebral artery: Patent Left middle cerebral artery: Patent Posterior communicating arteries: Patent Posterior cerebral arteries: Patent Basilar artery: Patent Distal vertebral arteries: Patent Measurements use NASCET criteria. CT perfusion brain: Technically suboptimal perfusion study Moderate multilevel cervical spondylosis.     1. No evidence for acute large vessel arterial occlusion. Mild atherosclerosis. 2. Asymmetric enlargement of the tonsils is again noted. Recommend nonemergent ENT consultation and follow-up.     CT BRAIN PERFUSION    Result Date: 3/29/2024  CLINICAL HISTORY: Code stroke EXAMINATION:  CT ANGIOGRAPHY HEAD AND NECK COMPARISON: CT head  3/29/2024 , CTA head and neck 11/2/2023 TECHNIQUE:  Following the uneventful administration of iodinated contrast material, axial CT angiography of the head and neck was performed. Delayed axial images through the head were also obtained. Coronal and sagittal reconstructions were obtained. Manual postprocessing of images was performed. 3-D  Sagittal maximal intensity projection images were obtained.  3-D Coronal maximal intensity projections were obtained.  CT dose reduction was achieved through use of a standardized protocol tailored for this examination and automatic exposure control for dose modulation. CT perfusion analysis was performed using CT with contrast administration, including postprocessing of parametric maps with the determination of cerebral blood flow, cerebral blood volume, and mean transit time. CT dose reduction was achieved through use of a standardized protocol tailored for this examination and automatic exposure control for dose

## 2024-04-03 LAB
BNP SERPL-MCNC: 77 PG/ML
GLUCOSE BLD STRIP.AUTO-MCNC: 113 MG/DL (ref 65–100)
GLUCOSE BLD STRIP.AUTO-MCNC: 189 MG/DL (ref 65–100)
GLUCOSE BLD STRIP.AUTO-MCNC: 217 MG/DL (ref 65–100)
GLUCOSE BLD STRIP.AUTO-MCNC: 219 MG/DL (ref 65–100)
PERFORMED BY:: ABNORMAL

## 2024-04-03 PROCEDURE — 2580000003 HC RX 258: Performed by: EMERGENCY MEDICINE

## 2024-04-03 PROCEDURE — 83880 ASSAY OF NATRIURETIC PEPTIDE: CPT

## 2024-04-03 PROCEDURE — 82962 GLUCOSE BLOOD TEST: CPT

## 2024-04-03 PROCEDURE — 1100000000 HC RM PRIVATE

## 2024-04-03 PROCEDURE — 94640 AIRWAY INHALATION TREATMENT: CPT

## 2024-04-03 PROCEDURE — 6370000000 HC RX 637 (ALT 250 FOR IP): Performed by: INTERNAL MEDICINE

## 2024-04-03 PROCEDURE — 36415 COLL VENOUS BLD VENIPUNCTURE: CPT

## 2024-04-03 PROCEDURE — 94761 N-INVAS EAR/PLS OXIMETRY MLT: CPT

## 2024-04-03 PROCEDURE — 6360000002 HC RX W HCPCS: Performed by: INTERNAL MEDICINE

## 2024-04-03 RX ORDER — METHYLPREDNISOLONE SODIUM SUCCINATE 40 MG/ML
40 INJECTION, POWDER, LYOPHILIZED, FOR SOLUTION INTRAMUSCULAR; INTRAVENOUS EVERY 8 HOURS
Status: DISCONTINUED | OUTPATIENT
Start: 2024-04-03 | End: 2024-04-04

## 2024-04-03 RX ORDER — ALBUTEROL SULFATE 90 UG/1
2 AEROSOL, METERED RESPIRATORY (INHALATION)
Status: DISCONTINUED | OUTPATIENT
Start: 2024-04-03 | End: 2024-04-03

## 2024-04-03 RX ORDER — ALBUTEROL SULFATE 90 UG/1
2 AEROSOL, METERED RESPIRATORY (INHALATION) EVERY 4 HOURS PRN
Status: DISCONTINUED | OUTPATIENT
Start: 2024-04-03 | End: 2024-04-04 | Stop reason: HOSPADM

## 2024-04-03 RX ADMIN — ASPIRIN 81 MG 81 MG: 81 TABLET ORAL at 07:56

## 2024-04-03 RX ADMIN — Medication 2 PUFF: at 20:07

## 2024-04-03 RX ADMIN — METHYLPREDNISOLONE SODIUM SUCCINATE 40 MG: 40 INJECTION INTRAMUSCULAR; INTRAVENOUS at 10:49

## 2024-04-03 RX ADMIN — INSULIN GLARGINE 26 UNITS: 100 INJECTION, SOLUTION SUBCUTANEOUS at 19:36

## 2024-04-03 RX ADMIN — SODIUM CHLORIDE, PRESERVATIVE FREE 10 ML: 5 INJECTION INTRAVENOUS at 07:56

## 2024-04-03 RX ADMIN — CLOPIDOGREL BISULFATE 75 MG: 75 TABLET ORAL at 07:55

## 2024-04-03 RX ADMIN — ATORVASTATIN CALCIUM 80 MG: 40 TABLET, FILM COATED ORAL at 07:56

## 2024-04-03 RX ADMIN — TRAZODONE HYDROCHLORIDE 200 MG: 150 TABLET ORAL at 21:24

## 2024-04-03 RX ADMIN — SODIUM CHLORIDE, PRESERVATIVE FREE 10 ML: 5 INJECTION INTRAVENOUS at 19:36

## 2024-04-03 RX ADMIN — LEVOTHYROXINE SODIUM 75 MCG: 75 TABLET ORAL at 07:55

## 2024-04-03 RX ADMIN — Medication 2 PUFF: at 08:13

## 2024-04-03 RX ADMIN — CITALOPRAM HYDROBROMIDE 40 MG: 20 TABLET ORAL at 07:55

## 2024-04-03 RX ADMIN — METHYLPREDNISOLONE SODIUM SUCCINATE 40 MG: 40 INJECTION INTRAMUSCULAR; INTRAVENOUS at 17:00

## 2024-04-04 VITALS
HEIGHT: 76 IN | BODY MASS INDEX: 38.36 KG/M2 | TEMPERATURE: 98.6 F | OXYGEN SATURATION: 93 % | WEIGHT: 315 LBS | HEART RATE: 107 BPM | DIASTOLIC BLOOD PRESSURE: 75 MMHG | SYSTOLIC BLOOD PRESSURE: 129 MMHG | RESPIRATION RATE: 18 BRPM

## 2024-04-04 LAB
GLUCOSE BLD STRIP.AUTO-MCNC: 148 MG/DL (ref 65–100)
GLUCOSE BLD STRIP.AUTO-MCNC: 168 MG/DL (ref 65–100)
GLUCOSE BLD STRIP.AUTO-MCNC: 202 MG/DL (ref 65–100)
PERFORMED BY:: ABNORMAL

## 2024-04-04 PROCEDURE — 94761 N-INVAS EAR/PLS OXIMETRY MLT: CPT

## 2024-04-04 PROCEDURE — 94640 AIRWAY INHALATION TREATMENT: CPT

## 2024-04-04 PROCEDURE — 6370000000 HC RX 637 (ALT 250 FOR IP): Performed by: INTERNAL MEDICINE

## 2024-04-04 PROCEDURE — 82962 GLUCOSE BLOOD TEST: CPT

## 2024-04-04 PROCEDURE — 6360000002 HC RX W HCPCS: Performed by: INTERNAL MEDICINE

## 2024-04-04 PROCEDURE — 2580000003 HC RX 258: Performed by: EMERGENCY MEDICINE

## 2024-04-04 RX ORDER — BUDESONIDE AND FORMOTEROL FUMARATE DIHYDRATE 160; 4.5 UG/1; UG/1
2 AEROSOL RESPIRATORY (INHALATION)
Qty: 10.2 G | Refills: 0 | Status: SHIPPED | OUTPATIENT
Start: 2024-04-04

## 2024-04-04 RX ORDER — CITALOPRAM 40 MG/1
40 TABLET ORAL DAILY
Qty: 30 TABLET | Refills: 0 | Status: SHIPPED | OUTPATIENT
Start: 2024-04-04

## 2024-04-04 RX ORDER — ALBUTEROL SULFATE 90 UG/1
2 AEROSOL, METERED RESPIRATORY (INHALATION) EVERY 6 HOURS PRN
Status: DISCONTINUED | OUTPATIENT
Start: 2024-04-04 | End: 2024-04-04 | Stop reason: HOSPADM

## 2024-04-04 RX ORDER — CLOPIDOGREL BISULFATE 75 MG/1
75 TABLET ORAL DAILY
Qty: 30 TABLET | Refills: 1 | Status: SHIPPED | OUTPATIENT
Start: 2024-04-05

## 2024-04-04 RX ORDER — CLOPIDOGREL BISULFATE 75 MG/1
75 TABLET ORAL DAILY
Qty: 30 TABLET | Refills: 3 | Status: SHIPPED | OUTPATIENT
Start: 2024-04-05 | End: 2024-04-04

## 2024-04-04 RX ORDER — ASPIRIN 81 MG/1
81 TABLET, CHEWABLE ORAL DAILY
Qty: 30 TABLET | Refills: 0 | Status: SHIPPED | OUTPATIENT
Start: 2024-04-04

## 2024-04-04 RX ORDER — LOPERAMIDE HYDROCHLORIDE 2 MG/1
2 CAPSULE ORAL 4 TIMES DAILY PRN
Qty: 20 CAPSULE | Refills: 0 | Status: SHIPPED | OUTPATIENT
Start: 2024-04-04 | End: 2024-04-04

## 2024-04-04 RX ORDER — LEVOTHYROXINE SODIUM 0.07 MG/1
75 TABLET ORAL
Qty: 30 TABLET | Refills: 3 | Status: SHIPPED | OUTPATIENT
Start: 2024-04-05 | End: 2024-04-04

## 2024-04-04 RX ORDER — HYDROXYZINE PAMOATE 25 MG/1
25 CAPSULE ORAL EVERY 6 HOURS PRN
Qty: 20 CAPSULE | Refills: 0 | Status: SHIPPED | OUTPATIENT
Start: 2024-04-04 | End: 2024-04-18

## 2024-04-04 RX ORDER — ALBUTEROL SULFATE 90 UG/1
2 AEROSOL, METERED RESPIRATORY (INHALATION) EVERY 6 HOURS PRN
Qty: 18 G | Refills: 0 | Status: SHIPPED | OUTPATIENT
Start: 2024-04-04

## 2024-04-04 RX ORDER — PROMETHAZINE HYDROCHLORIDE 12.5 MG/1
12.5 TABLET ORAL EVERY 6 HOURS PRN
Qty: 20 TABLET | Refills: 0 | Status: SHIPPED | OUTPATIENT
Start: 2024-04-04 | End: 2024-04-04

## 2024-04-04 RX ORDER — PREDNISONE 20 MG/1
20 TABLET ORAL DAILY
Status: DISCONTINUED | OUTPATIENT
Start: 2024-04-04 | End: 2024-04-04 | Stop reason: HOSPADM

## 2024-04-04 RX ORDER — ATORVASTATIN CALCIUM 80 MG/1
80 TABLET, FILM COATED ORAL DAILY
Qty: 30 TABLET | Refills: 1 | Status: SHIPPED | OUTPATIENT
Start: 2024-04-05

## 2024-04-04 RX ORDER — LOPERAMIDE HYDROCHLORIDE 2 MG/1
2 CAPSULE ORAL 4 TIMES DAILY PRN
Qty: 20 CAPSULE | Refills: 0 | Status: SHIPPED | OUTPATIENT
Start: 2024-04-04 | End: 2024-04-14

## 2024-04-04 RX ORDER — PROMETHAZINE HYDROCHLORIDE 12.5 MG/1
12.5 TABLET ORAL EVERY 6 HOURS PRN
Qty: 20 TABLET | Refills: 0 | Status: SHIPPED | OUTPATIENT
Start: 2024-04-04 | End: 2024-04-11

## 2024-04-04 RX ORDER — GABAPENTIN 600 MG/1
1200 TABLET ORAL NIGHTLY
Qty: 6 TABLET | Refills: 0 | Status: SHIPPED | OUTPATIENT
Start: 2024-04-04 | End: 2024-04-07

## 2024-04-04 RX ORDER — OXYCODONE HYDROCHLORIDE 5 MG/1
5 TABLET ORAL EVERY 8 HOURS PRN
Qty: 12 TABLET | Refills: 0 | Status: SHIPPED | OUTPATIENT
Start: 2024-04-04 | End: 2024-04-04

## 2024-04-04 RX ORDER — TRAZODONE HYDROCHLORIDE 100 MG/1
200 TABLET ORAL NIGHTLY
Qty: 30 TABLET | Refills: 0 | Status: SHIPPED | OUTPATIENT
Start: 2024-04-04

## 2024-04-04 RX ORDER — ATORVASTATIN CALCIUM 80 MG/1
80 TABLET, FILM COATED ORAL DAILY
Qty: 30 TABLET | Refills: 3 | Status: SHIPPED | OUTPATIENT
Start: 2024-04-05 | End: 2024-04-04

## 2024-04-04 RX ORDER — PREDNISONE 20 MG/1
20 TABLET ORAL DAILY
Qty: 4 TABLET | Refills: 0 | Status: SHIPPED | OUTPATIENT
Start: 2024-04-05 | End: 2024-04-09

## 2024-04-04 RX ORDER — HYDROXYZINE PAMOATE 25 MG/1
25 CAPSULE ORAL EVERY 6 HOURS PRN
Status: DISCONTINUED | OUTPATIENT
Start: 2024-04-04 | End: 2024-04-04 | Stop reason: HOSPADM

## 2024-04-04 RX ORDER — LEVOTHYROXINE SODIUM 0.07 MG/1
75 TABLET ORAL
Qty: 30 TABLET | Refills: 1 | Status: SHIPPED | OUTPATIENT
Start: 2024-04-05

## 2024-04-04 RX ORDER — OXYCODONE HYDROCHLORIDE 5 MG/1
5 TABLET ORAL EVERY 8 HOURS PRN
Qty: 12 TABLET | Refills: 0 | Status: SHIPPED | OUTPATIENT
Start: 2024-04-04 | End: 2024-04-07

## 2024-04-04 RX ORDER — PREDNISONE 20 MG/1
20 TABLET ORAL DAILY
Qty: 4 TABLET | Refills: 0 | Status: SHIPPED | OUTPATIENT
Start: 2024-04-05 | End: 2024-04-04

## 2024-04-04 RX ADMIN — LEVOTHYROXINE SODIUM 75 MCG: 75 TABLET ORAL at 06:16

## 2024-04-04 RX ADMIN — HYDROXYZINE PAMOATE 25 MG: 25 CAPSULE ORAL at 14:57

## 2024-04-04 RX ADMIN — CITALOPRAM HYDROBROMIDE 40 MG: 20 TABLET ORAL at 08:40

## 2024-04-04 RX ADMIN — CLOPIDOGREL BISULFATE 75 MG: 75 TABLET ORAL at 08:40

## 2024-04-04 RX ADMIN — ASPIRIN 81 MG 81 MG: 81 TABLET ORAL at 08:40

## 2024-04-04 RX ADMIN — PREDNISONE 20 MG: 20 TABLET ORAL at 08:40

## 2024-04-04 RX ADMIN — Medication 2 PUFF: at 08:56

## 2024-04-04 RX ADMIN — METHYLPREDNISOLONE SODIUM SUCCINATE 40 MG: 40 INJECTION INTRAMUSCULAR; INTRAVENOUS at 01:34

## 2024-04-04 RX ADMIN — SODIUM CHLORIDE, PRESERVATIVE FREE 10 ML: 5 INJECTION INTRAVENOUS at 08:48

## 2024-04-04 RX ADMIN — ATORVASTATIN CALCIUM 80 MG: 40 TABLET, FILM COATED ORAL at 08:40

## 2024-04-04 NOTE — DISCHARGE SUMMARY
Refills: 0      predniSONE (DELTASONE) 20 MG tablet Take 1 tablet by mouth daily for 4 doses  Qty: 4 tablet, Refills: 0      clopidogrel (PLAVIX) 75 MG tablet Take 1 tablet by mouth daily  Qty: 30 tablet, Refills: 3      oxyCODONE (ROXICODONE) 5 MG immediate release tablet Take 1 tablet by mouth every 8 hours as needed for Pain for up to 3 days. Max Daily Amount: 15 mg  Qty: 12 tablet, Refills: 0    Comments: Reduce doses taken as pain becomes manageable  Associated Diagnoses: Acute chest pain           CONTINUE these medications which have CHANGED    Details   traZODone (DESYREL) 100 MG tablet Take 2 tablets by mouth nightly  Qty: 30 tablet, Refills: 0      atorvastatin (LIPITOR) 80 MG tablet Take 1 tablet by mouth daily  Qty: 30 tablet, Refills: 3      levothyroxine (SYNTHROID) 75 MCG tablet Take 1 tablet by mouth every morning (before breakfast)  Qty: 30 tablet, Refills: 3           CONTINUE these medications which have NOT CHANGED    Details   citalopram (CELEXA) 40 MG tablet Take 1 tablet by mouth daily      albuterol sulfate HFA (VENTOLIN HFA) 108 (90 Base) MCG/ACT inhaler Inhale 2 puffs into the lungs every 6 hours as needed for Wheezing      budesonide-formoterol (SYMBICORT) 160-4.5 MCG/ACT AERO Inhale 2 puffs into the lungs in the morning and 2 puffs in the evening.  Qty: 10.2 g, Refills: 3      empagliflozin (JARDIANCE) 10 MG tablet Take 1 tablet by mouth daily  Qty: 30 tablet, Refills: 3      gabapentin (NEURONTIN) 600 MG tablet Take 2 tablets by mouth at bedtime.      aspirin 81 MG chewable tablet Take 1 tablet by mouth daily           STOP taking these medications       rivaroxaban (XARELTO) 10 MG TABS tablet Comments:   Reason for Stopping:         bumetanide (BUMEX) 2 MG tablet Comments:   Reason for Stopping:         diclofenac sodium (VOLTAREN) 1 % GEL Comments:   Reason for Stopping:         potassium bicarb-citric acid (EFFER-K) 20 MEQ TBEF effervescent tablet Comments:   Reason for Stopping:

## 2024-04-04 NOTE — CARE COORDINATION
Pt currently has no insurance coverage.     CM reached out to [facility]. Hospital will cover one month supply of mediations only. [facility] [facility] will deliver meds to the bedside.    Transition of Care Plan:    RUR: 24%  Prior Level of Functioning: independent  Disposition: Home  If SNF or IPR: Date FOC offered:   Date FOC received:   Accepting facility:   Date authorization started with reference number:   Date authorization received and expires:   Follow up appointments: Yes  DME needed: None  Transportation at discharge: self  IM/IMM Medicare/ letter given: N/A  Is patient a  and connected with [facility]?    If yes, was  transfer form completed and [facility] notified?   Caregiver Contact: N/A  Discharge Caregiver contacted prior to discharge? N/A  Care Conference needed? No  Barriers to discharge: None

## 2024-04-04 NOTE — PLAN OF CARE
Problem: Discharge Planning  Goal: Discharge to home or other facility with appropriate resources  4/3/2024 0805 by Abad Coronado RN  Outcome: Progressing     Problem: Pain  Goal: Verbalizes/displays adequate comfort level or baseline comfort level  4/3/2024 2154 by Damari Frias RN  Outcome: Progressing  4/3/2024 0805 by Abad Coronado RN  Outcome: Progressing     Problem: Safety - Adult  Goal: Free from fall injury  4/3/2024 2154 by Damari Frias RN  Outcome: Progressing  4/3/2024 0805 by Abad Coronado RN  Outcome: Progressing     Problem: ABCDS Injury Assessment  Goal: Absence of physical injury  4/3/2024 0805 by Abad Coronado RN  Outcome: Progressing     Problem: Neurosensory - Adult  Goal: Achieves stable or improved neurological status  4/3/2024 0805 by Abad Coronado RN  Outcome: Progressing     Problem: Musculoskeletal - Adult  Goal: Return mobility to safest level of function  4/3/2024 0805 by Abad Coronado RN  Outcome: Progressing     Problem: Hematologic - Adult  Goal: Maintains hematologic stability  4/3/2024 0805 by Abad Coronado RN  Outcome: Progressing     Problem: Chronic Conditions and Co-morbidities  Goal: Patient's chronic conditions and co-morbidity symptoms are monitored and maintained or improved  4/3/2024 0805 by Abad Coronado RN  Outcome: Progressing     
  Problem: Discharge Planning  Goal: Discharge to home or other facility with appropriate resources  Outcome: Adequate for Discharge  Flowsheets (Taken 4/4/2024 9492)  Discharge to home or other facility with appropriate resources: Identify barriers to discharge with patient and caregiver     
  Problem: Discharge Planning  Goal: Discharge to home or other facility with appropriate resources  Outcome: Not Progressing  Flowsheets (Taken 3/29/2024 2320)  Discharge to home or other facility with appropriate resources:   Identify barriers to discharge with patient and caregiver   Arrange for needed discharge resources and transportation as appropriate   Identify discharge learning needs (meds, wound care, etc)     Problem: Pain  Goal: Verbalizes/displays adequate comfort level or baseline comfort level  Outcome: Not Progressing     Problem: Safety - Adult  Goal: Free from fall injury  Outcome: Not Progressing     Problem: ABCDS Injury Assessment  Goal: Absence of physical injury  Outcome: Not Progressing     Problem: Discharge Planning  Goal: Discharge to home or other facility with appropriate resources  Outcome: Not Progressing  Flowsheets (Taken 3/29/2024 2320)  Discharge to home or other facility with appropriate resources:   Identify barriers to discharge with patient and caregiver   Arrange for needed discharge resources and transportation as appropriate   Identify discharge learning needs (meds, wound care, etc)     Problem: Pain  Goal: Verbalizes/displays adequate comfort level or baseline comfort level  Outcome: Not Progressing     Problem: Safety - Adult  Goal: Free from fall injury  Outcome: Not Progressing     Problem: ABCDS Injury Assessment  Goal: Absence of physical injury  Outcome: Not Progressing     
  Problem: Discharge Planning  Goal: Discharge to home or other facility with appropriate resources  Outcome: Progressing     Problem: Pain  Goal: Verbalizes/displays adequate comfort level or baseline comfort level  Outcome: Progressing     Problem: Safety - Adult  Goal: Free from fall injury  Outcome: Progressing     Problem: ABCDS Injury Assessment  Goal: Absence of physical injury  Outcome: Progressing     Problem: Neurosensory - Adult  Goal: Achieves stable or improved neurological status  Outcome: Progressing     Problem: Musculoskeletal - Adult  Goal: Return mobility to safest level of function  Outcome: Progressing     Problem: Hematologic - Adult  Goal: Maintains hematologic stability  Outcome: Progressing     Problem: Occupational Therapy - Adult  Goal: By Discharge: Performs self-care activities at highest level of function for planned discharge setting.  See evaluation for individualized goals.  Description: FUNCTIONAL STATUS PRIOR TO ADMISSION:  Pt was independent w/ ADLs, IADLs, and mobility prior to admission.    HOME SUPPORT: The patient lived alone with no local support.    Occupational Therapy Goals:  Initiated 3/30/2024  Patient/Family stated goal: Unable to express goal  1.  Patient will perform grooming with [place] within 7 day(s).  2.  Patient will perform upper body dressing with [place] within 7 day(s).  3.  Patient will perform lower body dressing with [place] within 7 day(s).  4.  Patient will perform toilet transfers with Modified [place]  within 7 day(s).  5.  Patient will perform all aspects of toileting with [place] within 7 day(s).  6.  Patient will participate in upper extremity therapeutic exercise/activities with [place] within 7 day(s).    7.  Patient will utilize energy conservation techniques during functional activities with [place] within 7 day(s).  8. Patient will be IND in 3-4 step meal preparation tasks in prep for household management 
  Problem: Discharge Planning  Goal: Discharge to home or other facility with appropriate resources  Outcome: Progressing  Flowsheets (Taken 4/1/2024 0946)  Discharge to home or other facility with appropriate resources: Identify barriers to discharge with patient and caregiver     Problem: Pain  Goal: Verbalizes/displays adequate comfort level or baseline comfort level  Outcome: Progressing     Problem: Safety - Adult  Goal: Free from fall injury  Outcome: Progressing  Flowsheets (Taken 4/1/2024 0946)  Free From Fall Injury: Instruct family/caregiver on patient safety     Problem: ABCDS Injury Assessment  Goal: Absence of physical injury  Outcome: Progressing  Flowsheets (Taken 4/1/2024 0946)  Absence of Physical Injury: Implement safety measures based on patient assessment     Problem: Neurosensory - Adult  Goal: Achieves stable or improved neurological status  Outcome: Progressing  Flowsheets (Taken 4/1/2024 0946)  Achieves stable or improved neurological status: Assess for and report changes in neurological status     Problem: Musculoskeletal - Adult  Goal: Return mobility to safest level of function  Outcome: Progressing  Flowsheets (Taken 4/1/2024 0946)  Return Mobility to Safest Level of Function: Assess patient stability and activity tolerance for standing, transferring and ambulating with or without assistive devices     Problem: Hematologic - Adult  Goal: Maintains hematologic stability  Outcome: Progressing  Flowsheets (Taken 4/1/2024 0946)  Maintains hematologic stability: Assess for signs and symptoms of bleeding or hemorrhage     Problem: Chronic Conditions and Co-morbidities  Goal: Patient's chronic conditions and co-morbidity symptoms are monitored and maintained or improved  Outcome: Progressing  Flowsheets (Taken 4/1/2024 0946)  Care Plan - Patient's Chronic Conditions and Co-Morbidity Symptoms are Monitored and Maintained or Improved: Monitor and assess patient's chronic conditions and comorbid 
OCCUPATIONAL THERAPY EVALUATION  Patient: Ye Joseph (48 y.o. male)  Date: 3/30/2024  Primary Diagnosis: CVA (cerebrovascular accident due to intracerebral hemorrhage) (Formerly [facility]) [I61.9]  Cerebrovascular accident (CVA), unspecified mechanism (HCC) [I63.9]       Precautions: Fall Risk                Recommendations for nursing mobility: Out of bed to chair for meals, Use of BSC for toileting , and AD and gt belt for bed to chair     In place during session:Peripheral IV  ASSESSMENT  Pt is a 48 y.o. male presenting to [facility] with c/o left-sided weakness and facial droop, admitted 3/29/2024 and currently being treated for stroke workup. CT results were negative for abnormalities. Pt also w/ PMH of stroke, hypertension, and morbid obesity. Pt received semi-supine in bed upon arrival, AXO x4, and agreeable to OT evaluation.     Based on current observations, pt presents with decreased  functional mobility, independence in ADLs, high-level IADLs, ROM, strength, activity tolerance, coordination, balance (see below for objective details and assist levels).     Overall, pt tolerates session poor with maximum encouragement to engage and minimal participation throughout session. Pt completed sup to sit and scooting to EOB SBA w/ verbal cue to place feet on floor. Pt donned gown [place] for orientation, to initiate threading BUEs and to pull up at shoulders, pt able to thread up to shoulder height bilaterally once initiated. Pt declined changing into non-slip hospital socks. Per nursing, pt has been up ambulating in room since stay, however, pt decline functional mobility this date, stating he is unable to walk right now. BUE ROM assessed seated EOB, RUE WFL, pt demonstrated minimal LUE AROM, stating he has difficulty with the left side at this time. Pt then declined further participation in therapy session and immediately began [facility] gown beginning w/ RUE. Pt returned to sup from sitting EOB SBA. Pt was able to 
Pt currently on OT caseload, seen this am and currently indep with all adls and functional mobiltiy in room. Pt states he not longer needs further OT, all needs met, patient at baseline. Pt will be discharged from OT caseload, please reconsult if patient status changes. Thank you      Problem: Occupational Therapy - Adult  Goal: By Discharge: Performs self-care activities at highest level of function for planned discharge setting.  See evaluation for individualized goals.  Description: FUNCTIONAL STATUS PRIOR TO ADMISSION:  Pt was independent w/ ADLs, IADLs, and mobility prior to admission.    HOME SUPPORT: The patient lived alone with no local support.    Occupational Therapy Goals:  Initiated 3/30/2024  Patient/Family stated goal: Unable to express goal  1.  Patient will perform grooming with [place] within 7 day(s).  2.  Patient will perform upper body dressing with [place] within 7 day(s).  3.  Patient will perform lower body dressing with [place] within 7 day(s).  4.  Patient will perform toilet transfers with Modified [place]  within 7 day(s).  5.  Patient will perform all aspects of toileting with [place] within 7 day(s).  6.  Patient will participate in upper extremity therapeutic exercise/activities with [place] within 7 day(s).    7.  Patient will utilize energy conservation techniques during functional activities with [place] within 7 day(s).  8. Patient will be IND in 3-4 step meal preparation tasks in prep for household management tasks within 7 day(s).   Outcome: HH/HSPC Resolved Met         
Pt currently on PT caseload, seen sitting I EOB. Pt reports he is currently I with all amb and functional mobility. Pt reports no skilled PT needs at this time. Pt education regarding need for PT and how to obtain while admitted if status changes. Pt currently DC from PT caseload at this time. Please reorder if/when status changes. Thank you.      Problem: Physical Therapy - Adult  Goal: By Discharge: Performs mobility at highest level of function for planned discharge setting.  See evaluation for individualized goals.  Description: FUNCTIONAL STATUS PRIOR TO ADMISSION: Patient was independent and active without use of DME.    HOME SUPPORT PRIOR TO ADMISSION: The patient lived alone with no local support.    Physical Therapy Goals  Initiated 3/30/2024  Pt stated goal: get better  Pt will be I with [facility] HEP in 7 days.  Pt will perform bed mobility with Modified [place] in 7 days.  Pt will perform transfers with Modified [place] in 7 days.   Pt will amb 50 feet with LRAD safely with Modified [place] in 7 days.     Pt will verbalize and demonstrate compliance with fall precautions  in 7 days.   Pt will demonstrate improvement in standing balance from fair to good in 7 days.    Outcome: HH/HSPC Resolved Met     
Speech LAnguage Pathology Dysphagia EVALUATION/DISCHARGE    Patient: Ye Joseph (48 y.o. male)  Date: 3/30/2024  Primary Diagnosis: CVA (cerebrovascular accident due to intracerebral hemorrhage) (Formerly [facility]) [I61.9]  Cerebrovascular accident (CVA), unspecified mechanism (HCC) [I63.9]       Precautions: aspiration, Fall Risk                  Diet recommendations: Regular and thin liquids    Safe swallow precautions: STRICT aspiration/GERD precautions. Intermittent supervision with po, monitor for s/sx aspiration/penetration. Meals only when fully awake and alert. Small sips, small bites, slow rate, remain upright during and for at least 30 minutes after meals. Meds whole with thin liquid.     ASSESSMENT :  Based on the objective data described below, the patient presents with largely WFL oropharyngeal swallow function.    Admitted for CVA work-up. Per RN, patient eating regular consistencies and thin liquids overnight, tolerated without difficulty. MD stated patient to be NPO until ST evaluation, and patient growing increasingly agitated. Per chart review, patient seen by ST in November 11/2023 with recs for regular/thin diet and no further follow-up.   Results of most recent CXR reviewed. CT head noted. Per results of CT brain perfusion and CTA head neck: Asymmetric enlargement of the tonsils is again noted. Recommend non-emergent ENT consultation and follow-up.  Patient alert and oriented x4. Patient appears neutral about participation with ST. Reports does not recall previous ST. Denies difficulty swallowing and reports \"I've been eating.\" Self-administered po.  Oral phase: WFL  Pharyngeal phase: timely and appears WFL upon palpation.   No clinical indicators aspiration/penetration observed.      No further ST intervention warranted currently. Patient will be discharged from skilled speech-language pathology services at this time. Please re-consult if/as medically indicated.      PLAN :  Recommendations and 
Situation:  Social/Functional History  Lives With: Alone  Type of Home: Homeless (Pt living in car)  ADL Assistance: Independent  Ambulation Assistance: Independent  Transfer Assistance: Independent    Cognitive/Behavioral Status:  Orientation  Orientation Level: Oriented X4       Skin:     Edema:     Strength:    Strength: Generally decreased, functional    Range Of Motion:  AROM: Generally decreased, functional       Coordination:        Tone & Sensation:   Tone: Normal         Functional Mobility:  Bed Mobility:     Bed Mobility Training  Bed Mobility Training: Yes  Supine to Sit: Stand-by assistance  Sit to Supine: Stand-by assistance  Scooting: Stand-by assistance  Transfers:        Balance:               Balance  Sitting: Intact                                           Therapeutic Exercises:       Functional Measure:  [facility] AM-PAC™ “6 Clicks”         Basic Mobility Inpatient Short Form  How much difficulty does the patient currently have... Unable A Lot A Little None   1.  Turning over in bed (including adjusting bedclothes, sheets and blankets)?   [] 1   [] 2   [x] 3   [] 4   2.  Sitting down on and standing up from a chair with arms ( e.g., wheelchair, bedside commode, etc.)   [] 1   [] 2   [x] 3   [] 4   3.  Moving from lying on back to sitting on the side of the bed?   [] 1   [] 2   [x] 3   [] 4          How much help from another person does the patient currently need... Total A Lot A Little None   4.  Moving to and from a bed to a chair (including a wheelchair)?   [] 1   [x] 2   [] 3   [] 4   5.  Need to walk in hospital room?   [] 1   [x] 2   [] 3   [] 4   6.  Climbing 3-5 steps with a railing?   [] 1   [x] 2   [] 3   [] 4   © 2007, Trustees of [facility], under license to [employer]. All rights reserved     Score:  Initial: 9/24 Most Recent: X (Date: 3/30/2024 )   Interpretation of Tool:  Represents activities that are increasingly more difficult (i.e. Bed mobility, Transfers,

## 2024-04-04 NOTE — PROGRESS NOTES
Pulmonary Disease Navigator Note  Neno Gonzalez [facility]    Current GOLD classification for Ye Joseph    Patient's chart was reviewed by Pulmonary Disease Navigator for compliance with prescribed treatment with Global Initiative For Chronic Obstructive Lung Disease (GOLD).    Patient returns after recent discharge on 03/22/2024. Patient states he cannot afford to drive to  medications as a major factor in his return to the hospital. Please see progress notes from last admission.    The CAT provides a reliable measure of the impact of COPD on a patient's health status. Range of CAT scores from 0-40. Higher scores denote a more severe impact of COPD on a patient’s life. Scores <10 have a low impact, 10-20 medium, 21-30 high and >30 very high impact, requiring gradually more interventions.     Current recorded COPD Assessment Tool (CAT) score of Cough Assessment: 3  Phlegm Assessment: 2  Chest tightness: 3  Walking on an incline: 4  Home Activities: 4  Confident Leaving The Home: 4  Sleeping Soundly: 4  Have Energy: 4  Assessment Score: 28         Comments:     Patient continues to have poor compliance with medications and adherence to a healthy regimen/routine. Patient was offered [id number] program last admission and declined. This admission he stated he could not afford to drive to  his medications.  Will attempt to work with patient on the importance of daily compliance with medications and healthy lifestyle. Will continue to follow        Electronically signed by RT Christiano on 4/1/24 at 3:42 PM EDT  
        Progress Note      4/1/2024 12:49 PM  NAME: Ye Joseph   MRN:  [id number]   Admit Diagnosis: CVA (cerebrovascular accident due to intracerebral hemorrhage) ([facility]) [I61.9]  Cerebrovascular accident (CVA), unspecified mechanism (HCC) [I63.9]      Problem List:   -Admitted to the hospital with chest pain  -History of ASD closure per Amplatz device per Dr. Joel Diaz in [place]  -No known established coronary artery disease  -COPD  -Sleep apnea  -Former smoker  -Varicose vein         Assessment/Plan:   Note dictated April 1, 2024  -New complaint of left arm weakness.  Neurology workup is pending.  -No acute cardiovascular issue at this time and will defer BREEZY at this time until  neuro up is completed.  -Once patient is relatively stable and clear from neurology we will schedule for transesophageal echocardiogram to assess the Amplatz device status and as well as explore the intracardiac shunt or mass or thrombus as patient presented with TIA/RIND/possibly stroke  -We will continue to follow while patient is in the hospital along with the team and make further cardiovascular management decision as clinically warranted.           []       High complexity decision making was performed in this patient at high risk for decompensation with multiple organ involvement.    Subjective:     Ye Joseph is a 48 y.o. White (non-) male who has no known established coronary artery disease status post ASD closure by Amplatz device several years ago in [place] by Dr. Joel Diaz with no recent follow-up. Cardiac enzyme has been unremarkable EKG shows no acute changes and telemetry shows no abnormalities. Patient is complaining of left-sided weakness.  CT scan f the head has been done which shows no acute changes.No acute cardiovascular issue at this time and will continue current conservative medical management with no further cardiovascular testing as it would not change my cardiac 
     Critical access hospital NEUROLOGY CONSULTATION          Chief Complaint/Admission Diagnosis: CVA (cerebrovascular accident due to intracerebral hemorrhage) ([facility]) [I61.9]  Cerebrovascular accident (CVA), unspecified mechanism (HCC) [I63.9]     I have been asked to see this 48 y.o. male in neurological consultation by Jim Huston MD to render advice and opinion regarding left sided weakness     ?     Impression:  47 yo man presented with left sided weakness and is s/p TNK on 3/29/2024. Acute stroke vs TIA are on the differential. Repeat head CT pending     Recommendations:     -Every 4 hr neurochecks   -Telemetry, IV fluids   -TTE,  MRI brain without contrast   -After 24 hours post TNK may start Plavix 75 percent mg  daily for 21 days; aspirin 81 mg daily   -A1C, Lipid panel  -Hypercoagulable panel   -Atorvastatin 80 mg daily   Abortive headache regimen: Compazine 10 mg once, magnesium sulfate 2 g IV once, Toradol 30 mg IM/IV once, Solu-Medrol 500 mg IV once, if no improvement, try Depakote 200 mg IV once.  100% oxygen can be used if none of the above medications help.  -Permissive hypertension with goal to treat blood pressures greater than 180/105   -PT, OT, speech consults for evaluation and management  -Counseling on lifestyle modifications such as smoking cessation, low-fat diet, and regular exercise was provided.        HPI:   History was obtained from a review of the electronic record and from the patient and family.??   Patient is reporting a headache. He reports mild improvement of LUE weakness.  ?     Review of Symptoms:     A ten system review of constitutional, cardiovascular, respiratory, musculoskeletal, endocrine, skin, HEENT, genitourinary, neurological, and psychiatric systems was obtained and is negative except as noted above in HPI.     Exam:   /75   Pulse 88   Temp 97.8 °F (36.6 °C) (Oral)   Resp 13   Ht 1.93 m (6' 3.98\")   Wt (!) 170.6 kg (376 lb 1.7 oz)   SpO2 95%   BMI 45.80 
Department of Internal Medicine  General Internal Medicine  Attending Progress Note      SUBJECTIVE: He was admitted for acute CVA with left-sided hemiparesis has been seen by teleneurologist as per patient also seen by cardiologist patient was scheduled for MRI unable to fit into the MRI machine    OBJECTIVE      Medications    Current Facility-Administered Medications: sodium chloride flush 0.9 % injection 5-40 mL, 5-40 mL, IntraVENous, 2 times per day  sodium chloride flush 0.9 % injection 5-40 mL, 5-40 mL, IntraVENous, PRN  0.9 % sodium chloride infusion, , IntraVENous, PRN  dextrose bolus 10% 125 mL, 125 mL, IntraVENous, PRN  albuterol sulfate HFA (PROVENTIL;VENTOLIN;PROAIR) 108 (90 Base) MCG/ACT inhaler 2 puff, 2 puff, Inhalation, Q6H PRN  budesonide-formoterol (SYMBICORT) 160-4.5 MCG/ACT inhaler 2 puff, 2 puff, Inhalation, BID RT  atorvastatin (LIPITOR) tablet 40 mg, 40 mg, Oral, Daily  citalopram (CELEXA) tablet 40 mg, 40 mg, Oral, Daily  levothyroxine (SYNTHROID) tablet 50 mcg, 50 mcg, Oral, QAM AC  traZODone (DESYREL) tablet 200 mg, 200 mg, Oral, Nightly  Physical    VITALS:  /87   Pulse 82   Temp 98.1 °F (36.7 °C) (Oral)   Resp 14   Ht 1.93 m (6' 3.98\")   Wt (!) 170.6 kg (376 lb 1.7 oz)   SpO2 97%   BMI 45.80 kg/m²   BLOOD PRESSURE RANGE:  Systolic (24hrs), Av , Min:109 , Max:151  ; Diastolic (24hrs), Av, Min:60, Max:97   CONSTITUTIONAL:  awake, alert, cooperative, no apparent distress, and appears stated age  EYES:  Lids and lashes normal, pupils equal, round and reactive to light, extra ocular muscles intact, sclera clear, conjunctiva normal  ENT:  Normocephalic, without obvious abnormality, atraumatic, sinuses nontender on palpation, external ears without lesions, oral pharynx with moist mucus membranes, tonsils without erythema or exudates, gums normal and good dentition.  NECK:  Supple, symmetrical, trachea midline, no adenopathy, thyroid symmetric, not enlarged and no 
Department of Internal Medicine  General Internal Medicine  Attending Progress Note      SUBJECTIVE: He was admitted for acute CVA with left-sided hemiparesis has been seen by teleneurologist as per patient also seen by cardiologist patient was scheduled for MRI unable to fit into the MRI machine  3/31  Patient has no complaints no chest no chest pain able to move all limbs was seen by teleneurologist recommended Plavix and aspirin and telemetry monitor plan transfer to medical telemetry PT and OT case management for skilled nursing facility/rehab    OBJECTIVE      Medications    Current Facility-Administered Medications: oxyCODONE (ROXICODONE) immediate release tablet 5 mg, 5 mg, Oral, Q8H PRN  clopidogrel (PLAVIX) tablet 75 mg, 75 mg, Oral, Daily  aspirin chewable tablet 81 mg, 81 mg, Oral, Daily  atorvastatin (LIPITOR) tablet 80 mg, 80 mg, Oral, Daily  sodium chloride flush 0.9 % injection 5-40 mL, 5-40 mL, IntraVENous, 2 times per day  sodium chloride flush 0.9 % injection 5-40 mL, 5-40 mL, IntraVENous, PRN  0.9 % sodium chloride infusion, , IntraVENous, PRN  dextrose bolus 10% 125 mL, 125 mL, IntraVENous, PRN  albuterol sulfate HFA (PROVENTIL;VENTOLIN;PROAIR) 108 (90 Base) MCG/ACT inhaler 2 puff, 2 puff, Inhalation, Q6H PRN  budesonide-formoterol (SYMBICORT) 160-4.5 MCG/ACT inhaler 2 puff, 2 puff, Inhalation, BID RT  citalopram (CELEXA) tablet 40 mg, 40 mg, Oral, Daily  levothyroxine (SYNTHROID) tablet 50 mcg, 50 mcg, Oral, QAM AC  traZODone (DESYREL) tablet 200 mg, 200 mg, Oral, Nightly  Physical    VITALS:  /75   Pulse 88   Temp 97.8 °F (36.6 °C) (Oral)   Resp 13   Ht 1.93 m (6' 3.98\")   Wt (!) 170.6 kg (376 lb 1.7 oz)   SpO2 95%   BMI 45.80 kg/m²   BLOOD PRESSURE RANGE:  Systolic (24hrs), Av , Min:115 , Max:182   ; Diastolic (24hrs), Av, Min:57, Max:96  CONSTITUTIONAL:  awake, alert, cooperative, no apparent distress, and appears stated age  EYES:  Lids and lashes normal, pupils equal, 
Department of Internal Medicine  General Internal Medicine  Attending Progress Note      SUBJECTIVE: He was admitted for acute CVA with left-sided hemiparesis has been seen by teleneurologist as per patient also seen by cardiologist patient was scheduled for MRI unable to fit into the MRI machine  3/31  Patient has no complaints no chest no chest pain able to move all limbs was seen by teleneurologist recommended Plavix and aspirin and telemetry monitor plan transfer to medical telemetry PT and OT case management for skilled nursing facility/rehab  4/1  Patient complains of headache, chest pain nursing report of left lower quadrant abdominal pain with diarrhea  Obtain CT of the abdomen and pelvis without contrast to evaluate for diverticulitis UA for C&S    OBJECTIVE      Medications    Current Facility-Administered Medications: promethazine (PHENERGAN) 6.25 MG/5ML syrup 12.5 mg, 12.5 mg, Oral, Q6H PRN  loperamide (IMODIUM) capsule 2 mg, 2 mg, Oral, 4x Daily PRN  glucose chewable tablet 16 g, 4 tablet, Oral, PRN  dextrose bolus 10% 125 mL, 125 mL, IntraVENous, PRN **OR** dextrose bolus 10% 250 mL, 250 mL, IntraVENous, PRN  glucagon injection 1 mg, 1 mg, SubCUTAneous, PRN  dextrose 10 % infusion, , IntraVENous, Continuous PRN  insulin glargine (LANTUS) injection vial 26 Units, 0.15 Units/kg, SubCUTAneous, Nightly  levothyroxine (SYNTHROID) tablet 75 mcg, 75 mcg, Oral, QAM AC  acetaminophen (TYLENOL) tablet 650 mg, 650 mg, Oral, Q6H PRN  ondansetron (ZOFRAN) injection 4 mg, 4 mg, IntraVENous, Q6H PRN  oxyCODONE (ROXICODONE) immediate release tablet 5 mg, 5 mg, Oral, Q8H PRN  clopidogrel (PLAVIX) tablet 75 mg, 75 mg, Oral, Daily  aspirin chewable tablet 81 mg, 81 mg, Oral, Daily  atorvastatin (LIPITOR) tablet 80 mg, 80 mg, Oral, Daily  sodium chloride flush 0.9 % injection 5-40 mL, 5-40 mL, IntraVENous, 2 times per day  sodium chloride flush 0.9 % injection 5-40 mL, 5-40 mL, IntraVENous, PRN  0.9 % sodium chloride 
Department of Internal Medicine  General Internal Medicine  Attending Progress Note      SUBJECTIVE: He was admitted for acute CVA with left-sided hemiparesis has been seen by teleneurologist as per patient also seen by cardiologist patient was scheduled for MRI unable to fit into the MRI machine  3/31  Patient has no complaints no chest no chest pain able to move all limbs was seen by teleneurologist recommended Plavix and aspirin and telemetry monitor plan transfer to medical telemetry PT and OT case management for skilled nursing facility/rehab  4/1  Patient complains of headache, chest pain nursing report of left lower quadrant abdominal pain with diarrhea  Obtain CT of the abdomen and pelvis without contrast to evaluate for diverticulitis UA for C&S  4/2  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 seen by ENT surgeon biopsy/excision inconsideration discussed with patient at length he understands the risk involved including but not limited to extension of strokes possible cardiac arrest and cardiac attack Rula \"I understand the risks but I like to have this taken out because it causes me to gasp and short of breath\"    OBJECTIVE      Medications    Current Facility-Administered Medications: loperamide (IMODIUM) capsule 2 mg, 2 mg, Oral, 4x Daily PRN  promethazine (PHENERGAN) tablet 12.5 mg, 12.5 mg, Oral, Q6H PRN  glucose chewable tablet 16 g, 4 tablet, Oral, PRN  dextrose bolus 10% 125 mL, 125 mL, IntraVENous, PRN **OR** dextrose bolus 10% 250 mL, 250 mL, IntraVENous, PRN  glucagon injection 1 mg, 1 mg, SubCUTAneous, PRN  dextrose 10 % infusion, , IntraVENous, Continuous PRN  insulin glargine (LANTUS) injection vial 26 Units, 0.15 Units/kg, SubCUTAneous, Nightly  levothyroxine (SYNTHROID) tablet 75 mcg, 75 mcg, Oral, QAM AC  acetaminophen (TYLENOL) tablet 650 mg, 650 mg, Oral, Q6H PRN  ondansetron (ZOFRAN) injection 4 mg, 4 mg, IntraVENous, Q6H PRN  oxyCODONE (ROXICODONE) immediate release tablet 5 mg, 5 mg, 
Department of Internal Medicine  General Internal Medicine  Attending Progress Note      SUBJECTIVE: He was admitted for acute CVA with left-sided hemiparesis has been seen by teleneurologist as per patient also seen by cardiologist patient was scheduled for MRI unable to fit into the MRI machine  3/31  Patient has no complaints no chest no chest pain able to move all limbs was seen by teleneurologist recommended Plavix and aspirin and telemetry monitor plan transfer to medical telemetry PT and OT case management for skilled nursing facility/rehab  4/1  Patient complains of headache, chest pain nursing report of left lower quadrant abdominal pain with diarrhea  Obtain CT of the abdomen and pelvis without contrast to evaluate for diverticulitis UA for C&S  4/2  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    OBJECTIVE      Medications    Current Facility-Administered Medications: methylPREDNISolone sodium succ (SOLU-MEDROL) injection 40 mg, 40 mg, IntraVENous, Q8H  albuterol sulfate HFA (PROVENTIL;VENTOLIN;PROAIR) 108 (90 Base) MCG/ACT inhaler 2 puff, 2 puff, Inhalation, Q4H PRN  loperamide (IMODIUM) capsule 2 mg, 2 mg, Oral, 4x Daily PRN  promethazine (PHENERGAN) tablet 12.5 mg, 12.5 mg, Oral, Q6H PRN  glucose chewable tablet 16 g, 4 tablet, Oral, PRN  dextrose bolus 10% 125 mL, 125 mL, IntraVENous, PRN **OR** dextrose bolus 10% 250 mL, 250 mL, IntraVENous, PRN  glucagon injection 1 mg, 1 mg, SubCUTAneous, PRN  dextrose 10 % infusion, , 
Discharge plan of care/case management plan validated with provider discharge order.     Discharge instructions discussed with the patient. All concerns addressed at this time. Medication received from [facility].    PIV removed. Patient wheeled downstairs to go home.  
[facility] Remote Note    Noted neurology note  Cardiology signed off but no clearance necessarily    Discussed with anesthesia today who will review further but ok to proceed     Will plan for direct laryngoscopy and biopsy, excision of uvular mass on Wednesday next week  This can be done outpatient if he is going to be discharged otherwise       Krista Mcnulty MD   [facility]  [address]  [place] [address]  Office Phone: [phone]        
Otolaryngology-Head and Neck Surgery  Inpatient Consultation    Patient: Ye Joseph  YOB: 1975  MRN: [id number]  Date of Service:  4/4/2024    Reason for Consultation: Uvular mass   Requesting Physician: Roxanna     History of Present Illness: Ye Joseph is a 48 y.o. 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  States it is uncomfortable, makes it difficult to breathe at night, he has been unable to use CPAP at home or in the hospital.      Past Medical History:  Past Medical History:   Diagnosis Date    Acute deep vein thrombosis (DVT) (HCC)     behind left knee    Acute MI (HCC)     COPD (chronic obstructive pulmonary disease) (HCC)     Depression     Hypertension     Ill-defined condition     Stroke (HCC)        Past Surgical History:   Past Surgical History:   Procedure Laterality Date    HERNIA REPAIR      IR FNA WITH IMAGING      ORTHOPEDIC SURGERY      rotator cuff surgery    OTHER SURGICAL HISTORY      Patent Foramen Ovale Repair    THYROIDECTOMY         Medications:   Current Outpatient Medications   Medication Instructions    albuterol sulfate HFA (VENTOLIN HFA) 108 (90 Base) MCG/ACT inhaler 2 puffs, Inhalation, EVERY 6 HOURS PRN    aspirin 81 mg, Oral, DAILY    atorvastatin (LIPITOR) 40 mg, Oral, DAILY    budesonide-formoterol (SYMBICORT) 160-4.5 MCG/ACT AERO 2 puffs, Inhalation, 2 TIMES DAILY RESP    bumetanide (BUMEX) 2 mg, Oral, DAILY    citalopram (CELEXA) 40 mg, Oral, DAILY    diclofenac sodium (VOLTAREN) 2 g, Topical, 4 TIMES DAILY PRN    empagliflozin (JARDIANCE) 10 
PT attempted this morning and pt requested therapist to return later as he was not feeling good and wanted to sleep. Will follow up later as time permits.   
Patient complaining of chest tightness and dizziness. Vital signs done. Provider notified via call. New orders added  
Patient refuses to keep on the equipment to take is BP.  MD notified.   
Patient went to MRI but when on the table he stated that he could not go through with the test. He refused and was returned to the ICU room.  
Progress Note:      [facility] NEUROLOGY PROGRESS NOTE          Impression/Recommendations:   48 years old man with hx of dVT, COPD, CAD, HTN admitted  for left sided weakness s/p TNK. Echo showed normal EF with a negative bubble study 11/8/23. Won't in the machine for MRI. New echo cancelled. CTA neck did not show any stenosis in the bilateral carotid arteries.             Aspirin 325 mg plus Plavix 300 mg in ED  DAPT for 30 days (Aspirin 325 mg qd plus Plavix 75 mg qd) then switch to Plavix 75 mg qd  Atorvastatin 80 mg qd  PT/OT recommended outpatient PT.  NPO until see by bedside swallowing or SLP evaluation.  Neuro floor admission with telemetry  No further neurological recommendations.          Anna Parra MD  Teleneurologist    SUBJECTIVE   Ye Joseph is a 48 y.o. male being evaluated for stroke.   He was admitted for acute CVA with left-sided hemiparesis has been seen by teleneurologist as per patient also seen by cardiologist patient was scheduled for MRI unable to fit into the MRI machine  3/31  Patient has no complaints no chest no chest pain able to move all limbs was seen by teleneurologist recommended Plavix and aspirin and telemetry monitor plan transfer to medical telemetry PT and OT case management for skilled nursing facility/rehab  ?     OBJECTIVE   ROS, PMH, FH, SH were all reviewed and are unchanged.     ?       Current Facility-Administered Medications   Medication Dose Route Frequency Provider Last Rate Last Admin    glucose chewable tablet 16 g  4 tablet Oral PRN Jim Mcknight MD        dextrose bolus 10% 125 mL  125 mL IntraVENous PRN Jim Mcknight MD        Or    dextrose bolus 10% 250 mL  250 mL IntraVENous PRN Jim Mcknight MD        glucagon injection 1 mg  1 mg SubCUTAneous PRN Jim Mcknight MD        dextrose 10 % infusion   IntraVENous Continuous PRN Jim Mcknight MD        insulin glargine (LANTUS) injection vial 26 Units  0.15 Units/kg 
Spiritual Care Assessment/Progress Note  [facility]    Name: Ye Joseph MRN: [id number]    Age: 48 y.o.     Sex: male   Language: English     Date: 4/2/2024            Total Time Calculated: 30 min              Spiritual Assessment begun in [facility]  Service Provided For:: Patient  Referral/Consult From:: Rounding  Encounter Overview/Reason : Initial Encounter    Spiritual beliefs:      [] Involved in a fannie tradition/spiritual practice:      [] Supported by a fannie community:      [] Claims no spiritual orientation:      [] Seeking spiritual identity:           [] Adheres to an individual form of spirituality:      [x] Not able to assess:                Identified resources for coping and support system:   Support System: Other (Comment) (Limited Support System)       [] Prayer                  [] Devotional reading               [] Music                  [] Guided Imagery     [] Pet visits                                        [] Other: (COMMENT)     Specific area/focus of visit   Encounter:    Crisis:    Spiritual/Emotional needs: Type: Spiritual Support  Ritual, Rites and Sacraments:    Grief, Loss, and Adjustments:    Ethics/Mediation:    Behavioral Health:    Palliative Care:    Advance Care Planning:      Plan/Referrals: Other (Comment) ( is available if needed)    Narrative:  visited pt Ye Joseph while rounding on [facility] for the purpose of making a spiritual assessment. Pt's chart reviewed. Pt is resting in bed, welcomes spiritual health visit. Pt reports of course of hospitalizations and family dynamics outside of the hospital which have limited his support system. Pt shares of his insecure living situation which he wonders is contributing to health condition. Pt identifies his feelings of frustration and sadness at his situation, as he has tried to secure housing unsuccessfully. Pt says he tries to stay positive but finds this difficult. 
edside and Verbal shift change report given to Jaswinder CRESPO (oncoming nurse) by Toñito Davis RN (offgoing nurse). Report included the following information SBAR, Kardex, MAR and Recent Results.    SITUATION:   Code Status: Full Code  Reason for Admission: CVA (cerebrovascular accident due to intracerebral hemorrhage) ([facility]) [I61.9]  Cerebrovascular accident (CVA), unspecified mechanism ([facility]) [I63.9]    Hospital day: 2  Problem List:         BACKGROUND:    Past Medical History:   Past Medical History:   Diagnosis Date    Acute deep vein thrombosis (DVT) ([facility])     behind left knee    Acute MI (HCC)     COPD (chronic obstructive pulmonary disease) ([facility])     Depression     Hypertension     Ill-defined condition     Stroke ([facility])          Patient taking anticoagulants Yes     ASSESSMENT:   Changes in Assessment Throughout Shift: No    Patient has Central Line: No Reasons if yes: PIV x 1  Patient has Rosario Cath: No Reasons if yes: Voiding     Last Vitals:   [unfilled]    IV and DRAINS (will only show if present)        WOUND (if present)   Wound Type:  None   Dressing present     Wound Concerns/Notes:  none    PAIN    Pain Assessment                      Interventions for Pain:  See MAR  Intervention effective: Yes  Time of last intervention: 2100   Reassessment Completed: Yes     Last 3 Weights:  [unfilled]  Weight change:     INTAKE/OUPUT    Current Shift: No intake/output data recorded.    Last three shifts: 03/29 1901 - 03/31 0700  In: 1194 [P.O.:1184; I.V.:10]  Out: 1700 [Urine:1700]    LAB RESULTS     Recent Labs     03/29/24  1813   WBC 7.9   HGB 13.3   HCT 41.7           Recent Labs     03/29/24  1813      K 3.8   BUN 15   INR 0.9       RECOMMENDATIONS AND DISCHARGE PLANNING     Pending tests/procedures/ Plan of Care or Other Needs: See Provider notes// Downgrade     Discharge plan for patient and Needs/Barriers: TBD    Estimated Discharge Date: TBD Posted on Whiteboard in Patient’s Room: No      4. 
     dextrose 10 % infusion   IntraVENous Continuous PRN Jim Mcknight MD        insulin glargine (LANTUS) injection vial 26 Units  0.15 Units/kg SubCUTAneous Nightly Jim Mcknight MD   26 Units at 04/01/24 2136    levothyroxine (SYNTHROID) tablet 75 mcg  75 mcg Oral QAM AC Jim Mcknight MD   75 mcg at 04/02/24 0510    acetaminophen (TYLENOL) tablet 650 mg  650 mg Oral Q6H PRN Jim Mcknight MD   650 mg at 03/31/24 2117    ondansetron (ZOFRAN) injection 4 mg  4 mg IntraVENous Q6H PRN Jim Mcknight MD   4 mg at 04/01/24 1719    oxyCODONE (ROXICODONE) immediate release tablet 5 mg  5 mg Oral Q8H PRN Jim Mcknight MD   5 mg at 04/02/24 0510    clopidogrel (PLAVIX) tablet 75 mg  75 mg Oral Daily Jim Mcknight MD   75 mg at 04/02/24 0820    aspirin chewable tablet 81 mg  81 mg Oral Daily Jim Mcknight MD   81 mg at 04/02/24 0820    atorvastatin (LIPITOR) tablet 80 mg  80 mg Oral Daily Jim Mcknight MD   80 mg at 04/02/24 0820    sodium chloride flush 0.9 % injection 5-40 mL  5-40 mL IntraVENous 2 times per day Rafael Kaufman MD   10 mL at 04/02/24 0820    sodium chloride flush 0.9 % injection 5-40 mL  5-40 mL IntraVENous PRN Rafael Kaufman MD        0.9 % sodium chloride infusion   IntraVENous PRN Rafael Kaufman MD        dextrose bolus 10% 125 mL  125 mL IntraVENous PRN Rafael Kaufman MD        albuterol sulfate HFA (PROVENTIL;VENTOLIN;PROAIR) 108 (90 Base) MCG/ACT inhaler 2 puff  2 puff Inhalation Q6H PRN Jim Mcknight MD        budesonide-formoterol (SYMBICORT) 160-4.5 MCG/ACT inhaler 2 puff  2 puff Inhalation BID RT Jim Mcknight MD   2 puff at 04/02/24 0824    citalopram (CELEXA) tablet 40 mg  40 mg Oral Daily Jim Mcknight MD   40 mg at 04/02/24 0820    traZODone (DESYREL) tablet 200 mg  200 mg Oral Nightly Jim Mcknight MD   200 mg at 04/01/24 2136        ?     Neurological Examination:   /70   Pulse 53   Temp 98 °F (36.7 °C) (Oral)   Resp 18   
y.o. White (non-) male who has no known established coronary artery disease status post ASD closure by Amplatz device several years ago in [place] by Dr. Joel Diaz with no recent follow-up. Cardiac enzyme has been unremarkable EKG shows no acute changes and telemetry shows no abnormalities. Patient is complaining of left-sided weakness.  CT scan f the head has been done which shows no acute changes.No acute cardiovascular issue at this time and will continue current conservative medical management with no further cardiovascular testing as it would not change my cardiac management.  Review of Systems:   Negative except for as noted above.    Objective:      Physical Exam:    Last 24hrs VS reviewed since prior progress note. Most recent are:    BP (!) 117/55   Pulse (!) 117   Temp 98.5 °F (36.9 °C) (Oral)   Resp 18   Ht 1.93 m (6' 3.98\")   Wt (!) 170.6 kg (376 lb 1.7 oz)   SpO2 90%   BMI 45.80 kg/m²   No intake or output data in the 24 hours ending 04/02/24 1135       General Appearance: Alert; no acute distress.  Ears/Nose/Mouth/Throat: moist mucous membranes  Neck: Supple.  Chest: Lungs clear to auscultation bilaterally.  Cardiovascular: Regular rate and rhythm, S1S2 normal  Abdomen: Soft, non-tender, bowel sounds are active.  Extremities: No edema bilaterally.  Skin: Warm and dry.      PMH/SH reviewed - no change compared to H&P    Telemetry: SR    EKG:     No valid procedures specified.    [x]  No new EKG for review    Lab Data Personally Reviewed:    No results for input(s): \"WBC\", \"HGB\", \"HCT\", \"PLT\" in the last 72 hours.    No results for input(s): \"INR\", \"APTT\" in the last 72 hours.    Invalid input(s): \"PTP\"     No results for input(s): \"NA\", \"K\", \"CL\", \"CO2\", \"BUN\", \"CREA\", \"GLU\", \"CA\", \"MG\" in the last 72 hours.    No results for input(s): \"CPK\" in the last 72 hours.    Invalid input(s): \"CPKMB\", \"CKNDX\", \"TROIQ\"  Lab Results   Component Value Date/Time    CHOL 134 01/10/2024 05:39 AM    
(PROVENTIL;VENTOLIN;PROAIR) 108 (90 Base) MCG/ACT inhaler 2 puff  2 puff Inhalation Q6H PRN    budesonide-formoterol (SYMBICORT) 160-4.5 MCG/ACT inhaler 2 puff  2 puff Inhalation BID RT    citalopram (CELEXA) tablet 40 mg  40 mg Oral Daily    traZODone (DESYREL) tablet 200 mg  200 mg Oral Nightly         Electronically signed by    Val Smart MD  March 31, 2024   7:20 PM

## 2024-04-05 ENCOUNTER — PREP FOR PROCEDURE (OUTPATIENT)
Age: 49
End: 2024-04-05

## 2024-04-05 ENCOUNTER — TELEPHONE (OUTPATIENT)
Age: 49
End: 2024-04-05

## 2024-04-05 DIAGNOSIS — R22.0 MASS OF SOFT PALATE: ICD-10-CM

## 2024-04-05 DIAGNOSIS — K13.70 ORAL MUCOSAL LESION: ICD-10-CM

## 2024-04-05 NOTE — TELEPHONE ENCOUNTER
I Was able to speak with neurology - Dr LIANA Parra regarding this patient and he is comfortable with us stopping plavix prior to any procedure    I called Luisito and we will plan for DL, biopsy, excision of uvular/ soft palate mass this Wednesday. He will stop Plavix. Ok if he cont baby ASA    Krista Mcnulty MD   [facility]  [address]  [place] [address]  Office Phone: [phone]

## 2024-04-18 ENCOUNTER — TELEPHONE (OUTPATIENT)
Age: 49
End: 2024-04-18

## 2024-04-18 NOTE — TELEPHONE ENCOUNTER
Patient did not show up for surgery for excision / biopsy of right throat mass, despite me confirming with him location and time, as well as my office staff, as well as [facility] hospital staff    Multiple attempts were made to reach him on day of surgery, unsuccessfully as well

## 2024-05-26 LAB
T4 FREE SERPL-MCNC: 0.8 NG/DL
TSH SERPL DL<=0.05 MIU/L-ACNC: 40 UIU/ML (ref 0.36–3.74)

## 2024-06-01 ENCOUNTER — HOSPITAL ENCOUNTER (EMERGENCY)
Facility: HOSPITAL | Age: 49
Discharge: HOME | End: 2024-06-01
Attending: STUDENT IN AN ORGANIZED HEALTH CARE EDUCATION/TRAINING PROGRAM
Payer: MEDICAID

## 2024-06-01 ENCOUNTER — APPOINTMENT (OUTPATIENT)
Facility: HOSPITAL | Age: 49
End: 2024-06-01
Payer: MEDICAID

## 2024-06-01 VITALS
TEMPERATURE: 97.8 F | HEIGHT: 76 IN | HEART RATE: 79 BPM | RESPIRATION RATE: 16 BRPM | WEIGHT: 315 LBS | DIASTOLIC BLOOD PRESSURE: 88 MMHG | OXYGEN SATURATION: 98 % | SYSTOLIC BLOOD PRESSURE: 141 MMHG | BODY MASS INDEX: 38.36 KG/M2

## 2024-06-01 DIAGNOSIS — L03.116 CELLULITIS OF LEFT LOWER EXTREMITY: ICD-10-CM

## 2024-06-01 DIAGNOSIS — M79.605 LEFT LEG PAIN: Primary | ICD-10-CM

## 2024-06-01 LAB
ALBUMIN SERPL-MCNC: 3.1 G/DL (ref 3.5–5)
ALBUMIN/GLOB SERPL: 0.7 (ref 1.1–2.2)
ALP SERPL-CCNC: 92 U/L (ref 45–117)
ALT SERPL-CCNC: 35 U/L (ref 12–78)
ANION GAP SERPL CALC-SCNC: 5 MMOL/L (ref 5–15)
AST SERPL W P-5'-P-CCNC: 15 U/L (ref 15–37)
BASOPHILS # BLD: 0 K/UL (ref 0–0.1)
BASOPHILS NFR BLD: 1 % (ref 0–1)
BILIRUB SERPL-MCNC: 0.2 MG/DL (ref 0.2–1)
BUN SERPL-MCNC: 11 MG/DL (ref 6–20)
BUN/CREAT SERPL: 11 (ref 12–20)
CA-I BLD-MCNC: 8.9 MG/DL (ref 8.5–10.1)
CHLORIDE SERPL-SCNC: 106 MMOL/L (ref 97–108)
CO2 SERPL-SCNC: 28 MMOL/L (ref 21–32)
CREAT SERPL-MCNC: 1.04 MG/DL (ref 0.7–1.3)
DIFFERENTIAL METHOD BLD: NORMAL
EOSINOPHIL # BLD: 0.2 K/UL (ref 0–0.4)
EOSINOPHIL NFR BLD: 4 % (ref 0–7)
ERYTHROCYTE [DISTWIDTH] IN BLOOD BY AUTOMATED COUNT: 13.9 % (ref 11.5–14.5)
GLOBULIN SER CALC-MCNC: 4.4 G/DL (ref 2–4)
GLUCOSE SERPL-MCNC: 133 MG/DL (ref 65–100)
HCT VFR BLD AUTO: 39.9 % (ref 36.6–50.3)
HGB BLD-MCNC: 12.8 G/DL (ref 12.1–17)
IMM GRANULOCYTES # BLD AUTO: 0 K/UL (ref 0–0.04)
IMM GRANULOCYTES NFR BLD AUTO: 0 % (ref 0–0.5)
LACTATE SERPL-SCNC: 2.3 MMOL/L (ref 0.4–2)
LYMPHOCYTES # BLD: 1.2 K/UL (ref 0.8–3.5)
LYMPHOCYTES NFR BLD: 22 % (ref 12–49)
MCH RBC QN AUTO: 31 PG (ref 26–34)
MCHC RBC AUTO-ENTMCNC: 32.1 G/DL (ref 30–36.5)
MCV RBC AUTO: 96.6 FL (ref 80–99)
MONOCYTES # BLD: 0.4 K/UL (ref 0–1)
MONOCYTES NFR BLD: 6 % (ref 5–13)
NEUTS SEG # BLD: 3.9 K/UL (ref 1.8–8)
NEUTS SEG NFR BLD: 67 % (ref 32–75)
NRBC # BLD: 0 K/UL (ref 0–0.01)
NRBC BLD-RTO: 0 PER 100 WBC
PLATELET # BLD AUTO: 211 K/UL (ref 150–400)
PMV BLD AUTO: 9.8 FL (ref 8.9–12.9)
POTASSIUM SERPL-SCNC: 3.7 MMOL/L (ref 3.5–5.1)
PROT SERPL-MCNC: 7.5 G/DL (ref 6.4–8.2)
RBC # BLD AUTO: 4.13 M/UL (ref 4.1–5.7)
SODIUM SERPL-SCNC: 139 MMOL/L (ref 136–145)
WBC # BLD AUTO: 5.7 K/UL (ref 4.1–11.1)

## 2024-06-01 PROCEDURE — 6360000004 HC RX CONTRAST MEDICATION: Performed by: STUDENT IN AN ORGANIZED HEALTH CARE EDUCATION/TRAINING PROGRAM

## 2024-06-01 PROCEDURE — 96374 THER/PROPH/DIAG INJ IV PUSH: CPT

## 2024-06-01 PROCEDURE — 87040 BLOOD CULTURE FOR BACTERIA: CPT

## 2024-06-01 PROCEDURE — 80053 COMPREHEN METABOLIC PANEL: CPT

## 2024-06-01 PROCEDURE — 73701 CT LOWER EXTREMITY W/DYE: CPT

## 2024-06-01 PROCEDURE — 6360000002 HC RX W HCPCS: Performed by: STUDENT IN AN ORGANIZED HEALTH CARE EDUCATION/TRAINING PROGRAM

## 2024-06-01 PROCEDURE — 85025 COMPLETE CBC W/AUTO DIFF WBC: CPT

## 2024-06-01 PROCEDURE — 83605 ASSAY OF LACTIC ACID: CPT

## 2024-06-01 PROCEDURE — 36415 COLL VENOUS BLD VENIPUNCTURE: CPT

## 2024-06-01 PROCEDURE — 99285 EMERGENCY DEPT VISIT HI MDM: CPT

## 2024-06-01 RX ORDER — OXYCODONE HYDROCHLORIDE AND ACETAMINOPHEN 5; 325 MG/1; MG/1
1 TABLET ORAL EVERY 6 HOURS PRN
Qty: 12 TABLET | Refills: 0 | Status: SHIPPED | OUTPATIENT
Start: 2024-06-01 | End: 2024-06-04

## 2024-06-01 RX ORDER — SULFAMETHOXAZOLE AND TRIMETHOPRIM 800; 160 MG/1; MG/1
2 TABLET ORAL 2 TIMES DAILY
Qty: 28 TABLET | Refills: 0 | Status: SHIPPED | OUTPATIENT
Start: 2024-06-01 | End: 2024-06-08

## 2024-06-01 RX ORDER — MORPHINE SULFATE 4 MG/ML
4 INJECTION, SOLUTION INTRAMUSCULAR; INTRAVENOUS
Status: COMPLETED | OUTPATIENT
Start: 2024-06-01 | End: 2024-06-01

## 2024-06-01 RX ADMIN — MORPHINE SULFATE 4 MG: 4 INJECTION INTRAVENOUS at 11:12

## 2024-06-01 RX ADMIN — IOPAMIDOL 100 ML: 755 INJECTION, SOLUTION INTRAVENOUS at 12:36

## 2024-06-01 ASSESSMENT — PAIN SCALES - GENERAL
PAINLEVEL_OUTOF10: 8
PAINLEVEL_OUTOF10: 10
PAINLEVEL_OUTOF10: 6

## 2024-06-01 ASSESSMENT — PAIN - FUNCTIONAL ASSESSMENT: PAIN_FUNCTIONAL_ASSESSMENT: 0-10

## 2024-06-01 NOTE — ED NOTES
Pt demand  be called. Reports when he saw  x2 days ago he told him to come to hospital for further eval and have hospital \"to call him as soon as I got here\". Call plcaed to  - no answer.

## 2024-06-01 NOTE — ED NOTES
DP & PT pulse of L foot audible c handheld doppler - pt moves c initial touch but tolerated looking for pulse s grimacing or voice complaint. Able to move L foot and toes s difficulty observed. L foot and toes pink c brisk cap refill, good PMS. Per  pt informed of attempting to contact

## 2024-06-01 NOTE — ED PROVIDER NOTES
as: SYNTHROID  Take 1 tablet by mouth every morning (before breakfast)     metFORMIN 500 MG tablet  Commonly known as: GLUCOPHAGE     traZODone 100 MG tablet  Commonly known as: DESYREL  Take 2 tablets by mouth nightly               Where to Get Your Medications        These medications were sent to [id number] - [facility] [address] [place] [address]. - P [phone] - F [phone] [address] [facility], [facility] [address]      Phone: [phone]   oxyCODONE-acetaminophen 5-325 MG per tablet  sulfamethoxazole-trimethoprim 800-160 MG per tablet           DISCONTINUED MEDICATIONS:  Discharge Medication List as of 6/1/2024  2:58 PM          I am the Primary Clinician of Record: Dedrick Nicolas MD (electronically signed)    (Please note that parts of this dictation were completed with voice recognition software. Quite often unanticipated grammatical, syntax, homophones, and other interpretive errors are inadvertently transcribed by the computer software. Please disregards these errors. Please excuse any errors that have escaped final proofreading.)     Dedrick Nicolas MD  06/02/24 0781

## 2024-06-01 NOTE — ED TRIAGE NOTES
Left leg pain, Dr. Coleman has been following for possible infection, treated with oral antibiotics with no improvement, unable to put weight on his leg, with associated swelling and redness

## 2024-06-02 LAB
BACTERIA SPEC CULT: NORMAL
BACTERIA SPEC CULT: NORMAL
Lab: NORMAL
Lab: NORMAL

## 2024-06-07 LAB
BACTERIA SPEC CULT: NORMAL
BACTERIA SPEC CULT: NORMAL
Lab: NORMAL
Lab: NORMAL

## 2024-06-10 ENCOUNTER — HOSPITAL ENCOUNTER (EMERGENCY)
Facility: HOSPITAL | Age: 49
Discharge: HOME | End: 2024-06-10
Payer: MEDICAID

## 2024-06-10 ENCOUNTER — APPOINTMENT (OUTPATIENT)
Facility: HOSPITAL | Age: 49
End: 2024-06-10
Payer: MEDICAID

## 2024-06-10 VITALS
SYSTOLIC BLOOD PRESSURE: 158 MMHG | OXYGEN SATURATION: 99 % | HEART RATE: 83 BPM | DIASTOLIC BLOOD PRESSURE: 68 MMHG | HEIGHT: 76 IN | BODY MASS INDEX: 38.36 KG/M2 | RESPIRATION RATE: 20 BRPM | WEIGHT: 315 LBS | TEMPERATURE: 98.2 F

## 2024-06-10 DIAGNOSIS — R07.9 CHEST PAIN, UNSPECIFIED TYPE: Primary | ICD-10-CM

## 2024-06-10 LAB
ALBUMIN SERPL-MCNC: 3.1 G/DL (ref 3.5–5)
ALBUMIN/GLOB SERPL: 0.7 (ref 1.1–2.2)
ALP SERPL-CCNC: 86 U/L (ref 45–117)
ALT SERPL-CCNC: 40 U/L (ref 12–78)
ANION GAP SERPL CALC-SCNC: 3 MMOL/L (ref 5–15)
AST SERPL W P-5'-P-CCNC: 71 U/L (ref 15–37)
BASOPHILS # BLD: 0 K/UL (ref 0–0.1)
BASOPHILS NFR BLD: 1 % (ref 0–1)
BILIRUB SERPL-MCNC: 0.3 MG/DL (ref 0.2–1)
BUN SERPL-MCNC: 10 MG/DL (ref 6–20)
BUN/CREAT SERPL: 8 (ref 12–20)
CA-I BLD-MCNC: 8.7 MG/DL (ref 8.5–10.1)
CHLORIDE SERPL-SCNC: 105 MMOL/L (ref 97–108)
CO2 SERPL-SCNC: 28 MMOL/L (ref 21–32)
CREAT SERPL-MCNC: 1.21 MG/DL (ref 0.7–1.3)
DIFFERENTIAL METHOD BLD: NORMAL
EKG ATRIAL RATE: 111 BPM
EKG DIAGNOSIS: NORMAL
EKG P AXIS: 34 DEGREES
EKG P-R INTERVAL: 180 MS
EKG Q-T INTERVAL: 354 MS
EKG QRS DURATION: 100 MS
EKG QTC CALCULATION (BAZETT): 481 MS
EKG R AXIS: -9 DEGREES
EKG T AXIS: 35 DEGREES
EKG VENTRICULAR RATE: 111 BPM
EOSINOPHIL # BLD: 0.2 K/UL (ref 0–0.4)
EOSINOPHIL NFR BLD: 3 % (ref 0–7)
ERYTHROCYTE [DISTWIDTH] IN BLOOD BY AUTOMATED COUNT: 14.2 % (ref 11.5–14.5)
GLOBULIN SER CALC-MCNC: 4.7 G/DL (ref 2–4)
GLUCOSE SERPL-MCNC: 125 MG/DL (ref 65–100)
HCT VFR BLD AUTO: 40.5 % (ref 36.6–50.3)
HGB BLD-MCNC: 13.1 G/DL (ref 12.1–17)
IMM GRANULOCYTES # BLD AUTO: 0 K/UL (ref 0–0.04)
IMM GRANULOCYTES NFR BLD AUTO: 0 % (ref 0–0.5)
LYMPHOCYTES # BLD: 1.3 K/UL (ref 0.8–3.5)
LYMPHOCYTES NFR BLD: 21 % (ref 12–49)
MCH RBC QN AUTO: 30.8 PG (ref 26–34)
MCHC RBC AUTO-ENTMCNC: 32.3 G/DL (ref 30–36.5)
MCV RBC AUTO: 95.3 FL (ref 80–99)
MONOCYTES # BLD: 0.4 K/UL (ref 0–1)
MONOCYTES NFR BLD: 6 % (ref 5–13)
NEUTS SEG # BLD: 4.4 K/UL (ref 1.8–8)
NEUTS SEG NFR BLD: 69 % (ref 32–75)
NRBC # BLD: 0 K/UL (ref 0–0.01)
NRBC BLD-RTO: 0 PER 100 WBC
PLATELET # BLD AUTO: 217 K/UL (ref 150–400)
PMV BLD AUTO: 11 FL (ref 8.9–12.9)
POTASSIUM SERPL-SCNC: 4 MMOL/L (ref 3.5–5.1)
POTASSIUM SERPL-SCNC: 6.4 MMOL/L (ref 3.5–5.1)
PROT SERPL-MCNC: 7.8 G/DL (ref 6.4–8.2)
RBC # BLD AUTO: 4.25 M/UL (ref 4.1–5.7)
SODIUM SERPL-SCNC: 136 MMOL/L (ref 136–145)
TROPONIN I SERPL HS-MCNC: 5 NG/L (ref 0–76)
TROPONIN I SERPL HS-MCNC: 6 NG/L (ref 0–76)
WBC # BLD AUTO: 6.3 K/UL (ref 4.1–11.1)

## 2024-06-10 PROCEDURE — 96374 THER/PROPH/DIAG INJ IV PUSH: CPT

## 2024-06-10 PROCEDURE — 71046 X-RAY EXAM CHEST 2 VIEWS: CPT

## 2024-06-10 PROCEDURE — 80053 COMPREHEN METABOLIC PANEL: CPT

## 2024-06-10 PROCEDURE — 99285 EMERGENCY DEPT VISIT HI MDM: CPT

## 2024-06-10 PROCEDURE — 96375 TX/PRO/DX INJ NEW DRUG ADDON: CPT

## 2024-06-10 PROCEDURE — 93005 ELECTROCARDIOGRAM TRACING: CPT | Performed by: NURSE PRACTITIONER

## 2024-06-10 PROCEDURE — 36415 COLL VENOUS BLD VENIPUNCTURE: CPT

## 2024-06-10 PROCEDURE — 85025 COMPLETE CBC W/AUTO DIFF WBC: CPT

## 2024-06-10 PROCEDURE — 84132 ASSAY OF SERUM POTASSIUM: CPT

## 2024-06-10 PROCEDURE — 84484 ASSAY OF TROPONIN QUANT: CPT

## 2024-06-10 PROCEDURE — 6360000002 HC RX W HCPCS: Performed by: NURSE PRACTITIONER

## 2024-06-10 RX ORDER — OXYCODONE HYDROCHLORIDE 5 MG/1
5 TABLET ORAL EVERY 6 HOURS PRN
Qty: 12 TABLET | Refills: 0 | Status: SHIPPED | OUTPATIENT
Start: 2024-06-10 | End: 2024-06-13

## 2024-06-10 RX ORDER — MORPHINE SULFATE 4 MG/ML
4 INJECTION, SOLUTION INTRAMUSCULAR; INTRAVENOUS
Status: COMPLETED | OUTPATIENT
Start: 2024-06-10 | End: 2024-06-10

## 2024-06-10 RX ORDER — ONDANSETRON 2 MG/ML
4 INJECTION INTRAMUSCULAR; INTRAVENOUS ONCE
Status: COMPLETED | OUTPATIENT
Start: 2024-06-10 | End: 2024-06-10

## 2024-06-10 RX ADMIN — MORPHINE SULFATE 4 MG: 4 INJECTION, SOLUTION INTRAMUSCULAR; INTRAVENOUS at 09:17

## 2024-06-10 RX ADMIN — ONDANSETRON 4 MG: 2 INJECTION INTRAMUSCULAR; INTRAVENOUS at 09:17

## 2024-06-10 ASSESSMENT — PAIN SCALES - GENERAL
PAINLEVEL_OUTOF10: 9
PAINLEVEL_OUTOF10: 0

## 2024-06-10 ASSESSMENT — PAIN - FUNCTIONAL ASSESSMENT
PAIN_FUNCTIONAL_ASSESSMENT: 0-10
PAIN_FUNCTIONAL_ASSESSMENT: 0-10

## 2024-06-10 ASSESSMENT — PAIN DESCRIPTION - LOCATION: LOCATION: ARM

## 2024-06-10 NOTE — ED PROVIDER NOTES
[facility] EMERGENCY DEPT  EMERGENCY DEPARTMENT HISTORY AND PHYSICAL EXAM      Date: 6/10/2024  Patient Name: Ye Joseph  MRN: [id number]  YOB: 1975  Date of evaluation: 6/10/2024  Provider: ADALI Jiménez NP   Note Started: 8:12 AM EDT 6/10/24    HISTORY OF PRESENT ILLNESS     Chief Complaint   Patient presents with    Chest Pain    Shortness of Breath       History Provided By: Patient    HPI: Ye Joseph is a 48 y.o. male with past medical history as listed below presents to the ER for chest pain.  Patient began having chest pain yesterday.  Patient states it worsened overnight.  Patient comes in for evaluation.    PAST MEDICAL HISTORY   Past Medical History:  Past Medical History:   Diagnosis Date    Acute deep vein thrombosis (DVT) (HCC)     behind left knee    Acute MI (HCC)     Arthritis     CHF (congestive heart failure) (HCC)     COPD (chronic obstructive pulmonary disease) (HCC)     Depression     Diabetes mellitus (HCC)     Heart attack (HCC)     2023    Hx of blood clots     legs    Hypertension     Ill-defined condition     Stroke (HCC)        Past Surgical History:  Past Surgical History:   Procedure Laterality Date    HERNIA REPAIR      IR FNA WITH IMAGING      ORTHOPEDIC SURGERY Right     rotator cuff surgery x2    OTHER SURGICAL HISTORY      Patent Foramen Ovale Repair    THYROIDECTOMY         Family History:  Family History   Problem Relation Age of Onset    Diabetes Maternal Uncle     Hypertension Maternal Uncle     Hypertension Paternal Uncle     Diabetes Paternal Uncle     Cancer Other     No Known Problems Father     Cancer Mother        Social History:  Social History     Tobacco Use    Smoking status: Former    Smokeless tobacco: Never   Vaping Use    Vaping Use: Never used   Substance Use Topics    Alcohol use: Yes     Comment: 2    Drug use: Not Currently       Allergies:  Allergies   Allergen Reactions    Vancomycin Hives     Gabrielle syndrome    Zofran

## 2024-06-11 ASSESSMENT — HEART SCORE: ECG: NORMAL

## 2024-06-14 ENCOUNTER — HOSPITAL ENCOUNTER (EMERGENCY)
Facility: HOSPITAL | Age: 49
Discharge: HOME | End: 2024-06-14
Attending: EMERGENCY MEDICINE
Payer: MEDICAID

## 2024-06-14 VITALS
HEART RATE: 89 BPM | SYSTOLIC BLOOD PRESSURE: 138 MMHG | DIASTOLIC BLOOD PRESSURE: 79 MMHG | BODY MASS INDEX: 38.36 KG/M2 | HEIGHT: 76 IN | OXYGEN SATURATION: 95 % | WEIGHT: 315 LBS | RESPIRATION RATE: 18 BRPM | TEMPERATURE: 98.1 F

## 2024-06-14 DIAGNOSIS — T78.40XA ACUTE ALLERGIC REACTION, INITIAL ENCOUNTER: Primary | ICD-10-CM

## 2024-06-14 LAB — DEPRECATED S PYO AG THROAT QL EIA: NEGATIVE

## 2024-06-14 PROCEDURE — 87880 STREP A ASSAY W/OPTIC: CPT

## 2024-06-14 PROCEDURE — 6370000000 HC RX 637 (ALT 250 FOR IP): Performed by: EMERGENCY MEDICINE

## 2024-06-14 PROCEDURE — 87070 CULTURE OTHR SPECIMN AEROBIC: CPT

## 2024-06-14 PROCEDURE — 99283 EMERGENCY DEPT VISIT LOW MDM: CPT

## 2024-06-14 RX ORDER — PREDNISONE 50 MG/1
50 TABLET ORAL DAILY
Qty: 5 TABLET | Refills: 0 | Status: SHIPPED | OUTPATIENT
Start: 2024-06-15 | End: 2024-06-20

## 2024-06-14 RX ORDER — PREDNISONE 20 MG/1
60 TABLET ORAL
Status: COMPLETED | OUTPATIENT
Start: 2024-06-14 | End: 2024-06-14

## 2024-06-14 RX ORDER — FAMOTIDINE 20 MG/1
20 TABLET, FILM COATED ORAL 2 TIMES DAILY
Qty: 10 TABLET | Refills: 0 | Status: SHIPPED | OUTPATIENT
Start: 2024-06-14

## 2024-06-14 RX ORDER — DIPHENHYDRAMINE HCL 25 MG
25 TABLET ORAL EVERY 6 HOURS PRN
Qty: 20 TABLET | Refills: 0 | Status: SHIPPED | OUTPATIENT
Start: 2024-06-14

## 2024-06-14 RX ORDER — FAMOTIDINE 20 MG/1
20 TABLET, FILM COATED ORAL
Status: COMPLETED | OUTPATIENT
Start: 2024-06-14 | End: 2024-06-14

## 2024-06-14 RX ORDER — FAMOTIDINE 20 MG/1
20 TABLET, FILM COATED ORAL 2 TIMES DAILY
Status: DISCONTINUED | OUTPATIENT
Start: 2024-06-14 | End: 2024-06-14

## 2024-06-14 RX ADMIN — FAMOTIDINE 20 MG: 20 TABLET, FILM COATED ORAL at 17:14

## 2024-06-14 RX ADMIN — PREDNISONE 60 MG: 20 TABLET ORAL at 17:14

## 2024-06-14 ASSESSMENT — PAIN - FUNCTIONAL ASSESSMENT: PAIN_FUNCTIONAL_ASSESSMENT: NONE - DENIES PAIN

## 2024-06-14 NOTE — DISCHARGE INSTRUCTIONS
Thank you for choosing our Emergency Department for your care.  It is our privilege to care for you in your time of need.  In the next several days, you may receive a survey via email or mailed to your home about your experience with our team.  We would greatly appreciate you taking a few minutes to complete the survey, as we use this information to learn what we have done well and what we could be doing better. Thank you for trusting us with your care!    Below you will find a list of your tests from today's visit.   Labs  Recent Results (from the past 12 hour(s))   Rapid Strep Screen    Collection Time: 06/14/24  4:47 PM    Specimen: Blood Serum; Throat   Result Value Ref Range    Strep A Ag Negative Negative         Radiologic Studies  No orders to display     ------------------------------------------------------------------------------------------------------------  The evaluation and treatment you received in the Emergency Department were for an urgent problem. It is important that you follow-up with a doctor, nurse practitioner, or physician assistant to:  (1) confirm your diagnosis,  (2) re-evaluation of changes in your illness and treatment, and (3) for ongoing care. Please take your discharge instructions with you when you go to your follow-up appointment.     If you have any problem arranging a follow-up appointment, contact us!  If your symptoms become worse or you do not improve as expected, please return to us. We are available 24 hours a day.     If a prescription has been provided, please fill it as soon as possible to prevent a delay in treatment. If you have any questions or reservations about taking the medication due to side effects or interactions with other medications, please call your primary care provider or contact us directly.  Again, THANK YOU for choosing us to care for YOU!

## 2024-06-14 NOTE — ED TRIAGE NOTES
Pt states the left side of his face was a little swollen last night, now worse this morning. Thinks it may be from pulling weeds two days ago

## 2024-06-14 NOTE — ED PROVIDER NOTES
medications      albuterol sulfate  (90 Base) MCG/ACT inhaler  Commonly known as: Ventolin HFA  Inhale 2 puffs into the lungs every 6 hours as needed for Wheezing     aspirin 81 MG chewable tablet  Take 1 tablet by mouth daily     atorvastatin 80 MG tablet  Commonly known as: LIPITOR  Take 1 tablet by mouth daily     budesonide-formoterol 160-4.5 MCG/ACT Aero  Commonly known as: SYMBICORT  Inhale 2 puffs into the lungs in the morning and 2 puffs in the evening.     citalopram 40 MG tablet  Commonly known as: CELEXA  Take 1 tablet by mouth daily     clopidogrel 75 MG tablet  Commonly known as: PLAVIX  Take 1 tablet by mouth daily     empagliflozin 10 MG tablet  Commonly known as: JARDIANCE  Take 1 tablet by mouth daily     gabapentin 600 MG tablet  Commonly known as: NEURONTIN  Take 2 tablets by mouth at bedtime for 3 days. Max Daily Amount: 1,200 mg     levothyroxine 75 MCG tablet  Commonly known as: SYNTHROID  Take 1 tablet by mouth every morning (before breakfast)     metFORMIN 500 MG tablet  Commonly known as: GLUCOPHAGE     traZODone 100 MG tablet  Commonly known as: DESYREL  Take 2 tablets by mouth nightly               Where to Get Your Medications        These medications were sent to [facility] [address] [place] [address] - P [phone] - F [phone]  [address] [place] [address]      Phone: [phone]   diphenhydrAMINE 25 MG tablet  famotidine 20 MG tablet  predniSONE 50 MG tablet           DISCONTINUED MEDICATIONS:  Current Discharge Medication List          I am the Primary Clinician of Record. Mohit Rogel MD (electronically signed)    (Please note that parts of this dictation were completed with voice recognition software. Quite often unanticipated grammatical, syntax, homophones, and other interpretive errors are inadvertently transcribed by the computer software. Please disregards these errors. Please excuse any errors that have

## 2024-06-16 LAB
BACTERIA SPEC CULT: NORMAL
Lab: NORMAL

## 2024-08-23 ENCOUNTER — HOSPITAL ENCOUNTER (OUTPATIENT)
Facility: HOSPITAL | Age: 49
End: 2024-08-23
Payer: MEDICAID

## 2024-08-23 DIAGNOSIS — R13.10 DYSPHAGIA, UNSPECIFIED TYPE: ICD-10-CM

## 2024-08-23 DIAGNOSIS — J35.8 TONSILLAR MASS: ICD-10-CM

## 2024-08-23 DIAGNOSIS — Z78.9 NON-SMOKER: ICD-10-CM

## 2024-08-23 PROCEDURE — 6360000004 HC RX CONTRAST MEDICATION: Performed by: OTOLARYNGOLOGY

## 2024-08-23 PROCEDURE — 70491 CT SOFT TISSUE NECK W/DYE: CPT

## 2024-08-23 RX ORDER — IOPAMIDOL 755 MG/ML
100 INJECTION, SOLUTION INTRAVASCULAR
Status: COMPLETED | OUTPATIENT
Start: 2024-08-23 | End: 2024-08-23

## 2024-08-23 RX ADMIN — IOPAMIDOL 100 ML: 755 INJECTION, SOLUTION INTRAVENOUS at 17:22

## 2024-09-02 ENCOUNTER — HOSPITAL ENCOUNTER (INPATIENT)
Facility: HOSPITAL | Age: 49
LOS: 2 days | Discharge: HOME | DRG: 203 | End: 2024-09-04
Attending: FAMILY MEDICINE | Admitting: FAMILY MEDICINE
Payer: MEDICAID

## 2024-09-02 ENCOUNTER — APPOINTMENT (OUTPATIENT)
Facility: HOSPITAL | Age: 49
DRG: 203 | End: 2024-09-02
Payer: MEDICAID

## 2024-09-02 DIAGNOSIS — R07.9 CHEST PAIN, UNSPECIFIED TYPE: ICD-10-CM

## 2024-09-02 DIAGNOSIS — I50.9 ACUTE ON CHRONIC HEART FAILURE, UNSPECIFIED HEART FAILURE TYPE (HCC): Primary | ICD-10-CM

## 2024-09-02 LAB
ALBUMIN SERPL-MCNC: 3.9 G/DL (ref 3.5–5)
ALBUMIN/GLOB SERPL: 0.9 (ref 1.1–2.2)
ALP SERPL-CCNC: 107 U/L (ref 45–117)
ALT SERPL-CCNC: 25 U/L (ref 12–78)
ANION GAP SERPL CALC-SCNC: 6 MMOL/L (ref 5–15)
AST SERPL W P-5'-P-CCNC: 16 U/L (ref 15–37)
BASOPHILS # BLD: 0 K/UL (ref 0–0.1)
BASOPHILS NFR BLD: 1 % (ref 0–1)
BILIRUB SERPL-MCNC: 0.5 MG/DL (ref 0.2–1)
BNP SERPL-MCNC: 78 PG/ML
BUN SERPL-MCNC: 9 MG/DL (ref 6–20)
BUN/CREAT SERPL: 8 (ref 12–20)
CA-I BLD-MCNC: 9.2 MG/DL (ref 8.5–10.1)
CHLORIDE SERPL-SCNC: 105 MMOL/L (ref 97–108)
CO2 SERPL-SCNC: 29 MMOL/L (ref 21–32)
CREAT SERPL-MCNC: 1.1 MG/DL (ref 0.7–1.3)
DIFFERENTIAL METHOD BLD: NORMAL
EKG ATRIAL RATE: 100 BPM
EKG DIAGNOSIS: NORMAL
EKG P AXIS: 32 DEGREES
EKG P-R INTERVAL: 178 MS
EKG Q-T INTERVAL: 390 MS
EKG QRS DURATION: 108 MS
EKG QTC CALCULATION (BAZETT): 503 MS
EKG R AXIS: 2 DEGREES
EKG T AXIS: 37 DEGREES
EKG VENTRICULAR RATE: 100 BPM
EOSINOPHIL # BLD: 0.1 K/UL (ref 0–0.4)
EOSINOPHIL NFR BLD: 2 % (ref 0–7)
ERYTHROCYTE [DISTWIDTH] IN BLOOD BY AUTOMATED COUNT: 13.6 % (ref 11.5–14.5)
GLOBULIN SER CALC-MCNC: 4.3 G/DL (ref 2–4)
GLUCOSE BLD STRIP.AUTO-MCNC: 117 MG/DL (ref 65–100)
GLUCOSE SERPL-MCNC: 112 MG/DL (ref 65–100)
HCT VFR BLD AUTO: 44.3 % (ref 36.6–50.3)
HGB BLD-MCNC: 15 G/DL (ref 12.1–17)
IMM GRANULOCYTES # BLD AUTO: 0 K/UL (ref 0–0.04)
IMM GRANULOCYTES NFR BLD AUTO: 0 % (ref 0–0.5)
LYMPHOCYTES # BLD: 1.5 K/UL (ref 0.8–3.5)
LYMPHOCYTES NFR BLD: 23 % (ref 12–49)
MCH RBC QN AUTO: 31.9 PG (ref 26–34)
MCHC RBC AUTO-ENTMCNC: 33.9 G/DL (ref 30–36.5)
MCV RBC AUTO: 94.3 FL (ref 80–99)
MONOCYTES # BLD: 0.4 K/UL (ref 0–1)
MONOCYTES NFR BLD: 6 % (ref 5–13)
NEUTS SEG # BLD: 4.4 K/UL (ref 1.8–8)
NEUTS SEG NFR BLD: 68 % (ref 32–75)
NRBC # BLD: 0 K/UL (ref 0–0.01)
NRBC BLD-RTO: 0 PER 100 WBC
PERFORMED BY:: ABNORMAL
PLATELET # BLD AUTO: 211 K/UL (ref 150–400)
PMV BLD AUTO: 9.8 FL (ref 8.9–12.9)
POTASSIUM SERPL-SCNC: 3.7 MMOL/L (ref 3.5–5.1)
PROT SERPL-MCNC: 8.2 G/DL (ref 6.4–8.2)
RBC # BLD AUTO: 4.7 M/UL (ref 4.1–5.7)
SODIUM SERPL-SCNC: 140 MMOL/L (ref 136–145)
TROPONIN I SERPL HS-MCNC: 4 NG/L (ref 0–76)
TROPONIN I SERPL HS-MCNC: 5 NG/L (ref 0–76)
TROPONIN I SERPL HS-MCNC: 5 NG/L (ref 0–76)
WBC # BLD AUTO: 6.5 K/UL (ref 4.1–11.1)

## 2024-09-02 PROCEDURE — 80053 COMPREHEN METABOLIC PANEL: CPT

## 2024-09-02 PROCEDURE — 2580000003 HC RX 258: Performed by: FAMILY MEDICINE

## 2024-09-02 PROCEDURE — 6360000002 HC RX W HCPCS: Performed by: NURSE PRACTITIONER

## 2024-09-02 PROCEDURE — 83036 HEMOGLOBIN GLYCOSYLATED A1C: CPT

## 2024-09-02 PROCEDURE — 96375 TX/PRO/DX INJ NEW DRUG ADDON: CPT

## 2024-09-02 PROCEDURE — 99285 EMERGENCY DEPT VISIT HI MDM: CPT

## 2024-09-02 PROCEDURE — 85025 COMPLETE CBC W/AUTO DIFF WBC: CPT

## 2024-09-02 PROCEDURE — 6360000002 HC RX W HCPCS: Performed by: INTERNAL MEDICINE

## 2024-09-02 PROCEDURE — 83880 ASSAY OF NATRIURETIC PEPTIDE: CPT

## 2024-09-02 PROCEDURE — 36415 COLL VENOUS BLD VENIPUNCTURE: CPT

## 2024-09-02 PROCEDURE — 93005 ELECTROCARDIOGRAM TRACING: CPT | Performed by: FAMILY MEDICINE

## 2024-09-02 PROCEDURE — 6360000002 HC RX W HCPCS: Performed by: FAMILY MEDICINE

## 2024-09-02 PROCEDURE — 6370000000 HC RX 637 (ALT 250 FOR IP): Performed by: INTERNAL MEDICINE

## 2024-09-02 PROCEDURE — 82962 GLUCOSE BLOOD TEST: CPT

## 2024-09-02 PROCEDURE — 2060000000 HC ICU INTERMEDIATE R&B

## 2024-09-02 PROCEDURE — 71045 X-RAY EXAM CHEST 1 VIEW: CPT

## 2024-09-02 PROCEDURE — 84484 ASSAY OF TROPONIN QUANT: CPT

## 2024-09-02 PROCEDURE — 96374 THER/PROPH/DIAG INJ IV PUSH: CPT

## 2024-09-02 PROCEDURE — 6370000000 HC RX 637 (ALT 250 FOR IP): Performed by: FAMILY MEDICINE

## 2024-09-02 RX ORDER — CLOPIDOGREL BISULFATE 75 MG/1
75 TABLET ORAL DAILY
Status: DISCONTINUED | OUTPATIENT
Start: 2024-09-03 | End: 2024-09-03

## 2024-09-02 RX ORDER — POTASSIUM CHLORIDE 1500 MG/1
40 TABLET, EXTENDED RELEASE ORAL PRN
Status: DISCONTINUED | OUTPATIENT
Start: 2024-09-02 | End: 2024-09-04 | Stop reason: HOSPADM

## 2024-09-02 RX ORDER — BUDESONIDE AND FORMOTEROL FUMARATE DIHYDRATE 160; 4.5 UG/1; UG/1
2 AEROSOL RESPIRATORY (INHALATION)
Status: DISCONTINUED | OUTPATIENT
Start: 2024-09-02 | End: 2024-09-04 | Stop reason: HOSPADM

## 2024-09-02 RX ORDER — BUMETANIDE 0.25 MG/ML
0.5 INJECTION INTRAMUSCULAR; INTRAVENOUS DAILY
Status: DISCONTINUED | OUTPATIENT
Start: 2024-09-03 | End: 2024-09-04 | Stop reason: HOSPADM

## 2024-09-02 RX ORDER — ASPIRIN 81 MG/1
81 TABLET, CHEWABLE ORAL DAILY
Status: DISCONTINUED | OUTPATIENT
Start: 2024-09-03 | End: 2024-09-04 | Stop reason: HOSPADM

## 2024-09-02 RX ORDER — MORPHINE SULFATE 4 MG/ML
4 INJECTION, SOLUTION INTRAMUSCULAR; INTRAVENOUS
Status: COMPLETED | OUTPATIENT
Start: 2024-09-02 | End: 2024-09-02

## 2024-09-02 RX ORDER — BUMETANIDE 0.25 MG/ML
0.5 INJECTION INTRAMUSCULAR; INTRAVENOUS
Status: COMPLETED | OUTPATIENT
Start: 2024-09-02 | End: 2024-09-02

## 2024-09-02 RX ORDER — LEVOTHYROXINE SODIUM 75 UG/1
75 TABLET ORAL
Status: DISCONTINUED | OUTPATIENT
Start: 2024-09-03 | End: 2024-09-04 | Stop reason: HOSPADM

## 2024-09-02 RX ORDER — POTASSIUM CHLORIDE 7.45 MG/ML
10 INJECTION INTRAVENOUS PRN
Status: DISCONTINUED | OUTPATIENT
Start: 2024-09-02 | End: 2024-09-04 | Stop reason: HOSPADM

## 2024-09-02 RX ORDER — ONDANSETRON 2 MG/ML
4 INJECTION INTRAMUSCULAR; INTRAVENOUS ONCE
Status: COMPLETED | OUTPATIENT
Start: 2024-09-02 | End: 2024-09-02

## 2024-09-02 RX ORDER — SODIUM CHLORIDE 9 MG/ML
INJECTION, SOLUTION INTRAVENOUS PRN
Status: DISCONTINUED | OUTPATIENT
Start: 2024-09-02 | End: 2024-09-04 | Stop reason: HOSPADM

## 2024-09-02 RX ORDER — POLYETHYLENE GLYCOL 3350 17 G/17G
17 POWDER, FOR SOLUTION ORAL DAILY PRN
Status: DISCONTINUED | OUTPATIENT
Start: 2024-09-02 | End: 2024-09-04 | Stop reason: HOSPADM

## 2024-09-02 RX ORDER — INSULIN LISPRO 100 [IU]/ML
0-4 INJECTION, SOLUTION INTRAVENOUS; SUBCUTANEOUS NIGHTLY
Status: DISCONTINUED | OUTPATIENT
Start: 2024-09-02 | End: 2024-09-04 | Stop reason: HOSPADM

## 2024-09-02 RX ORDER — KETOROLAC TROMETHAMINE 15 MG/ML
30 INJECTION, SOLUTION INTRAMUSCULAR; INTRAVENOUS
Status: COMPLETED | OUTPATIENT
Start: 2024-09-02 | End: 2024-09-02

## 2024-09-02 RX ORDER — SODIUM CHLORIDE 0.9 % (FLUSH) 0.9 %
5-40 SYRINGE (ML) INJECTION EVERY 12 HOURS SCHEDULED
Status: DISCONTINUED | OUTPATIENT
Start: 2024-09-02 | End: 2024-09-04 | Stop reason: HOSPADM

## 2024-09-02 RX ORDER — ACETAMINOPHEN 650 MG/1
650 SUPPOSITORY RECTAL EVERY 6 HOURS PRN
Status: DISCONTINUED | OUTPATIENT
Start: 2024-09-02 | End: 2024-09-04 | Stop reason: HOSPADM

## 2024-09-02 RX ORDER — ACETAMINOPHEN 325 MG/1
650 TABLET ORAL EVERY 6 HOURS PRN
Status: DISCONTINUED | OUTPATIENT
Start: 2024-09-02 | End: 2024-09-04 | Stop reason: HOSPADM

## 2024-09-02 RX ORDER — ONDANSETRON 4 MG/1
4 TABLET, ORALLY DISINTEGRATING ORAL EVERY 8 HOURS PRN
Status: DISCONTINUED | OUTPATIENT
Start: 2024-09-02 | End: 2024-09-04 | Stop reason: HOSPADM

## 2024-09-02 RX ORDER — CITALOPRAM HYDROBROMIDE 20 MG/1
40 TABLET ORAL DAILY
Status: DISCONTINUED | OUTPATIENT
Start: 2024-09-03 | End: 2024-09-04 | Stop reason: HOSPADM

## 2024-09-02 RX ORDER — INSULIN LISPRO 100 [IU]/ML
0-16 INJECTION, SOLUTION INTRAVENOUS; SUBCUTANEOUS
Status: DISCONTINUED | OUTPATIENT
Start: 2024-09-03 | End: 2024-09-04 | Stop reason: HOSPADM

## 2024-09-02 RX ORDER — ENOXAPARIN SODIUM 100 MG/ML
40 INJECTION SUBCUTANEOUS 2 TIMES DAILY
Status: DISCONTINUED | OUTPATIENT
Start: 2024-09-02 | End: 2024-09-04 | Stop reason: HOSPADM

## 2024-09-02 RX ORDER — ATORVASTATIN CALCIUM 40 MG/1
80 TABLET, FILM COATED ORAL DAILY
Status: DISCONTINUED | OUTPATIENT
Start: 2024-09-03 | End: 2024-09-03

## 2024-09-02 RX ORDER — BUDESONIDE AND FORMOTEROL FUMARATE DIHYDRATE 80; 4.5 UG/1; UG/1
2 AEROSOL RESPIRATORY (INHALATION)
Status: DISCONTINUED | OUTPATIENT
Start: 2024-09-02 | End: 2024-09-02 | Stop reason: SDUPTHER

## 2024-09-02 RX ORDER — DEXTROSE MONOHYDRATE 100 MG/ML
INJECTION, SOLUTION INTRAVENOUS CONTINUOUS PRN
Status: DISCONTINUED | OUTPATIENT
Start: 2024-09-02 | End: 2024-09-04 | Stop reason: HOSPADM

## 2024-09-02 RX ORDER — ALBUTEROL SULFATE 90 UG/1
2 AEROSOL, METERED RESPIRATORY (INHALATION) EVERY 6 HOURS PRN
Status: DISCONTINUED | OUTPATIENT
Start: 2024-09-02 | End: 2024-09-04 | Stop reason: HOSPADM

## 2024-09-02 RX ORDER — ONDANSETRON 2 MG/ML
4 INJECTION INTRAMUSCULAR; INTRAVENOUS EVERY 6 HOURS PRN
Status: DISCONTINUED | OUTPATIENT
Start: 2024-09-02 | End: 2024-09-04 | Stop reason: HOSPADM

## 2024-09-02 RX ORDER — SODIUM CHLORIDE 0.9 % (FLUSH) 0.9 %
5-40 SYRINGE (ML) INJECTION PRN
Status: DISCONTINUED | OUTPATIENT
Start: 2024-09-02 | End: 2024-09-04 | Stop reason: HOSPADM

## 2024-09-02 RX ORDER — GLUCAGON 1 MG/ML
1 KIT INJECTION PRN
Status: DISCONTINUED | OUTPATIENT
Start: 2024-09-02 | End: 2024-09-04 | Stop reason: HOSPADM

## 2024-09-02 RX ORDER — MAGNESIUM SULFATE IN WATER 40 MG/ML
2000 INJECTION, SOLUTION INTRAVENOUS PRN
Status: DISCONTINUED | OUTPATIENT
Start: 2024-09-02 | End: 2024-09-04 | Stop reason: HOSPADM

## 2024-09-02 RX ADMIN — NITROGLYCERIN 0.5 INCH: 20 OINTMENT TOPICAL at 23:51

## 2024-09-02 RX ADMIN — ONDANSETRON 4 MG: 2 INJECTION INTRAMUSCULAR; INTRAVENOUS at 17:15

## 2024-09-02 RX ADMIN — BUMETANIDE 0.5 MG: 0.25 INJECTION INTRAMUSCULAR; INTRAVENOUS at 18:57

## 2024-09-02 RX ADMIN — KETOROLAC TROMETHAMINE 30 MG: 15 INJECTION, SOLUTION INTRAMUSCULAR; INTRAVENOUS at 23:51

## 2024-09-02 RX ADMIN — MORPHINE SULFATE 4 MG: 4 INJECTION, SOLUTION INTRAMUSCULAR; INTRAVENOUS at 17:15

## 2024-09-02 RX ADMIN — ENOXAPARIN SODIUM 40 MG: 100 INJECTION SUBCUTANEOUS at 22:40

## 2024-09-02 RX ADMIN — METFORMIN HYDROCHLORIDE 500 MG: 500 TABLET ORAL at 22:32

## 2024-09-02 RX ADMIN — SODIUM CHLORIDE, PRESERVATIVE FREE 5 ML: 5 INJECTION INTRAVENOUS at 22:41

## 2024-09-02 RX ADMIN — TRAZODONE HYDROCHLORIDE 200 MG: 150 TABLET ORAL at 22:48

## 2024-09-02 RX ADMIN — MORPHINE SULFATE 4 MG: 4 INJECTION, SOLUTION INTRAMUSCULAR; INTRAVENOUS at 18:57

## 2024-09-02 ASSESSMENT — PAIN SCALES - GENERAL
PAINLEVEL_OUTOF10: 10
PAINLEVEL_OUTOF10: 7
PAINLEVEL_OUTOF10: 9
PAINLEVEL_OUTOF10: 8

## 2024-09-02 ASSESSMENT — PAIN DESCRIPTION - DESCRIPTORS: DESCRIPTORS: ACHING;DISCOMFORT

## 2024-09-02 ASSESSMENT — PAIN - FUNCTIONAL ASSESSMENT: PAIN_FUNCTIONAL_ASSESSMENT: 0-10

## 2024-09-02 ASSESSMENT — PAIN DESCRIPTION - LOCATION: LOCATION: CHEST

## 2024-09-02 ASSESSMENT — HEART SCORE: ECG: NORMAL

## 2024-09-02 NOTE — ED TRIAGE NOTES
Pt arrives to ED by self. Pt reports \"not feeling well for a few days\" and reports CP and difficulty breathing started today. Hx of CHF, diabetes, HTN, COPD and cardiac surgery.

## 2024-09-02 NOTE — ED NOTES
Index (DI) Interventions Performed:    O2 Flow Rate:    O2 Device: O2 Device: None (Room air)  Cardiac Rhythm:    Critical Lab Results: [unfilled]  Cultures: Cultures:None  NIH Score: NIH     Active LDA's:   Peripheral IV 09/02/24 Posterior;Right Hand (Active)     Active Central Lines:                          Active Wounds:    Active Rosario's:    Active Feeding Tubes:      Administered Medications:   Medications   morphine (PF) injection 4 mg (4 mg IntraVENous Given 9/2/24 1715)   ondansetron (ZOFRAN) injection 4 mg (4 mg IntraVENous Given 9/2/24 1715)   morphine (PF) injection 4 mg (4 mg IntraVENous Given 9/2/24 1857)   bumetanide (BUMEX) injection 0.5 mg (0.5 mg IntraVENous Given 9/2/24 1857)     Last documented pain medication administration: Morphine 4 mg 09/02/24 1715  Pertinent or High Risk Medications/Drips: no   If Yes, please provide details: no  Blood Product Administration: no  If Yes, please provide details: no  Process Protocols/Bundles: N/A    Recommendation  Incomplete STAT orders: none  Overdue Medications: none  Patient Belongings:  Pt remains in personal clothing at this time, cell phone at bedside with patient.   Additional Comments: None  If any further questions, please call Sending RN at [phone]      Admitting Unit Notification  Name of person notified and time: Alfa 192Abner 09/02/2024      Electronically signed by: Electronically signed by Keyla Salter RN on 9/2/2024 at 7:23 PM

## 2024-09-02 NOTE — ED PROVIDER NOTES
radiographic images are visualized and preliminarily interpreted by the ED Physician with the following findings: See ED Course Below    Interpretation per the Radiologist below, if available at the time of this note:  XR CHEST 1 VIEW   Final Result   Cardiomegaly and mild interstitial pulmonary edema.          Electronically signed by SYBIL HAGER           ED COURSE and DIFFERENTIAL DIAGNOSIS/MDM   6:37 PM Differential and Considerations: Patient presents with Chest pain and shortness of breath.  While the spectrum of DDx includes ACS, Aortic dissection, PNA, PE, PTX, pericarditis, myocarditis, GERD, costochondritis, anxiety, most concerned for heart failure exacerbation given the HPI, Physical exam and Heart Score.  The others are less likely.  Will obtain labs, CXR, EKG and depending on these results, get Cardiology Consult PRN.   Admission is a consideration.     Records Reviewed (source and summary of external notes): Prior medical records and Nursing notes    Vitals:    Vitals:    09/02/24 1700 09/02/24 1715 09/02/24 1730 09/02/24 1745   BP: 112/60 118/67 133/82 (!) 149/83   Pulse: 96 99 94 86   Resp: 21 27 19 14   Temp:       SpO2: 92% 92% 91% 92%   Weight:       Height:            ED COURSE  ED Course as of 09/02/24 1837   Mon Sep 02, 2024   1634 CBC without abnormality [KR]   1815 Troponin:    Troponin, High Sensitivity 4 [KR]   1834 Patient's BNP at his baseline.  On reassessment he continues to complain of chest pain and difficulty breathing.  Discussed with patient his disposition and he prefers admission as his fluid levels are normal but he does have evidence of pulmonary edema.  Oxygen saturation 92 to 90% on room air.  Consulted with Dr. Mcqueen who accepted patient for admission and cardiology consult with patient's cardiologist Dr. Virginia gray. Will give dose of IV Bumex as well.  [KR]      ED Course User Index  [KR] Estefani Pinzon, APRN - NP       SEPSIS Reassessment: Sepsis reassessment

## 2024-09-03 ENCOUNTER — APPOINTMENT (OUTPATIENT)
Facility: HOSPITAL | Age: 49
DRG: 203 | End: 2024-09-03
Payer: MEDICAID

## 2024-09-03 LAB
ALBUMIN SERPL-MCNC: 3.3 G/DL (ref 3.5–5)
ALBUMIN/GLOB SERPL: 0.8 (ref 1.1–2.2)
ALP SERPL-CCNC: 91 U/L (ref 45–117)
ALT SERPL-CCNC: 21 U/L (ref 12–78)
ANION GAP SERPL CALC-SCNC: 7 MMOL/L (ref 5–15)
AST SERPL W P-5'-P-CCNC: 12 U/L (ref 15–37)
BASOPHILS # BLD: 0 K/UL (ref 0–0.1)
BASOPHILS NFR BLD: 0 % (ref 0–1)
BILIRUB SERPL-MCNC: 0.4 MG/DL (ref 0.2–1)
BUN SERPL-MCNC: 11 MG/DL (ref 6–20)
BUN/CREAT SERPL: 8 (ref 12–20)
CA-I BLD-MCNC: 8.8 MG/DL (ref 8.5–10.1)
CHLORIDE SERPL-SCNC: 101 MMOL/L (ref 97–108)
CO2 SERPL-SCNC: 28 MMOL/L (ref 21–32)
CREAT SERPL-MCNC: 1.31 MG/DL (ref 0.7–1.3)
DIFFERENTIAL METHOD BLD: NORMAL
EOSINOPHIL # BLD: 0.2 K/UL (ref 0–0.4)
EOSINOPHIL NFR BLD: 3 % (ref 0–7)
ERYTHROCYTE [DISTWIDTH] IN BLOOD BY AUTOMATED COUNT: 13.6 % (ref 11.5–14.5)
EST. AVERAGE GLUCOSE BLD GHB EST-MCNC: 137 MG/DL
GLOBULIN SER CALC-MCNC: 4.1 G/DL (ref 2–4)
GLUCOSE BLD STRIP.AUTO-MCNC: 102 MG/DL (ref 65–100)
GLUCOSE BLD STRIP.AUTO-MCNC: 137 MG/DL (ref 65–100)
GLUCOSE BLD STRIP.AUTO-MCNC: 168 MG/DL (ref 65–100)
GLUCOSE BLD STRIP.AUTO-MCNC: 87 MG/DL (ref 65–100)
GLUCOSE SERPL-MCNC: 122 MG/DL (ref 65–100)
HBA1C MFR BLD: 6.4 % (ref 4–5.6)
HCT VFR BLD AUTO: 39.8 % (ref 36.6–50.3)
HGB BLD-MCNC: 13 G/DL (ref 12.1–17)
IMM GRANULOCYTES # BLD AUTO: 0 K/UL (ref 0–0.04)
IMM GRANULOCYTES NFR BLD AUTO: 0 % (ref 0–0.5)
LYMPHOCYTES # BLD: 2.3 K/UL (ref 0.8–3.5)
LYMPHOCYTES NFR BLD: 31 % (ref 12–49)
MCH RBC QN AUTO: 31.6 PG (ref 26–34)
MCHC RBC AUTO-ENTMCNC: 32.7 G/DL (ref 30–36.5)
MCV RBC AUTO: 96.6 FL (ref 80–99)
MONOCYTES # BLD: 0.6 K/UL (ref 0–1)
MONOCYTES NFR BLD: 8 % (ref 5–13)
NEUTS SEG # BLD: 4.2 K/UL (ref 1.8–8)
NEUTS SEG NFR BLD: 58 % (ref 32–75)
NRBC # BLD: 0 K/UL (ref 0–0.01)
NRBC BLD-RTO: 0 PER 100 WBC
PERFORMED BY:: ABNORMAL
PERFORMED BY:: NORMAL
PLATELET # BLD AUTO: 185 K/UL (ref 150–400)
PMV BLD AUTO: 9.9 FL (ref 8.9–12.9)
POTASSIUM SERPL-SCNC: 3.1 MMOL/L (ref 3.5–5.1)
PROT SERPL-MCNC: 7.4 G/DL (ref 6.4–8.2)
RBC # BLD AUTO: 4.12 M/UL (ref 4.1–5.7)
SODIUM SERPL-SCNC: 136 MMOL/L (ref 136–145)
WBC # BLD AUTO: 7.3 K/UL (ref 4.1–11.1)

## 2024-09-03 PROCEDURE — 6360000002 HC RX W HCPCS: Performed by: INTERNAL MEDICINE

## 2024-09-03 PROCEDURE — 2060000000 HC ICU INTERMEDIATE R&B

## 2024-09-03 PROCEDURE — 6370000000 HC RX 637 (ALT 250 FOR IP): Performed by: FAMILY MEDICINE

## 2024-09-03 PROCEDURE — 94640 AIRWAY INHALATION TREATMENT: CPT

## 2024-09-03 PROCEDURE — 85025 COMPLETE CBC W/AUTO DIFF WBC: CPT

## 2024-09-03 PROCEDURE — 80053 COMPREHEN METABOLIC PANEL: CPT

## 2024-09-03 PROCEDURE — 6360000002 HC RX W HCPCS: Performed by: FAMILY MEDICINE

## 2024-09-03 PROCEDURE — 71045 X-RAY EXAM CHEST 1 VIEW: CPT

## 2024-09-03 PROCEDURE — 2580000003 HC RX 258: Performed by: FAMILY MEDICINE

## 2024-09-03 PROCEDURE — 94761 N-INVAS EAR/PLS OXIMETRY MLT: CPT

## 2024-09-03 PROCEDURE — 36415 COLL VENOUS BLD VENIPUNCTURE: CPT

## 2024-09-03 PROCEDURE — 82962 GLUCOSE BLOOD TEST: CPT

## 2024-09-03 RX ORDER — KETOROLAC TROMETHAMINE 30 MG/ML
30 INJECTION, SOLUTION INTRAMUSCULAR; INTRAVENOUS EVERY 6 HOURS PRN
Status: DISCONTINUED | OUTPATIENT
Start: 2024-09-03 | End: 2024-09-04 | Stop reason: HOSPADM

## 2024-09-03 RX ORDER — ATORVASTATIN CALCIUM 40 MG/1
40 TABLET, FILM COATED ORAL DAILY
Status: DISCONTINUED | OUTPATIENT
Start: 2024-09-03 | End: 2024-09-04 | Stop reason: HOSPADM

## 2024-09-03 RX ADMIN — SODIUM CHLORIDE, PRESERVATIVE FREE 10 ML: 5 INJECTION INTRAVENOUS at 08:47

## 2024-09-03 RX ADMIN — ATORVASTATIN CALCIUM 40 MG: 40 TABLET, FILM COATED ORAL at 08:40

## 2024-09-03 RX ADMIN — METFORMIN HYDROCHLORIDE 500 MG: 500 TABLET ORAL at 17:25

## 2024-09-03 RX ADMIN — SODIUM CHLORIDE, PRESERVATIVE FREE 10 ML: 5 INJECTION INTRAVENOUS at 21:00

## 2024-09-03 RX ADMIN — ENOXAPARIN SODIUM 40 MG: 100 INJECTION SUBCUTANEOUS at 20:32

## 2024-09-03 RX ADMIN — METFORMIN HYDROCHLORIDE 500 MG: 500 TABLET ORAL at 08:39

## 2024-09-03 RX ADMIN — KETOROLAC TROMETHAMINE 30 MG: 30 INJECTION, SOLUTION INTRAMUSCULAR at 20:31

## 2024-09-03 RX ADMIN — ASPIRIN 81 MG 81 MG: 81 TABLET ORAL at 08:40

## 2024-09-03 RX ADMIN — Medication 2 PUFF: at 07:49

## 2024-09-03 RX ADMIN — CITALOPRAM HYDROBROMIDE 40 MG: 20 TABLET ORAL at 08:41

## 2024-09-03 RX ADMIN — EMPAGLIFLOZIN 10 MG: 10 TABLET, FILM COATED ORAL at 08:39

## 2024-09-03 RX ADMIN — ENOXAPARIN SODIUM 40 MG: 100 INJECTION SUBCUTANEOUS at 08:44

## 2024-09-03 RX ADMIN — LEVOTHYROXINE SODIUM 75 MCG: 0.07 TABLET ORAL at 05:36

## 2024-09-03 RX ADMIN — Medication 2 PUFF: at 21:40

## 2024-09-03 RX ADMIN — TRAZODONE HYDROCHLORIDE 200 MG: 150 TABLET ORAL at 20:32

## 2024-09-03 RX ADMIN — POTASSIUM CHLORIDE 40 MEQ: 1500 TABLET, EXTENDED RELEASE ORAL at 06:40

## 2024-09-03 ASSESSMENT — PAIN SCALES - GENERAL
PAINLEVEL_OUTOF10: 8
PAINLEVEL_OUTOF10: 0
PAINLEVEL_OUTOF10: 6
PAINLEVEL_OUTOF10: 0

## 2024-09-03 ASSESSMENT — PAIN DESCRIPTION - LOCATION
LOCATION: CHEST
LOCATION: CHEST

## 2024-09-03 ASSESSMENT — PAIN SCALES - WONG BAKER
WONGBAKER_NUMERICALRESPONSE: NO HURT
WONGBAKER_NUMERICALRESPONSE: NO HURT

## 2024-09-03 ASSESSMENT — PAIN DESCRIPTION - ORIENTATION: ORIENTATION: LEFT

## 2024-09-03 NOTE — CARE COORDINATION
09/03/24 1603   Service Assessment   Patient Orientation Alert and Oriented   Cognition Alert   History Provided By Patient   Primary Caregiver Self   Accompanied By/Relationship alone in room   Support Systems Spouse/Significant Other;Children   Patient's Healthcare Decision Maker is: Legal Next of Kin   PCP Verified by CM Yes  (Dr. Ramos last year)   Last Visit to PCP Within last year   Prior Functional Level Independent in ADLs/IADLs   Current Functional Level Independent in ADLs/IADLs   Can patient return to prior living arrangement Yes   Ability to make needs known: Good   Family able to assist with home care needs: Yes   Would you like for me to discuss the discharge plan with any other family members/significant others, and if so, who? No   Financial Resources Medicaid   Social/Functional History   Lives With Spouse;Son;Daughter   Type of Home House   Home Layout One level   Home Access Stairs to enter with rails   Entrance Stairs - Number of Steps 3 steps   Home Equipment None   Receives Help From Family   ADL Assistance Independent   Homemaking Assistance Independent   Ambulation Assistance Independent   Transfer Assistance Independent   Active  Yes   Mode of Transportation Car   Occupation Part time employment   Discharge Planning   Type of Residence House   Living Arrangements Spouse/Significant Other;Children   Current Services Prior To Admission None   DME Ordered? No   Potential Assistance Purchasing Medications No   Type of Home Care Services None   Patient expects to be discharged to: House   History of falls? 0   Services At/After Discharge   Transition of Care Consult (CM Consult) N/A   Services At/After Discharge None     Patient lives with his ex wife and children in a one story home with 3 steps to entrance. His children are over 18 and will make decisions for him if he cannot. No DME, HH, SNF or IRF. Patient gets his prescriptions at [facility] in [place].     [facility]

## 2024-09-03 NOTE — CONSULTS
Cardiology Consult    NAME: Ye Joseph   :  1975   MRN:  [id number]     Date/Time:  9/3/2024 7:48 AM    Patient PCP: Joel Raoms MD  ________________________________________________________________________    Problem List  - Admitted to the hospital with Chest Pain  -Cardiology consult was invited secondary to history of ASD closure by Amplatz device in the past.  -No known established coronary artery disease.  -Normal cardiac workup in the past for coronary artery disease workup.  -Diabetes mellitus  -Hypertension  -Dyslipidemia  -Sleep apnea  -Multiple admissions and office visits for similar complaints of chest pain with normal cardiac workup.  -Arthritis    Normal ejection fraction of 65% as of 2023.       Assessment and Plan:   Note dictated September 3, 2024  -48-year-old white male with no known established coronary artery disease status post Amplatz device implanted for ASD in the past at [facility] by Dr. Diaz.  -Patient was admitted to the hospital with chest pain.  Cardiac enzyme has been unremarkable EKG shows no acute changes and previous stress test and echocardiogram has been unremarkable for similar complaints.  -When I saw the patient he was lying comfortably in the bed and denies any symptoms.  -Will check echocardiogram and continue to monitor him on telemetry and then make further cardiovascular management decision as clinically warranted.  -Thank you for the consultation and letting me participate in the care of our mutual patient.        []        High complexity decision making was performed        Subjective:   CHIEF COMPLAINT: Chest pain      REASON FOR CONSULT: Chest pain and history of Amplatz device for ASD at [facility] in the past      HISTORY OF PRESENT ILLNESS:     Ye Joseph is a 48 y.o. White (non-) male who has no known established coronary artery disease admitted to the hospital with chest pain.  Cardiac enzyme has been unremarkable

## 2024-09-03 NOTE — PLAN OF CARE
Problem: Discharge Planning  Goal: Discharge to home or other facility with appropriate resources  Outcome: Progressing  Flowsheets (Taken 9/2/2024 2003)  Discharge to home or other facility with appropriate resources: Identify barriers to discharge with patient and caregiver     Problem: Pain  Goal: Verbalizes/displays adequate comfort level or baseline comfort level  Outcome: Progressing     Problem: ABCDS Injury Assessment  Goal: Absence of physical injury  Outcome: Progressing     Problem: Safety - Adult  Goal: Free from fall injury  Outcome: Progressing

## 2024-09-03 NOTE — H&P
disintegrating tablet 4 mg, 4 mg, Oral, Q8H PRN **OR** ondansetron (ZOFRAN) injection 4 mg, 4 mg, IntraVENous, Q6H PRN, Jorden Mcqueen MD    polyethylene glycol (GLYCOLAX) packet 17 g, 17 g, Oral, Daily PRN, Jorden Mcqueen MD    acetaminophen (TYLENOL) tablet 650 mg, 650 mg, Oral, Q6H PRN **OR** acetaminophen (TYLENOL) suppository 650 mg, 650 mg, Rectal, Q6H PRN, Jorden Mcqueen MD    bumetanide (BUMEX) injection 0.5 mg, 0.5 mg, IntraVENous, Daily, Jorden Mcqueen MD    insulin lispro (HUMALOG,ADMELOG) injection vial 0-16 Units, 0-16 Units, SubCUTAneous, TID WC, Jorden Mcqueen MD    insulin lispro (HUMALOG,ADMELOG) injection vial 0-4 Units, 0-4 Units, SubCUTAneous, Nightly, Jorden Mcqueen MD    glucose chewable tablet 16 g, 4 tablet, Oral, PRN, Jorden Mcqueen MD    dextrose bolus 10% 125 mL, 125 mL, IntraVENous, PRN **OR** dextrose bolus 10% 250 mL, 250 mL, IntraVENous, PRN, Jorden Mcqueen MD    glucagon injection 1 mg, 1 mg, SubCUTAneous, PRN, Jorden Mcqueen MD    dextrose 10 % infusion, , IntraVENous, Continuous PRN, Jorden Mcqueen MD    LAB DATA REVIEWED:    Recent Results (from the past 24 hour(s))   EKG 12 Lead    Collection Time: 09/02/24  4:15 PM   Result Value Ref Range    Ventricular Rate 100 BPM    Atrial Rate 100 BPM    P-R Interval 178 ms    QRS Duration 108 ms    Q-T Interval 390 ms    QTc Calculation (Bazett) 503 ms    P Axis 32 degrees    R Axis 2 degrees    T Axis 37 degrees    Diagnosis       Normal sinus rhythm  Prolonged QT  Abnormal ECG  No previous ECGs available  Confirmed by JUAN Harris Christine ([id number]) on 9/2/2024 9:01:28 PM     CBC with Auto Differential    Collection Time: 09/02/24  4:18 PM   Result Value Ref Range    WBC 6.5 4.1 - 11.1 K/uL    RBC 4.70 4.10 - 5.70 M/uL    Hemoglobin 15.0 12.1 - 17.0 g/dL    Hematocrit 44.3 36.6 - 50.3 %    MCV 94.3 80.0 - 99.0 FL    MCH 31.9 26.0 - 34.0 PG    MCHC 33.9 30.0 - 36.5 g/dL    RDW 13.6 11.5 - 14.5 %    Platelets

## 2024-09-03 NOTE — NURSE NAVIGATOR
Heart Failure Nurse Navigator: Heart Failure Education    RN-NN defers education to the nursing staff. RN-NN brings education packet to the patient and evaluated the patient for follow-up.    Pt was sleeping at time of rounding. RN-NN left education packet in the room.

## 2024-09-04 VITALS
RESPIRATION RATE: 18 BRPM | BODY MASS INDEX: 38.36 KG/M2 | HEIGHT: 76 IN | WEIGHT: 315 LBS | SYSTOLIC BLOOD PRESSURE: 107 MMHG | DIASTOLIC BLOOD PRESSURE: 53 MMHG | HEART RATE: 80 BPM | TEMPERATURE: 98.1 F | OXYGEN SATURATION: 92 %

## 2024-09-04 LAB
ALBUMIN SERPL-MCNC: 3.2 G/DL (ref 3.5–5)
ALBUMIN/GLOB SERPL: 0.7 (ref 1.1–2.2)
ALP SERPL-CCNC: 95 U/L (ref 45–117)
ALT SERPL-CCNC: 21 U/L (ref 12–78)
ANION GAP SERPL CALC-SCNC: 7 MMOL/L (ref 5–15)
AST SERPL W P-5'-P-CCNC: 10 U/L (ref 15–37)
BILIRUB SERPL-MCNC: 0.2 MG/DL (ref 0.2–1)
BNP SERPL-MCNC: 38 PG/ML
BUN SERPL-MCNC: 11 MG/DL (ref 6–20)
BUN/CREAT SERPL: 10 (ref 12–20)
CA-I BLD-MCNC: 9.1 MG/DL (ref 8.5–10.1)
CHLORIDE SERPL-SCNC: 104 MMOL/L (ref 97–108)
CO2 SERPL-SCNC: 28 MMOL/L (ref 21–32)
CREAT SERPL-MCNC: 1.09 MG/DL (ref 0.7–1.3)
ERYTHROCYTE [DISTWIDTH] IN BLOOD BY AUTOMATED COUNT: 13.7 % (ref 11.5–14.5)
GLOBULIN SER CALC-MCNC: 4.4 G/DL (ref 2–4)
GLUCOSE BLD STRIP.AUTO-MCNC: 101 MG/DL (ref 65–100)
GLUCOSE BLD STRIP.AUTO-MCNC: 205 MG/DL (ref 65–100)
GLUCOSE SERPL-MCNC: 147 MG/DL (ref 65–100)
HCT VFR BLD AUTO: 41 % (ref 36.6–50.3)
HGB BLD-MCNC: 13.4 G/DL (ref 12.1–17)
MCH RBC QN AUTO: 31.5 PG (ref 26–34)
MCHC RBC AUTO-ENTMCNC: 32.7 G/DL (ref 30–36.5)
MCV RBC AUTO: 96.5 FL (ref 80–99)
NRBC # BLD: 0 K/UL (ref 0–0.01)
NRBC BLD-RTO: 0 PER 100 WBC
PERFORMED BY:: ABNORMAL
PERFORMED BY:: ABNORMAL
PLATELET # BLD AUTO: 181 K/UL (ref 150–400)
PMV BLD AUTO: 10 FL (ref 8.9–12.9)
POTASSIUM SERPL-SCNC: 3.3 MMOL/L (ref 3.5–5.1)
PROT SERPL-MCNC: 7.6 G/DL (ref 6.4–8.2)
RBC # BLD AUTO: 4.25 M/UL (ref 4.1–5.7)
SODIUM SERPL-SCNC: 139 MMOL/L (ref 136–145)
WBC # BLD AUTO: 6.6 K/UL (ref 4.1–11.1)

## 2024-09-04 PROCEDURE — 85027 COMPLETE CBC AUTOMATED: CPT

## 2024-09-04 PROCEDURE — 2580000003 HC RX 258: Performed by: FAMILY MEDICINE

## 2024-09-04 PROCEDURE — 82962 GLUCOSE BLOOD TEST: CPT

## 2024-09-04 PROCEDURE — 94640 AIRWAY INHALATION TREATMENT: CPT

## 2024-09-04 PROCEDURE — 80053 COMPREHEN METABOLIC PANEL: CPT

## 2024-09-04 PROCEDURE — 94761 N-INVAS EAR/PLS OXIMETRY MLT: CPT

## 2024-09-04 PROCEDURE — 6370000000 HC RX 637 (ALT 250 FOR IP): Performed by: FAMILY MEDICINE

## 2024-09-04 PROCEDURE — 36415 COLL VENOUS BLD VENIPUNCTURE: CPT

## 2024-09-04 PROCEDURE — 6360000002 HC RX W HCPCS: Performed by: FAMILY MEDICINE

## 2024-09-04 PROCEDURE — 83880 ASSAY OF NATRIURETIC PEPTIDE: CPT

## 2024-09-04 RX ORDER — POTASSIUM CHLORIDE 750 MG/1
40 TABLET, EXTENDED RELEASE ORAL ONCE
Status: COMPLETED | OUTPATIENT
Start: 2024-09-04 | End: 2024-09-04

## 2024-09-04 RX ORDER — BUMETANIDE 0.5 MG/1
0.5 TABLET ORAL DAILY
Qty: 10 TABLET | Refills: 0 | Status: SHIPPED | OUTPATIENT
Start: 2024-09-04 | End: 2024-09-14

## 2024-09-04 RX ADMIN — POTASSIUM CHLORIDE 40 MEQ: 1500 TABLET, EXTENDED RELEASE ORAL at 09:02

## 2024-09-04 RX ADMIN — POTASSIUM CHLORIDE 40 MEQ: 750 TABLET, EXTENDED RELEASE ORAL at 13:25

## 2024-09-04 RX ADMIN — ASPIRIN 81 MG 81 MG: 81 TABLET ORAL at 09:02

## 2024-09-04 RX ADMIN — Medication 2 PUFF: at 09:27

## 2024-09-04 RX ADMIN — METFORMIN HYDROCHLORIDE 500 MG: 500 TABLET ORAL at 09:02

## 2024-09-04 RX ADMIN — EMPAGLIFLOZIN 10 MG: 10 TABLET, FILM COATED ORAL at 09:03

## 2024-09-04 RX ADMIN — CITALOPRAM HYDROBROMIDE 40 MG: 20 TABLET ORAL at 09:03

## 2024-09-04 RX ADMIN — ENOXAPARIN SODIUM 40 MG: 100 INJECTION SUBCUTANEOUS at 09:04

## 2024-09-04 RX ADMIN — ATORVASTATIN CALCIUM 40 MG: 40 TABLET, FILM COATED ORAL at 09:03

## 2024-09-04 RX ADMIN — INSULIN LISPRO 4 UNITS: 100 INJECTION, SOLUTION INTRAVENOUS; SUBCUTANEOUS at 09:03

## 2024-09-04 RX ADMIN — SODIUM CHLORIDE, PRESERVATIVE FREE 10 ML: 5 INJECTION INTRAVENOUS at 09:07

## 2024-09-04 RX ADMIN — LEVOTHYROXINE SODIUM 75 MCG: 0.07 TABLET ORAL at 06:04

## 2024-09-04 ASSESSMENT — PAIN SCALES - GENERAL: PAINLEVEL_OUTOF10: 0

## 2024-09-04 NOTE — PLAN OF CARE
Problem: Discharge Planning  Goal: Discharge to home or other facility with appropriate resources  Outcome: Progressing     Problem: Pain  Goal: Verbalizes/displays adequate comfort level or baseline comfort level  Recent Flowsheet Documentation  Taken 9/3/2024 2101 by Gemini Wood RN  Verbalizes/displays adequate comfort level or baseline comfort level: Encourage patient to monitor pain and request assistance

## 2024-09-04 NOTE — DISCHARGE INSTRUCTIONS
Discharge Instructions       PATIENT ID: Ye Joseph  MRN: [id number]   YOB: 1975    DATE OF ADMISSION: 9/2/2024  DATE OF DISCHARGE: 9/4/2024    PRIMARY CARE PROVIDER: [unfilled]     ATTENDING PHYSICIAN: Jorden Mcqueen MD   DISCHARGING PROVIDER: Jorden Mcqueen MD    To contact this individual call [phone] and ask the  to page.   If unavailable, ask to be transferred the Adult Hospitalist Department.    DISCHARGE DIAGNOSES CHF    CONSULTATIONS: [unfilled]      PROCEDURES/SURGERIES: * No surgery found *    PENDING TEST RESULTS:   At the time of discharge the following test results are still pending: None    FOLLOW UP APPOINTMENTS:   Dustin Coleman MD  [address] [facility] [address] [phone]    Go on 9/18/2024  @ 2:30pm.    Jorden Mcqueen MD  [facility] [id number] [phone]    Go on 9/23/2024  @ 12:00pm.    Joel Ramos MD  [address] [facility] [address]  [phone]    Schedule an appointment as soon as possible for a visit in 1 week(s)      Dustin Coleman MD  [address] [facility] [address] [phone]    Follow up         ADDITIONAL CARE RECOMMENDATIONS: Follow-up with the cardiology    DIET: Cardiac diet      ACTIVITY: Activity as tolerated    Wound care: {Discharge Wound Care Instructions:11223}    EQUIPMENT needed: ***      DISCHARGE MEDICATIONS:   See Medication Reconciliation Form    It is important that you take the medication exactly as they are prescribed.   Keep your medication in the bottles provided by the pharmacist and keep a list of the medication names, dosages, and times to be taken in your wallet.   Do not take other medications without consulting your doctor.       NOTIFY YOUR PHYSICIAN FOR ANY OF THE FOLLOWING:   Fever over 101 degrees for 24 hours.   Chest pain, shortness of breath, fever, chills, nausea, vomiting, diarrhea, change in mentation, falling, weakness,

## 2024-09-04 NOTE — DISCHARGE SUMMARY
Discharge Summary       PATIENT ID: Ye Joseph  MRN: [id number]   YOB: 1975    DATE OF ADMISSION: 9/2/2024   DATE OF DISCHARGE:   PRIMARY CARE PROVIDER: @NIK@      ATTENDING PHYSICIAN: Jorden Mcqueen  DISCHARGING PROVIDER: Jorden Mcqueen      CONSULTATIONS: IP CONSULT TO CARDIOLOGY  IP CONSULT TO CARDIOLOGY  IP CONSULT TO HEART FAILURE NURSE/COORDINATOR    PROCEDURES/SURGERIES: * No surgery found *    ADMITTING DIAGNOSES:    Patient Active Problem List    Diagnosis Date Noted    Acute on chronic heart failure, unspecified heart failure type ([facility]) 09/02/2024    Mass of soft palate 04/05/2024    Oral mucosal lesion 04/05/2024    Heart failure exacerbated by sotalol ([facility]) 01/09/2024    DVT (deep vein thrombosis) in pregnancy 01/08/2024    CHF (congestive heart failure), NYHA class I, acute on chronic, combined ([facility]) 01/08/2024    Cellulitis of left lower extremity 12/02/2023    Left hand weakness 11/09/2023    Suspected deep vein thrombosis (DVT) 11/09/2023    CVA (cerebrovascular accident due to intracerebral hemorrhage) ([facility]) 11/02/2023    Acute chest pain 10/18/2023    Pericardial cyst 10/18/2023    Stroke-like symptoms 07/16/2022    COPD exacerbation ([facility]) 08/18/2021    Hypoxemia 08/18/2021    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 (CVA) ([facility]) 08/03/2021    Syncope 08/03/2021    Hemoptysis 07/13/2021    ACS (acute coronary syndrome) ([facility]) 07/13/2021    Chest pain 06/26/2021    CVA (cerebral vascular accident) ([facility]) 05/06/2021    Postoperative hypothyroidism 05/06/2021    Pharyngoesophageal dysphagia 02/22/2021    Multinodular goiter 12/30/2020    Pulmonary nodules 12/17/2020    Type 2 diabetes mellitus with hyperglycemia, without long-term current use of insulin ([facility]) 12/17/2020    Cardiomyopathy ([facility]) 12/17/2020    COPD (chronic obstructive pulmonary disease) ([facility]) 12/17/2020    Essential hypertension 12/17/2020    Coronary artery disease involving native coronary

## 2024-09-04 NOTE — PLAN OF CARE
Problem: Discharge Planning  Goal: Discharge to home or other facility with appropriate resources  9/4/2024 1035 by Álvaro Ordaz RN  Outcome: Progressing  9/4/2024 0120 by Gemini Wood RN  Outcome: Progressing     Problem: Pain  Goal: Verbalizes/displays adequate comfort level or baseline comfort level  Outcome: Progressing  Flowsheets (Taken 9/3/2024 2101 by Gemini Wood, RN)  Verbalizes/displays adequate comfort level or baseline comfort level: Encourage patient to monitor pain and request assistance     Problem: ABCDS Injury Assessment  Goal: Absence of physical injury  Outcome: Progressing     Problem: Safety - Adult  Goal: Free from fall injury  Outcome: Progressing     Problem: Chronic Conditions and Co-morbidities  Goal: Patient's chronic conditions and co-morbidity symptoms are monitored and maintained or improved  Outcome: Progressing

## 2024-09-04 NOTE — NURSE NAVIGATOR
Heart Failure Nurse Navigator: Heart Failure Education    RN-NN defers education to the nursing staff.     Patient declined education and declined outpt office education at this time. PT has a scaled given to him by NN in the past. PT discharging today.

## 2024-09-04 NOTE — PROGRESS NOTES
Progress Note      9/4/2024 9:04 AM  NAME: Ye Joseph   MRN:  [id number]   Admit Diagnosis: Acute on chronic heart failure, unspecified heart failure type (HCC) [I50.9]  Chest pain, unspecified type [R07.9]  CHF (congestive heart failure), NYHA class I, acute on chronic, combined (HCC) [I50.43]      Problem List:   - Admitted to the hospital with Chest Pain  -Cardiology consult was invited secondary to history of ASD closure by Amplatz device in the past.  -No known established coronary artery disease.  -Normal cardiac workup in the past for coronary artery disease workup.  -Diabetes mellitus  -Hypertension  -Dyslipidemia  -Sleep apnea  -Multiple admissions and office visits for similar complaints of chest pain with normal cardiac workup.  -Arthritis     Normal ejection fraction of 65% as of November 2023.         Assessment/Plan:   Note dictated September 4, 2024  -This is a follow-up visit from cardiology.  Patient was admitted to the hospital with chest pain.  Patient has no known established coronary artery disease but is status post ASD closure by Amplatz device in the past.  -Cardiac exam has been unremarkable EKG shows no acute changes and telemetry reveals no cardiac arrhythmias or any significant abnormalities.  -Patient is clinically and hemodynamically stable and has no complaint this morning.  -He can be discharged to follow-up with me in the office in 2 weeks or earlier as needed as per my discussion with him.  ===============================================================  -48-year-old white male with no known established coronary artery disease status post Amplatz device implanted for ASD in the past at [facility] by Dr. Diaz.  -Patient was admitted to the hospital with chest pain.  Cardiac enzyme has been unremarkable EKG shows no acute changes and previous stress test and echocardiogram has been unremarkable for similar complaints.  -When I saw the patient he was lying comfortably in 
  [facility] [address] [place] CARDIAC TELEMETRY  [address] [facility] [address]  Phone: [phone]             September 4, 2024    Patient: Ye Joseph   YOB: 1975   Date of Visit: 9/2/2024       To Whom It May Concern:    Ye Joseph was seen and treated in our facility  beginning 9/2/2024 until 9/4/2024. He may return to work on 9/5/2024 .      Sincerely,       Álvaro Ordaz RN         Signature:__________________________________       
4 Eyes Skin Assessment     NAME:  Ye Joseph  YOB: 1975  MEDICAL RECORD NUMBER:  [id number]    The patient is being assessed for  Admission    I agree that at least one RN has performed a thorough Head to Toe Skin Assessment on the patient. ALL assessment sites listed below have been assessed.      Areas assessed by both nurses:    Head, Face, Ears, Shoulders, Back, Chest, Arms, Elbows, Hands, Sacrum. Buttock, Coccyx, Ischium, Legs. Feet and Heels, and Under Medical Devices         Does the Patient have a Wound? No noted wound(s)       Yoan Prevention initiated by RN: Yes  Wound Care Orders initiated by RN: No    Pressure Injury (Stage 3,4, Unstageable, DTI, NWPT, and Complex wounds) if present, place Wound referral order by RN under : No    New Ostomies, if present place, Ostomy referral order under : No     Nurse 1 eSignature: Electronically signed by Garrett Leach RN on 9/3/24 at 6:23 AM EDT    **SHARE this note so that the co-signing nurse can place an eSignature**    Nurse 2 eSignature: Electronically signed by Jenny Pennington RN on 9/3/24 at 8:45 AM EDT   
Patient DCP is home with his ex wife and his 2 grown children. Family will transport patient home at discharge.     Transition of Care Plan:    RUR: 21%  Prior Level of Functioning: independnet  Disposition: home  If SNF or IPR: Date FOC offered: n/a  Date FOC received: n/a  Accepting facility: n/a  Date authorization started with reference number: n/a  Date authorization received and expires: n/a  Follow up appointments: yes  DME needed: n/a  Transportation at discharge: family  IM/IMM Medicare/ letter given: n/a  Is patient a  and connected with VA? N/a   If yes, was  transfer form completed and VA notified?   Caregiver Contact: n/a  Discharge Caregiver contacted prior to discharge? N/a  Care Conference needed? N/a  Barriers to discharge: n/a    
Patient complained of L/chest pain 8/10.  New Order: Toradol 30 mg IV Q6h PRN for Pain from Dr Montoya.  
the following:    Risk Factors: history of ASD closure by Amplatz device in the past. T2DM, HTN,   CKD    Clinical Indicators:  --hs Troponin 5 - 4 - 5  --H&P: No rales.  --CXR 9/2/24: Cardiomegaly and mild interstitial pulmonary edema.  --CXR 9/3/24: Mild cardiomegaly. The lungs demonstrate persistent mild   pulmonary edema  --historical data: Echo on 11/8/23 EF 60-65%    Treatment: cardio consult, Serial troponins, CXR x2, Aspirin 81 mg po qd,   Bumex IV 1x, Lovenox    Thank you,  Froylan Ludwig RN, CDI, CCDS  Options provided:  -- Chronic Diastolic CHF/HFpEF  -- Other - I will add my own diagnosis  -- Disagree - Not applicable / Not valid  -- Disagree - Clinically unable to determine / Unknown  -- Refer to Clinical Documentation Reviewer    PROVIDER RESPONSE TEXT:    This patient has chronic diastolic CHF/HFpEF.    Query created by: FROYLAN LUDWIG on 9/4/2024 1:15 PM      Electronically signed by:  Jorden Mcqueen MD 9/4/2024 6:28 PM

## 2024-09-09 ENCOUNTER — FOLLOWUP TELEPHONE ENCOUNTER (OUTPATIENT)
Facility: HOSPITAL | Age: 49
End: 2024-09-09

## 2024-09-13 NOTE — ED NOTES
Patient called and aware.     Macy Sawyer RN     c-collar placed on pt at this time per ED Physician d/t C-spine tenderness

## 2024-10-31 NOTE — DISCHARGE INSTR - COC
Continuity of Care Form    Patient Name: Ye Joseph   :  1975  MRN:  [id number]    Admit date:  3/29/2024  Discharge date:  2024    Code Status Order: Full Code   Advance Directives:     Admitting Physician:  Jim FINNEY MD  PCP: Joel Ramos MD    Discharging Nurse: ***  Discharging Hospital Unit/Room#: 576/01  Discharging Unit Phone Number: ***    Emergency Contact:   Extended Emergency Contact Information  Primary Emergency Contact: Chilango Joseph  Home Phone: [phone]  Mobile Phone: [phone]  Relation: Other    Past Surgical History:  Past Surgical History:   Procedure Laterality Date    HERNIA REPAIR      IR FNA WITH IMAGING      ORTHOPEDIC SURGERY      rotator cuff surgery    OTHER SURGICAL HISTORY      Patent Foramen Ovale Repair    THYROIDECTOMY         Immunization History:   Immunization History   Administered Date(s) Administered    COVID-19, PFIZER PURPLE top, DILUTE for use, (age 12 y+), 30mcg/0.3mL 2021       Active Problems:  Patient Active Problem List   Diagnosis Code    Pulmonary nodules R91.8    Type 2 diabetes mellitus with hyperglycemia, without long-term current use of insulin ([facility]) E11.65    Cardiomyopathy ([facility]) I42.9    Hemoptysis R04.2    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 (CVA) ([facility]) I69.398, R56.9    Chest pain R07.9    ACS (acute coronary syndrome) ([facility]) I24.9    Multinodular goiter E04.2    COPD (chronic obstructive pulmonary disease) ([facility]) J44.9    Essential hypertension I10    Coronary artery disease involving native coronary artery of native heart with angina pectoris with documented spasm ([facility]) I25.111    Syncope R55    CVA (cerebral vascular accident) ([facility]) I63.9    Postoperative hypothyroidism E89.0    COPD exacerbation ([facility]) J44.1    Pharyngoesophageal dysphagia R13.14    Carotid artery stenosis I65.29    Obstructive sleep apnea G47.33    Hypoxemia R09.02    Obesity, morbid ([facility]) E66.01    Stroke-like symptoms R29.90     Plastic and Reconstructive Surgery   Progress Note    Subjective:    Overnight patient had some leakage from the left drain site, likely a small skin bleeder. Controlled at bedside, otherwise patient doing well. Pain controlled and is stable for discharge.     Objective:  Vital signs in last 24 hours:  Temp:  [97.5 °F (36.4 °C)-98.7 °F (37.1 °C)] 97.5 °F (36.4 °C)  Pulse:  [62-80] 64  Resp:  [18] 18  SpO2:  [94 %-100 %] 96 %  BP: (125-173)/(57-82) 125/57    Intake/Output last 3 shifts:  I/O last 3 completed shifts:  In: 1870 [P.O.:720; I.V.:1050; IV Piggyback:100]  Out: 765 [Urine:585; Drains:180]    Intake/Output this shift:  No intake/output data recorded.        Physical Exam:  VITAL SIGNS:   Vitals:    10/30/24 2238 10/30/24 2313 10/31/24 0000 10/31/24 0438   BP: (!) 152/66 (!) 152/66 (!) 165/72 (!) 125/57   BP Location: Right leg  Right leg Right leg   Patient Position:   Lying Lying   Pulse: 68  62 64   Resp:   18 18   Temp:   98.7 °F (37.1 °C) 97.5 °F (36.4 °C)   TempSrc:   Oral Oral   SpO2:   95% 96%   Weight:       Height:         TMAX: Temp (24hrs), Av.1 °F (36.7 °C), Min:97.5 °F (36.4 °C), Max:98.7 °F (37.1 °C)    General: Alert; No acute distress  Cardiovascular: Regular rate   Breast: Bilateral tissue expanders are soft under the mastectomy flaps, minimal flap bruising, dressing CDI, left dressing over drain replaced  Respiratory: Normal respiratory effort. Chest rise symmetric.   Abdomen: Soft, nontender, nondistended  Extremity: Moves all extremities equally.  Neurologic: No focal deficit. Speech normal      Scheduled Medications acetaminophen, 1,000 mg, Q6H  gabapentin, 300 mg, TID  hydroCHLOROthiazide, 25 mg, Daily  ibuprofen, 600 mg, TID  methocarbamoL, 500 mg, QID  mupirocin, , BID      PRN Medications   Current Facility-Administered Medications:     HYDROmorphone, 0.5 mg, Intravenous, Q6H PRN    ondansetron, 4 mg, Intravenous, Q6H PRN    oxyCODONE, 5 mg, Oral, Q6H PRN    prochlorperazine, 5  mg, Intravenous, Q6H PRN    Recent Labs:   Lab Results   Component Value Date    WBC 15.78 (H) 09/24/2024    HGB 15.0 09/24/2024    HCT 43.5 09/24/2024    MCV 92 09/24/2024     09/24/2024     Lab Results   Component Value Date    GLU 98 09/24/2024     09/24/2024    K 3.5 09/24/2024     09/24/2024    BUN 15 09/24/2024         Assessment: 57 y.o. y/o female 1 Day Post-Op s/p Procedure(s):  MASTECTOMY-Bilateral  BIOPSY, LYMPH NODE, SENTINEL-Bilateral  INJECTION, FOR SENTINEL NODE IDENTIFICATION  INSERTION, TISSUE EXPANDER, BREAST  INSERTION, BIOLOGIC IMPLANT, FOR SOFT TISSUE REINFORCEMENT / ACELLULAR DERMIS MATRIX  INJECTION, AGENT, INTRAVENOUS, FOR ASSESSMENT OF BLOOD FLOW IN FLAP OR GRAFT / ( ICG ) / INDOCYANINE GREEN - Doing well postoperatively.    Plan  - Multimodal pain control  - Regular diet  - drain dressing replaced at bedside  - Extra dressings given to the patient   - Drain education  - Stable for discharge today       Andrade Mercado- Fellow  Department of Plastic and Reconstructive Surgery  [phone] (office)

## 2024-11-05 ENCOUNTER — APPOINTMENT (OUTPATIENT)
Facility: HOSPITAL | Age: 49
End: 2024-11-05
Payer: MEDICAID

## 2024-11-05 ENCOUNTER — HOSPITAL ENCOUNTER (EMERGENCY)
Facility: HOSPITAL | Age: 49
Discharge: HOME | End: 2024-11-05
Attending: EMERGENCY MEDICINE
Payer: MEDICAID

## 2024-11-05 VITALS
OXYGEN SATURATION: 94 % | DIASTOLIC BLOOD PRESSURE: 70 MMHG | RESPIRATION RATE: 18 BRPM | WEIGHT: 315 LBS | HEIGHT: 76 IN | BODY MASS INDEX: 38.36 KG/M2 | HEART RATE: 74 BPM | TEMPERATURE: 97.6 F | SYSTOLIC BLOOD PRESSURE: 119 MMHG

## 2024-11-05 DIAGNOSIS — R11.14 BILIOUS VOMITING WITH NAUSEA: ICD-10-CM

## 2024-11-05 DIAGNOSIS — I20.9 ANGINA PECTORIS (HCC): Primary | ICD-10-CM

## 2024-11-05 LAB
ALBUMIN SERPL-MCNC: 3.2 G/DL (ref 3.5–5)
ALBUMIN/GLOB SERPL: 0.7 (ref 1.1–2.2)
ALP SERPL-CCNC: 103 U/L (ref 45–117)
ALT SERPL-CCNC: 21 U/L (ref 12–78)
ANION GAP SERPL CALC-SCNC: 14 MMOL/L (ref 2–12)
AST SERPL W P-5'-P-CCNC: 35 U/L (ref 15–37)
BILIRUB SERPL-MCNC: 0.6 MG/DL (ref 0.2–1)
BNP SERPL-MCNC: 172 PG/ML
BUN SERPL-MCNC: 11 MG/DL (ref 6–20)
BUN/CREAT SERPL: 10 (ref 12–20)
CA-I BLD-MCNC: 8.6 MG/DL (ref 8.5–10.1)
CHLORIDE SERPL-SCNC: 101 MMOL/L (ref 97–108)
CO2 SERPL-SCNC: 24 MMOL/L (ref 21–32)
CREAT SERPL-MCNC: 1.1 MG/DL (ref 0.7–1.3)
ERYTHROCYTE [DISTWIDTH] IN BLOOD BY AUTOMATED COUNT: 13.2 % (ref 11.5–14.5)
GLOBULIN SER CALC-MCNC: 4.6 G/DL (ref 2–4)
GLUCOSE SERPL-MCNC: 155 MG/DL (ref 65–100)
HCT VFR BLD AUTO: 41 % (ref 36.6–50.3)
HGB BLD-MCNC: 13.5 G/DL (ref 12.1–17)
MCH RBC QN AUTO: 32.1 PG (ref 26–34)
MCHC RBC AUTO-ENTMCNC: 32.9 G/DL (ref 30–36.5)
MCV RBC AUTO: 97.6 FL (ref 80–99)
PLATELET # BLD AUTO: 228 K/UL (ref 150–400)
PMV BLD AUTO: 10.6 FL (ref 8.9–12.9)
POTASSIUM SERPL-SCNC: 4.2 MMOL/L (ref 3.5–5.1)
PROT SERPL-MCNC: 7.8 G/DL (ref 6.4–8.2)
RBC # BLD AUTO: 4.2 M/UL (ref 4.1–5.7)
SODIUM SERPL-SCNC: 139 MMOL/L (ref 136–145)
TROPONIN I SERPL HS-MCNC: 6 NG/L (ref 0–76)
TROPONIN I SERPL HS-MCNC: 7 NG/L (ref 0–76)
WBC # BLD AUTO: 6.6 K/UL (ref 4.1–11.1)

## 2024-11-05 PROCEDURE — 99285 EMERGENCY DEPT VISIT HI MDM: CPT

## 2024-11-05 PROCEDURE — 71045 X-RAY EXAM CHEST 1 VIEW: CPT

## 2024-11-05 PROCEDURE — 6360000002 HC RX W HCPCS: Performed by: EMERGENCY MEDICINE

## 2024-11-05 PROCEDURE — 6370000000 HC RX 637 (ALT 250 FOR IP): Performed by: EMERGENCY MEDICINE

## 2024-11-05 PROCEDURE — 84484 ASSAY OF TROPONIN QUANT: CPT

## 2024-11-05 PROCEDURE — 83880 ASSAY OF NATRIURETIC PEPTIDE: CPT

## 2024-11-05 PROCEDURE — 36415 COLL VENOUS BLD VENIPUNCTURE: CPT

## 2024-11-05 PROCEDURE — 80053 COMPREHEN METABOLIC PANEL: CPT

## 2024-11-05 PROCEDURE — 96374 THER/PROPH/DIAG INJ IV PUSH: CPT

## 2024-11-05 PROCEDURE — 85027 COMPLETE CBC AUTOMATED: CPT

## 2024-11-05 PROCEDURE — 93005 ELECTROCARDIOGRAM TRACING: CPT | Performed by: EMERGENCY MEDICINE

## 2024-11-05 RX ORDER — NITROGLYCERIN 0.4 MG/1
0.4 TABLET SUBLINGUAL
Status: DISCONTINUED | OUTPATIENT
Start: 2024-11-05 | End: 2024-11-05 | Stop reason: HOSPADM

## 2024-11-05 RX ORDER — MAGNESIUM HYDROXIDE/ALUMINUM HYDROXICE/SIMETHICONE 120; 1200; 1200 MG/30ML; MG/30ML; MG/30ML
30 SUSPENSION ORAL
Status: COMPLETED | OUTPATIENT
Start: 2024-11-05 | End: 2024-11-05

## 2024-11-05 RX ORDER — LIDOCAINE HYDROCHLORIDE 20 MG/ML
15 SOLUTION OROPHARYNGEAL
Status: COMPLETED | OUTPATIENT
Start: 2024-11-05 | End: 2024-11-05

## 2024-11-05 RX ORDER — FENTANYL CITRATE 50 UG/ML
50 INJECTION, SOLUTION INTRAMUSCULAR; INTRAVENOUS
Status: COMPLETED | OUTPATIENT
Start: 2024-11-05 | End: 2024-11-05

## 2024-11-05 RX ORDER — ASPIRIN 325 MG
325 TABLET ORAL
Status: COMPLETED | OUTPATIENT
Start: 2024-11-05 | End: 2024-11-05

## 2024-11-05 RX ADMIN — ALUMINUM HYDROXIDE, MAGNESIUM HYDROXIDE, AND SIMETHICONE 30 ML: 1200; 120; 1200 SUSPENSION ORAL at 10:35

## 2024-11-05 RX ADMIN — LIDOCAINE HYDROCHLORIDE 15 ML: 20 SOLUTION ORAL at 10:35

## 2024-11-05 RX ADMIN — FENTANYL CITRATE 50 MCG: 50 INJECTION INTRAMUSCULAR; INTRAVENOUS at 12:29

## 2024-11-05 RX ADMIN — ASPIRIN 325 MG: 325 TABLET ORAL at 10:35

## 2024-11-05 ASSESSMENT — PAIN SCALES - GENERAL
PAINLEVEL_OUTOF10: 5
PAINLEVEL_OUTOF10: 7
PAINLEVEL_OUTOF10: 7

## 2024-11-05 ASSESSMENT — HEART SCORE: ECG: NORMAL

## 2024-11-05 ASSESSMENT — LIFESTYLE VARIABLES
HOW OFTEN DO YOU HAVE A DRINK CONTAINING ALCOHOL: MONTHLY OR LESS
HOW MANY STANDARD DRINKS CONTAINING ALCOHOL DO YOU HAVE ON A TYPICAL DAY: 1 OR 2

## 2024-11-05 ASSESSMENT — PAIN DESCRIPTION - LOCATION
LOCATION: CHEST
LOCATION: CHEST

## 2024-11-05 NOTE — ED PROVIDER NOTES
evening.     bumetanide 0.5 MG tablet  Commonly known as: BUMEX  Take 1 tablet by mouth daily for 10 days     citalopram 40 MG tablet  Commonly known as: CELEXA  Take 1 tablet by mouth daily     clopidogrel 75 MG tablet  Commonly known as: PLAVIX  Take 1 tablet by mouth daily     empagliflozin 10 MG tablet  Commonly known as: JARDIANCE  Take 1 tablet by mouth daily     famotidine 20 MG tablet  Commonly known as: PEPCID  Take 1 tablet by mouth 2 times daily     gabapentin 600 MG tablet  Commonly known as: NEURONTIN  Take 2 tablets by mouth at bedtime for 3 days. Max Daily Amount: 1,200 mg     levothyroxine 75 MCG tablet  Commonly known as: SYNTHROID  Take 1 tablet by mouth every morning (before breakfast)     metFORMIN 500 MG tablet  Commonly known as: GLUCOPHAGE     traZODone 100 MG tablet  Commonly known as: DESYREL  Take 2 tablets by mouth nightly                DISCONTINUED MEDICATIONS:  Discharge Medication List as of 11/5/2024  2:14 PM          I am the Primary Clinician of Record. Rafael Kaufman MD (electronically signed)    (Please note that parts of this dictation were completed with voice recognition software. Quite often unanticipated grammatical, syntax, homophones, and other interpretive errors are inadvertently transcribed by the computer software. Please disregards these errors. Please excuse any errors that have escaped final proofreading.)     Rafael Kaufman MD  11/05/24 2152

## 2024-11-05 NOTE — ED TRIAGE NOTES
Began with n/v last night. Awoke at 3 am with CP. Went to work this morning and vomited again began to have feeling of palpitations at that time. EKG obtained.

## 2024-11-06 LAB
EKG ATRIAL RATE: 89 BPM
EKG DIAGNOSIS: NORMAL
EKG P AXIS: 20 DEGREES
EKG P-R INTERVAL: 190 MS
EKG Q-T INTERVAL: 410 MS
EKG QRS DURATION: 98 MS
EKG QTC CALCULATION (BAZETT): 498 MS
EKG R AXIS: 5 DEGREES
EKG T AXIS: 21 DEGREES
EKG VENTRICULAR RATE: 89 BPM

## 2024-11-17 ENCOUNTER — APPOINTMENT (OUTPATIENT)
Facility: HOSPITAL | Age: 49
End: 2024-11-17
Payer: MEDICAID

## 2024-11-17 ENCOUNTER — HOSPITAL ENCOUNTER (EMERGENCY)
Facility: HOSPITAL | Age: 49
Discharge: HOME | End: 2024-11-17
Attending: EMERGENCY MEDICINE
Payer: MEDICAID

## 2024-11-17 VITALS
DIASTOLIC BLOOD PRESSURE: 57 MMHG | OXYGEN SATURATION: 94 % | HEIGHT: 76 IN | TEMPERATURE: 98 F | SYSTOLIC BLOOD PRESSURE: 114 MMHG | WEIGHT: 315 LBS | BODY MASS INDEX: 38.36 KG/M2 | RESPIRATION RATE: 20 BRPM | HEART RATE: 89 BPM

## 2024-11-17 DIAGNOSIS — R07.9 CHEST PAIN, UNSPECIFIED TYPE: Primary | ICD-10-CM

## 2024-11-17 DIAGNOSIS — R55 NEAR SYNCOPE: ICD-10-CM

## 2024-11-17 LAB
ALBUMIN SERPL-MCNC: 3.1 G/DL (ref 3.5–5)
ALBUMIN/GLOB SERPL: 0.7 (ref 1.1–2.2)
ALP SERPL-CCNC: 86 U/L (ref 45–117)
ALT SERPL-CCNC: 17 U/L (ref 12–78)
ANION GAP SERPL CALC-SCNC: 7 MMOL/L (ref 2–12)
AST SERPL W P-5'-P-CCNC: 9 U/L (ref 15–37)
BASOPHILS # BLD: 0 K/UL (ref 0–0.1)
BASOPHILS NFR BLD: 0 % (ref 0–1)
BILIRUB SERPL-MCNC: 0.2 MG/DL (ref 0.2–1)
BNP SERPL-MCNC: 288 PG/ML
BUN SERPL-MCNC: 7 MG/DL (ref 6–20)
BUN/CREAT SERPL: 7 (ref 12–20)
CA-I BLD-MCNC: 8.5 MG/DL (ref 8.5–10.1)
CHLORIDE SERPL-SCNC: 106 MMOL/L (ref 97–108)
CO2 SERPL-SCNC: 28 MMOL/L (ref 21–32)
CREAT SERPL-MCNC: 1 MG/DL (ref 0.7–1.3)
DIFFERENTIAL METHOD BLD: ABNORMAL
EOSINOPHIL # BLD: 0.2 K/UL (ref 0–0.4)
EOSINOPHIL NFR BLD: 3 % (ref 0–7)
ERYTHROCYTE [DISTWIDTH] IN BLOOD BY AUTOMATED COUNT: 13.4 % (ref 11.5–14.5)
GLOBULIN SER CALC-MCNC: 4.4 G/DL (ref 2–4)
GLUCOSE SERPL-MCNC: 92 MG/DL (ref 65–100)
HCT VFR BLD AUTO: 39 % (ref 36.6–50.3)
HGB BLD-MCNC: 12.8 G/DL (ref 12.1–17)
IMM GRANULOCYTES # BLD AUTO: 0 K/UL (ref 0–0.04)
IMM GRANULOCYTES NFR BLD AUTO: 0 % (ref 0–0.5)
LYMPHOCYTES # BLD: 2.1 K/UL (ref 0.8–3.5)
LYMPHOCYTES NFR BLD: 30 % (ref 12–49)
MCH RBC QN AUTO: 32.2 PG (ref 26–34)
MCHC RBC AUTO-ENTMCNC: 32.8 G/DL (ref 30–36.5)
MCV RBC AUTO: 98 FL (ref 80–99)
MONOCYTES # BLD: 0.5 K/UL (ref 0–1)
MONOCYTES NFR BLD: 8 % (ref 5–13)
NEUTS SEG # BLD: 4 K/UL (ref 1.8–8)
NEUTS SEG NFR BLD: 59 % (ref 32–75)
NRBC # BLD: 0 K/UL (ref 0–0.01)
NRBC BLD-RTO: 0 PER 100 WBC
PLATELET # BLD AUTO: 203 K/UL (ref 150–400)
PMV BLD AUTO: 10.3 FL (ref 8.9–12.9)
POTASSIUM SERPL-SCNC: 3.4 MMOL/L (ref 3.5–5.1)
PROT SERPL-MCNC: 7.5 G/DL (ref 6.4–8.2)
RBC # BLD AUTO: 3.98 M/UL (ref 4.1–5.7)
SODIUM SERPL-SCNC: 141 MMOL/L (ref 136–145)
TROPONIN I SERPL HS-MCNC: 6 NG/L (ref 0–76)
TROPONIN I SERPL HS-MCNC: 7 NG/L (ref 0–76)
WBC # BLD AUTO: 6.9 K/UL (ref 4.1–11.1)

## 2024-11-17 PROCEDURE — 71045 X-RAY EXAM CHEST 1 VIEW: CPT

## 2024-11-17 PROCEDURE — 84484 ASSAY OF TROPONIN QUANT: CPT

## 2024-11-17 PROCEDURE — 6360000004 HC RX CONTRAST MEDICATION: Performed by: EMERGENCY MEDICINE

## 2024-11-17 PROCEDURE — 83880 ASSAY OF NATRIURETIC PEPTIDE: CPT

## 2024-11-17 PROCEDURE — 93005 ELECTROCARDIOGRAM TRACING: CPT | Performed by: EMERGENCY MEDICINE

## 2024-11-17 PROCEDURE — 96374 THER/PROPH/DIAG INJ IV PUSH: CPT

## 2024-11-17 PROCEDURE — 36415 COLL VENOUS BLD VENIPUNCTURE: CPT

## 2024-11-17 PROCEDURE — 99285 EMERGENCY DEPT VISIT HI MDM: CPT

## 2024-11-17 PROCEDURE — 71275 CT ANGIOGRAPHY CHEST: CPT

## 2024-11-17 PROCEDURE — 80053 COMPREHEN METABOLIC PANEL: CPT

## 2024-11-17 PROCEDURE — 85025 COMPLETE CBC W/AUTO DIFF WBC: CPT

## 2024-11-17 PROCEDURE — 6360000002 HC RX W HCPCS: Performed by: EMERGENCY MEDICINE

## 2024-11-17 RX ORDER — MORPHINE SULFATE 4 MG/ML
4 INJECTION, SOLUTION INTRAMUSCULAR; INTRAVENOUS
Status: COMPLETED | OUTPATIENT
Start: 2024-11-17 | End: 2024-11-17

## 2024-11-17 RX ORDER — IOPAMIDOL 755 MG/ML
100 INJECTION, SOLUTION INTRAVASCULAR
Status: COMPLETED | OUTPATIENT
Start: 2024-11-17 | End: 2024-11-17

## 2024-11-17 RX ADMIN — IOPAMIDOL 100 ML: 755 INJECTION, SOLUTION INTRAVENOUS at 19:17

## 2024-11-17 RX ADMIN — MORPHINE SULFATE 4 MG: 4 INJECTION, SOLUTION INTRAMUSCULAR; INTRAVENOUS at 18:59

## 2024-11-17 ASSESSMENT — HEART SCORE: ECG: NON-SPECIFC REPOLARIZATION DISTURBANCE/LBTB/PM

## 2024-11-17 ASSESSMENT — PAIN DESCRIPTION - LOCATION
LOCATION: CHEST
LOCATION: CHEST

## 2024-11-17 ASSESSMENT — PAIN - FUNCTIONAL ASSESSMENT: PAIN_FUNCTIONAL_ASSESSMENT: 0-10

## 2024-11-17 ASSESSMENT — PAIN SCALES - GENERAL
PAINLEVEL_OUTOF10: 9
PAINLEVEL_OUTOF10: 9

## 2024-11-17 NOTE — ED TRIAGE NOTES
[facility] 15 pt stated that this am he was feeling a bit off but went to work; while at work he started to feel dizziness and thought his sugar was dropping so he took a snack but didn't feel any better; pt then started to have intermittent CP; c/o CP SOB dizziness

## 2024-11-17 NOTE — ED PROVIDER NOTES
[facility] EMERGENCY DEPT  EMERGENCY DEPARTMENT HISTORY AND PHYSICAL EXAM      Date: 11/17/2024  Patient Name: Ye Joseph  MRN: [id number]  Birthdate 1975  Date of evaluation: 11/17/2024  Provider: Sonu Son DO   Note Started: 6:12 PM EST 11/17/24    HISTORY OF PRESENT ILLNESS     Chief Complaint   Patient presents with    Dizziness    Chest Pain     History Provided By: Patient    HPI: Ye Joseph is a 49 y.o. male with past medical history of DVT, CHF, COPD, diabetes, obesity, stroke, and hypertension  who presents with chest discomfort and lightheadedness.  He had an episode while at work a few hours ago.  States he still has chest pain.    PAST MEDICAL HISTORY   Past Medical History:  Past Medical History:   Diagnosis Date    Acute deep vein thrombosis (DVT) (HCC)     behind left knee    Acute MI (HCC)     Arthritis     CHF (congestive heart failure) (HCC)     COPD (chronic obstructive pulmonary disease) (HCC)     Depression     Diabetes mellitus (HCC)     Heart attack (HCC)     2023    Hx of blood clots     legs    Hypertension     Ill-defined condition     Obesity     Stroke (HCC)        Past Surgical History:  Past Surgical History:   Procedure Laterality Date    HERNIA REPAIR      IR FNA WITH IMAGING      ORTHOPEDIC SURGERY Right     rotator cuff surgery x2    OTHER SURGICAL HISTORY      Patent Foramen Ovale Repair    THYROIDECTOMY         Family History:  Family History   Problem Relation Age of Onset    Diabetes Maternal Uncle     Hypertension Maternal Uncle     Hypertension Paternal Uncle     Diabetes Paternal Uncle     Cancer Other     No Known Problems Father     Cancer Mother        Social History:  Social History     Tobacco Use    Smoking status: Former    Smokeless tobacco: Never   Vaping Use    Vaping status: Never Used   Substance Use Topics    Alcohol use: Yes     Comment: 2    Drug use: Not Currently       Allergies:  Allergies   Allergen Reactions    Vancomycin Hives

## 2024-11-18 LAB
EKG ATRIAL RATE: 77 BPM
EKG DIAGNOSIS: NORMAL
EKG P AXIS: 25 DEGREES
EKG P-R INTERVAL: 198 MS
EKG Q-T INTERVAL: 424 MS
EKG QRS DURATION: 96 MS
EKG QTC CALCULATION (BAZETT): 479 MS
EKG R AXIS: 26 DEGREES
EKG T AXIS: 48 DEGREES
EKG VENTRICULAR RATE: 77 BPM

## 2024-11-25 NOTE — DISCHARGE INSTRUCTIONS
Patient Education        Hypokalemia: Care Instructions  Your Care Instructions     Hypokalemia (say \"vh-ww-rzw-ALIYAH-nani-uh\") is a low level of potassium. The heart, muscles, kidneys, and nervous system all need potassium to work well. This problem has many different causes. Kidney problems, diet, and medicines like diuretics and laxatives can cause it. So can vomiting or diarrhea. In some cases, cancer is the cause. Your doctor may do tests to find the cause of your low potassium levels. You may need medicines to bring your potassium levels back to normal. You may also need regular blood tests to check your potassium. If you have very low potassium, you may need intravenous (IV) medicines. You also may need tests to check the electrical activity of your heart. Heart problems caused by low potassium levels can be very serious. Follow-up care is a key part of your treatment and safety. Be sure to make and go to all appointments, and call your doctor if you are having problems. It's also a good idea to know your test results and keep a list of the medicines you take. How can you care for yourself at home? · If your doctor recommends it, eat foods that have a lot of potassium. These include fresh fruits, juices, and vegetables. They also include nuts, beans, and milk. · Be safe with medicines. If your doctor prescribes medicines or potassium supplements, take them exactly as directed. Call your doctor if you have any problems with your medicines. · Get your potassium levels tested as often as your doctor tells you. When should you call for help? Call 911 anytime you think you may need emergency care. For example, call if:    · You feel like your heart is missing beats. Heart problems caused by low potassium can cause death.     · You passed out (lost consciousness).     · You have a seizure.    Call your doctor now or seek immediate medical care if:    · You feel weak or unusually tired.     · You have severe arm or leg cramps.     · You have tingling or numbness.     · You feel sick to your stomach, or you vomit.     · You have belly cramps.     · You feel bloated or constipated.     · You have to urinate a lot.     · You feel very thirsty most of the time.     · You are dizzy or lightheaded, or you feel like you may faint.     · You feel depressed, or you lose touch with reality. Watch closely for changes in your health, and be sure to contact your doctor if:    · You do not get better as expected. Where can you learn more? Go to http://www.gray.com/  Enter G358 in the search box to learn more about \"Hypokalemia: Care Instructions. \"  Current as of: July 28, 2021               Content Version: 13.2  © 3814-4631 [employer]. Care instructions adapted under license by [employer] (which disclaims liability or warranty for this information). If you have questions about a medical condition or this instruction, always ask your healthcare professional. [address] any warranty or liability for your use of this information. no

## 2024-11-26 NOTE — ED TRIAGE NOTES
----- Message from Alec Varela MD sent at 11/25/2024  5:22 PM CST -----  Tell her urine test is fine.  ----- Message -----  From: Lab, Background User  Sent: 11/25/2024   1:56 PM CST  To: Alec Varela MD   Walking across the parking lot, chest tightness, sob, 324 ASA given, still having tightness and sob cardiac hx

## 2024-11-30 ENCOUNTER — HOSPITAL ENCOUNTER (OUTPATIENT)
Facility: HOSPITAL | Age: 49
Setting detail: OBSERVATION
Discharge: HOME | End: 2024-12-03
Attending: STUDENT IN AN ORGANIZED HEALTH CARE EDUCATION/TRAINING PROGRAM
Payer: MEDICAID

## 2024-11-30 ENCOUNTER — APPOINTMENT (OUTPATIENT)
Facility: HOSPITAL | Age: 49
End: 2024-11-30
Payer: MEDICAID

## 2024-11-30 DIAGNOSIS — I20.9 ANGINA PECTORIS (HCC): ICD-10-CM

## 2024-11-30 DIAGNOSIS — I25.118 CORONARY ARTERY DISEASE OF NATIVE HEART WITH STABLE ANGINA PECTORIS, UNSPECIFIED VESSEL OR LESION TYPE (HCC): ICD-10-CM

## 2024-11-30 DIAGNOSIS — R07.89 OTHER CHEST PAIN: Primary | ICD-10-CM

## 2024-11-30 DIAGNOSIS — R06.02 SHORTNESS OF BREATH: ICD-10-CM

## 2024-11-30 DIAGNOSIS — I20.0 UNSTABLE ANGINA (HCC): ICD-10-CM

## 2024-11-30 DIAGNOSIS — R07.9 CHEST PAIN, UNSPECIFIED TYPE: ICD-10-CM

## 2024-11-30 LAB
ALBUMIN SERPL-MCNC: 3 G/DL (ref 3.5–5)
ALBUMIN/GLOB SERPL: 0.6 (ref 1.1–2.2)
ALP SERPL-CCNC: 89 U/L (ref 45–117)
ALT SERPL-CCNC: 20 U/L (ref 12–78)
ANION GAP SERPL CALC-SCNC: 5 MMOL/L (ref 2–12)
AST SERPL W P-5'-P-CCNC: 36 U/L (ref 15–37)
BASOPHILS # BLD: 0.1 K/UL (ref 0–0.1)
BASOPHILS NFR BLD: 1 % (ref 0–1)
BILIRUB SERPL-MCNC: 0.4 MG/DL (ref 0.2–1)
BNP SERPL-MCNC: 106 PG/ML
BUN SERPL-MCNC: 11 MG/DL (ref 6–20)
BUN/CREAT SERPL: 12 (ref 12–20)
CA-I BLD-MCNC: 8.6 MG/DL (ref 8.5–10.1)
CHLORIDE SERPL-SCNC: 107 MMOL/L (ref 97–108)
CO2 SERPL-SCNC: 26 MMOL/L (ref 21–32)
CREAT SERPL-MCNC: 0.94 MG/DL (ref 0.7–1.3)
D DIMER PPP FEU-MCNC: 0.69 UG/ML(FEU)
DIFFERENTIAL METHOD BLD: NORMAL
EOSINOPHIL # BLD: 0.2 K/UL (ref 0–0.4)
EOSINOPHIL NFR BLD: 3 % (ref 0–7)
ERYTHROCYTE [DISTWIDTH] IN BLOOD BY AUTOMATED COUNT: 13.8 % (ref 11.5–14.5)
GLOBULIN SER CALC-MCNC: 4.8 G/DL (ref 2–4)
GLUCOSE SERPL-MCNC: 94 MG/DL (ref 65–100)
HCT VFR BLD AUTO: 40.1 % (ref 36.6–50.3)
HGB BLD-MCNC: 13.6 G/DL (ref 12.1–17)
IMM GRANULOCYTES # BLD AUTO: 0 K/UL (ref 0–0.04)
IMM GRANULOCYTES NFR BLD AUTO: 0 % (ref 0–0.5)
LYMPHOCYTES # BLD: 2 K/UL (ref 0.8–3.5)
LYMPHOCYTES NFR BLD: 28 % (ref 12–49)
MCH RBC QN AUTO: 32.5 PG (ref 26–34)
MCHC RBC AUTO-ENTMCNC: 33.9 G/DL (ref 30–36.5)
MCV RBC AUTO: 95.9 FL (ref 80–99)
MONOCYTES # BLD: 0.6 K/UL (ref 0–1)
MONOCYTES NFR BLD: 8 % (ref 5–13)
NEUTS SEG # BLD: 4.4 K/UL (ref 1.8–8)
NEUTS SEG NFR BLD: 60 % (ref 32–75)
NRBC # BLD: 0 K/UL (ref 0–0.01)
NRBC BLD-RTO: 0 PER 100 WBC
PLATELET # BLD AUTO: 235 K/UL (ref 150–400)
PMV BLD AUTO: 11.3 FL (ref 8.9–12.9)
POTASSIUM SERPL-SCNC: 4.6 MMOL/L (ref 3.5–5.1)
PROT SERPL-MCNC: 7.8 G/DL (ref 6.4–8.2)
RBC # BLD AUTO: 4.18 M/UL (ref 4.1–5.7)
RBC MORPH BLD: NORMAL
SODIUM SERPL-SCNC: 138 MMOL/L (ref 136–145)
TROPONIN I SERPL HS-MCNC: 5 NG/L (ref 0–76)
WBC # BLD AUTO: 7.3 K/UL (ref 4.1–11.1)

## 2024-11-30 PROCEDURE — 85025 COMPLETE CBC W/AUTO DIFF WBC: CPT

## 2024-11-30 PROCEDURE — 85379 FIBRIN DEGRADATION QUANT: CPT

## 2024-11-30 PROCEDURE — 84484 ASSAY OF TROPONIN QUANT: CPT

## 2024-11-30 PROCEDURE — 6360000002 HC RX W HCPCS: Performed by: STUDENT IN AN ORGANIZED HEALTH CARE EDUCATION/TRAINING PROGRAM

## 2024-11-30 PROCEDURE — 36415 COLL VENOUS BLD VENIPUNCTURE: CPT

## 2024-11-30 PROCEDURE — 99285 EMERGENCY DEPT VISIT HI MDM: CPT

## 2024-11-30 PROCEDURE — 71045 X-RAY EXAM CHEST 1 VIEW: CPT

## 2024-11-30 PROCEDURE — 96374 THER/PROPH/DIAG INJ IV PUSH: CPT

## 2024-11-30 PROCEDURE — 93005 ELECTROCARDIOGRAM TRACING: CPT | Performed by: STUDENT IN AN ORGANIZED HEALTH CARE EDUCATION/TRAINING PROGRAM

## 2024-11-30 PROCEDURE — 80053 COMPREHEN METABOLIC PANEL: CPT

## 2024-11-30 PROCEDURE — 83880 ASSAY OF NATRIURETIC PEPTIDE: CPT

## 2024-11-30 RX ORDER — MORPHINE SULFATE 4 MG/ML
4 INJECTION, SOLUTION INTRAMUSCULAR; INTRAVENOUS
Status: COMPLETED | OUTPATIENT
Start: 2024-11-30 | End: 2024-11-30

## 2024-11-30 RX ADMIN — MORPHINE SULFATE 4 MG: 4 INJECTION, SOLUTION INTRAMUSCULAR; INTRAVENOUS at 23:26

## 2024-11-30 ASSESSMENT — LIFESTYLE VARIABLES
HOW MANY STANDARD DRINKS CONTAINING ALCOHOL DO YOU HAVE ON A TYPICAL DAY: PATIENT DOES NOT DRINK
HOW MANY STANDARD DRINKS CONTAINING ALCOHOL DO YOU HAVE ON A TYPICAL DAY: PATIENT DOES NOT DRINK
HOW OFTEN DO YOU HAVE A DRINK CONTAINING ALCOHOL: NEVER
HOW OFTEN DO YOU HAVE A DRINK CONTAINING ALCOHOL: NEVER

## 2024-11-30 ASSESSMENT — PAIN DESCRIPTION - LOCATION
LOCATION: CHEST
LOCATION: CHEST

## 2024-11-30 ASSESSMENT — PAIN SCALES - GENERAL
PAINLEVEL_OUTOF10: 8
PAINLEVEL_OUTOF10: 9

## 2024-12-01 ENCOUNTER — APPOINTMENT (OUTPATIENT)
Facility: HOSPITAL | Age: 49
End: 2024-12-01
Payer: MEDICAID

## 2024-12-01 LAB
ANION GAP SERPL CALC-SCNC: 4 MMOL/L (ref 2–12)
BUN SERPL-MCNC: 9 MG/DL (ref 6–20)
BUN/CREAT SERPL: 9 (ref 12–20)
CA-I BLD-MCNC: 8.6 MG/DL (ref 8.5–10.1)
CHLORIDE SERPL-SCNC: 105 MMOL/L (ref 97–108)
CO2 SERPL-SCNC: 29 MMOL/L (ref 21–32)
CREAT SERPL-MCNC: 1.02 MG/DL (ref 0.7–1.3)
EKG ATRIAL RATE: 84 BPM
EKG DIAGNOSIS: NORMAL
EKG P AXIS: 17 DEGREES
EKG P-R INTERVAL: 192 MS
EKG Q-T INTERVAL: 416 MS
EKG QRS DURATION: 98 MS
EKG QTC CALCULATION (BAZETT): 491 MS
EKG R AXIS: 12 DEGREES
EKG T AXIS: 30 DEGREES
EKG VENTRICULAR RATE: 84 BPM
GLUCOSE BLD STRIP.AUTO-MCNC: 134 MG/DL (ref 65–100)
GLUCOSE BLD STRIP.AUTO-MCNC: 170 MG/DL (ref 65–100)
GLUCOSE SERPL-MCNC: 142 MG/DL (ref 65–100)
MAGNESIUM SERPL-MCNC: 1.9 MG/DL (ref 1.6–2.4)
PERFORMED BY:: ABNORMAL
PERFORMED BY:: ABNORMAL
PHOSPHATE SERPL-MCNC: 2.9 MG/DL (ref 2.6–4.7)
POTASSIUM SERPL-SCNC: 3 MMOL/L (ref 3.5–5.1)
SODIUM SERPL-SCNC: 138 MMOL/L (ref 136–145)
TROPONIN I SERPL HS-MCNC: 4 NG/L (ref 0–76)

## 2024-12-01 PROCEDURE — 71275 CT ANGIOGRAPHY CHEST: CPT

## 2024-12-01 PROCEDURE — 6370000000 HC RX 637 (ALT 250 FOR IP)

## 2024-12-01 PROCEDURE — G0378 HOSPITAL OBSERVATION PER HR: HCPCS

## 2024-12-01 PROCEDURE — 2060000000 HC ICU INTERMEDIATE R&B

## 2024-12-01 PROCEDURE — 83735 ASSAY OF MAGNESIUM: CPT

## 2024-12-01 PROCEDURE — 94640 AIRWAY INHALATION TREATMENT: CPT

## 2024-12-01 PROCEDURE — 6370000000 HC RX 637 (ALT 250 FOR IP): Performed by: INTERNAL MEDICINE

## 2024-12-01 PROCEDURE — 80048 BASIC METABOLIC PNL TOTAL CA: CPT

## 2024-12-01 PROCEDURE — 6360000004 HC RX CONTRAST MEDICATION: Performed by: STUDENT IN AN ORGANIZED HEALTH CARE EDUCATION/TRAINING PROGRAM

## 2024-12-01 PROCEDURE — 96376 TX/PRO/DX INJ SAME DRUG ADON: CPT

## 2024-12-01 PROCEDURE — 2580000003 HC RX 258

## 2024-12-01 PROCEDURE — 96375 TX/PRO/DX INJ NEW DRUG ADDON: CPT

## 2024-12-01 PROCEDURE — 36415 COLL VENOUS BLD VENIPUNCTURE: CPT

## 2024-12-01 PROCEDURE — 82962 GLUCOSE BLOOD TEST: CPT

## 2024-12-01 PROCEDURE — 6370000000 HC RX 637 (ALT 250 FOR IP): Performed by: STUDENT IN AN ORGANIZED HEALTH CARE EDUCATION/TRAINING PROGRAM

## 2024-12-01 PROCEDURE — 6360000002 HC RX W HCPCS: Performed by: INTERNAL MEDICINE

## 2024-12-01 PROCEDURE — 6360000002 HC RX W HCPCS

## 2024-12-01 PROCEDURE — 84100 ASSAY OF PHOSPHORUS: CPT

## 2024-12-01 RX ORDER — IOPAMIDOL 755 MG/ML
100 INJECTION, SOLUTION INTRAVASCULAR
Status: COMPLETED | OUTPATIENT
Start: 2024-12-01 | End: 2024-12-01

## 2024-12-01 RX ORDER — ALBUTEROL SULFATE 90 UG/1
2 INHALANT RESPIRATORY (INHALATION)
Status: DISCONTINUED | OUTPATIENT
Start: 2024-12-01 | End: 2024-12-02

## 2024-12-01 RX ORDER — SODIUM CHLORIDE 9 MG/ML
INJECTION, SOLUTION INTRAVENOUS PRN
Status: DISCONTINUED | OUTPATIENT
Start: 2024-12-01 | End: 2024-12-03 | Stop reason: HOSPADM

## 2024-12-01 RX ORDER — NITROGLYCERIN 0.4 MG/1
0.4 TABLET SUBLINGUAL EVERY 5 MIN PRN
Status: DISCONTINUED | OUTPATIENT
Start: 2024-12-01 | End: 2024-12-03 | Stop reason: HOSPADM

## 2024-12-01 RX ORDER — LEVOTHYROXINE SODIUM 75 UG/1
75 TABLET ORAL
Status: DISCONTINUED | OUTPATIENT
Start: 2024-12-01 | End: 2024-12-03 | Stop reason: HOSPADM

## 2024-12-01 RX ORDER — POTASSIUM CHLORIDE 1500 MG/1
40 TABLET, EXTENDED RELEASE ORAL PRN
Status: DISCONTINUED | OUTPATIENT
Start: 2024-12-01 | End: 2024-12-03 | Stop reason: HOSPADM

## 2024-12-01 RX ORDER — ATORVASTATIN CALCIUM 40 MG/1
40 TABLET, FILM COATED ORAL NIGHTLY
Status: DISCONTINUED | OUTPATIENT
Start: 2024-12-01 | End: 2024-12-01

## 2024-12-01 RX ORDER — FAMOTIDINE 20 MG/1
20 TABLET, FILM COATED ORAL 2 TIMES DAILY
Status: DISCONTINUED | OUTPATIENT
Start: 2024-12-01 | End: 2024-12-03 | Stop reason: HOSPADM

## 2024-12-01 RX ORDER — POTASSIUM CHLORIDE 7.45 MG/ML
10 INJECTION INTRAVENOUS PRN
Status: DISCONTINUED | OUTPATIENT
Start: 2024-12-01 | End: 2024-12-03 | Stop reason: HOSPADM

## 2024-12-01 RX ORDER — BUDESONIDE AND FORMOTEROL FUMARATE DIHYDRATE 160; 4.5 UG/1; UG/1
2 AEROSOL RESPIRATORY (INHALATION)
Status: DISCONTINUED | OUTPATIENT
Start: 2024-12-01 | End: 2024-12-03 | Stop reason: HOSPADM

## 2024-12-01 RX ORDER — ASPIRIN 81 MG/1
243 TABLET, CHEWABLE ORAL ONCE
Status: COMPLETED | OUTPATIENT
Start: 2024-12-01 | End: 2024-12-01

## 2024-12-01 RX ORDER — CITALOPRAM HYDROBROMIDE 20 MG/1
40 TABLET ORAL DAILY
Status: DISCONTINUED | OUTPATIENT
Start: 2024-12-01 | End: 2024-12-03 | Stop reason: HOSPADM

## 2024-12-01 RX ORDER — SODIUM CHLORIDE 0.9 % (FLUSH) 0.9 %
5-40 SYRINGE (ML) INJECTION PRN
Status: DISCONTINUED | OUTPATIENT
Start: 2024-12-01 | End: 2024-12-03 | Stop reason: HOSPADM

## 2024-12-01 RX ORDER — METOPROLOL SUCCINATE 50 MG/1
50 TABLET, EXTENDED RELEASE ORAL DAILY
Status: DISCONTINUED | OUTPATIENT
Start: 2024-12-01 | End: 2024-12-03 | Stop reason: HOSPADM

## 2024-12-01 RX ORDER — ENOXAPARIN SODIUM 100 MG/ML
40 INJECTION SUBCUTANEOUS DAILY
Status: DISCONTINUED | OUTPATIENT
Start: 2024-12-01 | End: 2024-12-01

## 2024-12-01 RX ORDER — ACETAMINOPHEN 325 MG/1
650 TABLET ORAL EVERY 6 HOURS PRN
Status: DISCONTINUED | OUTPATIENT
Start: 2024-12-01 | End: 2024-12-03 | Stop reason: HOSPADM

## 2024-12-01 RX ORDER — ACETAMINOPHEN 650 MG/1
650 SUPPOSITORY RECTAL EVERY 6 HOURS PRN
Status: DISCONTINUED | OUTPATIENT
Start: 2024-12-01 | End: 2024-12-03 | Stop reason: HOSPADM

## 2024-12-01 RX ORDER — MAGNESIUM SULFATE IN WATER 40 MG/ML
2000 INJECTION, SOLUTION INTRAVENOUS PRN
Status: DISCONTINUED | OUTPATIENT
Start: 2024-12-01 | End: 2024-12-03 | Stop reason: HOSPADM

## 2024-12-01 RX ORDER — POLYETHYLENE GLYCOL 3350 17 G/17G
17 POWDER, FOR SOLUTION ORAL DAILY PRN
Status: DISCONTINUED | OUTPATIENT
Start: 2024-12-01 | End: 2024-12-03 | Stop reason: HOSPADM

## 2024-12-01 RX ORDER — ALLOPURINOL 100 MG/1
100 TABLET ORAL DAILY
Status: DISCONTINUED | OUTPATIENT
Start: 2024-12-01 | End: 2024-12-03 | Stop reason: HOSPADM

## 2024-12-01 RX ORDER — ATORVASTATIN CALCIUM 40 MG/1
80 TABLET, FILM COATED ORAL DAILY
Status: DISCONTINUED | OUTPATIENT
Start: 2024-12-01 | End: 2024-12-03 | Stop reason: HOSPADM

## 2024-12-01 RX ORDER — BUMETANIDE 0.25 MG/ML
1 INJECTION, SOLUTION INTRAMUSCULAR; INTRAVENOUS 2 TIMES DAILY
Status: DISCONTINUED | OUTPATIENT
Start: 2024-12-01 | End: 2024-12-01

## 2024-12-01 RX ORDER — ASPIRIN 81 MG/1
81 TABLET, CHEWABLE ORAL DAILY
Status: DISCONTINUED | OUTPATIENT
Start: 2024-12-02 | End: 2024-12-01

## 2024-12-01 RX ORDER — ONDANSETRON 2 MG/ML
4 INJECTION INTRAMUSCULAR; INTRAVENOUS EVERY 6 HOURS PRN
Status: DISCONTINUED | OUTPATIENT
Start: 2024-12-01 | End: 2024-12-03 | Stop reason: HOSPADM

## 2024-12-01 RX ORDER — BUMETANIDE 0.25 MG/ML
2 INJECTION, SOLUTION INTRAMUSCULAR; INTRAVENOUS 2 TIMES DAILY
Status: DISPENSED | OUTPATIENT
Start: 2024-12-01 | End: 2024-12-02

## 2024-12-01 RX ORDER — SODIUM CHLORIDE 0.9 % (FLUSH) 0.9 %
5-40 SYRINGE (ML) INJECTION EVERY 12 HOURS SCHEDULED
Status: DISCONTINUED | OUTPATIENT
Start: 2024-12-01 | End: 2024-12-03 | Stop reason: HOSPADM

## 2024-12-01 RX ORDER — ONDANSETRON 4 MG/1
4 TABLET, ORALLY DISINTEGRATING ORAL EVERY 8 HOURS PRN
Status: DISCONTINUED | OUTPATIENT
Start: 2024-12-01 | End: 2024-12-03 | Stop reason: HOSPADM

## 2024-12-01 RX ORDER — ASPIRIN 81 MG/1
81 TABLET, CHEWABLE ORAL DAILY
Status: DISCONTINUED | OUTPATIENT
Start: 2024-12-01 | End: 2024-12-03 | Stop reason: HOSPADM

## 2024-12-01 RX ORDER — MORPHINE SULFATE 2 MG/ML
2 INJECTION, SOLUTION INTRAMUSCULAR; INTRAVENOUS EVERY 4 HOURS PRN
Status: DISPENSED | OUTPATIENT
Start: 2024-12-01 | End: 2024-12-03

## 2024-12-01 RX ADMIN — ATORVASTATIN CALCIUM 80 MG: 40 TABLET, FILM COATED ORAL at 09:17

## 2024-12-01 RX ADMIN — Medication 2 PUFF: at 09:23

## 2024-12-01 RX ADMIN — FAMOTIDINE 20 MG: 20 TABLET, FILM COATED ORAL at 09:17

## 2024-12-01 RX ADMIN — Medication 2 PUFF: at 19:35

## 2024-12-01 RX ADMIN — IOPAMIDOL 100 ML: 755 INJECTION, SOLUTION INTRAVENOUS at 00:20

## 2024-12-01 RX ADMIN — MORPHINE SULFATE 2 MG: 2 INJECTION, SOLUTION INTRAMUSCULAR; INTRAVENOUS at 10:30

## 2024-12-01 RX ADMIN — RIVAROXABAN 20 MG: 20 TABLET, FILM COATED ORAL at 18:02

## 2024-12-01 RX ADMIN — SODIUM CHLORIDE, PRESERVATIVE FREE 10 ML: 5 INJECTION INTRAVENOUS at 20:23

## 2024-12-01 RX ADMIN — FAMOTIDINE 20 MG: 20 TABLET, FILM COATED ORAL at 20:24

## 2024-12-01 RX ADMIN — TRAZODONE HYDROCHLORIDE 200 MG: 150 TABLET ORAL at 02:56

## 2024-12-01 RX ADMIN — BUMETANIDE 2 MG: 0.25 INJECTION INTRAMUSCULAR; INTRAVENOUS at 18:02

## 2024-12-01 RX ADMIN — MORPHINE SULFATE 2 MG: 2 INJECTION, SOLUTION INTRAMUSCULAR; INTRAVENOUS at 20:23

## 2024-12-01 RX ADMIN — ASPIRIN 81 MG: 81 TABLET, CHEWABLE ORAL at 09:17

## 2024-12-01 RX ADMIN — CITALOPRAM HYDROBROMIDE 40 MG: 20 TABLET ORAL at 09:17

## 2024-12-01 RX ADMIN — EMPAGLIFLOZIN 10 MG: 10 TABLET, FILM COATED ORAL at 20:24

## 2024-12-01 RX ADMIN — ASPIRIN 243 MG: 81 TABLET, CHEWABLE ORAL at 00:40

## 2024-12-01 RX ADMIN — MORPHINE SULFATE 2 MG: 2 INJECTION, SOLUTION INTRAMUSCULAR; INTRAVENOUS at 16:25

## 2024-12-01 RX ADMIN — TRAZODONE HYDROCHLORIDE 200 MG: 150 TABLET ORAL at 22:07

## 2024-12-01 RX ADMIN — SODIUM CHLORIDE, PRESERVATIVE FREE 10 ML: 5 INJECTION INTRAVENOUS at 09:20

## 2024-12-01 RX ADMIN — ALLOPURINOL 100 MG: 100 TABLET ORAL at 09:17

## 2024-12-01 RX ADMIN — LEVOTHYROXINE SODIUM 75 MCG: 0.07 TABLET ORAL at 06:43

## 2024-12-01 RX ADMIN — METOPROLOL SUCCINATE 50 MG: 50 TABLET, EXTENDED RELEASE ORAL at 09:17

## 2024-12-01 ASSESSMENT — PAIN DESCRIPTION - DESCRIPTORS
DESCRIPTORS: SHARP
DESCRIPTORS: ACHING;DISCOMFORT
DESCRIPTORS: SHARP

## 2024-12-01 ASSESSMENT — PAIN SCALES - GENERAL
PAINLEVEL_OUTOF10: 8
PAINLEVEL_OUTOF10: 8
PAINLEVEL_OUTOF10: 6
PAINLEVEL_OUTOF10: 8
PAINLEVEL_OUTOF10: 0
PAINLEVEL_OUTOF10: 8
PAINLEVEL_OUTOF10: 6
PAINLEVEL_OUTOF10: 8
PAINLEVEL_OUTOF10: 8

## 2024-12-01 ASSESSMENT — PAIN DESCRIPTION - ORIENTATION
ORIENTATION: ANTERIOR

## 2024-12-01 ASSESSMENT — PAIN DESCRIPTION - LOCATION
LOCATION: CHEST

## 2024-12-01 ASSESSMENT — PAIN DESCRIPTION - PAIN TYPE: TYPE: CHRONIC PAIN

## 2024-12-01 ASSESSMENT — PAIN SCALES - WONG BAKER
WONGBAKER_NUMERICALRESPONSE: HURTS A LITTLE BIT

## 2024-12-01 ASSESSMENT — PAIN DESCRIPTION - FREQUENCY: FREQUENCY: CONTINUOUS

## 2024-12-01 ASSESSMENT — PAIN - FUNCTIONAL ASSESSMENT: PAIN_FUNCTIONAL_ASSESSMENT: ACTIVITIES ARE NOT PREVENTED

## 2024-12-01 ASSESSMENT — HEART SCORE: ECG: NORMAL

## 2024-12-01 NOTE — ED NOTES
ED TO INPATIENT SBAR HANDOFF    Patient Name: Ye Joseph   Preferred Name: Ye  : 1975  49 y.o.   Family/Caregiver Present: no   Code Status Order: Full Code  PO Status: NPO:No  Telemetry Order: Yes  C-SSRS: Risk of Suicide: No Risk  Sitter no     Restraints:     Sepsis Risk Score      Situation  Chief Complaint   Patient presents with    Chest Pain     Brief Description of Patient's Condition: Pt c/o CP radiating into left arm. Hx CHF and stroke. Aox4, ambulates independently.     Mental Status: oriented, alert, coherent, logical, and thought processes intact  Arrived from:Home  Imaging:   CTA CHEST W WO CONTRAST   Final Result   No acute pulmonary embolus or other acute cardiopulmonary process. No   significant interval change.         Electronically signed by Jhony Gutierrez      XR CHEST 1 VIEW   Final Result   Unchanged cardiomegaly. No acute pulmonary process..         Electronically signed by Jhony Gutierrez        Abnormal labs:   Abnormal Labs Reviewed   COMPREHENSIVE METABOLIC PANEL - Abnormal; Notable for the following components:       Result Value    Albumin 3.0 (*)     Globulin 4.8 (*)     Albumin/Globulin Ratio 0.6 (*)     All other components within normal limits   D-DIMER, QUANTITATIVE - Abnormal; Notable for the following components:    D-Dimer, Quant 0.69 (*)     All other components within normal limits       Background  Allergies:   Allergies   Allergen Reactions    Vancomycin Hives     Gabrielle syndrome     History:   Past Medical History:   Diagnosis Date    Acute deep vein thrombosis (DVT) ([facility])     behind left knee    Acute MI (HCC)     Arthritis     CHF (congestive heart failure) (HCC)     COPD (chronic obstructive pulmonary disease) (HCC)     Depression     Diabetes mellitus (HCC)     Heart attack (HCC)         Hx of blood clots     legs    Hypertension     Ill-defined condition     Obesity     Stroke ([facility])        Assessment  Vitals:        Vitals:    24 0018

## 2024-12-01 NOTE — CARE COORDINATION
12/01/24 1222   Service Assessment   Patient Orientation Alert and Oriented   Cognition Alert   History Provided By Patient   Primary Caregiver Spouse   Support Systems Spouse/Significant Other  (ex-wife)   Patient's Healthcare Decision Maker is: Legal Next of Kin   PCP Verified by CM Yes   Last Visit to PCP Within last 6 months   Prior Functional Level Independent in ADLs/IADLs   Current Functional Level Independent in ADLs/IADLs   Can patient return to prior living arrangement Yes   Ability to make needs known: Good   Family able to assist with home care needs: Yes   Would you like for me to discuss the discharge plan with any other family members/significant others, and if so, who? No   Financial Resources Medicaid   Community Resources None   Social/Functional History   Lives With Spouse  (EX)   Type of Home House   Home Equipment None   Receives Help From Family   ADL Assistance Independent   Homemaking Assistance Independent   Ambulation Assistance Independent   Transfer Assistance Independent   Mode of Transportation Car   Discharge Planning   Type of Residence House   Living Arrangements Spouse/Significant Other   Current Services Prior To Admission None   Potential Assistance Needed N/A   DME Ordered? No   Potential Assistance Purchasing Medications No   Services At/After Discharge    Resource Information Provided? No   Confirm Follow Up Transport Self     Patient admits from home.  Patient will drive himself home at discharge.  Patient receives medication from [facility].    Advance Care Planning     General Advance Care Planning (ACP) Conversation    Date of Conversation: 12/1/2024  Conducted with: Patient with Decision Making Capacity  Other persons present: None    Healthcare Decision Maker:   Primary Decision Maker: Chilango Joseph - Friend - [phone]       Content/Action Overview:  Has ACP document(s) on file - reflects the patient's care preferences  Reviewed DNR/DNI and patient elects

## 2024-12-01 NOTE — ED NOTES
Pt c/o 8/10 chest pain that radiates down left arm. Pt states he has hx of CHF and stroke d/t PFO. Compliant with medications.

## 2024-12-01 NOTE — ED PROVIDER NOTES
MG tablet Take 1 tablet by mouth daily  Qty: 30 tablet, Refills: 1      levothyroxine (SYNTHROID) 75 MCG tablet Take 1 tablet by mouth every morning (before breakfast)  Qty: 30 tablet, Refills: 1            2. No follow-up provider specified.  3.  Return to ED if worse    4.      Medication List        ASK your doctor about these medications      albuterol sulfate  (90 Base) MCG/ACT inhaler  Commonly known as: Ventolin HFA  Inhale 2 puffs into the lungs every 6 hours as needed for Wheezing     aspirin 81 MG chewable tablet  Take 1 tablet by mouth daily     atorvastatin 80 MG tablet  Commonly known as: LIPITOR  Take 1 tablet by mouth daily     budesonide-formoterol 160-4.5 MCG/ACT Aero  Commonly known as: SYMBICORT  Inhale 2 puffs into the lungs in the morning and 2 puffs in the evening.     bumetanide 0.5 MG tablet  Commonly known as: BUMEX  Take 1 tablet by mouth daily for 10 days     citalopram 40 MG tablet  Commonly known as: CELEXA  Take 1 tablet by mouth daily     clopidogrel 75 MG tablet  Commonly known as: PLAVIX  Take 1 tablet by mouth daily     empagliflozin 10 MG tablet  Commonly known as: JARDIANCE  Take 1 tablet by mouth daily     famotidine 20 MG tablet  Commonly known as: PEPCID  Take 1 tablet by mouth 2 times daily     gabapentin 600 MG tablet  Commonly known as: NEURONTIN  Take 2 tablets by mouth at bedtime for 3 days. Max Daily Amount: 1,200 mg     levothyroxine 75 MCG tablet  Commonly known as: SYNTHROID  Take 1 tablet by mouth every morning (before breakfast)     metFORMIN 500 MG tablet  Commonly known as: GLUCOPHAGE     traZODone 100 MG tablet  Commonly known as: DESYREL  Take 2 tablets by mouth nightly            5. Discontinued Medications:   Current Discharge Medication List          Procedures     Unless otherwise noted below, none.    Performed by: Jeremy Hernandez MD   Procedures      Critical Care Time     Critical care billing criteria and/or time were NOT met.    Documentation

## 2024-12-01 NOTE — H&P
Result Value Ref Range    WBC 7.3 4.1 - 11.1 K/uL    RBC 4.18 4.10 - 5.70 M/uL    Hemoglobin 13.6 12.1 - 17.0 g/dL    Hematocrit 40.1 36.6 - 50.3 %    MCV 95.9 80.0 - 99.0 FL    MCH 32.5 26.0 - 34.0 PG    MCHC 33.9 30.0 - 36.5 g/dL    RDW 13.8 11.5 - 14.5 %    Platelets 235 150 - 400 K/uL    MPV 11.3 8.9 - 12.9 FL    Nucleated RBCs 0.0 0.0  WBC    nRBC 0.00 0.00 - 0.01 K/uL    Neutrophils % 60 32 - 75 %    Lymphocytes % 28 12 - 49 %    Monocytes % 8 5 - 13 %    Eosinophils % 3 0 - 7 %    Basophils % 1 0 - 1 %    Immature Granulocytes % 0 0 - 0.5 %    Neutrophils Absolute 4.4 1.8 - 8.0 K/UL    Lymphocytes Absolute 2.0 0.8 - 3.5 K/UL    Monocytes Absolute 0.6 0.0 - 1.0 K/UL    Eosinophils Absolute 0.2 0.0 - 0.4 K/UL    Basophils Absolute 0.1 0.0 - 0.1 K/UL    Immature Granulocytes Absolute 0.0 0.00 - 0.04 K/UL    Differential Type AUTOMATED      RBC Comment Macrocytosis  1+       Troponin    Collection Time: 11/30/24 10:34 PM   Result Value Ref Range    Troponin, High Sensitivity 5 0 - 76 ng/L   Comprehensive Metabolic Panel    Collection Time: 11/30/24 10:34 PM   Result Value Ref Range    Sodium 138 136 - 145 mmol/L    Potassium 4.6 3.5 - 5.1 mmol/L    Chloride 107 97 - 108 mmol/L    CO2 26 21 - 32 mmol/L    Anion Gap 5 2 - 12 mmol/L    Glucose 94 65 - 100 mg/dL    BUN 11 6 - 20 mg/dL    Creatinine 0.94 0.70 - 1.30 mg/dL    BUN/Creatinine Ratio 12 12 - 20      Est, Glom Filt Rate >90 >60 ml/min/1.73m2    Calcium 8.6 8.5 - 10.1 mg/dL    Total Bilirubin 0.4 0.2 - 1.0 mg/dL    AST 36 15 - 37 U/L    ALT 20 12 - 78 U/L    Alk Phosphatase 89 45 - 117 U/L    Total Protein 7.8 6.4 - 8.2 g/dL    Albumin 3.0 (L) 3.5 - 5.0 g/dL    Globulin 4.8 (H) 2.0 - 4.0 g/dL    Albumin/Globulin Ratio 0.6 (L) 1.1 - 2.2     Brain Natriuretic Peptide    Collection Time: 11/30/24 10:35 PM   Result Value Ref Range    NT Pro- <125 pg/mL   D-Dimer, Quantitative    Collection Time: 11/30/24 10:35 PM   Result Value Ref Range

## 2024-12-02 ENCOUNTER — APPOINTMENT (OUTPATIENT)
Facility: HOSPITAL | Age: 49
End: 2024-12-02
Payer: MEDICAID

## 2024-12-02 LAB
ANION GAP SERPL CALC-SCNC: 4 MMOL/L (ref 2–12)
BASOPHILS # BLD: 0 K/UL (ref 0–0.1)
BASOPHILS NFR BLD: 0 % (ref 0–1)
BUN SERPL-MCNC: 8 MG/DL (ref 6–20)
BUN/CREAT SERPL: 9 (ref 12–20)
CA-I BLD-MCNC: 8.8 MG/DL (ref 8.5–10.1)
CHLORIDE SERPL-SCNC: 105 MMOL/L (ref 97–108)
CO2 SERPL-SCNC: 30 MMOL/L (ref 21–32)
CREAT SERPL-MCNC: 0.9 MG/DL (ref 0.7–1.3)
DIFFERENTIAL METHOD BLD: ABNORMAL
ECHO AO ASC DIAM: 3.8 CM
ECHO AO ASCENDING AORTA INDEX: 1.4 CM/M2
ECHO AO ROOT DIAM: 4 CM
ECHO AO ROOT INDEX: 1.47 CM/M2
ECHO AV AREA PEAK VELOCITY: 2.2 CM2
ECHO AV AREA VTI: 2.4 CM2
ECHO AV AREA/BSA PEAK VELOCITY: 0.8 CM2/M2
ECHO AV AREA/BSA VTI: 0.9 CM2/M2
ECHO AV MEAN GRADIENT: 4 MMHG
ECHO AV MEAN VELOCITY: 0.9 M/S
ECHO AV PEAK GRADIENT: 6 MMHG
ECHO AV PEAK VELOCITY: 1.3 M/S
ECHO AV VELOCITY RATIO: 0.62
ECHO AV VTI: 26.9 CM
ECHO BSA: 2.81 M2
ECHO EST RA PRESSURE: 3 MMHG
ECHO LA AREA 4C: 20.1 CM2
ECHO LA MAJOR AXIS: 5.6 CM
ECHO LA VOL MOD A4C: 56 ML (ref 18–58)
ECHO LA VOLUME INDEX MOD A4C: 21 ML/M2 (ref 16–34)
ECHO LV E' LATERAL VELOCITY: 10.4 CM/S
ECHO LV E' SEPTAL VELOCITY: 4.9 CM/S
ECHO LV EDV A4C: 195 ML
ECHO LV EDV INDEX A4C: 72 ML/M2
ECHO LV EF PHYSICIAN: 55 %
ECHO LV EJECTION FRACTION A4C: 51 %
ECHO LV ESV A4C: 97 ML
ECHO LV ESV INDEX A4C: 36 ML/M2
ECHO LV FRACTIONAL SHORTENING: 38 % (ref 28–44)
ECHO LV INTERNAL DIMENSION DIASTOLE INDEX: 1.84 CM/M2
ECHO LV INTERNAL DIMENSION DIASTOLIC: 5 CM (ref 4.2–5.9)
ECHO LV INTERNAL DIMENSION SYSTOLIC INDEX: 1.14 CM/M2
ECHO LV INTERNAL DIMENSION SYSTOLIC: 3.1 CM
ECHO LV IVSD: 1 CM (ref 0.6–1)
ECHO LV MASS 2D: 194.4 G (ref 88–224)
ECHO LV MASS INDEX 2D: 71.5 G/M2 (ref 49–115)
ECHO LV POSTERIOR WALL DIASTOLIC: 1.1 CM (ref 0.6–1)
ECHO LV RELATIVE WALL THICKNESS RATIO: 0.44
ECHO LVOT AREA: 3.5 CM2
ECHO LVOT AV VTI INDEX: 0.7
ECHO LVOT DIAM: 2.1 CM
ECHO LVOT MEAN GRADIENT: 1 MMHG
ECHO LVOT PEAK GRADIENT: 3 MMHG
ECHO LVOT PEAK VELOCITY: 0.8 M/S
ECHO LVOT STROKE VOLUME INDEX: 23.9 ML/M2
ECHO LVOT SV: 65.1 ML
ECHO LVOT VTI: 18.8 CM
ECHO MV A VELOCITY: 0.54 M/S
ECHO MV AREA VTI: 3.1 CM2
ECHO MV E DECELERATION TIME (DT): 254 MS
ECHO MV E VELOCITY: 0.52 M/S
ECHO MV E/A RATIO: 0.96
ECHO MV E/E' LATERAL: 5
ECHO MV E/E' RATIO (AVERAGED): 7.81
ECHO MV E/E' SEPTAL: 10.61
ECHO MV LVOT VTI INDEX: 1.13
ECHO MV MAX VELOCITY: 0.7 M/S
ECHO MV MEAN GRADIENT: 1 MMHG
ECHO MV MEAN VELOCITY: 0.5 M/S
ECHO MV PEAK GRADIENT: 2 MMHG
ECHO MV VTI: 21.2 CM
ECHO PV MAX VELOCITY: 0.9 M/S
ECHO PV MEAN GRADIENT: 2 MMHG
ECHO PV MEAN VELOCITY: 0.6 M/S
ECHO PV PEAK GRADIENT: 3 MMHG
ECHO PV VTI: 18.4 CM
ECHO RA AREA 4C: 15 CM2
ECHO RA END SYSTOLIC VOLUME APICAL 4 CHAMBER INDEX BSA: 15 ML/M2
ECHO RA VOLUME: 40 ML
ECHO RIGHT VENTRICULAR SYSTOLIC PRESSURE (RVSP): 20 MMHG
ECHO RV BASAL DIMENSION: 3.4 CM
ECHO RV FREE WALL PEAK S': 13.2 CM/S
ECHO RV TAPSE: 2.3 CM (ref 1.7–?)
ECHO TV REGURGITANT MAX VELOCITY: 2.05 M/S
ECHO TV REGURGITANT PEAK GRADIENT: 17 MMHG
EOSINOPHIL # BLD: 0.1 K/UL (ref 0–0.4)
EOSINOPHIL NFR BLD: 2 % (ref 0–7)
ERYTHROCYTE [DISTWIDTH] IN BLOOD BY AUTOMATED COUNT: 13.5 % (ref 11.5–14.5)
GLUCOSE BLD STRIP.AUTO-MCNC: 107 MG/DL (ref 65–100)
GLUCOSE BLD STRIP.AUTO-MCNC: 116 MG/DL (ref 65–100)
GLUCOSE BLD STRIP.AUTO-MCNC: 124 MG/DL (ref 65–100)
GLUCOSE SERPL-MCNC: 103 MG/DL (ref 65–100)
HCT VFR BLD AUTO: 38.8 % (ref 36.6–50.3)
HGB BLD-MCNC: 13.2 G/DL (ref 12.1–17)
IMM GRANULOCYTES # BLD AUTO: 0 K/UL (ref 0–0.04)
IMM GRANULOCYTES NFR BLD AUTO: 0 % (ref 0–0.5)
LYMPHOCYTES # BLD: 1.7 K/UL (ref 0.8–3.5)
LYMPHOCYTES NFR BLD: 29 % (ref 12–49)
MCH RBC QN AUTO: 32.7 PG (ref 26–34)
MCHC RBC AUTO-ENTMCNC: 34 G/DL (ref 30–36.5)
MCV RBC AUTO: 96 FL (ref 80–99)
MONOCYTES # BLD: 0.4 K/UL (ref 0–1)
MONOCYTES NFR BLD: 7 % (ref 5–13)
NEUTS SEG # BLD: 3.6 K/UL (ref 1.8–8)
NEUTS SEG NFR BLD: 62 % (ref 32–75)
NRBC # BLD: 0 K/UL (ref 0–0.01)
NRBC BLD-RTO: 0 PER 100 WBC
PERFORMED BY:: ABNORMAL
PLATELET # BLD AUTO: 193 K/UL (ref 150–400)
PMV BLD AUTO: 9.9 FL (ref 8.9–12.9)
POTASSIUM SERPL-SCNC: 3.2 MMOL/L (ref 3.5–5.1)
RBC # BLD AUTO: 4.04 M/UL (ref 4.1–5.7)
SODIUM SERPL-SCNC: 139 MMOL/L (ref 136–145)
WBC # BLD AUTO: 5.9 K/UL (ref 4.1–11.1)

## 2024-12-02 PROCEDURE — 6370000000 HC RX 637 (ALT 250 FOR IP)

## 2024-12-02 PROCEDURE — 96376 TX/PRO/DX INJ SAME DRUG ADON: CPT

## 2024-12-02 PROCEDURE — 6370000000 HC RX 637 (ALT 250 FOR IP): Performed by: INTERNAL MEDICINE

## 2024-12-02 PROCEDURE — G0378 HOSPITAL OBSERVATION PER HR: HCPCS

## 2024-12-02 PROCEDURE — 82962 GLUCOSE BLOOD TEST: CPT

## 2024-12-02 PROCEDURE — C8929 TTE W OR WO FOL WCON,DOPPLER: HCPCS

## 2024-12-02 PROCEDURE — 6360000002 HC RX W HCPCS: Performed by: INTERNAL MEDICINE

## 2024-12-02 PROCEDURE — 36415 COLL VENOUS BLD VENIPUNCTURE: CPT

## 2024-12-02 PROCEDURE — 2580000003 HC RX 258

## 2024-12-02 PROCEDURE — 80048 BASIC METABOLIC PNL TOTAL CA: CPT

## 2024-12-02 PROCEDURE — 94761 N-INVAS EAR/PLS OXIMETRY MLT: CPT

## 2024-12-02 PROCEDURE — 94640 AIRWAY INHALATION TREATMENT: CPT

## 2024-12-02 PROCEDURE — 6360000004 HC RX CONTRAST MEDICATION

## 2024-12-02 PROCEDURE — 6360000002 HC RX W HCPCS

## 2024-12-02 PROCEDURE — 85025 COMPLETE CBC W/AUTO DIFF WBC: CPT

## 2024-12-02 RX ORDER — ALBUTEROL SULFATE 90 UG/1
2 INHALANT RESPIRATORY (INHALATION) EVERY 4 HOURS PRN
Status: DISCONTINUED | OUTPATIENT
Start: 2024-12-02 | End: 2024-12-03 | Stop reason: HOSPADM

## 2024-12-02 RX ORDER — REGADENOSON 0.08 MG/ML
INJECTION, SOLUTION INTRAVENOUS
Status: DISPENSED
Start: 2024-12-02 | End: 2024-12-02

## 2024-12-02 RX ORDER — POTASSIUM CHLORIDE 1500 MG/1
20 TABLET, EXTENDED RELEASE ORAL 2 TIMES DAILY
Status: DISCONTINUED | OUTPATIENT
Start: 2024-12-02 | End: 2024-12-03 | Stop reason: HOSPADM

## 2024-12-02 RX ORDER — BUMETANIDE 1 MG/1
2 TABLET ORAL 2 TIMES DAILY
Status: DISCONTINUED | OUTPATIENT
Start: 2024-12-03 | End: 2024-12-03 | Stop reason: HOSPADM

## 2024-12-02 RX ORDER — ALBUTEROL SULFATE 90 UG/1
2 INHALANT RESPIRATORY (INHALATION)
Status: DISCONTINUED | OUTPATIENT
Start: 2024-12-02 | End: 2024-12-03

## 2024-12-02 RX ORDER — NITROGLYCERIN 0.4 MG/1
0.4 TABLET SUBLINGUAL ONCE
Status: DISCONTINUED | OUTPATIENT
Start: 2024-12-02 | End: 2024-12-03 | Stop reason: HOSPADM

## 2024-12-02 RX ADMIN — MORPHINE SULFATE 2 MG: 2 INJECTION, SOLUTION INTRAMUSCULAR; INTRAVENOUS at 22:01

## 2024-12-02 RX ADMIN — TRAZODONE HYDROCHLORIDE 200 MG: 150 TABLET ORAL at 20:30

## 2024-12-02 RX ADMIN — POTASSIUM CHLORIDE 20 MEQ: 1500 TABLET, EXTENDED RELEASE ORAL at 20:30

## 2024-12-02 RX ADMIN — LEVOTHYROXINE SODIUM 75 MCG: 0.07 TABLET ORAL at 05:25

## 2024-12-02 RX ADMIN — POTASSIUM CHLORIDE 40 MEQ: 1500 TABLET, EXTENDED RELEASE ORAL at 05:25

## 2024-12-02 RX ADMIN — POTASSIUM CHLORIDE 20 MEQ: 1500 TABLET, EXTENDED RELEASE ORAL at 11:25

## 2024-12-02 RX ADMIN — ALLOPURINOL 100 MG: 100 TABLET ORAL at 11:25

## 2024-12-02 RX ADMIN — Medication 2 PUFF: at 11:59

## 2024-12-02 RX ADMIN — BUMETANIDE 2 MG: 0.25 INJECTION INTRAMUSCULAR; INTRAVENOUS at 17:21

## 2024-12-02 RX ADMIN — BUMETANIDE 2 MG: 0.25 INJECTION INTRAMUSCULAR; INTRAVENOUS at 11:26

## 2024-12-02 RX ADMIN — METOPROLOL SUCCINATE 50 MG: 50 TABLET, EXTENDED RELEASE ORAL at 11:25

## 2024-12-02 RX ADMIN — PERFLUTREN 2 ML: 6.52 INJECTION, SUSPENSION INTRAVENOUS at 09:12

## 2024-12-02 RX ADMIN — SODIUM CHLORIDE, PRESERVATIVE FREE 10 ML: 5 INJECTION INTRAVENOUS at 11:27

## 2024-12-02 RX ADMIN — ASPIRIN 81 MG: 81 TABLET, CHEWABLE ORAL at 11:24

## 2024-12-02 RX ADMIN — Medication 2 PUFF: at 20:22

## 2024-12-02 RX ADMIN — EMPAGLIFLOZIN 10 MG: 10 TABLET, FILM COATED ORAL at 11:26

## 2024-12-02 RX ADMIN — MORPHINE SULFATE 2 MG: 2 INJECTION, SOLUTION INTRAMUSCULAR; INTRAVENOUS at 17:20

## 2024-12-02 RX ADMIN — MORPHINE SULFATE 2 MG: 2 INJECTION, SOLUTION INTRAMUSCULAR; INTRAVENOUS at 11:26

## 2024-12-02 RX ADMIN — FAMOTIDINE 20 MG: 20 TABLET, FILM COATED ORAL at 20:30

## 2024-12-02 RX ADMIN — SODIUM CHLORIDE, PRESERVATIVE FREE 10 ML: 5 INJECTION INTRAVENOUS at 20:31

## 2024-12-02 RX ADMIN — FAMOTIDINE 20 MG: 20 TABLET, FILM COATED ORAL at 11:26

## 2024-12-02 RX ADMIN — ATORVASTATIN CALCIUM 80 MG: 40 TABLET, FILM COATED ORAL at 11:25

## 2024-12-02 RX ADMIN — RIVAROXABAN 20 MG: 20 TABLET, FILM COATED ORAL at 17:21

## 2024-12-02 RX ADMIN — CITALOPRAM HYDROBROMIDE 40 MG: 20 TABLET ORAL at 11:25

## 2024-12-02 ASSESSMENT — PAIN SCALES - GENERAL
PAINLEVEL_OUTOF10: 8
PAINLEVEL_OUTOF10: 6
PAINLEVEL_OUTOF10: 7
PAINLEVEL_OUTOF10: 8
PAINLEVEL_OUTOF10: 6
PAINLEVEL_OUTOF10: 7

## 2024-12-02 ASSESSMENT — PAIN DESCRIPTION - ORIENTATION
ORIENTATION: ANTERIOR
ORIENTATION: MID

## 2024-12-02 ASSESSMENT — PAIN DESCRIPTION - LOCATION
LOCATION: CHEST;ABDOMEN
LOCATION: CHEST

## 2024-12-02 ASSESSMENT — PAIN DESCRIPTION - ONSET: ONSET: ON-GOING

## 2024-12-02 ASSESSMENT — PAIN DESCRIPTION - FREQUENCY: FREQUENCY: CONTINUOUS

## 2024-12-02 ASSESSMENT — PAIN DESCRIPTION - DESCRIPTORS
DESCRIPTORS: DISCOMFORT;ACHING
DESCRIPTORS: SHARP

## 2024-12-02 ASSESSMENT — PAIN - FUNCTIONAL ASSESSMENT: PAIN_FUNCTIONAL_ASSESSMENT: ACTIVITIES ARE NOT PREVENTED

## 2024-12-02 ASSESSMENT — PAIN SCALES - WONG BAKER: WONGBAKER_NUMERICALRESPONSE: HURTS A LITTLE BIT

## 2024-12-02 NOTE — RT PROTOCOL NOTE
work of breathing using Per Protocol order mode.        4-6 - enter or revise RT Bronchodilator order(s) to two equivalent RT bronchodilator orders with one order with BID Frequency and one order with Frequency of every 4 hours PRN wheezing or increased work of breathing using Per Protocol order mode.        7-10 - enter or revise RT Bronchodilator order(s) to two equivalent RT bronchodilator orders with one order with TID Frequency and one order with Frequency of every 4 hours PRN wheezing or increased work of breathing using Per Protocol order mode.       11-13 - enter or revise RT Bronchodilator order(s) to one equivalent RT bronchodilator order with QID Frequency and an Albuterol order with Frequency of every 4 hours PRN wheezing or increased work of breathing using Per Protocol order mode.      Greater than 13 - enter or revise RT Bronchodilator order(s) to one equivalent RT bronchodilator order with every 4 hours Frequency and an Albuterol order with Frequency of every 2 hours PRN wheezing or increased work of breathing using Per Protocol order mode.     RT to enter RT Home Evaluation for COPD & MDI Assessment order using Per Protocol order mode.    Electronically signed by RT Jaleesa on 12/2/2024 at 12:02 PM

## 2024-12-02 NOTE — CARE COORDINATION
Chart reviewed, DCP remains for patient to d.c from  to home with ex wife.     continues to follow and monitor for needs.

## 2024-12-02 NOTE — CONSULTS
Consultation    NAME: Ye Joseph   :  1975   MRN:  [id number]     Date/Time:  2024 9:39 AM    Patient PCP: Joel Ramos MD  ________________________________________________________________________    Cardiology cross cover consultation-[facility]  (Dsutin Coleman MD)    Problem List:   1.  Chest pain concerning for angina  2.  Chronic combined heart failure  3.  Grade I (mild) diastolic dysfunction, or abnormal relaxation  4.  Personal history of previous myocardial infarction including   5.  Normal coronary anatomy via [facility] (Oct 2017 at [facility])  6.  History of previous cerebrovascular accident (CVA) with left-sided hemiparesis  7.  History of transient ischemia attack (TIA) x 2  8.  Carotid artery stenosis  9.  History of patent foramen ovale (PFO) closure  10.  Peripheral vascular disease (percutaneous intervention SEP 2017)    11.  History of syncope  12.  Mild valvular heart disease (Echo-2023):         12a.  Trace tricuspid valve regurgitation         12b.  Trace pulmonic valve regurgitation         12c.  Mild mitral valve regurgitation         12d.  Mild aortic valve sclerosis with trace aortic valve regurgitation (no stenosis)  13.  Previous deep vein thrombosis (DVT)  14.  Paroxysmal atrial fibrillation currently in sinus rhythm([id number] Score=5)  15.  Arteriovenous malformation/arteriovenous fistula of the right lower extremity  16.  Hypertension  17.  Dyslipidemia  18.  Type 2 diabetes  19.  Former smoker (stopped over 20 years ago)  20.  Class III obesity (BMI = 39.56 kg/m²)    21.  Obstructive sleep apnea requiring CPAP  22.  Chronic obstructive pulmonary disease (COPD)  23.  Hypoxemia requiring nocturnal oxygen  24.  Dyspnea  25.  Postoperative hypothyroidism  26.  History of gastrointestinal bleed (GIB)  27.  History of seizures (late effect of CVA)  28.  Arthritis  29.  Depression  30.  Hypokalemia       []        High complexity decision making 
  Result Value Ref Range    Phosphorus 2.9 2.6 - 4.7 mg/dL   POCT Glucose    Collection Time: 12/01/24 12:35 PM   Result Value Ref Range    POC Glucose 134 (H) 65 - 100 mg/dL    Performed by: VIVIAN CHAVEZ    CBC with Auto Differential    Collection Time: 12/02/24  2:24 AM   Result Value Ref Range    WBC 5.9 4.1 - 11.1 K/uL    RBC 4.04 (L) 4.10 - 5.70 M/uL    Hemoglobin 13.2 12.1 - 17.0 g/dL    Hematocrit 38.8 36.6 - 50.3 %    MCV 96.0 80.0 - 99.0 FL    MCH 32.7 26.0 - 34.0 PG    MCHC 34.0 30.0 - 36.5 g/dL    RDW 13.5 11.5 - 14.5 %    Platelets 193 150 - 400 K/uL    MPV 9.9 8.9 - 12.9 FL    Nucleated RBCs 0.0 0.0  WBC    nRBC 0.00 0.00 - 0.01 K/uL    Neutrophils % 62 32 - 75 %    Lymphocytes % 29 12 - 49 %    Monocytes % 7 5 - 13 %    Eosinophils % 2 0 - 7 %    Basophils % 0 0 - 1 %    Immature Granulocytes % 0 0 - 0.5 %    Neutrophils Absolute 3.6 1.8 - 8.0 K/UL    Lymphocytes Absolute 1.7 0.8 - 3.5 K/UL    Monocytes Absolute 0.4 0.0 - 1.0 K/UL    Eosinophils Absolute 0.1 0.0 - 0.4 K/UL    Basophils Absolute 0.0 0.0 - 0.1 K/UL    Immature Granulocytes Absolute 0.0 0.00 - 0.04 K/UL    Differential Type AUTOMATED     Basic Metabolic Panel    Collection Time: 12/02/24  2:24 AM   Result Value Ref Range    Sodium 139 136 - 145 mmol/L    Potassium 3.2 (L) 3.5 - 5.1 mmol/L    Chloride 105 97 - 108 mmol/L    CO2 30 21 - 32 mmol/L    Anion Gap 4 2 - 12 mmol/L    Glucose 103 (H) 65 - 100 mg/dL    BUN 8 6 - 20 mg/dL    Creatinine 0.90 0.70 - 1.30 mg/dL    BUN/Creatinine Ratio 9 (L) 12 - 20      Est, Glom Filt Rate >90 >60 ml/min/1.73m2    Calcium 8.8 8.5 - 10.1 mg/dL        JA RIOS MD

## 2024-12-03 VITALS
OXYGEN SATURATION: 95 % | TEMPERATURE: 98.1 F | HEIGHT: 76 IN | WEIGHT: 315 LBS | HEART RATE: 76 BPM | SYSTOLIC BLOOD PRESSURE: 117 MMHG | DIASTOLIC BLOOD PRESSURE: 63 MMHG | BODY MASS INDEX: 38.36 KG/M2 | RESPIRATION RATE: 18 BRPM

## 2024-12-03 LAB
ANION GAP SERPL CALC-SCNC: 4 MMOL/L (ref 2–12)
BASOPHILS # BLD: 0 K/UL (ref 0–0.1)
BASOPHILS NFR BLD: 0 % (ref 0–1)
BUN SERPL-MCNC: 14 MG/DL (ref 6–20)
BUN/CREAT SERPL: 13 (ref 12–20)
CA-I BLD-MCNC: 8.6 MG/DL (ref 8.5–10.1)
CHLORIDE SERPL-SCNC: 102 MMOL/L (ref 97–108)
CO2 SERPL-SCNC: 31 MMOL/L (ref 21–32)
CREAT SERPL-MCNC: 1.11 MG/DL (ref 0.7–1.3)
DIFFERENTIAL METHOD BLD: NORMAL
EOSINOPHIL # BLD: 0.2 K/UL (ref 0–0.4)
EOSINOPHIL NFR BLD: 3 % (ref 0–7)
ERYTHROCYTE [DISTWIDTH] IN BLOOD BY AUTOMATED COUNT: 13.4 % (ref 11.5–14.5)
GLUCOSE SERPL-MCNC: 127 MG/DL (ref 65–100)
HCT VFR BLD AUTO: 41.2 % (ref 36.6–50.3)
HGB BLD-MCNC: 13.6 G/DL (ref 12.1–17)
IMM GRANULOCYTES # BLD AUTO: 0 K/UL (ref 0–0.04)
IMM GRANULOCYTES NFR BLD AUTO: 0 % (ref 0–0.5)
LYMPHOCYTES # BLD: 2 K/UL (ref 0.8–3.5)
LYMPHOCYTES NFR BLD: 29 % (ref 12–49)
MCH RBC QN AUTO: 32.2 PG (ref 26–34)
MCHC RBC AUTO-ENTMCNC: 33 G/DL (ref 30–36.5)
MCV RBC AUTO: 97.4 FL (ref 80–99)
MONOCYTES # BLD: 0.6 K/UL (ref 0–1)
MONOCYTES NFR BLD: 9 % (ref 5–13)
NEUTS SEG # BLD: 4.1 K/UL (ref 1.8–8)
NEUTS SEG NFR BLD: 59 % (ref 32–75)
NRBC # BLD: 0 K/UL (ref 0–0.01)
NRBC BLD-RTO: 0 PER 100 WBC
PLATELET # BLD AUTO: 183 K/UL (ref 150–400)
PMV BLD AUTO: 9.9 FL (ref 8.9–12.9)
POTASSIUM SERPL-SCNC: 3.7 MMOL/L (ref 3.5–5.1)
RBC # BLD AUTO: 4.23 M/UL (ref 4.1–5.7)
SODIUM SERPL-SCNC: 137 MMOL/L (ref 136–145)
WBC # BLD AUTO: 7 K/UL (ref 4.1–11.1)

## 2024-12-03 PROCEDURE — 6370000000 HC RX 637 (ALT 250 FOR IP): Performed by: INTERNAL MEDICINE

## 2024-12-03 PROCEDURE — G0378 HOSPITAL OBSERVATION PER HR: HCPCS

## 2024-12-03 PROCEDURE — 36415 COLL VENOUS BLD VENIPUNCTURE: CPT

## 2024-12-03 PROCEDURE — 2580000003 HC RX 258

## 2024-12-03 PROCEDURE — 94640 AIRWAY INHALATION TREATMENT: CPT

## 2024-12-03 PROCEDURE — 85025 COMPLETE CBC W/AUTO DIFF WBC: CPT

## 2024-12-03 PROCEDURE — 80048 BASIC METABOLIC PNL TOTAL CA: CPT

## 2024-12-03 PROCEDURE — 6370000000 HC RX 637 (ALT 250 FOR IP)

## 2024-12-03 PROCEDURE — 2709999900 HC NON-CHARGEABLE SUPPLY: Performed by: INTERNAL MEDICINE

## 2024-12-03 RX ORDER — METOPROLOL SUCCINATE 50 MG/1
50 TABLET, EXTENDED RELEASE ORAL DAILY
Qty: 30 TABLET | Refills: 3 | Status: SHIPPED | OUTPATIENT
Start: 2024-12-04

## 2024-12-03 RX ORDER — ALLOPURINOL 100 MG/1
100 TABLET ORAL DAILY
Qty: 30 TABLET | Refills: 3 | Status: SHIPPED | OUTPATIENT
Start: 2024-12-04

## 2024-12-03 RX ORDER — NITROGLYCERIN 0.4 MG/1
0.4 TABLET SUBLINGUAL EVERY 5 MIN PRN
Qty: 3 TABLET | Refills: 0 | Status: SHIPPED | OUTPATIENT
Start: 2024-12-03

## 2024-12-03 RX ORDER — BUMETANIDE 0.5 MG/1
2 TABLET ORAL DAILY
Qty: 120 TABLET | Refills: 0 | Status: SHIPPED | OUTPATIENT
Start: 2024-12-03 | End: 2025-01-02

## 2024-12-03 RX ORDER — CLOPIDOGREL BISULFATE 75 MG/1
75 TABLET ORAL DAILY
OUTPATIENT
Start: 2024-12-03

## 2024-12-03 RX ADMIN — SODIUM CHLORIDE, PRESERVATIVE FREE 10 ML: 5 INJECTION INTRAVENOUS at 09:47

## 2024-12-03 RX ADMIN — Medication 2 PUFF: at 07:25

## 2024-12-03 RX ADMIN — BUMETANIDE 2 MG: 1 TABLET ORAL at 09:45

## 2024-12-03 RX ADMIN — CITALOPRAM HYDROBROMIDE 40 MG: 20 TABLET ORAL at 09:45

## 2024-12-03 RX ADMIN — EMPAGLIFLOZIN 10 MG: 10 TABLET, FILM COATED ORAL at 09:45

## 2024-12-03 RX ADMIN — ALLOPURINOL 100 MG: 100 TABLET ORAL at 09:45

## 2024-12-03 RX ADMIN — ASPIRIN 81 MG: 81 TABLET, CHEWABLE ORAL at 09:45

## 2024-12-03 RX ADMIN — ATORVASTATIN CALCIUM 80 MG: 40 TABLET, FILM COATED ORAL at 09:44

## 2024-12-03 RX ADMIN — LEVOTHYROXINE SODIUM 75 MCG: 0.07 TABLET ORAL at 06:20

## 2024-12-03 RX ADMIN — POTASSIUM CHLORIDE 20 MEQ: 1500 TABLET, EXTENDED RELEASE ORAL at 09:44

## 2024-12-03 RX ADMIN — METOPROLOL SUCCINATE 50 MG: 50 TABLET, EXTENDED RELEASE ORAL at 09:44

## 2024-12-03 RX ADMIN — FAMOTIDINE 20 MG: 20 TABLET, FILM COATED ORAL at 09:45

## 2024-12-03 ASSESSMENT — PAIN SCALES - WONG BAKER: WONGBAKER_NUMERICALRESPONSE: HURTS A LITTLE BIT

## 2024-12-03 ASSESSMENT — PAIN SCALES - GENERAL: PAINLEVEL_OUTOF10: 6

## 2024-12-03 NOTE — DISCHARGE SUMMARY
budesonide-formoterol 160-4.5 MCG/ACT Aero  Commonly known as: SYMBICORT  Inhale 2 puffs into the lungs in the morning and 2 puffs in the evening.     citalopram 40 MG tablet  Commonly known as: CELEXA  Take 1 tablet by mouth daily     clopidogrel 75 MG tablet  Commonly known as: PLAVIX  Take 1 tablet by mouth daily     empagliflozin 10 MG tablet  Commonly known as: JARDIANCE  Take 1 tablet by mouth daily     famotidine 20 MG tablet  Commonly known as: PEPCID  Take 1 tablet by mouth 2 times daily     levothyroxine 75 MCG tablet  Commonly known as: SYNTHROID  Take 1 tablet by mouth every morning (before breakfast)     metFORMIN 500 MG tablet  Commonly known as: GLUCOPHAGE     traZODone 100 MG tablet  Commonly known as: DESYREL  Take 2 tablets by mouth nightly               Where to Get Your Medications        These medications were sent to [facility] [address] [place] [address] - P [phone] - F [phone]  [address] [place] [id number]      Phone: [phone]   allopurinol 100 MG tablet  bumetanide 0.5 MG tablet  metoprolol succinate 50 MG extended release tablet  nitroGLYCERIN 0.4 MG SL tablet             DISPOSITION:    Home with Family:    X   Home with HH/PT/OT/RN:    SNF/LTC:    DAVID:    OTHER:            Code status:   Recommended diet: cardiac diet  Recommended activity: activity as tolerated  Wound care: None      Follow up with:   PCP : Joel Ramos MD Bush, James, MD  [address] [facility] [address] [phone]    Follow up in 10 day(s)  Advised patient to follow-up with PCP in 10 to 14 days with recent admission for continued monitoring and management of medications and other comorbidities.   friday 12/06/2024   @9:10    Dustin Coleman MD  [address] [facility] [address] [phone]    Follow up in 10 day(s)  Advised patient to follow-up with cardiologist in 10 to 14 days with recent admission for continued monitoring and

## 2024-12-03 NOTE — PROGRESS NOTES
Progress Note      12/3/2024 8:31 AM  NAME: Ye Joseph   MRN:  [id number]   Admit Diagnosis: Shortness of breath [R06.02]  Other chest pain [R07.89]  Chest pain [R07.9]  Chest pain, unspecified type [R07.9]      Problem List:   -Atypical chest pain, evaluation for ACS  -Paroxysmal atrial fibrillation  -Status post Amplatz ASD device closure more than 5 years ago.  -No known established coronary artery disease per cardiac catheterization per cardiac catheterization 5 years ago and per patient recall more than 2 years ago.  -Dyspnea  -Chronic obstructive pulmonary disease  -Diabetes  -Hypertension  -SAM on CPAP  -Hypothyroidism    Low normal ejection fraction of 50 to 55% with no wall motion abnormalities.     Assessment/Plan:   Note dictated December 3, 2024  -Follow-up visit from cardiology.  Patient was on Xarelto so cardiac catheterization was postponed.  Patient denies any symptoms of chest pain shortness of breath dizziness palpitation or any other anginal this morning.  -Patient was offered to stay in the hospital for cardiac catheterization in the next 2 days but he refused.  At this time I offered him Cardiolite stress test to be done today but he also refused and he stated that he already had to stress test and to cardiac catheterization done in less than 5 years.  -Based on my current clinical assessment I will be okay for him to go home and we will see him in the office and make further cardiovascular management decision including but not limited to outpatient cardiac catheterization in near future.  -Patient was informed if patient continue to have symptoms of chest pain he should contact us in the office or come to the emergency department.             []       High complexity decision making was performed in this patient at high risk for decompensation with multiple organ involvement.    Subjective:     Ye Joseph is a 49 y.o. White (non-) male with no known 
    Hospitalist Progress Note               Daily Progress Note: 2024      Hospital Day: 2     Chief complaint:   Chief Complaint   Patient presents with    Chest Pain        Brief HPI/ Hospital course to date:  Ye Joseph is a 49 y.o.  male with PMHx significant for COPD, diabetes, heart failure with preserved EF 60 to 65%, history of PFO closure,previous history of CVA with left-sided paresthesia, gout, hypertension, SAM, diabetes. He presented to the ED  with a chief complaint of chest pain associated with shortness of breath.  Of note,he presented multiple times during the month of November for evaluation of chest pain. Troponin is negative x 2. proBNP is 106. D-dimer slightly elevated. CTA unremarkable for any pulmonary embolism. Of note, at home takes Bumex 2 mg daily, frequency was reduced due to multiple bathroom visits.     --------  Patient is seen today for follow-up.  Reports to having chest pain.  Nitro did not help.  On room air.            All ROS negative otherwise mentioned above.      BP (!) 99/52   Pulse 78   Temp 97.5 °F (36.4 °C) (Oral)   Resp 22   Ht 1.93 m (6' 4\")   Wt (!) 147.4 kg (325 lb)   SpO2 96%   BMI 39.56 kg/m²    O2 Device: None (Room air)    Temp (24hrs), Av.7 °F (36.5 °C), Min:97.5 °F (36.4 °C), Max:97.8 °F (36.6 °C)    No intake/output data recorded.   No intake/output data recorded.    Physical Exam:  General:  Awake and alert   Lungs:   Clear to auscultation bilaterally.   Heart:  Regular rate and rhythm, S1, S2 normal, no murmur. No click, rub or gallop.   Abdomen:   Soft, none -tender. Bowel sounds normal. No masses,  No organomegaly.   Extremities: no  edema. Extremities normal, atraumatic, no cyanosis.    Pulses: 2+ and symmetric all extremities.   Skin: Skin color, texture, turgor normal. No rashes or lesions   Neurologic: CNII-XII intact.  No gross focal deficits       Assessment and Plan:  # Chest pain to evaluate for ACS  - Cardiology on board. 
.echo  
CM reviewed chart and noted patient remains in observation status. Cath postponed due to xarelto, but cardiology cleared patient to follow up as an outpatient.     DCP is for patient to return home with his ex wife.     Transition of Care Plan:    RUR: obs  Prior Level of Functioning: indepenendt  Disposition: homw ith ex wife  RYAN: today  If SNF or IPR: Date FOC offered: n/a  Date FOC received: n/a  Accepting facility: n/a  Date authorization started with reference number: n/a  Date authorization received and expires: n/a  Follow up appointments: yes  DME needed: n/a  Transportation at discharge: family  IM/IMM Medicare/ letter given: n/a  Is patient a [place] and connected with [facility]? N/a   If yes, was [place] transfer form completed and [facility] notified?   Caregiver Contact: n/a  Discharge Caregiver contacted prior to discharge? N/a  Care Conference needed? N/a  Barriers to discharge: n/a    
Communicated attending MD Marcella, about soft BP (99/52; 104/51mmHg) and the order for Bumex 2g IV twice a day. He asked to hold the Bumex for now and give the patient Morphing 2mg IV for pain. Followed the instructions.   
Patient has discharge orders home with self-care for today. Spoke with attending provider and consulting provider and they all stated pt is able to discharge. Patient is stable, alert, oriented and does not show any signs of distress. Patient is discharging without an IV, tele or harrington.       Discharge plan of care/case management plan validated with provider discharge order.  
RT Inhaler-Nebulizer Bronchodilator Protocol Note    There is a bronchodilator order in the chart from a provider indicating to follow the RT Bronchodilator Protocol and there is an “Initiate RT Inhaler-Nebulizer Bronchodilator Protocol” order as well (see protocol at bottom of note).    CXR Findings:  No results found.    The findings from the last RT Protocol Assessment were as follows:   History Pulmonary Disease: (P) Chronic pulmonary disease  Respiratory Pattern: (P) Regular pattern and RR 12-20 bpm  Breath Sounds: (P) Clear breath sounds  Cough: (P) Strong, spontaneous, non-productive  Indication for Bronchodilator Therapy: (P) On home bronchodilators  Bronchodilator Assessment Score: (P) 2    Aerosolized bronchodilator medication orders have been revised according to the RT Inhaler-Nebulizer Bronchodilator Protocol below.    Respiratory Therapist to perform RT Therapy Protocol Assessment initially then follow the protocol.  Repeat RT Therapy Protocol Assessment PRN for score 0-3 or on second treatment, BID, and PRN for scores above 3.    No Indications - adjust the frequency to every 6 hours PRN wheezing or bronchospasm, if no treatments needed after 48 hours then discontinue using Per Protocol order mode.     If indication present, adjust the RT bronchodilator orders based on the Bronchodilator Assessment Score as indicated below.  Use Inhaler orders unless patient has one or more of the following: on home nebulizer, not able to hold breath for 10 seconds, is not alert and oriented, cannot activate and use MDI correctly, or respiratory rate 25 breaths per minute or more, then use the equivalent nebulizer order(s) with same Frequency and PRN reasons based on the score.  If a patient is on this medication at home then do not decrease Frequency below that used at home.    0-3 - enter or revise RT bronchodilator order(s) to equivalent RT Bronchodilator order with Frequency of every 4 hours PRN for wheezing or 
Spiritual Health History and Assessment/Progress Note  [facility]    Initial Encounter, Spiritual/Emotional Needs,  ,  ,      Name: Ye Joseph MRN: [id number]    Age: 49 y.o.     Sex: male   Language: English   Scientology: Yarsanism   Chest pain     Date: 12/2/2024            Total Time Calculated: 39 min              Spiritual Assessment began in [facility]LE[facility]        Referral/Consult From: Rounding   Encounter Overview/Reason: Initial Encounter, Spiritual/Emotional Needs  Service Provided For: Patient    Glo, Belief, Meaning:   Patient has beliefs or practices that help with coping during difficult times  Family/Friends No family/friends present      Importance and Influence:  Patient has spiritual/personal beliefs that influence decisions regarding their health  Family/Friends No family/friends present    Community:  Patient feels well-supported. Support system includes: Children and Other: Ex-wife  Family/Friends No family/friends present    Assessment and Plan of Care:     Patient Interventions include: Facilitated expression of thoughts and feelings, Explored spiritual coping/struggle/distress, Engaged in theological reflection, Affirmed coping skills/support systems, Facilitated life review and/ or legacy, and Other: Reflective listening, spiritual assessment, and a peaceful and supportive presence.  Family/Friends Interventions include: No family/friends present    Patient Plan of Care: Other: Follow-up visit with pt on 12/3/24  Family/Friends Plan of Care: No family/friends present     visited pt for spiritual assessment while making rounds on 4W. Pt is lying in bed viewing something on his cell phone. Pt shares his concerns pertaining to his chronic health condition/treatment. Pt shares he has difficulty opening up to people and finds it hard to trust them. He shares photos of his 3 yr-old grandson whom he adores. Pt is well-supported by his family. He admits 
follow-up labs were ordered  [] Collateral history obtained from:       Time spent with patient including counseling, chart review and nursing communication: 38 minutes    Romulo Simons Cha, MD

## 2024-12-03 NOTE — PLAN OF CARE
Problem: Chronic Conditions and Co-morbidities  Goal: Patient's chronic conditions and co-morbidity symptoms are monitored and maintained or improved  12/2/2024 0825 by Mague Whittaker RN  Outcome: Progressing  12/1/2024 2239 by Charlee Blanco RN  Outcome: Progressing  Flowsheets (Taken 12/1/2024 1935)  Care Plan - Patient's Chronic Conditions and Co-Morbidity Symptoms are Monitored and Maintained or Improved:   Monitor and assess patient's chronic conditions and comorbid symptoms for stability, deterioration, or improvement   Collaborate with multidisciplinary team to address chronic and comorbid conditions and prevent exacerbation or deterioration   Update acute care plan with appropriate goals if chronic or comorbid symptoms are exacerbated and prevent overall improvement and discharge     Problem: Discharge Planning  Goal: Discharge to home or other facility with appropriate resources  12/2/2024 0825 by Mague Whittaker RN  Outcome: Progressing  12/1/2024 2239 by Charlee Blanco RN  Outcome: Progressing  Flowsheets (Taken 12/1/2024 1935)  Discharge to home or other facility with appropriate resources:   Identify barriers to discharge with patient and caregiver   Arrange for needed discharge resources and transportation as appropriate   Identify discharge learning needs (meds, wound care, etc)   Arrange for interpreters to assist at discharge as needed   Refer to discharge planning if patient needs post-hospital services based on physician order or complex needs related to functional status, cognitive ability or social support system     Problem: Pain  Goal: Verbalizes/displays adequate comfort level or baseline comfort level  12/2/2024 0825 by Mague Whittaker RN  Outcome: Progressing  12/1/2024 2239 by Charlee Blanco RN  Outcome: Progressing     Problem: Safety - Adult  Goal: Free from fall injury  12/2/2024 0825 by Mague Whittaker RN  Outcome: Progressing  12/1/2024 2239 by Charlee Blanco RN  Outcome: 
  Problem: Chronic Conditions and Co-morbidities  Goal: Patient's chronic conditions and co-morbidity symptoms are monitored and maintained or improved  12/3/2024 0742 by Mague Whittaker RN  Outcome: Progressing  12/2/2024 2039 by Haley Parry RN  Outcome: Progressing     Problem: Discharge Planning  Goal: Discharge to home or other facility with appropriate resources  12/3/2024 0742 by Mague Whittaker RN  Outcome: Progressing  12/2/2024 2039 by Haley Parry RN  Outcome: Progressing     Problem: Pain  Goal: Verbalizes/displays adequate comfort level or baseline comfort level  12/3/2024 0742 by Mague Whittaker RN  Outcome: Progressing  12/2/2024 2039 by Haley Parry, RN  Outcome: Progressing     Problem: Safety - Adult  Goal: Free from fall injury  12/3/2024 0742 by Mague Whittaker RN  Outcome: Progressing  12/2/2024 2039 by Haley Parry, RN  Outcome: Progressing     
  Problem: Chronic Conditions and Co-morbidities  Goal: Patient's chronic conditions and co-morbidity symptoms are monitored and maintained or improved  Outcome: Progressing     Problem: Discharge Planning  Goal: Discharge to home or other facility with appropriate resources  Outcome: Progressing     Problem: Pain  Goal: Verbalizes/displays adequate comfort level or baseline comfort level  12/1/2024 0926 by Mague Whittaker, RN  Outcome: Progressing  12/1/2024 0319 by Simon Herndon, RN  Outcome: Progressing  Flowsheets (Taken 12/1/2024 0319)  Verbalizes/displays adequate comfort level or baseline comfort level:   Encourage patient to monitor pain and request assistance   Administer analgesics based on type and severity of pain and evaluate response   Consider cultural and social influences on pain and pain management     Problem: Safety - Adult  Goal: Free from fall injury  Outcome: Progressing     
  Problem: Pain  Goal: Verbalizes/displays adequate comfort level or baseline comfort level  Outcome: Progressing  Flowsheets (Taken 12/1/2024 0319)  Verbalizes/displays adequate comfort level or baseline comfort level:   Encourage patient to monitor pain and request assistance   Administer analgesics based on type and severity of pain and evaluate response   Consider cultural and social influences on pain and pain management     
Progressing

## 2025-01-05 ENCOUNTER — APPOINTMENT (OUTPATIENT)
Facility: HOSPITAL | Age: 50
End: 2025-01-05
Payer: MEDICAID

## 2025-01-05 ENCOUNTER — HOSPITAL ENCOUNTER (EMERGENCY)
Facility: HOSPITAL | Age: 50
Discharge: HOME | End: 2025-01-05
Payer: MEDICAID

## 2025-01-05 VITALS
HEART RATE: 88 BPM | BODY MASS INDEX: 38.36 KG/M2 | OXYGEN SATURATION: 99 % | WEIGHT: 315 LBS | TEMPERATURE: 98.8 F | SYSTOLIC BLOOD PRESSURE: 112 MMHG | DIASTOLIC BLOOD PRESSURE: 71 MMHG | RESPIRATION RATE: 18 BRPM | HEIGHT: 76 IN

## 2025-01-05 DIAGNOSIS — G89.29 CHRONIC PAIN OF LEFT KNEE: Primary | ICD-10-CM

## 2025-01-05 DIAGNOSIS — M25.562 CHRONIC PAIN OF LEFT KNEE: Primary | ICD-10-CM

## 2025-01-05 PROCEDURE — 96372 THER/PROPH/DIAG INJ SC/IM: CPT

## 2025-01-05 PROCEDURE — 73562 X-RAY EXAM OF KNEE 3: CPT

## 2025-01-05 PROCEDURE — 99284 EMERGENCY DEPT VISIT MOD MDM: CPT

## 2025-01-05 PROCEDURE — 6360000002 HC RX W HCPCS

## 2025-01-05 RX ORDER — KETOROLAC TROMETHAMINE 30 MG/ML
30 INJECTION, SOLUTION INTRAMUSCULAR; INTRAVENOUS ONCE
Status: COMPLETED | OUTPATIENT
Start: 2025-01-05 | End: 2025-01-05

## 2025-01-05 RX ORDER — KETOROLAC TROMETHAMINE 10 MG/1
10 TABLET, FILM COATED ORAL EVERY 6 HOURS PRN
Qty: 20 TABLET | Refills: 0 | Status: SHIPPED | OUTPATIENT
Start: 2025-01-05

## 2025-01-05 RX ORDER — ACETAMINOPHEN 500 MG
1000 TABLET ORAL 3 TIMES DAILY PRN
Qty: 30 TABLET | Refills: 0 | Status: SHIPPED | OUTPATIENT
Start: 2025-01-05 | End: 2025-01-15

## 2025-01-05 RX ADMIN — KETOROLAC TROMETHAMINE 30 MG: 30 INJECTION, SOLUTION INTRAMUSCULAR at 14:55

## 2025-01-05 ASSESSMENT — PAIN SCALES - GENERAL: PAINLEVEL_OUTOF10: 10

## 2025-01-05 ASSESSMENT — PAIN DESCRIPTION - LOCATION: LOCATION: KNEE

## 2025-01-05 ASSESSMENT — PAIN DESCRIPTION - DESCRIPTORS: DESCRIPTORS: ACHING

## 2025-01-05 ASSESSMENT — PAIN - FUNCTIONAL ASSESSMENT: PAIN_FUNCTIONAL_ASSESSMENT: PREVENTS OR INTERFERES SOME ACTIVE ACTIVITIES AND ADLS

## 2025-01-05 ASSESSMENT — PAIN DESCRIPTION - PAIN TYPE: TYPE: ACUTE PAIN

## 2025-01-05 ASSESSMENT — PAIN DESCRIPTION - ORIENTATION: ORIENTATION: LEFT

## 2025-01-05 NOTE — ED TRIAGE NOTES
Patient ambulatory into ED c/o L knee pain. Reports he was diagnosed with a meniscal tear 5-6 years ago but was never able to follow up or get it taken care of.

## 2025-01-05 NOTE — ED PROVIDER NOTES
[facility] EMERGENCY DEPT  EMERGENCY DEPARTMENT HISTORY AND PHYSICAL EXAM      Date: 1/5/2025  Patient Name: Ye Joseph  MRN: [id number]  YOB: 1975  Date of evaluation: 1/5/2025  Provider: Alberto Pulido PA-C   Note Started: 2:42 PM EST 1/5/25    HISTORY OF PRESENT ILLNESS     Chief Complaint   Patient presents with    Knee Pain       History Provided By: Patient    HPI: Ye Joseph is a 49 y.o. male with past medical history as listed below presents emergency department for evaluation of left knee pain.  Patient reports that 5 or 6 years ago he was diagnosed with a meniscal tear but never followed up with any specialist.  Reports that he has had increased pain discomfort over the last several days and is requesting an evaluation and treatment.  Reports that a few days prior to arrival he felt a pop in the knee and felt it bent backwards awkwardly.  Reports that he has been walking on it ever since and treating with over-the-counter medications.    PAST MEDICAL HISTORY   Past Medical History:  Past Medical History:   Diagnosis Date    Acute deep vein thrombosis (DVT) (HCC)     behind left knee    Acute MI (HCC)     Arthritis     CHF (congestive heart failure) (HCC)     COPD (chronic obstructive pulmonary disease) (HCC)     Depression     Diabetes mellitus (HCC)     Heart attack (HCC)     2023    Hx of blood clots     legs    Hypertension     Ill-defined condition     Obesity     Stroke (HCC)        Past Surgical History:  Past Surgical History:   Procedure Laterality Date    HERNIA REPAIR      IR GUIDED FNA      ORTHOPEDIC SURGERY Right     rotator cuff surgery x2    OTHER SURGICAL HISTORY      Patent Foramen [place] Repair    THYROIDECTOMY         Family History:  Family History   Problem Relation Age of Onset    Diabetes Maternal Uncle     Hypertension Maternal Uncle     Hypertension Paternal Uncle     Diabetes Paternal Uncle     Cancer Other     No Known Problems Father     Cancer

## 2025-01-05 NOTE — ED NOTES
ED tech asked to wrap L knee with ACE. Patient asked to take wrap home to do himself instead. CECILLE Dominguez ok with this so patient given single roll of ACE wrap along with DC papers.   DC info reviewed with patient, all questions answered. Patient well-appearing at time of discharge and vital signs stable. Ambulatory out of ED at this time.

## 2025-01-10 NOTE — ED TRIAGE NOTES
[facility] 15 pt stated that he has been having worsening heaviness to his chest since last night; c/o pain to left arm since 4 am along with dizziness and SOB    unknown

## 2025-01-17 ENCOUNTER — OFFICE VISIT (OUTPATIENT)
Age: 50
End: 2025-01-17

## 2025-01-17 VITALS
DIASTOLIC BLOOD PRESSURE: 83 MMHG | BODY MASS INDEX: 41.53 KG/M2 | SYSTOLIC BLOOD PRESSURE: 128 MMHG | HEART RATE: 74 BPM | HEIGHT: 76 IN | OXYGEN SATURATION: 93 %

## 2025-01-17 DIAGNOSIS — M17.12 PRIMARY OSTEOARTHRITIS OF LEFT KNEE: Primary | ICD-10-CM

## 2025-01-17 DIAGNOSIS — M25.562 LEFT MEDIAL KNEE PAIN: ICD-10-CM

## 2025-01-17 DIAGNOSIS — M21.162 GENU VARUM, ACQUIRED, LEFT: ICD-10-CM

## 2025-01-17 DIAGNOSIS — M25.462 EFFUSION OF LEFT KNEE JOINT: ICD-10-CM

## 2025-01-17 RX ORDER — TRIAMCINOLONE ACETONIDE 40 MG/ML
40 INJECTION, SUSPENSION INTRA-ARTICULAR; INTRAMUSCULAR ONCE
Status: COMPLETED | OUTPATIENT
Start: 2025-01-17 | End: 2025-01-17

## 2025-01-17 RX ADMIN — TRIAMCINOLONE ACETONIDE 40 MG: 40 INJECTION, SUSPENSION INTRA-ARTICULAR; INTRAMUSCULAR at 10:38

## 2025-01-17 ASSESSMENT — PATIENT HEALTH QUESTIONNAIRE - PHQ9
SUM OF ALL RESPONSES TO PHQ QUESTIONS 1-9: 0
1. LITTLE INTEREST OR PLEASURE IN DOING THINGS: NOT AT ALL
2. FEELING DOWN, DEPRESSED OR HOPELESS: NOT AT ALL
SUM OF ALL RESPONSES TO PHQ9 QUESTIONS 1 & 2: 0

## 2025-01-17 NOTE — PROGRESS NOTES
Identified pt with two pt identifiers (name and ). Reviewed chart in preparation for visit and have obtained necessary documentation.    Ye Joseph is a 49 y.o. male New Patient (Ed Follow up Left knee )  .    Vitals:    25 0945 25 0949   BP: (!) 144/83 128/83   Site: Left Upper Arm Right Upper Arm   Position: Sitting Sitting   Cuff Size: Large Adult Large Adult   Pulse: 74    SpO2: 93%    Height: 1.93 m (6' 3.98\")           1. Have you been to the ER, urgent care clinic since your last visit?  Hospitalized since your last visit?  yes - ER     2. Have you seen or consulted any other health care providers outside of the [facility] since your last visit?  Include any pap smears or colon screening.  no

## 2025-01-17 NOTE — PROGRESS NOTES
Subjective:      Patient ID: Ye Joseph is a 49 y.o.  male.    Chief Complaint   Patient presents with    New Patient     Ed Follow up Left knee    Patient is a pleasant 49-year-old [employer] employee, who presents today with a 2-week history of left knee pain that started without any injury.  He went to the [facility] emergency room on 1/5/2025 and x-rays were obtained that did not show any fractures or acute injury.  He was noted to have some mild arthritic changes and narrowing of the medial compartment.  Most of his symptoms are medially based.  Patient states that approximately 10 years ago he was evaluated for a possible meniscus tear in the left knee, but he never followed this up and the symptoms resolved.  Today, he has noted to have a small effusion and cannot flex more than 90 degrees.  Most of the pain is medial based and patellofemoral joint.    HPI from [facility] ED on 1/05/2025 by Alberto Pulido PA-C Ye Joseph is a 49 y.o. male with past medical history as listed below presents emergency department for evaluation of left knee pain.  Patient reports that 5 or 6 years ago he was diagnosed with a meniscal tear but never followed up with any specialist.  Reports that he has had increased pain discomfort over the last several days and is requesting an evaluation and treatment.  Reports that a few days prior to arrival he felt a pop in the knee and felt it bent backwards awkwardly.  Reports that he has been walking on it ever since and treating with over-the-counter medications.       Social History     Occupational History    Not on file   Tobacco Use    Smoking status: Former    Smokeless tobacco: Never   Vaping Use    Vaping status: Never Used   Substance and Sexual Activity    Alcohol use: Yes     Comment: 2    Drug use: Not Currently    Sexual activity: Not on file      Past Medical History:   Diagnosis Date    Acute deep vein thrombosis (DVT) (HCC)       behind left knee

## 2025-01-24 ENCOUNTER — HOSPITAL ENCOUNTER (EMERGENCY)
Facility: HOSPITAL | Age: 50
Discharge: HOME | End: 2025-01-24
Attending: EMERGENCY MEDICINE
Payer: MEDICAID

## 2025-01-24 VITALS
BODY MASS INDEX: 39.17 KG/M2 | WEIGHT: 315 LBS | OXYGEN SATURATION: 97 % | SYSTOLIC BLOOD PRESSURE: 155 MMHG | RESPIRATION RATE: 18 BRPM | HEART RATE: 96 BPM | HEIGHT: 75 IN | DIASTOLIC BLOOD PRESSURE: 94 MMHG | TEMPERATURE: 98.3 F

## 2025-01-24 DIAGNOSIS — K52.9 GASTROENTERITIS: Primary | ICD-10-CM

## 2025-01-24 LAB
ALBUMIN SERPL-MCNC: 3.2 G/DL (ref 3.5–5)
ALBUMIN/GLOB SERPL: 0.7 (ref 1.1–2.2)
ALP SERPL-CCNC: 87 U/L (ref 45–117)
ALT SERPL-CCNC: 27 U/L (ref 12–78)
ANION GAP SERPL CALC-SCNC: 7 MMOL/L (ref 2–12)
AST SERPL W P-5'-P-CCNC: 25 U/L (ref 15–37)
BASOPHILS # BLD: 0.02 K/UL (ref 0–0.1)
BASOPHILS NFR BLD: 0.4 % (ref 0–1)
BILIRUB SERPL-MCNC: 0.5 MG/DL (ref 0.2–1)
BUN SERPL-MCNC: 10 MG/DL (ref 6–20)
BUN/CREAT SERPL: 11 (ref 12–20)
CA-I BLD-MCNC: 8.5 MG/DL (ref 8.5–10.1)
CHLORIDE SERPL-SCNC: 102 MMOL/L (ref 97–108)
CO2 SERPL-SCNC: 31 MMOL/L (ref 21–32)
CREAT SERPL-MCNC: 0.94 MG/DL (ref 0.7–1.3)
DIFFERENTIAL METHOD BLD: NORMAL
EOSINOPHIL # BLD: 0.12 K/UL (ref 0–0.4)
EOSINOPHIL NFR BLD: 2.1 % (ref 0–7)
ERYTHROCYTE [DISTWIDTH] IN BLOOD BY AUTOMATED COUNT: 13.6 % (ref 11.5–14.5)
GLOBULIN SER CALC-MCNC: 4.5 G/DL (ref 2–4)
GLUCOSE SERPL-MCNC: 104 MG/DL (ref 65–100)
HCT VFR BLD AUTO: 40.5 % (ref 36.6–50.3)
HGB BLD-MCNC: 13.1 G/DL (ref 12.1–17)
IMM GRANULOCYTES # BLD AUTO: 0.01 K/UL (ref 0–0.04)
IMM GRANULOCYTES NFR BLD AUTO: 0.2 % (ref 0–0.5)
LIPASE SERPL-CCNC: 29 U/L (ref 13–75)
LYMPHOCYTES # BLD: 1.2 K/UL (ref 0.8–3.5)
LYMPHOCYTES NFR BLD: 21.2 % (ref 12–49)
MCH RBC QN AUTO: 31.6 PG (ref 26–34)
MCHC RBC AUTO-ENTMCNC: 32.3 G/DL (ref 30–36.5)
MCV RBC AUTO: 97.8 FL (ref 80–99)
MONOCYTES # BLD: 0.34 K/UL (ref 0–1)
MONOCYTES NFR BLD: 6 % (ref 5–13)
NEUTS SEG # BLD: 3.98 K/UL (ref 1.8–8)
NEUTS SEG NFR BLD: 70.1 % (ref 32–75)
PLATELET # BLD AUTO: 225 K/UL (ref 150–400)
PMV BLD AUTO: 9.6 FL (ref 8.9–12.9)
POTASSIUM SERPL-SCNC: 4.4 MMOL/L (ref 3.5–5.1)
PROT SERPL-MCNC: 7.7 G/DL (ref 6.4–8.2)
RBC # BLD AUTO: 4.14 M/UL (ref 4.1–5.7)
SODIUM SERPL-SCNC: 140 MMOL/L (ref 136–145)
WBC # BLD AUTO: 5.7 K/UL (ref 4.1–11.1)

## 2025-01-24 PROCEDURE — 6360000002 HC RX W HCPCS: Performed by: EMERGENCY MEDICINE

## 2025-01-24 PROCEDURE — 85025 COMPLETE CBC W/AUTO DIFF WBC: CPT

## 2025-01-24 PROCEDURE — 36415 COLL VENOUS BLD VENIPUNCTURE: CPT

## 2025-01-24 PROCEDURE — 80053 COMPREHEN METABOLIC PANEL: CPT

## 2025-01-24 PROCEDURE — 96374 THER/PROPH/DIAG INJ IV PUSH: CPT

## 2025-01-24 PROCEDURE — 83690 ASSAY OF LIPASE: CPT

## 2025-01-24 PROCEDURE — 96375 TX/PRO/DX INJ NEW DRUG ADDON: CPT

## 2025-01-24 PROCEDURE — 99284 EMERGENCY DEPT VISIT MOD MDM: CPT

## 2025-01-24 RX ORDER — DEXAMETHASONE SODIUM PHOSPHATE 10 MG/ML
10 INJECTION, SOLUTION INTRAMUSCULAR; INTRAVENOUS ONCE
Status: COMPLETED | OUTPATIENT
Start: 2025-01-24 | End: 2025-01-24

## 2025-01-24 RX ORDER — ONDANSETRON 2 MG/ML
4 INJECTION INTRAMUSCULAR; INTRAVENOUS ONCE
Status: COMPLETED | OUTPATIENT
Start: 2025-01-24 | End: 2025-01-24

## 2025-01-24 RX ORDER — KETOROLAC TROMETHAMINE 15 MG/ML
15 INJECTION, SOLUTION INTRAMUSCULAR; INTRAVENOUS ONCE
Status: COMPLETED | OUTPATIENT
Start: 2025-01-24 | End: 2025-01-24

## 2025-01-24 RX ADMIN — KETOROLAC TROMETHAMINE 15 MG: 15 INJECTION, SOLUTION INTRAMUSCULAR; INTRAVENOUS at 09:19

## 2025-01-24 RX ADMIN — ONDANSETRON 4 MG: 2 INJECTION INTRAMUSCULAR; INTRAVENOUS at 09:19

## 2025-01-24 RX ADMIN — DEXAMETHASONE SODIUM PHOSPHATE 10 MG: 10 INJECTION, SOLUTION INTRAMUSCULAR; INTRAVENOUS at 09:19

## 2025-01-24 ASSESSMENT — PAIN SCALES - GENERAL: PAINLEVEL_OUTOF10: 9

## 2025-01-24 ASSESSMENT — PAIN - FUNCTIONAL ASSESSMENT: PAIN_FUNCTIONAL_ASSESSMENT: 0-10

## 2025-01-24 NOTE — DISCHARGE INSTRUCTIONS
Thank you for choosing our Emergency Department for your care.  It is our privilege to care for you in your time of need.  In the next several days, you may receive a survey via email or mailed to your home about your experience with our team.  We would greatly appreciate you taking a few minutes to complete the survey, as we use this information to learn what we have done well and what we could be doing better. Thank you for trusting us with your care!    Below you will find a list of your tests from today's visit.   Labs and Radiology Studies  Recent Results (from the past 12 hour(s))   CBC with Auto Differential    Collection Time: 01/24/25  9:20 AM   Result Value Ref Range    WBC 5.7 4.1 - 11.1 K/uL    RBC 4.14 4.10 - 5.70 M/uL    Hemoglobin 13.1 12.1 - 17.0 g/dL    Hematocrit 40.5 36.6 - 50.3 %    MCV 97.8 80.0 - 99.0 FL    MCH 31.6 26.0 - 34.0 PG    MCHC 32.3 30.0 - 36.5 g/dL    RDW 13.6 11.5 - 14.5 %    Platelets 225 150 - 400 K/uL    MPV 9.6 8.9 - 12.9 FL    Neutrophils % 70.1 32.0 - 75.0 %    Lymphocytes % 21.2 12.0 - 49.0 %    Monocytes % 6.0 5.0 - 13.0 %    Eosinophils % 2.1 0.0 - 7.0 %    Basophils % 0.4 0.0 - 1.0 %    Immature Granulocytes % 0.2 0.0 - 0.5 %    Neutrophils Absolute 3.98 1.80 - 8.00 K/UL    Lymphocytes Absolute 1.20 0.80 - 3.50 K/UL    Monocytes Absolute 0.34 0.00 - 1.00 K/UL    Eosinophils Absolute 0.12 0.00 - 0.40 K/UL    Basophils Absolute 0.02 0.00 - 0.10 K/UL    Immature Granulocytes Absolute 0.01 0.00 - 0.04 K/UL    Differential Type AUTOMATED     Comprehensive Metabolic Panel    Collection Time: 01/24/25  9:20 AM   Result Value Ref Range    Sodium 140 136 - 145 mmol/L    Potassium 4.4 3.5 - 5.1 mmol/L    Chloride 102 97 - 108 mmol/L    CO2 31 21 - 32 mmol/L    Anion Gap 7 2 - 12 mmol/L    Glucose 104 (H) 65 - 100 mg/dL    BUN 10 6 - 20 mg/dL    Creatinine 0.94 0.70 - 1.30 mg/dL    BUN/Creatinine Ratio 11 (L) 12 - 20      Est, Glom Filt Rate >90 >60 ml/min/1.73m2    Calcium 8.5

## 2025-01-24 NOTE — ED PROVIDER NOTES
[facility] EMERGENCY DEPARTMENT  EMERGENCY DEPARTMENT HISTORY AND PHYSICAL EXAM      Date: 1/24/2025  Patient Name: Ye Joseph  MRN: [id number]  Birthdate 1975  Date of evaluation: 1/24/2025  Provider: Sonu Son DO   Note Started: 8:49 AM EST 1/24/25    HISTORY OF PRESENT ILLNESS     Chief Complaint   Patient presents with    Diarrhea    Headache    Fatigue     History Provided By: Patient    HPI: Ye Joseph is a 49-year-old male with a past medical history of acute deep vein thrombosis, congestive heart failure, chronic obstructive pulmonary disease, diabetes mellitus, hypertension, and prior stroke who presents with one day of headache, stomach pain, nausea, vomiting, diarrhea, fatigue, and diarrhea. He reports no focal neurological deficits and no significant abdominal pain.    PAST MEDICAL HISTORY   Past Medical History:  Past Medical History:   Diagnosis Date    Acute deep vein thrombosis (DVT) (HCC)     behind left knee    Acute MI (HCC)     Arthritis     CHF (congestive heart failure) (HCC)     COPD (chronic obstructive pulmonary disease) (HCC)     Depression     Diabetes mellitus (HCC)     Heart attack (HCC)     2023    Hx of blood clots     legs    Hypertension     Ill-defined condition     Obesity     Stroke (HCC)        Past Surgical History:  Past Surgical History:   Procedure Laterality Date    HERNIA REPAIR      IR GUIDED FNA      ORTHOPEDIC SURGERY Right     rotator cuff surgery x2    OTHER SURGICAL HISTORY      Patent Foramen Ovale Repair    THYROIDECTOMY         Family History:  Family History   Problem Relation Age of Onset    Diabetes Maternal Uncle     Hypertension Maternal Uncle     Hypertension Paternal Uncle     Diabetes Paternal Uncle     Cancer Other     No Known Problems Father     Cancer Mother        Social History:  Social History     Tobacco Use    Smoking status: Former    Smokeless tobacco: Never   Vaping Use    Vaping status: Never Used  tablet  Commonly known as: TYLENOL  Take 2 tablets by mouth 3 times daily as needed for Pain     albuterol sulfate  (90 Base) MCG/ACT inhaler  Commonly known as: Ventolin HFA  Inhale 2 puffs into the lungs every 6 hours as needed for Wheezing     allopurinol 100 MG tablet  Commonly known as: ZYLOPRIM  Take 1 tablet by mouth daily     aspirin 81 MG chewable tablet  Take 1 tablet by mouth daily     atorvastatin 80 MG tablet  Commonly known as: LIPITOR  Take 1 tablet by mouth daily     budesonide-formoterol 160-4.5 MCG/ACT Aero  Commonly known as: SYMBICORT  Inhale 2 puffs into the lungs in the morning and 2 puffs in the evening.     bumetanide 0.5 MG tablet  Commonly known as: BUMEX  Take 4 tablets by mouth daily Take all 4 tablets at once for dose equivalent to 2mg.     citalopram 40 MG tablet  Commonly known as: CELEXA  Take 1 tablet by mouth daily     clopidogrel 75 MG tablet  Commonly known as: PLAVIX  Take 1 tablet by mouth daily     empagliflozin 10 MG tablet  Commonly known as: JARDIANCE  Take 1 tablet by mouth daily     famotidine 20 MG tablet  Commonly known as: PEPCID  Take 1 tablet by mouth 2 times daily     gabapentin 600 MG tablet  Commonly known as: NEURONTIN  Take 2 tablets by mouth at bedtime for 3 days. Max Daily Amount: 1,200 mg     ketorolac 10 MG tablet  Commonly known as: TORADOL  Take 1 tablet by mouth every 6 hours as needed for Pain     levothyroxine 75 MCG tablet  Commonly known as: SYNTHROID  Take 1 tablet by mouth every morning (before breakfast)     metFORMIN 500 MG tablet  Commonly known as: GLUCOPHAGE     metoprolol succinate 50 MG extended release tablet  Commonly known as: TOPROL XL  Take 1 tablet by mouth daily     nitroGLYCERIN 0.4 MG SL tablet  Commonly known as: NITROSTAT  Place 1 tablet under the tongue every 5 minutes as needed for Chest pain up to max of 3 total doses. If no relief after 1 dose, call 911.     traZODone 100 MG tablet  Commonly known as: DESYREL  Take 2

## 2025-01-24 NOTE — ED TRIAGE NOTES
Pt states he is having diarrhea and abdominal pain that is accompanied by a headache and fatigue that started at midnight,

## 2025-02-26 ENCOUNTER — APPOINTMENT (OUTPATIENT)
Facility: HOSPITAL | Age: 50
End: 2025-02-26
Payer: MEDICAID

## 2025-02-26 ENCOUNTER — HOSPITAL ENCOUNTER (EMERGENCY)
Facility: HOSPITAL | Age: 50
Discharge: HOME | End: 2025-02-26
Attending: EMERGENCY MEDICINE
Payer: MEDICAID

## 2025-02-26 VITALS
TEMPERATURE: 97.9 F | SYSTOLIC BLOOD PRESSURE: 121 MMHG | BODY MASS INDEX: 39.17 KG/M2 | OXYGEN SATURATION: 95 % | DIASTOLIC BLOOD PRESSURE: 83 MMHG | HEIGHT: 75 IN | HEART RATE: 88 BPM | RESPIRATION RATE: 16 BRPM | WEIGHT: 315 LBS

## 2025-02-26 DIAGNOSIS — R07.9 CHEST PAIN, UNSPECIFIED TYPE: Primary | ICD-10-CM

## 2025-02-26 LAB
ALBUMIN SERPL-MCNC: 3.7 G/DL (ref 3.5–5)
ALBUMIN/GLOB SERPL: 0.8 (ref 1.1–2.2)
ALP SERPL-CCNC: 80 U/L (ref 45–117)
ALT SERPL-CCNC: 36 U/L (ref 12–78)
ANION GAP SERPL CALC-SCNC: 5 MMOL/L (ref 2–12)
AST SERPL W P-5'-P-CCNC: 23 U/L (ref 15–37)
BASOPHILS # BLD: 0.03 K/UL (ref 0–0.1)
BASOPHILS NFR BLD: 0.5 % (ref 0–1)
BILIRUB SERPL-MCNC: 0.6 MG/DL (ref 0.2–1)
BUN SERPL-MCNC: 13 MG/DL (ref 6–20)
BUN/CREAT SERPL: 11 (ref 12–20)
CA-I BLD-MCNC: 9.3 MG/DL (ref 8.5–10.1)
CHLORIDE SERPL-SCNC: 104 MMOL/L (ref 97–108)
CO2 SERPL-SCNC: 26 MMOL/L (ref 21–32)
CREAT SERPL-MCNC: 1.17 MG/DL (ref 0.7–1.3)
DIFFERENTIAL METHOD BLD: NORMAL
EKG ATRIAL RATE: 79 BPM
EKG DIAGNOSIS: NORMAL
EKG P AXIS: 12 DEGREES
EKG P-R INTERVAL: 190 MS
EKG Q-T INTERVAL: 438 MS
EKG QRS DURATION: 96 MS
EKG QTC CALCULATION (BAZETT): 502 MS
EKG R AXIS: 2 DEGREES
EKG T AXIS: 32 DEGREES
EKG VENTRICULAR RATE: 79 BPM
EOSINOPHIL # BLD: 0.17 K/UL (ref 0–0.4)
EOSINOPHIL NFR BLD: 2.9 % (ref 0–7)
ERYTHROCYTE [DISTWIDTH] IN BLOOD BY AUTOMATED COUNT: 13.8 % (ref 11.5–14.5)
GLOBULIN SER CALC-MCNC: 4.7 G/DL (ref 2–4)
GLUCOSE SERPL-MCNC: 109 MG/DL (ref 65–100)
HCT VFR BLD AUTO: 40.9 % (ref 36.6–50.3)
HGB BLD-MCNC: 14 G/DL (ref 12.1–17)
IMM GRANULOCYTES # BLD AUTO: 0.01 K/UL (ref 0–0.04)
IMM GRANULOCYTES NFR BLD AUTO: 0.2 % (ref 0–0.5)
LYMPHOCYTES # BLD: 1.62 K/UL (ref 0.8–3.5)
LYMPHOCYTES NFR BLD: 27.5 % (ref 12–49)
MAGNESIUM SERPL-MCNC: 2 MG/DL (ref 1.6–2.4)
MCH RBC QN AUTO: 32.8 PG (ref 26–34)
MCHC RBC AUTO-ENTMCNC: 34.2 G/DL (ref 30–36.5)
MCV RBC AUTO: 95.8 FL (ref 80–99)
MONOCYTES # BLD: 0.42 K/UL (ref 0–1)
MONOCYTES NFR BLD: 7.1 % (ref 5–13)
NEUTS SEG # BLD: 3.64 K/UL (ref 1.8–8)
NEUTS SEG NFR BLD: 61.8 % (ref 32–75)
NRBC # BLD: 0 K/UL (ref 0–0.01)
NRBC BLD-RTO: 0 PER 100 WBC
PLATELET # BLD AUTO: 213 K/UL (ref 150–400)
PMV BLD AUTO: 9.8 FL (ref 8.9–12.9)
POTASSIUM SERPL-SCNC: 3.8 MMOL/L (ref 3.5–5.1)
PROT SERPL-MCNC: 8.4 G/DL (ref 6.4–8.2)
RBC # BLD AUTO: 4.27 M/UL (ref 4.1–5.7)
SODIUM SERPL-SCNC: 135 MMOL/L (ref 136–145)
TROPONIN I SERPL HS-MCNC: 5 NG/L (ref 0–76)
TROPONIN I SERPL HS-MCNC: 5 NG/L (ref 0–76)
WBC # BLD AUTO: 5.9 K/UL (ref 4.1–11.1)

## 2025-02-26 PROCEDURE — 93005 ELECTROCARDIOGRAM TRACING: CPT | Performed by: EMERGENCY MEDICINE

## 2025-02-26 PROCEDURE — 6370000000 HC RX 637 (ALT 250 FOR IP): Performed by: EMERGENCY MEDICINE

## 2025-02-26 PROCEDURE — 83735 ASSAY OF MAGNESIUM: CPT

## 2025-02-26 PROCEDURE — 71045 X-RAY EXAM CHEST 1 VIEW: CPT

## 2025-02-26 PROCEDURE — 85025 COMPLETE CBC W/AUTO DIFF WBC: CPT

## 2025-02-26 PROCEDURE — 99285 EMERGENCY DEPT VISIT HI MDM: CPT

## 2025-02-26 PROCEDURE — 84484 ASSAY OF TROPONIN QUANT: CPT

## 2025-02-26 PROCEDURE — 36415 COLL VENOUS BLD VENIPUNCTURE: CPT

## 2025-02-26 PROCEDURE — 80053 COMPREHEN METABOLIC PANEL: CPT

## 2025-02-26 RX ORDER — NITROGLYCERIN 0.4 MG/1
0.4 TABLET SUBLINGUAL EVERY 5 MIN PRN
OUTPATIENT
Start: 2025-02-26

## 2025-02-26 RX ORDER — TRAMADOL HYDROCHLORIDE 50 MG/1
50 TABLET ORAL ONCE
Status: COMPLETED | OUTPATIENT
Start: 2025-02-26 | End: 2025-02-26

## 2025-02-26 RX ORDER — ASPIRIN 325 MG
325 TABLET ORAL
Status: DISCONTINUED | OUTPATIENT
Start: 2025-02-26 | End: 2025-02-26

## 2025-02-26 RX ADMIN — TRAMADOL HYDROCHLORIDE 50 MG: 50 TABLET, COATED ORAL at 14:11

## 2025-02-26 ASSESSMENT — PAIN - FUNCTIONAL ASSESSMENT
PAIN_FUNCTIONAL_ASSESSMENT: ACTIVITIES ARE NOT PREVENTED
PAIN_FUNCTIONAL_ASSESSMENT: 0-10

## 2025-02-26 ASSESSMENT — PAIN SCALES - GENERAL
PAINLEVEL_OUTOF10: 9
PAINLEVEL_OUTOF10: 9

## 2025-02-26 ASSESSMENT — HEART SCORE: ECG: NORMAL

## 2025-02-26 ASSESSMENT — PAIN DESCRIPTION - LOCATION: LOCATION: ARM;CHEST

## 2025-02-26 NOTE — DISCHARGE INSTRUCTIONS
Thank you for choosing our Emergency Department for your care.  It is our privilege to care for you in your time of need.  In the next several days, you may receive a survey via email or mailed to your home about your experience with our team.  We would greatly appreciate you taking a few minutes to complete the survey, as we use this information to learn what we have done well and what we could be doing better. Thank you for trusting us with your care!    Below you will find a list of your tests from today's visit.   Labs and Radiology Studies  Recent Results (from the past 12 hour(s))   CBC with Auto Differential    Collection Time: 02/26/25 10:23 AM   Result Value Ref Range    WBC 5.9 4.1 - 11.1 K/uL    RBC 4.27 4.10 - 5.70 M/uL    Hemoglobin 14.0 12.1 - 17.0 g/dL    Hematocrit 40.9 36.6 - 50.3 %    MCV 95.8 80.0 - 99.0 FL    MCH 32.8 26.0 - 34.0 PG    MCHC 34.2 30.0 - 36.5 g/dL    RDW 13.8 11.5 - 14.5 %    Platelets 213 150 - 400 K/uL    MPV 9.8 8.9 - 12.9 FL    Nucleated RBCs 0.0 0.0  WBC    nRBC 0.00 0.00 - 0.01 K/uL    Neutrophils % 61.8 32.0 - 75.0 %    Lymphocytes % 27.5 12.0 - 49.0 %    Monocytes % 7.1 5.0 - 13.0 %    Eosinophils % 2.9 0.0 - 7.0 %    Basophils % 0.5 0.0 - 1.0 %    Immature Granulocytes % 0.2 0 - 0.5 %    Neutrophils Absolute 3.64 1.80 - 8.00 K/UL    Lymphocytes Absolute 1.62 0.80 - 3.50 K/UL    Monocytes Absolute 0.42 0.00 - 1.00 K/UL    Eosinophils Absolute 0.17 0.00 - 0.40 K/UL    Basophils Absolute 0.03 0.00 - 0.10 K/UL    Immature Granulocytes Absolute 0.01 0.00 - 0.04 K/UL    Differential Type AUTOMATED     Comprehensive Metabolic Panel    Collection Time: 02/26/25 10:23 AM   Result Value Ref Range    Sodium 135 (L) 136 - 145 mmol/L    Potassium 3.8 3.5 - 5.1 mmol/L    Chloride 104 97 - 108 mmol/L    CO2 26 21 - 32 mmol/L    Anion Gap 5 2 - 12 mmol/L    Glucose 109 (H) 65 - 100 mg/dL    BUN 13 6 - 20 mg/dL    Creatinine 1.17 0.70 - 1.30 mg/dL    BUN/Creatinine Ratio 11 (L)

## 2025-02-26 NOTE — ED PROVIDER NOTES
[facility] EMERGENCY DEPARTMENT  EMERGENCY DEPARTMENT HISTORY AND PHYSICAL EXAM      Date: 2/26/2025  Patient Name: Ye Joseph  MRN: [id number]  Birthdate 1975  Date of evaluation: 2/26/2025  Provider: Sonu Son DO   Note Started: 1:21 PM EST 2/26/25    HISTORY OF PRESENT ILLNESS     Chief Complaint   Patient presents with    Chest Pain    Shortness of Breath     History Provided By: Patient    HPI: Ye Joseph is a 49-year-old male with a past medical history of acute deep vein thrombosis, acute myocardial infarction, congestive heart failure, chronic obstructive pulmonary disease, and diabetes mellitus who presents with chest pain and shortness of breath. Symptoms began this morning and are associated with facial tingling, left arm pain, and tingling. The patient was evaluated by his PCP and was sent to the emergency department for further assessment.    PAST MEDICAL HISTORY   Past Medical History:  Past Medical History:   Diagnosis Date    Acute deep vein thrombosis (DVT) (HCC)     behind left knee    Acute MI (HCC)     Arthritis     CHF (congestive heart failure) (HCC)     COPD (chronic obstructive pulmonary disease) (HCC)     Depression     Diabetes mellitus (HCC)     Heart attack (HCC)     2023    Hx of blood clots     legs    Hypertension     Ill-defined condition     Obesity     Stroke (HCC)        Past Surgical History:  Past Surgical History:   Procedure Laterality Date    HERNIA REPAIR      IR GUIDED FNA      ORTHOPEDIC SURGERY Right     rotator cuff surgery x2    OTHER SURGICAL HISTORY      Patent Foramen Ovale Repair    THYROIDECTOMY         Family History:  Family History   Problem Relation Age of Onset    Diabetes Maternal Uncle     Hypertension Maternal Uncle     Hypertension Paternal Uncle     Diabetes Paternal Uncle     Cancer Other     No Known Problems Father     Cancer Mother        Social History:  Social History     Tobacco Use    Smoking status:

## 2025-04-22 ENCOUNTER — APPOINTMENT (OUTPATIENT)
Facility: HOSPITAL | Age: 50
End: 2025-04-22
Attending: STUDENT IN AN ORGANIZED HEALTH CARE EDUCATION/TRAINING PROGRAM
Payer: MEDICAID

## 2025-04-22 ENCOUNTER — APPOINTMENT (OUTPATIENT)
Facility: HOSPITAL | Age: 50
End: 2025-04-22
Payer: MEDICAID

## 2025-04-22 ENCOUNTER — HOSPITAL ENCOUNTER (OUTPATIENT)
Facility: HOSPITAL | Age: 50
Setting detail: OBSERVATION
Discharge: HOME | End: 2025-04-25
Attending: STUDENT IN AN ORGANIZED HEALTH CARE EDUCATION/TRAINING PROGRAM | Admitting: INTERNAL MEDICINE
Payer: MEDICAID

## 2025-04-22 ENCOUNTER — APPOINTMENT (OUTPATIENT)
Facility: HOSPITAL | Age: 50
End: 2025-04-22
Attending: EMERGENCY MEDICINE
Payer: MEDICAID

## 2025-04-22 DIAGNOSIS — R07.89 ATYPICAL CHEST PAIN: ICD-10-CM

## 2025-04-22 DIAGNOSIS — I63.9 CEREBROVASCULAR ACCIDENT (CVA), UNSPECIFIED MECHANISM (HCC): Primary | ICD-10-CM

## 2025-04-22 PROBLEM — G45.9 TIA (TRANSIENT ISCHEMIC ATTACK): Status: ACTIVE | Noted: 2025-04-22

## 2025-04-22 LAB
ALBUMIN SERPL-MCNC: 3.6 G/DL (ref 3.5–5)
ALBUMIN/GLOB SERPL: 0.8 (ref 1.1–2.2)
ALP SERPL-CCNC: 86 U/L (ref 45–117)
ALT SERPL-CCNC: 36 U/L (ref 12–78)
ANION GAP SERPL CALC-SCNC: 4 MMOL/L (ref 2–12)
ANION GAP SERPL CALC-SCNC: 7 MMOL/L (ref 2–12)
AST SERPL W P-5'-P-CCNC: 28 U/L (ref 15–37)
BASOPHILS # BLD: 0.04 K/UL (ref 0–0.1)
BASOPHILS NFR BLD: 0.5 % (ref 0–1)
BILIRUB SERPL-MCNC: 0.6 MG/DL (ref 0.2–1)
BNP SERPL-MCNC: 57 PG/ML
BUN SERPL-MCNC: 11 MG/DL (ref 6–20)
BUN SERPL-MCNC: 11 MG/DL (ref 6–20)
BUN/CREAT SERPL: 10 (ref 12–20)
BUN/CREAT SERPL: 9 (ref 12–20)
CA-I BLD-MCNC: 8.7 MG/DL (ref 8.5–10.1)
CA-I BLD-MCNC: 9.6 MG/DL (ref 8.5–10.1)
CHLORIDE SERPL-SCNC: 105 MMOL/L (ref 97–108)
CHLORIDE SERPL-SCNC: 105 MMOL/L (ref 97–108)
CO2 SERPL-SCNC: 26 MMOL/L (ref 21–32)
CO2 SERPL-SCNC: 31 MMOL/L (ref 21–32)
CREAT SERPL-MCNC: 1.08 MG/DL (ref 0.7–1.3)
CREAT SERPL-MCNC: 1.17 MG/DL (ref 0.7–1.3)
DIFFERENTIAL METHOD BLD: NORMAL
EKG ATRIAL RATE: 112 BPM
EKG DIAGNOSIS: NORMAL
EKG P AXIS: 25 DEGREES
EKG P-R INTERVAL: 174 MS
EKG Q-T INTERVAL: 348 MS
EKG QRS DURATION: 94 MS
EKG QTC CALCULATION (BAZETT): 475 MS
EKG R AXIS: -9 DEGREES
EKG T AXIS: 15 DEGREES
EKG VENTRICULAR RATE: 112 BPM
EOSINOPHIL # BLD: 0.02 K/UL (ref 0–0.4)
EOSINOPHIL NFR BLD: 0.3 % (ref 0–7)
ERYTHROCYTE [DISTWIDTH] IN BLOOD BY AUTOMATED COUNT: 13.2 % (ref 11.5–14.5)
GLOBULIN SER CALC-MCNC: 4.5 G/DL (ref 2–4)
GLUCOSE BLD STRIP.AUTO-MCNC: 108 MG/DL (ref 65–100)
GLUCOSE SERPL-MCNC: 115 MG/DL (ref 65–100)
GLUCOSE SERPL-MCNC: 152 MG/DL (ref 65–100)
HCT VFR BLD AUTO: 40.7 % (ref 36.6–50.3)
HGB BLD-MCNC: 13.9 G/DL (ref 12.1–17)
IMM GRANULOCYTES # BLD AUTO: 0.02 K/UL (ref 0–0.04)
IMM GRANULOCYTES NFR BLD AUTO: 0.3 % (ref 0–0.5)
INR PPP: 1.1 (ref 0.9–1.1)
LYMPHOCYTES # BLD: 1.63 K/UL (ref 0.8–3.5)
LYMPHOCYTES NFR BLD: 21.6 % (ref 12–49)
MCH RBC QN AUTO: 32.3 PG (ref 26–34)
MCHC RBC AUTO-ENTMCNC: 34.2 G/DL (ref 30–36.5)
MCV RBC AUTO: 94.7 FL (ref 80–99)
MONOCYTES # BLD: 0.5 K/UL (ref 0–1)
MONOCYTES NFR BLD: 6.6 % (ref 5–13)
NEUTS SEG # BLD: 5.32 K/UL (ref 1.8–8)
NEUTS SEG NFR BLD: 70.7 % (ref 32–75)
NRBC # BLD: 0 K/UL (ref 0–0.01)
NRBC BLD-RTO: 0 PER 100 WBC
PERFORMED BY:: ABNORMAL
PLATELET # BLD AUTO: 222 K/UL (ref 150–400)
PMV BLD AUTO: 9.8 FL (ref 8.9–12.9)
POTASSIUM SERPL-SCNC: 3.1 MMOL/L (ref 3.5–5.1)
POTASSIUM SERPL-SCNC: 3.8 MMOL/L (ref 3.5–5.1)
PROT SERPL-MCNC: 8.1 G/DL (ref 6.4–8.2)
PROTHROMBIN TIME: 14 SEC (ref 11.9–14.6)
RBC # BLD AUTO: 4.3 M/UL (ref 4.1–5.7)
SODIUM SERPL-SCNC: 138 MMOL/L (ref 136–145)
SODIUM SERPL-SCNC: 140 MMOL/L (ref 136–145)
TROPONIN I SERPL HS-MCNC: 5 NG/L (ref 0–76)
TROPONIN I SERPL HS-MCNC: 7 NG/L (ref 0–76)
TROPONIN I SERPL HS-MCNC: 7 NG/L (ref 0–76)
WBC # BLD AUTO: 7.5 K/UL (ref 4.1–11.1)

## 2025-04-22 PROCEDURE — 6360000002 HC RX W HCPCS: Performed by: INTERNAL MEDICINE

## 2025-04-22 PROCEDURE — 82962 GLUCOSE BLOOD TEST: CPT

## 2025-04-22 PROCEDURE — 85610 PROTHROMBIN TIME: CPT

## 2025-04-22 PROCEDURE — 6360000004 HC RX CONTRAST MEDICATION: Performed by: EMERGENCY MEDICINE

## 2025-04-22 PROCEDURE — 96372 THER/PROPH/DIAG INJ SC/IM: CPT

## 2025-04-22 PROCEDURE — 6370000000 HC RX 637 (ALT 250 FOR IP): Performed by: STUDENT IN AN ORGANIZED HEALTH CARE EDUCATION/TRAINING PROGRAM

## 2025-04-22 PROCEDURE — 80048 BASIC METABOLIC PNL TOTAL CA: CPT

## 2025-04-22 PROCEDURE — 94640 AIRWAY INHALATION TREATMENT: CPT

## 2025-04-22 PROCEDURE — 70498 CT ANGIOGRAPHY NECK: CPT

## 2025-04-22 PROCEDURE — 2500000003 HC RX 250 WO HCPCS: Performed by: INTERNAL MEDICINE

## 2025-04-22 PROCEDURE — 93005 ELECTROCARDIOGRAM TRACING: CPT | Performed by: STUDENT IN AN ORGANIZED HEALTH CARE EDUCATION/TRAINING PROGRAM

## 2025-04-22 PROCEDURE — 70450 CT HEAD/BRAIN W/O DYE: CPT

## 2025-04-22 PROCEDURE — 83880 ASSAY OF NATRIURETIC PEPTIDE: CPT

## 2025-04-22 PROCEDURE — 84484 ASSAY OF TROPONIN QUANT: CPT

## 2025-04-22 PROCEDURE — 71045 X-RAY EXAM CHEST 1 VIEW: CPT

## 2025-04-22 PROCEDURE — 6370000000 HC RX 637 (ALT 250 FOR IP): Performed by: INTERNAL MEDICINE

## 2025-04-22 PROCEDURE — 71275 CT ANGIOGRAPHY CHEST: CPT

## 2025-04-22 PROCEDURE — G0378 HOSPITAL OBSERVATION PER HR: HCPCS

## 2025-04-22 PROCEDURE — 85025 COMPLETE CBC W/AUTO DIFF WBC: CPT

## 2025-04-22 PROCEDURE — 99285 EMERGENCY DEPT VISIT HI MDM: CPT

## 2025-04-22 PROCEDURE — 93005 ELECTROCARDIOGRAM TRACING: CPT

## 2025-04-22 PROCEDURE — 94010 BREATHING CAPACITY TEST: CPT

## 2025-04-22 PROCEDURE — 80053 COMPREHEN METABOLIC PANEL: CPT

## 2025-04-22 PROCEDURE — 94761 N-INVAS EAR/PLS OXIMETRY MLT: CPT

## 2025-04-22 PROCEDURE — 36415 COLL VENOUS BLD VENIPUNCTURE: CPT

## 2025-04-22 PROCEDURE — 0042T CT BRAIN PERFUSION: CPT

## 2025-04-22 RX ORDER — INSULIN LISPRO 100 [IU]/ML
0-8 INJECTION, SOLUTION INTRAVENOUS; SUBCUTANEOUS
Status: DISCONTINUED | OUTPATIENT
Start: 2025-04-22 | End: 2025-04-25 | Stop reason: HOSPADM

## 2025-04-22 RX ORDER — ASPIRIN 325 MG
325 TABLET ORAL ONCE
Status: COMPLETED | OUTPATIENT
Start: 2025-04-22 | End: 2025-04-22

## 2025-04-22 RX ORDER — ASPIRIN 325 MG
325 TABLET, DELAYED RELEASE (ENTERIC COATED) ORAL ONCE
Status: DISCONTINUED | OUTPATIENT
Start: 2025-04-22 | End: 2025-04-22 | Stop reason: DRUGHIGH

## 2025-04-22 RX ORDER — LEVOTHYROXINE SODIUM 75 UG/1
75 TABLET ORAL
Status: DISCONTINUED | OUTPATIENT
Start: 2025-04-23 | End: 2025-04-25 | Stop reason: HOSPADM

## 2025-04-22 RX ORDER — GABAPENTIN 300 MG/1
1200 CAPSULE ORAL DAILY PRN
Status: DISCONTINUED | OUTPATIENT
Start: 2025-04-22 | End: 2025-04-25 | Stop reason: HOSPADM

## 2025-04-22 RX ORDER — IOPAMIDOL 755 MG/ML
100 INJECTION, SOLUTION INTRAVASCULAR
Status: COMPLETED | OUTPATIENT
Start: 2025-04-22 | End: 2025-04-22

## 2025-04-22 RX ORDER — ALBUTEROL SULFATE 90 UG/1
2 INHALANT RESPIRATORY (INHALATION) EVERY 6 HOURS PRN
Status: DISCONTINUED | OUTPATIENT
Start: 2025-04-22 | End: 2025-04-25 | Stop reason: HOSPADM

## 2025-04-22 RX ORDER — FAMOTIDINE 20 MG/1
20 TABLET, FILM COATED ORAL 2 TIMES DAILY
Status: DISCONTINUED | OUTPATIENT
Start: 2025-04-22 | End: 2025-04-24

## 2025-04-22 RX ORDER — NITROGLYCERIN 0.4 MG/1
0.4 TABLET SUBLINGUAL EVERY 5 MIN PRN
Status: DISCONTINUED | OUTPATIENT
Start: 2025-04-22 | End: 2025-04-25 | Stop reason: HOSPADM

## 2025-04-22 RX ORDER — SODIUM CHLORIDE 0.9 % (FLUSH) 0.9 %
5-40 SYRINGE (ML) INJECTION PRN
Status: DISCONTINUED | OUTPATIENT
Start: 2025-04-22 | End: 2025-04-25 | Stop reason: HOSPADM

## 2025-04-22 RX ORDER — ASPIRIN 81 MG/1
81 TABLET, CHEWABLE ORAL DAILY
Status: DISCONTINUED | OUTPATIENT
Start: 2025-04-23 | End: 2025-04-25 | Stop reason: HOSPADM

## 2025-04-22 RX ORDER — BUDESONIDE AND FORMOTEROL FUMARATE DIHYDRATE 160; 4.5 UG/1; UG/1
2 AEROSOL RESPIRATORY (INHALATION)
Status: DISCONTINUED | OUTPATIENT
Start: 2025-04-23 | End: 2025-04-25 | Stop reason: HOSPADM

## 2025-04-22 RX ORDER — BUMETANIDE 1 MG/1
2 TABLET ORAL DAILY
Status: DISCONTINUED | OUTPATIENT
Start: 2025-04-23 | End: 2025-04-25 | Stop reason: HOSPADM

## 2025-04-22 RX ORDER — GLUCAGON 1 MG/ML
1 KIT INJECTION PRN
Status: DISCONTINUED | OUTPATIENT
Start: 2025-04-22 | End: 2025-04-25 | Stop reason: HOSPADM

## 2025-04-22 RX ORDER — CITALOPRAM HYDROBROMIDE 20 MG/1
40 TABLET ORAL DAILY
Status: DISCONTINUED | OUTPATIENT
Start: 2025-04-23 | End: 2025-04-25 | Stop reason: HOSPADM

## 2025-04-22 RX ORDER — SODIUM CHLORIDE 9 MG/ML
INJECTION, SOLUTION INTRAVENOUS PRN
Status: DISCONTINUED | OUTPATIENT
Start: 2025-04-22 | End: 2025-04-25 | Stop reason: HOSPADM

## 2025-04-22 RX ORDER — ENOXAPARIN SODIUM 100 MG/ML
30 INJECTION SUBCUTANEOUS 2 TIMES DAILY
Status: DISCONTINUED | OUTPATIENT
Start: 2025-04-22 | End: 2025-04-25 | Stop reason: HOSPADM

## 2025-04-22 RX ORDER — CLOPIDOGREL BISULFATE 75 MG/1
75 TABLET ORAL DAILY
Status: DISCONTINUED | OUTPATIENT
Start: 2025-04-23 | End: 2025-04-25 | Stop reason: HOSPADM

## 2025-04-22 RX ORDER — IPRATROPIUM BROMIDE AND ALBUTEROL SULFATE 2.5; .5 MG/3ML; MG/3ML
1 SOLUTION RESPIRATORY (INHALATION)
Status: COMPLETED | OUTPATIENT
Start: 2025-04-22 | End: 2025-04-22

## 2025-04-22 RX ORDER — SODIUM CHLORIDE 0.9 % (FLUSH) 0.9 %
5-40 SYRINGE (ML) INJECTION EVERY 12 HOURS SCHEDULED
Status: DISCONTINUED | OUTPATIENT
Start: 2025-04-22 | End: 2025-04-25 | Stop reason: HOSPADM

## 2025-04-22 RX ORDER — DEXTROSE MONOHYDRATE 100 MG/ML
INJECTION, SOLUTION INTRAVENOUS CONTINUOUS PRN
Status: DISCONTINUED | OUTPATIENT
Start: 2025-04-22 | End: 2025-04-25 | Stop reason: HOSPADM

## 2025-04-22 RX ORDER — ONDANSETRON 4 MG/1
4 TABLET, ORALLY DISINTEGRATING ORAL EVERY 8 HOURS PRN
Status: DISCONTINUED | OUTPATIENT
Start: 2025-04-22 | End: 2025-04-25 | Stop reason: HOSPADM

## 2025-04-22 RX ORDER — ONDANSETRON 2 MG/ML
4 INJECTION INTRAMUSCULAR; INTRAVENOUS EVERY 6 HOURS PRN
Status: DISCONTINUED | OUTPATIENT
Start: 2025-04-22 | End: 2025-04-25 | Stop reason: HOSPADM

## 2025-04-22 RX ORDER — ATORVASTATIN CALCIUM 40 MG/1
80 TABLET, FILM COATED ORAL DAILY
Status: DISCONTINUED | OUTPATIENT
Start: 2025-04-23 | End: 2025-04-25 | Stop reason: HOSPADM

## 2025-04-22 RX ORDER — METOPROLOL SUCCINATE 50 MG/1
50 TABLET, EXTENDED RELEASE ORAL DAILY
Status: DISCONTINUED | OUTPATIENT
Start: 2025-04-23 | End: 2025-04-25 | Stop reason: HOSPADM

## 2025-04-22 RX ORDER — POLYETHYLENE GLYCOL 3350 17 G/17G
17 POWDER, FOR SOLUTION ORAL DAILY PRN
Status: DISCONTINUED | OUTPATIENT
Start: 2025-04-22 | End: 2025-04-25 | Stop reason: HOSPADM

## 2025-04-22 RX ADMIN — ENOXAPARIN SODIUM 30 MG: 100 INJECTION SUBCUTANEOUS at 21:43

## 2025-04-22 RX ADMIN — IPRATROPIUM BROMIDE AND ALBUTEROL SULFATE 1 DOSE: 2.5; .5 SOLUTION RESPIRATORY (INHALATION) at 14:19

## 2025-04-22 RX ADMIN — IOPAMIDOL 100 ML: 755 INJECTION, SOLUTION INTRAVENOUS at 16:25

## 2025-04-22 RX ADMIN — ASPIRIN 325 MG ORAL TABLET 325 MG: 325 PILL ORAL at 17:42

## 2025-04-22 RX ADMIN — SODIUM CHLORIDE, PRESERVATIVE FREE 10 ML: 5 INJECTION INTRAVENOUS at 21:44

## 2025-04-22 RX ADMIN — FAMOTIDINE 20 MG: 20 TABLET, FILM COATED ORAL at 21:43

## 2025-04-22 ASSESSMENT — PAIN SCALES - GENERAL
PAINLEVEL_OUTOF10: 9
PAINLEVEL_OUTOF10: 3

## 2025-04-22 ASSESSMENT — COPD QUESTIONNAIRES
QUESTION2_CHESTPHLEGM: 1
TOTAL_EXACERBATIONS_PASTYEAR: 1
QUESTION8_ENERGYLEVEL: 2
QUESTION6_LEAVINGHOUSE: 1
QUESTION4_WALKINCLINE: 4
CAT_TOTALSCORE: 18
QUESTION7_SLEEPQUALITY: 2
QUESTION3_CHESTTIGHTNESS: 4
QUESTION1_COUGHFREQUENCY: 2
QUESTION5_HOMEACTIVITIES: 2

## 2025-04-22 ASSESSMENT — PAIN - FUNCTIONAL ASSESSMENT
PAIN_FUNCTIONAL_ASSESSMENT: ACTIVITIES ARE NOT PREVENTED
PAIN_FUNCTIONAL_ASSESSMENT: 0-10

## 2025-04-22 ASSESSMENT — PAIN DESCRIPTION - LOCATION: LOCATION: CHEST;FACE

## 2025-04-22 NOTE — ED PROVIDER NOTES
[facility] EMERGENCY DEPT  EMERGENCY DEPARTMENT HISTORY AND PHYSICAL EXAM      Date of evaluation: 4/22/2025  Patient Name: Ye Joseph  Birthdate 1975  MRN: [id number]  ED Provider: Rosio Triana MD   Note Started: 1:54 PM EDT 4/22/25    HISTORY OF PRESENT ILLNESS     Chief Complaint   Patient presents with    Chest Pain    Shortness of Breath       History Provided By: Patient, only     HPI: Ye Joseph is a 49 y.o. male with PMH medication as below who comes to ED for evaluation of chest pain.  His current chest pain in the middle of his chest that is described as sharp and radiate to both arms.  He has history of CAD.  There is no current nausea or vomiting but reported shortness of breath.  He has underlying history of COPD as well.  There is lower extremity swelling which has not changed from before.  He is not having any fever, runny nose congestion or cough more than usual.      PAST MEDICAL HISTORY   Past Medical History:  Past Medical History:   Diagnosis Date    Acute deep vein thrombosis (DVT) (HCC)     behind left knee    Acute MI (HCC)     Arthritis     CHF (congestive heart failure) (HCC)     COPD (chronic obstructive pulmonary disease) (HCC)     Depression     Diabetes mellitus (HCC)     Heart attack (HCC)     2023    Hx of blood clots     legs    Hypertension     Ill-defined condition     Obesity     Stroke (HCC)        Past Surgical History:  Past Surgical History:   Procedure Laterality Date    HERNIA REPAIR      IR GUIDED FNA      ORTHOPEDIC SURGERY Right     rotator cuff surgery x2    OTHER SURGICAL HISTORY      Patent Foramen Ovale Repair    THYROIDECTOMY         Family History:  Family History   Problem Relation Age of Onset    Diabetes Maternal Uncle     Hypertension Maternal Uncle     Hypertension Paternal Uncle     Diabetes Paternal Uncle     Cancer Other     No Known Problems Father     Cancer Mother        Social History:  Social History     Tobacco Use    Smoking  injection 5-40 mL (10 mLs IntraVENous Given 4/23/25 0928)   sodium chloride flush 0.9 % injection 5-40 mL (has no administration in time range)   0.9 % sodium chloride infusion (has no administration in time range)   ondansetron (ZOFRAN-ODT) disintegrating tablet 4 mg (has no administration in time range)     Or   ondansetron (ZOFRAN) injection 4 mg (has no administration in time range)   polyethylene glycol (GLYCOLAX) packet 17 g (has no administration in time range)   enoxaparin Sodium (LOVENOX) injection 30 mg (30 mg SubCUTAneous Given 4/23/25 0925)   potassium chloride (KLOR-CON M) extended release tablet 40 mEq (40 mEq Oral Given 4/23/25 0507)     Or   potassium bicarb-citric acid (EFFER-K) effervescent tablet 40 mEq ( Oral See Alternative 4/23/25 0507)     Or   potassium chloride 10 mEq/100 mL IVPB (Peripheral Line) ( IntraVENous See Alternative 4/23/25 0507)   lidocaine 4 % external patch 1 patch (1 patch TransDERmal Patch Applied 4/23/25 1813)   ipratropium 0.5 mg-albuterol 2.5 mg (DUONEB) nebulizer solution 1 Dose (1 Dose Inhalation Given 4/22/25 1419)   iopamidol (ISOVUE-370) 76 % injection 100 mL (100 mLs IntraVENous Given 4/22/25 1625)   aspirin tablet 325 mg (325 mg Oral Given 4/22/25 1742)   morphine (PF) injection 2 mg (2 mg IntraVENous Given 4/23/25 1023)   sulfur hexafluoride microspheres (LUMASON) 60.7-25 MG injection 2 mL (2 mLs IntraVENous Given 4/23/25 1310)       CONSULTS: See ED Course/MDM for further details.  IP CONSULT TO TELE-NEUROLOGY  IP CONSULT TO NEUROLOGY  IP CONSULT TO CASE MANAGEMENT  IP CONSULT TO CARDIOLOGY   PROCEDURES   Unless otherwise noted above, none  Procedures    SEPSIS REASSESSMENT & CRITICAL CARE TIME   SEPSIS REASSESSMENT: Patient does NOT meet Sepsis criteria after ED workup    Patient does not meet Critical Care Time, 0 minutes  CLINICAL IMPRESSIONS     1. Cerebrovascular accident (CVA), unspecified mechanism (HCC)       SDOH/DISPOSITION/PLAN   Social Determinants

## 2025-04-22 NOTE — ED TRIAGE NOTES
Ems reports they were summoned for sob. Reports once at scene pt also reports CP and had taken Nitro SL prior to their arrival.

## 2025-04-22 NOTE — PROGRESS NOTES
MRI Brain without contrast ordered. MRI staff noted that patient has had prior MRIs ordered at our facility, yet due to patient body habitus and claustrophobia was not able to have MRI completed. Patient was again measured today to see if patient would fit in our standard bore. Bore measurement is 23.5\" circumference, patient measures 29\" at the elbows, therefore will not fit in our scanner for imaging.

## 2025-04-22 NOTE — ED NOTES
ED TO INPATIENT SBAR HANDOFF    Patient Name: Ye Joseph   Preferred Name: Ye  : 1975  49 y.o.   Family/Caregiver Present: no   Code Status Order: Prior  PO Status: NPO:No  Telemetry Order: Yes  C-SSRS: Risk of Suicide: No Risk  Sitter no   Restraints:     Sepsis Risk Score Sepsis V2 Risk Score: 4    Situation  Chief Complaint   Patient presents with    Chest Pain    Shortness of Breath     Brief Description of Patient's Condition: Patient came in with SOB and CP, Around 1530 patient tates that he also felt like he had facial numbness and droop that started at 12-1pm. Stroke coded.   Mental Status: oriented and alert  Arrived from:Home  Imaging:   CTA CHEST W WO CONTRAST PE Eval   Final Result   No acute process identified         Electronically signed by Amanda Shetty      CTA HEAD NECK W CONTRAST \"IF a thrombolytic or thrombectomy candidate within 24h\".   Final Result   CT Brain Perfusion demonstrates no blood flow or volume deficits to suggest   acute ischemia or tissue at ischemic risk.      CTA demonstrates no large vessel occlusion, significant flow-limiting stenosis.   Right cerebral developmental venous anomaly.      No suspicious mass/enhancing lesion. Stable bilateral tonsillar hypertrophy.            Electronically signed by LATRICIA MONTES      CT BRAIN PERFUSION \"IF a thrombolytic or thrombectomy candidate within 24h AND have CT Perfusion capability\".   Final Result   CT Brain Perfusion demonstrates no blood flow or volume deficits to suggest   acute ischemia or tissue at ischemic risk.      CTA demonstrates no large vessel occlusion, significant flow-limiting stenosis.   Right cerebral developmental venous anomaly.      No suspicious mass/enhancing lesion. Stable bilateral tonsillar hypertrophy.            Electronically signed by LATRICIA MONTES      CT HEAD WO CONTRAST   Final Result         No change or acute abnormality      Electronically signed by Rehan ALMENDAREZ

## 2025-04-22 NOTE — PROGRESS NOTES
Pulmonary Disease Navigator Note  [facility]    Current GOLD classification for eY Joseph    Patient's chart was reviewed by Pulmonary Disease Navigator for compliance with prescribed treatment with [employer] Initiative For Chronic Obstructive Lung Disease (GOLD).    Please, review beneath recommendations for pharmacological treatment for patient with obstructive lung disease.     Current Pharmacological Treatment:     Mr Joseph is presently in the ER and has received one Duoneb treatment at this time. Mr Joseph's home respiratory medications include albuterol nebulizer q6h PRN, and Symbicort -4.5 mcg/act BID.      Current eosinophil count: 0.02  Recorded domestic exacerbations past 12 months: 1          Combination  ICS-LABA Inhaler Acceptable   Therapy  Device For Use   Salmeterol/fluticasone Advair  Diskus    Vilanterol/fluticasone  Breo  Ellipta    Formoterol/mometasone  Dulera MDI Yes   Formoterol/budesonide  Symbicort MDI Yes   Triple Therapy Recommended (For Group E) [id number]     Fluticasone/umeclidinium/vilanterol  Trelegy  Ellipta    Budesonide/glycopyrrolate/formoterol fumarate  Breztri  MDI Yes   Recommended (For Group A & B) LAMA/LABA     Vilanterol/umeclidinium  Anoro  Ellipta    Olodaterol/tiotropium  Stiolto  Respimat Yes   Formoterol/glycopyrronium  Bevespi  MDI Yes   Aclidinium/formoterol Duaklir  Pressair      *Nebulizer Options    LAMA LABA ICS   Deni  Brovanchad Budesonide    Performist      The CAT provides a reliable measure of the impact of COPD on a patient's health status. Range of CAT scores from 0-40. Higher scores denote a more severe impact of COPD on a patient’s life. Scores <10 have a low impact, 10-20 medium, 21-30 high and >30 very high impact, requiring gradually more interventions.     Current recorded COPD Assessment Tool (CAT) score of  Cough Assessment: 2  Phlegm Assessment: 1  Chest tightness: 4  Walking on an

## 2025-04-23 ENCOUNTER — APPOINTMENT (OUTPATIENT)
Facility: HOSPITAL | Age: 50
End: 2025-04-23
Attending: INTERNAL MEDICINE
Payer: MEDICAID

## 2025-04-23 LAB
ANION GAP SERPL CALC-SCNC: 4 MMOL/L (ref 2–12)
BASOPHILS # BLD: 0.03 K/UL (ref 0–0.1)
BASOPHILS NFR BLD: 0.5 % (ref 0–1)
BUN SERPL-MCNC: 11 MG/DL (ref 6–20)
BUN/CREAT SERPL: 13 (ref 12–20)
CA-I BLD-MCNC: 8.8 MG/DL (ref 8.5–10.1)
CHLORIDE SERPL-SCNC: 106 MMOL/L (ref 97–108)
CHOLEST SERPL-MCNC: 131 MG/DL
CO2 SERPL-SCNC: 29 MMOL/L (ref 21–32)
CREAT SERPL-MCNC: 0.86 MG/DL (ref 0.7–1.3)
DIFFERENTIAL METHOD BLD: NORMAL
EKG ATRIAL RATE: 89 BPM
EKG ATRIAL RATE: 90 BPM
EKG DIAGNOSIS: NORMAL
EKG DIAGNOSIS: NORMAL
EKG P AXIS: 14 DEGREES
EKG P AXIS: 29 DEGREES
EKG P-R INTERVAL: 184 MS
EKG P-R INTERVAL: 192 MS
EKG Q-T INTERVAL: 392 MS
EKG Q-T INTERVAL: 408 MS
EKG QRS DURATION: 100 MS
EKG QRS DURATION: 100 MS
EKG QTC CALCULATION (BAZETT): 479 MS
EKG QTC CALCULATION (BAZETT): 496 MS
EKG R AXIS: -1 DEGREES
EKG R AXIS: 11 DEGREES
EKG T AXIS: 32 DEGREES
EKG T AXIS: 7 DEGREES
EKG VENTRICULAR RATE: 89 BPM
EKG VENTRICULAR RATE: 90 BPM
EOSINOPHIL # BLD: 0.17 K/UL (ref 0–0.4)
EOSINOPHIL NFR BLD: 2.8 % (ref 0–7)
ERYTHROCYTE [DISTWIDTH] IN BLOOD BY AUTOMATED COUNT: 13.4 % (ref 11.5–14.5)
ERYTHROCYTE [DISTWIDTH] IN BLOOD BY AUTOMATED COUNT: 13.5 % (ref 11.5–14.5)
EST. AVERAGE GLUCOSE BLD GHB EST-MCNC: 120 MG/DL
EST. AVERAGE GLUCOSE BLD GHB EST-MCNC: 128 MG/DL
GLUCOSE BLD STRIP.AUTO-MCNC: 104 MG/DL (ref 65–100)
GLUCOSE BLD STRIP.AUTO-MCNC: 114 MG/DL (ref 65–100)
GLUCOSE BLD STRIP.AUTO-MCNC: 133 MG/DL (ref 65–100)
GLUCOSE BLD STRIP.AUTO-MCNC: 151 MG/DL (ref 65–100)
GLUCOSE SERPL-MCNC: 100 MG/DL (ref 65–100)
HBA1C MFR BLD: 5.8 % (ref 4–5.6)
HBA1C MFR BLD: 6.1 % (ref 4–5.6)
HCT VFR BLD AUTO: 38 % (ref 36.6–50.3)
HCT VFR BLD AUTO: 40.2 % (ref 36.6–50.3)
HDLC SERPL-MCNC: 37 MG/DL
HDLC SERPL: 3.5 (ref 0–5)
HGB BLD-MCNC: 12.7 G/DL (ref 12.1–17)
HGB BLD-MCNC: 13.6 G/DL (ref 12.1–17)
IMM GRANULOCYTES # BLD AUTO: 0.03 K/UL (ref 0–0.04)
IMM GRANULOCYTES NFR BLD AUTO: 0.5 % (ref 0–0.5)
LDLC SERPL CALC-MCNC: 67.4 MG/DL (ref 0–100)
LIPID PANEL: NORMAL
LYMPHOCYTES # BLD: 1.45 K/UL (ref 0.8–3.5)
LYMPHOCYTES NFR BLD: 24.2 % (ref 12–49)
MCH RBC QN AUTO: 32.6 PG (ref 26–34)
MCH RBC QN AUTO: 33 PG (ref 26–34)
MCHC RBC AUTO-ENTMCNC: 33.4 G/DL (ref 30–36.5)
MCHC RBC AUTO-ENTMCNC: 33.8 G/DL (ref 30–36.5)
MCV RBC AUTO: 96.4 FL (ref 80–99)
MCV RBC AUTO: 98.7 FL (ref 80–99)
MONOCYTES # BLD: 0.38 K/UL (ref 0–1)
MONOCYTES NFR BLD: 6.4 % (ref 5–13)
NEUTS SEG # BLD: 3.92 K/UL (ref 1.8–8)
NEUTS SEG NFR BLD: 65.6 % (ref 32–75)
NRBC # BLD: 0 K/UL (ref 0–0.01)
NRBC # BLD: 0 K/UL (ref 0–0.01)
NRBC BLD-RTO: 0 PER 100 WBC
NRBC BLD-RTO: 0 PER 100 WBC
PERFORMED BY:: ABNORMAL
PLATELET # BLD AUTO: 206 K/UL (ref 150–400)
PLATELET # BLD AUTO: 213 K/UL (ref 150–400)
PMV BLD AUTO: 10 FL (ref 8.9–12.9)
PMV BLD AUTO: 10 FL (ref 8.9–12.9)
POTASSIUM SERPL-SCNC: 4 MMOL/L (ref 3.5–5.1)
RBC # BLD AUTO: 3.85 M/UL (ref 4.1–5.7)
RBC # BLD AUTO: 4.17 M/UL (ref 4.1–5.7)
SODIUM SERPL-SCNC: 139 MMOL/L (ref 136–145)
TRIGL SERPL-MCNC: 133 MG/DL
TROPONIN I SERPL HS-MCNC: 9 NG/L (ref 0–76)
VLDLC SERPL CALC-MCNC: 26.6 MG/DL
WBC # BLD AUTO: 5.6 K/UL (ref 4.1–11.1)
WBC # BLD AUTO: 6 K/UL (ref 4.1–11.1)

## 2025-04-23 PROCEDURE — C8929 TTE W OR WO FOL WCON,DOPPLER: HCPCS

## 2025-04-23 PROCEDURE — 85027 COMPLETE CBC AUTOMATED: CPT

## 2025-04-23 PROCEDURE — 97165 OT EVAL LOW COMPLEX 30 MIN: CPT

## 2025-04-23 PROCEDURE — 6370000000 HC RX 637 (ALT 250 FOR IP)

## 2025-04-23 PROCEDURE — 6370000000 HC RX 637 (ALT 250 FOR IP): Performed by: STUDENT IN AN ORGANIZED HEALTH CARE EDUCATION/TRAINING PROGRAM

## 2025-04-23 PROCEDURE — 82962 GLUCOSE BLOOD TEST: CPT

## 2025-04-23 PROCEDURE — 96372 THER/PROPH/DIAG INJ SC/IM: CPT

## 2025-04-23 PROCEDURE — 80061 LIPID PANEL: CPT

## 2025-04-23 PROCEDURE — 94640 AIRWAY INHALATION TREATMENT: CPT

## 2025-04-23 PROCEDURE — 94761 N-INVAS EAR/PLS OXIMETRY MLT: CPT

## 2025-04-23 PROCEDURE — 36415 COLL VENOUS BLD VENIPUNCTURE: CPT

## 2025-04-23 PROCEDURE — 6360000004 HC RX CONTRAST MEDICATION

## 2025-04-23 PROCEDURE — 85025 COMPLETE CBC W/AUTO DIFF WBC: CPT

## 2025-04-23 PROCEDURE — G0378 HOSPITAL OBSERVATION PER HR: HCPCS

## 2025-04-23 PROCEDURE — 92610 EVALUATE SWALLOWING FUNCTION: CPT

## 2025-04-23 PROCEDURE — 96374 THER/PROPH/DIAG INJ IV PUSH: CPT

## 2025-04-23 PROCEDURE — 97162 PT EVAL MOD COMPLEX 30 MIN: CPT

## 2025-04-23 PROCEDURE — 84484 ASSAY OF TROPONIN QUANT: CPT

## 2025-04-23 PROCEDURE — 6370000000 HC RX 637 (ALT 250 FOR IP): Performed by: EMERGENCY MEDICINE

## 2025-04-23 PROCEDURE — 6360000002 HC RX W HCPCS: Performed by: INTERNAL MEDICINE

## 2025-04-23 PROCEDURE — 99223 1ST HOSP IP/OBS HIGH 75: CPT | Performed by: PSYCHIATRY & NEUROLOGY

## 2025-04-23 PROCEDURE — 6360000002 HC RX W HCPCS: Performed by: STUDENT IN AN ORGANIZED HEALTH CARE EDUCATION/TRAINING PROGRAM

## 2025-04-23 PROCEDURE — 6370000000 HC RX 637 (ALT 250 FOR IP): Performed by: INTERNAL MEDICINE

## 2025-04-23 PROCEDURE — 83036 HEMOGLOBIN GLYCOSYLATED A1C: CPT

## 2025-04-23 PROCEDURE — 2500000003 HC RX 250 WO HCPCS: Performed by: INTERNAL MEDICINE

## 2025-04-23 PROCEDURE — 97530 THERAPEUTIC ACTIVITIES: CPT

## 2025-04-23 PROCEDURE — 80048 BASIC METABOLIC PNL TOTAL CA: CPT

## 2025-04-23 PROCEDURE — 93005 ELECTROCARDIOGRAM TRACING: CPT | Performed by: STUDENT IN AN ORGANIZED HEALTH CARE EDUCATION/TRAINING PROGRAM

## 2025-04-23 RX ORDER — POTASSIUM CHLORIDE 1500 MG/1
40 TABLET, EXTENDED RELEASE ORAL PRN
Status: DISCONTINUED | OUTPATIENT
Start: 2025-04-23 | End: 2025-04-25 | Stop reason: HOSPADM

## 2025-04-23 RX ORDER — MORPHINE SULFATE 2 MG/ML
2 INJECTION, SOLUTION INTRAMUSCULAR; INTRAVENOUS ONCE
Status: COMPLETED | OUTPATIENT
Start: 2025-04-23 | End: 2025-04-23

## 2025-04-23 RX ORDER — POTASSIUM CHLORIDE 7.45 MG/ML
10 INJECTION INTRAVENOUS PRN
Status: DISCONTINUED | OUTPATIENT
Start: 2025-04-23 | End: 2025-04-25 | Stop reason: HOSPADM

## 2025-04-23 RX ORDER — LIDOCAINE 4 G/G
1 PATCH TOPICAL DAILY
Status: DISCONTINUED | OUTPATIENT
Start: 2025-04-23 | End: 2025-04-25 | Stop reason: HOSPADM

## 2025-04-23 RX ADMIN — ASPIRIN 81 MG: 81 TABLET, CHEWABLE ORAL at 09:24

## 2025-04-23 RX ADMIN — ENOXAPARIN SODIUM 30 MG: 100 INJECTION SUBCUTANEOUS at 09:25

## 2025-04-23 RX ADMIN — Medication 0.4 MG: at 09:20

## 2025-04-23 RX ADMIN — FAMOTIDINE 20 MG: 20 TABLET, FILM COATED ORAL at 09:24

## 2025-04-23 RX ADMIN — LEVOTHYROXINE SODIUM 75 MCG: 0.07 TABLET ORAL at 05:07

## 2025-04-23 RX ADMIN — Medication 2 PUFF: at 11:53

## 2025-04-23 RX ADMIN — Medication 0.4 MG: at 09:44

## 2025-04-23 RX ADMIN — Medication 2 PUFF: at 08:40

## 2025-04-23 RX ADMIN — SULFUR HEXAFLUORIDE 2 ML: 60.7; .19; .19 INJECTION, POWDER, LYOPHILIZED, FOR SUSPENSION INTRAVENOUS; INTRAVESICAL at 13:10

## 2025-04-23 RX ADMIN — MORPHINE SULFATE 2 MG: 2 INJECTION, SOLUTION INTRAMUSCULAR; INTRAVENOUS at 10:23

## 2025-04-23 RX ADMIN — ENOXAPARIN SODIUM 30 MG: 100 INJECTION SUBCUTANEOUS at 21:16

## 2025-04-23 RX ADMIN — CITALOPRAM HYDROBROMIDE 40 MG: 20 TABLET ORAL at 09:24

## 2025-04-23 RX ADMIN — FAMOTIDINE 20 MG: 20 TABLET, FILM COATED ORAL at 21:16

## 2025-04-23 RX ADMIN — SODIUM CHLORIDE, PRESERVATIVE FREE 10 ML: 5 INJECTION INTRAVENOUS at 21:17

## 2025-04-23 RX ADMIN — METOPROLOL SUCCINATE 50 MG: 50 TABLET, EXTENDED RELEASE ORAL at 09:24

## 2025-04-23 RX ADMIN — EMPAGLIFLOZIN 10 MG: 10 TABLET, FILM COATED ORAL at 09:32

## 2025-04-23 RX ADMIN — BUMETANIDE 2 MG: 1 TABLET ORAL at 09:24

## 2025-04-23 RX ADMIN — CLOPIDOGREL BISULFATE 75 MG: 75 TABLET, FILM COATED ORAL at 09:24

## 2025-04-23 RX ADMIN — Medication 2 PUFF: at 22:52

## 2025-04-23 RX ADMIN — ATORVASTATIN CALCIUM 80 MG: 40 TABLET, FILM COATED ORAL at 09:24

## 2025-04-23 RX ADMIN — SODIUM CHLORIDE, PRESERVATIVE FREE 10 ML: 5 INJECTION INTRAVENOUS at 09:28

## 2025-04-23 RX ADMIN — POTASSIUM CHLORIDE 40 MEQ: 1500 TABLET, EXTENDED RELEASE ORAL at 05:07

## 2025-04-23 ASSESSMENT — PAIN SCALES - GENERAL
PAINLEVEL_OUTOF10: 10
PAINLEVEL_OUTOF10: 0
PAINLEVEL_OUTOF10: 10

## 2025-04-23 ASSESSMENT — PAIN DESCRIPTION - LOCATION
LOCATION: CHEST
LOCATION: FACE
LOCATION: CHEST

## 2025-04-23 ASSESSMENT — PAIN DESCRIPTION - DESCRIPTORS: DESCRIPTORS: SHARP

## 2025-04-23 NOTE — THERAPY EVALUATION
OCCUPATIONAL THERAPY EVALUATION  Patient: Ye Joseph (49 y.o. male)  Date: 4/23/2025  Primary Diagnosis: TIA (transient ischemic attack) [G45.9]       Precautions: Fall Risk                Recommendations for nursing mobility: Out of bed to chair for meals, Encourage HEP in prep for ADLs/mobility; see handout for details, Frequent repositioning to prevent skin breakdown, Use of bed/chair alarm for safety, Use of BSC for toileting , AD and gt belt for bed to chair , and Assist x1    In place during session:EKG/telemetry   ASSESSMENT  Pt is a 49 y.o. male presenting to [facility] with c/o chest pain and SOB, admitted 4/22/25 and currently being treated for chest pain, hx of CAD, hx heart failure, CVA work up, hx HTN, DM, morbid obesity, chronic debility. Head CT 4/22/25 no change or acute abnormality. Pt received semi-supine in bed upon arrival, AXO x 4, and agreeable to OT evaluation.     Based on current observations, pt presents with decreased  functional mobility, independence in ADLs, high-level IADLs, ROM, strength, body mechanics, activity tolerance, coordination, balance, posture, increased pain levels (see below for objective details and assist levels).     Overall, pt tolerates session fair with c/o 9/10 chest pain, RN aware. Bed mobility completed with SBA and OOB functional mobility with CGA-min A and RW. VC req'd for hand placement during transfers. Pt able to take 2-3 side steps up toward HOB. Further OOB activity deferred following side steps s/t chest pain. UB bathing and grooming completed with set up A while seated EOB. Pt demo'd decreased LUE strength/AROM and coordination, RUE WFL. Pt will benefit from continued skilled OT services to address current impairments and improve IND and safety with self cares and functional transfers/mobility. Current OT d/c recommendation High intensity and comprehensive skilled occupational therapy in a multidisciplinary setting as patient is working towards

## 2025-04-23 NOTE — CONSULTS
otherwise unremarkable. Visualized lung apices are clear. No acute fracture or aggressive osseous lesion.     CT Brain Perfusion demonstrates no blood flow or volume deficits to suggest acute ischemia or tissue at ischemic risk. CTA demonstrates no large vessel occlusion, significant flow-limiting stenosis. Right cerebral developmental venous anomaly. No suspicious mass/enhancing lesion. Stable bilateral tonsillar hypertrophy. Electronically signed by LATRICIA MONTES    CT HEAD WO CONTRAST  Result Date: 4/22/2025  EXAM: CT CODE NEURO HEAD WO CONTRAST INDICATION: Facial numbness, weakness COMPARISON: 3/31/2024. CONTRAST: None. TECHNIQUE: Unenhanced CT of the head was performed using 5 mm images. Brain and bone windows were generated. Coronal and sagittal reformats. CT dose reduction was achieved through use of a standardized protocol tailored for this examination and automatic exposure control for dose modulation.  FINDINGS: The ventricles and sulci are normal in size, shape and configuration. Mild low-density in the periventricular white matter. Encephalomalacia of the left temporoparietal lobe unchanged. There is no intracranial hemorrhage, extra-axial collection, or mass effect. The basilar cisterns are open. No CT evidence of acute infarct. The bone windows demonstrate no abnormalities. The visualized portions of the paranasal sinuses and mastoid air cells are clear.     No change or acute abnormality Electronically signed by Rehan Contreras    XR CHEST PORTABLE  Result Date: 4/22/2025  EXAM:  XR CHEST PORTABLE INDICATION: SOB COMPARISON: 2/26/2025 TECHNIQUE: portable chest AP view FINDINGS: There is unchanged cardiomegaly. Mild pulmonary vascular congestion is present. There is no new airspace disease or pleural effusion. The visualized bones and upper abdomen are age-appropriate.     Unchanged cardiomegaly. Mild pulmonary vascular congestion. Electronically signed by Jhony Gutierrez           Video Attestation:

## 2025-04-23 NOTE — H&P
History & Physical    Primary Care Provider: Joel Ramos MD  Source of Information: Patient/family     Chief complaint:   Chief Complaint   Patient presents with    Chest Pain    Shortness of Breath        History of Presenting Illness:   Patient is a 49 year old male with multiple medical issues including COPD, Morbid Obesity, Coronary Artery Disease, CHF, Diabetes Mellitus who presents to the E.R. with complaints of chest pain, center of his chest, sharp, radiating to his arms bilaterally, and tingling/numbness of the face.  Associated shortness of breath.  He was initially evaluated for the chest pain with chest x-ray which showed mild pulmonary edema, CTAof the Chest which was negative for pulmonary embolism and serial High-sensitivity troponin which was essentially negative.  The complaints of facial tingling/numbness did not surface until after the complaints of chest discomfort and that as well was initially negative.  Stroke workup was essentially negative as well with nil acute on CT Head, CTA Head/Neck and Perfusion scan.  MRI was ordered but due to the patient's obesity, he will not fit inside the scanner and also requests anesthesia.  Given his multiple comorbidities/risk factors, he is being referred for evaluation and care.    Review of System:    History of sleep apnea, not currently using CPAP and only able to tolerate nasal pillows.  He has a previous history of cerebral vascular disease/stroke with left sided hemiparesis, which has improved with residual weakness.  He states that he is able to ambulate without a walker or a cane.        Review of Systems:  A comprehensive review of systems was negative except for that written in the History of Present Illness.     Past Medical History:   Diagnosis Date    Acute deep vein thrombosis (DVT) ([facility])     behind left knee    Acute MI ([facility])     Arthritis     CHF (congestive heart failure) ([facility])     COPD (chronic obstructive pulmonary disease) ([facility])      thrombolytic or thrombectomy candidate within 24h\".   Final Result   CT Brain Perfusion demonstrates no blood flow or volume deficits to suggest   acute ischemia or tissue at ischemic risk.      CTA demonstrates no large vessel occlusion, significant flow-limiting stenosis.   Right cerebral developmental venous anomaly.      No suspicious mass/enhancing lesion. Stable bilateral tonsillar hypertrophy.            Electronically signed by [employer]      CT BRAIN PERFUSION \"IF a thrombolytic or thrombectomy candidate within 24h AND have CT Perfusion capability\".   Final Result   CT Brain Perfusion demonstrates no blood flow or volume deficits to suggest   acute ischemia or tissue at ischemic risk.      CTA demonstrates no large vessel occlusion, significant flow-limiting stenosis.   Right cerebral developmental venous anomaly.      No suspicious mass/enhancing lesion. Stable bilateral tonsillar hypertrophy.            Electronically signed by [employer]      CT HEAD WO CONTRAST   Final Result         No change or acute abnormality      Electronically signed by Rehan Contreras      XR CHEST PORTABLE   Final Result      Unchanged cardiomegaly. Mild pulmonary vascular congestion.         Electronically signed by Jhony Gutierrez      MRI BRAIN WO CONTRAST    (Results Pending)      Discussion/Medical Decision Making: Patient with numerous medical comorbidities, each with increased risk for mortality and morbidity if left untreated.   I have reviewed patient's presenting subjective and objective findings, as well as all laboratory studies, imaging studies, and vital signs to date as well as treatment rendered  and patient's response to those treatments.  In addition, prior medical, surgical and relevant social and family histories were reviewed.    I have discussed patient's presentation/findings and clinical course to date with ED provider. Given the patient's current clinical presentation, I have a high level of

## 2025-04-23 NOTE — PROGRESS NOTES
4 Eyes Skin Assessment     NAME:  Ye Joseph  YOB: 1975  MEDICAL RECORD NUMBER:  [id number]    The patient is being assessed for  Admission    I agree that at least one RN has performed a thorough Head to Toe Skin Assessment on the patient. ALL assessment sites listed below have been assessed.      Areas assessed by both nurses:    Head, Face, Ears, Shoulders, Back, Chest, Arms, Elbows, Hands, Sacrum. Buttock, Coccyx, Ischium, Legs. Feet and Heels, and Under Medical Devices         Does the Patient have a Wound? No noted wound(s)       Yoan Prevention initiated by RN: No  Wound Care Orders initiated by RN: No    Pressure Injury (Stage 3,4, Unstageable, DTI, NWPT, and Complex wounds) if present, place Wound referral order by RN under : No    New Ostomies, if present place, Ostomy referral order under : No     Nurse 1 eSignature: Electronically signed by Anastasiia Hoyt RN on 4/23/25 at 2:10 AM EDT    **SHARE this note so that the co-signing nurse can place an eSignature**    Nurse 2 eSignature: Electronically signed by Sandra Parrish RN on 4/23/25 at 2:12 AM EDT

## 2025-04-23 NOTE — PROGRESS NOTES
Hospitalist Progress Note               Daily Progress Note: 4/23/2025      Hospital Day: 2     Chief complaint:   Chief Complaint   Patient presents with    Chest Pain    Shortness of Breath        Subjective:     Patient is seen today for follow-up. Patient seen and examined at bedside. This am, complaining of CP, sharp, 9/10 intensity, L sided, non-radiating.   Troponin x 4   EKG this am NSR         Medications reviewed  Current Facility-Administered Medications   Medication Dose Route Frequency    potassium chloride (KLOR-CON M) extended release tablet 40 mEq  40 mEq Oral PRN    Or    potassium bicarb-citric acid (EFFER-K) effervescent tablet 40 mEq  40 mEq Oral PRN    Or    potassium chloride 10 mEq/100 mL IVPB (Peripheral Line)  10 mEq IntraVENous PRN    lidocaine 4 % external patch 1 patch  1 patch TransDERmal Daily    nitroGLYCERIN (NITROSTAT) SL tablet 0.4 mg  0.4 mg SubLINGual Q5 Min PRN    albuterol sulfate HFA (PROVENTIL;VENTOLIN;PROAIR) 108 (90 Base) MCG/ACT inhaler 2 puff  2 puff Inhalation Q6H PRN    atorvastatin (LIPITOR) tablet 80 mg  80 mg Oral Daily    budesonide-formoterol (SYMBICORT) 160-4.5 MCG/ACT inhaler 2 puff  2 puff Inhalation BID RT    bumetanide (BUMEX) tablet 2 mg  2 mg Oral Daily    citalopram (CELEXA) tablet 40 mg  40 mg Oral Daily    clopidogrel (PLAVIX) tablet 75 mg  75 mg Oral Daily    empagliflozin (JARDIANCE) tablet 10 mg  10 mg Oral Daily    famotidine (PEPCID) tablet 20 mg  20 mg Oral BID    gabapentin (NEURONTIN) capsule 1,200 mg  1,200 mg Oral Daily PRN    levothyroxine (SYNTHROID) tablet 75 mcg  75 mcg Oral QAM AC    metoprolol succinate (TOPROL XL) extended release tablet 50 mg  50 mg Oral Daily    aspirin chewable tablet 81 mg  81 mg Oral Daily    glucose chewable tablet 16 g  4 tablet Oral PRN    dextrose bolus 10% 125 mL  125 mL IntraVENous PRN    Or    dextrose bolus 10% 250 mL  250 mL IntraVENous PRN    glucagon injection 1 mg  1 mg SubCUTAneous PRN    dextrose 10

## 2025-04-24 LAB
ANION GAP SERPL CALC-SCNC: 5 MMOL/L (ref 2–12)
BASOPHILS # BLD: 0.03 K/UL (ref 0–0.1)
BASOPHILS NFR BLD: 0.5 % (ref 0–1)
BUN SERPL-MCNC: 11 MG/DL (ref 6–20)
BUN/CREAT SERPL: 11 (ref 12–20)
CA-I BLD-MCNC: 9 MG/DL (ref 8.5–10.1)
CHLORIDE SERPL-SCNC: 103 MMOL/L (ref 97–108)
CO2 SERPL-SCNC: 28 MMOL/L (ref 21–32)
CREAT SERPL-MCNC: 1 MG/DL (ref 0.7–1.3)
DIFFERENTIAL METHOD BLD: ABNORMAL
EOSINOPHIL # BLD: 0.14 K/UL (ref 0–0.4)
EOSINOPHIL NFR BLD: 2.1 % (ref 0–7)
ERYTHROCYTE [DISTWIDTH] IN BLOOD BY AUTOMATED COUNT: 13.1 % (ref 11.5–14.5)
GLUCOSE BLD STRIP.AUTO-MCNC: 101 MG/DL (ref 65–100)
GLUCOSE BLD STRIP.AUTO-MCNC: 114 MG/DL (ref 65–100)
GLUCOSE BLD STRIP.AUTO-MCNC: 118 MG/DL (ref 65–100)
GLUCOSE BLD STRIP.AUTO-MCNC: 138 MG/DL (ref 65–100)
GLUCOSE SERPL-MCNC: 114 MG/DL (ref 65–100)
HCT VFR BLD AUTO: 40.3 % (ref 36.6–50.3)
HGB BLD-MCNC: 13.6 G/DL (ref 12.1–17)
IMM GRANULOCYTES # BLD AUTO: 0.02 K/UL (ref 0–0.04)
IMM GRANULOCYTES NFR BLD AUTO: 0.3 % (ref 0–0.5)
LYMPHOCYTES # BLD: 1.07 K/UL (ref 0.8–3.5)
LYMPHOCYTES NFR BLD: 16.2 % (ref 12–49)
MCH RBC QN AUTO: 32.5 PG (ref 26–34)
MCHC RBC AUTO-ENTMCNC: 33.7 G/DL (ref 30–36.5)
MCV RBC AUTO: 96.4 FL (ref 80–99)
MONOCYTES # BLD: 0.38 K/UL (ref 0–1)
MONOCYTES NFR BLD: 5.7 % (ref 5–13)
NEUTS SEG # BLD: 4.97 K/UL (ref 1.8–8)
NEUTS SEG NFR BLD: 75.2 % (ref 32–75)
NRBC # BLD: 0 K/UL (ref 0–0.01)
NRBC BLD-RTO: 0 PER 100 WBC
PERFORMED BY:: ABNORMAL
PLATELET # BLD AUTO: 197 K/UL (ref 150–400)
PMV BLD AUTO: 9.8 FL (ref 8.9–12.9)
POTASSIUM SERPL-SCNC: 3.9 MMOL/L (ref 3.5–5.1)
RBC # BLD AUTO: 4.18 M/UL (ref 4.1–5.7)
SODIUM SERPL-SCNC: 136 MMOL/L (ref 136–145)
WBC # BLD AUTO: 6.6 K/UL (ref 4.1–11.1)

## 2025-04-24 PROCEDURE — 96372 THER/PROPH/DIAG INJ SC/IM: CPT

## 2025-04-24 PROCEDURE — 6370000000 HC RX 637 (ALT 250 FOR IP): Performed by: INTERNAL MEDICINE

## 2025-04-24 PROCEDURE — 97535 SELF CARE MNGMENT TRAINING: CPT

## 2025-04-24 PROCEDURE — 2500000003 HC RX 250 WO HCPCS: Performed by: STUDENT IN AN ORGANIZED HEALTH CARE EDUCATION/TRAINING PROGRAM

## 2025-04-24 PROCEDURE — 94640 AIRWAY INHALATION TREATMENT: CPT

## 2025-04-24 PROCEDURE — G0378 HOSPITAL OBSERVATION PER HR: HCPCS

## 2025-04-24 PROCEDURE — 97530 THERAPEUTIC ACTIVITIES: CPT

## 2025-04-24 PROCEDURE — 96375 TX/PRO/DX INJ NEW DRUG ADDON: CPT

## 2025-04-24 PROCEDURE — 85025 COMPLETE CBC W/AUTO DIFF WBC: CPT

## 2025-04-24 PROCEDURE — 6360000002 HC RX W HCPCS: Performed by: INTERNAL MEDICINE

## 2025-04-24 PROCEDURE — 2500000003 HC RX 250 WO HCPCS: Performed by: INTERNAL MEDICINE

## 2025-04-24 PROCEDURE — 36415 COLL VENOUS BLD VENIPUNCTURE: CPT

## 2025-04-24 PROCEDURE — 6370000000 HC RX 637 (ALT 250 FOR IP): Performed by: STUDENT IN AN ORGANIZED HEALTH CARE EDUCATION/TRAINING PROGRAM

## 2025-04-24 PROCEDURE — 82962 GLUCOSE BLOOD TEST: CPT

## 2025-04-24 PROCEDURE — 6360000002 HC RX W HCPCS: Performed by: STUDENT IN AN ORGANIZED HEALTH CARE EDUCATION/TRAINING PROGRAM

## 2025-04-24 PROCEDURE — 94761 N-INVAS EAR/PLS OXIMETRY MLT: CPT

## 2025-04-24 PROCEDURE — 80048 BASIC METABOLIC PNL TOTAL CA: CPT

## 2025-04-24 RX ORDER — TRAMADOL HYDROCHLORIDE 50 MG/1
50 TABLET ORAL EVERY 12 HOURS PRN
Status: DISCONTINUED | OUTPATIENT
Start: 2025-04-24 | End: 2025-04-25 | Stop reason: HOSPADM

## 2025-04-24 RX ORDER — PANTOPRAZOLE SODIUM 40 MG/10ML
40 INJECTION, POWDER, LYOPHILIZED, FOR SOLUTION INTRAVENOUS DAILY
Status: DISCONTINUED | OUTPATIENT
Start: 2025-04-24 | End: 2025-04-24

## 2025-04-24 RX ORDER — MAGNESIUM HYDROXIDE/ALUMINUM HYDROXICE/SIMETHICONE 120; 1200; 1200 MG/30ML; MG/30ML; MG/30ML
30 SUSPENSION ORAL EVERY 6 HOURS PRN
Status: DISCONTINUED | OUTPATIENT
Start: 2025-04-24 | End: 2025-04-25 | Stop reason: HOSPADM

## 2025-04-24 RX ADMIN — BUMETANIDE 2 MG: 1 TABLET ORAL at 09:56

## 2025-04-24 RX ADMIN — ALUMINUM HYDROXIDE, MAGNESIUM HYDROXIDE, AND SIMETHICONE 30 ML: 200; 200; 20 SUSPENSION ORAL at 11:03

## 2025-04-24 RX ADMIN — FAMOTIDINE 20 MG: 20 TABLET, FILM COATED ORAL at 09:56

## 2025-04-24 RX ADMIN — ASPIRIN 81 MG: 81 TABLET, CHEWABLE ORAL at 09:56

## 2025-04-24 RX ADMIN — ATORVASTATIN CALCIUM 80 MG: 40 TABLET, FILM COATED ORAL at 09:56

## 2025-04-24 RX ADMIN — CITALOPRAM HYDROBROMIDE 40 MG: 20 TABLET ORAL at 09:56

## 2025-04-24 RX ADMIN — METOPROLOL SUCCINATE 50 MG: 50 TABLET, EXTENDED RELEASE ORAL at 09:56

## 2025-04-24 RX ADMIN — SODIUM CHLORIDE, PRESERVATIVE FREE 10 ML: 5 INJECTION INTRAVENOUS at 21:28

## 2025-04-24 RX ADMIN — Medication 2 PUFF: at 09:42

## 2025-04-24 RX ADMIN — SODIUM CHLORIDE, PRESERVATIVE FREE 40 MG: 5 INJECTION INTRAVENOUS at 11:02

## 2025-04-24 RX ADMIN — EMPAGLIFLOZIN 10 MG: 10 TABLET, FILM COATED ORAL at 09:56

## 2025-04-24 RX ADMIN — CLOPIDOGREL BISULFATE 75 MG: 75 TABLET, FILM COATED ORAL at 09:56

## 2025-04-24 RX ADMIN — ENOXAPARIN SODIUM 30 MG: 100 INJECTION SUBCUTANEOUS at 09:55

## 2025-04-24 RX ADMIN — TRAMADOL HYDROCHLORIDE 50 MG: 50 TABLET, COATED ORAL at 13:30

## 2025-04-24 RX ADMIN — ENOXAPARIN SODIUM 30 MG: 100 INJECTION SUBCUTANEOUS at 21:27

## 2025-04-24 RX ADMIN — LEVOTHYROXINE SODIUM 75 MCG: 0.07 TABLET ORAL at 06:40

## 2025-04-24 ASSESSMENT — PAIN DESCRIPTION - LOCATION
LOCATION: CHEST;LEG
LOCATION: CHEST;LEG

## 2025-04-24 ASSESSMENT — PAIN SCALES - GENERAL
PAINLEVEL_OUTOF10: 9
PAINLEVEL_OUTOF10: 0
PAINLEVEL_OUTOF10: 9
PAINLEVEL_OUTOF10: 0

## 2025-04-24 NOTE — CARE COORDINATION
CM reviewed chart. CM met with patient at bedside concerning therapy rec, choice given for [facility], referrals sent. CM will continue to follow.

## 2025-04-24 NOTE — PROGRESS NOTES
Hospitalist Progress Note               Daily Progress Note: 4/24/2025      Hospital Day: 3     Chief complaint:   Chief Complaint   Patient presents with    Chest Pain    Shortness of Breath        Subjective:     Patient is seen today for follow-up. Patient seen and examined at bedside. This am, he is again complaining of CP, sharp, 10/10 intensity, L sided, non-radiating.   Troponin x 4   EKG this am NSR   All the findings similar to presentation and yesterday. Yesterday had given the patient 2 mg of IV morphine that he reported helped with his chest pain. He started again complaining yesterday evening regarding his chest pain with all the cardiac workup negative, I offered to give him lidocaine patch patient reported that lidocaine does not work.          Medications reviewed  Current Facility-Administered Medications   Medication Dose Route Frequency    aluminum & magnesium hydroxide-simethicone (MAALOX PLUS) 200-200-20 MG/5ML suspension 30 mL  30 mL Oral Q6H PRN    pantoprazole (PROTONIX) 40 mg in sodium chloride (PF) 0.9 % 10 mL injection  40 mg IntraVENous Daily    traMADol (ULTRAM) tablet 50 mg  50 mg Oral Q12H PRN    potassium chloride (KLOR-CON M) extended release tablet 40 mEq  40 mEq Oral PRN    Or    potassium bicarb-citric acid (EFFER-K) effervescent tablet 40 mEq  40 mEq Oral PRN    Or    potassium chloride 10 mEq/100 mL IVPB (Peripheral Line)  10 mEq IntraVENous PRN    lidocaine 4 % external patch 1 patch  1 patch TransDERmal Daily    nitroGLYCERIN (NITROSTAT) SL tablet 0.4 mg  0.4 mg SubLINGual Q5 Min PRN    albuterol sulfate HFA (PROVENTIL;VENTOLIN;PROAIR) 108 (90 Base) MCG/ACT inhaler 2 puff  2 puff Inhalation Q6H PRN    atorvastatin (LIPITOR) tablet 80 mg  80 mg Oral Daily    budesonide-formoterol (SYMBICORT) 160-4.5 MCG/ACT inhaler 2 puff  2 puff Inhalation BID RT    bumetanide (BUMEX) tablet 2 mg  2 mg Oral Daily    citalopram (CELEXA) tablet 40 mg  40 mg Oral Daily    clopidogrel (PLAVIX)

## 2025-04-24 NOTE — PROGRESS NOTES
OCCUPATIONAL THERAPY TREATMENT  Patient: Ye Joseph (49 y.o. male)  Date: 4/24/2025  Primary Diagnosis: TIA (transient ischemic attack) [G45.9]       Precautions: Fall Risk                Recommendations for nursing mobility: Out of bed to chair for meals, Encourage HEP in prep for ADLs/mobility; see handout for details, Use of bed/chair alarm for safety, AD and gt belt for bed to chair , Amb to bathroom with AD and gait belt, and Assist x1    In place during session: EKG/telemetry   Chart, occupational therapy assessment, plan of care, and goals were reviewed.  ASSESSMENT  Patient continues with skilled OT services and is progressing towards goals. Pt presented side lying upon CONNER arrival, agreeable to session. Pt A&O x 4. Pt was SBA for bed mobility.  Pt sat EOB and completed UB dressing with set up. Pt donned briefs with CGA while standing to pull to waist.  Pt ambulated into the bathroom and completed commode transfer and toileting with SBA.  Pt returned to bed and was left with all needs met.  (See below for objective details and assist levels).     Overall pt tolerated session fair today with UB & LB dressing and toileting.  Current OT recommendations for discharge High intensity and comprehensive skilled occupational therapy in a multidisciplinary setting as patient is working towards tolerating up to 3 hours of therapy/day x 5-7 days/week. Will continue to benefit from skilled OT services, and will continue to progress as tolerated.      Start of Session End of Session   SPO2 (%) 93 95   Heart Rate (BPM) 89 108     GOALS:    Problem: Occupational Therapy - Adult  Goal: By Discharge: Performs self-care activities at highest level of function for planned discharge setting.  See evaluation for individualized goals.  Description: FUNCTIONAL STATUS PRIOR TO ADMISSION:  Pt was independent for ADLs and functional mobility.    HOME SUPPORT: The patient lived alone.    Occupational Therapy  Yes  Rolling: Stand by assistance  Supine to Sit: Stand by assistance  Sit to Supine: Stand by assistance  Scooting: Stand by assistance    Transfers:  Transfer Training  Transfer Training: Yes  Interventions: Visual cues;Verbal cues  Sit to Stand: Contact guard assistance  Stand to Sit: Contact guard assistance  Stand Pivot Transfers: Contact guard assistance;Minimal assistance  Toilet Transfer: Contact guard assistance      Balance:  Balance  Sitting: Intact  Standing: Impaired  Standing - Static: Fair      ADL Intervention:       UE Dressing: Setup  UE Dressing Skilled Clinical Factors: donning hospital gown around back    LE Dressing: Contact guard assistance  LE Dressing Skilled Clinical Factors: donning briefs while seated EOB; CGA when standing to pull to waist    Toileting: Stand by assistance  Toileting Skilled Clinical Factors: continent void while seated on commode        Pain Ratin/10 chest and B LEs  Pain Intervention(s):   patient medicated for pain prior to session    Activity Tolerance:   Fair     After treatment patient left in no apparent distress:   Bed locked and returned to lowest position, Patient left in no apparent distress in bed, Call bell within reach, Bed/ chair alarm activated, Side rails x3, and Updated patient's board on functional status and mobility recommendations, and nsg updated     COMMUNICATION/EDUCATION:   The patient’s plan of care was discussed with: Physical therapy assistant and Registered nurse    Patient Education  Education Given To: Patient  Education Provided: Plan of Care;Transfer Training;Fall Prevention Strategies  Education Method: Verbal;Demonstration  Barriers to Learning: Readiness to Learn  Education Outcome: Continued education needed    Thank you for this referral.  YAZAN Addison  Minutes: 20

## 2025-04-25 VITALS
WEIGHT: 315 LBS | TEMPERATURE: 98.4 F | DIASTOLIC BLOOD PRESSURE: 78 MMHG | OXYGEN SATURATION: 94 % | SYSTOLIC BLOOD PRESSURE: 122 MMHG | BODY MASS INDEX: 38.36 KG/M2 | RESPIRATION RATE: 16 BRPM | HEART RATE: 95 BPM | HEIGHT: 76 IN

## 2025-04-25 LAB
ANION GAP SERPL CALC-SCNC: 5 MMOL/L (ref 2–12)
BASOPHILS # BLD: 0.02 K/UL (ref 0–0.1)
BASOPHILS NFR BLD: 0.3 % (ref 0–1)
BUN SERPL-MCNC: 12 MG/DL (ref 6–20)
BUN/CREAT SERPL: 11 (ref 12–20)
CA-I BLD-MCNC: 9.1 MG/DL (ref 8.5–10.1)
CHLORIDE SERPL-SCNC: 101 MMOL/L (ref 97–108)
CO2 SERPL-SCNC: 28 MMOL/L (ref 21–32)
CREAT SERPL-MCNC: 1.09 MG/DL (ref 0.7–1.3)
DIFFERENTIAL METHOD BLD: NORMAL
ECHO AO ASC DIAM: 3.5 CM
ECHO AO ASCENDING AORTA INDEX: 1.29 CM/M2
ECHO AO ROOT DIAM: 3.5 CM
ECHO AO ROOT INDEX: 1.29 CM/M2
ECHO AV AREA PEAK VELOCITY: 4.6 CM2
ECHO AV AREA VTI: 4.4 CM2
ECHO AV AREA/BSA PEAK VELOCITY: 1.7 CM2/M2
ECHO AV AREA/BSA VTI: 1.6 CM2/M2
ECHO AV MEAN GRADIENT: 3 MMHG
ECHO AV MEAN VELOCITY: 0.7 M/S
ECHO AV PEAK GRADIENT: 5 MMHG
ECHO AV PEAK VELOCITY: 1.1 M/S
ECHO AV VELOCITY RATIO: 1
ECHO AV VTI: 19 CM
ECHO BSA: 2.79 M2
ECHO EST RA PRESSURE: 8 MMHG
ECHO LA AREA 2C: 12 CM2
ECHO LA AREA 4C: 15.6 CM2
ECHO LA DIAMETER INDEX: 1.77 CM/M2
ECHO LA DIAMETER: 4.8 CM
ECHO LA MAJOR AXIS: 5 CM
ECHO LA MINOR AXIS: 4.8 CM
ECHO LA TO AORTIC ROOT RATIO: 1.37
ECHO LA VOL BP: 30 ML (ref 18–58)
ECHO LA VOL MOD A2C: 24 ML (ref 18–58)
ECHO LA VOL MOD A4C: 38 ML (ref 18–58)
ECHO LA VOL/BSA BIPLANE: 11 ML/M2 (ref 16–34)
ECHO LA VOLUME INDEX MOD A2C: 9 ML/M2 (ref 16–34)
ECHO LA VOLUME INDEX MOD A4C: 14 ML/M2 (ref 16–34)
ECHO LV E' LATERAL VELOCITY: 7.8 CM/S
ECHO LV E' SEPTAL VELOCITY: 7.94 CM/S
ECHO LV EDV A2C: 27 ML
ECHO LV EDV A4C: 109 ML
ECHO LV EDV INDEX A4C: 40 ML/M2
ECHO LV EDV NDEX A2C: 10 ML/M2
ECHO LV EF PHYSICIAN: 60 %
ECHO LV EJECTION FRACTION A2C: 28 %
ECHO LV EJECTION FRACTION A4C: 70 %
ECHO LV EJECTION FRACTION BIPLANE: 50 % (ref 55–100)
ECHO LV ESV A2C: 19 ML
ECHO LV ESV A4C: 33 ML
ECHO LV ESV INDEX A2C: 7 ML/M2
ECHO LV ESV INDEX A4C: 12 ML/M2
ECHO LV FRACTIONAL SHORTENING: 25 % (ref 28–44)
ECHO LV INTERNAL DIMENSION DIASTOLE INDEX: 1.62 CM/M2
ECHO LV INTERNAL DIMENSION DIASTOLIC: 4.4 CM (ref 4.2–5.9)
ECHO LV INTERNAL DIMENSION SYSTOLIC INDEX: 1.22 CM/M2
ECHO LV INTERNAL DIMENSION SYSTOLIC: 3.3 CM
ECHO LV IVSD: 1.4 CM (ref 0.6–1)
ECHO LV MASS 2D: 227.5 G (ref 88–224)
ECHO LV MASS INDEX 2D: 84 G/M2 (ref 49–115)
ECHO LV POSTERIOR WALL DIASTOLIC: 1.3 CM (ref 0.6–1)
ECHO LV RELATIVE WALL THICKNESS RATIO: 0.59
ECHO LVOT AREA: 4.5 CM2
ECHO LVOT AV VTI INDEX: 0.97
ECHO LVOT DIAM: 2.4 CM
ECHO LVOT MEAN GRADIENT: 3 MMHG
ECHO LVOT PEAK GRADIENT: 5 MMHG
ECHO LVOT PEAK VELOCITY: 1.1 M/S
ECHO LVOT STROKE VOLUME INDEX: 30.7 ML/M2
ECHO LVOT SV: 83.2 ML
ECHO LVOT VTI: 18.4 CM
ECHO MV A VELOCITY: 0.85 M/S
ECHO MV E DECELERATION TIME (DT): 143 MS
ECHO MV E VELOCITY: 0.58 M/S
ECHO MV E/A RATIO: 0.68
ECHO MV E/E' LATERAL: 7.44
ECHO MV E/E' RATIO (AVERAGED): 7.37
ECHO MV E/E' SEPTAL: 7.3
ECHO MV REGURGITANT PEAK GRADIENT: 3 MMHG
ECHO MV REGURGITANT PEAK VELOCITY: 0.8 M/S
ECHO MV REGURGITANT VTIA: 19.7 CM
ECHO PV MAX VELOCITY: 1.2 M/S
ECHO PV MEAN GRADIENT: 3 MMHG
ECHO PV MEAN VELOCITY: 0.7 M/S
ECHO PV PEAK GRADIENT: 6 MMHG
ECHO PV VTI: 17.2 CM
ECHO RA AREA 4C: 10.4 CM2
ECHO RA END SYSTOLIC VOLUME APICAL 4 CHAMBER INDEX BSA: 7 ML/M2
ECHO RA VOLUME: 19 ML
ECHO RIGHT VENTRICULAR SYSTOLIC PRESSURE (RVSP): 13 MMHG
ECHO RV BASAL DIMENSION: 3.8 CM
ECHO RV FREE WALL PEAK S': 13.8 CM/S
ECHO RV MID DIMENSION: 2.5 CM
ECHO RV TAPSE: 3.6 CM (ref 1.7–?)
ECHO TV REGURGITANT MAX VELOCITY: 1.07 M/S
ECHO TV REGURGITANT PEAK GRADIENT: 5 MMHG
EKG ATRIAL RATE: 93 BPM
EKG DIAGNOSIS: NORMAL
EKG P AXIS: 12 DEGREES
EKG P-R INTERVAL: 180 MS
EKG Q-T INTERVAL: 396 MS
EKG QRS DURATION: 106 MS
EKG QTC CALCULATION (BAZETT): 492 MS
EKG R AXIS: 5 DEGREES
EKG T AXIS: 14 DEGREES
EKG VENTRICULAR RATE: 93 BPM
EOSINOPHIL # BLD: 0.16 K/UL (ref 0–0.4)
EOSINOPHIL NFR BLD: 2.2 % (ref 0–7)
ERYTHROCYTE [DISTWIDTH] IN BLOOD BY AUTOMATED COUNT: 12.9 % (ref 11.5–14.5)
GLUCOSE BLD STRIP.AUTO-MCNC: 124 MG/DL (ref 65–100)
GLUCOSE BLD STRIP.AUTO-MCNC: 154 MG/DL (ref 65–100)
GLUCOSE BLD STRIP.AUTO-MCNC: 90 MG/DL (ref 65–100)
GLUCOSE SERPL-MCNC: 119 MG/DL (ref 65–100)
HCT VFR BLD AUTO: 42.9 % (ref 36.6–50.3)
HGB BLD-MCNC: 14.4 G/DL (ref 12.1–17)
IMM GRANULOCYTES # BLD AUTO: 0.03 K/UL (ref 0–0.04)
IMM GRANULOCYTES NFR BLD AUTO: 0.4 % (ref 0–0.5)
LV EF: 70 ML
LYMPHOCYTES # BLD: 1.36 K/UL (ref 0.8–3.5)
LYMPHOCYTES NFR BLD: 18.5 % (ref 12–49)
MCH RBC QN AUTO: 32.1 PG (ref 26–34)
MCHC RBC AUTO-ENTMCNC: 33.6 G/DL (ref 30–36.5)
MCV RBC AUTO: 95.8 FL (ref 80–99)
MONOCYTES # BLD: 0.46 K/UL (ref 0–1)
MONOCYTES NFR BLD: 6.3 % (ref 5–13)
NEUTS SEG # BLD: 5.31 K/UL (ref 1.8–8)
NEUTS SEG NFR BLD: 72.3 % (ref 32–75)
NRBC # BLD: 0 K/UL (ref 0–0.01)
NRBC BLD-RTO: 0 PER 100 WBC
PERFORMED BY:: ABNORMAL
PERFORMED BY:: ABNORMAL
PERFORMED BY:: NORMAL
PLATELET # BLD AUTO: 227 K/UL (ref 150–400)
PMV BLD AUTO: 10 FL (ref 8.9–12.9)
POTASSIUM SERPL-SCNC: 3.8 MMOL/L (ref 3.5–5.1)
RBC # BLD AUTO: 4.48 M/UL (ref 4.1–5.7)
SODIUM SERPL-SCNC: 134 MMOL/L (ref 136–145)
WBC # BLD AUTO: 7.3 K/UL (ref 4.1–11.1)

## 2025-04-25 PROCEDURE — 2580000003 HC RX 258: Performed by: STUDENT IN AN ORGANIZED HEALTH CARE EDUCATION/TRAINING PROGRAM

## 2025-04-25 PROCEDURE — 97530 THERAPEUTIC ACTIVITIES: CPT

## 2025-04-25 PROCEDURE — 6360000002 HC RX W HCPCS: Performed by: INTERNAL MEDICINE

## 2025-04-25 PROCEDURE — 80048 BASIC METABOLIC PNL TOTAL CA: CPT

## 2025-04-25 PROCEDURE — 96376 TX/PRO/DX INJ SAME DRUG ADON: CPT

## 2025-04-25 PROCEDURE — 93005 ELECTROCARDIOGRAM TRACING: CPT | Performed by: STUDENT IN AN ORGANIZED HEALTH CARE EDUCATION/TRAINING PROGRAM

## 2025-04-25 PROCEDURE — 94761 N-INVAS EAR/PLS OXIMETRY MLT: CPT

## 2025-04-25 PROCEDURE — 96372 THER/PROPH/DIAG INJ SC/IM: CPT

## 2025-04-25 PROCEDURE — 6370000000 HC RX 637 (ALT 250 FOR IP): Performed by: INTERNAL MEDICINE

## 2025-04-25 PROCEDURE — 36415 COLL VENOUS BLD VENIPUNCTURE: CPT

## 2025-04-25 PROCEDURE — 2500000003 HC RX 250 WO HCPCS: Performed by: INTERNAL MEDICINE

## 2025-04-25 PROCEDURE — 6360000002 HC RX W HCPCS: Performed by: STUDENT IN AN ORGANIZED HEALTH CARE EDUCATION/TRAINING PROGRAM

## 2025-04-25 PROCEDURE — 85025 COMPLETE CBC W/AUTO DIFF WBC: CPT

## 2025-04-25 PROCEDURE — G0378 HOSPITAL OBSERVATION PER HR: HCPCS

## 2025-04-25 PROCEDURE — 82962 GLUCOSE BLOOD TEST: CPT

## 2025-04-25 PROCEDURE — 94640 AIRWAY INHALATION TREATMENT: CPT

## 2025-04-25 RX ORDER — TRAMADOL HYDROCHLORIDE 50 MG/1
50 TABLET ORAL EVERY 12 HOURS PRN
Qty: 6 TABLET | Refills: 0 | Status: SHIPPED | OUTPATIENT
Start: 2025-04-25 | End: 2025-04-28

## 2025-04-25 RX ORDER — PANTOPRAZOLE SODIUM 20 MG/1
40 TABLET, DELAYED RELEASE ORAL DAILY
Qty: 30 TABLET | Refills: 0 | Status: SHIPPED | OUTPATIENT
Start: 2025-04-25

## 2025-04-25 RX ADMIN — SODIUM CHLORIDE, PRESERVATIVE FREE 40 MG: 5 INJECTION INTRAVENOUS at 08:50

## 2025-04-25 RX ADMIN — EMPAGLIFLOZIN 10 MG: 10 TABLET, FILM COATED ORAL at 08:59

## 2025-04-25 RX ADMIN — SODIUM CHLORIDE, PRESERVATIVE FREE 5 ML: 5 INJECTION INTRAVENOUS at 08:59

## 2025-04-25 RX ADMIN — BUMETANIDE 2 MG: 1 TABLET ORAL at 08:49

## 2025-04-25 RX ADMIN — ASPIRIN 81 MG: 81 TABLET, CHEWABLE ORAL at 08:49

## 2025-04-25 RX ADMIN — CITALOPRAM HYDROBROMIDE 40 MG: 20 TABLET ORAL at 08:49

## 2025-04-25 RX ADMIN — LEVOTHYROXINE SODIUM 75 MCG: 0.07 TABLET ORAL at 07:55

## 2025-04-25 RX ADMIN — CLOPIDOGREL BISULFATE 75 MG: 75 TABLET, FILM COATED ORAL at 08:50

## 2025-04-25 RX ADMIN — ENOXAPARIN SODIUM 30 MG: 100 INJECTION SUBCUTANEOUS at 08:51

## 2025-04-25 RX ADMIN — METOPROLOL SUCCINATE 50 MG: 50 TABLET, EXTENDED RELEASE ORAL at 08:50

## 2025-04-25 RX ADMIN — ATORVASTATIN CALCIUM 80 MG: 40 TABLET, FILM COATED ORAL at 08:50

## 2025-04-25 RX ADMIN — Medication 2 PUFF: at 07:40

## 2025-04-25 ASSESSMENT — PAIN SCALES - GENERAL: PAINLEVEL_OUTOF10: 0

## 2025-04-25 NOTE — PROGRESS NOTES
4/25/2025        RE: Ye Joseph         [id number] [facility] [id number]          To Whom It May Concern,      Due to medical reasons, Ye Joseph was admitted at [facility] from 4/22/2025 to 4/25/2025.     He can resume work on 4/28/2025.         Sincerely,          Rebecca Gutiérrez RN

## 2025-04-25 NOTE — CARE COORDINATION
1434: CM noted discharge order. No CM needs. Therapy informed patient mobility improved today.    Transition of Care Plan:    RUR: n/a  Prior Level of Functioning: Independent  Disposition: home  RYAN: today  If SNF or IPR: Date FOC offered: n/a  Date FOC received: n/a  Accepting facility: n/a  Date authorization started with reference number: n/a  Date authorization received and expires: n/a  Follow up appointments: Per MD  DME needed: n/a  Transportation at discharge: Medicaid  IM/IMM Medicare/ letter given: n/a  Is patient a [place] and connected with [facility]? N/a   If yes, was  transfer form completed and [facility] notified?   Caregiver Contact: n/a  Discharge Caregiver contacted prior to discharge? N/a  Care Conference needed? no  Barriers to discharge: none       0859: CM reviewed chart. DCP [facility] pending auth, started 4/24, when medicallly ready. CM will continue to follow.

## 2025-04-25 NOTE — PLAN OF CARE
PHYSICAL THERAPY TREATMENT     Patient: Ye Joseph (49 y.o. male)  Date: 4/25/2025  Diagnosis: TIA (transient ischemic attack) [G45.9] TIA (transient ischemic attack)      Precautions: Fall Risk                      Recommendations for nursing mobility: Assist x1    In place during session: EKG/telemetry   Chart, physical therapy assessment, plan of care and goals were reviewed.  ASSESSMENT  Patient continues with skilled PT services and is progressing towards goals. Pt supine in bed upon PT arrival, agreeable to session. (See below for objective details and assist levels).     Overall pt tolerated session well today. Pt transferred to eob with no hands on assist. Pt stood from bed with no hands on assist and no AD. Pt ambulated ~200 ft with no AD and sup. Gait was steady with no lob or knee buckling noted. Pt rteurned sitting in the bed. Pt showed major improvements in functional mobility today as compared to yesterday's tx session. Pt left sitting eob with all needs met. Will continue to benefit from skilled PT services, and will continue to progress as tolerated.     Updating current PT discharge recommendations to Intermittent physical therapy up to 2-3x/week in previous living setting when medically appropriate due to improvements with overall functional mobility; change discussed and agreed upon with supervising PT Rosalba Flores. Co-signature obtained. Current PT DC recommendation Intermittent physical therapy up to 2-3x/week in previous living setting once medically appropriate.    GOALS:  Problem: Physical Therapy - Adult  Goal: By Discharge: Performs mobility at highest level of function for planned discharge setting.  See evaluation for individualized goals.  Description: FUNCTIONAL STATUS PRIOR TO ADMISSION: Patient was independent and active without use of DME.    HOME SUPPORT PRIOR TO ADMISSION: The patient lives alone and did not require assistance.    Physical Therapy 
  Problem: Chronic Conditions and Co-morbidities  Goal: Patient's chronic conditions and co-morbidity symptoms are monitored and maintained or improved  4/24/2025 2225 by Stefani Wellington RN  Outcome: Progressing  Flowsheets (Taken 4/24/2025 2130)  Care Plan - Patient's Chronic Conditions and Co-Morbidity Symptoms are Monitored and Maintained or Improved: Monitor and assess patient's chronic conditions and comorbid symptoms for stability, deterioration, or improvement  4/24/2025 1325 by Glo Trevizo RN  Outcome: Progressing  Flowsheets (Taken 4/24/2025 0953)  Care Plan - Patient's Chronic Conditions and Co-Morbidity Symptoms are Monitored and Maintained or Improved: Monitor and assess patient's chronic conditions and comorbid symptoms for stability, deterioration, or improvement     Problem: Discharge Planning  Goal: Discharge to home or other facility with appropriate resources  4/24/2025 2225 by Stefani Wellington RN  Outcome: Progressing  Flowsheets (Taken 4/24/2025 2130)  Discharge to home or other facility with appropriate resources: Identify barriers to discharge with patient and caregiver  4/24/2025 1325 by Glo Trevizo RN  Outcome: Progressing  Flowsheets (Taken 4/24/2025 0953)  Discharge to home or other facility with appropriate resources:   Identify barriers to discharge with patient and caregiver   Identify discharge learning needs (meds, wound care, etc)   Arrange for needed discharge resources and transportation as appropriate     Problem: Pain  Goal: Verbalizes/displays adequate comfort level or baseline comfort level  4/24/2025 2225 by Stefani Wellington RN  Outcome: Progressing  4/24/2025 1325 by Glo Trevizo RN  Outcome: Progressing     Problem: Safety - Adult  Goal: Free from fall injury  4/24/2025 2225 by Stefani Wellington RN  Outcome: Progressing  4/24/2025 1325 by Glo Trevizo RN  Outcome: Progressing     
  Problem: Chronic Conditions and Co-morbidities  Goal: Patient's chronic conditions and co-morbidity symptoms are monitored and maintained or improved  Outcome: Progressing     Problem: Discharge Planning  Goal: Discharge to home or other facility with appropriate resources  Outcome: Progressing     Problem: Pain  Goal: Verbalizes/displays adequate comfort level or baseline comfort level  Outcome: Progressing     Problem: Safety - Adult  Goal: Free from fall injury  Outcome: Progressing     Problem: SLP Adult - Impaired Swallowing  Goal: By Discharge: Advance to least restrictive diet without signs or symptoms of aspiration for planned discharge setting.  See evaluation for individualized goals.  Description: Speech Therapy Swallow Goals  Initiated 4/23/25  -Patient will tolerate regular diet with thin liquids without clinical indicators of aspiration given no cues within 7 day(s).        -Patient will tolerate PO trials without clinical indicators of aspiration given no cues within 7 day(s).      -Patient will participate in modified barium swallow study within 7 day(s).      -Patient will demonstrate understanding of swallow safety precautions and aspiration precautions, diet recs with no cues within 7day(s).    -Patient/caregiver goal: \" to get better\"   4/23/2025 1414 by Rosamaria Zhu, SLP  Outcome: Progressing     Problem: Occupational Therapy - Adult  Goal: By Discharge: Performs self-care activities at highest level of function for planned discharge setting.  See evaluation for individualized goals.  Description: FUNCTIONAL STATUS PRIOR TO ADMISSION:  Pt was independent for ADLs and functional mobility.    HOME SUPPORT: The patient lived alone.    Occupational Therapy Goals:  Initiated 4/23/2025  Patient/Family stated goal: be independent again  1.  Patient will perform lower body dressing with [place] within 7 day(s).  2.  Patient will perform standing grooming with [place] within 7 
PHYSICAL THERAPY EVALUATION  Patient: Ye Joseph (49 y.o. male)  Date: 4/23/2025  Primary Diagnosis: TIA (transient ischemic attack) [G45.9]       Precautions: Fall Risk, General Precautions                      Recommendations for nursing mobility: Out of bed to chair for meals, Use of BSC for toileting , and AD and gt belt for bed to chair     In place during session: Peripheral IV and EKG/telemetry     ASSESSMENT  Pt is a 49 y.o. male admitted on 4/22/2025 for chest pain, and tingling/numbness of the face; pt currently being treated for CVA w/u, SOB, cardiac w/u (negative), CAD/HF, HTN, DM, hyperlipidemia. Pt supine in bed, HOB > 45 degrees, upon PT arrival, agreeable to evaluation. Pt A&O x 4.      Based on the objective data described below, the patient currently presents with impaired functional mobility, decreased independence in ADLs, impaired ability to perform high-level IADLs, impaired strength, impaired sensation, decreased activity tolerance, decreased coordination, impaired balance, decreased fine-motor control, and increased pain levels. (See below for objective details and assist levels).     Overall pt tolerated session fair today with increased chest pain, with vitals stable, with mobility. Pt required Min A  for bed mobility and transfers, pt demonstrates intact sitting balance and static standing balance requiring CGA and RW, this is significantly below pt's independent functional baseline. Pt will benefit from continued skilled PT to address above deficits and return to PLOF. Current PT DC recommendation High intensity and comprehensive skilled physical therapy in a multidisciplinary setting as patient is working towards tolerating up to 3 hours of therapy/day x 5-7 days/week once medically appropriate.     Start of Session End of Session   SPO2 (%) 94 RA 93 RA   Heart Rate (BPM) 93 91   /84 138/83     GOALS:    Problem: Physical Therapy - Adult  Goal: By Discharge: Performs 
Speech LAnguage Pathology Dysphagia EVALUATION    Patient: Ye Joseph (49 y.o. male)  Date: 4/23/2025  Primary Diagnosis: TIA (transient ischemic attack) [G45.9]       Precautions: aspiration Fall Risk, General Precautions                  Time In: 1130  Time Out: 1150    DIET RECOMMENDATIONS: Regular and thin liquids    SWALLOW SAFETY PRECAUTIONS: 1:1 assistance with ALL PO intake, STRICT aspiration and GERD precautions, monitor pt closely for s/s aspiration, meds as tolerated, FEED ONLY IF AWAKE AND ALERT.      ASSESSMENT :  Based on the objective data described below, the patient presents with mild oral dysphagia c/b weakness of oral motor musculature left side.   Patient reports reduced sensation left side of face. There is a slight reduction in nasolabial fold left side w/ downward drawl w/ muscle activation. Reduced lingual strength left side and reduced range of motion.   Patient reports compensation by eating slow, taking smaller bites and placing food right side.   Patient does feel like his speech is slightly changed.   Patient is 100% intelligible; however presents w/ low vocal amplitude and pressed for speech at times. Full motor speech evaluation may be warranted if symptoms do not improve.   No aphasia observed   Cognition appears intact.     49 yom who presented to the ED w/ facial numbness and tingling. He has increased left sided weakness. He does have a hx of CVA. TNK was not administered.   Neurology notes appreciated.     Patient will benefit from skilled intervention to address the above impairments.    GOALS:    Problem: SLP Adult - Impaired Swallowing  Goal: By Discharge: Advance to least restrictive diet without signs or symptoms of aspiration for planned discharge setting.  See evaluation for individualized goals.  Description: Speech Therapy Swallow Goals  Initiated 4/23/25  -Patient will tolerate regular diet with thin liquids without clinical indicators of aspiration given no 
progress as tolerated. Current PT DC recommendation High intensity and comprehensive skilled physical therapy in a multidisciplinary setting as patient is working towards tolerating up to 3 hours of therapy/day x 5-7 days/week once medically appropriate.    GOALS:  Problem: Physical Therapy - Adult  Goal: By Discharge: Performs mobility at highest level of function for planned discharge setting.  See evaluation for individualized goals.  Description: FUNCTIONAL STATUS PRIOR TO ADMISSION: Patient was independent and active without use of DME.    HOME SUPPORT PRIOR TO ADMISSION: The patient lives alone and did not require assistance.    Physical Therapy Goals  Initiated 4/23/2025  Pt stated goal: \"Go back to normal\"  Pt will be I with [facility] in 7 days.  Pt will perform bed mobility with [place] in 7 days.  Pt will perform transfers with [place] in 7 days.   Pt will amb 150 feet with LRAD safely with [place] in 7 days.  Pt will ascend/descend 4 steps with 1 handrail(s) and [place] in 7 days to safely enter/navigate home.   Pt will demonstrate improvement in dynamic standing balance from CGA to Independent in 7 days.     Outcome: Progressing     PLAN :  Patient continues to benefit from skilled intervention to address functional impairments. Continue treatment per established plan of care to address goals.    Recommendation for discharge: (in order for the patient to meet his/her long term goals)  High intensity and comprehensive skilled physical therapy in a multidisciplinary setting as patient is working towards tolerating up to 3 hours of therapy/day x 5-7 days/week    Potential barriers for safe discharge: pt has poor safety awareness, pt has impaired cognition, pt is a high fall risk, pt is not safe to be alone, and concern for pt safely navigating or managing the home environment.    IF patient discharges home will need the following DME:continuing to assess with progress     SUBJECTIVE:

## 2025-04-25 NOTE — DISCHARGE SUMMARY
across the septum  we continued ASA, Plavix, statin. Cardiology was consulted as patient was very anxious regarding his heart and his recurrent chest pains. No acute recommendations were cardiology, patient save for discharge from their standpoint.  Continued his home [facility] inpatient as well.  Four his concern for ischemic stroke, CT head without contrast, CTA head and neck, CT brain perfusion were all unremarkable. Patient can't get MRI secondary to patient girth.    Neurology consulted, recommended no new acute interventions. Patient is at [facility] for his stroke. Patient to be discharged and follow-up with neurology outpatient.      Discharge Medications:      Medication List        START taking these medications      pantoprazole 20 MG tablet  Commonly known as: Protonix  Take 2 tablets by mouth daily     traMADol 50 MG tablet  Commonly known as: ULTRAM  Take 1 tablet by mouth every 12 hours as needed for Pain for up to 3 days. Max Daily Amount: 100 mg            CHANGE how you take these medications      gabapentin 600 MG tablet  Commonly known as: NEURONTIN  Take 2 tablets by mouth at bedtime for 3 days. Max Daily Amount: 1,200 mg  What changed:   when to take this  reasons to take this            CONTINUE taking these medications      acetaminophen 500 MG tablet  Commonly known as: TYLENOL  Take 2 tablets by mouth 3 times daily as needed for Pain     albuterol sulfate  (90 Base) MCG/ACT inhaler  Commonly known as: Ventolin HFA  Inhale 2 puffs into the lungs every 6 hours as needed for Wheezing     allopurinol 100 MG tablet  Commonly known as: ZYLOPRIM  Take 1 tablet by mouth daily     aspirin 81 MG chewable tablet  Take 1 tablet by mouth daily     atorvastatin 80 MG tablet  Commonly known as: LIPITOR  Take 1 tablet by mouth daily     budesonide-formoterol 160-4.5 MCG/ACT Aero  Commonly known as: SYMBICORT  Inhale 2 puffs into the lungs in the morning and 2 puffs in the evening.    non-tender. Bowel sounds normal. No masses,  No organomegaly.   Extremities: Extremities normal, atraumatic, no cyanosis or edema.   Pulses: 2+ and symmetric all extremities.   Skin: Skin color, texture, turgor normal. No rashes or lesions   Neurologic: CNII-XII intact. No gross sensory or motor deficits             Significant Diagnostic Studies:   4/22/2025: BUN 11 mg/dL (Ref range: 6 - 20 mg/dL); BUN 11 mg/dL (Ref range: 6 - 20 mg/dL); Calcium 9.6 mg/dL (Ref range: 8.5 - 10.1 mg/dL); Calcium 8.7 mg/dL (Ref range: 8.5 - 10.1 mg/dL); Chloride 105 mmol/L (Ref range: 97 - 108 mmol/L); Chloride 105 mmol/L (Ref range: 97 - 108 mmol/L); CO2 26 mmol/L (Ref range: 21 - 32 mmol/L); CO2 31 mmol/L (Ref range: 21 - 32 mmol/L); Creatinine 1.17 mg/dL (Ref range: 0.70 - 1.30 mg/dL); Creatinine 1.08 mg/dL (Ref range: 0.70 - 1.30 mg/dL); Glucose 115 mg/dL (H; Ref range: 65 - 100 mg/dL); Glucose 152 mg/dL (H; Ref range: 65 - 100 mg/dL); Hematocrit 40.7 % (Ref range: 36.6 - 50.3 %); Hemoglobin 13.9 g/dL (Ref range: 12.1 - 17.0 g/dL); Potassium 3.8 mmol/L (Ref range: 3.5 - 5.1 mmol/L); Potassium 3.1 mmol/L (L; Ref range: 3.5 - 5.1 mmol/L); Sodium 138 mmol/L (Ref range: 136 - 145 mmol/L); Sodium 140 mmol/L (Ref range: 136 - 145 mmol/L)  4/23/2025: BUN 11 mg/dL (Ref range: 6 - 20 mg/dL); Calcium 8.8 mg/dL (Ref range: 8.5 - 10.1 mg/dL); Chloride 106 mmol/L (Ref range: 97 - 108 mmol/L); CO2 29 mmol/L (Ref range: 21 - 32 mmol/L); Creatinine 0.86 mg/dL (Ref range: 0.70 - 1.30 mg/dL); Glucose 100 mg/dL (Ref range: 65 - 100 mg/dL); Hematocrit 38.0 % (Ref range: 36.6 - 50.3 %); Hematocrit 40.2 % (Ref range: 36.6 - 50.3 %); Hemoglobin 12.7 g/dL (Ref range: 12.1 - 17.0 g/dL); Hemoglobin 13.6 g/dL (Ref range: 12.1 - 17.0 g/dL); Potassium 4.0 mmol/L (Ref range: 3.5 - 5.1 mmol/L); Sodium 139 mmol/L (Ref range: 136 - 145 mmol/L)  Recent Labs     04/24/25  0742 04/25/25  0637   WBC 6.6 7.3   HGB 13.6 14.4   HCT 40.3 42.9    227     Recent  7230

## 2025-08-12 ENCOUNTER — APPOINTMENT (OUTPATIENT)
Facility: HOSPITAL | Age: 50
DRG: 058 | End: 2025-08-12
Payer: MEDICAID

## 2025-08-12 ENCOUNTER — HOSPITAL ENCOUNTER (INPATIENT)
Facility: HOSPITAL | Age: 50
LOS: 2 days | Discharge: HOME | DRG: 058 | End: 2025-08-14
Attending: EMERGENCY MEDICINE | Admitting: HOSPITALIST
Payer: MEDICAID

## 2025-08-12 DIAGNOSIS — I63.9 CEREBROVASCULAR ACCIDENT (CVA), UNSPECIFIED MECHANISM (HCC): ICD-10-CM

## 2025-08-12 DIAGNOSIS — R07.9 CHEST PAIN, UNSPECIFIED TYPE: ICD-10-CM

## 2025-08-12 DIAGNOSIS — R20.0 LEFT SIDED NUMBNESS: ICD-10-CM

## 2025-08-12 DIAGNOSIS — R52 PAIN: ICD-10-CM

## 2025-08-12 DIAGNOSIS — G45.9 TIA (TRANSIENT ISCHEMIC ATTACK): Primary | ICD-10-CM

## 2025-08-12 LAB
ALBUMIN SERPL-MCNC: 3.5 G/DL (ref 3.5–5)
ALBUMIN/GLOB SERPL: 0.7 (ref 1.1–2.2)
ALP SERPL-CCNC: 91 U/L (ref 45–117)
ALT SERPL-CCNC: 38 U/L (ref 12–78)
ANION GAP SERPL CALC-SCNC: 10 MMOL/L (ref 2–12)
AST SERPL W P-5'-P-CCNC: ABNORMAL U/L (ref 15–37)
BASOPHILS # BLD: 0.03 K/UL (ref 0–0.1)
BASOPHILS NFR BLD: 0.4 % (ref 0–1)
BILIRUB SERPL-MCNC: 1 MG/DL (ref 0.2–1)
BNP SERPL-MCNC: 26 PG/ML
BUN SERPL-MCNC: 15 MG/DL (ref 6–20)
BUN/CREAT SERPL: 12 (ref 12–20)
CA-I BLD-MCNC: 9.4 MG/DL (ref 8.5–10.1)
CHLORIDE SERPL-SCNC: 104 MMOL/L (ref 97–108)
CK SERPL-CCNC: 161 U/L (ref 39–308)
CO2 SERPL-SCNC: 21 MMOL/L (ref 21–32)
CREAT SERPL-MCNC: 1.3 MG/DL (ref 0.7–1.3)
CRP SERPL-MCNC: <0.29 MG/DL (ref 0–0.3)
DIFFERENTIAL METHOD BLD: NORMAL
EKG ATRIAL RATE: 98 BPM
EKG DIAGNOSIS: NORMAL
EKG P AXIS: 35 DEGREES
EKG P-R INTERVAL: 176 MS
EKG Q-T INTERVAL: 386 MS
EKG QRS DURATION: 98 MS
EKG QTC CALCULATION (BAZETT): 492 MS
EKG R AXIS: 34 DEGREES
EKG T AXIS: 16 DEGREES
EKG VENTRICULAR RATE: 98 BPM
EOSINOPHIL # BLD: 0.07 K/UL (ref 0–0.4)
EOSINOPHIL NFR BLD: 1 % (ref 0–7)
ERYTHROCYTE [DISTWIDTH] IN BLOOD BY AUTOMATED COUNT: 13.6 % (ref 11.5–14.5)
GLOBULIN SER CALC-MCNC: 5.2 G/DL (ref 2–4)
GLUCOSE BLD STRIP.AUTO-MCNC: 111 MG/DL (ref 65–100)
GLUCOSE BLD STRIP.AUTO-MCNC: 152 MG/DL (ref 65–100)
GLUCOSE SERPL-MCNC: 133 MG/DL (ref 65–100)
HCT VFR BLD AUTO: 41.9 % (ref 36.6–50.3)
HGB BLD-MCNC: 14.4 G/DL (ref 12.1–17)
IMM GRANULOCYTES # BLD AUTO: 0.02 K/UL (ref 0–0.04)
IMM GRANULOCYTES NFR BLD AUTO: 0.3 % (ref 0–0.5)
INR PPP: 1.1 (ref 0.9–1.1)
LYMPHOCYTES # BLD: 1.66 K/UL (ref 0.8–3.5)
LYMPHOCYTES NFR BLD: 24 % (ref 12–49)
MCH RBC QN AUTO: 31.9 PG (ref 26–34)
MCHC RBC AUTO-ENTMCNC: 34.4 G/DL (ref 30–36.5)
MCV RBC AUTO: 92.7 FL (ref 80–99)
MONOCYTES # BLD: 0.47 K/UL (ref 0–1)
MONOCYTES NFR BLD: 6.8 % (ref 5–13)
NEUTS SEG # BLD: 4.68 K/UL (ref 1.8–8)
NEUTS SEG NFR BLD: 67.5 % (ref 32–75)
NRBC # BLD: 0 K/UL (ref 0–0.01)
NRBC BLD-RTO: 0 PER 100 WBC
PERFORMED BY:: ABNORMAL
PERFORMED BY:: ABNORMAL
PLATELET # BLD AUTO: 222 K/UL (ref 150–400)
PMV BLD AUTO: 9.7 FL (ref 8.9–12.9)
POTASSIUM SERPL-SCNC: ABNORMAL MMOL/L (ref 3.5–5.1)
PROT SERPL-MCNC: 8.7 G/DL (ref 6.4–8.2)
PROTHROMBIN TIME: 14.1 SEC (ref 11.9–14.1)
RBC # BLD AUTO: 4.52 M/UL (ref 4.1–5.7)
SODIUM SERPL-SCNC: 135 MMOL/L (ref 136–145)
TROPONIN I SERPL HS-MCNC: 6 NG/L (ref 0–76)
TROPONIN I SERPL HS-MCNC: 7 NG/L (ref 0–76)
TSH SERPL DL<=0.05 MIU/L-ACNC: 10.8 UIU/ML (ref 0.36–3.74)
URATE SERPL-MCNC: 6.2 MG/DL (ref 3.5–7.2)
WBC # BLD AUTO: 6.9 K/UL (ref 4.1–11.1)

## 2025-08-12 PROCEDURE — 71045 X-RAY EXAM CHEST 1 VIEW: CPT

## 2025-08-12 PROCEDURE — 36415 COLL VENOUS BLD VENIPUNCTURE: CPT

## 2025-08-12 PROCEDURE — 99285 EMERGENCY DEPT VISIT HI MDM: CPT

## 2025-08-12 PROCEDURE — 80053 COMPREHEN METABOLIC PANEL: CPT

## 2025-08-12 PROCEDURE — 70496 CT ANGIOGRAPHY HEAD: CPT

## 2025-08-12 PROCEDURE — 6360000002 HC RX W HCPCS: Performed by: HOSPITALIST

## 2025-08-12 PROCEDURE — 0042T CT BRAIN PERFUSION: CPT

## 2025-08-12 PROCEDURE — 86140 C-REACTIVE PROTEIN: CPT

## 2025-08-12 PROCEDURE — 2500000003 HC RX 250 WO HCPCS: Performed by: HOSPITALIST

## 2025-08-12 PROCEDURE — 85025 COMPLETE CBC W/AUTO DIFF WBC: CPT

## 2025-08-12 PROCEDURE — 1100000000 HC RM PRIVATE

## 2025-08-12 PROCEDURE — 6360000004 HC RX CONTRAST MEDICATION: Performed by: EMERGENCY MEDICINE

## 2025-08-12 PROCEDURE — 70450 CT HEAD/BRAIN W/O DYE: CPT

## 2025-08-12 PROCEDURE — 6370000000 HC RX 637 (ALT 250 FOR IP): Performed by: HOSPITALIST

## 2025-08-12 PROCEDURE — 85610 PROTHROMBIN TIME: CPT

## 2025-08-12 PROCEDURE — 84443 ASSAY THYROID STIM HORMONE: CPT

## 2025-08-12 PROCEDURE — 84484 ASSAY OF TROPONIN QUANT: CPT

## 2025-08-12 PROCEDURE — 82962 GLUCOSE BLOOD TEST: CPT

## 2025-08-12 PROCEDURE — 93005 ELECTROCARDIOGRAM TRACING: CPT | Performed by: EMERGENCY MEDICINE

## 2025-08-12 PROCEDURE — 6370000000 HC RX 637 (ALT 250 FOR IP): Performed by: NURSE PRACTITIONER

## 2025-08-12 PROCEDURE — 96374 THER/PROPH/DIAG INJ IV PUSH: CPT

## 2025-08-12 PROCEDURE — 82550 ASSAY OF CK (CPK): CPT

## 2025-08-12 PROCEDURE — 83880 ASSAY OF NATRIURETIC PEPTIDE: CPT

## 2025-08-12 PROCEDURE — 6360000002 HC RX W HCPCS: Performed by: EMERGENCY MEDICINE

## 2025-08-12 PROCEDURE — 94640 AIRWAY INHALATION TREATMENT: CPT

## 2025-08-12 PROCEDURE — 84550 ASSAY OF BLOOD/URIC ACID: CPT

## 2025-08-12 RX ORDER — ZOLPIDEM TARTRATE 5 MG/1
5 TABLET ORAL NIGHTLY PRN
Status: DISCONTINUED | OUTPATIENT
Start: 2025-08-12 | End: 2025-08-14 | Stop reason: HOSPADM

## 2025-08-12 RX ORDER — SODIUM CHLORIDE 0.9 % (FLUSH) 0.9 %
5-40 SYRINGE (ML) INJECTION PRN
Status: DISCONTINUED | OUTPATIENT
Start: 2025-08-12 | End: 2025-08-14 | Stop reason: HOSPADM

## 2025-08-12 RX ORDER — ACETAMINOPHEN 500 MG
1000 TABLET ORAL EVERY 6 HOURS PRN
Status: DISCONTINUED | OUTPATIENT
Start: 2025-08-12 | End: 2025-08-14 | Stop reason: HOSPADM

## 2025-08-12 RX ORDER — ATORVASTATIN CALCIUM 40 MG/1
40 TABLET, FILM COATED ORAL DAILY
Status: ON HOLD | COMMUNITY
Start: 2025-08-11 | End: 2025-08-14 | Stop reason: HOSPADM

## 2025-08-12 RX ORDER — SODIUM CHLORIDE 0.9 % (FLUSH) 0.9 %
5-40 SYRINGE (ML) INJECTION EVERY 12 HOURS SCHEDULED
Status: DISCONTINUED | OUTPATIENT
Start: 2025-08-12 | End: 2025-08-14 | Stop reason: HOSPADM

## 2025-08-12 RX ORDER — BUDESONIDE AND FORMOTEROL FUMARATE DIHYDRATE 160; 4.5 UG/1; UG/1
2 AEROSOL RESPIRATORY (INHALATION)
Status: DISCONTINUED | OUTPATIENT
Start: 2025-08-12 | End: 2025-08-14 | Stop reason: HOSPADM

## 2025-08-12 RX ORDER — ALBUTEROL SULFATE 90 UG/1
2 INHALANT RESPIRATORY (INHALATION) EVERY 6 HOURS PRN
Status: DISCONTINUED | OUTPATIENT
Start: 2025-08-12 | End: 2025-08-14 | Stop reason: HOSPADM

## 2025-08-12 RX ORDER — INSULIN LISPRO 100 [IU]/ML
0-16 INJECTION, SOLUTION INTRAVENOUS; SUBCUTANEOUS EVERY 6 HOURS SCHEDULED
Status: DISCONTINUED | OUTPATIENT
Start: 2025-08-12 | End: 2025-08-13

## 2025-08-12 RX ORDER — INSULIN LISPRO 100 [IU]/ML
0-8 INJECTION, SOLUTION INTRAVENOUS; SUBCUTANEOUS
Status: DISCONTINUED | OUTPATIENT
Start: 2025-08-12 | End: 2025-08-14 | Stop reason: HOSPADM

## 2025-08-12 RX ORDER — DEXTROSE MONOHYDRATE 100 MG/ML
INJECTION, SOLUTION INTRAVENOUS CONTINUOUS PRN
Status: DISCONTINUED | OUTPATIENT
Start: 2025-08-12 | End: 2025-08-14 | Stop reason: HOSPADM

## 2025-08-12 RX ORDER — SODIUM CHLORIDE 9 MG/ML
INJECTION, SOLUTION INTRAVENOUS PRN
Status: DISCONTINUED | OUTPATIENT
Start: 2025-08-12 | End: 2025-08-14 | Stop reason: HOSPADM

## 2025-08-12 RX ORDER — CITALOPRAM HYDROBROMIDE 20 MG/1
40 TABLET ORAL DAILY
Status: DISCONTINUED | OUTPATIENT
Start: 2025-08-12 | End: 2025-08-14 | Stop reason: HOSPADM

## 2025-08-12 RX ORDER — ONDANSETRON 4 MG/1
4 TABLET, ORALLY DISINTEGRATING ORAL EVERY 8 HOURS PRN
Status: DISCONTINUED | OUTPATIENT
Start: 2025-08-12 | End: 2025-08-14 | Stop reason: HOSPADM

## 2025-08-12 RX ORDER — GLUCAGON 1 MG/ML
1 KIT INJECTION PRN
Status: DISCONTINUED | OUTPATIENT
Start: 2025-08-12 | End: 2025-08-14 | Stop reason: HOSPADM

## 2025-08-12 RX ORDER — LEVOTHYROXINE SODIUM 75 UG/1
75 TABLET ORAL
Status: DISCONTINUED | OUTPATIENT
Start: 2025-08-13 | End: 2025-08-14 | Stop reason: HOSPADM

## 2025-08-12 RX ORDER — METOPROLOL SUCCINATE 50 MG/1
50 TABLET, EXTENDED RELEASE ORAL DAILY
Status: DISCONTINUED | OUTPATIENT
Start: 2025-08-12 | End: 2025-08-14 | Stop reason: HOSPADM

## 2025-08-12 RX ORDER — CLOPIDOGREL BISULFATE 75 MG/1
75 TABLET ORAL DAILY
Status: DISCONTINUED | OUTPATIENT
Start: 2025-08-12 | End: 2025-08-14 | Stop reason: HOSPADM

## 2025-08-12 RX ORDER — ZOLPIDEM TARTRATE 10 MG/1
10 TABLET ORAL NIGHTLY PRN
COMMUNITY
Start: 2025-07-22

## 2025-08-12 RX ORDER — MORPHINE SULFATE 4 MG/ML
4 INJECTION, SOLUTION INTRAMUSCULAR; INTRAVENOUS
Refills: 0 | Status: COMPLETED | OUTPATIENT
Start: 2025-08-12 | End: 2025-08-12

## 2025-08-12 RX ORDER — ATORVASTATIN CALCIUM 40 MG/1
40 TABLET, FILM COATED ORAL DAILY
Status: DISCONTINUED | OUTPATIENT
Start: 2025-08-12 | End: 2025-08-13

## 2025-08-12 RX ORDER — ENOXAPARIN SODIUM 100 MG/ML
30 INJECTION SUBCUTANEOUS 2 TIMES DAILY
Status: DISCONTINUED | OUTPATIENT
Start: 2025-08-12 | End: 2025-08-14 | Stop reason: HOSPADM

## 2025-08-12 RX ORDER — ASPIRIN 81 MG/1
81 TABLET, CHEWABLE ORAL DAILY
Status: DISCONTINUED | OUTPATIENT
Start: 2025-08-12 | End: 2025-08-14 | Stop reason: HOSPADM

## 2025-08-12 RX ORDER — ONDANSETRON 2 MG/ML
4 INJECTION INTRAMUSCULAR; INTRAVENOUS EVERY 6 HOURS PRN
Status: DISCONTINUED | OUTPATIENT
Start: 2025-08-12 | End: 2025-08-14 | Stop reason: HOSPADM

## 2025-08-12 RX ORDER — BUMETANIDE 2 MG/1
2 TABLET ORAL DAILY
COMMUNITY
Start: 2025-07-22

## 2025-08-12 RX ORDER — IOPAMIDOL 755 MG/ML
100 INJECTION, SOLUTION INTRAVASCULAR
Status: COMPLETED | OUTPATIENT
Start: 2025-08-12 | End: 2025-08-12

## 2025-08-12 RX ADMIN — ENOXAPARIN SODIUM 30 MG: 100 INJECTION SUBCUTANEOUS at 22:05

## 2025-08-12 RX ADMIN — SODIUM CHLORIDE, PRESERVATIVE FREE 10 ML: 5 INJECTION INTRAVENOUS at 22:09

## 2025-08-12 RX ADMIN — ZOLPIDEM TARTRATE 5 MG: 5 TABLET ORAL at 22:05

## 2025-08-12 RX ADMIN — ACETAMINOPHEN 1000 MG: 500 TABLET ORAL at 22:06

## 2025-08-12 RX ADMIN — METFORMIN HYDROCHLORIDE 500 MG: 500 TABLET ORAL at 19:00

## 2025-08-12 RX ADMIN — IOPAMIDOL 100 ML: 755 INJECTION, SOLUTION INTRAVENOUS at 12:49

## 2025-08-12 RX ADMIN — MORPHINE SULFATE 4 MG: 4 INJECTION, SOLUTION INTRAMUSCULAR; INTRAVENOUS at 13:57

## 2025-08-12 RX ADMIN — Medication 2 PUFF: at 20:10

## 2025-08-12 ASSESSMENT — PAIN - FUNCTIONAL ASSESSMENT
PAIN_FUNCTIONAL_ASSESSMENT: 0-10

## 2025-08-12 ASSESSMENT — PAIN SCALES - GENERAL
PAINLEVEL_OUTOF10: 9
PAINLEVEL_OUTOF10: 8
PAINLEVEL_OUTOF10: 10
PAINLEVEL_OUTOF10: 8
PAINLEVEL_OUTOF10: 8

## 2025-08-12 ASSESSMENT — HEART SCORE: ECG: NORMAL

## 2025-08-12 ASSESSMENT — PAIN DESCRIPTION - ORIENTATION: ORIENTATION: ANTERIOR

## 2025-08-12 ASSESSMENT — PAIN DESCRIPTION - LOCATION: LOCATION: HEAD;CHEST

## 2025-08-12 ASSESSMENT — PAIN DESCRIPTION - DESCRIPTORS: DESCRIPTORS: TIGHTNESS

## 2025-08-13 ENCOUNTER — APPOINTMENT (OUTPATIENT)
Facility: HOSPITAL | Age: 50
DRG: 058 | End: 2025-08-13
Payer: MEDICAID

## 2025-08-13 PROBLEM — R20.2 PARESTHESIA OF LEFT UPPER AND LOWER EXTREMITY: Status: ACTIVE | Noted: 2025-08-13

## 2025-08-13 LAB
BNP SERPL-MCNC: 38 PG/ML
CHOLEST SERPL-MCNC: 141 MG/DL (ref 0–200)
ECHO AO ASC DIAM: 3.1 CM
ECHO AO ASCENDING AORTA INDEX: 1.14 CM/M2
ECHO AO ROOT DIAM: 3.3 CM
ECHO AO ROOT INDEX: 1.22 CM/M2
ECHO BSA: 2.79 M2
ECHO EST RA PRESSURE: 3 MMHG
ECHO LA AREA 2C: 10.4 CM2
ECHO LA AREA 4C: 14.1 CM2
ECHO LA DIAMETER INDEX: 1.51 CM/M2
ECHO LA DIAMETER: 4.1 CM
ECHO LA MAJOR AXIS: 4.8 CM
ECHO LA MINOR AXIS: 4.8 CM
ECHO LA TO AORTIC ROOT RATIO: 1.24
ECHO LA VOL BP: 25 ML (ref 18–58)
ECHO LA VOL MOD A2C: 18 ML (ref 18–58)
ECHO LA VOL MOD A4C: 35 ML (ref 18–58)
ECHO LA VOL/BSA BIPLANE: 9 ML/M2 (ref 16–34)
ECHO LA VOLUME INDEX MOD A2C: 7 ML/M2 (ref 16–34)
ECHO LA VOLUME INDEX MOD A4C: 13 ML/M2 (ref 16–34)
ECHO LV EDV A2C: 16 ML
ECHO LV EDV A4C: 89 ML
ECHO LV EDV INDEX A4C: 33 ML/M2
ECHO LV EDV NDEX A2C: 6 ML/M2
ECHO LV EF PHYSICIAN: 55 %
ECHO LV EJECTION FRACTION A2C: 4 %
ECHO LV EJECTION FRACTION A4C: 70 %
ECHO LV ESV A2C: 15 ML
ECHO LV ESV A4C: 27 ML
ECHO LV ESV INDEX A2C: 6 ML/M2
ECHO LV ESV INDEX A4C: 10 ML/M2
ECHO LV FRACTIONAL SHORTENING: 41 % (ref 28–44)
ECHO LV INTERNAL DIMENSION DIASTOLE INDEX: 2.07 CM/M2
ECHO LV INTERNAL DIMENSION DIASTOLIC: 5.6 CM (ref 4.2–5.9)
ECHO LV INTERNAL DIMENSION SYSTOLIC INDEX: 1.22 CM/M2
ECHO LV INTERNAL DIMENSION SYSTOLIC: 3.3 CM
ECHO LV IVSD: 1.5 CM (ref 0.6–1)
ECHO LV MASS 2D: 441 G (ref 88–224)
ECHO LV MASS INDEX 2D: 162.7 G/M2 (ref 49–115)
ECHO LV POSTERIOR WALL DIASTOLIC: 1.8 CM (ref 0.6–1)
ECHO LV RELATIVE WALL THICKNESS RATIO: 0.64
ECHO LVOT AREA: 4.9 CM2
ECHO LVOT DIAM: 2.5 CM
ECHO LVOT MEAN GRADIENT: 2 MMHG
ECHO LVOT PEAK GRADIENT: 4 MMHG
ECHO LVOT PEAK VELOCITY: 1 M/S
ECHO LVOT STROKE VOLUME INDEX: 28.4 ML/M2
ECHO LVOT SV: 77 ML
ECHO LVOT VTI: 15.7 CM
ECHO MV A VELOCITY: 0.86 M/S
ECHO MV E DECELERATION TIME (DT): 118 MS
ECHO MV E VELOCITY: 0.69 M/S
ECHO MV E/A RATIO: 0.8
ECHO MV REGURGITANT PEAK GRADIENT: 5 MMHG
ECHO MV REGURGITANT PEAK VELOCITY: 1.1 M/S
ECHO MV REGURGITANT VTIA: 18.5 CM
ECHO PV MAX VELOCITY: 1.3 M/S
ECHO PV MEAN GRADIENT: 4 MMHG
ECHO PV MEAN VELOCITY: 0.9 M/S
ECHO PV PEAK GRADIENT: 7 MMHG
ECHO PV VTI: 18.2 CM
ECHO RA AREA 4C: 12.7 CM2
ECHO RA END SYSTOLIC VOLUME APICAL 4 CHAMBER INDEX BSA: 11 ML/M2
ECHO RA VOLUME: 29 ML
ECHO RV MID DIMENSION: 2.9 CM
ERYTHROCYTE [DISTWIDTH] IN BLOOD BY AUTOMATED COUNT: 13.6 % (ref 11.5–14.5)
EST. AVERAGE GLUCOSE BLD GHB EST-MCNC: 127 MG/DL
GLUCOSE BLD STRIP.AUTO-MCNC: 106 MG/DL (ref 65–100)
GLUCOSE BLD STRIP.AUTO-MCNC: 126 MG/DL (ref 65–100)
GLUCOSE BLD STRIP.AUTO-MCNC: 130 MG/DL (ref 65–100)
GLUCOSE BLD STRIP.AUTO-MCNC: 133 MG/DL (ref 65–100)
GLUCOSE BLD STRIP.AUTO-MCNC: 162 MG/DL (ref 65–100)
HBA1C MFR BLD: 6.1 % (ref 4–5.6)
HCT VFR BLD AUTO: 40.5 % (ref 36.6–50.3)
HDLC SERPL-MCNC: 28 MG/DL (ref 40–60)
HDLC SERPL: 5 (ref 0–5)
HGB BLD-MCNC: 13.5 G/DL (ref 12.1–17)
LDLC SERPL CALC-MCNC: 93 MG/DL (ref 0–100)
LIPID PANEL: ABNORMAL
MCH RBC QN AUTO: 32.3 PG (ref 26–34)
MCHC RBC AUTO-ENTMCNC: 33.3 G/DL (ref 30–36.5)
MCV RBC AUTO: 96.9 FL (ref 80–99)
NRBC # BLD: 0 K/UL (ref 0–0.01)
NRBC BLD-RTO: 0 PER 100 WBC
PERFORMED BY:: ABNORMAL
PLATELET # BLD AUTO: 195 K/UL (ref 150–400)
PMV BLD AUTO: 9.8 FL (ref 8.9–12.9)
RBC # BLD AUTO: 4.18 M/UL (ref 4.1–5.7)
TRIGL SERPL-MCNC: 102 MG/DL (ref 0–150)
TROPONIN I SERPL HS-MCNC: 7 NG/L (ref 0–76)
VLDLC SERPL CALC-MCNC: 20 MG/DL
WBC # BLD AUTO: 6.7 K/UL (ref 4.1–11.1)

## 2025-08-13 PROCEDURE — 97165 OT EVAL LOW COMPLEX 30 MIN: CPT

## 2025-08-13 PROCEDURE — 83036 HEMOGLOBIN GLYCOSYLATED A1C: CPT

## 2025-08-13 PROCEDURE — 80061 LIPID PANEL: CPT

## 2025-08-13 PROCEDURE — 94640 AIRWAY INHALATION TREATMENT: CPT

## 2025-08-13 PROCEDURE — 6370000000 HC RX 637 (ALT 250 FOR IP)

## 2025-08-13 PROCEDURE — 94664 DEMO&/EVAL PT USE INHALER: CPT

## 2025-08-13 PROCEDURE — 6370000000 HC RX 637 (ALT 250 FOR IP): Performed by: NURSE PRACTITIONER

## 2025-08-13 PROCEDURE — 6360000002 HC RX W HCPCS: Performed by: HOSPITALIST

## 2025-08-13 PROCEDURE — 94761 N-INVAS EAR/PLS OXIMETRY MLT: CPT

## 2025-08-13 PROCEDURE — G0425 INPT/ED TELECONSULT30: HCPCS | Performed by: SPECIALIST

## 2025-08-13 PROCEDURE — 1100000000 HC RM PRIVATE

## 2025-08-13 PROCEDURE — 2500000003 HC RX 250 WO HCPCS: Performed by: HOSPITALIST

## 2025-08-13 PROCEDURE — 6370000000 HC RX 637 (ALT 250 FOR IP): Performed by: HOSPITALIST

## 2025-08-13 PROCEDURE — 84484 ASSAY OF TROPONIN QUANT: CPT

## 2025-08-13 PROCEDURE — 82962 GLUCOSE BLOOD TEST: CPT

## 2025-08-13 PROCEDURE — C8924 2D TTE W OR W/O FOL W/CON,FU: HCPCS

## 2025-08-13 PROCEDURE — 6360000002 HC RX W HCPCS: Performed by: NURSE PRACTITIONER

## 2025-08-13 PROCEDURE — 83880 ASSAY OF NATRIURETIC PEPTIDE: CPT

## 2025-08-13 PROCEDURE — 97161 PT EVAL LOW COMPLEX 20 MIN: CPT

## 2025-08-13 PROCEDURE — 85027 COMPLETE CBC AUTOMATED: CPT

## 2025-08-13 RX ORDER — PANTOPRAZOLE SODIUM 40 MG/1
40 TABLET, DELAYED RELEASE ORAL
Status: DISCONTINUED | OUTPATIENT
Start: 2025-08-14 | End: 2025-08-14 | Stop reason: HOSPADM

## 2025-08-13 RX ORDER — ATORVASTATIN CALCIUM 40 MG/1
80 TABLET, FILM COATED ORAL DAILY
Status: DISCONTINUED | OUTPATIENT
Start: 2025-08-14 | End: 2025-08-14 | Stop reason: HOSPADM

## 2025-08-13 RX ORDER — MORPHINE SULFATE 4 MG/ML
4 INJECTION, SOLUTION INTRAMUSCULAR; INTRAVENOUS ONCE
Status: COMPLETED | OUTPATIENT
Start: 2025-08-13 | End: 2025-08-13

## 2025-08-13 RX ORDER — MAGNESIUM HYDROXIDE/ALUMINUM HYDROXICE/SIMETHICONE 120; 1200; 1200 MG/30ML; MG/30ML; MG/30ML
30 SUSPENSION ORAL EVERY 6 HOURS PRN
Status: DISCONTINUED | OUTPATIENT
Start: 2025-08-13 | End: 2025-08-14 | Stop reason: HOSPADM

## 2025-08-13 RX ORDER — BUMETANIDE 1 MG/1
2 TABLET ORAL DAILY
Status: DISCONTINUED | OUTPATIENT
Start: 2025-08-13 | End: 2025-08-14 | Stop reason: HOSPADM

## 2025-08-13 RX ADMIN — LEVOTHYROXINE SODIUM 75 MCG: 0.07 TABLET ORAL at 07:00

## 2025-08-13 RX ADMIN — BUMETANIDE 2 MG: 1 TABLET ORAL at 12:30

## 2025-08-13 RX ADMIN — TRAZODONE HYDROCHLORIDE 200 MG: 150 TABLET ORAL at 21:14

## 2025-08-13 RX ADMIN — Medication 2 PUFF: at 21:56

## 2025-08-13 RX ADMIN — ACETAMINOPHEN 1000 MG: 500 TABLET ORAL at 21:19

## 2025-08-13 RX ADMIN — ENOXAPARIN SODIUM 30 MG: 100 INJECTION SUBCUTANEOUS at 21:14

## 2025-08-13 RX ADMIN — CLOPIDOGREL BISULFATE 75 MG: 75 TABLET, FILM COATED ORAL at 12:38

## 2025-08-13 RX ADMIN — MORPHINE SULFATE 4 MG: 4 INJECTION, SOLUTION INTRAMUSCULAR; INTRAVENOUS at 07:00

## 2025-08-13 RX ADMIN — Medication 2 PUFF: at 07:34

## 2025-08-13 RX ADMIN — CITALOPRAM HYDROBROMIDE 40 MG: 20 TABLET ORAL at 08:20

## 2025-08-13 RX ADMIN — ASPIRIN 81 MG: 81 TABLET, CHEWABLE ORAL at 08:20

## 2025-08-13 RX ADMIN — TRAZODONE HYDROCHLORIDE 200 MG: 150 TABLET ORAL at 00:52

## 2025-08-13 RX ADMIN — METFORMIN HYDROCHLORIDE 500 MG: 500 TABLET ORAL at 17:24

## 2025-08-13 RX ADMIN — METOPROLOL SUCCINATE 50 MG: 50 TABLET, EXTENDED RELEASE ORAL at 08:20

## 2025-08-13 RX ADMIN — ACETAMINOPHEN 1000 MG: 500 TABLET ORAL at 11:08

## 2025-08-13 RX ADMIN — METFORMIN HYDROCHLORIDE 500 MG: 500 TABLET ORAL at 08:22

## 2025-08-13 RX ADMIN — SODIUM CHLORIDE, PRESERVATIVE FREE 10 ML: 5 INJECTION INTRAVENOUS at 21:16

## 2025-08-13 RX ADMIN — SODIUM CHLORIDE, PRESERVATIVE FREE 10 ML: 5 INJECTION INTRAVENOUS at 08:26

## 2025-08-13 RX ADMIN — ENOXAPARIN SODIUM 30 MG: 100 INJECTION SUBCUTANEOUS at 08:21

## 2025-08-13 RX ADMIN — ATORVASTATIN CALCIUM 40 MG: 40 TABLET, FILM COATED ORAL at 08:20

## 2025-08-13 ASSESSMENT — ENCOUNTER SYMPTOMS
DIARRHEA: 0
BACK PAIN: 0
VOMITING: 0
NAUSEA: 0
ABDOMINAL DISTENTION: 0

## 2025-08-13 ASSESSMENT — PAIN SCALES - GENERAL
PAINLEVEL_OUTOF10: 7
PAINLEVEL_OUTOF10: 7
PAINLEVEL_OUTOF10: 0
PAINLEVEL_OUTOF10: 3
PAINLEVEL_OUTOF10: 8
PAINLEVEL_OUTOF10: 0
PAINLEVEL_OUTOF10: 0
PAINLEVEL_OUTOF10: 9

## 2025-08-13 ASSESSMENT — PAIN DESCRIPTION - LOCATION
LOCATION: HEAD
LOCATION: CHEST

## 2025-08-13 ASSESSMENT — PAIN - FUNCTIONAL ASSESSMENT
PAIN_FUNCTIONAL_ASSESSMENT: 0-10

## 2025-08-13 ASSESSMENT — PAIN DESCRIPTION - DESCRIPTORS: DESCRIPTORS: ACHING

## 2025-08-14 VITALS
SYSTOLIC BLOOD PRESSURE: 108 MMHG | BODY MASS INDEX: 38.36 KG/M2 | HEIGHT: 76 IN | RESPIRATION RATE: 18 BRPM | WEIGHT: 315 LBS | HEART RATE: 95 BPM | TEMPERATURE: 98.1 F | DIASTOLIC BLOOD PRESSURE: 68 MMHG | OXYGEN SATURATION: 93 %

## 2025-08-14 LAB
GLUCOSE BLD STRIP.AUTO-MCNC: 113 MG/DL (ref 65–100)
GLUCOSE BLD STRIP.AUTO-MCNC: 120 MG/DL (ref 65–100)
PERFORMED BY:: ABNORMAL
PERFORMED BY:: ABNORMAL

## 2025-08-14 PROCEDURE — 94640 AIRWAY INHALATION TREATMENT: CPT

## 2025-08-14 PROCEDURE — 6370000000 HC RX 637 (ALT 250 FOR IP)

## 2025-08-14 PROCEDURE — 82962 GLUCOSE BLOOD TEST: CPT

## 2025-08-14 PROCEDURE — 6360000002 HC RX W HCPCS: Performed by: HOSPITALIST

## 2025-08-14 PROCEDURE — 2500000003 HC RX 250 WO HCPCS: Performed by: HOSPITALIST

## 2025-08-14 PROCEDURE — 6370000000 HC RX 637 (ALT 250 FOR IP): Performed by: HOSPITALIST

## 2025-08-14 PROCEDURE — 94761 N-INVAS EAR/PLS OXIMETRY MLT: CPT

## 2025-08-14 RX ORDER — ATORVASTATIN CALCIUM 80 MG/1
80 TABLET, FILM COATED ORAL DAILY
Qty: 30 TABLET | Refills: 3 | Status: SHIPPED | OUTPATIENT
Start: 2025-08-15

## 2025-08-14 RX ORDER — PANTOPRAZOLE SODIUM 40 MG/1
40 TABLET, DELAYED RELEASE ORAL
Qty: 30 TABLET | Refills: 3 | Status: SHIPPED | OUTPATIENT
Start: 2025-08-15

## 2025-08-14 RX ADMIN — Medication 2 PUFF: at 08:00

## 2025-08-14 RX ADMIN — CITALOPRAM HYDROBROMIDE 40 MG: 20 TABLET ORAL at 08:44

## 2025-08-14 RX ADMIN — ENOXAPARIN SODIUM 30 MG: 100 INJECTION SUBCUTANEOUS at 08:44

## 2025-08-14 RX ADMIN — SODIUM CHLORIDE, PRESERVATIVE FREE 10 ML: 5 INJECTION INTRAVENOUS at 08:46

## 2025-08-14 RX ADMIN — CLOPIDOGREL BISULFATE 75 MG: 75 TABLET, FILM COATED ORAL at 08:44

## 2025-08-14 RX ADMIN — BUMETANIDE 2 MG: 1 TABLET ORAL at 08:44

## 2025-08-14 RX ADMIN — ATORVASTATIN CALCIUM 80 MG: 40 TABLET, FILM COATED ORAL at 08:44

## 2025-08-14 RX ADMIN — PANTOPRAZOLE SODIUM 40 MG: 40 TABLET, DELAYED RELEASE ORAL at 08:44

## 2025-08-14 RX ADMIN — METFORMIN HYDROCHLORIDE 500 MG: 500 TABLET ORAL at 08:44

## 2025-08-14 RX ADMIN — METOPROLOL SUCCINATE 50 MG: 50 TABLET, EXTENDED RELEASE ORAL at 08:44

## 2025-08-14 RX ADMIN — ASPIRIN 81 MG: 81 TABLET, CHEWABLE ORAL at 08:43

## 2025-08-14 RX ADMIN — LEVOTHYROXINE SODIUM 75 MCG: 0.07 TABLET ORAL at 08:44

## 2025-08-14 ASSESSMENT — PAIN SCALES - GENERAL
PAINLEVEL_OUTOF10: 0
PAINLEVEL_OUTOF10: 0